# Patient Record
Sex: MALE | Race: WHITE | NOT HISPANIC OR LATINO | Employment: OTHER
[De-identification: names, ages, dates, MRNs, and addresses within clinical notes are randomized per-mention and may not be internally consistent; named-entity substitution may affect disease eponyms.]

---

## 2017-01-05 ENCOUNTER — RECORDS - HEALTHEAST (OUTPATIENT)
Dept: ADMINISTRATIVE | Facility: OTHER | Age: 77
End: 2017-01-05

## 2017-01-06 ENCOUNTER — RECORDS - HEALTHEAST (OUTPATIENT)
Dept: ADMINISTRATIVE | Facility: OTHER | Age: 77
End: 2017-01-06

## 2017-01-13 ENCOUNTER — RECORDS - HEALTHEAST (OUTPATIENT)
Dept: ADMINISTRATIVE | Facility: OTHER | Age: 77
End: 2017-01-13

## 2017-01-14 ENCOUNTER — COMMUNICATION - HEALTHEAST (OUTPATIENT)
Dept: INTERNAL MEDICINE | Facility: CLINIC | Age: 77
End: 2017-01-14

## 2017-01-14 DIAGNOSIS — E11.9 TYPE 2 DIABETES MELLITUS (H): ICD-10-CM

## 2017-01-27 ENCOUNTER — RECORDS - HEALTHEAST (OUTPATIENT)
Dept: ADMINISTRATIVE | Facility: OTHER | Age: 77
End: 2017-01-27

## 2017-01-28 ENCOUNTER — COMMUNICATION - HEALTHEAST (OUTPATIENT)
Dept: INTERNAL MEDICINE | Facility: CLINIC | Age: 77
End: 2017-01-28

## 2017-01-28 DIAGNOSIS — E11.9 DIABETES MELLITUS, TYPE 2 (H): ICD-10-CM

## 2017-02-03 ENCOUNTER — RECORDS - HEALTHEAST (OUTPATIENT)
Dept: ADMINISTRATIVE | Facility: OTHER | Age: 77
End: 2017-02-03

## 2017-02-04 ENCOUNTER — COMMUNICATION - HEALTHEAST (OUTPATIENT)
Dept: INTERNAL MEDICINE | Facility: CLINIC | Age: 77
End: 2017-02-04

## 2017-02-04 DIAGNOSIS — I10 HTN (HYPERTENSION): ICD-10-CM

## 2017-02-10 ENCOUNTER — RECORDS - HEALTHEAST (OUTPATIENT)
Dept: ADMINISTRATIVE | Facility: OTHER | Age: 77
End: 2017-02-10

## 2017-02-24 ENCOUNTER — RECORDS - HEALTHEAST (OUTPATIENT)
Dept: ADMINISTRATIVE | Facility: OTHER | Age: 77
End: 2017-02-24

## 2017-03-02 ENCOUNTER — RECORDS - HEALTHEAST (OUTPATIENT)
Dept: ADMINISTRATIVE | Facility: OTHER | Age: 77
End: 2017-03-02

## 2017-03-16 ENCOUNTER — RECORDS - HEALTHEAST (OUTPATIENT)
Dept: ADMINISTRATIVE | Facility: OTHER | Age: 77
End: 2017-03-16

## 2017-03-27 ENCOUNTER — RECORDS - HEALTHEAST (OUTPATIENT)
Dept: ADMINISTRATIVE | Facility: OTHER | Age: 77
End: 2017-03-27

## 2017-05-01 ENCOUNTER — COMMUNICATION - HEALTHEAST (OUTPATIENT)
Dept: INTERNAL MEDICINE | Facility: CLINIC | Age: 77
End: 2017-05-01

## 2017-05-01 ENCOUNTER — AMBULATORY - HEALTHEAST (OUTPATIENT)
Dept: SCHEDULING | Facility: CLINIC | Age: 77
End: 2017-05-01

## 2017-05-01 ENCOUNTER — RECORDS - HEALTHEAST (OUTPATIENT)
Dept: ADMINISTRATIVE | Facility: OTHER | Age: 77
End: 2017-05-01

## 2017-05-01 DIAGNOSIS — Z09 FOLLOW UP: ICD-10-CM

## 2017-05-01 DIAGNOSIS — I10 HTN (HYPERTENSION): ICD-10-CM

## 2017-05-02 ENCOUNTER — RECORDS - HEALTHEAST (OUTPATIENT)
Dept: ADMINISTRATIVE | Facility: OTHER | Age: 77
End: 2017-05-02

## 2017-05-03 ENCOUNTER — OFFICE VISIT - HEALTHEAST (OUTPATIENT)
Dept: INTERNAL MEDICINE | Facility: CLINIC | Age: 77
End: 2017-05-03

## 2017-05-03 ENCOUNTER — COMMUNICATION - HEALTHEAST (OUTPATIENT)
Dept: INTERNAL MEDICINE | Facility: CLINIC | Age: 77
End: 2017-05-03

## 2017-05-03 DIAGNOSIS — Z01.818 PRE-OPERATIVE EXAMINATION FOR INTERNAL MEDICINE: ICD-10-CM

## 2017-05-03 DIAGNOSIS — C18.9 COLON CANCER (H): ICD-10-CM

## 2017-05-03 DIAGNOSIS — M10.9 GOUT, UNSPECIFIED: ICD-10-CM

## 2017-05-03 DIAGNOSIS — I66.9 CEREBRAL ARTERY OCCLUSION: ICD-10-CM

## 2017-05-03 DIAGNOSIS — I77.9 BILATERAL CAROTID ARTERY DISEASE (H): ICD-10-CM

## 2017-05-03 DIAGNOSIS — I10 ESSENTIAL HYPERTENSION: ICD-10-CM

## 2017-05-03 DIAGNOSIS — C67.9 MALIGNANT NEOPLASM OF BLADDER (H): ICD-10-CM

## 2017-05-03 DIAGNOSIS — E11.9 TYPE 2 DIABETES MELLITUS WITHOUT COMPLICATION (H): ICD-10-CM

## 2017-05-03 DIAGNOSIS — E53.8 OTHER B-COMPLEX DEFICIENCIES: ICD-10-CM

## 2017-05-03 LAB — HBA1C MFR BLD: 6.8 % (ref 3.5–6)

## 2017-05-03 ASSESSMENT — MIFFLIN-ST. JEOR: SCORE: 1504.6

## 2017-05-08 ENCOUNTER — HOSPITAL ENCOUNTER (OUTPATIENT)
Dept: ULTRASOUND IMAGING | Facility: CLINIC | Age: 77
Discharge: HOME | End: 2017-05-08
Attending: INTERNAL MEDICINE

## 2017-05-08 DIAGNOSIS — I65.29 CAROTID STENOSIS: ICD-10-CM

## 2017-05-08 DIAGNOSIS — I77.9 BILATERAL CAROTID ARTERY DISEASE (H): ICD-10-CM

## 2017-05-14 ENCOUNTER — COMMUNICATION - HEALTHEAST (OUTPATIENT)
Dept: INTERNAL MEDICINE | Facility: CLINIC | Age: 77
End: 2017-05-14

## 2017-05-14 DIAGNOSIS — I66.9 CEREBRAL ARTERY OCCLUSION: ICD-10-CM

## 2017-05-14 DIAGNOSIS — I10 HTN (HYPERTENSION): ICD-10-CM

## 2017-05-15 ENCOUNTER — RECORDS - HEALTHEAST (OUTPATIENT)
Dept: ADMINISTRATIVE | Facility: OTHER | Age: 77
End: 2017-05-15

## 2017-05-16 ENCOUNTER — OFFICE VISIT - HEALTHEAST (OUTPATIENT)
Dept: INTERNAL MEDICINE | Facility: CLINIC | Age: 77
End: 2017-05-16

## 2017-05-16 DIAGNOSIS — R05.9 COUGH: ICD-10-CM

## 2017-05-16 DIAGNOSIS — J02.9 SORE THROAT: ICD-10-CM

## 2017-05-17 ENCOUNTER — AMBULATORY - HEALTHEAST (OUTPATIENT)
Dept: SCHEDULING | Facility: CLINIC | Age: 77
End: 2017-05-17

## 2017-05-17 DIAGNOSIS — C19 RECTOSIGMOID CANCER (H): ICD-10-CM

## 2017-05-30 ENCOUNTER — OFFICE VISIT - HEALTHEAST (OUTPATIENT)
Dept: WOUND CARE | Facility: HOSPITAL | Age: 77
End: 2017-05-30

## 2017-05-30 DIAGNOSIS — C19 RECTOSIGMOID CANCER (H): ICD-10-CM

## 2017-05-31 ENCOUNTER — ANESTHESIA - HEALTHEAST (OUTPATIENT)
Dept: SURGERY | Facility: HOSPITAL | Age: 77
End: 2017-05-31

## 2017-05-31 ASSESSMENT — MIFFLIN-ST. JEOR: SCORE: 1502.78

## 2017-06-01 ENCOUNTER — SURGERY - HEALTHEAST (OUTPATIENT)
Dept: SURGERY | Facility: HOSPITAL | Age: 77
End: 2017-06-01

## 2017-06-01 ASSESSMENT — MIFFLIN-ST. JEOR: SCORE: 1515.49

## 2017-06-02 ASSESSMENT — MIFFLIN-ST. JEOR: SCORE: 1516.39

## 2017-06-03 ASSESSMENT — MIFFLIN-ST. JEOR: SCORE: 1477.38

## 2017-06-04 ASSESSMENT — MIFFLIN-ST. JEOR: SCORE: 1513.67

## 2017-06-05 ENCOUNTER — HOME CARE/HOSPICE - HEALTHEAST (OUTPATIENT)
Dept: HOME HEALTH SERVICES | Facility: HOME HEALTH | Age: 77
End: 2017-06-05

## 2017-06-06 ENCOUNTER — HOME CARE/HOSPICE - HEALTHEAST (OUTPATIENT)
Dept: HOME HEALTH SERVICES | Facility: HOME HEALTH | Age: 77
End: 2017-06-06

## 2017-06-06 ASSESSMENT — MIFFLIN-ST. JEOR: SCORE: 1486.9

## 2017-06-08 ENCOUNTER — HOME CARE/HOSPICE - HEALTHEAST (OUTPATIENT)
Dept: HOME HEALTH SERVICES | Facility: HOME HEALTH | Age: 77
End: 2017-06-08

## 2017-06-08 ENCOUNTER — COMMUNICATION - HEALTHEAST (OUTPATIENT)
Dept: INTERNAL MEDICINE | Facility: CLINIC | Age: 77
End: 2017-06-08

## 2017-06-08 DIAGNOSIS — C20 RECTAL CANCER (H): ICD-10-CM

## 2017-06-11 ENCOUNTER — HOME CARE/HOSPICE - HEALTHEAST (OUTPATIENT)
Dept: HOME HEALTH SERVICES | Facility: HOME HEALTH | Age: 77
End: 2017-06-11

## 2017-06-12 ENCOUNTER — HOME CARE/HOSPICE - HEALTHEAST (OUTPATIENT)
Dept: HOME HEALTH SERVICES | Facility: HOME HEALTH | Age: 77
End: 2017-06-12

## 2017-06-12 ENCOUNTER — RECORDS - HEALTHEAST (OUTPATIENT)
Dept: ADMINISTRATIVE | Facility: OTHER | Age: 77
End: 2017-06-12

## 2017-06-13 ENCOUNTER — OFFICE VISIT - HEALTHEAST (OUTPATIENT)
Dept: WOUND CARE | Facility: HOSPITAL | Age: 77
End: 2017-06-13

## 2017-06-13 DIAGNOSIS — C20 RECTAL CANCER (H): ICD-10-CM

## 2017-06-15 ENCOUNTER — COMMUNICATION - HEALTHEAST (OUTPATIENT)
Dept: HOME HEALTH SERVICES | Facility: HOME HEALTH | Age: 77
End: 2017-06-15

## 2017-06-15 ENCOUNTER — HOME CARE/HOSPICE - HEALTHEAST (OUTPATIENT)
Dept: HOME HEALTH SERVICES | Facility: HOME HEALTH | Age: 77
End: 2017-06-15

## 2017-06-19 ENCOUNTER — HOME CARE/HOSPICE - HEALTHEAST (OUTPATIENT)
Dept: HOME HEALTH SERVICES | Facility: HOME HEALTH | Age: 77
End: 2017-06-19

## 2017-06-20 ENCOUNTER — HOME CARE/HOSPICE - HEALTHEAST (OUTPATIENT)
Dept: HOME HEALTH SERVICES | Facility: HOME HEALTH | Age: 77
End: 2017-06-20

## 2017-06-21 ENCOUNTER — RECORDS - HEALTHEAST (OUTPATIENT)
Dept: ADMINISTRATIVE | Facility: OTHER | Age: 77
End: 2017-06-21

## 2017-06-22 ENCOUNTER — HOME CARE/HOSPICE - HEALTHEAST (OUTPATIENT)
Dept: HOME HEALTH SERVICES | Facility: HOME HEALTH | Age: 77
End: 2017-06-22

## 2017-06-23 ENCOUNTER — RECORDS - HEALTHEAST (OUTPATIENT)
Dept: ADMINISTRATIVE | Facility: OTHER | Age: 77
End: 2017-06-23

## 2017-06-23 ENCOUNTER — HOME CARE/HOSPICE - HEALTHEAST (OUTPATIENT)
Dept: HOME HEALTH SERVICES | Facility: HOME HEALTH | Age: 77
End: 2017-06-23

## 2017-06-25 ENCOUNTER — HOME CARE/HOSPICE - HEALTHEAST (OUTPATIENT)
Dept: HOME HEALTH SERVICES | Facility: HOME HEALTH | Age: 77
End: 2017-06-25

## 2017-06-26 ENCOUNTER — HOME CARE/HOSPICE - HEALTHEAST (OUTPATIENT)
Dept: HOME HEALTH SERVICES | Facility: HOME HEALTH | Age: 77
End: 2017-06-26

## 2017-06-26 ENCOUNTER — COMMUNICATION - HEALTHEAST (OUTPATIENT)
Dept: HOME HEALTH SERVICES | Facility: HOME HEALTH | Age: 77
End: 2017-06-26

## 2017-06-29 ENCOUNTER — HOME CARE/HOSPICE - HEALTHEAST (OUTPATIENT)
Dept: HOME HEALTH SERVICES | Facility: HOME HEALTH | Age: 77
End: 2017-06-29

## 2017-06-29 ENCOUNTER — OFFICE VISIT - HEALTHEAST (OUTPATIENT)
Dept: INTERNAL MEDICINE | Facility: CLINIC | Age: 77
End: 2017-06-29

## 2017-06-29 ENCOUNTER — COMMUNICATION - HEALTHEAST (OUTPATIENT)
Dept: HOME HEALTH SERVICES | Facility: HOME HEALTH | Age: 77
End: 2017-06-29

## 2017-06-29 DIAGNOSIS — E11.9 TYPE 2 DIABETES MELLITUS WITHOUT COMPLICATION (H): ICD-10-CM

## 2017-06-29 DIAGNOSIS — I10 ESSENTIAL HYPERTENSION: ICD-10-CM

## 2017-06-29 DIAGNOSIS — E87.5 HYPERKALEMIA: ICD-10-CM

## 2017-07-03 ENCOUNTER — COMMUNICATION - HEALTHEAST (OUTPATIENT)
Dept: INTERNAL MEDICINE | Facility: CLINIC | Age: 77
End: 2017-07-03

## 2017-07-03 ENCOUNTER — HOME CARE/HOSPICE - HEALTHEAST (OUTPATIENT)
Dept: HOME HEALTH SERVICES | Facility: HOME HEALTH | Age: 77
End: 2017-07-03

## 2017-07-05 ENCOUNTER — HOSPITAL ENCOUNTER (OUTPATIENT)
Dept: RADIOLOGY | Facility: HOSPITAL | Age: 77
Discharge: HOME | End: 2017-07-05
Attending: COLON & RECTAL SURGERY

## 2017-07-05 DIAGNOSIS — Z93.2 ILEOSTOMY IN PLACE (H): ICD-10-CM

## 2017-07-06 ENCOUNTER — HOME CARE/HOSPICE - HEALTHEAST (OUTPATIENT)
Dept: HOME HEALTH SERVICES | Facility: HOME HEALTH | Age: 77
End: 2017-07-06

## 2017-07-07 ENCOUNTER — HOME CARE/HOSPICE - HEALTHEAST (OUTPATIENT)
Dept: HOME HEALTH SERVICES | Facility: HOME HEALTH | Age: 77
End: 2017-07-07

## 2017-07-10 ENCOUNTER — HOME CARE/HOSPICE - HEALTHEAST (OUTPATIENT)
Dept: HOME HEALTH SERVICES | Facility: HOME HEALTH | Age: 77
End: 2017-07-10

## 2017-07-11 ENCOUNTER — OFFICE VISIT - HEALTHEAST (OUTPATIENT)
Dept: INTERNAL MEDICINE | Facility: CLINIC | Age: 77
End: 2017-07-11

## 2017-07-11 ENCOUNTER — COMMUNICATION - HEALTHEAST (OUTPATIENT)
Dept: INTERNAL MEDICINE | Facility: CLINIC | Age: 77
End: 2017-07-11

## 2017-07-11 DIAGNOSIS — C20 RECTAL CANCER (H): ICD-10-CM

## 2017-07-11 DIAGNOSIS — Z01.818 PREOP EXAM FOR INTERNAL MEDICINE: ICD-10-CM

## 2017-07-11 DIAGNOSIS — I10 ESSENTIAL HYPERTENSION: ICD-10-CM

## 2017-07-11 DIAGNOSIS — E11.9 TYPE 2 DIABETES MELLITUS WITHOUT COMPLICATION (H): ICD-10-CM

## 2017-07-11 DIAGNOSIS — R11.0 NAUSEA: ICD-10-CM

## 2017-07-11 DIAGNOSIS — I77.9 BILATERAL CAROTID ARTERY DISEASE (H): ICD-10-CM

## 2017-07-12 ENCOUNTER — HOME CARE/HOSPICE - HEALTHEAST (OUTPATIENT)
Dept: HOME HEALTH SERVICES | Facility: HOME HEALTH | Age: 77
End: 2017-07-12

## 2017-07-13 ENCOUNTER — HOME CARE/HOSPICE - HEALTHEAST (OUTPATIENT)
Dept: HOME HEALTH SERVICES | Facility: HOME HEALTH | Age: 77
End: 2017-07-13

## 2017-07-20 ENCOUNTER — COMMUNICATION - HEALTHEAST (OUTPATIENT)
Dept: INTERNAL MEDICINE | Facility: CLINIC | Age: 77
End: 2017-07-20

## 2017-07-22 ENCOUNTER — HOME CARE/HOSPICE - HEALTHEAST (OUTPATIENT)
Dept: HOME HEALTH SERVICES | Facility: HOME HEALTH | Age: 77
End: 2017-07-22

## 2017-07-23 ENCOUNTER — COMMUNICATION - HEALTHEAST (OUTPATIENT)
Dept: HOME HEALTH SERVICES | Facility: HOME HEALTH | Age: 77
End: 2017-07-23

## 2017-07-23 ENCOUNTER — HOME CARE/HOSPICE - HEALTHEAST (OUTPATIENT)
Dept: HOME HEALTH SERVICES | Facility: HOME HEALTH | Age: 77
End: 2017-07-23

## 2017-07-24 ENCOUNTER — OFFICE VISIT - HEALTHEAST (OUTPATIENT)
Dept: INTERNAL MEDICINE | Facility: CLINIC | Age: 77
End: 2017-07-24

## 2017-07-24 ENCOUNTER — AMBULATORY - HEALTHEAST (OUTPATIENT)
Dept: INTERNAL MEDICINE | Facility: CLINIC | Age: 77
End: 2017-07-24

## 2017-07-24 DIAGNOSIS — E44.0 MALNUTRITION OF MODERATE DEGREE (H): ICD-10-CM

## 2017-07-24 DIAGNOSIS — C20 RECTAL CANCER (H): ICD-10-CM

## 2017-07-24 DIAGNOSIS — E87.20 METABOLIC ACIDOSIS: ICD-10-CM

## 2017-07-24 DIAGNOSIS — I10 ESSENTIAL HYPERTENSION: ICD-10-CM

## 2017-07-24 DIAGNOSIS — N17.9 PRERENAL ACUTE RENAL FAILURE (H): ICD-10-CM

## 2017-07-24 DIAGNOSIS — E87.5 HYPERKALEMIA: ICD-10-CM

## 2017-07-25 ENCOUNTER — COMMUNICATION - HEALTHEAST (OUTPATIENT)
Dept: INTERNAL MEDICINE | Facility: CLINIC | Age: 77
End: 2017-07-25

## 2017-07-25 ENCOUNTER — HOME CARE/HOSPICE - HEALTHEAST (OUTPATIENT)
Dept: HOME HEALTH SERVICES | Facility: HOME HEALTH | Age: 77
End: 2017-07-25

## 2017-07-26 ENCOUNTER — HOME CARE/HOSPICE - HEALTHEAST (OUTPATIENT)
Dept: HOME HEALTH SERVICES | Facility: HOME HEALTH | Age: 77
End: 2017-07-26

## 2017-07-26 ENCOUNTER — COMMUNICATION - HEALTHEAST (OUTPATIENT)
Dept: INTERNAL MEDICINE | Facility: CLINIC | Age: 77
End: 2017-07-26

## 2017-07-27 ENCOUNTER — COMMUNICATION - HEALTHEAST (OUTPATIENT)
Dept: INTERNAL MEDICINE | Facility: CLINIC | Age: 77
End: 2017-07-27

## 2017-07-27 ENCOUNTER — HOME CARE/HOSPICE - HEALTHEAST (OUTPATIENT)
Dept: HOME HEALTH SERVICES | Facility: HOME HEALTH | Age: 77
End: 2017-07-27

## 2017-07-28 ENCOUNTER — HOME CARE/HOSPICE - HEALTHEAST (OUTPATIENT)
Dept: HOME HEALTH SERVICES | Facility: HOME HEALTH | Age: 77
End: 2017-07-28

## 2017-07-28 ENCOUNTER — RECORDS - HEALTHEAST (OUTPATIENT)
Dept: ADMINISTRATIVE | Facility: OTHER | Age: 77
End: 2017-07-28

## 2017-07-29 ENCOUNTER — HOME CARE/HOSPICE - HEALTHEAST (OUTPATIENT)
Dept: HOME HEALTH SERVICES | Facility: HOME HEALTH | Age: 77
End: 2017-07-29

## 2017-07-31 ENCOUNTER — HOME CARE/HOSPICE - HEALTHEAST (OUTPATIENT)
Dept: HOME HEALTH SERVICES | Facility: HOME HEALTH | Age: 77
End: 2017-07-31

## 2017-08-03 ENCOUNTER — OFFICE VISIT - HEALTHEAST (OUTPATIENT)
Dept: INTERNAL MEDICINE | Facility: CLINIC | Age: 77
End: 2017-08-03

## 2017-08-03 DIAGNOSIS — Z01.818 PREOP EXAM FOR INTERNAL MEDICINE: ICD-10-CM

## 2017-08-03 DIAGNOSIS — E11.9 TYPE 2 DIABETES MELLITUS WITHOUT COMPLICATION (H): ICD-10-CM

## 2017-08-03 DIAGNOSIS — E46 MALNUTRITION (H): ICD-10-CM

## 2017-08-03 DIAGNOSIS — C20 RECTAL CANCER (H): ICD-10-CM

## 2017-08-03 DIAGNOSIS — I10 ESSENTIAL HYPERTENSION: ICD-10-CM

## 2017-08-03 DIAGNOSIS — N17.9 AKI (ACUTE KIDNEY INJURY) (H): ICD-10-CM

## 2017-08-04 ENCOUNTER — HOME CARE/HOSPICE - HEALTHEAST (OUTPATIENT)
Dept: HOME HEALTH SERVICES | Facility: HOME HEALTH | Age: 77
End: 2017-08-04

## 2017-08-14 ENCOUNTER — ANESTHESIA - HEALTHEAST (OUTPATIENT)
Dept: SURGERY | Facility: HOSPITAL | Age: 77
End: 2017-08-14

## 2017-08-14 ENCOUNTER — COMMUNICATION - HEALTHEAST (OUTPATIENT)
Dept: INTERNAL MEDICINE | Facility: CLINIC | Age: 77
End: 2017-08-14

## 2017-08-14 ASSESSMENT — MIFFLIN-ST. JEOR: SCORE: 1452.89

## 2017-08-15 ENCOUNTER — SURGERY - HEALTHEAST (OUTPATIENT)
Dept: SURGERY | Facility: HOSPITAL | Age: 77
End: 2017-08-15

## 2017-08-15 ASSESSMENT — MIFFLIN-ST. JEOR: SCORE: 1441.31

## 2017-08-17 ASSESSMENT — MIFFLIN-ST. JEOR: SCORE: 1441.31

## 2017-08-18 ENCOUNTER — HOME CARE/HOSPICE - HEALTHEAST (OUTPATIENT)
Dept: HOME HEALTH SERVICES | Facility: HOME HEALTH | Age: 77
End: 2017-08-18

## 2017-08-19 ENCOUNTER — COMMUNICATION - HEALTHEAST (OUTPATIENT)
Dept: INTERNAL MEDICINE | Facility: CLINIC | Age: 77
End: 2017-08-19

## 2017-08-21 ENCOUNTER — COMMUNICATION - HEALTHEAST (OUTPATIENT)
Dept: INTERNAL MEDICINE | Facility: CLINIC | Age: 77
End: 2017-08-21

## 2017-08-21 DIAGNOSIS — I10 ESSENTIAL HYPERTENSION WITH GOAL BLOOD PRESSURE LESS THAN 130/80: ICD-10-CM

## 2017-08-23 ENCOUNTER — COMMUNICATION - HEALTHEAST (OUTPATIENT)
Dept: INTERNAL MEDICINE | Facility: CLINIC | Age: 77
End: 2017-08-23

## 2017-08-23 DIAGNOSIS — E11.9 TYPE 2 DIABETES MELLITUS WITHOUT COMPLICATION (H): ICD-10-CM

## 2017-08-25 ENCOUNTER — OFFICE VISIT - HEALTHEAST (OUTPATIENT)
Dept: INTERNAL MEDICINE | Facility: CLINIC | Age: 77
End: 2017-08-25

## 2017-08-25 ENCOUNTER — HOSPITAL ENCOUNTER (OUTPATIENT)
Dept: ADMINISTRATIVE | Facility: OTHER | Age: 77
Discharge: HOME | End: 2017-08-25

## 2017-08-25 DIAGNOSIS — R19.7 DIARRHEA: ICD-10-CM

## 2017-09-06 ENCOUNTER — RECORDS - HEALTHEAST (OUTPATIENT)
Dept: ADMINISTRATIVE | Facility: OTHER | Age: 77
End: 2017-09-06

## 2017-09-11 ENCOUNTER — COMMUNICATION - HEALTHEAST (OUTPATIENT)
Dept: INTERNAL MEDICINE | Facility: CLINIC | Age: 77
End: 2017-09-11

## 2017-09-11 DIAGNOSIS — E11.9 TYPE 2 DIABETES MELLITUS WITHOUT COMPLICATION (H): ICD-10-CM

## 2017-09-14 ENCOUNTER — RECORDS - HEALTHEAST (OUTPATIENT)
Dept: ADMINISTRATIVE | Facility: OTHER | Age: 77
End: 2017-09-14

## 2017-09-29 ENCOUNTER — AMBULATORY - HEALTHEAST (OUTPATIENT)
Dept: INTERNAL MEDICINE | Facility: CLINIC | Age: 77
End: 2017-09-29

## 2017-09-29 DIAGNOSIS — E53.8 B12 DEFICIENCY: ICD-10-CM

## 2017-09-29 DIAGNOSIS — E53.8 OTHER B-COMPLEX DEFICIENCIES: ICD-10-CM

## 2017-10-03 ENCOUNTER — AMBULATORY - HEALTHEAST (OUTPATIENT)
Dept: NURSING | Facility: CLINIC | Age: 77
End: 2017-10-03

## 2017-10-07 ENCOUNTER — COMMUNICATION - HEALTHEAST (OUTPATIENT)
Dept: INTERNAL MEDICINE | Facility: CLINIC | Age: 77
End: 2017-10-07

## 2017-10-09 ENCOUNTER — COMMUNICATION - HEALTHEAST (OUTPATIENT)
Dept: INTERNAL MEDICINE | Facility: CLINIC | Age: 77
End: 2017-10-09

## 2017-10-09 DIAGNOSIS — I66.9 CEREBRAL ARTERY OCCLUSION: ICD-10-CM

## 2017-11-26 ENCOUNTER — COMMUNICATION - HEALTHEAST (OUTPATIENT)
Dept: INTERNAL MEDICINE | Facility: CLINIC | Age: 77
End: 2017-11-26

## 2017-11-26 DIAGNOSIS — I10 HTN (HYPERTENSION): ICD-10-CM

## 2017-12-07 ENCOUNTER — OFFICE VISIT - HEALTHEAST (OUTPATIENT)
Dept: INTERNAL MEDICINE | Facility: CLINIC | Age: 77
End: 2017-12-07

## 2017-12-07 DIAGNOSIS — I66.9 CEREBRAL ARTERY OCCLUSION: ICD-10-CM

## 2017-12-07 DIAGNOSIS — E11.9 TYPE 2 DIABETES MELLITUS WITHOUT COMPLICATION (H): ICD-10-CM

## 2017-12-07 DIAGNOSIS — I10 ESSENTIAL HYPERTENSION: ICD-10-CM

## 2017-12-07 DIAGNOSIS — E53.8 B12 DEFICIENCY: ICD-10-CM

## 2017-12-07 LAB — HBA1C MFR BLD: 8.7 % (ref 3.5–6)

## 2018-03-02 ENCOUNTER — RECORDS - HEALTHEAST (OUTPATIENT)
Dept: ADMINISTRATIVE | Facility: OTHER | Age: 78
End: 2018-03-02

## 2018-03-06 ENCOUNTER — RECORDS - HEALTHEAST (OUTPATIENT)
Dept: ADMINISTRATIVE | Facility: OTHER | Age: 78
End: 2018-03-06

## 2018-03-15 ENCOUNTER — RECORDS - HEALTHEAST (OUTPATIENT)
Dept: ADMINISTRATIVE | Facility: OTHER | Age: 78
End: 2018-03-15

## 2018-03-19 ENCOUNTER — HOSPITAL ENCOUNTER (OUTPATIENT)
Dept: CT IMAGING | Facility: CLINIC | Age: 78
Discharge: HOME | End: 2018-03-19
Attending: COLON & RECTAL SURGERY

## 2018-03-19 DIAGNOSIS — Z85.048 PERSONAL HISTORY OF RECTAL CANCER: ICD-10-CM

## 2018-03-19 LAB
CREAT BLD-MCNC: 1.3 MG/DL
CREAT BLD-MCNC: 1.3 MG/DL (ref 0.7–1.3)
POC GFR AMER AF HE - HISTORICAL: >60 ML/MIN/1.73M2
POC GFR NON AMER AF HE - HISTORICAL: 53 ML/MIN/1.73M2

## 2018-03-30 ENCOUNTER — OFFICE VISIT - HEALTHEAST (OUTPATIENT)
Dept: INTERNAL MEDICINE | Facility: CLINIC | Age: 78
End: 2018-03-30

## 2018-03-30 DIAGNOSIS — I66.9 CEREBRAL ARTERY OCCLUSION: ICD-10-CM

## 2018-03-30 DIAGNOSIS — C67.9 MALIGNANT NEOPLASM OF BLADDER (H): ICD-10-CM

## 2018-03-30 DIAGNOSIS — I10 ESSENTIAL HYPERTENSION: ICD-10-CM

## 2018-03-30 DIAGNOSIS — E11.9 TYPE 2 DIABETES MELLITUS WITHOUT COMPLICATION (H): ICD-10-CM

## 2018-03-30 LAB — HBA1C MFR BLD: 7.4 % (ref 3.5–6)

## 2018-04-12 ENCOUNTER — OFFICE VISIT - HEALTHEAST (OUTPATIENT)
Dept: INTERNAL MEDICINE | Facility: CLINIC | Age: 78
End: 2018-04-12

## 2018-04-12 DIAGNOSIS — Z51.89 ENCOUNTER FOR WOUND RE-CHECK: ICD-10-CM

## 2018-04-16 ENCOUNTER — COMMUNICATION - HEALTHEAST (OUTPATIENT)
Dept: INTERNAL MEDICINE | Facility: CLINIC | Age: 78
End: 2018-04-16

## 2018-04-16 DIAGNOSIS — I66.9 CEREBRAL ARTERY OCCLUSION: ICD-10-CM

## 2018-04-19 ENCOUNTER — COMMUNICATION - HEALTHEAST (OUTPATIENT)
Dept: INTERNAL MEDICINE | Facility: CLINIC | Age: 78
End: 2018-04-19

## 2018-04-20 ENCOUNTER — OFFICE VISIT - HEALTHEAST (OUTPATIENT)
Dept: INTERNAL MEDICINE | Facility: CLINIC | Age: 78
End: 2018-04-20

## 2018-04-20 DIAGNOSIS — S91.109S OPEN TOE WOUND, SEQUELA: ICD-10-CM

## 2018-04-27 ENCOUNTER — OFFICE VISIT - HEALTHEAST (OUTPATIENT)
Dept: INTERNAL MEDICINE | Facility: CLINIC | Age: 78
End: 2018-04-27

## 2018-04-27 DIAGNOSIS — S91.109S OPEN TOE WOUND, SEQUELA: ICD-10-CM

## 2018-05-25 ENCOUNTER — AMBULATORY - HEALTHEAST (OUTPATIENT)
Dept: LAB | Facility: CLINIC | Age: 78
End: 2018-05-25

## 2018-05-25 DIAGNOSIS — Z85.048 PERSONAL HISTORY OF MALIGNANT NEOPLASM OF RECTUM, RECTOSIGMOID JUNCTION, AND ANUS: ICD-10-CM

## 2018-05-25 LAB — CEA SERPL-MCNC: 3.9 NG/ML (ref 0–3)

## 2018-05-30 ENCOUNTER — OFFICE VISIT - HEALTHEAST (OUTPATIENT)
Dept: INTERNAL MEDICINE | Facility: CLINIC | Age: 78
End: 2018-05-30

## 2018-05-30 DIAGNOSIS — Z86.73 HISTORY OF STROKE: ICD-10-CM

## 2018-05-30 DIAGNOSIS — Z85.51 HISTORY OF BLADDER CANCER: ICD-10-CM

## 2018-05-30 DIAGNOSIS — I65.22 CAROTID ARTERY STENOSIS, ASYMPTOMATIC, LEFT: ICD-10-CM

## 2018-05-30 DIAGNOSIS — Z85.048 HISTORY OF RECTAL CANCER: ICD-10-CM

## 2018-05-30 DIAGNOSIS — Z51.81 MEDICATION MONITORING ENCOUNTER: ICD-10-CM

## 2018-05-30 DIAGNOSIS — E11.9 TYPE 2 DIABETES MELLITUS WITHOUT COMPLICATION, WITHOUT LONG-TERM CURRENT USE OF INSULIN (H): ICD-10-CM

## 2018-05-30 DIAGNOSIS — E53.8 B12 DEFICIENCY: ICD-10-CM

## 2018-05-30 DIAGNOSIS — I10 ESSENTIAL HYPERTENSION: ICD-10-CM

## 2018-05-30 LAB
ALBUMIN SERPL-MCNC: 3.5 G/DL (ref 3.5–5)
ALP SERPL-CCNC: 98 U/L (ref 45–120)
ALT SERPL W P-5'-P-CCNC: 12 U/L (ref 0–45)
ANION GAP SERPL CALCULATED.3IONS-SCNC: 12 MMOL/L (ref 5–18)
AST SERPL W P-5'-P-CCNC: 18 U/L (ref 0–40)
BILIRUB SERPL-MCNC: 0.4 MG/DL (ref 0–1)
BUN SERPL-MCNC: 23 MG/DL (ref 8–28)
CALCIUM SERPL-MCNC: 9.1 MG/DL (ref 8.5–10.5)
CHLORIDE BLD-SCNC: 107 MMOL/L (ref 98–107)
CO2 SERPL-SCNC: 19 MMOL/L (ref 22–31)
CREAT SERPL-MCNC: 1.29 MG/DL (ref 0.7–1.3)
ERYTHROCYTE [DISTWIDTH] IN BLOOD BY AUTOMATED COUNT: 13 % (ref 11–14.5)
FASTING STATUS PATIENT QL REPORTED: YES
GFR SERPL CREATININE-BSD FRML MDRD: 54 ML/MIN/1.73M2
GLUCOSE BLD-MCNC: 149 MG/DL (ref 70–125)
HCT VFR BLD AUTO: 37.2 % (ref 40–54)
HDLC SERPL-MCNC: 36 MG/DL
HGB BLD-MCNC: 12.4 G/DL (ref 14–18)
LDLC SERPL CALC-MCNC: 116 MG/DL
MCH RBC QN AUTO: 31.6 PG (ref 27–34)
MCHC RBC AUTO-ENTMCNC: 33.3 G/DL (ref 32–36)
MCV RBC AUTO: 95 FL (ref 80–100)
PLATELET # BLD AUTO: 206 THOU/UL (ref 140–440)
PMV BLD AUTO: 6.9 FL (ref 7–10)
POTASSIUM BLD-SCNC: 4.8 MMOL/L (ref 3.5–5)
PROT SERPL-MCNC: 6.4 G/DL (ref 6–8)
RBC # BLD AUTO: 3.92 MILL/UL (ref 4.4–6.2)
SODIUM SERPL-SCNC: 138 MMOL/L (ref 136–145)
WBC: 5 THOU/UL (ref 4–11)

## 2018-05-30 ASSESSMENT — MIFFLIN-ST. JEOR: SCORE: 1489.18

## 2018-05-31 ENCOUNTER — COMMUNICATION - HEALTHEAST (OUTPATIENT)
Dept: INTERNAL MEDICINE | Facility: CLINIC | Age: 78
End: 2018-05-31

## 2018-05-31 ENCOUNTER — HOSPITAL ENCOUNTER (OUTPATIENT)
Dept: ULTRASOUND IMAGING | Facility: CLINIC | Age: 78
Discharge: HOME | End: 2018-05-31
Attending: INTERNAL MEDICINE

## 2018-05-31 DIAGNOSIS — I65.22 CAROTID ARTERY STENOSIS, ASYMPTOMATIC, LEFT: ICD-10-CM

## 2018-05-31 DIAGNOSIS — E11.9 TYPE 2 DIABETES MELLITUS WITHOUT COMPLICATION, WITHOUT LONG-TERM CURRENT USE OF INSULIN (H): ICD-10-CM

## 2018-06-01 ENCOUNTER — COMMUNICATION - HEALTHEAST (OUTPATIENT)
Dept: INTERNAL MEDICINE | Facility: CLINIC | Age: 78
End: 2018-06-01

## 2018-06-07 ENCOUNTER — COMMUNICATION - HEALTHEAST (OUTPATIENT)
Dept: INTERNAL MEDICINE | Facility: CLINIC | Age: 78
End: 2018-06-07

## 2018-06-07 DIAGNOSIS — E11.9 TYPE 2 DIABETES MELLITUS (H): ICD-10-CM

## 2018-06-12 ENCOUNTER — OFFICE VISIT - HEALTHEAST (OUTPATIENT)
Dept: INTERNAL MEDICINE | Facility: CLINIC | Age: 78
End: 2018-06-12

## 2018-06-12 DIAGNOSIS — E53.8 B12 DEFICIENCY: ICD-10-CM

## 2018-06-12 DIAGNOSIS — Z86.73 HISTORY OF STROKE: ICD-10-CM

## 2018-06-12 DIAGNOSIS — Z51.81 MEDICATION MONITORING ENCOUNTER: ICD-10-CM

## 2018-06-12 DIAGNOSIS — I10 ESSENTIAL HYPERTENSION: ICD-10-CM

## 2018-06-12 DIAGNOSIS — E11.9 TYPE 2 DIABETES MELLITUS WITHOUT COMPLICATION, WITHOUT LONG-TERM CURRENT USE OF INSULIN (H): ICD-10-CM

## 2018-06-12 DIAGNOSIS — I65.22 CAROTID ARTERY STENOSIS, ASYMPTOMATIC, LEFT: ICD-10-CM

## 2018-06-12 DIAGNOSIS — Z85.048 HISTORY OF RECTAL CANCER: ICD-10-CM

## 2018-06-12 DIAGNOSIS — Z85.51 HISTORY OF BLADDER CANCER: ICD-10-CM

## 2018-06-12 LAB
ANION GAP SERPL CALCULATED.3IONS-SCNC: 7 MMOL/L (ref 5–18)
BUN SERPL-MCNC: 30 MG/DL (ref 8–28)
CALCIUM SERPL-MCNC: 8.9 MG/DL (ref 8.5–10.5)
CHLORIDE BLD-SCNC: 105 MMOL/L (ref 98–107)
CO2 SERPL-SCNC: 26 MMOL/L (ref 22–31)
CREAT SERPL-MCNC: 1.35 MG/DL (ref 0.7–1.3)
GFR SERPL CREATININE-BSD FRML MDRD: 51 ML/MIN/1.73M2
GLUCOSE BLD-MCNC: 222 MG/DL (ref 70–125)
POTASSIUM BLD-SCNC: 4.3 MMOL/L (ref 3.5–5)
SODIUM SERPL-SCNC: 138 MMOL/L (ref 136–145)

## 2018-06-12 ASSESSMENT — MIFFLIN-ST. JEOR: SCORE: 1498.25

## 2018-06-13 ENCOUNTER — COMMUNICATION - HEALTHEAST (OUTPATIENT)
Dept: INTERNAL MEDICINE | Facility: CLINIC | Age: 78
End: 2018-06-13

## 2018-06-14 ENCOUNTER — RECORDS - HEALTHEAST (OUTPATIENT)
Dept: ADMINISTRATIVE | Facility: OTHER | Age: 78
End: 2018-06-14

## 2018-07-17 ENCOUNTER — OFFICE VISIT - HEALTHEAST (OUTPATIENT)
Dept: INTERNAL MEDICINE | Facility: CLINIC | Age: 78
End: 2018-07-17

## 2018-07-17 DIAGNOSIS — E53.8 B12 DEFICIENCY: ICD-10-CM

## 2018-07-17 DIAGNOSIS — E11.40 CONTROLLED TYPE 2 DIABETES WITH NEUROPATHY (H): ICD-10-CM

## 2018-07-17 DIAGNOSIS — Z86.73 HISTORY OF STROKE: ICD-10-CM

## 2018-07-17 DIAGNOSIS — Z85.51 HISTORY OF BLADDER CANCER: ICD-10-CM

## 2018-07-17 DIAGNOSIS — Z85.048 HISTORY OF RECTAL CANCER: ICD-10-CM

## 2018-07-17 DIAGNOSIS — F41.9 ANXIETY: ICD-10-CM

## 2018-07-17 DIAGNOSIS — I10 ESSENTIAL HYPERTENSION: ICD-10-CM

## 2018-07-17 DIAGNOSIS — I65.22 CAROTID ARTERY STENOSIS, ASYMPTOMATIC, LEFT: ICD-10-CM

## 2018-07-17 DIAGNOSIS — Z51.81 MEDICATION MONITORING ENCOUNTER: ICD-10-CM

## 2018-07-17 LAB
ALBUMIN SERPL-MCNC: 3.6 G/DL (ref 3.5–5)
ALP SERPL-CCNC: 87 U/L (ref 45–120)
ALT SERPL W P-5'-P-CCNC: 10 U/L (ref 0–45)
ANION GAP SERPL CALCULATED.3IONS-SCNC: 12 MMOL/L (ref 5–18)
AST SERPL W P-5'-P-CCNC: 11 U/L (ref 0–40)
BILIRUB SERPL-MCNC: 0.3 MG/DL (ref 0–1)
BUN SERPL-MCNC: 26 MG/DL (ref 8–28)
CALCIUM SERPL-MCNC: 8.9 MG/DL (ref 8.5–10.5)
CHLORIDE BLD-SCNC: 109 MMOL/L (ref 98–107)
CO2 SERPL-SCNC: 19 MMOL/L (ref 22–31)
CREAT SERPL-MCNC: 1.39 MG/DL (ref 0.7–1.3)
GFR SERPL CREATININE-BSD FRML MDRD: 49 ML/MIN/1.73M2
GLUCOSE BLD-MCNC: 157 MG/DL (ref 70–125)
HBA1C MFR BLD: 6.9 % (ref 3.5–6)
POTASSIUM BLD-SCNC: 4.7 MMOL/L (ref 3.5–5)
PROT SERPL-MCNC: 6 G/DL (ref 6–8)
SODIUM SERPL-SCNC: 140 MMOL/L (ref 136–145)

## 2018-07-17 ASSESSMENT — MIFFLIN-ST. JEOR: SCORE: 1498.25

## 2018-07-18 ENCOUNTER — COMMUNICATION - HEALTHEAST (OUTPATIENT)
Dept: INTERNAL MEDICINE | Facility: CLINIC | Age: 78
End: 2018-07-18

## 2018-07-23 ENCOUNTER — COMMUNICATION - HEALTHEAST (OUTPATIENT)
Dept: SCHEDULING | Facility: CLINIC | Age: 78
End: 2018-07-23

## 2018-08-01 ENCOUNTER — OFFICE VISIT - HEALTHEAST (OUTPATIENT)
Dept: INTERNAL MEDICINE | Facility: CLINIC | Age: 78
End: 2018-08-01

## 2018-08-01 DIAGNOSIS — E11.40 CONTROLLED TYPE 2 DIABETES WITH NEUROPATHY (H): ICD-10-CM

## 2018-08-01 DIAGNOSIS — Z86.73 HISTORY OF STROKE: ICD-10-CM

## 2018-08-01 DIAGNOSIS — Z51.81 MEDICATION MONITORING ENCOUNTER: ICD-10-CM

## 2018-08-01 DIAGNOSIS — I65.22 CAROTID ARTERY STENOSIS, ASYMPTOMATIC, LEFT: ICD-10-CM

## 2018-08-01 DIAGNOSIS — I10 ESSENTIAL HYPERTENSION: ICD-10-CM

## 2018-08-01 ASSESSMENT — MIFFLIN-ST. JEOR: SCORE: 1502.78

## 2018-08-03 ENCOUNTER — COMMUNICATION - HEALTHEAST (OUTPATIENT)
Dept: INTERNAL MEDICINE | Facility: CLINIC | Age: 78
End: 2018-08-03

## 2018-08-05 ENCOUNTER — COMMUNICATION - HEALTHEAST (OUTPATIENT)
Dept: INTERNAL MEDICINE | Facility: CLINIC | Age: 78
End: 2018-08-05

## 2018-08-05 DIAGNOSIS — I66.9 CEREBRAL ARTERY OCCLUSION: ICD-10-CM

## 2018-08-06 ENCOUNTER — RECORDS - HEALTHEAST (OUTPATIENT)
Dept: ADMINISTRATIVE | Facility: OTHER | Age: 78
End: 2018-08-06

## 2018-08-12 ENCOUNTER — COMMUNICATION - HEALTHEAST (OUTPATIENT)
Dept: INTERNAL MEDICINE | Facility: CLINIC | Age: 78
End: 2018-08-12

## 2018-08-12 DIAGNOSIS — I10 HTN (HYPERTENSION): ICD-10-CM

## 2018-08-23 ENCOUNTER — AMBULATORY - HEALTHEAST (OUTPATIENT)
Dept: NURSING | Facility: CLINIC | Age: 78
End: 2018-08-23

## 2018-09-06 ENCOUNTER — RECORDS - HEALTHEAST (OUTPATIENT)
Dept: ADMINISTRATIVE | Facility: OTHER | Age: 78
End: 2018-09-06

## 2018-09-13 ENCOUNTER — RECORDS - HEALTHEAST (OUTPATIENT)
Dept: ADMINISTRATIVE | Facility: OTHER | Age: 78
End: 2018-09-13

## 2018-09-27 ENCOUNTER — OFFICE VISIT - HEALTHEAST (OUTPATIENT)
Dept: INTERNAL MEDICINE | Facility: CLINIC | Age: 78
End: 2018-09-27

## 2018-09-27 DIAGNOSIS — I10 ESSENTIAL HYPERTENSION: ICD-10-CM

## 2018-09-27 DIAGNOSIS — I65.22 CAROTID ARTERY STENOSIS, ASYMPTOMATIC, LEFT: ICD-10-CM

## 2018-09-27 DIAGNOSIS — Z51.81 MEDICATION MONITORING ENCOUNTER: ICD-10-CM

## 2018-09-27 DIAGNOSIS — F41.9 ANXIETY: ICD-10-CM

## 2018-09-27 DIAGNOSIS — E53.8 B12 DEFICIENCY: ICD-10-CM

## 2018-09-27 DIAGNOSIS — E11.40 CONTROLLED TYPE 2 DIABETES WITH NEUROPATHY (H): ICD-10-CM

## 2018-09-27 DIAGNOSIS — Z86.73 HISTORY OF STROKE: ICD-10-CM

## 2018-09-27 LAB
ANION GAP SERPL CALCULATED.3IONS-SCNC: 11 MMOL/L (ref 5–18)
BUN SERPL-MCNC: 30 MG/DL (ref 8–28)
CALCIUM SERPL-MCNC: 9.2 MG/DL (ref 8.5–10.5)
CHLORIDE BLD-SCNC: 107 MMOL/L (ref 98–107)
CO2 SERPL-SCNC: 22 MMOL/L (ref 22–31)
CREAT SERPL-MCNC: 1.51 MG/DL (ref 0.7–1.3)
GFR SERPL CREATININE-BSD FRML MDRD: 45 ML/MIN/1.73M2
GLUCOSE BLD-MCNC: 188 MG/DL (ref 70–125)
POTASSIUM BLD-SCNC: 4.5 MMOL/L (ref 3.5–5)
SODIUM SERPL-SCNC: 140 MMOL/L (ref 136–145)

## 2018-09-28 ENCOUNTER — COMMUNICATION - HEALTHEAST (OUTPATIENT)
Dept: INTERNAL MEDICINE | Facility: CLINIC | Age: 78
End: 2018-09-28

## 2018-10-06 ENCOUNTER — COMMUNICATION - HEALTHEAST (OUTPATIENT)
Dept: INTERNAL MEDICINE | Facility: CLINIC | Age: 78
End: 2018-10-06

## 2018-10-06 DIAGNOSIS — E11.9 TYPE 2 DIABETES MELLITUS WITHOUT COMPLICATION (H): ICD-10-CM

## 2018-10-13 ENCOUNTER — COMMUNICATION - HEALTHEAST (OUTPATIENT)
Dept: INTERNAL MEDICINE | Facility: CLINIC | Age: 78
End: 2018-10-13

## 2018-10-13 DIAGNOSIS — I10 HTN (HYPERTENSION): ICD-10-CM

## 2018-10-14 ENCOUNTER — COMMUNICATION - HEALTHEAST (OUTPATIENT)
Dept: INTERNAL MEDICINE | Facility: CLINIC | Age: 78
End: 2018-10-14

## 2018-10-14 DIAGNOSIS — E11.40 CONTROLLED TYPE 2 DIABETES WITH NEUROPATHY (H): ICD-10-CM

## 2018-11-01 ENCOUNTER — COMMUNICATION - HEALTHEAST (OUTPATIENT)
Dept: INTERNAL MEDICINE | Facility: CLINIC | Age: 78
End: 2018-11-01

## 2018-11-01 DIAGNOSIS — I66.9 CEREBRAL ARTERY OCCLUSION: ICD-10-CM

## 2018-12-12 ENCOUNTER — RECORDS - HEALTHEAST (OUTPATIENT)
Dept: ADMINISTRATIVE | Facility: OTHER | Age: 78
End: 2018-12-12

## 2018-12-19 ENCOUNTER — RECORDS - HEALTHEAST (OUTPATIENT)
Dept: ADMINISTRATIVE | Facility: OTHER | Age: 78
End: 2018-12-19

## 2019-01-12 ENCOUNTER — COMMUNICATION - HEALTHEAST (OUTPATIENT)
Dept: INTERNAL MEDICINE | Facility: CLINIC | Age: 79
End: 2019-01-12

## 2019-01-12 DIAGNOSIS — E11.9 TYPE 2 DIABETES MELLITUS WITHOUT COMPLICATION, WITHOUT LONG-TERM CURRENT USE OF INSULIN (H): ICD-10-CM

## 2019-01-14 ENCOUNTER — OFFICE VISIT - HEALTHEAST (OUTPATIENT)
Dept: INTERNAL MEDICINE | Facility: CLINIC | Age: 79
End: 2019-01-14

## 2019-01-14 DIAGNOSIS — E11.40 CONTROLLED TYPE 2 DIABETES WITH NEUROPATHY (H): ICD-10-CM

## 2019-01-14 DIAGNOSIS — E11.9 TYPE 2 DIABETES MELLITUS WITHOUT COMPLICATION, WITHOUT LONG-TERM CURRENT USE OF INSULIN (H): ICD-10-CM

## 2019-01-14 DIAGNOSIS — E53.8 B12 DEFICIENCY: ICD-10-CM

## 2019-01-14 DIAGNOSIS — I10 ESSENTIAL HYPERTENSION: ICD-10-CM

## 2019-01-14 DIAGNOSIS — I65.22 CAROTID ARTERY STENOSIS, ASYMPTOMATIC, LEFT: ICD-10-CM

## 2019-01-14 DIAGNOSIS — Z51.81 MEDICATION MONITORING ENCOUNTER: ICD-10-CM

## 2019-01-14 DIAGNOSIS — C18.7 MALIGNANT NEOPLASM OF SIGMOID COLON (H): ICD-10-CM

## 2019-01-14 LAB
ALBUMIN SERPL-MCNC: 3.6 G/DL (ref 3.5–5)
ALP SERPL-CCNC: 99 U/L (ref 45–120)
ALT SERPL W P-5'-P-CCNC: 9 U/L (ref 0–45)
ANION GAP SERPL CALCULATED.3IONS-SCNC: 12 MMOL/L (ref 5–18)
AST SERPL W P-5'-P-CCNC: 13 U/L (ref 0–40)
BILIRUB SERPL-MCNC: 0.4 MG/DL (ref 0–1)
BUN SERPL-MCNC: 41 MG/DL (ref 8–28)
CALCIUM SERPL-MCNC: 8.7 MG/DL (ref 8.5–10.5)
CEA SERPL-MCNC: 3.8 NG/ML (ref 0–3)
CHLORIDE BLD-SCNC: 105 MMOL/L (ref 98–107)
CO2 SERPL-SCNC: 19 MMOL/L (ref 22–31)
CREAT SERPL-MCNC: 2.19 MG/DL (ref 0.7–1.3)
GFR SERPL CREATININE-BSD FRML MDRD: 29 ML/MIN/1.73M2
GLUCOSE BLD-MCNC: 173 MG/DL (ref 70–125)
HBA1C MFR BLD: 6.6 % (ref 3.5–6)
POTASSIUM BLD-SCNC: 5 MMOL/L (ref 3.5–5)
PROT SERPL-MCNC: 6.4 G/DL (ref 6–8)
SODIUM SERPL-SCNC: 136 MMOL/L (ref 136–145)

## 2019-01-15 ENCOUNTER — COMMUNICATION - HEALTHEAST (OUTPATIENT)
Dept: INTERNAL MEDICINE | Facility: CLINIC | Age: 79
End: 2019-01-15

## 2019-01-30 ENCOUNTER — COMMUNICATION - HEALTHEAST (OUTPATIENT)
Dept: INTERNAL MEDICINE | Facility: CLINIC | Age: 79
End: 2019-01-30

## 2019-02-01 ENCOUNTER — OFFICE VISIT - HEALTHEAST (OUTPATIENT)
Dept: INTERNAL MEDICINE | Facility: CLINIC | Age: 79
End: 2019-02-01

## 2019-02-01 DIAGNOSIS — Z85.51 HISTORY OF BLADDER CANCER: ICD-10-CM

## 2019-02-01 DIAGNOSIS — M70.21 OLECRANON BURSITIS OF RIGHT ELBOW: ICD-10-CM

## 2019-02-01 DIAGNOSIS — I10 ESSENTIAL HYPERTENSION: ICD-10-CM

## 2019-02-01 DIAGNOSIS — E53.8 B12 DEFICIENCY: ICD-10-CM

## 2019-02-01 DIAGNOSIS — C18.7 MALIGNANT NEOPLASM OF SIGMOID COLON (H): ICD-10-CM

## 2019-02-01 DIAGNOSIS — E11.40 CONTROLLED TYPE 2 DIABETES WITH NEUROPATHY (H): ICD-10-CM

## 2019-02-01 DIAGNOSIS — R79.89 ELEVATED SERUM CREATININE: ICD-10-CM

## 2019-02-01 LAB
ANION GAP SERPL CALCULATED.3IONS-SCNC: 11 MMOL/L (ref 5–18)
BUN SERPL-MCNC: 35 MG/DL (ref 8–28)
CALCIUM SERPL-MCNC: 8.9 MG/DL (ref 8.5–10.5)
CHLORIDE BLD-SCNC: 105 MMOL/L (ref 98–107)
CO2 SERPL-SCNC: 21 MMOL/L (ref 22–31)
CREAT SERPL-MCNC: 2.11 MG/DL (ref 0.7–1.3)
GFR SERPL CREATININE-BSD FRML MDRD: 30 ML/MIN/1.73M2
GLUCOSE BLD-MCNC: 217 MG/DL (ref 70–125)
POTASSIUM BLD-SCNC: 4.4 MMOL/L (ref 3.5–5)
SODIUM SERPL-SCNC: 137 MMOL/L (ref 136–145)

## 2019-02-04 ENCOUNTER — COMMUNICATION - HEALTHEAST (OUTPATIENT)
Dept: INTERNAL MEDICINE | Facility: CLINIC | Age: 79
End: 2019-02-04

## 2019-02-25 ENCOUNTER — COMMUNICATION - HEALTHEAST (OUTPATIENT)
Dept: INTERNAL MEDICINE | Facility: CLINIC | Age: 79
End: 2019-02-25

## 2019-03-07 ENCOUNTER — RECORDS - HEALTHEAST (OUTPATIENT)
Dept: ADMINISTRATIVE | Facility: OTHER | Age: 79
End: 2019-03-07

## 2019-04-14 ENCOUNTER — COMMUNICATION - HEALTHEAST (OUTPATIENT)
Dept: INTERNAL MEDICINE | Facility: CLINIC | Age: 79
End: 2019-04-14

## 2019-04-14 DIAGNOSIS — E11.9 TYPE 2 DIABETES MELLITUS WITHOUT COMPLICATION (H): ICD-10-CM

## 2019-04-17 ENCOUNTER — OFFICE VISIT - HEALTHEAST (OUTPATIENT)
Dept: INTERNAL MEDICINE | Facility: CLINIC | Age: 79
End: 2019-04-17

## 2019-04-17 DIAGNOSIS — F41.9 ANXIETY: ICD-10-CM

## 2019-04-17 DIAGNOSIS — Z51.81 MEDICATION MONITORING ENCOUNTER: ICD-10-CM

## 2019-04-17 DIAGNOSIS — Z85.51 HISTORY OF BLADDER CANCER: ICD-10-CM

## 2019-04-17 DIAGNOSIS — Z85.048 HISTORY OF RECTAL CANCER: ICD-10-CM

## 2019-04-17 DIAGNOSIS — I65.22 CAROTID ARTERY STENOSIS, ASYMPTOMATIC, LEFT: ICD-10-CM

## 2019-04-17 DIAGNOSIS — Z86.73 HISTORY OF STROKE: ICD-10-CM

## 2019-04-17 DIAGNOSIS — E53.8 B12 DEFICIENCY: ICD-10-CM

## 2019-04-17 DIAGNOSIS — E11.9 TYPE 2 DIABETES MELLITUS WITHOUT COMPLICATION, WITHOUT LONG-TERM CURRENT USE OF INSULIN (H): ICD-10-CM

## 2019-04-17 LAB
ALBUMIN SERPL-MCNC: 3.6 G/DL (ref 3.5–5)
ALP SERPL-CCNC: 118 U/L (ref 45–120)
ALT SERPL W P-5'-P-CCNC: <9 U/L (ref 0–45)
ANION GAP SERPL CALCULATED.3IONS-SCNC: 11 MMOL/L (ref 5–18)
AST SERPL W P-5'-P-CCNC: 10 U/L (ref 0–40)
BILIRUB SERPL-MCNC: 0.3 MG/DL (ref 0–1)
BUN SERPL-MCNC: 30 MG/DL (ref 8–28)
CALCIUM SERPL-MCNC: 9 MG/DL (ref 8.5–10.5)
CHLORIDE BLD-SCNC: 106 MMOL/L (ref 98–107)
CO2 SERPL-SCNC: 19 MMOL/L (ref 22–31)
CREAT SERPL-MCNC: 2.09 MG/DL (ref 0.7–1.3)
GFR SERPL CREATININE-BSD FRML MDRD: 31 ML/MIN/1.73M2
GLUCOSE BLD-MCNC: 169 MG/DL (ref 70–125)
HBA1C MFR BLD: 6.4 % (ref 3.5–6)
POTASSIUM BLD-SCNC: 4.5 MMOL/L (ref 3.5–5)
PROT SERPL-MCNC: 6.3 G/DL (ref 6–8)
SODIUM SERPL-SCNC: 136 MMOL/L (ref 136–145)

## 2019-04-18 ENCOUNTER — COMMUNICATION - HEALTHEAST (OUTPATIENT)
Dept: INTERNAL MEDICINE | Facility: CLINIC | Age: 79
End: 2019-04-18

## 2019-05-03 ENCOUNTER — COMMUNICATION - HEALTHEAST (OUTPATIENT)
Dept: INTERNAL MEDICINE | Facility: CLINIC | Age: 79
End: 2019-05-03

## 2019-05-19 ENCOUNTER — COMMUNICATION - HEALTHEAST (OUTPATIENT)
Dept: INTERNAL MEDICINE | Facility: CLINIC | Age: 79
End: 2019-05-19

## 2019-05-19 DIAGNOSIS — I66.9 CEREBRAL ARTERY OCCLUSION: ICD-10-CM

## 2019-06-24 ENCOUNTER — RECORDS - HEALTHEAST (OUTPATIENT)
Dept: ADMINISTRATIVE | Facility: OTHER | Age: 79
End: 2019-06-24

## 2019-07-10 ENCOUNTER — COMMUNICATION - HEALTHEAST (OUTPATIENT)
Dept: INTERNAL MEDICINE | Facility: CLINIC | Age: 79
End: 2019-07-10

## 2019-07-10 ENCOUNTER — OFFICE VISIT - HEALTHEAST (OUTPATIENT)
Dept: INTERNAL MEDICINE | Facility: CLINIC | Age: 79
End: 2019-07-10

## 2019-07-10 DIAGNOSIS — Z51.81 MEDICATION MONITORING ENCOUNTER: ICD-10-CM

## 2019-07-10 DIAGNOSIS — I10 ESSENTIAL HYPERTENSION: ICD-10-CM

## 2019-07-10 DIAGNOSIS — I66.9 CEREBRAL ARTERY OCCLUSION: ICD-10-CM

## 2019-07-10 DIAGNOSIS — I65.22 CAROTID ARTERY STENOSIS, ASYMPTOMATIC, LEFT: ICD-10-CM

## 2019-07-10 DIAGNOSIS — E53.8 B12 DEFICIENCY: ICD-10-CM

## 2019-07-10 DIAGNOSIS — E11.9 TYPE 2 DIABETES MELLITUS WITHOUT COMPLICATION, WITHOUT LONG-TERM CURRENT USE OF INSULIN (H): ICD-10-CM

## 2019-07-10 DIAGNOSIS — Z85.038 HISTORY OF COLON CANCER, STAGE III: ICD-10-CM

## 2019-07-10 DIAGNOSIS — Z86.73 HISTORY OF STROKE: ICD-10-CM

## 2019-07-10 LAB
ALBUMIN SERPL-MCNC: 3.6 G/DL (ref 3.5–5)
ALP SERPL-CCNC: 107 U/L (ref 45–120)
ALT SERPL W P-5'-P-CCNC: <9 U/L (ref 0–45)
ANION GAP SERPL CALCULATED.3IONS-SCNC: 10 MMOL/L (ref 5–18)
AST SERPL W P-5'-P-CCNC: 11 U/L (ref 0–40)
BILIRUB SERPL-MCNC: 0.3 MG/DL (ref 0–1)
BUN SERPL-MCNC: 31 MG/DL (ref 8–28)
CALCIUM SERPL-MCNC: 9 MG/DL (ref 8.5–10.5)
CHLORIDE BLD-SCNC: 103 MMOL/L (ref 98–107)
CO2 SERPL-SCNC: 25 MMOL/L (ref 22–31)
CREAT SERPL-MCNC: 1.99 MG/DL (ref 0.7–1.3)
ERYTHROCYTE [DISTWIDTH] IN BLOOD BY AUTOMATED COUNT: 12.4 % (ref 11–14.5)
GFR SERPL CREATININE-BSD FRML MDRD: 32 ML/MIN/1.73M2
GLUCOSE BLD-MCNC: 164 MG/DL (ref 70–125)
HBA1C MFR BLD: 6.7 % (ref 3.5–6)
HCT VFR BLD AUTO: 33.6 % (ref 40–54)
HGB BLD-MCNC: 11.3 G/DL (ref 14–18)
MCH RBC QN AUTO: 32.1 PG (ref 27–34)
MCHC RBC AUTO-ENTMCNC: 33.5 G/DL (ref 32–36)
MCV RBC AUTO: 96 FL (ref 80–100)
PLATELET # BLD AUTO: 202 THOU/UL (ref 140–440)
PMV BLD AUTO: 6.4 FL (ref 7–10)
POTASSIUM BLD-SCNC: 4 MMOL/L (ref 3.5–5)
PROT SERPL-MCNC: 6.2 G/DL (ref 6–8)
RBC # BLD AUTO: 3.52 MILL/UL (ref 4.4–6.2)
SODIUM SERPL-SCNC: 138 MMOL/L (ref 136–145)
WBC: 5.7 THOU/UL (ref 4–11)

## 2019-07-11 ENCOUNTER — COMMUNICATION - HEALTHEAST (OUTPATIENT)
Dept: INTERNAL MEDICINE | Facility: CLINIC | Age: 79
End: 2019-07-11

## 2019-07-14 ENCOUNTER — COMMUNICATION - HEALTHEAST (OUTPATIENT)
Dept: INTERNAL MEDICINE | Facility: CLINIC | Age: 79
End: 2019-07-14

## 2019-07-14 DIAGNOSIS — E11.9 TYPE 2 DIABETES MELLITUS (H): ICD-10-CM

## 2019-07-21 ENCOUNTER — COMMUNICATION - HEALTHEAST (OUTPATIENT)
Dept: INTERNAL MEDICINE | Facility: CLINIC | Age: 79
End: 2019-07-21

## 2019-07-21 DIAGNOSIS — I65.22 CAROTID ARTERY STENOSIS, ASYMPTOMATIC, LEFT: ICD-10-CM

## 2019-08-09 ENCOUNTER — COMMUNICATION - HEALTHEAST (OUTPATIENT)
Dept: SCHEDULING | Facility: CLINIC | Age: 79
End: 2019-08-09

## 2019-08-09 ENCOUNTER — OFFICE VISIT - HEALTHEAST (OUTPATIENT)
Dept: FAMILY MEDICINE | Facility: CLINIC | Age: 79
End: 2019-08-09

## 2019-08-09 DIAGNOSIS — R91.8 LUNG FIELD ABNORMAL FINDING ON EXAMINATION: ICD-10-CM

## 2019-08-09 DIAGNOSIS — R03.0 ELEVATED BLOOD PRESSURE READING: ICD-10-CM

## 2019-08-09 DIAGNOSIS — R05.9 COUGH: ICD-10-CM

## 2019-08-13 ENCOUNTER — OFFICE VISIT - HEALTHEAST (OUTPATIENT)
Dept: INTERNAL MEDICINE | Facility: CLINIC | Age: 79
End: 2019-08-13

## 2019-08-13 ENCOUNTER — COMMUNICATION - HEALTHEAST (OUTPATIENT)
Dept: SCHEDULING | Facility: CLINIC | Age: 79
End: 2019-08-13

## 2019-08-13 DIAGNOSIS — H93.8X2 EAR FULLNESS, LEFT: ICD-10-CM

## 2019-08-13 DIAGNOSIS — I10 ESSENTIAL HYPERTENSION: ICD-10-CM

## 2019-08-13 LAB
ANION GAP SERPL CALCULATED.3IONS-SCNC: 11 MMOL/L (ref 5–18)
BUN SERPL-MCNC: 24 MG/DL (ref 8–28)
CALCIUM SERPL-MCNC: 8.7 MG/DL (ref 8.5–10.5)
CHLORIDE BLD-SCNC: 106 MMOL/L (ref 98–107)
CO2 SERPL-SCNC: 22 MMOL/L (ref 22–31)
CREAT SERPL-MCNC: 1.9 MG/DL (ref 0.7–1.3)
GFR SERPL CREATININE-BSD FRML MDRD: 34 ML/MIN/1.73M2
GLUCOSE BLD-MCNC: 143 MG/DL (ref 70–125)
POTASSIUM BLD-SCNC: 3.7 MMOL/L (ref 3.5–5)
SODIUM SERPL-SCNC: 139 MMOL/L (ref 136–145)

## 2019-08-20 ENCOUNTER — OFFICE VISIT - HEALTHEAST (OUTPATIENT)
Dept: INTERNAL MEDICINE | Facility: CLINIC | Age: 79
End: 2019-08-20

## 2019-08-20 DIAGNOSIS — I10 ESSENTIAL HYPERTENSION: ICD-10-CM

## 2019-08-20 LAB
ANION GAP SERPL CALCULATED.3IONS-SCNC: 11 MMOL/L (ref 5–18)
BUN SERPL-MCNC: 36 MG/DL (ref 8–28)
CALCIUM SERPL-MCNC: 8.9 MG/DL (ref 8.5–10.5)
CHLORIDE BLD-SCNC: 103 MMOL/L (ref 98–107)
CO2 SERPL-SCNC: 25 MMOL/L (ref 22–31)
CREAT SERPL-MCNC: 2.19 MG/DL (ref 0.7–1.3)
GFR SERPL CREATININE-BSD FRML MDRD: 29 ML/MIN/1.73M2
GLUCOSE BLD-MCNC: 228 MG/DL (ref 70–125)
POTASSIUM BLD-SCNC: 3.6 MMOL/L (ref 3.5–5)
SODIUM SERPL-SCNC: 139 MMOL/L (ref 136–145)

## 2019-08-21 ENCOUNTER — COMMUNICATION - HEALTHEAST (OUTPATIENT)
Dept: INTERNAL MEDICINE | Facility: CLINIC | Age: 79
End: 2019-08-21

## 2019-08-27 ENCOUNTER — OFFICE VISIT - HEALTHEAST (OUTPATIENT)
Dept: INTERNAL MEDICINE | Facility: CLINIC | Age: 79
End: 2019-08-27

## 2019-08-27 DIAGNOSIS — I10 ESSENTIAL HYPERTENSION: ICD-10-CM

## 2019-08-27 LAB
ANION GAP SERPL CALCULATED.3IONS-SCNC: 11 MMOL/L (ref 5–18)
BUN SERPL-MCNC: 26 MG/DL (ref 8–28)
CALCIUM SERPL-MCNC: 8.7 MG/DL (ref 8.5–10.5)
CHLORIDE BLD-SCNC: 102 MMOL/L (ref 98–107)
CO2 SERPL-SCNC: 26 MMOL/L (ref 22–31)
CREAT SERPL-MCNC: 1.97 MG/DL (ref 0.7–1.3)
GFR SERPL CREATININE-BSD FRML MDRD: 33 ML/MIN/1.73M2
GLUCOSE BLD-MCNC: 192 MG/DL (ref 70–125)
POTASSIUM BLD-SCNC: 4 MMOL/L (ref 3.5–5)
SODIUM SERPL-SCNC: 139 MMOL/L (ref 136–145)

## 2019-08-29 ENCOUNTER — RECORDS - HEALTHEAST (OUTPATIENT)
Dept: ADMINISTRATIVE | Facility: OTHER | Age: 79
End: 2019-08-29

## 2019-08-30 ENCOUNTER — HOSPITAL ENCOUNTER (OUTPATIENT)
Dept: CT IMAGING | Facility: CLINIC | Age: 79
Discharge: HOME | End: 2019-08-30
Attending: INTERNAL MEDICINE

## 2019-08-30 DIAGNOSIS — Z85.048 PERSONAL HISTORY OF RECTAL CANCER: ICD-10-CM

## 2019-08-30 DIAGNOSIS — R97.0 ELEVATED CEA: ICD-10-CM

## 2019-08-30 DIAGNOSIS — Z85.51 PERSONAL HISTORY OF BLADDER CANCER: ICD-10-CM

## 2019-09-03 ENCOUNTER — OFFICE VISIT - HEALTHEAST (OUTPATIENT)
Dept: INTERNAL MEDICINE | Facility: CLINIC | Age: 79
End: 2019-09-03

## 2019-09-03 DIAGNOSIS — N18.30 CHRONIC RENAL INSUFFICIENCY, STAGE 3 (MODERATE) (H): ICD-10-CM

## 2019-09-03 DIAGNOSIS — Z85.038 HISTORY OF COLON CANCER, STAGE III: ICD-10-CM

## 2019-09-03 DIAGNOSIS — I65.22 CAROTID ARTERY STENOSIS, ASYMPTOMATIC, LEFT: ICD-10-CM

## 2019-09-03 DIAGNOSIS — I10 ESSENTIAL HYPERTENSION: ICD-10-CM

## 2019-09-07 ENCOUNTER — COMMUNICATION - HEALTHEAST (OUTPATIENT)
Dept: INTERNAL MEDICINE | Facility: CLINIC | Age: 79
End: 2019-09-07

## 2019-09-07 DIAGNOSIS — I10 ESSENTIAL HYPERTENSION: ICD-10-CM

## 2019-09-09 ENCOUNTER — RECORDS - HEALTHEAST (OUTPATIENT)
Dept: ADMINISTRATIVE | Facility: OTHER | Age: 79
End: 2019-09-09

## 2019-10-07 ENCOUNTER — COMMUNICATION - HEALTHEAST (OUTPATIENT)
Dept: INTERNAL MEDICINE | Facility: CLINIC | Age: 79
End: 2019-10-07

## 2019-10-07 DIAGNOSIS — I66.9 CEREBRAL ARTERY OCCLUSION: ICD-10-CM

## 2019-10-11 ENCOUNTER — COMMUNICATION - HEALTHEAST (OUTPATIENT)
Dept: INTERNAL MEDICINE | Facility: CLINIC | Age: 79
End: 2019-10-11

## 2019-10-11 DIAGNOSIS — I10 ESSENTIAL HYPERTENSION: ICD-10-CM

## 2019-10-21 ENCOUNTER — OFFICE VISIT - HEALTHEAST (OUTPATIENT)
Dept: GERIATRICS | Facility: CLINIC | Age: 79
End: 2019-10-21

## 2019-10-21 DIAGNOSIS — N30.00 ACUTE CYSTITIS WITHOUT HEMATURIA: ICD-10-CM

## 2019-10-21 DIAGNOSIS — Z85.51 HISTORY OF BLADDER CANCER: ICD-10-CM

## 2019-10-21 DIAGNOSIS — Z85.048 HISTORY OF RECTAL CANCER: ICD-10-CM

## 2019-10-21 DIAGNOSIS — Z86.73 HISTORY OF STROKE: ICD-10-CM

## 2019-10-21 DIAGNOSIS — E11.40 CONTROLLED TYPE 2 DIABETES WITH NEUROPATHY (H): ICD-10-CM

## 2019-10-21 DIAGNOSIS — I10 ESSENTIAL HYPERTENSION: ICD-10-CM

## 2019-10-22 ENCOUNTER — RECORDS - HEALTHEAST (OUTPATIENT)
Dept: LAB | Facility: CLINIC | Age: 79
End: 2019-10-22

## 2019-10-23 ENCOUNTER — OFFICE VISIT - HEALTHEAST (OUTPATIENT)
Dept: GERIATRICS | Facility: CLINIC | Age: 79
End: 2019-10-23

## 2019-10-23 DIAGNOSIS — E53.8 B12 DEFICIENCY: ICD-10-CM

## 2019-10-23 DIAGNOSIS — Z86.73 HISTORY OF STROKE: ICD-10-CM

## 2019-10-23 DIAGNOSIS — I10 ESSENTIAL HYPERTENSION: ICD-10-CM

## 2019-10-23 DIAGNOSIS — E11.40 CONTROLLED TYPE 2 DIABETES WITH NEUROPATHY (H): ICD-10-CM

## 2019-10-23 DIAGNOSIS — N30.00 ACUTE CYSTITIS WITHOUT HEMATURIA: ICD-10-CM

## 2019-10-23 DIAGNOSIS — K59.01 SLOW TRANSIT CONSTIPATION: ICD-10-CM

## 2019-10-23 LAB
ANION GAP SERPL CALCULATED.3IONS-SCNC: 10 MMOL/L (ref 5–18)
BUN SERPL-MCNC: 51 MG/DL (ref 8–28)
CALCIUM SERPL-MCNC: 9.3 MG/DL (ref 8.5–10.5)
CHLORIDE BLD-SCNC: 103 MMOL/L (ref 98–107)
CO2 SERPL-SCNC: 24 MMOL/L (ref 22–31)
CREAT SERPL-MCNC: 2.42 MG/DL (ref 0.7–1.3)
ERYTHROCYTE [DISTWIDTH] IN BLOOD BY AUTOMATED COUNT: 13.8 % (ref 11–14.5)
GFR SERPL CREATININE-BSD FRML MDRD: 26 ML/MIN/1.73M2
GLUCOSE BLD-MCNC: 135 MG/DL (ref 70–125)
HCT VFR BLD AUTO: 33.3 % (ref 40–54)
HGB BLD-MCNC: 10.6 G/DL (ref 14–18)
MAGNESIUM SERPL-MCNC: 1.9 MG/DL (ref 1.8–2.6)
MCH RBC QN AUTO: 31.1 PG (ref 27–34)
MCHC RBC AUTO-ENTMCNC: 31.8 G/DL (ref 32–36)
MCV RBC AUTO: 98 FL (ref 80–100)
PLATELET # BLD AUTO: 375 THOU/UL (ref 140–440)
PMV BLD AUTO: 9.2 FL (ref 8.5–12.5)
POTASSIUM BLD-SCNC: 3.6 MMOL/L (ref 3.5–5)
RBC # BLD AUTO: 3.41 MILL/UL (ref 4.4–6.2)
SODIUM SERPL-SCNC: 137 MMOL/L (ref 136–145)
WBC: 9.5 THOU/UL (ref 4–11)

## 2019-10-24 ENCOUNTER — RECORDS - HEALTHEAST (OUTPATIENT)
Dept: LAB | Facility: CLINIC | Age: 79
End: 2019-10-24

## 2019-10-25 LAB
ANION GAP SERPL CALCULATED.3IONS-SCNC: 8 MMOL/L (ref 5–18)
BUN SERPL-MCNC: 52 MG/DL (ref 8–28)
CALCIUM SERPL-MCNC: 8.6 MG/DL (ref 8.5–10.5)
CHLORIDE BLD-SCNC: 103 MMOL/L (ref 98–107)
CO2 SERPL-SCNC: 25 MMOL/L (ref 22–31)
CREAT SERPL-MCNC: 2.44 MG/DL (ref 0.7–1.3)
ERYTHROCYTE [DISTWIDTH] IN BLOOD BY AUTOMATED COUNT: 13.6 % (ref 11–14.5)
GFR SERPL CREATININE-BSD FRML MDRD: 26 ML/MIN/1.73M2
GLUCOSE BLD-MCNC: 87 MG/DL (ref 70–125)
HCT VFR BLD AUTO: 26.2 % (ref 40–54)
HGB BLD-MCNC: 8.6 G/DL (ref 14–18)
MCH RBC QN AUTO: 31.6 PG (ref 27–34)
MCHC RBC AUTO-ENTMCNC: 32.8 G/DL (ref 32–36)
MCV RBC AUTO: 96 FL (ref 80–100)
PLATELET # BLD AUTO: 311 THOU/UL (ref 140–440)
PMV BLD AUTO: 9.5 FL (ref 8.5–12.5)
POTASSIUM BLD-SCNC: 3.8 MMOL/L (ref 3.5–5)
RBC # BLD AUTO: 2.72 MILL/UL (ref 4.4–6.2)
SODIUM SERPL-SCNC: 136 MMOL/L (ref 136–145)
WBC: 7.4 THOU/UL (ref 4–11)

## 2019-10-27 ENCOUNTER — RECORDS - HEALTHEAST (OUTPATIENT)
Dept: LAB | Facility: CLINIC | Age: 79
End: 2019-10-27

## 2019-10-27 LAB — HEMOCCULT STL QL: NEGATIVE

## 2019-10-28 ENCOUNTER — RECORDS - HEALTHEAST (OUTPATIENT)
Dept: LAB | Facility: CLINIC | Age: 79
End: 2019-10-28

## 2019-10-28 ENCOUNTER — OFFICE VISIT - HEALTHEAST (OUTPATIENT)
Dept: GERIATRICS | Facility: CLINIC | Age: 79
End: 2019-10-28

## 2019-10-28 DIAGNOSIS — E11.40 CONTROLLED TYPE 2 DIABETES WITH NEUROPATHY (H): ICD-10-CM

## 2019-10-28 DIAGNOSIS — Z86.73 HISTORY OF STROKE: ICD-10-CM

## 2019-10-28 DIAGNOSIS — I10 ESSENTIAL HYPERTENSION: ICD-10-CM

## 2019-10-28 DIAGNOSIS — N30.00 ACUTE CYSTITIS WITHOUT HEMATURIA: ICD-10-CM

## 2019-10-28 LAB
ANION GAP SERPL CALCULATED.3IONS-SCNC: 9 MMOL/L (ref 5–18)
BUN SERPL-MCNC: 47 MG/DL (ref 8–28)
CALCIUM SERPL-MCNC: 8.8 MG/DL (ref 8.5–10.5)
CHLORIDE BLD-SCNC: 102 MMOL/L (ref 98–107)
CO2 SERPL-SCNC: 24 MMOL/L (ref 22–31)
CREAT SERPL-MCNC: 2.32 MG/DL (ref 0.7–1.3)
ERYTHROCYTE [DISTWIDTH] IN BLOOD BY AUTOMATED COUNT: 13.6 % (ref 11–14.5)
GFR SERPL CREATININE-BSD FRML MDRD: 27 ML/MIN/1.73M2
GLUCOSE BLD-MCNC: 211 MG/DL (ref 70–125)
HCT VFR BLD AUTO: 29.3 % (ref 40–54)
HGB BLD-MCNC: 9.1 G/DL (ref 14–18)
MCH RBC QN AUTO: 31.3 PG (ref 27–34)
MCHC RBC AUTO-ENTMCNC: 31.1 G/DL (ref 32–36)
MCV RBC AUTO: 101 FL (ref 80–100)
PLATELET # BLD AUTO: 403 THOU/UL (ref 140–440)
PMV BLD AUTO: 9.4 FL (ref 8.5–12.5)
POTASSIUM BLD-SCNC: 4.2 MMOL/L (ref 3.5–5)
RBC # BLD AUTO: 2.91 MILL/UL (ref 4.4–6.2)
SODIUM SERPL-SCNC: 135 MMOL/L (ref 136–145)
TSH SERPL DL<=0.005 MIU/L-ACNC: 2.42 UIU/ML (ref 0.3–5)
VIT B12 SERPL-MCNC: >2000 PG/ML (ref 213–816)
WBC: 8.2 THOU/UL (ref 4–11)

## 2019-10-29 ENCOUNTER — RECORDS - HEALTHEAST (OUTPATIENT)
Dept: LAB | Facility: CLINIC | Age: 79
End: 2019-10-29

## 2019-10-29 LAB
25(OH)D3 SERPL-MCNC: 14 NG/ML (ref 30–80)
FERRITIN SERPL-MCNC: 407 NG/ML (ref 27–300)
HBA1C MFR BLD: 7.6 % (ref 4.2–6.1)
IRON SATN MFR SERPL: 23 % (ref 20–50)
IRON SERPL-MCNC: 46 UG/DL (ref 42–175)
TIBC SERPL-MCNC: 199 UG/DL (ref 313–563)
TRANSFERRIN SERPL-MCNC: 159 MG/DL (ref 212–360)

## 2019-10-30 ENCOUNTER — OFFICE VISIT - HEALTHEAST (OUTPATIENT)
Dept: GERIATRICS | Facility: CLINIC | Age: 79
End: 2019-10-30

## 2019-10-30 DIAGNOSIS — E11.40 CONTROLLED TYPE 2 DIABETES WITH NEUROPATHY (H): ICD-10-CM

## 2019-10-30 DIAGNOSIS — Z86.73 HISTORY OF STROKE: ICD-10-CM

## 2019-10-30 DIAGNOSIS — I10 ESSENTIAL HYPERTENSION: ICD-10-CM

## 2019-10-30 DIAGNOSIS — K59.01 SLOW TRANSIT CONSTIPATION: ICD-10-CM

## 2019-10-30 DIAGNOSIS — E53.8 B12 DEFICIENCY: ICD-10-CM

## 2019-10-30 DIAGNOSIS — C20 RECTAL CANCER (H): ICD-10-CM

## 2019-10-30 LAB
BASOPHILS # BLD AUTO: 0.1 THOU/UL (ref 0–0.2)
BASOPHILS NFR BLD AUTO: 1 % (ref 0–2)
DOHLE BODIES: ABNORMAL
EOSINOPHIL COUNT (ABSOLUTE): 0.2 THOU/UL (ref 0–0.4)
EOSINOPHIL NFR BLD AUTO: 2 % (ref 0–6)
ERYTHROCYTE [DISTWIDTH] IN BLOOD BY AUTOMATED COUNT: 13.6 % (ref 11–14.5)
HCT VFR BLD AUTO: 32.6 % (ref 40–54)
HGB BLD-MCNC: 10 G/DL (ref 14–18)
LAB AP CHARGES (HE HISTORICAL CONVERSION): NORMAL
LYMPHOCYTES # BLD AUTO: 0.9 THOU/UL (ref 0.8–4.4)
LYMPHOCYTES NFR BLD AUTO: 9 % (ref 20–40)
MCH RBC QN AUTO: 31.2 PG (ref 27–34)
MCHC RBC AUTO-ENTMCNC: 30.7 G/DL (ref 32–36)
MCV RBC AUTO: 102 FL (ref 80–100)
METAMYELOCYTES (ABSOLUTE): 0.1 THOU/UL
METAMYELOCYTES NFR BLD MANUAL: 1 %
MONOCYTES # BLD AUTO: 0.2 THOU/UL (ref 0–0.9)
MONOCYTES NFR BLD AUTO: 3 % (ref 2–10)
MYELOCYTES (ABSOLUTE): 0.1 THOU/UL
MYELOCYTES NFR BLD MANUAL: 1 %
OVALOCYTES: ABNORMAL
PATH REPORT.COMMENTS IMP SPEC: NORMAL
PATH REPORT.COMMENTS IMP SPEC: NORMAL
PATH REPORT.FINAL DX SPEC: NORMAL
PATH REPORT.MICROSCOPIC SPEC OTHER STN: ABNORMAL
PATH REPORT.MICROSCOPIC SPEC OTHER STN: NORMAL
PATH REPORT.RELEVANT HX SPEC: NORMAL
PLAT MORPH BLD: ABNORMAL
PLATELET # BLD AUTO: 491 THOU/UL (ref 140–440)
PMV BLD AUTO: 9.4 FL (ref 8.5–12.5)
POLYCHROMASIA BLD QL SMEAR: ABNORMAL
RBC # BLD AUTO: 3.21 MILL/UL (ref 4.4–6.2)
REACTIVE LYMPHS: ABNORMAL
TOTAL NEUTROPHILS-ABS(DIFF): 8.1 THOU/UL (ref 2–7.7)
TOTAL NEUTROPHILS-REL(DIFF): 84 % (ref 50–70)
TOXIC GRANULATION: ABNORMAL
WBC: 9.7 THOU/UL (ref 4–11)

## 2019-11-04 ENCOUNTER — OFFICE VISIT - HEALTHEAST (OUTPATIENT)
Dept: GERIATRICS | Facility: CLINIC | Age: 79
End: 2019-11-04

## 2019-11-04 DIAGNOSIS — E11.40 CONTROLLED TYPE 2 DIABETES WITH NEUROPATHY (H): ICD-10-CM

## 2019-11-04 DIAGNOSIS — N30.00 ACUTE CYSTITIS WITHOUT HEMATURIA: ICD-10-CM

## 2019-11-04 DIAGNOSIS — F41.9 ANXIETY: ICD-10-CM

## 2019-11-04 DIAGNOSIS — Z86.73 HISTORY OF STROKE: ICD-10-CM

## 2019-11-04 DIAGNOSIS — I10 ESSENTIAL HYPERTENSION: ICD-10-CM

## 2019-11-05 ENCOUNTER — OFFICE VISIT - HEALTHEAST (OUTPATIENT)
Dept: GERIATRICS | Facility: CLINIC | Age: 79
End: 2019-11-05

## 2019-11-05 DIAGNOSIS — E11.40 CONTROLLED TYPE 2 DIABETES WITH NEUROPATHY (H): ICD-10-CM

## 2019-11-05 DIAGNOSIS — E53.8 B12 DEFICIENCY: ICD-10-CM

## 2019-11-05 DIAGNOSIS — K59.01 SLOW TRANSIT CONSTIPATION: ICD-10-CM

## 2019-11-05 DIAGNOSIS — I65.22 CAROTID ARTERY STENOSIS, ASYMPTOMATIC, LEFT: ICD-10-CM

## 2019-11-05 DIAGNOSIS — I10 ESSENTIAL HYPERTENSION: ICD-10-CM

## 2019-11-05 DIAGNOSIS — R53.81 PHYSICAL DECONDITIONING: ICD-10-CM

## 2019-11-06 ENCOUNTER — AMBULATORY - HEALTHEAST (OUTPATIENT)
Dept: GERIATRICS | Facility: CLINIC | Age: 79
End: 2019-11-06

## 2019-11-06 ENCOUNTER — COMMUNICATION - HEALTHEAST (OUTPATIENT)
Dept: GERIATRICS | Facility: CLINIC | Age: 79
End: 2019-11-06

## 2019-11-07 ENCOUNTER — OFFICE VISIT - HEALTHEAST (OUTPATIENT)
Dept: INTERNAL MEDICINE | Facility: CLINIC | Age: 79
End: 2019-11-07

## 2019-11-07 ENCOUNTER — AMBULATORY - HEALTHEAST (OUTPATIENT)
Dept: LAB | Facility: CLINIC | Age: 79
End: 2019-11-07

## 2019-11-07 DIAGNOSIS — I65.22 CAROTID ARTERY STENOSIS, ASYMPTOMATIC, LEFT: ICD-10-CM

## 2019-11-07 DIAGNOSIS — E55.9 VITAMIN D DEFICIENCY: ICD-10-CM

## 2019-11-07 DIAGNOSIS — Z86.73 HISTORY OF STROKE: ICD-10-CM

## 2019-11-07 DIAGNOSIS — R52 PAIN: ICD-10-CM

## 2019-11-07 DIAGNOSIS — I10 ESSENTIAL HYPERTENSION: ICD-10-CM

## 2019-11-07 DIAGNOSIS — Z87.440 PERSONAL HISTORY OF URINARY TRACT INFECTION: ICD-10-CM

## 2019-11-07 DIAGNOSIS — N18.30 CHRONIC RENAL INSUFFICIENCY, STAGE 3 (MODERATE) (H): ICD-10-CM

## 2019-11-07 DIAGNOSIS — E11.9 TYPE 2 DIABETES MELLITUS WITHOUT COMPLICATION, WITHOUT LONG-TERM CURRENT USE OF INSULIN (H): ICD-10-CM

## 2019-11-07 DIAGNOSIS — E53.8 B12 DEFICIENCY: ICD-10-CM

## 2019-11-07 DIAGNOSIS — Z85.038 HISTORY OF COLON CANCER: ICD-10-CM

## 2019-11-07 DIAGNOSIS — Z85.51 HISTORY OF BLADDER CANCER: ICD-10-CM

## 2019-11-07 DIAGNOSIS — Z85.038 HISTORY OF COLON CANCER, STAGE III: ICD-10-CM

## 2019-11-07 DIAGNOSIS — Z51.81 MEDICATION MONITORING ENCOUNTER: ICD-10-CM

## 2019-11-07 DIAGNOSIS — Z85.048 PERSONAL HISTORY OF MALIGNANT NEOPLASM OF RECTUM, RECTOSIGMOID JUNCTION, AND ANUS: ICD-10-CM

## 2019-11-07 DIAGNOSIS — D64.9 ANEMIA, UNSPECIFIED TYPE: ICD-10-CM

## 2019-11-07 LAB
ALBUMIN SERPL-MCNC: 3.2 G/DL (ref 3.5–5)
ALP SERPL-CCNC: 141 U/L (ref 45–120)
ALT SERPL W P-5'-P-CCNC: 21 U/L (ref 0–45)
ANION GAP SERPL CALCULATED.3IONS-SCNC: 11 MMOL/L (ref 5–18)
AST SERPL W P-5'-P-CCNC: 13 U/L (ref 0–40)
BILIRUB SERPL-MCNC: 0.3 MG/DL (ref 0–1)
BUN SERPL-MCNC: 41 MG/DL (ref 8–28)
CALCIUM SERPL-MCNC: 8.8 MG/DL (ref 8.5–10.5)
CEA SERPL-MCNC: 6.4 NG/ML (ref 0–3)
CHLORIDE BLD-SCNC: 104 MMOL/L (ref 98–107)
CO2 SERPL-SCNC: 23 MMOL/L (ref 22–31)
CREAT SERPL-MCNC: 2.13 MG/DL (ref 0.7–1.3)
ERYTHROCYTE [DISTWIDTH] IN BLOOD BY AUTOMATED COUNT: 13 % (ref 11–14.5)
GFR SERPL CREATININE-BSD FRML MDRD: 30 ML/MIN/1.73M2
GLUCOSE BLD-MCNC: 195 MG/DL (ref 70–125)
HCT VFR BLD AUTO: 32.5 % (ref 40–54)
HGB BLD-MCNC: 10.8 G/DL (ref 14–18)
MCH RBC QN AUTO: 31.8 PG (ref 27–34)
MCHC RBC AUTO-ENTMCNC: 33.2 G/DL (ref 32–36)
MCV RBC AUTO: 96 FL (ref 80–100)
PLATELET # BLD AUTO: 361 THOU/UL (ref 140–440)
PMV BLD AUTO: 6.7 FL (ref 7–10)
POTASSIUM BLD-SCNC: 4.7 MMOL/L (ref 3.5–5)
PROT SERPL-MCNC: 6.8 G/DL (ref 6–8)
RBC # BLD AUTO: 3.39 MILL/UL (ref 4.4–6.2)
SODIUM SERPL-SCNC: 138 MMOL/L (ref 136–145)
WBC: 6.3 THOU/UL (ref 4–11)

## 2019-11-08 ENCOUNTER — COMMUNICATION - HEALTHEAST (OUTPATIENT)
Dept: INTERNAL MEDICINE | Facility: CLINIC | Age: 79
End: 2019-11-08

## 2019-11-08 DIAGNOSIS — Z79.4 TYPE 2 DIABETES MELLITUS WITHOUT COMPLICATION, WITH LONG-TERM CURRENT USE OF INSULIN (H): ICD-10-CM

## 2019-11-08 DIAGNOSIS — E11.9 TYPE 2 DIABETES MELLITUS WITHOUT COMPLICATION, WITH LONG-TERM CURRENT USE OF INSULIN (H): ICD-10-CM

## 2019-11-12 ENCOUNTER — AMBULATORY - HEALTHEAST (OUTPATIENT)
Dept: EDUCATION SERVICES | Facility: CLINIC | Age: 79
End: 2019-11-12

## 2019-11-12 DIAGNOSIS — E11.40 CONTROLLED TYPE 2 DIABETES WITH NEUROPATHY (H): ICD-10-CM

## 2019-11-12 DIAGNOSIS — E11.9 TYPE 2 DIABETES MELLITUS (H): ICD-10-CM

## 2019-11-13 ENCOUNTER — COMMUNICATION - HEALTHEAST (OUTPATIENT)
Dept: INTERNAL MEDICINE | Facility: CLINIC | Age: 79
End: 2019-11-13

## 2019-11-13 DIAGNOSIS — E11.40 CONTROLLED TYPE 2 DIABETES WITH NEUROPATHY (H): ICD-10-CM

## 2019-11-15 ENCOUNTER — RECORDS - HEALTHEAST (OUTPATIENT)
Dept: ADMINISTRATIVE | Facility: OTHER | Age: 79
End: 2019-11-15

## 2019-11-18 ENCOUNTER — OFFICE VISIT - HEALTHEAST (OUTPATIENT)
Dept: INTERNAL MEDICINE | Facility: CLINIC | Age: 79
End: 2019-11-18

## 2019-11-18 DIAGNOSIS — N18.30 CHRONIC RENAL INSUFFICIENCY, STAGE 3 (MODERATE) (H): ICD-10-CM

## 2019-11-18 DIAGNOSIS — E11.40 CONTROLLED TYPE 2 DIABETES WITH NEUROPATHY (H): ICD-10-CM

## 2019-11-18 DIAGNOSIS — I10 ESSENTIAL HYPERTENSION: ICD-10-CM

## 2019-11-18 DIAGNOSIS — E11.9 TYPE 2 DIABETES MELLITUS (H): ICD-10-CM

## 2019-11-18 LAB
ANION GAP SERPL CALCULATED.3IONS-SCNC: 14 MMOL/L (ref 5–18)
BUN SERPL-MCNC: 36 MG/DL (ref 8–28)
CALCIUM SERPL-MCNC: 8.7 MG/DL (ref 8.5–10.5)
CHLORIDE BLD-SCNC: 107 MMOL/L (ref 98–107)
CO2 SERPL-SCNC: 17 MMOL/L (ref 22–31)
CREAT SERPL-MCNC: 2.32 MG/DL (ref 0.7–1.3)
GFR SERPL CREATININE-BSD FRML MDRD: 27 ML/MIN/1.73M2
GLUCOSE BLD-MCNC: 289 MG/DL (ref 70–125)
POTASSIUM BLD-SCNC: 4 MMOL/L (ref 3.5–5)
SODIUM SERPL-SCNC: 138 MMOL/L (ref 136–145)

## 2019-11-18 RX ORDER — HYDRALAZINE HYDROCHLORIDE 25 MG/1
75 TABLET, FILM COATED ORAL 4 TIMES DAILY
Status: SHIPPED | COMMUNITY
Start: 2019-11-18 | End: 2022-03-21

## 2019-12-05 ENCOUNTER — AMBULATORY - HEALTHEAST (OUTPATIENT)
Dept: EDUCATION SERVICES | Facility: CLINIC | Age: 79
End: 2019-12-05

## 2019-12-05 DIAGNOSIS — E11.65 TYPE 2 DIABETES MELLITUS WITH HYPERGLYCEMIA, UNSPECIFIED WHETHER LONG TERM INSULIN USE (H): ICD-10-CM

## 2019-12-12 ENCOUNTER — COMMUNICATION - HEALTHEAST (OUTPATIENT)
Dept: INTERNAL MEDICINE | Facility: CLINIC | Age: 79
End: 2019-12-12

## 2019-12-12 DIAGNOSIS — I10 ESSENTIAL HYPERTENSION: ICD-10-CM

## 2019-12-15 ENCOUNTER — COMMUNICATION - HEALTHEAST (OUTPATIENT)
Dept: MEDSURG UNIT | Facility: CLINIC | Age: 79
End: 2019-12-15

## 2019-12-15 DIAGNOSIS — I10 ESSENTIAL HYPERTENSION: ICD-10-CM

## 2019-12-17 ENCOUNTER — RECORDS - HEALTHEAST (OUTPATIENT)
Dept: ADMINISTRATIVE | Facility: OTHER | Age: 79
End: 2019-12-17

## 2019-12-18 ENCOUNTER — COMMUNICATION - HEALTHEAST (OUTPATIENT)
Dept: INTERNAL MEDICINE | Facility: CLINIC | Age: 79
End: 2019-12-18

## 2019-12-18 DIAGNOSIS — I66.9 CEREBRAL ARTERY OCCLUSION: ICD-10-CM

## 2019-12-19 ENCOUNTER — RECORDS - HEALTHEAST (OUTPATIENT)
Dept: ADMINISTRATIVE | Facility: OTHER | Age: 79
End: 2019-12-19

## 2020-01-16 ENCOUNTER — OFFICE VISIT - HEALTHEAST (OUTPATIENT)
Dept: GERIATRICS | Facility: CLINIC | Age: 80
End: 2020-01-16

## 2020-01-16 DIAGNOSIS — N18.4 CHRONIC KIDNEY DISEASE, STAGE IV (SEVERE) (H): ICD-10-CM

## 2020-01-16 DIAGNOSIS — M62.81 GENERALIZED MUSCLE WEAKNESS: ICD-10-CM

## 2020-01-16 DIAGNOSIS — E11.40 CONTROLLED TYPE 2 DIABETES WITH NEUROPATHY (H): ICD-10-CM

## 2020-01-16 DIAGNOSIS — Z86.73 HISTORY OF STROKE: ICD-10-CM

## 2020-01-16 DIAGNOSIS — J10.1 INFLUENZA A: ICD-10-CM

## 2020-01-16 DIAGNOSIS — R53.81 PHYSICAL DECONDITIONING: ICD-10-CM

## 2020-01-17 ENCOUNTER — OFFICE VISIT - HEALTHEAST (OUTPATIENT)
Dept: GERIATRICS | Facility: CLINIC | Age: 80
End: 2020-01-17

## 2020-01-17 DIAGNOSIS — E11.40 CONTROLLED TYPE 2 DIABETES WITH NEUROPATHY (H): ICD-10-CM

## 2020-01-17 DIAGNOSIS — J10.1 INFLUENZA A: ICD-10-CM

## 2020-01-17 DIAGNOSIS — Z71.89 ACP (ADVANCE CARE PLANNING): ICD-10-CM

## 2020-01-19 ENCOUNTER — RECORDS - HEALTHEAST (OUTPATIENT)
Dept: LAB | Facility: CLINIC | Age: 80
End: 2020-01-19

## 2020-01-20 ENCOUNTER — COMMUNICATION - HEALTHEAST (OUTPATIENT)
Dept: GERIATRICS | Facility: CLINIC | Age: 80
End: 2020-01-20

## 2020-01-20 ENCOUNTER — OFFICE VISIT - HEALTHEAST (OUTPATIENT)
Dept: GERIATRICS | Facility: CLINIC | Age: 80
End: 2020-01-20

## 2020-01-20 DIAGNOSIS — E11.40 CONTROLLED TYPE 2 DIABETES WITH NEUROPATHY (H): ICD-10-CM

## 2020-01-20 DIAGNOSIS — J10.1 INFLUENZA A: ICD-10-CM

## 2020-01-20 DIAGNOSIS — I10 ESSENTIAL HYPERTENSION: ICD-10-CM

## 2020-01-20 LAB
ANION GAP SERPL CALCULATED.3IONS-SCNC: 9 MMOL/L (ref 5–18)
BUN SERPL-MCNC: 45 MG/DL (ref 8–28)
CALCIUM SERPL-MCNC: 8.6 MG/DL (ref 8.5–10.5)
CHLORIDE BLD-SCNC: 105 MMOL/L (ref 98–107)
CO2 SERPL-SCNC: 21 MMOL/L (ref 22–31)
CREAT SERPL-MCNC: 2.36 MG/DL (ref 0.7–1.3)
ERYTHROCYTE [DISTWIDTH] IN BLOOD BY AUTOMATED COUNT: 14.4 % (ref 11–14.5)
GFR SERPL CREATININE-BSD FRML MDRD: 27 ML/MIN/1.73M2
GLUCOSE BLD-MCNC: 273 MG/DL (ref 70–125)
HCT VFR BLD AUTO: 27.4 % (ref 40–54)
HGB BLD-MCNC: 8.6 G/DL (ref 14–18)
MAGNESIUM SERPL-MCNC: 1.9 MG/DL (ref 1.8–2.6)
MCH RBC QN AUTO: 29.7 PG (ref 27–34)
MCHC RBC AUTO-ENTMCNC: 31.4 G/DL (ref 32–36)
MCV RBC AUTO: 95 FL (ref 80–100)
PLATELET # BLD AUTO: 279 THOU/UL (ref 140–440)
PMV BLD AUTO: 9.5 FL (ref 8.5–12.5)
POTASSIUM BLD-SCNC: 4.2 MMOL/L (ref 3.5–5)
RBC # BLD AUTO: 2.9 MILL/UL (ref 4.4–6.2)
SODIUM SERPL-SCNC: 135 MMOL/L (ref 136–145)
WBC: 5.9 THOU/UL (ref 4–11)

## 2020-01-23 ENCOUNTER — OFFICE VISIT - HEALTHEAST (OUTPATIENT)
Dept: GERIATRICS | Facility: CLINIC | Age: 80
End: 2020-01-23

## 2020-01-23 DIAGNOSIS — N18.4 CHRONIC KIDNEY DISEASE, STAGE IV (SEVERE) (H): ICD-10-CM

## 2020-01-23 DIAGNOSIS — E11.69 TYPE 2 DIABETES MELLITUS WITH OTHER SPECIFIED COMPLICATION, WITH LONG-TERM CURRENT USE OF INSULIN (H): ICD-10-CM

## 2020-01-23 DIAGNOSIS — J10.1 INFLUENZA A: ICD-10-CM

## 2020-01-23 DIAGNOSIS — Z79.4 TYPE 2 DIABETES MELLITUS WITH OTHER SPECIFIED COMPLICATION, WITH LONG-TERM CURRENT USE OF INSULIN (H): ICD-10-CM

## 2020-01-23 DIAGNOSIS — Z86.73 HISTORY OF STROKE: ICD-10-CM

## 2020-01-23 DIAGNOSIS — R53.81 PHYSICAL DECONDITIONING: ICD-10-CM

## 2020-01-23 DIAGNOSIS — I10 ESSENTIAL HYPERTENSION: ICD-10-CM

## 2020-01-23 DIAGNOSIS — M62.81 GENERALIZED MUSCLE WEAKNESS: ICD-10-CM

## 2020-01-28 ENCOUNTER — OFFICE VISIT - HEALTHEAST (OUTPATIENT)
Dept: GERIATRICS | Facility: CLINIC | Age: 80
End: 2020-01-28

## 2020-01-28 DIAGNOSIS — N18.4 CHRONIC KIDNEY DISEASE, STAGE IV (SEVERE) (H): ICD-10-CM

## 2020-01-28 DIAGNOSIS — M62.81 GENERALIZED MUSCLE WEAKNESS: ICD-10-CM

## 2020-01-28 DIAGNOSIS — J10.1 INFLUENZA A: ICD-10-CM

## 2020-01-28 DIAGNOSIS — I10 ESSENTIAL HYPERTENSION: ICD-10-CM

## 2020-01-30 ENCOUNTER — OFFICE VISIT - HEALTHEAST (OUTPATIENT)
Dept: GERIATRICS | Facility: CLINIC | Age: 80
End: 2020-01-30

## 2020-01-30 DIAGNOSIS — M62.81 GENERALIZED MUSCLE WEAKNESS: ICD-10-CM

## 2020-01-30 DIAGNOSIS — N18.4 CHRONIC KIDNEY DISEASE, STAGE IV (SEVERE) (H): ICD-10-CM

## 2020-01-30 DIAGNOSIS — Z79.4 TYPE 2 DIABETES MELLITUS WITH OTHER SPECIFIED COMPLICATION, WITH LONG-TERM CURRENT USE OF INSULIN (H): ICD-10-CM

## 2020-01-30 DIAGNOSIS — I10 ESSENTIAL HYPERTENSION: ICD-10-CM

## 2020-01-30 DIAGNOSIS — J10.1 INFLUENZA A: ICD-10-CM

## 2020-01-30 DIAGNOSIS — E11.69 TYPE 2 DIABETES MELLITUS WITH OTHER SPECIFIED COMPLICATION, WITH LONG-TERM CURRENT USE OF INSULIN (H): ICD-10-CM

## 2020-02-03 ENCOUNTER — OFFICE VISIT - HEALTHEAST (OUTPATIENT)
Dept: GERIATRICS | Facility: CLINIC | Age: 80
End: 2020-02-03

## 2020-02-03 DIAGNOSIS — I10 ESSENTIAL HYPERTENSION: ICD-10-CM

## 2020-02-03 DIAGNOSIS — J10.1 INFLUENZA A: ICD-10-CM

## 2020-02-03 DIAGNOSIS — E11.40 CONTROLLED TYPE 2 DIABETES WITH NEUROPATHY (H): ICD-10-CM

## 2020-02-04 ENCOUNTER — COMMUNICATION - HEALTHEAST (OUTPATIENT)
Dept: GERIATRICS | Facility: CLINIC | Age: 80
End: 2020-02-04

## 2020-02-04 ENCOUNTER — AMBULATORY - HEALTHEAST (OUTPATIENT)
Dept: GERIATRICS | Facility: CLINIC | Age: 80
End: 2020-02-04

## 2020-02-05 ENCOUNTER — OFFICE VISIT - HEALTHEAST (OUTPATIENT)
Dept: INTERNAL MEDICINE | Facility: CLINIC | Age: 80
End: 2020-02-05

## 2020-02-05 DIAGNOSIS — R52 PAIN: ICD-10-CM

## 2020-02-05 DIAGNOSIS — J11.1 INFLUENZA: ICD-10-CM

## 2020-02-05 DIAGNOSIS — D50.9 IRON DEFICIENCY ANEMIA, UNSPECIFIED IRON DEFICIENCY ANEMIA TYPE: ICD-10-CM

## 2020-02-05 DIAGNOSIS — E11.40 CONTROLLED TYPE 2 DIABETES WITH NEUROPATHY (H): ICD-10-CM

## 2020-02-05 DIAGNOSIS — I10 ESSENTIAL HYPERTENSION: ICD-10-CM

## 2020-02-05 LAB
ANION GAP SERPL CALCULATED.3IONS-SCNC: 15 MMOL/L (ref 5–18)
BUN SERPL-MCNC: 38 MG/DL (ref 8–28)
CALCIUM SERPL-MCNC: 8.7 MG/DL (ref 8.5–10.5)
CHLORIDE BLD-SCNC: 105 MMOL/L (ref 98–107)
CO2 SERPL-SCNC: 19 MMOL/L (ref 22–31)
CREAT SERPL-MCNC: 2.43 MG/DL (ref 0.7–1.3)
ERYTHROCYTE [DISTWIDTH] IN BLOOD BY AUTOMATED COUNT: 15.8 % (ref 11–14.5)
GFR SERPL CREATININE-BSD FRML MDRD: 26 ML/MIN/1.73M2
GLUCOSE BLD-MCNC: 353 MG/DL (ref 70–125)
HCT VFR BLD AUTO: 30.9 % (ref 40–54)
HGB BLD-MCNC: 9.6 G/DL (ref 14–18)
MCH RBC QN AUTO: 29.2 PG (ref 27–34)
MCHC RBC AUTO-ENTMCNC: 31.1 G/DL (ref 32–36)
MCV RBC AUTO: 94 FL (ref 80–100)
PLATELET # BLD AUTO: 246 THOU/UL (ref 140–440)
PMV BLD AUTO: 10 FL (ref 8.5–12.5)
POTASSIUM BLD-SCNC: 4.1 MMOL/L (ref 3.5–5)
RBC # BLD AUTO: 3.29 MILL/UL (ref 4.4–6.2)
SODIUM SERPL-SCNC: 139 MMOL/L (ref 136–145)
WBC: 6 THOU/UL (ref 4–11)

## 2020-02-07 ENCOUNTER — COMMUNICATION - HEALTHEAST (OUTPATIENT)
Dept: INTERNAL MEDICINE | Facility: CLINIC | Age: 80
End: 2020-02-07

## 2020-02-19 ENCOUNTER — COMMUNICATION - HEALTHEAST (OUTPATIENT)
Dept: MEDSURG UNIT | Facility: CLINIC | Age: 80
End: 2020-02-19

## 2020-02-19 DIAGNOSIS — I10 ESSENTIAL HYPERTENSION: ICD-10-CM

## 2020-03-31 ENCOUNTER — COMMUNICATION - HEALTHEAST (OUTPATIENT)
Dept: INTERNAL MEDICINE | Facility: CLINIC | Age: 80
End: 2020-03-31

## 2020-03-31 DIAGNOSIS — R52 PAIN: ICD-10-CM

## 2020-04-08 ENCOUNTER — OFFICE VISIT - HEALTHEAST (OUTPATIENT)
Dept: INTERNAL MEDICINE | Facility: CLINIC | Age: 80
End: 2020-04-08

## 2020-04-08 DIAGNOSIS — E11.9 TYPE 2 DIABETES MELLITUS WITHOUT COMPLICATION, WITHOUT LONG-TERM CURRENT USE OF INSULIN (H): ICD-10-CM

## 2020-04-08 DIAGNOSIS — D64.9 ANEMIA, UNSPECIFIED TYPE: ICD-10-CM

## 2020-04-08 DIAGNOSIS — Z85.51 HISTORY OF BLADDER CANCER: ICD-10-CM

## 2020-04-08 DIAGNOSIS — Z85.038 HISTORY OF COLON CANCER, STAGE III: ICD-10-CM

## 2020-04-08 DIAGNOSIS — N18.30 CHRONIC RENAL INSUFFICIENCY, STAGE 3 (MODERATE) (H): ICD-10-CM

## 2020-04-08 DIAGNOSIS — I65.22 CAROTID ARTERY STENOSIS, ASYMPTOMATIC, LEFT: ICD-10-CM

## 2020-04-08 DIAGNOSIS — I10 ESSENTIAL HYPERTENSION: ICD-10-CM

## 2020-04-08 ASSESSMENT — PATIENT HEALTH QUESTIONNAIRE - PHQ9: SUM OF ALL RESPONSES TO PHQ QUESTIONS 1-9: 0

## 2020-04-15 ENCOUNTER — COMMUNICATION - HEALTHEAST (OUTPATIENT)
Dept: INTERNAL MEDICINE | Facility: CLINIC | Age: 80
End: 2020-04-15

## 2020-04-15 DIAGNOSIS — I66.9 CEREBRAL ARTERY OCCLUSION: ICD-10-CM

## 2020-04-29 ENCOUNTER — COMMUNICATION - HEALTHEAST (OUTPATIENT)
Dept: INTERNAL MEDICINE | Facility: CLINIC | Age: 80
End: 2020-04-29

## 2020-04-29 DIAGNOSIS — E11.9 TYPE 2 DIABETES MELLITUS WITHOUT COMPLICATION, WITH LONG-TERM CURRENT USE OF INSULIN (H): ICD-10-CM

## 2020-04-29 DIAGNOSIS — Z79.4 TYPE 2 DIABETES MELLITUS WITHOUT COMPLICATION, WITH LONG-TERM CURRENT USE OF INSULIN (H): ICD-10-CM

## 2020-05-07 ENCOUNTER — OFFICE VISIT - HEALTHEAST (OUTPATIENT)
Dept: INTERNAL MEDICINE | Facility: CLINIC | Age: 80
End: 2020-05-07

## 2020-05-07 ENCOUNTER — COMMUNICATION - HEALTHEAST (OUTPATIENT)
Dept: INTERNAL MEDICINE | Facility: CLINIC | Age: 80
End: 2020-05-07

## 2020-05-07 DIAGNOSIS — D64.9 ANEMIA, UNSPECIFIED TYPE: ICD-10-CM

## 2020-05-07 DIAGNOSIS — N18.30 CHRONIC RENAL INSUFFICIENCY, STAGE 3 (MODERATE) (H): ICD-10-CM

## 2020-05-07 DIAGNOSIS — Z86.73 HISTORY OF STROKE: ICD-10-CM

## 2020-05-07 DIAGNOSIS — Z85.51 HISTORY OF BLADDER CANCER: ICD-10-CM

## 2020-05-07 DIAGNOSIS — Z85.038 HISTORY OF COLON CANCER, STAGE III: ICD-10-CM

## 2020-05-07 DIAGNOSIS — Z79.4 TYPE 2 DIABETES MELLITUS WITHOUT COMPLICATION, WITH LONG-TERM CURRENT USE OF INSULIN (H): ICD-10-CM

## 2020-05-07 DIAGNOSIS — I10 ESSENTIAL HYPERTENSION: ICD-10-CM

## 2020-05-07 DIAGNOSIS — E11.9 TYPE 2 DIABETES MELLITUS WITHOUT COMPLICATION, WITH LONG-TERM CURRENT USE OF INSULIN (H): ICD-10-CM

## 2020-05-07 DIAGNOSIS — I65.22 CAROTID ARTERY STENOSIS, ASYMPTOMATIC, LEFT: ICD-10-CM

## 2020-05-08 ENCOUNTER — AMBULATORY - HEALTHEAST (OUTPATIENT)
Dept: PHARMACY | Facility: CLINIC | Age: 80
End: 2020-05-08

## 2020-05-08 DIAGNOSIS — E11.40 CONTROLLED TYPE 2 DIABETES WITH NEUROPATHY (H): ICD-10-CM

## 2020-05-20 ENCOUNTER — OFFICE VISIT - HEALTHEAST (OUTPATIENT)
Dept: INTERNAL MEDICINE | Facility: CLINIC | Age: 80
End: 2020-05-20

## 2020-05-20 DIAGNOSIS — E11.9 TYPE 2 DIABETES MELLITUS WITHOUT COMPLICATION, WITHOUT LONG-TERM CURRENT USE OF INSULIN (H): ICD-10-CM

## 2020-05-20 DIAGNOSIS — N18.30 CHRONIC RENAL INSUFFICIENCY, STAGE 3 (MODERATE) (H): ICD-10-CM

## 2020-05-20 DIAGNOSIS — Z85.038 HISTORY OF COLON CANCER, STAGE III: ICD-10-CM

## 2020-05-20 DIAGNOSIS — Z86.73 HISTORY OF STROKE: ICD-10-CM

## 2020-05-20 DIAGNOSIS — I10 ESSENTIAL HYPERTENSION: ICD-10-CM

## 2020-06-04 ENCOUNTER — RECORDS - HEALTHEAST (OUTPATIENT)
Dept: ADMINISTRATIVE | Facility: OTHER | Age: 80
End: 2020-06-04

## 2020-06-04 ENCOUNTER — COMMUNICATION - HEALTHEAST (OUTPATIENT)
Dept: INTERNAL MEDICINE | Facility: CLINIC | Age: 80
End: 2020-06-04

## 2020-06-04 DIAGNOSIS — R52 PAIN: ICD-10-CM

## 2020-06-23 ENCOUNTER — RECORDS - HEALTHEAST (OUTPATIENT)
Dept: ADMINISTRATIVE | Facility: OTHER | Age: 80
End: 2020-06-23

## 2020-06-23 LAB
ALT SERPL W/O P-5'-P-CCNC: 9 U/L (ref 7–40)
AST SERPL-CCNC: 11 U/L (ref 13–40)
CREAT SERPL-MCNC: 2.04 MG/DL (ref 0.5–1.2)
GFR ESTIMATE EXT - HISTORICAL: 29.7 ML/MIN/1.73M^2

## 2020-06-24 ENCOUNTER — COMMUNICATION - HEALTHEAST (OUTPATIENT)
Dept: SCHEDULING | Facility: CLINIC | Age: 80
End: 2020-06-24

## 2020-06-24 ENCOUNTER — AMBULATORY - HEALTHEAST (OUTPATIENT)
Dept: LAB | Facility: CLINIC | Age: 80
End: 2020-06-24

## 2020-06-24 DIAGNOSIS — N18.4 CHRONIC KIDNEY DISEASE, STAGE IV (SEVERE) (H): ICD-10-CM

## 2020-06-24 DIAGNOSIS — E87.6 HYPOKALEMIA: ICD-10-CM

## 2020-06-24 LAB
ANION GAP SERPL CALCULATED.3IONS-SCNC: 13 MMOL/L (ref 5–18)
BUN SERPL-MCNC: 27 MG/DL (ref 8–28)
CALCIUM SERPL-MCNC: 8.6 MG/DL (ref 8.5–10.5)
CHLORIDE BLD-SCNC: 101 MMOL/L (ref 98–107)
CO2 SERPL-SCNC: 27 MMOL/L (ref 22–31)
CREAT SERPL-MCNC: 2.19 MG/DL (ref 0.7–1.3)
GFR SERPL CREATININE-BSD FRML MDRD: 29 ML/MIN/1.73M2
GLUCOSE BLD-MCNC: 219 MG/DL (ref 70–125)
MAGNESIUM SERPL-MCNC: 2 MG/DL (ref 1.8–2.6)
POTASSIUM BLD-SCNC: 3 MMOL/L (ref 3.5–5)
SODIUM SERPL-SCNC: 141 MMOL/L (ref 136–145)

## 2020-06-24 RX ORDER — POTASSIUM CHLORIDE 1500 MG/1
20 TABLET, EXTENDED RELEASE ORAL DAILY
Qty: 30 TABLET | Refills: 11 | Status: SHIPPED | OUTPATIENT
Start: 2020-06-24 | End: 2021-10-01 | Stop reason: CLARIF

## 2020-06-25 ENCOUNTER — AMBULATORY - HEALTHEAST (OUTPATIENT)
Dept: INTERNAL MEDICINE | Facility: CLINIC | Age: 80
End: 2020-06-25

## 2020-06-25 DIAGNOSIS — N18.4 CHRONIC KIDNEY DISEASE, STAGE IV (SEVERE) (H): ICD-10-CM

## 2020-06-25 DIAGNOSIS — E87.6 HYPOKALEMIA: ICD-10-CM

## 2020-07-02 ENCOUNTER — AMBULATORY - HEALTHEAST (OUTPATIENT)
Dept: LAB | Facility: CLINIC | Age: 80
End: 2020-07-02

## 2020-07-02 DIAGNOSIS — N18.4 CHRONIC KIDNEY DISEASE, STAGE IV (SEVERE) (H): ICD-10-CM

## 2020-07-02 DIAGNOSIS — E87.6 HYPOKALEMIA: ICD-10-CM

## 2020-07-02 LAB
ANION GAP SERPL CALCULATED.3IONS-SCNC: 11 MMOL/L (ref 5–18)
BUN SERPL-MCNC: 27 MG/DL (ref 8–28)
CALCIUM SERPL-MCNC: 8.3 MG/DL (ref 8.5–10.5)
CHLORIDE BLD-SCNC: 102 MMOL/L (ref 98–107)
CO2 SERPL-SCNC: 23 MMOL/L (ref 22–31)
CREAT SERPL-MCNC: 2.12 MG/DL (ref 0.7–1.3)
GFR SERPL CREATININE-BSD FRML MDRD: 30 ML/MIN/1.73M2
GLUCOSE BLD-MCNC: 198 MG/DL (ref 70–125)
POTASSIUM BLD-SCNC: 3.9 MMOL/L (ref 3.5–5)
SODIUM SERPL-SCNC: 136 MMOL/L (ref 136–145)

## 2020-07-03 ENCOUNTER — COMMUNICATION - HEALTHEAST (OUTPATIENT)
Dept: INTERNAL MEDICINE | Facility: CLINIC | Age: 80
End: 2020-07-03

## 2020-07-03 DIAGNOSIS — N28.9 RENAL INSUFFICIENCY: ICD-10-CM

## 2020-07-09 ENCOUNTER — COMMUNICATION - HEALTHEAST (OUTPATIENT)
Dept: INTERNAL MEDICINE | Facility: CLINIC | Age: 80
End: 2020-07-09

## 2020-07-09 ENCOUNTER — OFFICE VISIT - HEALTHEAST (OUTPATIENT)
Dept: INTERNAL MEDICINE | Facility: CLINIC | Age: 80
End: 2020-07-09

## 2020-07-09 DIAGNOSIS — N18.4 CHRONIC KIDNEY DISEASE, STAGE IV (SEVERE) (H): ICD-10-CM

## 2020-07-09 DIAGNOSIS — Z79.4 TYPE 2 DIABETES MELLITUS WITHOUT COMPLICATION, WITH LONG-TERM CURRENT USE OF INSULIN (H): ICD-10-CM

## 2020-07-09 DIAGNOSIS — Z86.73 HISTORY OF STROKE: ICD-10-CM

## 2020-07-09 DIAGNOSIS — I65.22 CAROTID ARTERY STENOSIS, ASYMPTOMATIC, LEFT: ICD-10-CM

## 2020-07-09 DIAGNOSIS — I10 ESSENTIAL HYPERTENSION: ICD-10-CM

## 2020-07-09 DIAGNOSIS — E53.8 B12 DEFICIENCY: ICD-10-CM

## 2020-07-09 DIAGNOSIS — Z85.51 HISTORY OF BLADDER CANCER: ICD-10-CM

## 2020-07-09 DIAGNOSIS — E11.9 TYPE 2 DIABETES MELLITUS WITHOUT COMPLICATION, WITH LONG-TERM CURRENT USE OF INSULIN (H): ICD-10-CM

## 2020-07-09 DIAGNOSIS — Z85.038 HISTORY OF COLON CANCER, STAGE III: ICD-10-CM

## 2020-07-09 LAB
ALBUMIN SERPL-MCNC: 3.3 G/DL (ref 3.5–5)
ALP SERPL-CCNC: 105 U/L (ref 45–120)
ALT SERPL W P-5'-P-CCNC: 10 U/L (ref 0–45)
ANION GAP SERPL CALCULATED.3IONS-SCNC: 13 MMOL/L (ref 5–18)
AST SERPL W P-5'-P-CCNC: 11 U/L (ref 0–40)
BILIRUB SERPL-MCNC: 0.3 MG/DL (ref 0–1)
BUN SERPL-MCNC: 32 MG/DL (ref 8–28)
CALCIUM SERPL-MCNC: 8.3 MG/DL (ref 8.5–10.5)
CEA SERPL-MCNC: 4.7 NG/ML (ref 0–3)
CHLORIDE BLD-SCNC: 105 MMOL/L (ref 98–107)
CO2 SERPL-SCNC: 21 MMOL/L (ref 22–31)
CREAT SERPL-MCNC: 2.38 MG/DL (ref 0.7–1.3)
ERYTHROCYTE [DISTWIDTH] IN BLOOD BY AUTOMATED COUNT: 13.8 % (ref 11–14.5)
GFR SERPL CREATININE-BSD FRML MDRD: 26 ML/MIN/1.73M2
GLUCOSE BLD-MCNC: 146 MG/DL (ref 70–125)
HBA1C MFR BLD: 5.7 % (ref 3.5–6)
HCT VFR BLD AUTO: 26.9 % (ref 40–54)
HGB BLD-MCNC: 8.8 G/DL (ref 14–18)
MCH RBC QN AUTO: 29.6 PG (ref 27–34)
MCHC RBC AUTO-ENTMCNC: 32.9 G/DL (ref 32–36)
MCV RBC AUTO: 90 FL (ref 80–100)
PLATELET # BLD AUTO: 195 THOU/UL (ref 140–440)
PMV BLD AUTO: 6.8 FL (ref 7–10)
POTASSIUM BLD-SCNC: 4.2 MMOL/L (ref 3.5–5)
PROT SERPL-MCNC: 6.2 G/DL (ref 6–8)
RBC # BLD AUTO: 2.98 MILL/UL (ref 4.4–6.2)
SODIUM SERPL-SCNC: 139 MMOL/L (ref 136–145)
WBC: 5.2 THOU/UL (ref 4–11)

## 2020-07-10 ENCOUNTER — COMMUNICATION - HEALTHEAST (OUTPATIENT)
Dept: INTERNAL MEDICINE | Facility: CLINIC | Age: 80
End: 2020-07-10

## 2020-07-15 ENCOUNTER — COMMUNICATION - HEALTHEAST (OUTPATIENT)
Dept: EDUCATION SERVICES | Facility: CLINIC | Age: 80
End: 2020-07-15

## 2020-07-15 DIAGNOSIS — E11.9 TYPE 2 DIABETES MELLITUS (H): ICD-10-CM

## 2020-07-21 ENCOUNTER — COMMUNICATION - HEALTHEAST (OUTPATIENT)
Dept: INTERNAL MEDICINE | Facility: CLINIC | Age: 80
End: 2020-07-21

## 2020-07-21 DIAGNOSIS — I65.22 CAROTID ARTERY STENOSIS, ASYMPTOMATIC, LEFT: ICD-10-CM

## 2020-07-22 RX ORDER — ATORVASTATIN CALCIUM 20 MG/1
20 TABLET, FILM COATED ORAL DAILY
Qty: 90 TABLET | Refills: 3 | Status: SHIPPED | OUTPATIENT
Start: 2020-07-22 | End: 2021-07-30

## 2020-07-27 ENCOUNTER — RECORDS - HEALTHEAST (OUTPATIENT)
Dept: HEALTH INFORMATION MANAGEMENT | Facility: CLINIC | Age: 80
End: 2020-07-27

## 2020-08-31 ENCOUNTER — OFFICE VISIT - HEALTHEAST (OUTPATIENT)
Dept: INTERNAL MEDICINE | Facility: CLINIC | Age: 80
End: 2020-08-31

## 2020-08-31 DIAGNOSIS — I65.22 CAROTID ARTERY STENOSIS, ASYMPTOMATIC, LEFT: ICD-10-CM

## 2020-08-31 DIAGNOSIS — Z86.73 HISTORY OF STROKE: ICD-10-CM

## 2020-08-31 DIAGNOSIS — Z85.51 HISTORY OF BLADDER CANCER: ICD-10-CM

## 2020-08-31 DIAGNOSIS — Z85.038 HISTORY OF COLON CANCER, STAGE III: ICD-10-CM

## 2020-08-31 DIAGNOSIS — Z51.81 ENCOUNTER FOR THERAPEUTIC DRUG MONITORING: ICD-10-CM

## 2020-08-31 DIAGNOSIS — E11.9 TYPE 2 DIABETES MELLITUS WITHOUT COMPLICATION, WITHOUT LONG-TERM CURRENT USE OF INSULIN (H): ICD-10-CM

## 2020-08-31 DIAGNOSIS — N18.4 CHRONIC KIDNEY DISEASE, STAGE IV (SEVERE) (H): ICD-10-CM

## 2020-08-31 DIAGNOSIS — D64.9 ANEMIA, UNSPECIFIED TYPE: ICD-10-CM

## 2020-08-31 DIAGNOSIS — I10 ESSENTIAL HYPERTENSION: ICD-10-CM

## 2020-08-31 DIAGNOSIS — Z79.4 TYPE 2 DIABETES MELLITUS WITHOUT COMPLICATION, WITH LONG-TERM CURRENT USE OF INSULIN (H): ICD-10-CM

## 2020-08-31 DIAGNOSIS — E11.9 TYPE 2 DIABETES MELLITUS WITHOUT COMPLICATION, WITH LONG-TERM CURRENT USE OF INSULIN (H): ICD-10-CM

## 2020-08-31 DIAGNOSIS — E53.8 B12 DEFICIENCY: ICD-10-CM

## 2020-08-31 LAB
ALBUMIN SERPL-MCNC: 3.6 G/DL (ref 3.5–5)
ALP SERPL-CCNC: 99 U/L (ref 45–120)
ALT SERPL W P-5'-P-CCNC: 13 U/L (ref 0–45)
ANION GAP SERPL CALCULATED.3IONS-SCNC: 12 MMOL/L (ref 5–18)
AST SERPL W P-5'-P-CCNC: 14 U/L (ref 0–40)
BILIRUB SERPL-MCNC: 0.3 MG/DL (ref 0–1)
BUN SERPL-MCNC: 31 MG/DL (ref 8–28)
CALCIUM SERPL-MCNC: 8.7 MG/DL (ref 8.5–10.5)
CHLORIDE BLD-SCNC: 103 MMOL/L (ref 98–107)
CO2 SERPL-SCNC: 24 MMOL/L (ref 22–31)
CREAT SERPL-MCNC: 2.22 MG/DL (ref 0.7–1.3)
ERYTHROCYTE [DISTWIDTH] IN BLOOD BY AUTOMATED COUNT: 13.9 % (ref 11–14.5)
GFR SERPL CREATININE-BSD FRML MDRD: 29 ML/MIN/1.73M2
GLUCOSE BLD-MCNC: 117 MG/DL (ref 70–125)
HCT VFR BLD AUTO: 29.9 % (ref 40–54)
HGB BLD-MCNC: 10.3 G/DL (ref 14–18)
MCH RBC QN AUTO: 30.8 PG (ref 27–34)
MCHC RBC AUTO-ENTMCNC: 34.2 G/DL (ref 32–36)
MCV RBC AUTO: 90 FL (ref 80–100)
PLATELET # BLD AUTO: 218 THOU/UL (ref 140–440)
PMV BLD AUTO: 6.3 FL (ref 7–10)
POTASSIUM BLD-SCNC: 3.3 MMOL/L (ref 3.5–5)
PROT SERPL-MCNC: 6.8 G/DL (ref 6–8)
RBC # BLD AUTO: 3.33 MILL/UL (ref 4.4–6.2)
SODIUM SERPL-SCNC: 139 MMOL/L (ref 136–145)
WBC: 5.4 THOU/UL (ref 4–11)

## 2020-09-04 ENCOUNTER — COMMUNICATION - HEALTHEAST (OUTPATIENT)
Dept: INTERNAL MEDICINE | Facility: CLINIC | Age: 80
End: 2020-09-04

## 2020-09-04 DIAGNOSIS — E11.9 TYPE 2 DIABETES MELLITUS WITHOUT COMPLICATION, WITH LONG-TERM CURRENT USE OF INSULIN (H): ICD-10-CM

## 2020-09-04 DIAGNOSIS — Z79.4 TYPE 2 DIABETES MELLITUS WITHOUT COMPLICATION, WITH LONG-TERM CURRENT USE OF INSULIN (H): ICD-10-CM

## 2020-09-08 ENCOUNTER — COMMUNICATION - HEALTHEAST (OUTPATIENT)
Dept: INTERNAL MEDICINE | Facility: CLINIC | Age: 80
End: 2020-09-08

## 2020-09-08 DIAGNOSIS — I10 ESSENTIAL HYPERTENSION: ICD-10-CM

## 2020-09-09 RX ORDER — AMLODIPINE BESYLATE 10 MG/1
10 TABLET ORAL DAILY
Qty: 90 TABLET | Refills: 3 | Status: SHIPPED | OUTPATIENT
Start: 2020-09-09 | End: 2021-09-08

## 2020-09-09 RX ORDER — LINAGLIPTIN 5 MG/1
5 TABLET, FILM COATED ORAL DAILY
Qty: 90 TABLET | Refills: 3 | Status: SHIPPED | OUTPATIENT
Start: 2020-09-09 | End: 2021-09-08

## 2020-09-16 ENCOUNTER — COMMUNICATION - HEALTHEAST (OUTPATIENT)
Dept: INTERNAL MEDICINE | Facility: CLINIC | Age: 80
End: 2020-09-16

## 2020-09-16 DIAGNOSIS — R52 PAIN: ICD-10-CM

## 2020-09-16 DIAGNOSIS — E11.9 TYPE 2 DIABETES MELLITUS (H): ICD-10-CM

## 2020-09-20 ENCOUNTER — COMMUNICATION - HEALTHEAST (OUTPATIENT)
Dept: SCHEDULING | Facility: CLINIC | Age: 80
End: 2020-09-20

## 2020-09-23 ENCOUNTER — OFFICE VISIT - HEALTHEAST (OUTPATIENT)
Dept: INTERNAL MEDICINE | Facility: CLINIC | Age: 80
End: 2020-09-23

## 2020-09-23 DIAGNOSIS — E11.22 TYPE 2 DIABETES MELLITUS WITH STAGE 4 CHRONIC KIDNEY DISEASE, WITH LONG-TERM CURRENT USE OF INSULIN (H): ICD-10-CM

## 2020-09-23 DIAGNOSIS — I48.0 PAROXYSMAL ATRIAL FIBRILLATION (H): ICD-10-CM

## 2020-09-23 DIAGNOSIS — I65.22 CAROTID ARTERY STENOSIS, ASYMPTOMATIC, LEFT: ICD-10-CM

## 2020-09-23 DIAGNOSIS — R78.81 GRAM-POSITIVE BACTEREMIA: ICD-10-CM

## 2020-09-23 DIAGNOSIS — N18.4 TYPE 2 DIABETES MELLITUS WITH STAGE 4 CHRONIC KIDNEY DISEASE, WITH LONG-TERM CURRENT USE OF INSULIN (H): ICD-10-CM

## 2020-09-23 DIAGNOSIS — I10 ESSENTIAL HYPERTENSION: ICD-10-CM

## 2020-09-23 DIAGNOSIS — Z79.4 TYPE 2 DIABETES MELLITUS WITH STAGE 4 CHRONIC KIDNEY DISEASE, WITH LONG-TERM CURRENT USE OF INSULIN (H): ICD-10-CM

## 2020-09-26 ENCOUNTER — COMMUNICATION - HEALTHEAST (OUTPATIENT)
Dept: INTERNAL MEDICINE | Facility: CLINIC | Age: 80
End: 2020-09-26

## 2020-09-26 DIAGNOSIS — I10 ESSENTIAL HYPERTENSION: ICD-10-CM

## 2020-09-29 RX ORDER — FUROSEMIDE 40 MG
TABLET ORAL
Qty: 90 TABLET | Refills: 3 | Status: SHIPPED | OUTPATIENT
Start: 2020-09-29 | End: 2021-09-24

## 2020-10-14 ENCOUNTER — OFFICE VISIT - HEALTHEAST (OUTPATIENT)
Dept: CARDIOLOGY | Facility: CLINIC | Age: 80
End: 2020-10-14

## 2020-10-14 DIAGNOSIS — E11.22 TYPE 2 DIABETES MELLITUS WITH STAGE 4 CHRONIC KIDNEY DISEASE, WITH LONG-TERM CURRENT USE OF INSULIN (H): ICD-10-CM

## 2020-10-14 DIAGNOSIS — Z79.4 TYPE 2 DIABETES MELLITUS WITH STAGE 4 CHRONIC KIDNEY DISEASE, WITH LONG-TERM CURRENT USE OF INSULIN (H): ICD-10-CM

## 2020-10-14 DIAGNOSIS — I50.32 CHRONIC DIASTOLIC CONGESTIVE HEART FAILURE (H): ICD-10-CM

## 2020-10-14 DIAGNOSIS — I48.0 PAROXYSMAL ATRIAL FIBRILLATION (H): ICD-10-CM

## 2020-10-14 DIAGNOSIS — N18.4 TYPE 2 DIABETES MELLITUS WITH STAGE 4 CHRONIC KIDNEY DISEASE, WITH LONG-TERM CURRENT USE OF INSULIN (H): ICD-10-CM

## 2020-10-14 DIAGNOSIS — N18.4 CHRONIC KIDNEY DISEASE, STAGE IV (SEVERE) (H): ICD-10-CM

## 2020-10-14 DIAGNOSIS — I10 ESSENTIAL HYPERTENSION: ICD-10-CM

## 2020-10-21 ENCOUNTER — COMMUNICATION - HEALTHEAST (OUTPATIENT)
Dept: MEDSURG UNIT | Facility: CLINIC | Age: 80
End: 2020-10-21

## 2020-10-21 DIAGNOSIS — I10 ESSENTIAL HYPERTENSION: ICD-10-CM

## 2020-10-29 ENCOUNTER — OFFICE VISIT - HEALTHEAST (OUTPATIENT)
Dept: INTERNAL MEDICINE | Facility: CLINIC | Age: 80
End: 2020-10-29

## 2020-10-29 DIAGNOSIS — D64.9 ANEMIA, UNSPECIFIED TYPE: ICD-10-CM

## 2020-10-29 DIAGNOSIS — I65.22 CAROTID ARTERY STENOSIS, ASYMPTOMATIC, LEFT: ICD-10-CM

## 2020-10-29 DIAGNOSIS — Z85.51 HISTORY OF BLADDER CANCER: ICD-10-CM

## 2020-10-29 DIAGNOSIS — Z79.4 TYPE 2 DIABETES MELLITUS WITH STAGE 4 CHRONIC KIDNEY DISEASE, WITH LONG-TERM CURRENT USE OF INSULIN (H): ICD-10-CM

## 2020-10-29 DIAGNOSIS — E11.9 TYPE 2 DIABETES MELLITUS WITHOUT COMPLICATION, WITHOUT LONG-TERM CURRENT USE OF INSULIN (H): ICD-10-CM

## 2020-10-29 DIAGNOSIS — I10 ESSENTIAL HYPERTENSION: ICD-10-CM

## 2020-10-29 DIAGNOSIS — Z86.73 HISTORY OF STROKE: ICD-10-CM

## 2020-10-29 DIAGNOSIS — Z85.048 HISTORY OF RECTAL CANCER: ICD-10-CM

## 2020-10-29 DIAGNOSIS — Z51.81 ENCOUNTER FOR THERAPEUTIC DRUG MONITORING: ICD-10-CM

## 2020-10-29 DIAGNOSIS — E11.22 TYPE 2 DIABETES MELLITUS WITH STAGE 4 CHRONIC KIDNEY DISEASE, WITH LONG-TERM CURRENT USE OF INSULIN (H): ICD-10-CM

## 2020-10-29 DIAGNOSIS — E53.8 B12 DEFICIENCY: ICD-10-CM

## 2020-10-29 DIAGNOSIS — I48.0 PAROXYSMAL ATRIAL FIBRILLATION (H): ICD-10-CM

## 2020-10-29 DIAGNOSIS — N18.4 TYPE 2 DIABETES MELLITUS WITH STAGE 4 CHRONIC KIDNEY DISEASE, WITH LONG-TERM CURRENT USE OF INSULIN (H): ICD-10-CM

## 2020-10-29 LAB
ALBUMIN SERPL-MCNC: 3.2 G/DL (ref 3.5–5)
ALP SERPL-CCNC: 109 U/L (ref 45–120)
ALT SERPL W P-5'-P-CCNC: 25 U/L (ref 0–45)
ANION GAP SERPL CALCULATED.3IONS-SCNC: 12 MMOL/L (ref 5–18)
AST SERPL W P-5'-P-CCNC: 20 U/L (ref 0–40)
BILIRUB SERPL-MCNC: 0.3 MG/DL (ref 0–1)
BUN SERPL-MCNC: 33 MG/DL (ref 8–28)
CALCIUM SERPL-MCNC: 8.4 MG/DL (ref 8.5–10.5)
CHLORIDE BLD-SCNC: 102 MMOL/L (ref 98–107)
CO2 SERPL-SCNC: 23 MMOL/L (ref 22–31)
CREAT SERPL-MCNC: 2.23 MG/DL (ref 0.7–1.3)
ERYTHROCYTE [DISTWIDTH] IN BLOOD BY AUTOMATED COUNT: 14.7 % (ref 11–14.5)
FERRITIN SERPL-MCNC: 56 NG/ML (ref 27–300)
GFR SERPL CREATININE-BSD FRML MDRD: 28 ML/MIN/1.73M2
GLUCOSE BLD-MCNC: 314 MG/DL (ref 70–125)
HBA1C MFR BLD: 7.3 %
HCT VFR BLD AUTO: 26 % (ref 40–54)
HGB BLD-MCNC: 8.6 G/DL (ref 14–18)
IRON SATN MFR SERPL: 26 % (ref 20–50)
IRON SERPL-MCNC: 63 UG/DL (ref 42–175)
MCH RBC QN AUTO: 30.8 PG (ref 27–34)
MCHC RBC AUTO-ENTMCNC: 33.1 G/DL (ref 32–36)
MCV RBC AUTO: 93 FL (ref 80–100)
PLATELET # BLD AUTO: 188 THOU/UL (ref 140–440)
PMV BLD AUTO: 9.7 FL (ref 8.5–12.5)
POTASSIUM BLD-SCNC: 3.7 MMOL/L (ref 3.5–5)
PROT SERPL-MCNC: 6.1 G/DL (ref 6–8)
RBC # BLD AUTO: 2.79 MILL/UL (ref 4.4–6.2)
SODIUM SERPL-SCNC: 137 MMOL/L (ref 136–145)
TIBC SERPL-MCNC: 247 UG/DL (ref 313–563)
TRANSFERRIN SERPL-MCNC: 197 MG/DL (ref 212–360)
WBC: 4.1 THOU/UL (ref 4–11)

## 2020-11-25 ENCOUNTER — OFFICE VISIT - HEALTHEAST (OUTPATIENT)
Dept: INTERNAL MEDICINE | Facility: CLINIC | Age: 80
End: 2020-11-25

## 2020-11-25 DIAGNOSIS — E11.9 TYPE 2 DIABETES MELLITUS WITHOUT COMPLICATION, WITHOUT LONG-TERM CURRENT USE OF INSULIN (H): ICD-10-CM

## 2020-11-25 DIAGNOSIS — N18.4 CHRONIC KIDNEY DISEASE, STAGE IV (SEVERE) (H): ICD-10-CM

## 2020-11-25 DIAGNOSIS — I10 ESSENTIAL HYPERTENSION: ICD-10-CM

## 2020-12-07 ENCOUNTER — RECORDS - HEALTHEAST (OUTPATIENT)
Dept: ADMINISTRATIVE | Facility: OTHER | Age: 80
End: 2020-12-07

## 2020-12-13 ENCOUNTER — COMMUNICATION - HEALTHEAST (OUTPATIENT)
Dept: INTERNAL MEDICINE | Facility: CLINIC | Age: 80
End: 2020-12-13

## 2020-12-13 DIAGNOSIS — E11.9 TYPE 2 DIABETES MELLITUS WITHOUT COMPLICATION, WITHOUT LONG-TERM CURRENT USE OF INSULIN (H): ICD-10-CM

## 2020-12-16 ENCOUNTER — OFFICE VISIT - HEALTHEAST (OUTPATIENT)
Dept: INTERNAL MEDICINE | Facility: CLINIC | Age: 80
End: 2020-12-16

## 2020-12-16 DIAGNOSIS — Z79.4 TYPE 2 DIABETES MELLITUS WITH STAGE 4 CHRONIC KIDNEY DISEASE, WITH LONG-TERM CURRENT USE OF INSULIN (H): ICD-10-CM

## 2020-12-16 DIAGNOSIS — I10 ESSENTIAL HYPERTENSION: ICD-10-CM

## 2020-12-16 DIAGNOSIS — I65.22 CAROTID ARTERY STENOSIS, ASYMPTOMATIC, LEFT: ICD-10-CM

## 2020-12-16 DIAGNOSIS — N18.4 TYPE 2 DIABETES MELLITUS WITH STAGE 4 CHRONIC KIDNEY DISEASE, WITH LONG-TERM CURRENT USE OF INSULIN (H): ICD-10-CM

## 2020-12-16 DIAGNOSIS — E11.22 TYPE 2 DIABETES MELLITUS WITH STAGE 4 CHRONIC KIDNEY DISEASE, WITH LONG-TERM CURRENT USE OF INSULIN (H): ICD-10-CM

## 2020-12-16 DIAGNOSIS — I48.0 PAROXYSMAL ATRIAL FIBRILLATION (H): ICD-10-CM

## 2020-12-29 ENCOUNTER — COMMUNICATION - HEALTHEAST (OUTPATIENT)
Dept: EDUCATION SERVICES | Facility: CLINIC | Age: 80
End: 2020-12-29

## 2020-12-29 DIAGNOSIS — E11.9 TYPE 2 DIABETES MELLITUS (H): ICD-10-CM

## 2021-01-26 ENCOUNTER — RECORDS - HEALTHEAST (OUTPATIENT)
Dept: ADMINISTRATIVE | Facility: OTHER | Age: 81
End: 2021-01-26

## 2021-01-26 ENCOUNTER — COMMUNICATION - HEALTHEAST (OUTPATIENT)
Dept: INTERNAL MEDICINE | Facility: CLINIC | Age: 81
End: 2021-01-26

## 2021-01-26 DIAGNOSIS — F34.1 DYSTHYMIA: ICD-10-CM

## 2021-02-20 ENCOUNTER — COMMUNICATION - HEALTHEAST (OUTPATIENT)
Dept: EDUCATION SERVICES | Facility: CLINIC | Age: 81
End: 2021-02-20

## 2021-02-20 DIAGNOSIS — E11.9 TYPE 2 DIABETES MELLITUS (H): ICD-10-CM

## 2021-02-22 RX ORDER — PEN NEEDLE, DIABETIC 30 GX3/16"
NEEDLE, DISPOSABLE MISCELLANEOUS
Qty: 100 EACH | Refills: 3 | Status: SHIPPED | OUTPATIENT
Start: 2021-02-22 | End: 2022-01-11

## 2021-03-02 ENCOUNTER — OFFICE VISIT - HEALTHEAST (OUTPATIENT)
Dept: INTERNAL MEDICINE | Facility: CLINIC | Age: 81
End: 2021-03-02

## 2021-03-02 ENCOUNTER — AMBULATORY - HEALTHEAST (OUTPATIENT)
Dept: NURSING | Facility: CLINIC | Age: 81
End: 2021-03-02

## 2021-03-02 DIAGNOSIS — I10 ESSENTIAL HYPERTENSION: ICD-10-CM

## 2021-03-02 DIAGNOSIS — Z85.51 HISTORY OF BLADDER CANCER: ICD-10-CM

## 2021-03-02 DIAGNOSIS — N18.4 TYPE 2 DIABETES MELLITUS WITH STAGE 4 CHRONIC KIDNEY DISEASE, WITH LONG-TERM CURRENT USE OF INSULIN (H): ICD-10-CM

## 2021-03-02 DIAGNOSIS — F34.1 DYSTHYMIA: ICD-10-CM

## 2021-03-02 DIAGNOSIS — I48.0 PAROXYSMAL ATRIAL FIBRILLATION (H): ICD-10-CM

## 2021-03-02 DIAGNOSIS — D64.9 ANEMIA, UNSPECIFIED TYPE: ICD-10-CM

## 2021-03-02 DIAGNOSIS — Z51.81 ENCOUNTER FOR THERAPEUTIC DRUG MONITORING: ICD-10-CM

## 2021-03-02 DIAGNOSIS — I65.22 CAROTID ARTERY STENOSIS, ASYMPTOMATIC, LEFT: ICD-10-CM

## 2021-03-02 DIAGNOSIS — N18.4 CHRONIC KIDNEY DISEASE, STAGE IV (SEVERE) (H): ICD-10-CM

## 2021-03-02 DIAGNOSIS — Z85.048 HISTORY OF RECTAL CANCER: ICD-10-CM

## 2021-03-02 DIAGNOSIS — E53.8 B12 DEFICIENCY: ICD-10-CM

## 2021-03-02 DIAGNOSIS — E11.22 TYPE 2 DIABETES MELLITUS WITH STAGE 4 CHRONIC KIDNEY DISEASE, WITH LONG-TERM CURRENT USE OF INSULIN (H): ICD-10-CM

## 2021-03-02 DIAGNOSIS — Z86.73 HISTORY OF STROKE: ICD-10-CM

## 2021-03-02 DIAGNOSIS — Z79.4 TYPE 2 DIABETES MELLITUS WITH STAGE 4 CHRONIC KIDNEY DISEASE, WITH LONG-TERM CURRENT USE OF INSULIN (H): ICD-10-CM

## 2021-03-02 DIAGNOSIS — E11.9 TYPE 2 DIABETES MELLITUS WITHOUT COMPLICATION, WITHOUT LONG-TERM CURRENT USE OF INSULIN (H): ICD-10-CM

## 2021-03-02 LAB
ALBUMIN SERPL-MCNC: 3.5 G/DL (ref 3.5–5)
ALP SERPL-CCNC: 100 U/L (ref 45–120)
ALT SERPL W P-5'-P-CCNC: 24 U/L (ref 0–45)
ANION GAP SERPL CALCULATED.3IONS-SCNC: 12 MMOL/L (ref 5–18)
AST SERPL W P-5'-P-CCNC: 19 U/L (ref 0–40)
BILIRUB SERPL-MCNC: 0.4 MG/DL (ref 0–1)
BUN SERPL-MCNC: 52 MG/DL (ref 8–28)
CALCIUM SERPL-MCNC: 8.4 MG/DL (ref 8.5–10.5)
CHLORIDE BLD-SCNC: 102 MMOL/L (ref 98–107)
CO2 SERPL-SCNC: 22 MMOL/L (ref 22–31)
CREAT SERPL-MCNC: 2.58 MG/DL (ref 0.7–1.3)
GFR SERPL CREATININE-BSD FRML MDRD: 24 ML/MIN/1.73M2
GLUCOSE BLD-MCNC: 176 MG/DL (ref 70–125)
HBA1C MFR BLD: 7.3 %
POTASSIUM BLD-SCNC: 4.1 MMOL/L (ref 3.5–5)
PROT SERPL-MCNC: 6.6 G/DL (ref 6–8)
SODIUM SERPL-SCNC: 136 MMOL/L (ref 136–145)

## 2021-03-02 RX ORDER — INSULIN GLARGINE 100 [IU]/ML
16 INJECTION, SOLUTION SUBCUTANEOUS EVERY MORNING
Status: SHIPPED
Start: 2021-03-02 | End: 2021-09-08

## 2021-03-02 ASSESSMENT — PATIENT HEALTH QUESTIONNAIRE - PHQ9: SUM OF ALL RESPONSES TO PHQ QUESTIONS 1-9: 2

## 2021-03-03 ENCOUNTER — COMMUNICATION - HEALTHEAST (OUTPATIENT)
Dept: INTERNAL MEDICINE | Facility: CLINIC | Age: 81
End: 2021-03-03

## 2021-03-04 ENCOUNTER — COMMUNICATION - HEALTHEAST (OUTPATIENT)
Dept: INTERNAL MEDICINE | Facility: CLINIC | Age: 81
End: 2021-03-04

## 2021-03-04 DIAGNOSIS — I10 ESSENTIAL HYPERTENSION: ICD-10-CM

## 2021-03-23 ENCOUNTER — AMBULATORY - HEALTHEAST (OUTPATIENT)
Dept: NURSING | Facility: CLINIC | Age: 81
End: 2021-03-23

## 2021-04-06 ENCOUNTER — RECORDS - HEALTHEAST (OUTPATIENT)
Dept: ADMINISTRATIVE | Facility: OTHER | Age: 81
End: 2021-04-06

## 2021-04-06 ENCOUNTER — AMBULATORY - HEALTHEAST (OUTPATIENT)
Dept: CARDIOLOGY | Facility: CLINIC | Age: 81
End: 2021-04-06

## 2021-04-14 ENCOUNTER — OFFICE VISIT - HEALTHEAST (OUTPATIENT)
Dept: CARDIOLOGY | Facility: CLINIC | Age: 81
End: 2021-04-14

## 2021-04-14 DIAGNOSIS — I48.0 PAROXYSMAL ATRIAL FIBRILLATION (H): ICD-10-CM

## 2021-04-14 DIAGNOSIS — N18.4 CHRONIC KIDNEY DISEASE, STAGE IV (SEVERE) (H): ICD-10-CM

## 2021-04-14 DIAGNOSIS — I10 ESSENTIAL HYPERTENSION: ICD-10-CM

## 2021-04-14 DIAGNOSIS — I50.32 CHRONIC DIASTOLIC CHF (CONGESTIVE HEART FAILURE), NYHA CLASS 2 (H): ICD-10-CM

## 2021-04-14 ASSESSMENT — MIFFLIN-ST. JEOR: SCORE: 1366.71

## 2021-04-21 ENCOUNTER — COMMUNICATION - HEALTHEAST (OUTPATIENT)
Dept: MEDSURG UNIT | Facility: CLINIC | Age: 81
End: 2021-04-21

## 2021-04-21 DIAGNOSIS — I10 ESSENTIAL HYPERTENSION: ICD-10-CM

## 2021-04-21 RX ORDER — METOPROLOL SUCCINATE 50 MG/1
50 TABLET, EXTENDED RELEASE ORAL DAILY
Qty: 90 TABLET | Refills: 3 | Status: SHIPPED | OUTPATIENT
Start: 2021-04-21 | End: 2022-04-06

## 2021-05-26 ENCOUNTER — RECORDS - HEALTHEAST (OUTPATIENT)
Dept: ADMINISTRATIVE | Facility: CLINIC | Age: 81
End: 2021-05-26

## 2021-05-26 VITALS — DIASTOLIC BLOOD PRESSURE: 50 MMHG | HEART RATE: 64 BPM | SYSTOLIC BLOOD PRESSURE: 140 MMHG

## 2021-05-26 VITALS — RESPIRATION RATE: 16 BRPM

## 2021-05-27 ENCOUNTER — RECORDS - HEALTHEAST (OUTPATIENT)
Dept: ADMINISTRATIVE | Facility: CLINIC | Age: 81
End: 2021-05-27

## 2021-05-27 VITALS — OXYGEN SATURATION: 100 % | HEART RATE: 81 BPM | DIASTOLIC BLOOD PRESSURE: 58 MMHG | SYSTOLIC BLOOD PRESSURE: 150 MMHG

## 2021-05-27 ASSESSMENT — PATIENT HEALTH QUESTIONNAIRE - PHQ9
SUM OF ALL RESPONSES TO PHQ QUESTIONS 1-9: 0
SUM OF ALL RESPONSES TO PHQ QUESTIONS 1-9: 2

## 2021-05-27 NOTE — TELEPHONE ENCOUNTER
RN cannot approve Refill Request    RN can NOT refill this medication Protocol failed and NO refill given.       Cammy Monroy, [facility] Triage/Med Refill 4/16/2019    Requested Prescriptions   Pending Prescriptions Disp Refills     glimepiride (AMARYL) 4 MG tablet [Pharmacy Med Name: GLIMEPIRIDE 4MG] 180 tablet 1     Sig: ONE PILL TWICE PER DAY WITH LUNCH AND SUPPER       Oral Hypoglycemics Refill Protocol Failed - 4/14/2019 10:01 AM        Failed - Microalbumin in last year      Microalbumin, Random Urine   Date Value Ref Range Status   12/07/2017 120.44 (H) 0.00 - 1.99 mg/dL Final                  Passed - Visit with PCP or prescribing provider visit in last 6 months       Last office visit with prescriber/PCP: 2/1/2019 OR same dept: 2/1/2019 Telly Torre MD OR same specialty: 2/1/2019 Telly Torre MD Last physical: Visit date not found Last MTM visit: Visit date not found         Next appt within 3 mo: Visit date not found  Next physical within 3 mo: Visit date not found  Prescriber OR PCP: Telly Torre MD  Last diagnosis associated with med order: 1. Type 2 diabetes mellitus without complication (H)  - glimepiride (AMARYL) 4 MG tablet [Pharmacy Med Name: GLIMEPIRIDE 4MG]; One pill twice per day with lunch and supper  Dispense: 180 tablet; Refill: 1     If protocol passes may refill for 12 months if within 3 months of last provider visit (or a total of 15 months).           Passed - A1C in last 6 months     Hemoglobin A1c   Date Value Ref Range Status   01/14/2019 6.6 (H) 3.5 - 6.0 % Final               Passed - Blood pressure in last year     BP Readings from Last 1 Encounters:   02/01/19 120/60             Passed - Serum creatinine in last year     Creatinine   Date Value Ref Range Status   02/01/2019 2.11 (H) 0.70 - 1.30 mg/dL Final

## 2021-05-27 NOTE — PATIENT INSTRUCTIONS - HE
Walk with walker 10 minutes twice a day every day.    Make an appointment with ophthalmology for diabetic eye exam.    Continue on current medications.    Follow-up with me in 3-4 months.

## 2021-05-27 NOTE — PROGRESS NOTES
[facility] clinic Follow Up Note    Suman Nagy   78 y.o. male    Date of Visit: 4/17/2019    Chief Complaint   Patient presents with     Follow-up     Recheck of blood pressure, diabetes, and other medical issues.     Injections     B12 shot.     Rosi Butler is here with his wife, Jeni.    Patient has had multiple lacunar strokes, last imaged MRI 2015.    Status post left CEA.  May 2018 carotid ultrasound showed stable 50-69% stenosis on the left, no recurrence on the right.    He has not had any new neurologic events since last visit.  He is at home with wife.  He still takes his own medications but overseen by his wife.    Patient does have a fluctuating hypertension condition.  Occasional spikes up to 180/80, but most numbers in the 130s-140s over 70s.  He denies orthostasis.  No falls.    No lower extremity edema or worsening shortness of breath.    No palpitations or history of arrhythmia.  No chest pain.    Patient had high spiking blood pressures when attempt to wean off clonidine was performed.  He had worsening of creatinine with high potassium when a low-dose losartan was given.    He is no longer on losartan.    On clonidine 1.5 mg in the morning, and a full 3 mg pill in the evening.  Toprol-XL 12.5 mg a day.    His creatinine did remain moderately elevated in February of 2.1.  Previous creatinine was in the 1.5 range.  Potassium was 4.4 last February.    Diabetes type 2 has been well controlled with hemoglobin A1c of 6.6% in January.  His blood sugars are occasionally higher up to 170 but most blood sugars in the 130-140 range.  He denies any low blood sugar events.    He did reduce the metformin down to 500 mg twice a day in January because of elevated creatinine.  He still on Amaryl 4 mg with lunch and supper.    He does have sniffing and peripheral neuropathy.  He does wear diabetic shoes.  He denies any foot sores and the wife does check regularly.    Past history of stage  III a colon cancer resected in 2017.  CT scan of the abdomen negative March 2018.  Colonoscopy June 2018 did show one polyp at flex sig December 2018 was negative.    Plan was for colonoscopy and oncology follow-up in June.  Last January his CEA was stable at 3.8.  He does take to fiber supplements a day.  No abdominal pain and bowels are regular no blood in stool.    Chronic anxiety and dysthymia is well controlled on 75 mg of Zoloft.  He no longer has bad nightmares.  He prefers to stay on the current dose of the Zoloft.    Patient does have a history of B12 deficiency, now taking daily oral B12, and getting B12 shots at visits.    Still on Lipitor 20 mg a day and Plavix with history of carotid artery stenosis.  No  Generalized myalgias.  No jaundice or right upper quadrant pain.  Liver tests of been normal previously.    TURBT in 2015.  I reviewed the cystoscopy report from March 2019 which was negative.  Next month follow-up with urology plan.  Patient denies any hematuria or new urinary shift.    He quit smoking in 1995.    No cough complaints today.    Is been over a year since he seen the ophthalmologist, no acute vision changes.    No elbow bursitis complaints today.    He is eating well, weight is down a few pounds.  No swallowing difficulty.    Wife is functioning well at home with him.  No behavioral issues.    PMHx:    Past Medical History:   Diagnosis Date     Anemia      Bladder cancer (H)      Cancer (H)     Rectosigmoid     Diabetes mellitus (H)      Hyperkalemia      Hypertension      Seizure (H)     possible, one time occurence     Stroke (H)     multiple, residual left sided weakness, last CVA in July 2015 caused some speech deficit     Superficial phlebitis      Venous insufficiency of both lower extremities      PSHx:    Past Surgical History:   Procedure Laterality Date     COLON SURGERY      Colectomy Low Anterior Resection     EYE SURGERY      Cataract Extraction     LAPAROSCOPIC COLON  RESECTION N/A 6/1/2017    Procedure: LAPAROSCOPIC ASSISTED COLECTOMY LOW ANTERIOR RESECTION AND DIVERTING LOOP ILEOSTOMY, PROCTOSOPY;  Surgeon: Tyson Parry MD;  Location: [facility] [place];  Service:      [place] CLOSE ENTEROSTOMY N/A 8/15/2017    Procedure: ILEOSTOMY CLOSURE LOOP ;  Surgeon: Tyson Parry MD;  Location: [facility] [place];  Service: General     KY CYSTOURETHROSCOPY,FULGUR <0.5 CM LESN N/A 9/1/2015    Procedure: CYSTOSCOPY TRANSURETHRAL RESECTION BLADDER TUMOR;  Surgeon: Cleveland Nair MD;  Location: [facility] [place];  Service: Urology     KY CYSTOURETHROSCOPY,FULGUR <0.5 CM LESN N/A 12/22/2015    Procedure: CYSTOSCOPY TRANSURETHRAL RESECTION BLADDER TUMOR AND BLADDER BIOPSIES;  Surgeon: Cleveland Nair MD;  Location: [facility] [place];  Service: Urology     TURBT      X2     VASECTOMY       Immunizations:   Immunization History   Administered Date(s) Administered     Influenza [id number], 01/18/2010     Influenza high dose, seasonal 09/15/2015, 09/26/2016, 10/03/2017, 09/27/2018     Influenza, Seasonal, Inj PF IIV3 09/27/2010, 09/14/2012, 09/27/2013     Influenza, inj, historic,unspecified 10/19/2007, 09/16/2011, 10/15/2015     Influenza, seasonal,quad inj 6-35 mos 09/18/2009, 10/17/2014     Pneumo Conj 13-V (2010&after) 01/20/2015     Pneumo Polysac 23-V 10/08/2004     Td,adult,historic,unspecified 07/01/2004     Tdap 05/20/2015       ROS A comprehensive review of systems was performed and was otherwise negative    Medications, allergies, and problem list were reviewed and updated    Exam  /68 (Patient Site: Right Arm, Patient Position: Sitting, Cuff Size: Adult Regular)   Pulse 60   Resp 16   Wt 177 lb (80.3 kg)   BMI 27.72 kg/m    Alert and oriented x3.  He is able to clamp on the exam table.  No jaundice.  Animated affect.  No JVD.  Lungs are clear.  Heart is regular without murmur.  Abdomen soft and nontender.  There is no ankle edema.    Assessment/Plan  1. Type 2  diabetes mellitus without complication, without long-term current use of insulin (H)  Still appears to be adequately controlled other blood sugars somewhat higher.  No low blood sugars.    I did discuss risk of lactic acidosis with the metformin, with his worsening renal insufficiency.    Peripheral neuropathy, patient denying any foot sores    Patient and wife was reminded to make a ophthalmology appointment for diabetic eye check.  - metFORMIN (GLUCOPHAGE) 1000 MG tablet; Take 0.5 tablets (500 mg total) by mouth 2 (two) times a day with meals.  - Glycosylated Hemoglobin A1c    2. Carotid artery stenosis, asymptomatic, left  Continue medical management with Plavix and Lipitor.    Could consider repeat ultrasound later this year or next year, given that last years ultrasound was stable.    3. History of stroke  No new event.  Plavix and Lipitor    He was not as active this winter, has had some deconditioning with unsteady gait.  I stressed the importance of walking twice a day in the house with walker.  I did discuss option for physical therapy, but he declined.    4. Anxiety  Patient prefers to stay on current dose of sertraline 75 mg a day.  I did discuss option for reducing the dose.    5. History of rectal cancer  Colon cancer resected 2017.  Plan was for oncology follow-up and possible colonoscopy this June.  No evidence of recurrence at this time.    6. History of bladder cancer  Cystoscopy negative in March, six-month follow-up with urology    7. B12 deficiency  B12 shots every 3-4 months at appointments.  Continue daily oral B12    8. Medication monitoring encounter    - Comprehensive Metabolic Panel        Return in about 4 months (around 8/17/2019) for Recheck.   Patient Instructions   Walk with walker 10 minutes twice a day every day.    Make an appointment with ophthalmology for diabetic eye exam.    Continue on current medications.    Follow-up with me in 3-4 months.    Telly Torre MD  I spent a  "total time with patient of over 25 minutes and over 50% coord care.  Time all face to face.      Current Outpatient Medications   Medication Sig Dispense Refill     atorvastatin (LIPITOR) 20 MG tablet Take 1 tablet (20 mg total) by mouth daily. 90 tablet 1     blood glucose test (ONETOUCH ULTRA BLUE TEST STRIP) strips Use 1 each As Directed 6 (six) times a day. (E11.9) 600 strip 1     cholecalciferol, vitamin D3, 1,000 unit tablet Take 1,000 Units by mouth daily.       cloNIDine HCl (CATAPRES) 0.3 MG tablet Take 1/2 pill in the morning.  Take 1 full pill at bedtime. 135 tablet 2     clopidogrel (PLAVIX) 75 mg tablet TAKE ONE (1) TABLET BY MOUTH DAILY 30 tablet 5     cyanocobalamin 1,000 mcg/mL injection Inject 1,000 mcg into the shoulder, thigh, or buttocks every 30 (thirty) days.       cyanocobalamin, vitamin B-12, (VITAMIN B-12) 1,000 mcg Subl Place 1 tablet (1,000 mcg total) under the tongue daily. 90 tablet 3     glimepiride (AMARYL) 4 MG tablet ONE PILL TWICE PER DAY WITH LUNCH AND SUPPER 180 tablet 3     INJECT EASE LANCETS 30 gauge Misc Use daily as directed (4-6 times daily) 600 each 0     INJECT EASE LANCETS 30 gauge Misc Use daily as directed (4-6 times daily) 600 each 3     metFORMIN (GLUCOPHAGE) 1000 MG tablet Take 0.5 tablets (500 mg total) by mouth 2 (two) times a day with meals.       metoprolol succinate (TOPROL-XL) 25 MG Take 0.5 tablets (12.5 mg total) by mouth daily. 45 tablet 3     sertraline (ZOLOFT) 50 MG tablet Take one & one -half (1&1/2) tablets by mouth daily 135 tablet 2     SURE COMFORT PEN NEEDLE 31 gauge x 3/16\" Ndle Use one needle each night. 100 each 2     Current Facility-Administered Medications   Medication Dose Route Frequency Provider Last Rate Last Dose     cyanocobalamin injection 1,000 mcg  1,000 mcg Intramuscular Q30 Days Jerry Kelsey MD   1,000 mcg at 04/17/19 1119     cyanocobalamin injection 1,000 mcg  1,000 mcg Intramuscular Q30 Days Telly Torre MD   1,000 mcg " "at 18 1546     Allergies   Allergen Reactions     Cefazolin      \"felt very hot\"     Pravastatin      Increased peripheral neuropathy     Simvastatin      Increased peripheral neuropathy     Thiazides      Other: Gout       Social History     Tobacco Use     Smoking status: Former Smoker     Last attempt to quit: 1995     Years since quittin.6     Smokeless tobacco: Never Used   Substance Use Topics     Alcohol use: No     Drug use: No           "

## 2021-05-28 ENCOUNTER — RECORDS - HEALTHEAST (OUTPATIENT)
Dept: ADMINISTRATIVE | Facility: CLINIC | Age: 81
End: 2021-05-28

## 2021-05-28 NOTE — TELEPHONE ENCOUNTER
Who is requesting the letter?  Patients wife (consent on file) calling to request a lab results letter stating this is the 2nd time we did not get the lab results in the mail that were mailed.   Why is the letter needed? We want it or our records  How would you like this letter returned? Mailed to address. Home address was verified with caller  Okay to leave a detailed message? Yes

## 2021-05-28 NOTE — TELEPHONE ENCOUNTER
Unable to leave a message on wife's cellular device but did manage to leave a message on the home phone stating the reason why they had not been receiving result letters in the mail and that I will mail out the patient's two most recent result letters. Also gave the number for the [employer] support line which can be called to discuss result letters being switched back to being mailed.       Florinda Osuan, [facility]  2:53 PM  5/3/2019

## 2021-05-28 NOTE — TELEPHONE ENCOUNTER
Explain to pt that since pt is active on [employer], that is how his result letters are sent. We do not send it in the mail if they are active on [employer]   PAKO Mohr  2:03 PM  5/3/2019

## 2021-05-29 NOTE — TELEPHONE ENCOUNTER
Refill Approved    Rx renewed per Medication Renewal Policy. Medication was last renewed on 11/2/18.    Cammy Monroy, [facility] Triage/Med Refill 5/21/2019     Requested Prescriptions   Pending Prescriptions Disp Refills     clopidogrel (PLAVIX) 75 mg tablet [Pharmacy Med Name: CLOPIDOGREL 75MG] 30 tablet 5     Sig: TAKE ONE (1) TABLET BY MOUTH DAILY       Clopidogrel/Prasugrel/Ticagrelor Refill Protocol Passed - 5/19/2019 10:58 AM        Passed - PCP or prescribing provider visit in past 6 months       Last office visit with prescriber/PCP: 4/17/2019 OR same dept: 4/17/2019 Telly Torre MD OR same specialty: 4/17/2019 Telly Torre MD Last physical: Visit date not found Last MTM visit: Visit date not found     Next appt within 3 mo: Visit date not found  Next physical within 3 mo: Visit date not found  Prescriber OR PCP: Telly Torre MD  Last diagnosis associated with med order: 1. Ischemic Stroke  - clopidogrel (PLAVIX) 75 mg tablet [Pharmacy Med Name: CLOPIDOGREL 75MG]; TAKE ONE (1) TABLET BY MOUTH DAILY  Dispense: 30 tablet; Refill: 5    If protocol passes may refill for 6 months if within 3 months of last provider visit (or a total of 9 months).              Passed - Hemoglobin in past 12 months     Hemoglobin   Date Value Ref Range Status   05/30/2018 12.4 (L) 14.0 - 18.0 g/dL Final

## 2021-05-30 ENCOUNTER — RECORDS - HEALTHEAST (OUTPATIENT)
Dept: ADMINISTRATIVE | Facility: CLINIC | Age: 81
End: 2021-05-30

## 2021-05-30 VITALS
WEIGHT: 186 LBS | HEIGHT: 67 IN | BODY MASS INDEX: 29.59 KG/M2 | BODY MASS INDEX: 29.57 KG/M2 | HEIGHT: 67 IN | BODY MASS INDEX: 29.57 KG/M2 | BODY MASS INDEX: 29.59 KG/M2 | WEIGHT: 186 LBS

## 2021-05-30 VITALS — HEIGHT: 67 IN | WEIGHT: 185.4 LBS | BODY MASS INDEX: 29.1 KG/M2

## 2021-05-30 NOTE — TELEPHONE ENCOUNTER
Refill Approved    Rx renewed per Medication Renewal Policy. Medication was last renewed on 6/12/18  OV 7/10/19 .    Lu Hayes, [facility] Triage/Med Refill 7/21/2019     Requested Prescriptions   Pending Prescriptions Disp Refills     atorvastatin (LIPITOR) 20 MG tablet [Pharmacy Med Name: ATORVASTATIN 20MG] 90 tablet 1     Sig: TAKE 1 TABLET (20 MG TOTAL) BY MOUTH DAILY.       Statins Refill Protocol (Hmg CoA Reductase Inhibitors) Passed - 7/21/2019  1:09 PM        Passed - PCP or prescribing provider visit in past 12 months      Last office visit with prescriber/PCP: 4/17/2019 Telly Torre MD OR same dept: 4/17/2019 Telly Torre MD OR same specialty: 4/17/2019 Telly Torre MD  Last physical: 7/10/2019 Last MTM visit: Visit date not found   Next visit within 3 mo: Visit date not found  Next physical within 3 mo: Visit date not found  Prescriber OR PCP: Telly Torre MD  Last diagnosis associated with med order: 1. Carotid artery stenosis, asymptomatic, left  - atorvastatin (LIPITOR) 20 MG tablet [Pharmacy Med Name: ATORVASTATIN 20MG]; Take 1 tablet (20 mg total) by mouth daily.  Dispense: 90 tablet; Refill: 1    If protocol passes may refill for 12 months if within 3 months of last provider visit (or a total of 15 months).

## 2021-05-30 NOTE — PATIENT INSTRUCTIONS - HE
Continue taking atorvastatin/Lipitor 20 mg every evening.  Call if you need a new prescription in order to restart that.    I change the sertraline prescription on the medication list to state what you are currently taking, just 150 mg pill a day.    5 days before your colonoscopy stop the Plavix/clopidogrel.  Restart the Plavix/clopidogrel the day after colonoscopy if no major bleeding.    The day before colonoscopy do not take any glimepiride.  Continue metformin the day before colonoscopy.    The day of colonoscopy do not take glimepiride or metformin.    Take the clonidine and metoprolol at the time as you usually take it, with sip of water or Gatorade if needed.    You can take the sertraline and atorvastatin at the usual times.    Make appointment for routine yearly eye check with ophthalmology this summer.    Change follow-up appointment with me to routine 4-month follow-up.    Vitamin B12 shot today.

## 2021-05-30 NOTE — TELEPHONE ENCOUNTER
Refill Approved    Rx renewed per Medication Renewal Policy. Medication was last renewed on 9/27/18.    Cammy Monroy, [facility] Triage/Med Refill 7/10/2019     Requested Prescriptions   Pending Prescriptions Disp Refills     cloNIDine HCl (CATAPRES) 0.3 MG tablet [Pharmacy Med Name: CLONIDINE 0.3MG] 135 tablet 2     Sig: TAKE 1/2 PILL IN THE MORNING. TAKE 1 FULL PILL AT BEDTIME.       Clonidine/Hydralazine Refill Protocol Passed - 7/10/2019  9:33 AM        Passed - PCP or prescribing provider visit in past 12 months       Last office visit with prescriber/PCP: 4/17/2019 Telly Torre MD OR same dept: 4/17/2019 Telly Torre MD OR same specialty: 4/17/2019 Telly Torre MD  Last physical: Visit date not found Last MTM visit: Visit date not found   Next visit within 3 mo: Visit date not found  Next physical within 3 mo: Visit date not found  Prescriber OR PCP: Telly Torre MD  Last diagnosis associated with med order: 1. Hypertension  - cloNIDine HCl (CATAPRES) 0.3 MG tablet [Pharmacy Med Name: CLONIDINE 0.3MG]; Take 1/2 pill in the morning.  Take 1 full pill at bedtime.  Dispense: 135 tablet; Refill: 2    If protocol passes may refill for 12 months if within 3 months of last provider visit (or a total of 15 months).             Passed - Blood pressure filed in past 12 months     BP Readings from Last 1 Encounters:   04/17/19 124/68

## 2021-05-30 NOTE — PROGRESS NOTES
[facility] clinic Follow Up Note    Suman Nagy   79 y.o. male    Date of Visit: 7/10/2019    Chief Complaint   Patient presents with     Follow-up     Colonoscopy 8-9-19   Discuss medications.                          Rosi oRdriguez is here with his wife, Jeni.  Patient lives at home with wife.    Patient has a history of lacunar strokes, seen on 2015 MRI.  Previous CEA.  I did review the May 2018 ultrasound that showed a stable left stenosis 50 to 69% unchanged.  No right stenosis.    He is on Plavix and has been on Lipitor.  The wife tells me today that is not on her med list at home and she is unsure if he has been taking it recently.    Patient has significant hypertension with labile hypertension.  High spiking blood pressures in the past when we tried to adjust his clonidine dosing.    Elevated creatinine over the past year and no longer on losartan.  Creatinine was 2.0 in April.  Labs reviewed from April 2019 by me today.    Diabetes type 2 is been well controlled and denies low blood sugars.  Hemoglobin A1c in April was 6.4%.  He is on metformin 500 mg twice daily and [place] provide 4 mg with lunch and 4 mg with supper.  Diet is been stable, he denies diarrhea or abdominal pain or nausea.    He had stage III colon cancer resected 2017.  He had a colonoscopy June 2018 with a small polyp.  Flex sig December 2018 was negative.    He was recently at oncology, and I did review their note from June 24, 2019.  His CEA level at that time was up slightly at 4.8.  Previous CEA level in January 2019 had been stable at 3.8.    No blood noted in stool.    History of peripheral neuropathy and B12 deficiency.  He gets regular B12 shots in clinic.    Chronic dysthymia anxiety is controlled, he states his been stable on just 50 mg of sertraline.    No chest pain or chest pressure.  No palpitations or history of arrhythmia.  No falls or fainting spells.    Quit smoking in 1995    PMHx:    Past  Medical History:   Diagnosis Date     Anemia      Bladder cancer (H)      Cancer (H)     Rectosigmoid     Diabetes mellitus (H)      Hyperkalemia      Hypertension      Seizure (H)     possible, one time occurence     Stroke (H)     multiple, residual left sided weakness, last CVA in July 2015 caused some speech deficit     Superficial phlebitis      Venous insufficiency of both lower extremities      PSHx:    Past Surgical History:   Procedure Laterality Date     COLON SURGERY      Colectomy Low Anterior Resection     EYE SURGERY      Cataract Extraction     LAPAROSCOPIC COLON RESECTION N/A 6/1/2017    Procedure: LAPAROSCOPIC ASSISTED COLECTOMY LOW ANTERIOR RESECTION AND DIVERTING LOOP ILEOSTOMY, PROCTOSOPY;  Surgeon: Tyson Parry MD;  Location: [facility];  Service:      OH CLOSE ENTEROSTOMY N/A 8/15/2017    Procedure: ILEOSTOMY CLOSURE LOOP ;  Surgeon: Tyson Parry MD;  Location: [facility] [place];  Service: General     OH CYSTOURETHROSCOPY,FULGUR <0.5 CM LESN N/A 9/1/2015    Procedure: CYSTOSCOPY TRANSURETHRAL RESECTION BLADDER TUMOR;  Surgeon: Cleveland Nair MD;  Location: [facility] [place];  Service: Urology     OH CYSTOURETHROSCOPY,FULGUR <0.5 CM LESN N/A 12/22/2015    Procedure: CYSTOSCOPY TRANSURETHRAL RESECTION BLADDER TUMOR AND BLADDER BIOPSIES;  Surgeon: Cleveland Nair MD;  Location: [facility] [place];  Service: Urology     TURBT      X2     VASECTOMY       Immunizations:   Immunization History   Administered Date(s) Administered     Influenza [id number], 01/18/2010     Influenza high dose, seasonal 09/15/2015, 09/26/2016, 10/03/2017, 09/27/2018     Influenza, Seasonal, Inj PF IIV3 09/27/2010, 09/14/2012, 09/27/2013     Influenza, inj, historic,unspecified 10/19/2007, 09/16/2011, 10/15/2015     Influenza, seasonal,quad inj 6-35 mos 09/18/2009, 10/17/2014     Pneumo Conj 13-V (2010&after) 01/20/2015     Pneumo Polysac 23-V 10/08/2004     Td,adult,historic,unspecified 07/01/2004      Tdap 05/20/2015       ROS A comprehensive review of systems was performed and was otherwise negative    Medications, allergies, and problem list were reviewed and updated    Exam  /86   Pulse 66   Wt 175 lb 14.4 oz (79.8 kg)   BMI 27.55 kg/m    Frail elderly male.  Still able to clamp on the exam table.  Moderate kyphosis.  No jaundice.  Lungs are clear to auscultation with good respiratory excursion.  Heart is regular with no murmur.  No JVD.  No cervical or supraclavicular adenopathy.  No abdominal tenderness abdomen soft nontender nondistended.  No ankle edema.    Assessment/Plan  1. History of colon cancer, stage III  With recurrent polyp on previous colonoscopy.  Slight increase of CEA level.    Colorectal clinic has set up a colonoscopy for August 9.  Risks of colonoscopy discussed.  Patient wishes to proceed with colonoscopy for surveillance after cancer and previous polyp.    No evidence of bleeding at this time.  Check hemoglobin.  - HM2(CBC w/o Differential)    I discussed risk of affecting blood pressure and low blood sugar risk with the colonoscopy prep.  I reviewed medication changes in detail with the wife and patient.  See below instructions.    2. Ischemic Stroke  History of stroke.  Chronic dysthymia controlled, continue current sertraline.  - sertraline (ZOLOFT) 50 MG tablet; Take 1 tablet (50 mg total) by mouth daily.    3. History of stroke  No new event.  He will continue on Plavix.  Hold Plavix for 5 days before the colonoscopy and restart day after if no bleeding.    4. Carotid artery stenosis, asymptomatic, left  Remains asymptomatic.  Unclear if he is taking atorvastatin at this time.  I will put that back on his medication list and have him continue on it.  I did discuss risk of muscle achiness and weakness with atorvastatin and now that he is 79, he could consider discontinuing that in the future.  But I also discussed that it is used for stroke reduction with his carotid artery  stenosis.    5. Type 2 diabetes mellitus without complication, without long-term current use of insulin (H)  Well-controlled.  See patient instructions below regarding his medications.  - Glycosylated Hemoglobin A1c    Due for yearly eye exam, patient was reminded to make appointment.    6. B12 deficiency  B12 shot given today.  Follow-up in 4 months for next B12 shot.    7. Medication monitoring encounter    - Comprehensive Metabolic Panel  - HM2(CBC w/o Differential)    History of bladder cancer with TURBT in 2015.  Cystoscopy report March 2019 was negative.  Urology follow-up, I believe is a 6-month follow-up in September  Return in about 4 months (around 11/10/2019) for Recheck.   Patient Instructions   Continue taking atorvastatin/Lipitor 20 mg every evening.  Call if you need a new prescription in order to restart that.    I change the sertraline prescription on the medication list to state what you are currently taking, just 150 mg pill a day.    5 days before your colonoscopy stop the Plavix/clopidogrel.  Restart the Plavix/clopidogrel the day after colonoscopy if no major bleeding.    The day before colonoscopy do not take any glimepiride.  Continue metformin the day before colonoscopy.    The day of colonoscopy do not take glimepiride or metformin.    Take the clonidine and metoprolol at the time as you usually take it, with sip of water or Gatorade if needed.    You can take the sertraline and atorvastatin at the usual times.    Make appointment for routine yearly eye check with ophthalmology this summer.    Change follow-up appointment with me to routine 4-month follow-up.    Vitamin B12 shot today.    Telly Torre MD      Current Outpatient Medications   Medication Sig Dispense Refill     blood glucose test (ONETOUCH ULTRA BLUE TEST STRIP) strips Use 1 each As Directed 6 (six) times a day. (E11.9) 600 strip 1     cholecalciferol, vitamin D3, 1,000 unit tablet Take 1,000 Units by mouth daily.        "cloNIDine HCl (CATAPRES) 0.3 MG tablet TAKE 1/2 PILL IN THE MORNING. TAKE 1 FULL PILL AT BEDTIME. 135 tablet 2     clopidogrel (PLAVIX) 75 mg tablet TAKE ONE (1) TABLET BY MOUTH DAILY 90 tablet 1     cyanocobalamin 1,000 mcg/mL injection Inject 1,000 mcg into the shoulder, thigh, or buttocks every 30 (thirty) days.       cyanocobalamin, vitamin B-12, (VITAMIN B-12) 1,000 mcg Subl Place 1 tablet (1,000 mcg total) under the tongue daily. 90 tablet 3     glimepiride (AMARYL) 4 MG tablet ONE PILL TWICE PER DAY WITH LUNCH AND SUPPER 180 tablet 3     INJECT EASE LANCETS 30 gauge Misc Use daily as directed (4-6 times daily) 600 each 0     INJECT EASE LANCETS 30 gauge Misc Use daily as directed (4-6 times daily) 600 each 3     metFORMIN (GLUCOPHAGE) 1000 MG tablet Take 0.5 tablets (500 mg total) by mouth 2 (two) times a day with meals.       metoprolol succinate (TOPROL-XL) 25 MG Take 0.5 tablets (12.5 mg total) by mouth daily. 45 tablet 3     psyllium husk (DAILY FIBER ORAL) Take by mouth.       sertraline (ZOLOFT) 50 MG tablet Take 1 tablet (50 mg total) by mouth daily.       SURE COMFORT PEN NEEDLE 31 gauge x 3/16\" Ndle Use one needle each night. 100 each 2     atorvastatin (LIPITOR) 20 MG tablet Take 1 tablet (20 mg total) by mouth daily. 90 tablet 1     Current Facility-Administered Medications   Medication Dose Route Frequency Provider Last Rate Last Dose     cyanocobalamin injection 1,000 mcg  1,000 mcg Intramuscular Q30 Days Jerry Kelsey MD   1,000 mcg at 04/17/19 1119     cyanocobalamin injection 1,000 mcg  1,000 mcg Intramuscular Q30 Days Telly Torre MD   1,000 mcg at 07/17/18 1546     Allergies   Allergen Reactions     Cefazolin      \"felt very hot\"     Pravastatin      Increased peripheral neuropathy     Simvastatin      Increased peripheral neuropathy     Thiazides      Other: Gout       Social History     Tobacco Use     Smoking status: Former Smoker     Last attempt to quit: 8/31/1995     Years " since quittin.8     Smokeless tobacco: Never Used   Substance Use Topics     Alcohol use: No     Drug use: No

## 2021-05-30 NOTE — TELEPHONE ENCOUNTER
Refill Approved    Rx renewed per Medication Renewal Policy. Medication was last renewed on 6/8/18  OV 7/10/19.    Lu Hayes, [facility] Triage/Med Refill 7/14/2019     Requested Prescriptions   Pending Prescriptions Disp Refills     ONETOUCH ULTRA BLUE TEST STRIP strips [Pharmacy Med Name: ONETOUCH ULTRA STRIPS]  1     Sig: USE AS DIRECTED 6 TIMES PER DAY       Diabetic Supplies Refill Protocol Passed - 7/14/2019 11:01 AM        Passed - Visit with PCP or prescribing provider visit in last 6 months     Last office visit with prescriber/PCP: 4/17/2019 Telly Torre MD OR same dept: 4/17/2019 Telly Torre MD OR same specialty: 4/17/2019 Telly Torre MD  Last physical: 7/10/2019 Last MTM visit: Visit date not found   Next visit within 3 mo: Visit date not found  Next physical within 3 mo: Visit date not found  Prescriber OR PCP: Telly Torre MD  Last diagnosis associated with med order: 1. Type 2 diabetes mellitus (H)  - ONETOUCH ULTRA BLUE TEST STRIP strips [Pharmacy Med Name: ONETOUCH ULTRA STRIPS]; USE AS DIRECTED 6 TIMES PER DAY; Refill: 1    If protocol passes may refill for 12 months if within 3 months of last provider visit (or a total of 15 months).             Passed - A1C in last 6 months     Hemoglobin A1c   Date Value Ref Range Status   07/10/2019 6.7 (H) 3.5 - 6.0 % Final

## 2021-05-31 ENCOUNTER — RECORDS - HEALTHEAST (OUTPATIENT)
Dept: ADMINISTRATIVE | Facility: CLINIC | Age: 81
End: 2021-05-31

## 2021-05-31 VITALS — WEIGHT: 162.5 LBS | BODY MASS INDEX: 25.45 KG/M2

## 2021-05-31 VITALS — BODY MASS INDEX: 27.74 KG/M2 | WEIGHT: 177.1 LBS

## 2021-05-31 VITALS — BODY MASS INDEX: 27.25 KG/M2 | WEIGHT: 174 LBS

## 2021-05-31 VITALS — WEIGHT: 169.7 LBS | HEIGHT: 68 IN | BODY MASS INDEX: 25.72 KG/M2

## 2021-05-31 VITALS — BODY MASS INDEX: 25.47 KG/M2 | WEIGHT: 162.6 LBS

## 2021-05-31 VITALS — HEIGHT: 67 IN | WEIGHT: 187.4 LBS | BODY MASS INDEX: 29.41 KG/M2

## 2021-05-31 VITALS — HEIGHT: 66 IN | WEIGHT: 185 LBS | BODY MASS INDEX: 29.73 KG/M2

## 2021-05-31 NOTE — TELEPHONE ENCOUNTER
Call from wife       Was at GI today for colonoscopy and turned away as his BP was too high there       In the past 15-20min there was   196/88mmHg   213/124mmHg   214/98mmHg       No chest pain  No shortness of breath   Did take both of his anti-hypertensives this am        A/P:   > Nothing at usual clinic today - directed to [facility] for eval of BP        They will head now       Pedro Constantino, RN   Triage and Medication Refills            Reason for Disposition    Systolic BP >= 180 OR Diastolic >= 110    Protocols used: HIGH BLOOD PRESSURE-A-OH

## 2021-05-31 NOTE — PROGRESS NOTES
Clinic Note    Assessment:     Assessment and Plan:  1. Hypertension  Uncontrolled.  We will increase his amlodipine to 10 mg and have him follow-up with his PCP next week for recheck.  Recheck basic metabolic panel today.    - amLODIPine (NORVASC) 5 MG tablet; Take 2 tablets (10 mg total) by mouth daily.  Dispense: 90 tablet; Refill: 0  - Basic Metabolic Panel       Patient Instructions   Increase amlodipine to 10 mg/day.  This means that you should take 2 tablets/day, in combination with the rest of your medications.    Recheck kidney labs and electrolytes today.    Follow-up with Dr. Telly Torre next week as originally scheduled for BP check and routine follow-up.    Return in about 1 week (around 9/3/2019).         Subjective:      Patient comes to clinic today for follow-up.    I saw him one week ago for his hypertension.  At that time, his blood pressure was 190/70.  He had seemingly been unresponsive to furosemide 40 mg.  We added amlodipine 5 mg to his regimen.    Today, his blood pressure is 165/67.    He is currently using clonidine 0.3 mg half tablet in the morning and 1 full tablet at bedtime.  Amlodipine 5 mg, furosemide 40 mg, and metoprolol succinate 25 mg.    He has not noticed any significant changes in his urine output.  In general, he is feeling well and has no acute complaints today.    The following portions of the patient's history were reviewed and updated as appropriate: Allergies, medications, problem list, prior note.     Review of Systems:    Review is otherwise negative except for what is mentioned above.     Social Hx:    Social History     Tobacco Use   Smoking Status Former Smoker     Last attempt to quit: 1995     Years since quittin.0   Smokeless Tobacco Never Used         Objective:     Vitals:    19 1313   BP: 165/67   Pulse: 73   Weight: 174 lb 12.8 oz (79.3 kg)       Exam:    General: No apparent distress. Calm. Alert and Oriented X3. Pt behavior is  appropriate.  Chest/Lungs: Normal chest wall, clear to auscultation, normal respiratory effort and rate.   Heart/Pulses: Regular rate and rhythm, strong and equal radial pulses, no murmurs. Capillary refill <2 seconds. No edema.       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 90 tablet 0     atorvastatin (LIPITOR) 20 MG tablet Take 1 tablet (20 mg total) by mouth daily. 90 tablet 3     cholecalciferol, vitamin D3, 1,000 unit tablet Take 1,000 Units by mouth daily.       cloNIDine HCl (CATAPRES) 0.3 MG tablet TAKE 1/2 PILL IN THE MORNING. TAKE 1 FULL PILL AT BEDTIME. 135 tablet 2     clopidogrel (PLAVIX) 75 mg tablet TAKE ONE (1) TABLET BY MOUTH DAILY 90 tablet 1     cyanocobalamin 1,000 mcg/mL injection Inject 1,000 mcg into the shoulder, thigh, or buttocks every 30 (thirty) days.       cyanocobalamin, vitamin B-12, (VITAMIN B-12) 1,000 mcg Subl Place 1 tablet (1,000 mcg total) under the tongue daily. 90 tablet 3     furosemide (LASIX) 40 MG tablet Take 1 tablet (40 mg total) by mouth daily. 30 tablet 0     glimepiride (AMARYL) 4 MG tablet ONE PILL TWICE PER DAY WITH LUNCH AND SUPPER 180 tablet 3     INJECT EASE LANCETS 30 gauge Misc Use daily as directed (4-6 times daily) 600 each 0     INJECT EASE LANCETS 30 gauge Misc Use daily as directed (4-6 times daily) 600 each 3     metFORMIN (GLUCOPHAGE) 1000 MG tablet Take 0.5 tablets (500 mg total) by mouth 2 (two) times a day with meals.       metoprolol succinate (TOPROL-XL) 25 MG  "Take 0.5 tablets (12.5 mg total) by mouth daily. 45 tablet 3     ONETOUCH ULTRA BLUE TEST STRIP strips USE AS DIRECTED 6 TIMES PER  strip 3     psyllium husk (DAILY FIBER ORAL) Take by mouth.       sertraline (ZOLOFT) 50 MG tablet Take 1 tablet (50 mg total) by mouth daily.       SURE COMFORT PEN NEEDLE 31 gauge x 3/16\" Ndle Use one needle each night. 100 each 2     Current Facility-Administered Medications   Medication Dose Route Frequency Provider Last Rate Last Dose     cyanocobalamin injection 1,000 mcg  1,000 mcg Intramuscular Q30 Days Jerry Kelsey MD   1,000 mcg at 04/17/19 1119     cyanocobalamin injection 1,000 mcg  1,000 mcg Intramuscular Q30 Days Telly Torre MD   1,000 mcg at 07/10/19 1543       I spent 15 minutes with patient face to face, of which >50% was counseling regarding the above plan       Mir De La Cruz CNP (Rob)    8/27/2019           "

## 2021-05-31 NOTE — TELEPHONE ENCOUNTER
Triage note:      Wife called about 79 year old  with concerns about high BP.      It came to their attention when he went to [facility] last Friday for a colonoscopy that his BP was running high.  It was 196/88, 213/124, 214/98 that day. They canceled his procedure due to BP.    Yesterday BP was 189/87, 182/83  This morning BP was 190/80, 181/77    He feels a little tired tired, but otherwise he only complains that his left ear feels plugged.  No other symptoms.      Rn triaged to be seen in office today.  She agreed.  Patient warm transferred to scheduling for appointment.  He is scheduled at 2 pm today with Frank De La Cruz NP.    Dea Milan RN, [facility] Med Refill/Triage, 8/13/2019 12:27 PM      Reason for Disposition    Systolic BP >= 180 OR Diastolic >= 110    Protocols used: HIGH BLOOD PRESSURE-A-OH

## 2021-05-31 NOTE — PROGRESS NOTES
[facility] Follow Up Note    Suman Nagy   79 y.o. male    Date of Visit: 9/3/2019    Chief Complaint   Patient presents with     Follow-up     4 month checkup; 2 week blood pressure recheck     Subjective  Michael is here with his wife, Jeni.    Here for blood pressure follow-up.  He has labile hypertension with renal insufficiency.    He had severe high spiking blood pressure when he had his colonoscopy on August 9 and they canceled the colonoscopy, has not been rescheduled.    He was told to take his clonidine and medication the morning as usual.    He takes clonidine 1.5 mg in the morning and 3 mg in the evening.    In mid August Lasix 40 mg once a day was added.  Later in August amlodipine 5 mill grams once a day was added.  On August 27 amlodipine was increased to 10 mg once a day.  Blood pressure at that time was 165/67.    Patient did not have edema previously.    He now is trace to +1 lower extremity edema but he denies that it bothers him.    He denies lightheaded dizzy spells.  No new neurologic changes.  He has a past history of lacunar strokes.  Previous CEA.  May 2018 left carotid ultrasound 50-69%.  No restenosis on the right.    Still on metoprolol XL 12.5 mg a day.    Compliant with clonidine.    Diabetes type 2 has been controlled with low-dose metformin and Amaryl.    No chest pain or palpitations.  He denies orthostasis on the new medication.  Still eating and drinking normally with stable weight.    Patient did undergo a full chest, abdomen and pelvic CT scan for evaluation of his colon cancer.  He had a borderline elevated CEA level.  I did review the CT scan with the wife and patient today.  There was no evidence of malignancy, negative CT scan.    PMHx:    Past Medical History:   Diagnosis Date     Anemia      Bladder cancer (H)      Cancer (H)     Rectosigmoid     Diabetes mellitus (H)      Hyperkalemia      Hypertension      Seizure (H)     possible, one time  occurence     Stroke (H)     multiple, residual left sided weakness, last CVA in July 2015 caused some speech deficit     Superficial phlebitis      Venous insufficiency of both lower extremities      PSHx:    Past Surgical History:   Procedure Laterality Date     COLON SURGERY      Colectomy Low Anterior Resection     EYE SURGERY      Cataract Extraction     LAPAROSCOPIC COLON RESECTION N/A 6/1/2017    Procedure: LAPAROSCOPIC ASSISTED COLECTOMY LOW ANTERIOR RESECTION AND DIVERTING LOOP ILEOSTOMY, PROCTOSOPY;  Surgeon: Tyson Parry MD;  Location: [facility];  Service:      WA CLOSE ENTEROSTOMY N/A 8/15/2017    Procedure: ILEOSTOMY CLOSURE LOOP ;  Surgeon: Tyson Parry MD;  Location: [facility];  Service: General     WA CYSTOURETHROSCOPY,FULGUR <0.5 CM LESN N/A 9/1/2015    Procedure: CYSTOSCOPY TRANSURETHRAL RESECTION BLADDER TUMOR;  Surgeon: Cleevland Nair MD;  Location: [facility];  Service: Urology     WA CYSTOURETHROSCOPY,FULGUR <0.5 CM LESN N/A 12/22/2015    Procedure: CYSTOSCOPY TRANSURETHRAL RESECTION BLADDER TUMOR AND BLADDER BIOPSIES;  Surgeon: Cleveland Nair MD;  Location: [facility];  Service: Urology     TURBT      X2     VASECTOMY       Immunizations:   Immunization History   Administered Date(s) Administered     Influenza [id number], 01/18/2010     Influenza high dose,seasonal,PF, 65+ yrs 09/15/2015, 09/26/2016, 10/03/2017, 09/27/2018     Influenza, Seasonal, Inj PF IIV3 09/27/2010, 09/14/2012, 09/27/2013     Influenza, inj, historic,unspecified 10/19/2007, 09/16/2011, 10/15/2015     Influenza, seasonal,quad inj 6-35 mos 09/18/2009, 10/17/2014     Pneumo Conj 13-V (2010&after) 01/20/2015     Pneumo Polysac 23-V 10/08/2004     Td,adult,historic,unspecified 07/01/2004     Tdap 05/20/2015       ROS A comprehensive review of systems was performed and was otherwise negative    Medications, allergies, and problem list were reviewed and updated    Exam  /74  (Patient Site: Right Arm, Patient Position: Sitting, Cuff Size: Adult Regular)   Pulse 84   Wt 176 lb (79.8 kg)   BMI 27.57 kg/m    No neurologic changes.  Lungs are clear.  Heart is regular without murmur.  He does have trace to +1 ankle edema bilaterally slightly worse on the left leg.    Assessment/Plan  1. Hypertension  Blood pressure is borderline controlled but adequate given his vascular disease and age.    Goal blood pressure less than 150/80.    Continue on current medications and follow-up on November 7.    If blood pressure significantly high, or lower extremity edema worsens, see my instructions below.  Wife will call and I would have him consider those medication changes, and follow-up 1 to 2 weeks after changes.    2. Chronic renal insufficiency, stage 3 (moderate) (H)  Labs were stable on August 27, reviewed by me today    3. Carotid artery stenosis, asymptomatic, left  Asymptomatic.  Continue medical management with Lipitor and Plavix.  He did confirm he is taking those medications now.    4. History of colon cancer, stage III  He has not heard if they are planning a repeat colonoscopy, but with his other medical conditions and labile hypertension and now negative full body CT scan, they may hold off on repeating the colonoscopy.    History of B12 deficiency, will be due for a B12 shot in November.    History of bladder cancer with TURBT in 2015.  Negative cystoscopy in March and has an appointment next week with urology.    History of anxiety and dysthymia, controlled on Zoloft 50 mg.    Return in 2 months (on 11/7/2019) for Recheck.   Patient Instructions   Continue on current medications at this time.    If your legs swell up more, contact me and I can have you adjust medication further.  I could have you reduce amlodipine down to 5 mg once a day, and increase your furosemide to 40 mg twice a day.  I would want you seen in clinic within 1 to 2 weeks, if you made these changes, however.    If  your blood pressure gets above 160/90, or less than 115/60 with lightheaded or dizzy spells, contact me to consider further blood pressure medication adjustment.    If you continue on same medication, see me November 7 for your scheduled appointment.        Telly Torre MD        Current Outpatient Medications   Medication Sig Dispense Refill     amLODIPine (NORVASC) 5 MG tablet Take 2 tablets (10 mg total) by mouth daily. 90 tablet 0     atorvastatin (LIPITOR) 20 MG tablet Take 1 tablet (20 mg total) by mouth daily. 90 tablet 3     cholecalciferol, vitamin D3, 1,000 unit tablet Take 1,000 Units by mouth daily.       cloNIDine HCl (CATAPRES) 0.3 MG tablet TAKE 1/2 PILL IN THE MORNING. TAKE 1 FULL PILL AT BEDTIME. 135 tablet 2     clopidogrel (PLAVIX) 75 mg tablet TAKE ONE (1) TABLET BY MOUTH DAILY 90 tablet 1     cyanocobalamin 1,000 mcg/mL injection Inject 1,000 mcg into the shoulder, thigh, or buttocks every 30 (thirty) days.       cyanocobalamin, vitamin B-12, (VITAMIN B-12) 1,000 mcg Subl Place 1 tablet (1,000 mcg total) under the tongue daily. 90 tablet 3     furosemide (LASIX) 40 MG tablet Take 1 tablet (40 mg total) by mouth daily. 30 tablet 0     glimepiride (AMARYL) 4 MG tablet ONE PILL TWICE PER DAY WITH LUNCH AND SUPPER 180 tablet 3     INJECT EASE LANCETS 30 gauge Misc Use daily as directed (4-6 times daily) 600 each 0     INJECT EASE LANCETS 30 gauge Misc Use daily as directed (4-6 times daily) 600 each 3     metFORMIN (GLUCOPHAGE) 1000 MG tablet Take 0.5 tablets (500 mg total) by mouth 2 (two) times a day with meals.       metoprolol succinate (TOPROL-XL) 25 MG Take 0.5 tablets (12.5 mg total) by mouth daily. 45 tablet 3     ONETOUCH ULTRA BLUE TEST STRIP strips USE AS DIRECTED 6 TIMES PER  strip 3     psyllium husk (DAILY FIBER ORAL) Take 1 tablet by mouth daily.              sertraline (ZOLOFT) 50 MG tablet Take 1 tablet (50 mg total) by mouth daily.       SURE COMFORT PEN NEEDLE 31 gauge x  "3/16\" Ndle Use one needle each night. 100 each 2     Current Facility-Administered Medications   Medication Dose Route Frequency Provider Last Rate Last Dose     cyanocobalamin injection 1,000 mcg  1,000 mcg Intramuscular Q30 Days Jerry Kelsey MD   1,000 mcg at 19 1119     cyanocobalamin injection 1,000 mcg  1,000 mcg Intramuscular Q30 Days Telly Torre MD   1,000 mcg at 07/10/19 1543     Allergies   Allergen Reactions     Cefazolin      \"felt very hot\"     Pravastatin      Increased peripheral neuropathy     Simvastatin      Increased peripheral neuropathy     Thiazides      Other: Gout       Social History     Tobacco Use     Smoking status: Former Smoker     Last attempt to quit: 1995     Years since quittin.0     Smokeless tobacco: Never Used   Substance Use Topics     Alcohol use: No     Drug use: No           "

## 2021-05-31 NOTE — PATIENT INSTRUCTIONS - HE
Your blood pressure is too high today.  We need to add a new blood pressure medication.    New medication is called furosemide (Lasix).  Take 40 mg daily.    Follow-up with me in 1 week to recheck blood pressure and nonfasting labs.    Recheck kidney labs and electrolytes today.    Ear lavage was performed today for earwax removal in left ear.

## 2021-05-31 NOTE — PROGRESS NOTES
Clinic Note    Assessment:     Assessment and Plan:  1. Hypertension  Uncontrolled.  We will add amlodipine 5 mg daily to his regimen.  Recheck basic metabolic panel today.  Follow-up in 1 week.    - amLODIPine (NORVASC) 5 MG tablet; Take 1 tablet (5 mg total) by mouth daily.  Dispense: 90 tablet; Refill: 0  - Basic Metabolic Panel       Patient Instructions   New blood pressure medication sent into pharmacy.  It is called amlodipine.  Take 1 tablet daily with the rest of your medications.    Continue checking blood pressure regularly.  You are doing an excellent job of this.    Continue all other medications as originally prescribed.    Recheck kidney labs and electrolytes today.    Return in about 1 week (around 2019).         Subjective:      Patient comes to the clinic today for follow-up BP check.    I saw the patient 1 week ago.  At that time, his blood pressure was 198/70.  We added furosemide 40 mg to his regimen.  BMP at that time showed stable creatinine 1.90, potassium 3.7.    Today, patient's blood pressure is 190/70.  He complains of a little fatigue but is otherwise completely asymptomatic.  No chest pain or shortness of breath.  No headache or dizziness.    He has a log of blood pressures with him today, with most home BP measurements similar to today's measurement.    Patient uses metoprolol succinate 25 mg once daily, as well as clonidine 0.3 mg.    The following portions of the patient's history were reviewed and updated as appropriate: Allergies, medications, problems, prior note.    Review of Systems:    Review is otherwise negative except for what is mentioned above.     Social Hx:    Social History     Tobacco Use   Smoking Status Former Smoker     Last attempt to quit: 1995     Years since quittin.9   Smokeless Tobacco Never Used         Objective:     Vitals:    19 1315   BP: (!) 190/70   Pulse: 74       Exam:    General: No apparent distress. Calm. Alert and Oriented X3.  Pt behavior is appropriate.      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 90 tablet 3     cholecalciferol, vitamin D3, 1,000 unit tablet Take 1,000 Units by mouth daily.       cloNIDine HCl (CATAPRES) 0.3 MG tablet TAKE 1/2 PILL IN THE MORNING. TAKE 1 FULL PILL AT BEDTIME. 135 tablet 2     clopidogrel (PLAVIX) 75 mg tablet TAKE ONE (1) TABLET BY MOUTH DAILY 90 tablet 1     cyanocobalamin 1,000 mcg/mL injection Inject 1,000 mcg into the shoulder, thigh, or buttocks every 30 (thirty) days.       cyanocobalamin, vitamin B-12, (VITAMIN B-12) 1,000 mcg Subl Place 1 tablet (1,000 mcg total) under the tongue daily. 90 tablet 3     furosemide (LASIX) 40 MG tablet Take 1 tablet (40 mg total) by mouth daily. 30 tablet 0     glimepiride (AMARYL) 4 MG tablet ONE PILL TWICE PER DAY WITH LUNCH AND SUPPER 180 tablet 3     INJECT EASE LANCETS 30 gauge Misc Use daily as directed (4-6 times daily) 600 each 0     INJECT EASE LANCETS 30 gauge Misc Use daily as directed (4-6 times daily) 600 each 3     metFORMIN (GLUCOPHAGE) 1000 MG tablet Take 0.5 tablets (500 mg total) by mouth 2 (two) times a day with meals.       metoprolol succinate (TOPROL-XL) 25 MG Take 0.5 tablets (12.5 mg total) by mouth daily. 45 tablet 3     ONETOUCH ULTRA BLUE TEST STRIP strips USE AS DIRECTED 6 TIMES PER  strip 3     psyllium husk (DAILY FIBER ORAL) Take by mouth.       sertraline (ZOLOFT) 50 MG tablet Take 1 tablet (50 mg total) by mouth daily.       SURE  "COMFORT PEN NEEDLE 31 gauge x 3/16\" Ndle Use one needle each night. 100 each 2     amLODIPine (NORVASC) 5 MG tablet Take 1 tablet (5 mg total) by mouth daily. 90 tablet 0     Current Facility-Administered Medications   Medication Dose Route Frequency Provider Last Rate Last Dose     cyanocobalamin injection 1,000 mcg  1,000 mcg Intramuscular Q30 Days Jerry Kelsey MD   1,000 mcg at 04/17/19 1119     cyanocobalamin injection 1,000 mcg  1,000 mcg Intramuscular Q30 Days Telly Torre MD   1,000 mcg at 07/10/19 1543           Mir De La Cruz CNP (Rob)    8/20/2019           "

## 2021-05-31 NOTE — PATIENT INSTRUCTIONS - HE
New blood pressure medication sent into pharmacy.  It is called amlodipine.  Take 1 tablet daily with the rest of your medications.    Continue checking blood pressure regularly.  You are doing an excellent job of this.    Continue all other medications as originally prescribed.    Recheck kidney labs and electrolytes today.

## 2021-05-31 NOTE — PATIENT INSTRUCTIONS - HE
Continue on current medications at this time.    If your legs swell up more, contact me and I can have you adjust medication further.  I could have you reduce amlodipine down to 5 mg once a day, and increase your furosemide to 40 mg twice a day.  I would want you seen in clinic within 1 to 2 weeks, if you made these changes, however.    If your blood pressure gets above 160/90, or less than 115/60 with lightheaded or dizzy spells, contact me to consider further blood pressure medication adjustment.    If you continue on same medication, see me November 7 for your scheduled appointment.

## 2021-05-31 NOTE — PROGRESS NOTES
Clinic Note    Assessment:     Assessment and Plan:  1. Hypertension  BP is 198/70 today.  We will add furosemide 40 mg to his regimen, recheck BMP today, and get him back in 1 week for recheck.  - furosemide (LASIX) 40 MG tablet; Take 1 tablet (40 mg total) by mouth daily.  Dispense: 30 tablet; Refill: 0  - Basic Metabolic Panel    2. Ear fullness, left  Ear lavage was successfully completed today by the medical assistant without instrumentation.       Patient Instructions   Your blood pressure is too high today.  We need to add a new blood pressure medication.    New medication is called furosemide (Lasix).  Take 40 mg daily.    Follow-up with me in 1 week to recheck blood pressure and nonfasting labs.    Recheck kidney labs and electrolytes today.    Ear lavage was performed today for earwax removal in left ear.    Return in about 1 week (around 8/20/2019).         Subjective:      Follow-up of his hypertension.Patient comes to clinic today for    He was due to have a colonoscopy last week Friday, approximately 5 days ago.  However, he was turned away due to elevated blood pressures during the rooming process.    He was evaluated at the [facility] on 8/9.  It was thought that his blood pressure was elevated due to stress from the upcoming colonoscopy procedure.  He was encouraged to continue monitoring his blood pressure and follow-up with his PCP.    Today, he brings a log of blood pressures with him to his appointment today, with most in the 180-200 systolic range.    He has decreased hearing in his left ear recently but is otherwise completely asymptomatic.  No chest pain or shortness of breath.  No headache or dizziness.  He had a chest x-ray recently at the [facility]; results were benign.    The following portions of the patient's history were reviewed and updated as appropriate: Allergies, medications, problems, prior note.    Review of Systems:    Review is otherwise negative except for what is mentioned above.      Social Hx:    Social History     Tobacco Use   Smoking Status Former Smoker     Last attempt to quit: 1995     Years since quittin.9   Smokeless Tobacco Never Used         Objective:     Vitals:    19 1354   BP: (!) 198/70   Pulse: 66   Weight: 174 lb 12.8 oz (79.3 kg)       Exam:    General: No apparent distress. Calm. Alert and Oriented X3. Pt behavior is appropriate.  Chest/Lungs: Normal chest wall, clear to auscultation, normal respiratory effort and rate.   Heart/Pulses: Regular rate and rhythm, strong and equal radial pulses, no murmurs. Capillary refill <2 seconds. No edema.   Left ear: Canal difficult to visualize due to copious amounts of impacted cerumen.  This was successfully lavaged without instrumentation by the medical assistant.  TM normal.    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 90 tablet 3     cholecalciferol, vitamin D3, 1,000 unit tablet Take 1,000 Units by mouth daily.       cloNIDine HCl (CATAPRES) 0.3 MG tablet TAKE 1/2 PILL IN THE MORNING. TAKE 1 FULL PILL AT BEDTIME. 135 tablet 2     clopidogrel (PLAVIX) 75 mg tablet TAKE ONE (1) TABLET BY MOUTH DAILY 90 tablet 1     cyanocobalamin 1,000 mcg/mL injection Inject 1,000 mcg into the shoulder, thigh, or buttocks every 30 (thirty) days.       cyanocobalamin, vitamin B-12, (VITAMIN B-12) 1,000 mcg Subl Place 1 tablet (1,000 mcg total) under the tongue daily. 90 tablet 3     glimepiride (AMARYL) 4 MG tablet ONE PILL TWICE PER DAY WITH  "LUNCH AND SUPPER 180 tablet 3     INJECT EASE LANCETS 30 gauge Misc Use daily as directed (4-6 times daily) 600 each 0     INJECT EASE LANCETS 30 gauge Misc Use daily as directed (4-6 times daily) 600 each 3     metFORMIN (GLUCOPHAGE) 1000 MG tablet Take 0.5 tablets (500 mg total) by mouth 2 (two) times a day with meals.       metoprolol succinate (TOPROL-XL) 25 MG Take 0.5 tablets (12.5 mg total) by mouth daily. 45 tablet 3     ONETOUCH ULTRA BLUE TEST STRIP strips USE AS DIRECTED 6 TIMES PER  strip 3     psyllium husk (DAILY FIBER ORAL) Take by mouth.       sertraline (ZOLOFT) 50 MG tablet Take 1 tablet (50 mg total) by mouth daily.       SURE COMFORT PEN NEEDLE 31 gauge x 3/16\" Ndle Use one needle each night. 100 each 2     furosemide (LASIX) 40 MG tablet Take 1 tablet (40 mg total) by mouth daily. 30 tablet 0     Current Facility-Administered Medications   Medication Dose Route Frequency Provider Last Rate Last Dose     cyanocobalamin injection 1,000 mcg  1,000 mcg Intramuscular Q30 Days Jerry Kelsey MD   1,000 mcg at 04/17/19 1119     cyanocobalamin injection 1,000 mcg  1,000 mcg Intramuscular Q30 Days Telly Torre MD   1,000 mcg at 07/10/19 1543         Mir De La Cruz CNP (Rob)    8/13/2019           "

## 2021-05-31 NOTE — PATIENT INSTRUCTIONS - HE
Increase amlodipine to 10 mg/day.  This means that you should take 2 tablets/day, in combination with the rest of your medications.    Recheck kidney labs and electrolytes today.    Follow-up with Dr. Telly Torre next week as originally scheduled for BP check and routine follow-up.

## 2021-06-01 ENCOUNTER — RECORDS - HEALTHEAST (OUTPATIENT)
Dept: ADMINISTRATIVE | Facility: CLINIC | Age: 81
End: 2021-06-01

## 2021-06-01 VITALS — BODY MASS INDEX: 28.56 KG/M2 | HEIGHT: 67 IN | WEIGHT: 182 LBS

## 2021-06-01 VITALS — HEIGHT: 67 IN | WEIGHT: 184 LBS | BODY MASS INDEX: 28.88 KG/M2

## 2021-06-01 VITALS — WEIGHT: 184 LBS | HEIGHT: 67 IN | BODY MASS INDEX: 28.88 KG/M2

## 2021-06-01 VITALS — HEIGHT: 67 IN | WEIGHT: 185 LBS | BODY MASS INDEX: 29.03 KG/M2

## 2021-06-01 NOTE — TELEPHONE ENCOUNTER
Refill Approved    Rx renewed per Medication Renewal Policy. Medication was last renewed on 8/13/19.    Lu Hayes, [facility] Triage/Med Refill 9/8/2019     Requested Prescriptions   Pending Prescriptions Disp Refills     furosemide (LASIX) 40 MG tablet [Pharmacy Med Name: FUROSEMIDE 40MG] 30 tablet 0     Sig: TAKE 1 TABLET (40 MG TOTAL) BY MOUTH DAILY.       Diuretics/Combination Diuretics Refill Protocol  Passed - 9/7/2019 10:17 AM        Passed - Visit with PCP or prescribing provider visit in past 12 months     Last office visit with prescriber/PCP: 8/27/2019 Mir De La Cruz CNP OR same dept: 9/3/2019 Telly Torre MD OR same specialty: 9/3/2019 Telly Torre MD  Last physical: Visit date not found Last MTM visit: Visit date not found   Next visit within 3 mo: Visit date not found  Next physical within 3 mo: Visit date not found  Prescriber OR PCP: Mir De La Cruz CNP  Last diagnosis associated with med order: 1. Hypertension  - furosemide (LASIX) 40 MG tablet [Pharmacy Med Name: FUROSEMIDE 40MG]; Take 1 tablet (40 mg total) by mouth daily.  Dispense: 30 tablet; Refill: 0    If protocol passes may refill for 12 months if within 3 months of last provider visit (or a total of 15 months).             Passed - Serum Potassium in past 12 months      Lab Results   Component Value Date    Potassium 4.0 08/27/2019             Passed - Serum Sodium in past 12 months      Lab Results   Component Value Date    Sodium 139 08/27/2019             Passed - Blood pressure on file in past 12 months     BP Readings from Last 1 Encounters:   09/03/19 154/74             Passed - Serum Creatinine in past 12 months      Creatinine   Date Value Ref Range Status   08/27/2019 1.97 (H) 0.70 - 1.30 mg/dL Final

## 2021-06-02 ENCOUNTER — OFFICE VISIT - HEALTHEAST (OUTPATIENT)
Dept: INTERNAL MEDICINE | Facility: CLINIC | Age: 81
End: 2021-06-02

## 2021-06-02 ENCOUNTER — RECORDS - HEALTHEAST (OUTPATIENT)
Dept: ADMINISTRATIVE | Facility: CLINIC | Age: 81
End: 2021-06-02

## 2021-06-02 VITALS — WEIGHT: 185 LBS | BODY MASS INDEX: 28.98 KG/M2

## 2021-06-02 VITALS — WEIGHT: 181.9 LBS | BODY MASS INDEX: 28.49 KG/M2

## 2021-06-02 VITALS — BODY MASS INDEX: 27.72 KG/M2 | WEIGHT: 177 LBS

## 2021-06-02 DIAGNOSIS — I10 ESSENTIAL HYPERTENSION: ICD-10-CM

## 2021-06-02 DIAGNOSIS — Z85.048 HISTORY OF RECTAL CANCER: ICD-10-CM

## 2021-06-02 DIAGNOSIS — E11.22 TYPE 2 DIABETES MELLITUS WITH STAGE 4 CHRONIC KIDNEY DISEASE, WITH LONG-TERM CURRENT USE OF INSULIN (H): ICD-10-CM

## 2021-06-02 DIAGNOSIS — N18.4 ANEMIA DUE TO STAGE 4 CHRONIC KIDNEY DISEASE (H): ICD-10-CM

## 2021-06-02 DIAGNOSIS — Z79.4 TYPE 2 DIABETES MELLITUS WITH STAGE 4 CHRONIC KIDNEY DISEASE, WITH LONG-TERM CURRENT USE OF INSULIN (H): ICD-10-CM

## 2021-06-02 DIAGNOSIS — Z51.81 ENCOUNTER FOR THERAPEUTIC DRUG MONITORING: ICD-10-CM

## 2021-06-02 DIAGNOSIS — N18.4 CHRONIC KIDNEY DISEASE, STAGE IV (SEVERE) (H): ICD-10-CM

## 2021-06-02 DIAGNOSIS — D63.1 ANEMIA DUE TO STAGE 4 CHRONIC KIDNEY DISEASE (H): ICD-10-CM

## 2021-06-02 DIAGNOSIS — N18.4 TYPE 2 DIABETES MELLITUS WITH STAGE 4 CHRONIC KIDNEY DISEASE, WITH LONG-TERM CURRENT USE OF INSULIN (H): ICD-10-CM

## 2021-06-02 DIAGNOSIS — Z85.51 PERSONAL HISTORY OF MALIGNANT NEOPLASM OF BLADDER: ICD-10-CM

## 2021-06-02 DIAGNOSIS — Z86.73 HISTORY OF STROKE: ICD-10-CM

## 2021-06-02 DIAGNOSIS — I48.0 PAROXYSMAL ATRIAL FIBRILLATION (H): ICD-10-CM

## 2021-06-02 LAB
ALBUMIN SERPL-MCNC: 3.2 G/DL (ref 3.5–5)
ALP SERPL-CCNC: 103 U/L (ref 45–120)
ALT SERPL W P-5'-P-CCNC: 10 U/L (ref 0–45)
ANION GAP SERPL CALCULATED.3IONS-SCNC: 10 MMOL/L (ref 5–18)
AST SERPL W P-5'-P-CCNC: 10 U/L (ref 0–40)
BILIRUB SERPL-MCNC: 0.3 MG/DL (ref 0–1)
BUN SERPL-MCNC: 50 MG/DL (ref 8–28)
CALCIUM SERPL-MCNC: 8.4 MG/DL (ref 8.5–10.5)
CHLORIDE BLD-SCNC: 103 MMOL/L (ref 98–107)
CO2 SERPL-SCNC: 22 MMOL/L (ref 22–31)
CREAT SERPL-MCNC: 2.49 MG/DL (ref 0.7–1.3)
GFR SERPL CREATININE-BSD FRML MDRD: 25 ML/MIN/1.73M2
GLUCOSE BLD-MCNC: 287 MG/DL (ref 70–125)
HBA1C MFR BLD: 6.6 %
POTASSIUM BLD-SCNC: 4 MMOL/L (ref 3.5–5)
PROT SERPL-MCNC: 6.2 G/DL (ref 6–8)
SODIUM SERPL-SCNC: 135 MMOL/L (ref 136–145)

## 2021-06-02 NOTE — PROGRESS NOTES
[facility] Admission note      Patient: Suman Nagy  MRN: [id number]  Date of Service: 10/21/2019      [facility] [[id number]]  Reason for Visit     Chief Complaint   Patient presents with     H & P       Code Status     FULL CODE    Assessment     -Acute Klebsiella UTI  -Acute   metabolic/ Toxic encephalopathy with persistent confusion noted in the TCU  -History of chronic kidney disease stage IV  DC creatinine is around 2.3   -History of diabetes type 2  -History of CVA with resultant left-sided weakness in 2015.  Continues on statin and Plavix.  -Bilateral lower extremity lymphedema  -Underlying history of bladder cancer status post cystoscopy recently  -Underlying history of rectal cancer with repeat imaging including a CT revealing no malignancy  -Profound generalized weakness with patient reporting difficulty with ambulation    Plan     Patient has been admitted to the TCU.  He was treated for Klebsiella UTI in the hospital with IV ceftriaxone and transitioned to oral Keflex.  His blood cultures did grow staph coag negative which is felt to be a contaminant.  He is afebrile and denies any dysuria   believes that his UTI stems from recent cystoscopy  He is on a lower dose of Keflex 250 mg twice daily for 6 days and can continue that in spite of renal impairment  As per patient he is on surveillance mode for his bladder cancer with no active treatment is being pursued currently.  He denies any hematuria.  He is a diabetic and his medications will be managed in the TCU.  He has had blood sugars generally in the 140s with highs in the 360+ will monitor trends.  Patient will be started on 4 times daily blood sugar checks.  Insulin sliding scale regimen has been started and monitor trends.  He has a history of hypertension who is now on a low-dose of Norvasc 5MG  due to leg edema concerns.  His metoprolol dose was increased due to this.  He is also on a high dose of  clonidine  Monitor blood pressures closely   Tubigrip stockings for lymphedema.    He is no longer on scheduled Lasix so will monitor trends before adding any Lasix to his regimen including recheck BMP  He does remain on statins and Plavix for underlying history of CVA with left-sided weakness but is more complaining of right leg weakness so will participate in therapy.  He did have repeat imaging done with underlying history of rectal cancer and bladder cancer with did not show any repeat malignancy.  Discharge creatinine was still high at 2.37 his Lasix was held.  Recheck BMP closely, monitor weight trends closely.  He is currently quite debilitated and will need extensive rehab and is wheelchair-bound nonambulatory    History     Patient is a very pleasant 79 y.o. male who is admitted to TCU  Patient was admitted with generalized weakness and work-up revealed that he had UTI.  Cultures grew Klebsiella.  He was treated with IV ceftriaxone.  He has been discharged on oral Keflex.  He is currently denying any dysuria.  Patient was noted to be acutely confused in the hospital he was felt to be confused secondary to metabolic encephalopathy secondary to underlying UTI.  Currently he is improved but not back to baseline.  He has significant issues with chronic lymphedema.  Due to this issue his Norvasc dosage has been discontinued.  He continues to have some limited edema in his legs.  In addition he has chronic kidney disease stage IV baseline creatinine is around 4.  His lymphedema requires him to be on diuretics which were stopped in the hospital.  He has been given a higher dose of metoprolol as he is no longer on Norvasc and Lasix.  Patient is also diabetic as per him his diabetes has been adequately controlled he remains on a diabetic diet along with glimepiride and metformin.  He also has underlying history of bladder cancer and rectal cancer he denies any ongoing treatments for that he recently had a cystoscopy  done.  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 He has never used smokeless tobacco. He reports that he does not drink alcohol or use drugs.    Family History     Reviewed, and family history includes COPD in his father.    Medication List   Post Discharge Medication Reconciliation Status: discharge medications reconciled and changed, per note/orders (see AVS)   Current Outpatient Medications on File Prior to Visit   Medication Sig Dispense Refill     acetaminophen (TYLENOL) 325 MG tablet Take 2 tablets (650 mg total) by mouth every 4 (four) hours as needed.  0     amLODIPine (NORVASC) 5 MG tablet Take 1 tablet (5 mg total) by mouth daily. 30 tablet 0     atorvastatin (LIPITOR) 20 MG tablet Take 1 tablet (20 mg total) by mouth daily. 90 tablet 3     cephalexin (KEFLEX) 250 MG capsule Take 1 capsule (250 mg total) by mouth 2 (two) times a day for 6 days. 12 capsule 0     cloNIDine HCl (CATAPRES) 0.3 MG tablet TAKE 1/2 PILL IN THE MORNING. TAKE 1 FULL PILL AT BEDTIME. 135 tablet 2     clopidogrel (PLAVIX) 75 mg tablet TAKE ONE (1) TABLET BY MOUTH DAILY 90 tablet 1  "    cyanocobalamin 1,000 mcg/mL injection Inject 1,000 mcg into the shoulder, thigh, or buttocks every 3 (three) months.              cyanocobalamin, vitamin B-12, (VITAMIN B-12) 1,000 mcg Subl Place 1 tablet (1,000 mcg total) under the tongue daily. 90 tablet 3     furosemide (LASIX) 40 MG tablet 40 mg daily prn for leg edema  0     glimepiride (AMARYL) 4 MG tablet ONE PILL TWICE PER DAY WITH LUNCH AND SUPPER (Patient taking differently: Take 4 mg by mouth twice a day with lunch and supper.       ) 180 tablet 3     INJECT EASE LANCETS 30 gauge Misc Use daily as directed (4-6 times daily) 600 each 3     metFORMIN (GLUCOPHAGE) 1000 MG tablet Take 1,000 mg by mouth 2 (two) times a day with meals.       metoprolol succinate (TOPROL-XL) 25 MG Take 1 tablet (25 mg total) by mouth daily. 30 tablet 0     ONETOUCH ULTRA BLUE TEST STRIP strips USE AS DIRECTED 6 TIMES PER  strip 3     psyllium husk (DAILY FIBER ORAL) Take 1 tablet by mouth 2 (two) times a day.              sertraline (ZOLOFT) 50 MG tablet Take 50 mg by mouth daily.       SURE COMFORT PEN NEEDLE 31 gauge x 3/16\" Ndle Use one needle each night. 100 each 2     No current facility-administered medications on file prior to visit.        Allergies     Allergies   Allergen Reactions     Cefazolin      \"felt very hot\"     Pravastatin      Increased peripheral neuropathy     Simvastatin      Increased peripheral neuropathy     Thiazides      Other: Gout         Review of Systems   A comprehensive review of 14 systems was done. Pertinent findings noted here and in history of present illness. All the rest negative.  Constitutional: Negative.  Negative for fever, chills, he has activity change, appetite change and fatigue.   HENT: Negative for congestion and facial swelling.    Eyes: Negative for photophobia, redness and visual disturbance.   Respiratory: Negative for cough and chest tightness.    Cardiovascular: Negative for chest pain, palpitations and leg " swelling.   Gastrointestinal: Negative for nausea, diarrhea, constipation, blood in stool and abdominal distention.   Genitourinary: Negative.    Musculoskeletal: Negative.  Reporting significant weakness especially in the right leg today  Skin: Negative.    Neurological: Negative for dizziness, tremors, syncope, weakness, light-headedness and headaches.   Hematological: Does not bruise/bleed easily.   Psychiatric/Behavioral: Negative.  Recalls remain somewhat impaired      Physical Exam     Recent Vitals 10/19/2019   Height -   Weight -   BSA (m2) -   /77   Pulse 100   Temp -   Temp src -   SpO2 -   Some recent data might be hidden       Constitutional: Oriented to person, place, and time and appears well-developed.  Frail and weak  HEENT:  Normocephalic and atraumatic.  Eyes: Conjunctivae and EOM are normal. Pupils are equal, round, and reactive to light. No discharge.  No scleral icterus. Nose normal. Mouth/Throat: Oropharynx is clear and moist. No oropharyngeal exudate.    NECK: Normal range of motion. Neck supple. No JVD present. No tracheal deviation present. No thyromegaly present.   CARDIOVASCULAR: Normal rate, regular rhythm and intact distal pulses.  Exam reveals no gallop and no friction rub.  Systolic murmur present.  PULMONARY: Effort normal and breath sounds normal. No respiratory distress.No Wheezing or rales.  ABDOMEN: Soft. Bowel sounds are normal. No distension and no mass.  There is no tenderness. There is no rebound and no guarding. No HSM.  MUSCULOSKELETAL: Normal range of motion.  Has chronic 1+ bilateral lower extremity edema and no tenderness. Mild kyphosis, no tenderness.  Reporting right lower extremity weakness  LYMPH NODES: Has no cervical, supraclavicular, axillary and groin adenopathy.   NEUROLOGICAL: Alert and oriented to person, place, and time. No cranial nerve deficit.  Normal muscle tone. Coordination normal.  Has baseline left-sided weakness  GENITOURINARY: Deferred  exam.  SKIN: Skin is warm and dry. No rash noted. No erythema. No pallor.   EXTREMITIES: No cyanosis, no clubbing, he has chronic 1+ bilateral lower extremity edema. No Deformity.  PSYCHIATRIC: Normal mood, affect and behavior.      Lab Results     Recent Results (from the past 240 hour(s))   ECG 12 lead nursing unit performed    Collection Time: 10/15/19 10:13 PM   Result Value Ref Range    SYSTOLIC BLOOD PRESSURE 232 mmHg    DIASTOLIC BLOOD PRESSURE 99 mmHg    VENTRICULAR RATE 106 BPM    ATRIAL RATE 106 BPM    P-R INTERVAL 146 ms    QRS DURATION 84 ms    Q-T INTERVAL 352 ms    QTC CALCULATION (BEZET) 467 ms    P Axis 6 degrees    R AXIS -9 degrees    T AXIS 24 degrees    MUSE DIAGNOSIS       Sinus tachycardia with Premature atrial complexes  Inferior infarct , age undetermined -possible   Abnormal ECG  When compared with ECG of 15-AUG-2017 11:41,  Premature atrial complexes are now Present  Vent. rate has increased BY  49 BPM    Confirmed by SHERMAN BRUCE MD LOC: ([id number]) on 10/16/2019 1:07:28 PM     Comprehensive metabolic panel    Collection Time: 10/15/19 10:55 PM   Result Value Ref Range    Sodium 138 136 - 145 mmol/L    Potassium 2.9 (L) 3.5 - 5.0 mmol/L    Chloride 103 98 - 107 mmol/L    CO2 21 (L) 22 - 31 mmol/L    Anion Gap, Calculation 14 5 - 18 mmol/L    Glucose 234 (H) 70 - 125 mg/dL    BUN 25 8 - 28 mg/dL    Creatinine 2.18 (H) 0.70 - 1.30 mg/dL    GFR MDRD Af Amer 35 (L) >60 mL/min/1.73m2    GFR MDRD Non Af Amer 29 (L) >60 mL/min/1.73m2    Bilirubin, Total 0.5 0.0 - 1.0 mg/dL    Calcium 9.1 8.5 - 10.5 mg/dL    Protein, Total 6.8 6.0 - 8.0 g/dL    Albumin 3.2 (L) 3.5 - 5.0 g/dL    Alkaline Phosphatase 105 45 - 120 U/L    AST 17 0 - 40 U/L    ALT 13 0 - 45 U/L   Magnesium    Collection Time: 10/15/19 10:55 PM   Result Value Ref Range    Magnesium 1.5 (L) 1.8 - 2.6 mg/dL   Protime-INR    Collection Time: 10/15/19 10:55 PM   Result Value Ref Range    INR 1.23 (H) 0.90 - 1.10   Troponin I     Collection Time: 10/15/19 10:55 PM   Result Value Ref Range    Troponin I 0.04 0.00 - 0.29 ng/mL   Lactic Acid    Collection Time: 10/15/19 10:55 PM   Result Value Ref Range    Lactic Acid 1.5 0.5 - 2.2 mmol/L   BNP(B-type Natriuretic Peptide)    Collection Time: 10/15/19 10:55 PM   Result Value Ref Range     (H) 0 - 86 pg/mL   HM1 (CBC with Diff)    Collection Time: 10/15/19 10:55 PM   Result Value Ref Range    WBC 9.6 4.0 - 11.0 thou/uL    RBC 3.18 (L) 4.40 - 6.20 mill/uL    Hemoglobin 10.2 (L) 14.0 - 18.0 g/dL    Hematocrit 30.2 (L) 40.0 - 54.0 %    MCV 95 80 - 100 fL    MCH 32.1 27.0 - 34.0 pg    MCHC 33.8 32.0 - 36.0 g/dL    RDW 14.0 11.0 - 14.5 %    Platelets 230 140 - 440 thou/uL    MPV 9.4 8.5 - 12.5 fL    Neutrophils % 87 (H) 50 - 70 %    Lymphocytes % 4 (L) 20 - 40 %    Monocytes % 8 2 - 10 %    Eosinophils % 1 0 - 6 %    Basophils % 0 0 - 2 %    Neutrophils Absolute 8.3 (H) 2.0 - 7.7 thou/uL    Lymphocytes Absolute 0.3 (L) 0.8 - 4.4 thou/uL    Monocytes Absolute 0.8 0.0 - 0.9 thou/uL    Eosinophils Absolute 0.1 0.0 - 0.4 thou/uL    Basophils Absolute 0.0 0.0 - 0.2 thou/uL   Urinalysis-UC if Indicated    Collection Time: 10/15/19 11:06 PM   Result Value Ref Range    Color, UA Yellow Colorless, Yellow, Straw, Light Yellow    Clarity, UA Clear Clear    Glucose, UA 50 mg/dL (!) Negative    Bilirubin, UA Negative Negative    Ketones, UA Negative Negative    Specific Gravity, UA 1.010 1.001 - 1.030    Blood, UA Small (!) Negative    pH, UA 6.0 4.5 - 8.0    Protein, UA >=500 mg/dL (!) Negative mg/dL    Urobilinogen, UA <2.0 E.U./dL <2.0 E.U./dL, 2.0 E.U./dL    Nitrite, UA Negative Negative    Leukocytes, UA Moderate (!) Negative    Bacteria, UA Moderate (!) None Seen hpf    RBC, UA 0-2 None Seen, 0-2 hpf    WBC, UA 10-25 (!) None Seen, 0-5 hpf    Squam Epithel, UA 10-25 (!) None Seen, 0-5 lpf    WBC Clumps Present (!) None Seen    Mucus, UA Moderate (!) None Seen lpf    Granular Casts, UA 10-25 (!) None  Seen lpf   Culture, Urine    Collection Time: 10/15/19 11:06 PM   Result Value Ref Range    Culture 50,000-100,000 col/ml Klebsiella pneumoniae (!)     Culture 10,000-50,000 col/ml Klebsiella pneumoniae (!)        Susceptibility    Klebsiella pneumoniae - BRET     Amoxicillin + Clavulanate <=4/2 Sensitive      Ampicillin >16 Resistant      Cefazolin <=1 Sensitive      Cefepime <=1 Sensitive      Ceftriaxone <=1 Sensitive      Ciprofloxacin <=0.5 Sensitive      Gentamicin <=2 Sensitive      Levofloxacin <=1 Sensitive      Meropenem <=0.25 Sensitive      Nitrofurantoin >64 Resistant      Tetracycline <=2 Sensitive      Tobramycin <=2 Sensitive      Trimethoprim + Sulfamethoxazole <=0.5 Sensitive     Klebsiella pneumoniae - BRET     Amoxicillin + Clavulanate <=4/2 Sensitive      Ampicillin >16 Resistant      Cefazolin <=1 Sensitive      Cefepime <=1 Sensitive      Ceftriaxone <=1 Sensitive      Ciprofloxacin <=0.5 Sensitive      Gentamicin <=2 Sensitive      Levofloxacin <=1 Sensitive      Meropenem <=0.25 Sensitive      Nitrofurantoin >64 Resistant      Tetracycline <=2 Sensitive      Tobramycin <=2 Sensitive      Trimethoprim + Sulfamethoxazole <=0.5 Sensitive    Blood culture from PERIPHERAL SITE    Collection Time: 10/15/19 11:09 PM   Result Value Ref Range    Anaerobic Blood Culture Bottle Positive after 24 hours incubation (!) No Growth, No organisms seen, bottle returned to instrument, Specimen not received, No Growth at 24 hours, No Growth at 48 hours, No Growth at 72 hours, No Growth at 96 hours, No Growth at 120 hours   Culture, Blood Positive Work-up    Collection Time: 10/15/19 11:09 PM   Result Value Ref Range    Culture Coagulase negative Staphylococcus (!)     Gram Stain Result Gram positive cocci in clusters    POCT Glucose    Collection Time: 10/16/19  1:49 AM   Result Value Ref Range    Glucose 318 (H) 70 - 139 mg/dL   Basic Metabolic Panel    Collection Time: 10/16/19  5:55 AM   Result Value Ref Range     Sodium 139 136 - 145 mmol/L    Potassium 3.1 (L) 3.5 - 5.0 mmol/L    Chloride 105 98 - 107 mmol/L    CO2 25 22 - 31 mmol/L    Anion Gap, Calculation 9 5 - 18 mmol/L    Glucose 163 (H) 70 - 125 mg/dL    Calcium 8.7 8.5 - 10.5 mg/dL    BUN 25 8 - 28 mg/dL    Creatinine 2.13 (H) 0.70 - 1.30 mg/dL    GFR MDRD Af Amer 36 (L) >60 mL/min/1.73m2    GFR MDRD Non Af Amer 30 (L) >60 mL/min/1.73m2   HM2(CBC w/o Differential)    Collection Time: 10/16/19  5:55 AM   Result Value Ref Range    WBC 7.1 4.0 - 11.0 thou/uL    RBC 2.94 (L) 4.40 - 6.20 mill/uL    Hemoglobin 9.3 (L) 14.0 - 18.0 g/dL    Hematocrit 28.0 (L) 40.0 - 54.0 %    MCV 95 80 - 100 fL    MCH 31.6 27.0 - 34.0 pg    MCHC 33.2 32.0 - 36.0 g/dL    RDW 14.1 11.0 - 14.5 %    Platelets 210 140 - 440 thou/uL    MPV 9.2 8.5 - 12.5 fL   Magnesium    Collection Time: 10/16/19  5:55 AM   Result Value Ref Range    Magnesium 1.7 (L) 1.8 - 2.6 mg/dL   POCT Glucose    Collection Time: 10/16/19  6:51 AM   Result Value Ref Range    Glucose 134 70 - 139 mg/dL   POCT Glucose    Collection Time: 10/16/19 12:12 PM   Result Value Ref Range    Glucose 191 (H) 70 - 139 mg/dL   POCT Glucose    Collection Time: 10/16/19  2:20 PM   Result Value Ref Range    Glucose 187 (H) 70 - 139 mg/dL   POCT Glucose    Collection Time: 10/16/19  5:33 PM   Result Value Ref Range    Glucose 261 (H) 70 - 139 mg/dL   POCT Glucose    Collection Time: 10/16/19  8:54 PM   Result Value Ref Range    Glucose 263 (H) 70 - 139 mg/dL   Basic Metabolic Panel    Collection Time: 10/17/19  5:16 AM   Result Value Ref Range    Sodium 137 136 - 145 mmol/L    Potassium 3.4 (L) 3.5 - 5.0 mmol/L    Chloride 103 98 - 107 mmol/L    CO2 24 22 - 31 mmol/L    Anion Gap, Calculation 10 5 - 18 mmol/L    Glucose 144 (H) 70 - 125 mg/dL    Calcium 8.9 8.5 - 10.5 mg/dL    BUN 38 (H) 8 - 28 mg/dL    Creatinine 2.46 (H) 0.70 - 1.30 mg/dL    GFR MDRD Af Amer 31 (L) >60 mL/min/1.73m2    GFR MDRD Non Af Amer 25 (L) >60 mL/min/1.73m2   HM1  (CBC with Diff)    Collection Time: 10/17/19  5:16 AM   Result Value Ref Range    WBC 7.1 4.0 - 11.0 thou/uL    RBC 2.98 (L) 4.40 - 6.20 mill/uL    Hemoglobin 9.5 (L) 14.0 - 18.0 g/dL    Hematocrit 28.7 (L) 40.0 - 54.0 %    MCV 96 80 - 100 fL    MCH 31.9 27.0 - 34.0 pg    MCHC 33.1 32.0 - 36.0 g/dL    RDW 14.0 11.0 - 14.5 %    Platelets 212 140 - 440 thou/uL    MPV 9.3 8.5 - 12.5 fL    Neutrophils % 76 (H) 50 - 70 %    Lymphocytes % 12 (L) 20 - 40 %    Monocytes % 10 2 - 10 %    Eosinophils % 2 0 - 6 %    Basophils % 1 0 - 2 %    Neutrophils Absolute 5.3 2.0 - 7.7 thou/uL    Lymphocytes Absolute 0.8 0.8 - 4.4 thou/uL    Monocytes Absolute 0.7 0.0 - 0.9 thou/uL    Eosinophils Absolute 0.1 0.0 - 0.4 thou/uL    Basophils Absolute 0.0 0.0 - 0.2 thou/uL   POCT Glucose    Collection Time: 10/17/19  6:31 AM   Result Value Ref Range    Glucose 138 70 - 139 mg/dL   POCT Glucose    Collection Time: 10/17/19 12:17 PM   Result Value Ref Range    Glucose 330 (H) 70 - 139 mg/dL   Potassium    Collection Time: 10/17/19  1:05 PM   Result Value Ref Range    Potassium 3.7 3.5 - 5.0 mmol/L   POCT Glucose    Collection Time: 10/17/19  5:58 PM   Result Value Ref Range    Glucose 250 (H) 70 - 139 mg/dL   POCT Glucose    Collection Time: 10/17/19  9:03 PM   Result Value Ref Range    Glucose 152 (H) 70 - 139 mg/dL   Magnesium    Collection Time: 10/18/19  6:30 AM   Result Value Ref Range    Magnesium 1.9 1.8 - 2.6 mg/dL   Basic Metabolic Panel    Collection Time: 10/18/19  6:30 AM   Result Value Ref Range    Sodium 139 136 - 145 mmol/L    Potassium 3.0 (L) 3.5 - 5.0 mmol/L    Chloride 103 98 - 107 mmol/L    CO2 24 22 - 31 mmol/L    Anion Gap, Calculation 12 5 - 18 mmol/L    Glucose 82 70 - 125 mg/dL    Calcium 9.0 8.5 - 10.5 mg/dL    BUN 25 8 - 28 mg/dL    Creatinine 2.26 (H) 0.70 - 1.30 mg/dL    GFR MDRD Af Amer 34 (L) >60 mL/min/1.73m2    GFR MDRD Non Af Amer 28 (L) >60 mL/min/1.73m2   POCT Glucose    Collection Time: 10/18/19  6:38 AM    Result Value Ref Range    Glucose 83 70 - 139 mg/dL   POCT Glucose    Collection Time: 10/18/19 11:14 AM   Result Value Ref Range    Glucose 280 (H) 70 - 139 mg/dL   POCT Glucose    Collection Time: 10/18/19  4:04 PM   Result Value Ref Range    Glucose 335 (H) 70 - 139 mg/dL   Potassium    Collection Time: 10/18/19  6:26 PM   Result Value Ref Range    Potassium 3.6 3.5 - 5.0 mmol/L   POCT Glucose    Collection Time: 10/18/19  8:24 PM   Result Value Ref Range    Glucose 223 (H) 70 - 139 mg/dL   Magnesium    Collection Time: 10/19/19  6:16 AM   Result Value Ref Range    Magnesium 1.9 1.8 - 2.6 mg/dL   Basic Metabolic Panel    Collection Time: 10/19/19  6:16 AM   Result Value Ref Range    Sodium 137 136 - 145 mmol/L    Potassium 3.4 (L) 3.5 - 5.0 mmol/L    Chloride 104 98 - 107 mmol/L    CO2 21 (L) 22 - 31 mmol/L    Anion Gap, Calculation 12 5 - 18 mmol/L    Glucose 162 (H) 70 - 125 mg/dL    Calcium 9.0 8.5 - 10.5 mg/dL    BUN 41 (H) 8 - 28 mg/dL    Creatinine 2.37 (H) 0.70 - 1.30 mg/dL    GFR MDRD Af Amer 32 (L) >60 mL/min/1.73m2    GFR MDRD Non Af Amer 27 (L) >60 mL/min/1.73m2   Platelet Count - every other day x 3    Collection Time: 10/19/19  6:16 AM   Result Value Ref Range    Platelets 222 140 - 440 thou/uL   POCT Glucose    Collection Time: 10/19/19  6:40 AM   Result Value Ref Range    Glucose 170 (H) 70 - 139 mg/dL   POCT Glucose    Collection Time: 10/19/19 11:09 AM   Result Value Ref Range    Glucose 316 (H) 70 - 139 mg/dL   Potassium    Collection Time: 10/19/19 12:09 PM   Result Value Ref Range    Potassium 3.9 3.5 - 5.0 mmol/L            Imaging Results     Ct Head Without Contrast    Result Date: 10/16/2019  EXAM: CT HEAD WO CONTRAST LOCATION: [facility] DATE/TIME: 10/16/2019 12:12 AM INDICATION: Neuro deficit, acute, stroke suspected COMPARISON: MRI head 07/20/2015 TECHNIQUE: Routine without IV contrast. Multiplanar reformats. Dose reduction techniques were used. FINDINGS: INTRACRANIAL  CONTENTS: No intracranial hemorrhage, extraaxial collection, or mass effect.  No CT evidence of acute infarct. Mild volume loss and presumed chronic small vessel ischemia similar to the prior MRI of 2015. Small chronic lacunar infarcts in each thalamus again noted. A few additional scattered chronic lacunar infarcts in the periventricular white matter are grossly stable. VISUALIZED ORBITS/SINUSES/MASTOIDS: No intraorbital abnormality. No paranasal sinus mucosal disease. No middle ear or mastoid effusion. BONES/SOFT TISSUES: No acute abnormality.     1.  No definite acute intracranial abnormality. 2.  Age-related and chronic ischemic changes not obviously changed compared to 2015.    Xr Chest 1 View    Result Date: 10/15/2019  EXAM: XR CHEST 1 VIEW LOCATION: [facility] DATE/TIME: 10/15/2019 11:30 PM INDICATION: weakness COMPARISON: 08/09/2019     Heart size is normal. Lung volumes are diminished. No acute infiltrate or large effusion. No acute bony abnormalities.    Us Venous Leg Left    Result Date: 10/15/2019  EXAM: US VENOUS LEG LEFT LOCATION: [facility] DATE/TIME: 10/15/2019 11:30 PM INDICATION: Left leg pain and swelling COMPARISON: 03/02/2016 TECHNIQUE: Venous Duplex ultrasound of the left lower extremity with and without compression, augmentation and duplex. Color flow and spectral Doppler with waveform analysis performed. FINDINGS: Exam includes the common femoral, femoral, popliteal, and contralateral common femoral veins as well as segmentally visualized deep calf veins and greater saphenous vein. LEFT: No deep vein thrombosis. No superficial thrombophlebitis. 4.3 x 2.3 x 1.6 cm complex popliteal fossa cyst. Nonspecific subcutaneous edema.     1.  No deep venous thrombosis in the left lower extremity.            KATIE Florez

## 2021-06-02 NOTE — TELEPHONE ENCOUNTER
Refill Approved    Rx renewed per Medication Renewal Policy. Medication was last renewed on 8/27/19.    Lu Hayes, [facility] Triage/Med Refill 10/11/2019     Requested Prescriptions   Pending Prescriptions Disp Refills     amLODIPine (NORVASC) 5 MG tablet [Pharmacy Med Name: AMLODIPINE 5MG] 90 tablet 0     Sig: TAKE 1 TABLET (5 MG TOTAL) BY MOUTH DAILY.       Calcium-Channel Blockers Protocol Passed - 10/11/2019 10:09 AM        Passed - PCP or prescribing provider visit in past 12 months or next 3 months     Last office visit with prescriber/PCP: 8/27/2019 Mir De La Cruz CNP OR same dept: 9/3/2019 Telly Torre MD OR same specialty: 9/3/2019 Telly Torre MD  Last physical: Visit date not found Last MTM visit: Visit date not found   Next visit within 3 mo: Visit date not found  Next physical within 3 mo: Visit date not found  Prescriber OR PCP: Mir De La Cruz CNP  Last diagnosis associated with med order: 1. Hypertension  - amLODIPine (NORVASC) 5 MG tablet [Pharmacy Med Name: AMLODIPINE 5MG]; Take 1 tablet (5 mg total) by mouth daily.  Dispense: 90 tablet; Refill: 0    If protocol passes may refill for 12 months if within 3 months of last provider visit (or a total of 15 months).             Passed - Blood pressure filed in past 12 months     BP Readings from Last 1 Encounters:   09/03/19 154/74

## 2021-06-02 NOTE — TELEPHONE ENCOUNTER
RN cannot approve Refill Request    RN can NOT refill this medication historical medication requested. Last office visit: 9/3/2019 Telly Torre MD Last Physical: 7/10/2019 Last MTM visit: Visit date not found Last visit same specialty: 9/3/2019 Telly Torre MD.  Next visit within 3 mo: Visit date not found  Next physical within 3 mo: Visit date not found      Janice Argueta, [facility] Triage/Med Refill 10/8/2019    Requested Prescriptions   Pending Prescriptions Disp Refills     sertraline (ZOLOFT) 50 MG tablet [Pharmacy Med Name: SERTRALINE 50MG] 135 tablet 2     Sig: TAKE ONE & ONE -HALF (1&1/2) TABLETS BY MOUTH DAILY       [facility] Refill Protocol  Passed - 10/7/2019 10:07 AM        Passed - PCP or prescribing provider visit in last year     Last office visit with prescriber/PCP: 9/3/2019 Telly Torre MD OR same dept: 9/3/2019 Telly Torre MD OR same specialty: 9/3/2019 Telly Torre MD  Last physical: 7/10/2019 Last MTM visit: Visit date not found   Next visit within 3 mo: Visit date not found  Next physical within 3 mo: Visit date not found  Prescriber OR PCP: Telly Torre MD  Last diagnosis associated with med order: 1. Ischemic Stroke  - sertraline (ZOLOFT) 50 MG tablet [Pharmacy Med Name: SERTRALINE 50MG]; TAKE ONE & ONE -HALF (1&1/2) TABLETS BY MOUTH DAILY  Dispense: 135 tablet; Refill: 2    If protocol passes may refill for 12 months if within 3 months of last provider visit (or a total of 15 months).

## 2021-06-02 NOTE — PROGRESS NOTES
[facility] Admission note      Patient: Suman Nagy  MRN: [id number]  Date of Service: 10/28/2019      [facility] [[id number]]  Reason for Visit     Chief Complaint   Patient presents with     Review Of Multiple Medical Conditions       Code Status     FULL CODE    Assessment     -Acute anemia with a drop in hemoglobin from 10.6-8.6 no evidence of GI bleed so far  -Acute   metabolic/ Toxic encephalopathy with persistent confusion noted in the TCU  -History of chronic kidney disease stage IV  DC creatinine is around 2.3   -History of diabetes type 2  -History of CVA with resultant left-sided weakness in 2015.  Continues on statin and Plavix.  -Bilateral lower extremity lymphedema  -Underlying history of bladder cancer status post cystoscopy recently  -Underlying history of rectal cancer with repeat imaging including a CT revealing no malignancy  -Profound generalized weakness with patient reporting difficulty with ambulation    Plan     Patient has been admitted to the TCU.  UTI concerns are minimal and he is currently done with his Keflex.  Patient denies any dysuria.  Blood pressures reviewed and he is stable on his current regimen he remains on clonidine and amlodipine as well as metoprolol.  Diabetes regimen was reviewed and he is on glimepiride.  In addition he is also getting metformin 1 g twice a day and blood sugars unfortunately remain quite high with postprandial elevations greater than 200.  He is on a maximum dose of metformin and also is taking glimepiride and quite high doses.  Suspect with renal impairment we will be limited and pushing more oral medications and he may be requiring insulin soon  His recheck A1c is pending.-7.6  Recheck hemoglobin shows significant decline in hemoglobin from 10-8.6 there has been a progressive decline in hemoglobin.  Stool guaiacs are done and are negative.  Suspect this is not from internal bleeding.  He did have recent blood  loss from hematuria.  Would like to recheck hemoglobin will also order some iron studies to rule out anemia of chronic kidney disease versus iron deficiency.  Recheck CBC will be pending today also will check a B12 level which is pending for today  [facility] to [facility]    History     Patient is a very pleasant 79 y.o. male who is admitted to TCU  Patient was admitted with generalized weakness and work-up revealed that he had UTI.  Cultures grew Klebsiella.  He was treated with IV ceftriaxone.  He is currently done with his oral Keflex and denies any dysuria.  He has underlying history of chronic kidney disease stage IV.  Renal function remains impaired with a recheck creatinine hovering at 2.4 this seems to be baseline.  Unfortunately has had progressive anemia.  Recheck hemoglobin was 8.6.  This is a drop from 10.6 he denies any GI bleed concerns.  Stool guaiac came back negative.  He is on Plavix for prophylaxis regarding his CVA.  In addition he also gets B12 supplementation.  Suspect this could be related to anemia of chronic kidney disease versus iron deficiency.  Blood pressures have been stable he remains on multiple medications.  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 He has never used smokeless tobacco. He reports that he does not drink alcohol or use drugs.    Family History     Reviewed, and family history includes COPD in his father.    Medication List   Post Discharge Medication Reconciliation Status: discharge medications reconciled and changed, per note/orders (see AVS)   Current Outpatient Medications on File Prior to Visit   Medication Sig Dispense Refill     acetaminophen (TYLENOL) 325 MG tablet Take 2 tablets (650 mg total) by mouth every 4 (four) hours as needed.  0     amLODIPine (NORVASC) 5 MG tablet Take 1 tablet (5 mg total) by mouth daily. 30 tablet 0     atorvastatin (LIPITOR) 20 MG tablet Take 1 tablet (20 mg total) by mouth daily. 90 tablet 3     cloNIDine HCl (CATAPRES) 0.3 MG tablet TAKE 1/2 PILL IN THE MORNING. TAKE 1 FULL PILL AT BEDTIME. 135 tablet 2     clopidogrel (PLAVIX) 75 mg tablet TAKE ONE (1) TABLET BY MOUTH DAILY 90 tablet 1     cyanocobalamin 1,000 mcg/mL injection Inject 1,000 mcg into the shoulder, thigh, or buttocks every 3 (three) months.              cyanocobalamin, vitamin B-12, (VITAMIN B-12) 1,000 mcg Subl Place 1 tablet (1,000 mcg total) under the tongue daily. 90 tablet 3     furosemide (LASIX) 40 MG tablet 40 mg daily prn for leg edema  0     glimepiride (AMARYL) 4 MG tablet ONE PILL TWICE PER DAY WITH LUNCH AND SUPPER (Patient taking differently: Take 4 mg by mouth twice a day with lunch and supper.       ) 180 tablet 3     INJECT EASE LANCETS 30 gauge Misc Use daily as directed (4-6 times daily) 600 each 3     insulin aspart U-100 (NOVOLOG) 100 unit/mL injection Inject under the skin 3 (three) times a day before meals. <140                           0U  141-180                      1U  181-220     "                  2U  221-260                      4U  261-300                      6U  301-350                      8U  351-400                     12U  >400                          Call provider       metFORMIN (GLUCOPHAGE) 1000 MG tablet Take 1,000 mg by mouth 2 (two) times a day with meals.       metoprolol succinate (TOPROL-XL) 25 MG Take 1 tablet (25 mg total) by mouth daily. 30 tablet 0     ONETOUCH ULTRA BLUE TEST STRIP strips USE AS DIRECTED 6 TIMES PER  strip 3     psyllium husk (DAILY FIBER ORAL) Take 1 tablet by mouth 2 (two) times a day.              sertraline (ZOLOFT) 50 MG tablet Take 50 mg by mouth daily.       SURE COMFORT PEN NEEDLE 31 gauge x 3/16\" Ndle Use one needle each night. 100 each 2     No current facility-administered medications on file prior to visit.        Allergies     Allergies   Allergen Reactions     Cefazolin      \"felt very hot\"     Pravastatin      Increased peripheral neuropathy     Simvastatin      Increased peripheral neuropathy     Thiazides      Other: Gout         Review of Systems   A comprehensive review of 14 systems was done. Pertinent findings noted here and in history of present illness. All the rest negative.  Constitutional: Negative.  Negative for fever, chills, he has activity change, appetite change and fatigue.   HENT: Negative for congestion and facial swelling.    Eyes: Negative for photophobia, redness and visual disturbance.   Respiratory: Negative for cough and chest tightness.    Cardiovascular: Negative for chest pain, palpitations and leg swelling.   Gastrointestinal: Negative for nausea, diarrhea, constipation, blood in stool and abdominal distention.   Genitourinary: Negative.    Musculoskeletal: Negative.  Reporting significant weakness especially in the right leg today  Skin: Negative.    Neurological: Negative for dizziness, tremors, syncope, weakness, light-headedness and headaches.   Hematological: Does not bruise/bleed easily. "   Psychiatric/Behavioral: Negative.  Recalls remain somewhat impaired      Physical Exam     Recent Vitals 10/23/2019   Height -   Weight 179 lbs   BSA (m2) 1.97 m2   /74   Pulse 93   Temp 97   Temp src -   SpO2 91   Some recent data might be hidden       Constitutional: Oriented to person, place, and time and appears well-developed.  Frail and weak  HEENT:  Normocephalic and atraumatic.  Eyes: Conjunctivae and EOM are normal. Pupils are equal, round, and reactive to light. No discharge.  No scleral icterus. Nose normal. Mouth/Throat: Oropharynx is clear and moist. No oropharyngeal exudate.    NECK: Normal range of motion. Neck supple. No JVD present. No tracheal deviation present. No thyromegaly present.   CARDIOVASCULAR: Normal rate, regular rhythm and intact distal pulses.  Exam reveals no gallop and no friction rub.  Systolic murmur present.  PULMONARY: Effort normal and breath sounds normal. No respiratory distress.No Wheezing or rales.  ABDOMEN: Soft. Bowel sounds are normal. No distension and no mass.  There is no tenderness. There is no rebound and no guarding. No HSM.  MUSCULOSKELETAL: Normal range of motion.  Has chronic 1+ bilateral lower extremity edema and no tenderness. Mild kyphosis, no tenderness.  Reporting right lower extremity weakness  LYMPH NODES: Has no cervical, supraclavicular, axillary and groin adenopathy.   NEUROLOGICAL: Alert and oriented to person, place, and time. No cranial nerve deficit.  Normal muscle tone. Coordination normal.  Has baseline left-sided weakness  GENITOURINARY: Deferred exam.  SKIN: Skin is warm and dry. No rash noted. No erythema. No pallor.   EXTREMITIES: No cyanosis, no clubbing, he has chronic 1+ bilateral lower extremity edema. No Deformity.  PSYCHIATRIC: Normal mood, affect and behavior.      Lab Results     Recent Results (from the past 240 hour(s))   POCT Glucose    Collection Time: 10/18/19 11:14 AM   Result Value Ref Range    Glucose 280 (H) 70 - 139  mg/dL   POCT Glucose    Collection Time: 10/18/19  4:04 PM   Result Value Ref Range    Glucose 335 (H) 70 - 139 mg/dL   Potassium    Collection Time: 10/18/19  6:26 PM   Result Value Ref Range    Potassium 3.6 3.5 - 5.0 mmol/L   POCT Glucose    Collection Time: 10/18/19  8:24 PM   Result Value Ref Range    Glucose 223 (H) 70 - 139 mg/dL   Magnesium    Collection Time: 10/19/19  6:16 AM   Result Value Ref Range    Magnesium 1.9 1.8 - 2.6 mg/dL   Basic Metabolic Panel    Collection Time: 10/19/19  6:16 AM   Result Value Ref Range    Sodium 137 136 - 145 mmol/L    Potassium 3.4 (L) 3.5 - 5.0 mmol/L    Chloride 104 98 - 107 mmol/L    CO2 21 (L) 22 - 31 mmol/L    Anion Gap, Calculation 12 5 - 18 mmol/L    Glucose 162 (H) 70 - 125 mg/dL    Calcium 9.0 8.5 - 10.5 mg/dL    BUN 41 (H) 8 - 28 mg/dL    Creatinine 2.37 (H) 0.70 - 1.30 mg/dL    GFR MDRD Af Amer 32 (L) >60 mL/min/1.73m2    GFR MDRD Non Af Amer 27 (L) >60 mL/min/1.73m2   Platelet Count - every other day x 3    Collection Time: 10/19/19  6:16 AM   Result Value Ref Range    Platelets 222 140 - 440 thou/uL   POCT Glucose    Collection Time: 10/19/19  6:40 AM   Result Value Ref Range    Glucose 170 (H) 70 - 139 mg/dL   POCT Glucose    Collection Time: 10/19/19 11:09 AM   Result Value Ref Range    Glucose 316 (H) 70 - 139 mg/dL   Potassium    Collection Time: 10/19/19 12:09 PM   Result Value Ref Range    Potassium 3.9 3.5 - 5.0 mmol/L   Basic Metabolic Panel    Collection Time: 10/23/19  8:25 AM   Result Value Ref Range    Sodium 137 136 - 145 mmol/L    Potassium 3.6 3.5 - 5.0 mmol/L    Chloride 103 98 - 107 mmol/L    CO2 24 22 - 31 mmol/L    Anion Gap, Calculation 10 5 - 18 mmol/L    Glucose 135 (H) 70 - 125 mg/dL    Calcium 9.3 8.5 - 10.5 mg/dL    BUN 51 (H) 8 - 28 mg/dL    Creatinine 2.42 (H) 0.70 - 1.30 mg/dL    GFR MDRD Af Amer 31 (L) >60 mL/min/1.73m2    GFR MDRD Non Af Amer 26 (L) >60 mL/min/1.73m2   Magnesium    Collection Time: 10/23/19  8:25 AM   Result Value  Ref Range    Magnesium 1.9 1.8 - 2.6 mg/dL   HM2(CBC w/o Differential)    Collection Time: 10/23/19  8:25 AM   Result Value Ref Range    WBC 9.5 4.0 - 11.0 thou/uL    RBC 3.41 (L) 4.40 - 6.20 mill/uL    Hemoglobin 10.6 (L) 14.0 - 18.0 g/dL    Hematocrit 33.3 (L) 40.0 - 54.0 %    MCV 98 80 - 100 fL    MCH 31.1 27.0 - 34.0 pg    MCHC 31.8 (L) 32.0 - 36.0 g/dL    RDW 13.8 11.0 - 14.5 %    Platelets 375 140 - 440 thou/uL    MPV 9.2 8.5 - 12.5 fL   Basic Metabolic Panel    Collection Time: 10/25/19  6:15 AM   Result Value Ref Range    Sodium 136 136 - 145 mmol/L    Potassium 3.8 3.5 - 5.0 mmol/L    Chloride 103 98 - 107 mmol/L    CO2 25 22 - 31 mmol/L    Anion Gap, Calculation 8 5 - 18 mmol/L    Glucose 87 70 - 125 mg/dL    Calcium 8.6 8.5 - 10.5 mg/dL    BUN 52 (H) 8 - 28 mg/dL    Creatinine 2.44 (H) 0.70 - 1.30 mg/dL    GFR MDRD Af Amer 31 (L) >60 mL/min/1.73m2    GFR MDRD Non Af Amer 26 (L) >60 mL/min/1.73m2   HM2(CBC w/o Differential)    Collection Time: 10/25/19  6:15 AM   Result Value Ref Range    WBC 7.4 4.0 - 11.0 thou/uL    RBC 2.72 (L) 4.40 - 6.20 mill/uL    Hemoglobin 8.6 (L) 14.0 - 18.0 g/dL    Hematocrit 26.2 (L) 40.0 - 54.0 %    MCV 96 80 - 100 fL    MCH 31.6 27.0 - 34.0 pg    MCHC 32.8 32.0 - 36.0 g/dL    RDW 13.6 11.0 - 14.5 %    Platelets 311 140 - 440 thou/uL    MPV 9.5 8.5 - 12.5 fL   Occult Blood, Fecal    Collection Time: 10/27/19  6:30 PM   Result Value Ref Range    Occult Blood, Stool #1 Negative Negative              KATIE Florez

## 2021-06-02 NOTE — PROGRESS NOTES
[facility]      Facility:    [facility] [[id number]]  Code Status: FULL CODE      Chief Complaint/Reason for Visit:   Chief Complaint   Patient presents with     Review Of Multiple Medical Conditions       HPI:   Suman is a 79 y.o. male who is a recent transfer from [facility] admission on 10/52/19 and discharged on 10/19/2019. 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  He was found to have a UTI with Klebsiella pneumonia started on Rocephin,, resumed on Keflex to complete 2-week course.  Per his CKD stage IV, creatinine was 2.37, was on Lasix twice a day for chronic leg edema since August 2019, which is on hold.  We will follow-up with neurology as outpatient.  Norvasc dose is decreased due to chronic leg edema.  Continue lymphedema care.  At discharge he was resumed on amlodipine 5 mg daily, clonidine 0.1 mg 3 times a day, metoprolol 25 mg daily for hypertension.  Per his history of CVA he resumed on statin and Plavix.  Per diabetes he resumed on diabetic diet, glyburide, metformin.  Also has history of bladder, colon, rectal cancer, recent CT showed no malignancy.  He is also seen urology outpatient with a cystoscopy couple weeks ago which was also negative.  He was socially discharged to TCU for rehab.    Past Medical History:  Past Medical History:   Diagnosis Date     Anemia      Bladder cancer (H)      Cancer (H)     Rectosigmoid     Diabetes mellitus (H)      Hyperkalemia      Hypertension      Seizure (H)     possible, one time occurence     Stroke (H)     multiple, residual left sided weakness, last CVA in July 2015 caused some speech deficit     Superficial phlebitis      Venous insufficiency of both lower extremities            Surgical  History:  Past Surgical History:   Procedure Laterality Date     COLON SURGERY      Colectomy Low Anterior Resection     EYE SURGERY      Cataract Extraction     LAPAROSCOPIC COLON RESECTION N/A 2017    Procedure: LAPAROSCOPIC ASSISTED COLECTOMY LOW ANTERIOR RESECTION AND DIVERTING LOOP ILEOSTOMY, PROCTOSOPY;  Surgeon: Tyson Parry MD;  [place] [facility] [place]:      AR CLOSE ENTEROSTOMY N/A 8/15/2017    Procedure: ILEOSTOMY CLOSURE LOOP ;  Surgeon: Tyson Parry MD;  [place] [facility] [place]  Service: General     AR CYSTOURETHROSCOPY,FULGUR <0.5 CM LESN N/A 2015    Procedure: CYSTOSCOPY TRANSURETHRAL RESECTION BLADDER TUMOR;  Surgeon: Cleveland Nair MD;  [place] [facility] [place]  Service: [place]rology     AR CYSTOURETHROSCOPY,FULGUR <0.5 CM LESN N/A 2015    Procedure: CYSTOSCOPY TRANSURETHRAL RESECTION BLADDER TUMOR AND BLADDER BIOPSIES;  Surgeon: Cleveland Nair MD;  [place] [facility] [place]  Service: [place]rology     TURBT      X2     VASECTOMY         Family History:   Family History   Problem Relation Age of Onset     COPD Father        Social History:    Social History     Socioeconomic History     Marital status:      Spouse name: Not on file     Number of children: Not on file     Years of education: Not on file     Highest education level: Not on file   Occupational History     Not on file   Social Needs     Financial resource strain: Not on file     Food insecurity:     Worry: Not on file     Inability: Not on file     Transportation needs:     Medical: Not on file     Non-medical: Not on file   Tobacco Use     Smoking status: Former Smoker     Last attempt to quit: 1995     Years since quittin.1     Smokeless tobacco: Never Used   Substance and Sexual Activity     Alcohol use: No     Drug use: No     Sexual activity: Not Currently     Partners: Female   Lifestyle     Physical activity:     Days per week: Not on file     Minutes per session:  Not on file     Stress: Not on file   Relationships     Social connections:     Talks on phone: Not on file     Gets together: Not on file     Attends Cheondo[employer] service: Not on file     Active member of club or organization: Not on file     Attends meetings of clubs or organizations: Not on file     Relationship status: Not on file     Intimate partner violence:     Fear of current or ex partner: Not on file     Emotionally abused: Not on file     Physically abused: Not on file     Forced sexual activity: Not on file   Other Topics Concern     Not on file   Social History Narrative     Not on file          Review of Systems   Constitutional: Positive for activity change and fatigue. Negative for appetite change, diaphoresis and fever.   HENT: Positive for trouble swallowing. Negative for congestion and hearing loss.    Eyes: Negative.    Respiratory: Negative for shortness of breath and wheezing.    Cardiovascular: Positive for leg swelling.   Gastrointestinal: Negative for abdominal distention, abdominal pain, blood in stool, constipation, diarrhea and nausea.   Endocrine: Negative.    Genitourinary: Negative for difficulty urinating.   Musculoskeletal:        Denies pain   Skin:        intact   Allergic/Immunologic: Negative.    Neurological: Negative for dizziness, tremors, speech difficulty and light-headedness.   Hematological: Negative.    Psychiatric/Behavioral: Negative for agitation, confusion and hallucinations. The patient is not nervous/anxious.        Vitals:    10/23/19 1930   BP: 166/74   Pulse: 93   Resp: 16   Temp: 97  F (36.1  C)   SpO2: 91%   Weight: 179 lb (81.2 kg)       Physical Exam  Vitals signs reviewed.   Constitutional:       Appearance: He is obese.      Comments: Ongoing hyperglycemia   HENT:      Head: Normocephalic and atraumatic.      Right Ear: External ear normal.      Left Ear: External ear normal.      Nose: No congestion or rhinorrhea.      Mouth/Throat:      Pharynx: Oropharynx  is clear. No oropharyngeal exudate or posterior oropharyngeal erythema.   Eyes:      General:         Right eye: No discharge.         Left eye: No discharge.   Cardiovascular:      Rate and Rhythm: Normal rate.      Heart sounds: No murmur. No friction rub. No gallop.    Pulmonary:      Effort: No respiratory distress.      Breath sounds: No wheezing or rales.      Comments: Dim, RA  Abdominal:      General: Bowel sounds are normal.      Comments: Denies constipation or diarrhea   Musculoskeletal:      Right lower leg: Edema present.      Left lower leg: Edema present.      Comments: tubigrips ordered, denies pain. L sided weakness per CVA in 2015   Skin:     General: Skin is warm and dry.      Comments: intact   Neurological:      Mental Status: He is oriented to person, place, and time.   Psychiatric:         Mood and Affect: Mood normal.         Behavior: Behavior normal.      Comments: No behaviors reported         Medication List:  Current Outpatient Medications   Medication Sig     insulin aspart U-100 (NOVOLOG) 100 unit/mL injection Inject under the skin 3 (three) times a day before meals. <140                           0U  141-180                      1U  181-220                      2U  221-260                      4U  261-300                      6U  301-350                      8U  351-400                     12U  >400                          Call provider     acetaminophen (TYLENOL) 325 MG tablet Take 2 tablets (650 mg total) by mouth every 4 (four) hours as needed.     amLODIPine (NORVASC) 5 MG tablet Take 1 tablet (5 mg total) by mouth daily.     atorvastatin (LIPITOR) 20 MG tablet Take 1 tablet (20 mg total) by mouth daily.     cephalexin (KEFLEX) 250 MG capsule Take 1 capsule (250 mg total) by mouth 2 (two) times a day for 6 days.     cloNIDine HCl (CATAPRES) 0.3 MG tablet TAKE 1/2 PILL IN THE MORNING. TAKE 1 FULL PILL AT BEDTIME.     clopidogrel (PLAVIX) 75 mg tablet TAKE ONE (1) TABLET BY MOUTH  "DAILY     cyanocobalamin 1,000 mcg/mL injection Inject 1,000 mcg into the shoulder, thigh, or buttocks every 3 (three) months.            cyanocobalamin, vitamin B-12, (VITAMIN B-12) 1,000 mcg Subl Place 1 tablet (1,000 mcg total) under the tongue daily.     furosemide (LASIX) 40 MG tablet 40 mg daily prn for leg edema     glimepiride (AMARYL) 4 MG tablet ONE PILL TWICE PER DAY WITH LUNCH AND SUPPER (Patient taking differently: Take 4 mg by mouth twice a day with lunch and supper.       )     INJECT EASE LANCETS 30 gauge Misc Use daily as directed (4-6 times daily)     metFORMIN (GLUCOPHAGE) 1000 MG tablet Take 1,000 mg by mouth 2 (two) times a day with meals.     metoprolol succinate (TOPROL-XL) 25 MG Take 1 tablet (25 mg total) by mouth daily.     ONETOUCH ULTRA BLUE TEST STRIP strips USE AS DIRECTED 6 TIMES PER DAY     psyllium husk (DAILY FIBER ORAL) Take 1 tablet by mouth 2 (two) times a day.            sertraline (ZOLOFT) 50 MG tablet Take 50 mg by mouth daily.     SURE COMFORT PEN NEEDLE 31 gauge x 3/16\" Ndle Use one needle each night.       Labs:  Results for orders placed or performed in visit on 10/23/19   Basic Metabolic Panel   Result Value Ref Range    Sodium 137 136 - 145 mmol/L    Potassium 3.6 3.5 - 5.0 mmol/L    Chloride 103 98 - 107 mmol/L    CO2 24 22 - 31 mmol/L    Anion Gap, Calculation 10 5 - 18 mmol/L    Glucose 135 (H) 70 - 125 mg/dL    Calcium 9.3 8.5 - 10.5 mg/dL    BUN 51 (H) 8 - 28 mg/dL    Creatinine 2.42 (H) 0.70 - 1.30 mg/dL    GFR MDRD Af Amer 31 (L) >60 mL/min/1.73m2    GFR MDRD Non Af Amer 26 (L) >60 mL/min/1.73m2     Lab Results   Component Value Date    WBC 9.5 10/23/2019    HGB 10.6 (L) 10/23/2019    HCT 33.3 (L) 10/23/2019    MCV 98 10/23/2019     10/23/2019     Lab Results   Component Value Date    HGBA1C 6.7 (H) 07/10/2019     No results found for: TSH    No results found for: [id number]    Vitamin B-12   Date Value Ref Range Status   05/03/2017 >2,000 (H) 213 - 816 " pg/mL Final     Mag 1.9    Assessment/Plan:    UTI for Klebsiella pneumonia: Initially treated with Rocephin, discharged on Keflex 250 mg twice daily, end 10/25    Chronic kidney disease stage IV: Lasix on hold follow-up with, nephrology outpatient.  Last creatinine 2.42 with a GFR of 26 on 10/23/2019    Hypertension: Continue clonidine 0.15 mg in a.m. and 0.3 mg at bedtime, metoprolol succinate 25 mg daily and amlodipine 5 mg (weaned from 10mg in hospital)    History of stroke in 7/2015 with residual left-sided hemiplegia: Continue Plavix 75 mg daily and atorvastatin 20 mg daily    Vitamin B12 deficiency: Continue B12 1000 MCG daily and 1000 MCG IM injection every 90 days, recheck B12    Controlled type 2 diabetes: Continue glimepiride 4 mg twice daily (at lunch and supper), metformin 1000 mg twice daily and start NovoLog sliding scale, recheck A1c    Depression: Continue sertraline 50 mg daily    Dysphasia: Speech to evaluate and treat    Constipation: Continue psyllium husk 1 tab twice daily    Pain control: Continue Tylenol 650 mg every 4 hours as needed, denies pain    Disposition: Plans to return home, pending cognitive assessment    The care plan has been reviewed and all orders signed. Changes to care plan, if any, as noted. Otherwise, continue care plan of care.  The total time spent with this patient was 35 minutes, with greater than 50% spent in counseling and coordination of care that included multiple issues per kidney fx, speech consult, DM, Htn and therapy which lasted 20 minutes.     Post Discharge Medication Reconciliation Status: discharge medications reconciled, continue medications without change      Electronically signed by: Gee Rincon NP

## 2021-06-02 NOTE — PROGRESS NOTES
[facility]      Facility:    [facility] [[id number]]  Code Status: FULL CODE      Chief Complaint/Reason for Visit:   Chief Complaint   Patient presents with     Review Of Multiple Medical Conditions       HPI:   Suman is a 79 y.o. male who is a recent transfer from [facility] admission on 10/52/19 and discharged on 10/19/2019. 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  He was found to have a UTI with Klebsiella pneumonia started on Rocephin,, resumed on Keflex to complete 2-week course.  Per his CKD stage IV, creatinine was 2.37, was on Lasix twice a day for chronic leg edema since August 2019, which is on hold.  We will follow-up with neurology as outpatient.  Norvasc dose is decreased due to chronic leg edema.  Continue lymphedema care.  At discharge he was resumed on amlodipine 5 mg daily, clonidine 0.1 mg 3 times a day, metoprolol 25 mg daily for hypertension.  Per his history of CVA he resumed on statin and Plavix.  Per diabetes he resumed on diabetic diet, glyburide, metformin.  Also has history of bladder, colon, rectal cancer, recent CT showed no malignancy.  He is also seen urology outpatient with a cystoscopy couple weeks ago which was also negative.  He was socially discharged to TCU for rehab.    Past Medical History:  Past Medical History:   Diagnosis Date     Anemia      Bladder cancer (H)      Cancer (H)     Rectosigmoid     Diabetes mellitus (H)      Hyperkalemia      Hypertension      Seizure (H)     possible, one time occurence     Stroke (H)     multiple, residual left sided weakness, last CVA in July 2015 caused some speech deficit     Superficial phlebitis      Venous insufficiency of both lower extremities            Surgical  History:  Past Surgical History:   Procedure Laterality Date     COLON SURGERY      Colectomy Low Anterior Resection     EYE SURGERY      Cataract Extraction     LAPAROSCOPIC COLON RESECTION N/A 2017    Procedure: LAPAROSCOPIC ASSISTED COLECTOMY LOW ANTERIOR RESECTION AND DIVERTING LOOP ILEOSTOMY, PROCTOSOPY;  Surgeon: Tyson Parry MD;  Location: [facility];  Service:      NY CLOSE ENTEROSTOMY N/A 8/15/2017    Procedure: ILEOSTOMY CLOSURE LOOP ;  Surgeon: Tyson Parry MD;  Location: [facility];  Service: General     NY CYSTOURETHROSCOPY,FULGUR <0.5 CM LESN N/A 2015    Procedure: CYSTOSCOPY TRANSURETHRAL RESECTION BLADDER TUMOR;  Surgeon: Cleveland Nair MD;  Location: [facility];  Service: Urology     NY CYSTOURETHROSCOPY,FULGUR <0.5 CM LESN N/A 2015    Procedure: CYSTOSCOPY TRANSURETHRAL RESECTION BLADDER TUMOR AND BLADDER BIOPSIES;  Surgeon: Cleveland Nair MD;  Location: [facility];  Service: Urology     TURBT      X2     VASECTOMY         Family History:   Family History   Problem Relation Age of Onset     COPD Father        Social History:    Social History     Socioeconomic History     Marital status:      Spouse name: Not on file     Number of children: Not on file     Years of education: Not on file     Highest education level: Not on file   Occupational History     Not on file   Social Needs     Financial resource strain: Not on file     Food insecurity:     Worry: Not on file     Inability: Not on file     Transportation needs:     Medical: Not on file     Non-medical: Not on file   Tobacco Use     Smoking status: Former Smoker     Last attempt to quit: 1995     Years since quittin.1     Smokeless tobacco: Never Used   Substance and Sexual Activity     Alcohol use: No     Drug use: No     Sexual activity: Not Currently     Partners: Female   Lifestyle     Physical activity:     Days per week: Not on file     Minutes per session:  Not on file     Stress: Not on file   Relationships     Social connections:     Talks on phone: Not on file     Gets together: Not on file     Attends Church service: Not on file     Active member of club or organization: Not on file     Attends meetings of clubs or organizations: Not on file     Relationship status: Not on file     Intimate partner violence:     Fear of current or ex partner: Not on file     Emotionally abused: Not on file     Physically abused: Not on file     Forced sexual activity: Not on file   Other Topics Concern     Not on file   Social History Narrative     Not on file          Review of Systems   Constitutional: Positive for activity change and fatigue. Negative for appetite change, diaphoresis and fever.        No concerns   HENT: Positive for trouble swallowing. Negative for congestion and hearing loss.    Eyes: Negative.    Respiratory: Negative for shortness of breath and wheezing.    Cardiovascular: Negative for leg swelling.   Gastrointestinal: Negative for abdominal distention, abdominal pain, blood in stool, constipation, diarrhea and nausea.   Endocrine: Negative.    Genitourinary: Negative for difficulty urinating.   Musculoskeletal:        Bilateral knee pain   Skin:        intact   Allergic/Immunologic: Negative.    Neurological: Negative for dizziness, tremors, speech difficulty and light-headedness.   Hematological: Negative.    Psychiatric/Behavioral: Negative for agitation, confusion and hallucinations. The patient is not nervous/anxious.        Vitals:    10/30/19 1243   BP: 120/76   Pulse: 89   Resp: 20   Temp: 98  F (36.7  C)   SpO2: 99%   Weight: 170 lb (77.1 kg)       Physical Exam  Vitals signs reviewed.   Constitutional:       Appearance: He is obese.      Comments: CKD per oral DM meds, also was given lasix x10d per med error   HENT:      Head: Normocephalic and atraumatic.      Right Ear: External ear normal.      Left Ear: External ear normal.      Nose: No  congestion or rhinorrhea.      Mouth/Throat:      Pharynx: Oropharynx is clear. No oropharyngeal exudate or posterior oropharyngeal erythema.   Eyes:      General:         Right eye: No discharge.         Left eye: No discharge.   Cardiovascular:      Rate and Rhythm: Normal rate.      Heart sounds: No murmur. No friction rub. No gallop.    Pulmonary:      Effort: No respiratory distress.      Breath sounds: No wheezing or rales.      Comments: Dim, RA  Abdominal:      General: Bowel sounds are normal.      Comments: Denies constipation or diarrhea   Musculoskeletal:      Right lower leg: Edema present.      Left lower leg: Edema present.      Comments: tubigrips, bilateral knee pain. L sided weakness per CVA in 2015   Skin:     General: Skin is warm and dry.      Comments: intact   Neurological:      Mental Status: He is oriented to person, place, and time.   Psychiatric:         Mood and Affect: Mood normal.         Behavior: Behavior normal.      Comments: No behaviors reported         Medication List:  Current Outpatient Medications   Medication Sig     diclofenac sodium (VOLTAREN) 1 % Gel Apply 4 g topically 3 (three) times a day. Apply to knees     acetaminophen (TYLENOL) 325 MG tablet Take 2 tablets (650 mg total) by mouth every 4 (four) hours as needed.     amLODIPine (NORVASC) 5 MG tablet Take 1 tablet (5 mg total) by mouth daily.     atorvastatin (LIPITOR) 20 MG tablet Take 1 tablet (20 mg total) by mouth daily.     cloNIDine HCl (CATAPRES) 0.3 MG tablet TAKE 1/2 PILL IN THE MORNING. TAKE 1 FULL PILL AT BEDTIME.     clopidogrel (PLAVIX) 75 mg tablet TAKE ONE (1) TABLET BY MOUTH DAILY     cyanocobalamin 1,000 mcg/mL injection Inject 1,000 mcg into the shoulder, thigh, or buttocks every 3 (three) months.            cyanocobalamin, vitamin B-12, (VITAMIN B-12) 1,000 mcg Subl Place 1 tablet (1,000 mcg total) under the tongue daily. (Patient taking differently: Place 500 mcg under the tongue daily.       )      "INJECT EASE LANCETS 30 gauge Misc Use daily as directed (4-6 times daily)     insulin aspart U-100 (NOVOLOG) 100 unit/mL injection Inject under the skin 3 (three) times a day before meals. <140                           0U  141-180                      1U  181-220                      2U  221-260                      4U  261-300                      6U  301-350                      8U  351-400                     12U  >400                          Call provider     metoprolol succinate (TOPROL-XL) 25 MG Take 1 tablet (25 mg total) by mouth daily.     ONETOUCH ULTRA BLUE TEST STRIP strips USE AS DIRECTED 6 TIMES PER DAY     psyllium husk (DAILY FIBER ORAL) Take 1 tablet by mouth 2 (two) times a day.            sertraline (ZOLOFT) 50 MG tablet Take 50 mg by mouth daily.     SURE COMFORT PEN NEEDLE 31 gauge x 3/16\" Ndle Use one needle each night.       Labs:  Results for orders placed or performed in visit on 10/28/19   Basic Metabolic Panel   Result Value Ref Range    Sodium 135 (L) 136 - 145 mmol/L    Potassium 4.2 3.5 - 5.0 mmol/L    Chloride 102 98 - 107 mmol/L    CO2 24 22 - 31 mmol/L    Anion Gap, Calculation 9 5 - 18 mmol/L    Glucose 211 (H) 70 - 125 mg/dL    Calcium 8.8 8.5 - 10.5 mg/dL    BUN 47 (H) 8 - 28 mg/dL    Creatinine 2.32 (H) 0.70 - 1.30 mg/dL    GFR MDRD Af Amer 33 (L) >60 mL/min/1.73m2    GFR MDRD Non Af Amer 27 (L) >60 mL/min/1.73m2     Lab Results   Component Value Date    WBC 9.7 10/29/2019    HGB 10.0 (L) 10/29/2019    HCT 32.6 (L) 10/29/2019     (H) 10/29/2019     (H) 10/29/2019     Lab Results   Component Value Date    HGBA1C 7.6 (H) 10/28/2019     Lab Results   Component Value Date    TSH 2.42 10/28/2019       Vitamin D, Total (25-Hydroxy)   Date Value Ref Range Status   10/28/2019 14.0 (L) 30.0 - 80.0 ng/mL Final       Vitamin B-12   Date Value Ref Range Status   10/28/2019 >2,000 (H) 213 - 816 pg/mL Final     Mag 1.9    Assessment/Plan:    UTI for Klebsiella pneumonia: " Initially treated with Rocephin, discharged on Keflex 250 mg twice daily, end 10/25    Chronic kidney disease stage IV: Lasix discontinued, f/u with nephrology outpatient on 11/15.  Last creatinine 2.42 with a GFR of 26 on 10/23/2019. Will dc metformin and glimepiride today. He was also given lasix x10d as a medication error    Hypertension: Continue clonidine 0.15 mg in a.m. and 0.3 mg at bedtime, metoprolol succinate 25 mg daily and amlodipine 5 mg (weaned from 10mg in hospital).  SBP <150    History of stroke in 7/2015 with residual left-sided hemiplegia: Continue Plavix 75 mg daily and atorvastatin 20 mg daily    Vitamin B12 deficiency: decrease cyanocobalamin to 500MCG daily and 1000 MCG IM injection every 90 days    Controlled type 2 diabetes: per renal fx will dc metformin and glimepiride. Previously started NovoLog sliding scale, recheck A1c was 7.6    Depression: Continue sertraline 50 mg daily    Dysphasia: Speech to evaluate and treat, no change to CSC diet, regular consistency    Constipation: Continue psyllium husk 1 tab twice daily    Pain control: Continue Tylenol 650 mg every 4 hours as needed, today start voltaren gel 1% 4gm three times a day to knees    Disposition: Plans to return home, CPT 4.6    MEDICAL EQUIPMENT NEEDS:  wheelchair    DISCHARGE PLAN/FACE TO FACE:  I certify that services are/were furnished while this patient was under the care of a physician and that a physician or an allowed non-physician practitioner (NPP), had a face-to-face encounter that meets the physician face-to-face encounter requirements. The encounter was in whole, or in part, related to the primary reason for home health. The patient is confined to his/her home and needs intermittent skilled nursing, physical therapy, speech-language pathology, or the continued need for occupational therapy. A plan of care has been established by a physician and is periodically reviewed by a physician.  Date of Face-to-Face  Encounter: 10/30/19    I certify that, based on my findings, the following services are medically necessary home health services:     Patient is 67.5 inches tall and weighs 179 pounds.  The patient has had acute change of condition that necessitates a mobility device that cannot be resolved by the use of a fitted cane or walker.  Patient has multiple diagnoses that limit functional mobility including M62.81 Muscle weakness, R I 8 9.0 lymphedema, 286.7 3 history of TIA, and arthritis of her knees.  Patient requires a standard wheelchair, seat cushion and bilateral lower extremity foot rest.  The patient has a mobility limitation that prevents him from accomplishing an MR ADLs such as feeding, dressing, grooming and bathing.  This places the patient at a reasonably determined heightened risk of morbidity or mortality secondary to attempts to perform an MR ADL.  Patient's home provides adequate access between rooms, maneuvering space and surfaces for the use of manual wheelchair that is provided.  The use of manual wheelchair will significantly improve the patient's ability to participate in MR ADLs, as he has not expressed an unwillingness to use, and he will use it on a regular basis in the home.  The patient has sufficient upper extremity function and other physical and mental capabilities needed to safely propel the wheelchair that is provided.  He also has caregiver, wife and family who are available and willing to provide assistance with a wheelchair.  Lifetime use.      Electronically signed by: Gee Rincon NP

## 2021-06-02 NOTE — TELEPHONE ENCOUNTER
Pt's wife is calling in about prescription refill that was ordered for her  on 10/11/2019. Insurance says it is too soon to fill. Prescription pharmacy has says 5mg per day, but there was another order on file for him to take 10 mg per day. Wife reports pt is taking 10 mg per day, these are 5 mg tablets ordered, so this is why he is already out. Please clarify correct script and dosage with Pharmacy, so she can  today.   Pt was out of medication yesterday.   Please call [facility] at [phone]    Provider please advise.    Petey Anglin, MADD[facility] Triage/Medication Refill

## 2021-06-03 ENCOUNTER — COMMUNICATION - HEALTHEAST (OUTPATIENT)
Dept: INTERNAL MEDICINE | Facility: CLINIC | Age: 81
End: 2021-06-03

## 2021-06-03 VITALS
WEIGHT: 179 LBS | TEMPERATURE: 97 F | SYSTOLIC BLOOD PRESSURE: 166 MMHG | DIASTOLIC BLOOD PRESSURE: 74 MMHG | OXYGEN SATURATION: 91 % | RESPIRATION RATE: 16 BRPM | HEART RATE: 93 BPM | BODY MASS INDEX: 27.62 KG/M2

## 2021-06-03 VITALS
TEMPERATURE: 98 F | WEIGHT: 170 LBS | BODY MASS INDEX: 26.23 KG/M2 | HEART RATE: 89 BPM | DIASTOLIC BLOOD PRESSURE: 76 MMHG | SYSTOLIC BLOOD PRESSURE: 120 MMHG | OXYGEN SATURATION: 99 % | RESPIRATION RATE: 20 BRPM

## 2021-06-03 VITALS
DIASTOLIC BLOOD PRESSURE: 72 MMHG | HEART RATE: 80 BPM | SYSTOLIC BLOOD PRESSURE: 142 MMHG | BODY MASS INDEX: 25.6 KG/M2 | WEIGHT: 165.9 LBS

## 2021-06-03 VITALS — WEIGHT: 174.8 LBS | BODY MASS INDEX: 27.38 KG/M2

## 2021-06-03 VITALS
DIASTOLIC BLOOD PRESSURE: 74 MMHG | BODY MASS INDEX: 27.57 KG/M2 | SYSTOLIC BLOOD PRESSURE: 154 MMHG | WEIGHT: 176 LBS | HEART RATE: 84 BPM

## 2021-06-03 VITALS — BODY MASS INDEX: 25.46 KG/M2 | WEIGHT: 165 LBS

## 2021-06-03 VITALS — BODY MASS INDEX: 27.38 KG/M2 | WEIGHT: 174.8 LBS

## 2021-06-03 VITALS — WEIGHT: 175.9 LBS | BODY MASS INDEX: 27.55 KG/M2

## 2021-06-03 NOTE — PATIENT INSTRUCTIONS - HE
Increase Lantus to 10 units nightly.    If fasting sugars in the morning are less than 100, call the clinic for possible adjustment.    No changes in glimepiride for now.    Follow-up with diabetes educator in December.  Make sure that you have clear recordings of your blood sugars, per her instructions.  Make sure that you are checking your blood sugar in the morning while fasting and 2 hours after eating meals.    Follow-up with Dr. Sanz in January.

## 2021-06-03 NOTE — PROGRESS NOTES
Clinic Note    Assessment:     Assessment and Plan:    1. Type 2 diabetes mellitus (H)  Tolerating his Lantus.  No hypoglycemia.  His fasting blood sugars in the morning of been in the 150s.  I will have him increase his Lantus from 8 units to 10 units nightly.  He will follow-up with the diabetes educator next month.    2. Hypertension  He had hydralazine 25 mg 4 times daily added by his nephrologist.  On medication reconciliation, it was found that he was actually taking furosemide 40 mg twice daily instead of once daily as directed by Dr. Sanz.  I will have him hold steady on his medications for now and recheck BMP.  - Basic Metabolic Panel       Patient Instructions   Increase Lantus to 10 units nightly.    If fasting sugars in the morning are less than 100, call the clinic for possible adjustment.    No changes in glimepiride for now.    Follow-up with diabetes educator in December.  Make sure that you have clear recordings of your blood sugars, per her instructions.  Make sure that you are checking your blood sugar in the morning while fasting and 2 hours after eating meals.    Follow-up with Dr. Sanz in January.        Return in about 2 months (around 1/18/2020).         Subjective:      Patient comes to clinic today for follow-up of his type 2 diabetes.    Patient last saw his PCP, Dr. Telly Torre on 11/7.  That was for hospital/TCU follow-up.    He had progressive worsening kidney function over the past year.  In the hospital, he was discontinued from metformin.  He had significant changes to his blood pressure medications.  His amlodipine was reduced from 10 mg/day down to 5 mg.  His furosemide at 40 mg twice per day was held.    He was told to restart the furosemide at 40 mg once per day and told to go back on the 10 mg amlodipine dose.    Today, his blood pressure is 140/50.  On medication reconciliation, it was realized that he is actually taking furosemide 40 mg twice daily.  Other medications are  the same.    He was recently started on Lantus insulin 8 units nightly by the diabetes educator.  On glimepiride 4 mg with lunch and supper.  He brings a log of blood sugars with him to his appointment today.  Fasting blood sugars in the morning are in the 150s.  Some of his later measurements will be closer to the 300s, depending on what he eats.    Unfortunately, his blood sugar log is quite messy and difficult to extract data from.  No dates or times listed.    He denies any hypoglycemia.    He recently saw nephrology.  Was started on hydralazine 25 mg 4 times daily.  Has follow-up with them next month.    The following portions of the patient's history were reviewed and updated as appropriate: Allergies, medications, problems, Prior note   Review of Systems:    Review is otherwise negative except for what is mentioned above.     Social Hx:    Social History     Tobacco Use   Smoking Status Former Smoker     Last attempt to quit: 1995     Years since quittin.2   Smokeless Tobacco Never Used         Objective:     Vitals:    19 1402   BP: 140/50   Pulse: 64       Exam:    General: No apparent distress. Calm. Alert and Oriented X3. Pt behavior is appropriate.  Patient is in a wheelchair, accompanied by wife.  Chest/Lungs: Normal chest wall, clear to auscultation, normal respiratory effort and rate.   Heart/Pulses: Regular rate and rhythm, strong and equal radial pulses, no murmurs. Capillary refill <2 seconds. No edema.       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  0     amLODIPine (NORVASC) 10 MG tablet Take 1 tablet (10 mg total) by mouth daily.       atorvastatin (LIPITOR) 20 MG tablet Take 1 tablet (20 mg total) by mouth daily. 90 tablet 3     blood glucose test strips Check blood sugar 3 times per day. Accu-Chek Guide test striips. 100 strip 3     cholecalciferol, vitamin D3, (VITAMIN D3) 1,000 unit capsule Take 2 capsules (2,000 Units total) by mouth daily. 180 capsule 0     cloNIDine HCl (CATAPRES) 0.3 MG tablet TAKE 1/2 PILL IN THE MORNING. TAKE 1 FULL PILL AT BEDTIME. 135 tablet 2     clopidogrel (PLAVIX) 75 mg tablet TAKE ONE (1) TABLET BY MOUTH DAILY 90 tablet 1     cyanocobalamin 1,000 mcg/mL injection Inject 1,000 mcg into the shoulder, thigh, or buttocks every 3 (three) months.              cyanocobalamin, vitamin B-12, (VITAMIN B-12) 1,000 mcg Subl Place 1 tablet (1,000 mcg total) under the tongue daily. (Patient taking differently: Place 500 mcg under the tongue daily.       ) 90 tablet 3     diclofenac sodium (VOLTAREN) 1 % Gel Apply 4 g topically 3 (three) times a day. Apply to knees 100 g 0     furosemide (LASIX) 40 MG tablet Take 1 tablet (40 mg total) by mouth daily.  0     generic lancets Fastclix lancets. Check blood sugar 3 times per day. 102 each 3     glimepiride (AMARYL) 4 MG tablet Take 4 mg by mouth twice a day with lunch and supper.       hydrALAZINE (APRESOLINE) 25 MG tablet Take 25 mg by mouth 4 (four) times a day.       insulin glargine (LANTUS SOLOSTAR U-100 INSULIN) 100 unit/mL (3 mL) pen Take 10 units daily + use 2 unit prime with each dose for a total of 12 units/day 5 adj dose pen 0     metoprolol succinate (TOPROL-XL) 25 MG Take 1 tablet (25 mg total) by mouth daily. 30 tablet 0     ONETOUCH ULTRA2 METER [facility] AS DIRECTED 1 each 0     pen needle, diabetic (BD ULTRA-FINE JONO PEN  "NEEDLE) 32 gauge x 5/32\" Ndle Use one needle per day to inject insulin. 100 each 4     psyllium husk (DAILY FIBER ORAL) Take 1 tablet by mouth 2 (two) times a day.              sertraline (ZOLOFT) 50 MG tablet Take 50 mg by mouth daily.       Current Facility-Administered Medications   Medication Dose Route Frequency Provider Last Rate Last Dose     cyanocobalamin injection 1,000 mcg  1,000 mcg Intramuscular Q3 Months Telly Torre MD   1,000 mcg at 11/07/19 1405       I spent 30 minutes with patient face to face, of which >50% was counseling regarding the above plan       Mir De La Cruz (Rob), NATALI    11/18/2019           "

## 2021-06-03 NOTE — PROGRESS NOTES
[facility] Admission note      Patient: Suman Nagy  MRN: [id number]  Date of Service: 11/4/2019      [facility] [[id number]]  Reason for Visit     Chief Complaint   Patient presents with     Review Of Multiple Medical Conditions       Code Status     FULL CODE    Assessment     -Acute anemia with a drop in hemoglobin from 10.6-8.6 no evidence of GI bleed so far  R/C improved to 10  -Acute   metabolic/ Toxic encephalopathy with persistent confusion noted in the TCU  -Cognitive impairment baseline CPT is 4.6  -History of chronic kidney disease stage IV  DC creatinine is around 2.3   -History of diabetes type 2 poorly controlled in spite of patient being on a max dose of metformin  -History of CVA with resultant left-sided weakness in 2015.  Continues on statin and Plavix.  -Bilateral lower extremity lymphedema  -Underlying history of bladder cancer status post cystoscopy recently  -Underlying history of rectal cancer with repeat imaging including a CT revealing no malignancy  -Profound generalized weakness with patient reporting difficulty with ambulation    Plan     Patient has been admitted to the TCU.  Patient examined the presence of his wife and was quite agitated and upset today  UTI concerns are minimal and he is currently done with his Keflex.  Patient denies any dysuria.  Patient's blood sugars have been very elevated with postprandial elevations he is on max dose of 2 agents.  He is on metformin in spite of renal impairment.  He is also on glimepiride  He is quite adamant and does not want to go home on any insulin regimen.  Recheck hemoglobin is stabilized at 10.  He will be discharging at a wheelchair level and as per his request he is requesting that he be discharged today.  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  He feels he is reached his max potential  even though he still wheelchair-bound and will discharge at wheelchair level  CPT is 4.6.  Care plan reviewed both with the patient and his wife present at his bedside.  They will discuss this with the .  Family is also adamant that they do not want to take any therapy home as this is the request of the patient  Cognitive status was reviewed with wife who is aware of those limitations and feels that is baseline for him and not new.  In addition I did talk to the patient and his wife regarding his renal impairment and patient told me he plans to follow-up with his primary care and a nephrologist to discuss these issues.  I have again asked him to discuss with  and set up a discharge plan going.  Total time spent 35 minutes more than 20 minutes spent face-to-face talking to the patient and his wife reviewing labs as well as reviewing discharge planning and patient's concern advice concerns about going home including services and cognitive issues.  Patient has been discussed about diabetic management which is poor but is adamant he will not want anything changed till he sees his regular doctor    History     Patient is a very pleasant 79 y.o. male who is admitted to TCU  Patient was admitted with generalized weakness and work-up revealed that he had UTI.  Cultures grew Klebsiella.  He is currently done with his antibiotics and denies any dysuria.  He remains profoundly debilitated due to which his fall risk is high and his balance is poor.  He will be discharging at a wheelchair level.  Cognitively he is impaired CPT is 4.6 his wife present at bedside is aware and understand that this is baseline for him.  He had significant anemia work-up so far has been negative suspected to be anemia of chronic kidney disease.  Recheck hemoglobin has stabilized at 10 with no new concerns.  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)     Anxiety     History of stroke     Medication monitoring encounter     History of bladder cancer     History of rectal cancer     Carotid artery stenosis, asymptomatic, left     Rectal cancer (H)     Hypertension     B12 deficiency     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 He has never used smokeless tobacco. He reports that he does not drink alcohol or use drugs.    Family History     Reviewed, and family history includes COPD in his father.    Medication List   Post Discharge Medication Reconciliation Status: discharge medications reconciled and changed, per note/orders (see AVS)   Current Outpatient Medications on File Prior to Visit   Medication Sig Dispense Refill     acetaminophen (TYLENOL) 325 MG tablet Take 2 tablets (650 mg total) by mouth every 4 (four) hours as needed.  0     amLODIPine (NORVASC) 5 MG tablet Take 1 tablet (5 mg total) by mouth daily. 30 tablet 0     atorvastatin (LIPITOR) 20 MG tablet Take 1 tablet (20 mg total) by mouth daily. 90 tablet 3     cloNIDine HCl (CATAPRES) 0.3 MG tablet TAKE 1/2 PILL IN THE MORNING. TAKE 1 FULL PILL AT BEDTIME. 135 tablet 2     clopidogrel (PLAVIX) 75 mg tablet TAKE ONE (1) TABLET BY MOUTH DAILY 90 tablet 1      "cyanocobalamin 1,000 mcg/mL injection Inject 1,000 mcg into the shoulder, thigh, or buttocks every 3 (three) months.              cyanocobalamin, vitamin B-12, (VITAMIN B-12) 1,000 mcg Subl Place 1 tablet (1,000 mcg total) under the tongue daily. (Patient taking differently: Place 500 mcg under the tongue daily.       ) 90 tablet 3     diclofenac sodium (VOLTAREN) 1 % Gel Apply 4 g topically 3 (three) times a day. Apply to knees       INJECT EASE LANCETS 30 gauge Misc Use daily as directed (4-6 times daily) 600 each 3     insulin aspart U-100 (NOVOLOG) 100 unit/mL injection Inject under the skin 3 (three) times a day before meals. <140                           0U  141-180                      1U  181-220                      2U  221-260                      4U  261-300                      6U  301-350                      8U  351-400                     12U  >400                          Call provider       metoprolol succinate (TOPROL-XL) 25 MG Take 1 tablet (25 mg total) by mouth daily. 30 tablet 0     ONETOUCH ULTRA BLUE TEST STRIP strips USE AS DIRECTED 6 TIMES PER  strip 3     psyllium husk (DAILY FIBER ORAL) Take 1 tablet by mouth 2 (two) times a day.              sertraline (ZOLOFT) 50 MG tablet Take 50 mg by mouth daily.       SURE COMFORT PEN NEEDLE 31 gauge x 3/16\" Ndle Use one needle each night. 100 each 2     No current facility-administered medications on file prior to visit.        Allergies     Allergies   Allergen Reactions     Cefazolin      \"felt very hot\"     Pravastatin      Increased peripheral neuropathy     Simvastatin      Increased peripheral neuropathy     Thiazides      Other: Gout         Review of Systems   A comprehensive review of 14 systems was done. Pertinent findings noted here and in history of present illness. All the rest negative.  Constitutional: Negative.  Negative for fever, chills, he has activity change, appetite change and fatigue.   HENT: Negative for congestion " and facial swelling.    Eyes: Negative for photophobia, redness and visual disturbance.   Respiratory: Negative for cough and chest tightness.    Cardiovascular: Negative for chest pain, palpitations and leg swelling.   Gastrointestinal: Negative for nausea, diarrhea, constipation, blood in stool and abdominal distention.   Genitourinary: Negative.    Musculoskeletal: Negative.  Reporting significant weakness especially in the right leg today  Skin: Negative.    Neurological: Negative for dizziness, tremors, syncope, weakness, light-headedness and headaches.   Hematological: Does not bruise/bleed easily.   Psychiatric/Behavioral: Negative.  Recalls remain somewhat impaired      Physical Exam     Recent Vitals 10/30/2019   Height -   Weight 170 lbs   BSA (m2) 1.92 m2   /76   Pulse 89   Temp 98   Temp src -   SpO2 99   Some recent data might be hidden       Constitutional: Oriented to person, place, and time and appears well-developed.  Frail and weak  HEENT:  Normocephalic and atraumatic.  Eyes: Conjunctivae and EOM are normal. Pupils are equal, round, and reactive to light. No discharge.  No scleral icterus. Nose normal. Mouth/Throat: Oropharynx is clear and moist. No oropharyngeal exudate.    NECK: Normal range of motion. Neck supple. No JVD present. No tracheal deviation present. No thyromegaly present.   CARDIOVASCULAR: Normal rate, regular rhythm and intact distal pulses.  Exam reveals no gallop and no friction rub.  Systolic murmur present.  PULMONARY: Effort normal and breath sounds normal. No respiratory distress.No Wheezing or rales.  ABDOMEN: Soft. Bowel sounds are normal. No distension and no mass.  There is no tenderness. There is no rebound and no guarding. No HSM.  MUSCULOSKELETAL: Normal range of motion.  Has chronic 1+ bilateral lower extremity edema and no tenderness. Mild kyphosis, no tenderness.  Reporting right lower extremity weakness  LYMPH NODES: Has no cervical, supraclavicular, axillary  and groin adenopathy.   NEUROLOGICAL: Alert and oriented to person, place, and time. No cranial nerve deficit.  Normal muscle tone. Coordination normal.  Has baseline left-sided weakness  GENITOURINARY: Deferred exam.  SKIN: Skin is warm and dry. No rash noted. No erythema. No pallor.   EXTREMITIES: No cyanosis, no clubbing, he has chronic 1+ bilateral lower extremity edema. No Deformity.  PSYCHIATRIC: Normal mood, affect and behavior.      Lab Results     Recent Results (from the past 240 hour(s))   Occult Blood, Fecal    Collection Time: 10/27/19  6:30 PM   Result Value Ref Range    Occult Blood, Stool #1 Negative Negative   Basic Metabolic Panel    Collection Time: 10/28/19  9:25 AM   Result Value Ref Range    Sodium 135 (L) 136 - 145 mmol/L    Potassium 4.2 3.5 - 5.0 mmol/L    Chloride 102 98 - 107 mmol/L    CO2 24 22 - 31 mmol/L    Anion Gap, Calculation 9 5 - 18 mmol/L    Glucose 211 (H) 70 - 125 mg/dL    Calcium 8.8 8.5 - 10.5 mg/dL    BUN 47 (H) 8 - 28 mg/dL    Creatinine 2.32 (H) 0.70 - 1.30 mg/dL    GFR MDRD Af Amer 33 (L) >60 mL/min/1.73m2    GFR MDRD Non Af Amer 27 (L) >60 mL/min/1.73m2   Glycosylated Hemoglobin A1C    Collection Time: 10/28/19  9:25 AM   Result Value Ref Range    Hemoglobin A1c 7.6 (H) 4.2 - 6.1 %   Thyroid Stimulating Hormone (TSH)    Collection Time: 10/28/19  9:25 AM   Result Value Ref Range    TSH 2.42 0.30 - 5.00 uIU/mL   Vitamin B12    Collection Time: 10/28/19  9:25 AM   Result Value Ref Range    Vitamin B-12 >2,000 (H) 213 - 816 pg/mL   Vitamin D, Total (25-Hydroxy)    Collection Time: 10/28/19  9:25 AM   Result Value Ref Range    Vitamin D, Total (25-Hydroxy) 14.0 (L) 30.0 - 80.0 ng/mL   HM2(CBC w/o Differential)    Collection Time: 10/28/19  9:25 AM   Result Value Ref Range    WBC 8.2 4.0 - 11.0 thou/uL    RBC 2.91 (L) 4.40 - 6.20 mill/uL    Hemoglobin 9.1 (L) 14.0 - 18.0 g/dL    Hematocrit 29.3 (L) 40.0 - 54.0 %     (H) 80 - 100 fL    MCH 31.3 27.0 - 34.0 pg    MCHC  31.1 (L) 32.0 - 36.0 g/dL    RDW 13.6 11.0 - 14.5 %    Platelets 403 140 - 440 thou/uL    MPV 9.4 8.5 - 12.5 fL   Ferritin    Collection Time: 10/29/19 10:08 AM   Result Value Ref Range    Ferritin 407 (H) 27 - 300 ng/mL   Iron and Transferrin Iron Binding Capacity    Collection Time: 10/29/19 10:08 AM   Result Value Ref Range    Iron 46 42 - 175 ug/dL    Transferrin 159 (L) 212 - 360 mg/dL    Transferrin Saturation, Calculated 23 20 - 50 %    Transferrin IBC, Calculated 199 (L) 313 - 563 ug/dL   Morphology,Smear Review (MORP)    Collection Time: 10/29/19 10:08 AM   Result Value Ref Range    Pathology, Smear Review See Separate Pathology Report (!) (none)    WBC 9.7 4.0 - 11.0 thou/uL    RBC 3.21 (L) 4.40 - 6.20 mill/uL    Hemoglobin 10.0 (L) 14.0 - 18.0 g/dL    Hematocrit 32.6 (L) 40.0 - 54.0 %     (H) 80 - 100 fL    MCH 31.2 27.0 - 34.0 pg    MCHC 30.7 (L) 32.0 - 36.0 g/dL    RDW 13.6 11.0 - 14.5 %    Platelets 491 (H) 140 - 440 thou/uL    MPV 9.4 8.5 - 12.5 fL   Manual Differential    Collection Time: 10/29/19 10:08 AM   Result Value Ref Range    Total Neutrophils % 84 (H) 50 - 70 %    Lymphocytes % 9 (L) 20 - 40 %    Monocytes % 3 2 - 10 %    Eosinophils %  2 0 - 6 %    Basophils % 1 0 - 2 %    Metamyelocytes % 1 <=1 %    Myelocytes % 1 <=1 %    Total Neutrophils Absolute 8.1 (H) 2.0 - 7.7 thou/ul    Lymphocytes Absolute 0.9 0.8 - 4.4 thou/uL    Monocytes Absolute 0.2 0.0 - 0.9 thou/uL    Eosinophils Absolute 0.2 0.0 - 0.4 thou/uL    Basophils Absolute 0.1 0.0 - 0.2 thou/uL    Metamyelocytes Absolute 0.1 <=0.1 thou/uL    Myelocytes Absolute 0.1 <=0.1 thou/uL    Reactive Lymphocytes 1+ (!) Negative    Toxic Granulation 1+ (!) Negative    Dohle Bodies 1+ (!) Negative    Platelet Estimate Increased (!) Normal    Ovalocytes 1+ (!) Negative    Polychromasia 1+ (!) Negative   Peripheral Blood Smear, Path Review    Collection Time: 10/29/19 10:08 AM   Result Value Ref Range    Case Report       Peripheral Blood  Morphology Report                Case: [id number]                                   Authorizing Provider:  Abbie Kessler MBBS         Collected:           10/29/2019 1008              Ordering Location:      [facility] Received:            10/30/2019 0948              Pathologist:           Elias Beltran MD                                                        Specimen:    Peripheral Blood                                                                           Final Diagnosis       PERIPHERAL BLOOD:     -   MILD NEUTROPHILIA     -   MACROCYTIC ANEMIA     -   MILD THROMBOCYTOSIS    Comment       The causes of reactive neutrophilia are numerous and range from infection to metabolic defects. Bacterial infections are the predominant cause of neutrophilia; other causes include therapeutic or endogenous drugs/hormones, acute stress, acute tissue necrosis and other infectious/inflammatory processes, including collagen vascular disorders and autoimmune disease. Absolute neutrophilia is seen in a variety of hematopoietic neoplasms, especially myeloproliferative disorders. Recommend clinical correlation; consider repeat CBC after resolution of any possible infection. If a diagnosis of CML is suspected, evaluation of the peripheral blood for the presence of the [place] chromosome and/or the BCR/ABL fusion gene is suggested.     Macrocytosis can be seen in liver disease, hypothyroidism, refractory anemias, vitamin B12 and folate deficiency, and drug/alcohol use, among other etiologies.     Thrombocytosis can be a reaction to hemorrhage, iron deficiency, post splenectomy, chronic  inflammatory states, malignancies, or drugs (vincristine, high-dose erythropoietin) or due to an intrinsic stem cell defect (e.g., essential thrombocythemia). Please correlate with clinical findings.     If there is clinical suspicion for a myeloproliferative neoplasm, JAK2 (Janus Kinase 2 gene), MPL (thrombopoietin receptor  gene), and CALR (calreticulin) mutation analysis can be performed on a subsequent peripheral blood specimen. The JAK2 V617F is present in 95% to 98% of polycythemia vera, 50% to 60% of primary myelofibrosis (PMF), and 50% to 60% of essential thrombocythemia (ET). It has also been described infrequently in other myeloid neoplasms, including chronic myelomonocytic leukemia and myelodysplastic syndrome. This mutation is not seen in chronic myelogenous leukemia (CML) or in reactive conditions with elevated blood counts. Detection of the JAK2 V617F is useful to help establish the diagnosis of MPN. However, a negative JAK2 V617F result does not indicate absence of an  MPN. Other important molecular markers in [id number] MPN include CALR exon 9 mutation (20%-30% of PMF and ET) and MPL exon 10 mutation (5%-10% of PMF and 3%-5% of ET). Mutations in JAK2, CALR, and MPL are essentially mutually exclusive.    Clinical Information D64.9     Peripheral Smear       Red blood cells are decreased in number and overall macrocytic and normochromic. Anisopoikilocytosis, polychromasia, and rouleaux formation are not prominent.    The white blood cell count and differential appear as reported on the CBC. Leukocytes are high normal in number and demonstrate an absolute neutrophilia. No blasts or dysplastic changes are identified.    Platelets are increased in number but overall normal in appearance.    Charges CPT: 93158  ICD-10: D64.9               KATIE Florez

## 2021-06-03 NOTE — TELEPHONE ENCOUNTER
"RN cannot approve Refill Request    RN can NOT refill this medication med is not covered by policy/route to provider. Last office visit: 11/7/2019 Telly Torre MD Last Physical: 7/10/2019 Last MTM visit: Visit date not found Last visit same specialty: 11/7/2019 Telly Torre MD.  Next visit within 3 mo: Visit date not found  Next physical within 3 mo: Visit date not found      Aleja Smith, [facility] Triage/Med Refill 11/8/2019    Requested Prescriptions   Pending Prescriptions Disp Refills     EASY TOUCH INSULIN SYRINGE 0.3 mL 30 gauge x 1/2\" Syrg [Pharmacy Med Name: INSULIN SYRG MIS 0.3/30G] 100 each 0     Sig: USE AS DIRECTED FOR SLIDING SCALE INSULIN       There is no refill protocol information for this order           "

## 2021-06-03 NOTE — PATIENT INSTRUCTIONS - HE
Continue on the higher dose of amlodipine 10 mg a day.    Continue on furosemide, but only take 40 mg once a day.    Take the full metoprolol, or 25 mg once a day.    You will not be able to take the metformin anymore with your elevated creatinine.  Discontinue metformin.    Continue the glimepiride 4 mg with lunch and supper.    You will likely need to start insulin.  Record your blood sugars 3 times a day before breakfast, lunch and dinner, bring those records into your next visit.    Check blood pressure daily, and bring those records with to next visit.    Meet with the diabetic educator to discuss insulin.    See the kidney doctor later this month as planned.    Follow-up with me on November 15 at 1 PM.

## 2021-06-03 NOTE — TELEPHONE ENCOUNTER
Refill Approved    Rx renewed per Medication Renewal Policy. Medication was last renewed on 11/7/19.    Cammy Monroy, [facility] Triage/Med Refill 11/13/2019     Requested Prescriptions   Pending Prescriptions Disp Refills     ONETOUCH ULTRA2 METER Misc [Pharmacy Med Name: ONETOUCH ULTRA 2 METER KIT] 1 each 0     Sig: AS DIRECTED       Diabetic Supplies Refill Protocol Passed - 11/13/2019  2:04 PM        Passed - Visit with PCP or prescribing provider visit in last 6 months     Last office visit with prescriber/PCP: 11/7/2019 Telly Torre MD OR same dept: 11/7/2019 Telly Torre MD OR same specialty: 11/7/2019 Telly Torre MD  Last physical: 7/10/2019 Last MTM visit: Visit date not found   Next visit within 3 mo: Visit date not found  Next physical within 3 mo: Visit date not found  Prescriber OR PCP: Telly Torre MD  Last diagnosis associated with med order: There are no diagnoses linked to this encounter.  If protocol passes may refill for 12 months if within 3 months of last provider visit (or a total of 15 months).             Passed - A1C in last 6 months     Hemoglobin A1c   Date Value Ref Range Status   10/28/2019 7.6 (H) 4.2 - 6.1 % Final

## 2021-06-03 NOTE — PROGRESS NOTES
[facility]    Facility:   [facility] [[id number]]   Code Status: POLST AVAILABLE  PCP: Telly Torre MD   Phone: [phone]   Fax: [phone]      CHIEF COMPLAINT/REASON FOR VISIT:  Chief Complaint   Patient presents with     Discharge Summary       HISTORY COURSE:    Suman is a 79 y.o. male who is a recent transfer from [facility] admission on 10/52/19 and discharged on 10/19/2019. 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  He was found to have a UTI with Klebsiella pneumonia started on Rocephin,, resumed on Keflex to complete 2-week course.  Per his CKD stage IV, creatinine was 2.37, was on Lasix twice a day for chronic leg edema since August 2019, which is on hold.  We will follow-up with neurology as outpatient.  Norvasc dose is decreased due to chronic leg edema.  Continue lymphedema care.  At discharge he was resumed on amlodipine 5 mg daily, clonidine 0.1 mg 3 times a day, metoprolol 25 mg daily for hypertension.  Per his history of CVA he resumed on statin and Plavix.  Per diabetes he resumed on diabetic diet, glyburide, metformin.  Also has history of bladder, colon, rectal cancer, recent CT showed no malignancy.  He is also seen urology outpatient with a cystoscopy couple weeks ago which was also negative.  He was socially discharged to TCU for rehab.    Today Mr. Nagy is being evaluated for a review of multiple medical conditions prior to discharge from TCU.  He denied any concerns at this visit and is looking forward to returning home.  His blood pressure has been stable on his current antihypertensive regimen with SBP 120s.  He reported that his urinary symptoms have resolved at this time.  The patient denied lightheadedness,  dizziness, breathing difficulty, chest pain, palpitations, constipation, urinary symptoms, numbness or tingling in extremities, and pain.  Nursing staff denied any new concerns for the patient at this time.    Past Medical History:   Diagnosis Date     Anemia      Bladder cancer (H)      Cancer (H)     Rectosigmoid     Diabetes mellitus (H)      Hyperkalemia      Hypertension      Seizure (H)     possible, one time occurence     Stroke (H)     multiple, residual left sided weakness, last CVA in July 2015 caused some speech deficit     Superficial phlebitis      Venous insufficiency of both lower extremities      Past Surgical History:   Procedure Laterality Date     COLON SURGERY      Colectomy Low Anterior Resection     EYE SURGERY      Cataract Extraction     LAPAROSCOPIC COLON RESECTION N/A 6/1/2017    Procedure: LAPAROSCOPIC ASSISTED COLECTOMY LOW ANTERIOR RESECTION AND DIVERTING LOOP ILEOSTOMY, PROCTOSOPY;  Surgeon: Tyson Parry MD;  Location: [facility];  Service:      NC CLOSE ENTEROSTOMY N/A 8/15/2017    Procedure: ILEOSTOMY CLOSURE LOOP ;  Surgeon: Tyson Parry MD;  Location: [facility];  Service: General     NC CYSTOURETHROSCOPY,FULGUR <0.5 CM LESN N/A 9/1/2015    Procedure: CYSTOSCOPY TRANSURETHRAL RESECTION BLADDER TUMOR;  Surgeon: Cleveland Nair MD;  Location: [facility];  Service: Urology     NC CYSTOURETHROSCOPY,FULGUR <0.5 CM LESN N/A 12/22/2015    Procedure: CYSTOSCOPY TRANSURETHRAL RESECTION BLADDER TUMOR AND BLADDER BIOPSIES;  Surgeon: Cleveland Nair MD;  Location: [facility];  Service: Urology     TURBT      X2     VASECTOMY        Social History     Socioeconomic History     Marital status:      Spouse name: None     Number of children: None     Years of education: None     Highest education level: None   Occupational History     None   Social Needs     Financial resource strain: None     Food insecurity:     Worry: None     Inability: None      Transportation needs:     Medical: None     Non-medical: None   Tobacco Use     Smoking status: Former Smoker     Last attempt to quit: 1995     Years since quittin.1     Smokeless tobacco: Never Used   Substance and Sexual Activity     Alcohol use: No     Drug use: No     Sexual activity: Not Currently     Partners: Female   Lifestyle     Physical activity:     Days per week: None     Minutes per session: None     Stress: None   Relationships     Social connections:     Talks on phone: None     Gets together: None     Attends Jewish service: None     Active member of club or organization: None     Attends meetings of clubs or organizations: None     Relationship status: None     Intimate partner violence:     Fear of current or ex partner: None     Emotionally abused: None     Physically abused: None     Forced sexual activity: None   Other Topics Concern     None   Social History Narrative     None         PHYSICAL EXAM:   Resp 15     General Appearance:    Alert, cooperative, no distress, appears stated age   Head:    Normocephalic, without obvious abnormality, atraumatic   Eyes:    PERRL, conjunctiva/corneas clear,  both eyes        Ears:    Normal  external ear canals, both ears   Nose:   Nares normal, septum midline, mucosa normal, no drainage    or sinus tenderness   Throat:   Lips, mucosa, and tongue normal; teeth and gums normal   Neck:   Supple, symmetrical, trachea midline, no adenopathy;        thyroid:  No enlargement/tenderness/nodules; no carotid    bruit or JVD   Back:     Symmetric, no curvature, ROM normal, no CVA tenderness   Lungs:     Clear to auscultation bilaterally, respirations unlabored   Chest wall:    No tenderness or deformity   Heart:    Regular rate and rhythm, S1 and S2 normal, no murmur, rub   or gallop   Abdomen:     Soft, non-tender, bowel sounds active all four quadrants,     no masses, no organomegaly   Extremities:   Extremities normal, atraumatic, no cyanosis  or edema   Pulses:   2+ and symmetric all extremities   Skin:   Skin color, texture, turgor normal, no rashes or lesions   Neurologic:   Oriented to person, place, time, and situation; exhibits logical thought processes; generalized weakness       MEDICATION LIST:  Current Outpatient Medications   Medication Sig     acetaminophen (TYLENOL) 325 MG tablet Take 2 tablets (650 mg total) by mouth every 4 (four) hours as needed.     amLODIPine (NORVASC) 5 MG tablet Take 1 tablet (5 mg total) by mouth daily.     atorvastatin (LIPITOR) 20 MG tablet Take 1 tablet (20 mg total) by mouth daily.     cloNIDine HCl (CATAPRES) 0.3 MG tablet TAKE 1/2 PILL IN THE MORNING. TAKE 1 FULL PILL AT BEDTIME.     clopidogrel (PLAVIX) 75 mg tablet TAKE ONE (1) TABLET BY MOUTH DAILY     cyanocobalamin 1,000 mcg/mL injection Inject 1,000 mcg into the shoulder, thigh, or buttocks every 3 (three) months.            cyanocobalamin, vitamin B-12, (VITAMIN B-12) 1,000 mcg Subl Place 1 tablet (1,000 mcg total) under the tongue daily. (Patient taking differently: Place 500 mcg under the tongue daily.       )     diclofenac sodium (VOLTAREN) 1 % Gel Apply 4 g topically 3 (three) times a day. Apply to knees     INJECT EASE LANCETS 30 gauge Misc Use daily as directed (4-6 times daily)     insulin aspart U-100 (NOVOLOG) 100 unit/mL injection Inject under the skin 3 (three) times a day before meals. <140                           0U  141-180                      1U  181-220                      2U  221-260                      4U  261-300                      6U  301-350                      8U  351-400                     12U  >400                          Call provider     metoprolol succinate (TOPROL-XL) 25 MG Take 1 tablet (25 mg total) by mouth daily.     ONETOUCH ULTRA BLUE TEST STRIP strips USE AS DIRECTED 6 TIMES PER DAY     psyllium husk (DAILY FIBER ORAL) Take 1 tablet by mouth 2 (two) times a day.            sertraline (ZOLOFT) 50 MG tablet Take  "50 mg by mouth daily.     SURE COMFORT PEN NEEDLE 31 gauge x 3/16\" Ndle Use one needle each night.       DISCHARGE DIAGNOSIS:    ICD-10-CM    1. Hypertension I10    2. Carotid artery stenosis, asymptomatic, left I65.22    3. 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 Slow transit constipation K59.01    5. B12 deficiency E53.8    6. Physical deconditioning R53.81        Physical deconditioning  -Discharge home from facility per therapy recommendations     Hypertension/CAD  -Encouraged cardiac diet, low sodium diet, exercise and stress reduction techniques.   -Emphasized importance of blood pressure control.   -Continue current medications as prescribed.   -Notify provider if pt's SBP<90 or >180 and DBP <50 or >100     DMII  Lab Results   Component Value Date    HGBA1C 7.6 (H) 10/28/2019   -Continue Novolog as prescribed  -Continue blood glucose checks four times a day AC/HS  -Notify provider with BG < 70 or > 450    Constipation  -Continue psyllium as prescribed  -Encouraged patient to increase fiber in diet, eat a lot of fruits and vegetables, increase water intake  -Exercise as much as possible  -Notify provider if patient has loose stools or no BM for >3 days    Vitamin B12 deficiency  Vitamin B-12   Date Value Ref Range Status   10/28/2019 >2,000 (H) 213 - 816 pg/mL Final   -Follow-up with PCP for further recommendations    Otherwise continue current care plan for all other chronic medical conditions, as they are stable. Encouraged patient to engage in healthy lifestyle behaviors such as engaging in social activities, exercising (PT/OT), eating well, and following care plan. Follow up for routine check-up, or sooner if needed. Will continue to monitor patient and work with nursing staff collaboratively to work toward positive patient outcomes.     MEDICAL EQUIPMENT NEEDS:  The patient has mobility limitation significantly impairing his ability to participate in mobility-related activities of " daily living, such as toileting, feeding, dressing, grooming, and bathing in customary locations in the home. The mobility limitation cannot be sufficiently and safely resolved by use of an appropriately fitted walker. The patient or caregiver is able to safely use a walker. The patient's functional mobility deficit can be sufficiently resolved by the use of a walker.  The patient has mobility limitations that impairs his ability to perform ADLs without use of a walker to be mobile in the home.      DISCHARGE PLAN/FACE TO FACE:  I certify that services are/were furnished while this patient was under the care of a physician and that a physician or an allowed non-physician practitioner (NPP), had a face-to-face encounter that meets the physician face-to-face encounter requirements. The encounter was in whole, or in part, related to the primary reason for home health. The patient is confined to his home and needs intermittent skilled nursing, physical therapy, speech-language pathology, or the continued need for occupational therapy. A plan of care has been established by a physician and is periodically reviewed by a physician.  Date of Face-to-Face Encounter: 11/5/19    I certify that, based on my findings, the following services are medically necessary home health services: home health aid for bathing and ADLs, skilled nursing (RN) for medication set-up and teaching, symptoms and disease process monitoring and education, PT for strengthening, balance, endurance and safety within the home, OT for strengthening, ADL needs, adaptive equipment and safety.     My clinical findings support the need for the above skilled services because: (Please write a brief narrative summary that describes what the RN, PT, SLP, or other services will be doing in the home. A list of diagnoses in this section does not meet the CMS requirements.) home health aid for bathing and ADLs, skilled nursing (RN) for medication set-up and teaching,  symptoms and disease process monitoring and education, PT for strengthening, balance, endurance and safety within the home, OT for strengthening, ADL needs, adaptive equipment and safety.     This patient is homebound because: (Please write a brief narrative summary describing the functional limitations as to why this patient is homebound and specifically what makes this patient homebound.) he is a frail elderly gentleman with multiple comorbidities and recent hospitalizations making it unsafe for him to go out into the community for services.     The patient is, or has been, under my care and I have initiated the establishment of the plan of care. This patient will be followed by a physician who will periodically review the plan of care.    Schedule follow up visit with primary care provider within 7 days to reestablish care.    Anticipated discharge date: 11/6/19    Electronically signed by: Kristi Trevizo CNP

## 2021-06-03 NOTE — PROGRESS NOTES
[facility] clinic Follow Up Note    Suman Nagy   79 y.o. male    Date of Visit: 11/7/2019    Chief Complaint   Patient presents with     Follow-up     Subjective  Luke is here with his wife, Jeni.  Patient is here for hospital follow-up and TCU follow-up.    Patient has a past history of stroke, diabetes, hypertension with worsening renal insufficiency.    He also has a history of stage III colon cancer resected in 2017.  August 2019 CT scan of the chest abdomen and pelvis was negative for recurrence.  He still has not had his follow-up colonoscopy which was planned for last August.    He did have cystoscopy in September for his bladder cancer follow-up.  TURBT in 2015.  Cystoscopy was negative and given a 6-month follow-up.    Patient has had progressive worsening kidney function over the past year.  May 2018 creatinine was 1.3.    October 28, 2019 creatinine 2.3.    Patient has diabetes type 2.  Hemoglobin A1c was 7.6 in October in the hospital.  Usually hemoglobin A1c has been 6.7% and well-controlled.    With his elevated creatinine, he was discontinued from metformin in the hospital last month.  His blood sugars are now running in the 200s.  He is on Amaryl 4 mg with lunch and supper.  Blood sugar was 210 this morning.    Patient was hospitalized in late October with confusion and a Klebsiella UTI.  Blood culture was 1/2+ for coag negative staph.  He was treated with ceftriaxone and then Keflex.  He is returned to baseline mental status.  No fevers or urinary symptoms now.    No diarrhea.    In the hospital he had significant changes to his medications.  He had lower extremity edema and lower blood pressure.  His amlodipine was reduced from 10 mg a day down to 5 mg a day.    His previous furosemide at 40 mg twice a day was held.    He continued on clonidine, one point it appeared to be 0.1 mg 3 times daily, but the wife had put him back on 1.5 mg in the morning and 3 mg in the  evening.    Metoprolol XL was increased from 12.5 mg daily up to 25 mg daily.    He currently does not have any significant lower extremity edema.    When he went home yesterday from the TCU, the wife went back to his previous dose of medications prior to hospitalization.    Patient was getting a sliding scale short acting insulin in the TCU but that was not continued when he went home.    I did review hospital work-up including a head CT scan from October 15 it was negative for bleed or fracture.  Chest x-ray at that time was clear.  Ultrasound of his left leg was negative for DVT.    He was significantly anemic consistent with acute illness.  Hemoglobin down to 8.6 on October 25.  Hemoglobin coming up to 10.0 on October 29.  I did review the blood smear which appears benign.  Ferritin and iron saturation levels were 407/23%    He does have history of B12 deficiency on oral B12 and usually gets B12 shots every 3 months.    His vitamin D level was very low at 14 and was started on a supplement of 2000 IU a day.    Chronic dysthymia, still on Zoloft 50 mg.    Past history of stroke in 2015 with left hemiparesis.  May 2018 ultrasound showed left 50-69% with no restenosis on the right post CEA.  He is on Plavix and Lipitor 20 mg.    He denies chest pain or palpitations.    Cared for at home with wife.    He has some lower back achiness which increased in the hospital.  He is largely in a wheelchair but does ambulate some.  He declined any referral to physical therapy.  He is not taking pain medicine.    PMHx:    Past Medical History:   Diagnosis Date     Anemia      Bladder cancer (H)      Cancer (H)     Rectosigmoid     Diabetes mellitus (H)      Hyperkalemia      Hypertension      Seizure (H)     possible, one time occurence     Stroke (H)     multiple, residual left sided weakness, last CVA in July 2015 caused some speech deficit     Superficial phlebitis      Venous insufficiency of both lower extremities       PSHx:    Past Surgical History:   Procedure Laterality Date     COLON SURGERY      Colectomy Low Anterior Resection     EYE SURGERY      Cataract Extraction     LAPAROSCOPIC COLON RESECTION N/A 6/1/2017    Procedure: LAPAROSCOPIC ASSISTED COLECTOMY LOW ANTERIOR RESECTION AND DIVERTING LOOP ILEOSTOMY, PROCTOSOPY;  Surgeon: Tyson Parry MD;  [place] [facility]  Service:      SC CLOSE ENTEROSTOMY N/A 8/15/2017    Procedure: ILEOSTOMY CLOSURE LOOP ;  Surgeon: Tyson Parry MD;  [place] [facility]  Service: General     SC CYSTOURETHROSCOPY,FULGUR <0.5 CM LESN N/A 9/1/2015    Procedure: CYSTOSCOPY TRANSURETHRAL RESECTION BLADDER TUMOR;  Surgeon: Cleveland Nair MD;  [place] [facility]  Service: Urology     SC CYSTOURETHROSCOPY,FULGUR <0.5 CM LESN N/A 12/22/2015    Procedure: CYSTOSCOPY TRANSURETHRAL RESECTION BLADDER TUMOR AND BLADDER BIOPSIES;  Surgeon: Cleveland Nair MD;  [place] [facility]  Service: Urology     TURBT      X2     VASECTOMY       Immunizations:   Immunization History   Administered Date(s) Administered     Influenza [id number], 01/18/2010     Influenza high dose,seasonal,PF, 65+ yrs 09/15/2015, 09/26/2016, 10/03/2017, 09/27/2018, 10/19/2019     Influenza, Seasonal, Inj PF IIV3 09/27/2010, 09/14/2012, 09/27/2013     Influenza, inj, historic,unspecified 10/19/2007, 09/16/2011, 10/15/2015     Influenza, seasonal,quad inj 6-35 mos 09/18/2009, 10/17/2014     Pneumo Conj 13-V (2010&after) 01/20/2015     Pneumo Polysac 23-V 10/08/2004     Td,adult,historic,unspecified 07/01/2004     Tdap 05/20/2015       ROS A comprehensive review of systems was performed and was otherwise negative    Medications, allergies, and problem list were reviewed and updated    Exam  /72 (Patient Site: Right Arm, Patient Position: Sitting, Cuff Size: Adult Regular)   Pulse 80   Wt 165 lb 14.4 oz (75.3 kg)   BMI 25.60 kg/m    Patient has some baseline mental deficits,  but still oriented to place and time.  Answers questions appropriately.  Somewhat flat affect but that is baseline for him.  No jaundice.  Lungs are clear to auscultation.  Heart is regular without murmur.  Abdomen soft nontender no suprapubic tenderness.  There is no ankle edema, which is improved from previous.    Assessment/Plan  1. Hypertension  Currently controlled, but has recently gone through significant changes both at the TCU and then returning home to his previous doses of medications.    Extensive discussion on his blood pressure medication was performed by me today with wife.    I will have him continue taking the higher dose of Toprol-XL 25 mg a day.  He was on 25 mg a day at the TCU, but this morning the wife just gave him 12.5 mg.    Go back to the 10 mg amlodipine dose, which he actually had this morning.  He had been on 5 mg of amlodipine at the TCU.    Continue on clonidine 1.5 mg in the morning and 3 mg in the evening.    Restart furosemide at 40 mg once a day.  Could go back to the 40 mg twice a day if needed for blood pressure control.    Does not have current edema, but that was an issue earlier.      - metoprolol succinate (TOPROL-XL) 25 MG; Take 1 tablet (25 mg total) by mouth daily.  Dispense: 30 tablet; Refill: 0  - amLODIPine (NORVASC) 10 MG tablet; Take 1 tablet (10 mg total) by mouth daily.    2. Personal history of urinary tract infection  No evidence of recurrence.  Completed antibiotic treatment.    3. History of bladder cancer  Negative cystoscopy in September, 6-month follow-up with urology    4. Chronic renal insufficiency, stage 3 (moderate) (H)  Significant worsening creatinine since last year.    Wife reports that he has a nephrology appointment on November 14.  - Comprehensive Metabolic Panel  - furosemide (LASIX) 40 MG tablet; Take 1 tablet (40 mg total) by mouth daily.; Refill: 0    5. Carotid artery stenosis, asymptomatic, left  Post CEA.  Post stroke.    Continue Plavix  and Lipitor 20 mg    6. History of colon cancer, stage III  Oncology department requested a CEA recheck.  Did have been borderline higher.  He had canceled his colonoscopy in August because of severe elevated blood pressure.    He is not currently clinically stable to proceed with further endoscopy at this time.    He did have a CT scan in August full body without evidence of recurrence cancer on that test.    7. History of stroke  As above, stable.  Plavix and Lipitor.    8. Type 2 diabetes mellitus without complication, without long-term current use of insulin (H)  We will no longer be able to take the metformin with his worsening renal insufficiency.  That was taken off his med list.  He had not been taking metformin at the TCU.    Blood sugars are running too high.  He will likely need to start a Lantus insulin 5 to 10 units daily.    He is no longer on the sliding scale short acting insulin since leaving the TCU yesterday.    He has an appoint with the diabetic educator next week to discuss starting insulin with the wife would have to give it.    He will continue on the Amaryl 4 mg with lunch and supper at this time.    He previously had had well-controlled diabetes prior to hospitalization.  He was on a low-dose metformin 500 mg twice a day previous, however.  - Ambulatory referral to Diabetes Education Program (CDE)    9. B12 deficiency  IM B12 given today.  Plan every 3-month shot.  Continue oral B12 daily.  - HM2(CBC w/o Differential)  - cyanocobalamin injection 1,000 mcg    10. Medication monitoring encounter    - Comprehensive Metabolic Panel    11. Anemia, unspecified type  Significant worsening anemia consistent with acute illness in the hospital last month.  Was improving on last check.  Recheck today.  - HM2(CBC w/o Differential)    12. Pain  Knee DJD pain.  Voltaren gel refilled.  - diclofenac sodium (VOLTAREN) 1 % Gel; Apply 4 g topically 3 (three) times a day. Apply to knees  Dispense: 100 g;  Refill: 0    13. Vitamin D deficiency  Severely low level.  Started on 2000 IU a day.  - cholecalciferol, vitamin D3, (VITAMIN D3) 1,000 unit capsule; Take 2 capsules (2,000 Units total) by mouth daily.  Dispense: 180 capsule; Refill: 0    14. History of colon cancer  As above  - CEA (Carcinoembryonic Antigen)    Is already got the flu shot.    Time with patient and wife greater than 40 minutes with greater than 50% the time coronation of care.  All time face-to-face.    Return in 8 days (on 11/15/2019) for Recheck.   Patient Instructions   Continue on the higher dose of amlodipine 10 mg a day.    Continue on furosemide, but only take 40 mg once a day.    Take the full metoprolol, or 25 mg once a day.    You will not be able to take the metformin anymore with your elevated creatinine.  Discontinue metformin.    Continue the glimepiride 4 mg with lunch and supper.    You will likely need to start insulin.  Record your blood sugars 3 times a day before breakfast, lunch and dinner, bring those records into your next visit.    Check blood pressure daily, and bring those records with to next visit.    Meet with the diabetic educator to discuss insulin.    See the kidney doctor later this month as planned.    Follow-up with me on November 15 at 1 PM.    Telly Torre MD        Current Outpatient Medications   Medication Sig Dispense Refill     acetaminophen (TYLENOL) 325 MG tablet Take 2 tablets (650 mg total) by mouth every 4 (four) hours as needed.  0     amLODIPine (NORVASC) 10 MG tablet Take 1 tablet (10 mg total) by mouth daily.       atorvastatin (LIPITOR) 20 MG tablet Take 1 tablet (20 mg total) by mouth daily. 90 tablet 3     cloNIDine HCl (CATAPRES) 0.3 MG tablet TAKE 1/2 PILL IN THE MORNING. TAKE 1 FULL PILL AT BEDTIME. 135 tablet 2     clopidogrel (PLAVIX) 75 mg tablet TAKE ONE (1) TABLET BY MOUTH DAILY 90 tablet 1     cyanocobalamin 1,000 mcg/mL injection Inject 1,000 mcg into the shoulder, thigh, or buttocks  "every 3 (three) months.              cyanocobalamin, vitamin B-12, (VITAMIN B-12) 1,000 mcg Subl Place 1 tablet (1,000 mcg total) under the tongue daily. (Patient taking differently: Place 500 mcg under the tongue daily.       ) 90 tablet 3     diclofenac sodium (VOLTAREN) 1 % Gel Apply 4 g topically 3 (three) times a day. Apply to knees 100 g 0     furosemide (LASIX) 40 MG tablet Take 1 tablet (40 mg total) by mouth daily.  0     glimepiride (AMARYL) 4 MG tablet Take 4 mg by mouth twice a day with lunch and supper.       INJECT EASE LANCETS 30 gauge Misc Use daily as directed (4-6 times daily) 600 each 3     metoprolol succinate (TOPROL-XL) 25 MG Take 1 tablet (25 mg total) by mouth daily. 30 tablet 0     ONETOUCH ULTRA BLUE TEST STRIP strips USE AS DIRECTED 6 TIMES PER  strip 3     psyllium husk (DAILY FIBER ORAL) Take 1 tablet by mouth 2 (two) times a day.              sertraline (ZOLOFT) 50 MG tablet Take 50 mg by mouth daily.       SURE COMFORT PEN NEEDLE 31 gauge x 3/16\" Ndle Use one needle each night. 100 each 2     cholecalciferol, vitamin D3, (VITAMIN D3) 1,000 unit capsule Take 2 capsules (2,000 Units total) by mouth daily. 180 capsule 0     Current Facility-Administered Medications   Medication Dose Route Frequency Provider Last Rate Last Dose     cyanocobalamin injection 1,000 mcg  1,000 mcg Intramuscular Q3 Months Telly Torre MD   1,000 mcg at 19 1405     Allergies   Allergen Reactions     Cefazolin      \"felt very hot\"     Pravastatin      Increased peripheral neuropathy     Simvastatin      Increased peripheral neuropathy     Thiazides      Other: Gout       Social History     Tobacco Use     Smoking status: Former Smoker     Last attempt to quit: 1995     Years since quittin.2     Smokeless tobacco: Never Used   Substance Use Topics     Alcohol use: No     Drug use: No           "

## 2021-06-03 NOTE — PROGRESS NOTES
[facility] Patient Outreach:     Discharge Date::  11/5/19      Reason for TCU stay (discharge diagnosis)::  UTI, CKD, HTN, DMII      Are you feeling better, the same or worse since your discharge?:  Patient is feeling better          As part of your discharge plan, did they discuss home care with you?: Yes (refused all the therapy options d/t he says he doesn't need anything and has all the exercises )        Have your seen them yet, or are they scheduled to visit?: No        Did you receive any new medications, or was there a change to your medications?: Yes        Are you taking those medications, or do you have any established regiment?:  Taking all medications without any problems       Do you have any follow up visits scheduled with your PCP or Specialist?:  Yes, with PCP      (RN) Is it scheduled soon enough (3-5 days)?: Yes

## 2021-06-03 NOTE — TELEPHONE ENCOUNTER
New Appointment Needed  What is the reason for the visit:    The patients wife called stating that her  can not make this appointment, that he has another appointment at the same time.  She was wondering if he could get rescheduled with Dr. Torre another day.  She also stated he needs to be seen within 2 weeks per Dr. Torre.    Provider Preference: PCP only  How soon do you need to be seen?: next week  Waitlist offered?: No  Okay to leave a detailed message:  Yes

## 2021-06-03 NOTE — PROGRESS NOTES
DIABETES EDUCATION CARE PLAN    Assessment/Plan:     Initial visit for diabetes education for a Lantus insulin start. Instructed on insulin action times, how to store measure and inject Lantus or Basaglar using an insulin pen. Instructed on insulin injection sites and rotation, symptoms and treatment of high and low blood sugar. Reviewed proper sharps disposal. Patient's wife Alana plans on giving the injections. Luke was on long-acting insulin in the past. Patient is checking his blood sugar. He does not always wash his hands properly prior to poking his finger which often results in false high readings. Updated meter to a more accurate One Touch Verio Flex. The One Touch Ultra 2 meters have older technology that is not accurate.    B One Touch Ultra 2 (old meter)  B One Touch Verio Flex (new meter)    Per medication protocol basal insulin dose calculated based on weight and A1c. 75.3 kg (0.1 units/kg)=7.5 units  Noted in the MD progress notes to start between 5-10 units Lantus per day which aligns with these calculations    Current diabetes medications:   Glimepiride 0-4-4-0  Using a Novolog sliding scale since leaving the rehab facility. The Lantus vial and syringes given to patient and wife at discharge.  Typically taking 1-2 units Novolog 1-3 times per day.    PLAN:  1. Check blood sugar 3 times per day (before breakfast, before dinner and bedtime). Wash hands with soap and water before checking blood sugar.  2. Start 8 units of Lantus insulin at bedtime. Pick a time and stay within one hour of the same time.  3. Stop Novolog sliding scale.  4. Continue Glimepiride 4 mg before lunch and dinner.  5. Drink more water to lower blood sugar and protect your kidneys.  6. Bring meter and blood sugar log to all medical visits.  7. Seeing  19.   8. Next visit with me in 2-3 weeks.    Subjective and Objective:     Suman Nagy is a 79 y.o. male referred by Telly Torre MD.  Accompanied  by:  wife Alana   Lives with wife who is the primary caretaker. Luke checks his blood sugar. Wife does the grocery shopping and cooking.    Wt Readings from Last 3 Encounters:   11/07/19 165 lb 14.4 oz (75.3 kg)   10/30/19 170 lb (77.1 kg)   10/23/19 179 lb (81.2 kg)     Lab Results   Component Value Date    HGBA1C 7.6 (H) 10/28/2019     Physical Activity: Limited. Luke was in a wheelchair today    SMBG pattern/BG ranges: Uses One Touch Ultra 2 meter. Plans to use the One Touch Verio Flex starting today  Fasting 168, 190, 182, 153, 216, 219, 160  Lunch 341, 334, 338  Supper 183, 235    Hypoglycemia: none    EDUCATION RECORD    Monitoring   Meter: Assessed and Demonstrated how to use  Monitoring: Discussed and Literature provided  BG goals: Discussed and Literature provided    Nutrition Management  Nutrition Management: Discussed briefly  Weight: Not addressed  Portions/Balance: Not addressed  Carb ID/Count: Not addressed  Label Reading: Not addressed  Heart Healthy Fats: Not addressed  Physical Activity: Assessed and Discussed    Medication  Oral diabetes medications: Assessed, Discussed and Competent  Injected Medications: Discussed and demonstrated how to use pen and Literature provided  Storage/Exp:Discussed, Literature provided and Competent   Site Rotation: Discussed and Literature provided   Disposal of Sharps:Discussed and Literature provided    Diabetes Disease Process: Discussed and Literature provided    Acute Complications: Prevent, Detect, Treat:  Hypoglycemia: Assessed and Discussed and Literature provided  Hyperglycemia: Assessed and Discussed and Literature provided  Sick Days: Not addressed    Chronic Complications  Foot Care:Not addressed  Skin Care: Not addressed  Eye: Not addressed  ABC: Discussed  Teeth:Not addressed  Goal Setting and Problem Solving: Discussed and Literature provided  Barriers: Assessed and history of stroke, cognitive impairment  Psychosocial Adjustments:  Assessed      Time spent with the patient: 60 minutes   Previous Education: yes  Visit Type:Diabetes Self-Management Training ()  Hours Remaining: DSMT 1 and MNT 1 for 2019  Diagnosis per referral:Type 2 diabetes with neuropathy, with unspecified use of insulin (E11.9, Z79.4)      Kasie Bishop RD, LD, CDE  Diabetes Educator  11/12/2019                          environmental exposure/outdoors on Friday

## 2021-06-03 NOTE — PATIENT INSTRUCTIONS - HE
1. Check blood sugar 3 times per day (before breakfast, before dinner and bedtime). Wash hands with soap and water before checking blood sugar.  2. Start 8 units of Lantus insulin at bedtime. Pick a time and stay within one hour of the same time.  3. Continue Glimepiride 4 mg before lunch and dinner.  4. Drink more water to lower blood sugar and protect your kidneys.  5. Bring meter and blood sugar log to all medical visits.  6. Seeing  11/18/19.   7. Next visit with me in 2-3 weeks.    Blood sugar goals:   Before meals: 100-150  Bedtime: 100-160    A1c goal of less than 8.0%

## 2021-06-04 ENCOUNTER — COMMUNICATION - HEALTHEAST (OUTPATIENT)
Dept: INTERNAL MEDICINE | Facility: CLINIC | Age: 81
End: 2021-06-04

## 2021-06-04 VITALS
HEART RATE: 64 BPM | BODY MASS INDEX: 29.44 KG/M2 | DIASTOLIC BLOOD PRESSURE: 77 MMHG | WEIGHT: 188 LBS | TEMPERATURE: 98.6 F | SYSTOLIC BLOOD PRESSURE: 132 MMHG

## 2021-06-04 VITALS
SYSTOLIC BLOOD PRESSURE: 142 MMHG | BODY MASS INDEX: 26.78 KG/M2 | DIASTOLIC BLOOD PRESSURE: 54 MMHG | TEMPERATURE: 98 F | HEART RATE: 64 BPM | WEIGHT: 171 LBS

## 2021-06-04 VITALS
WEIGHT: 188 LBS | SYSTOLIC BLOOD PRESSURE: 161 MMHG | TEMPERATURE: 98.8 F | HEART RATE: 75 BPM | DIASTOLIC BLOOD PRESSURE: 75 MMHG | BODY MASS INDEX: 29.44 KG/M2

## 2021-06-04 VITALS
HEART RATE: 67 BPM | TEMPERATURE: 98.7 F | SYSTOLIC BLOOD PRESSURE: 148 MMHG | BODY MASS INDEX: 27.82 KG/M2 | WEIGHT: 177.6 LBS | DIASTOLIC BLOOD PRESSURE: 67 MMHG

## 2021-06-04 VITALS — WEIGHT: 172 LBS | BODY MASS INDEX: 26.54 KG/M2

## 2021-06-04 VITALS
DIASTOLIC BLOOD PRESSURE: 54 MMHG | SYSTOLIC BLOOD PRESSURE: 132 MMHG | BODY MASS INDEX: 27.72 KG/M2 | HEART RATE: 60 BPM | WEIGHT: 177 LBS

## 2021-06-04 DIAGNOSIS — I10 ESSENTIAL HYPERTENSION: ICD-10-CM

## 2021-06-04 NOTE — PROGRESS NOTES
DIABETES EDUCATION CARE PLAN    Assessment/Plan:     3 week follow-up visit for diabetes education and insulin adjustment. Luke is checking his fasting blood sugar every day. Fasting blood sugars within target with recent increase in Lantus to 10 units. He is not checking his blood sugars later in the day. Before starting Lantus his bedtime blood sugars were above 200. Reviewed eating habits. Luke has a good appetite and eats sensibly. Encouraged him to drink more water and less Coke Zero. Weight increased 7 pounds.     Current diabetes medications:   Glimepiride 0-4-4-0  Lantus Solostar 0-0-0-10    Plan:  1. Check blood sugar 3 times per day (before breakfast, before dinner and bedtime). Wash hands with soap and water before checking blood sugar.  2. Continue 10 units of Lantus insulin at bedtime.   3. Continue Glimepiride 4 mg before lunch and dinner.  4. Drink more water to lower blood sugar and protect your kidneys.  5. Bring meter and blood sugar log to all medical visits.  6. Next diabetes check 1/20/2020.  7. Follow-up with me as needed.    Subjective/Objective:      Suman Nagy is a 79 y.o. male referred by Telly Torre MD.  Accompanied by wife, Alana    Wt Readings from Last 3 Encounters:   12/05/19 172 lb (78 kg)   11/13/19 165 lb (74.8 kg)   11/07/19 165 lb 14.4 oz (75.3 kg)       Lab Results   Component Value Date    HGBA1C 7.6 (H) 10/28/2019     Diet/Eating Habits: 3 meals per day and 3 snacks  Breakfast: Oatmeal, blueberries, 2 slices of wheat toast with butter and jelly. Water or Coke Zero.  Snack: peanuts, unsalted  Lunch: PB sandwich with sugar-free jello or fish, salad. Coke Zero.  Snack: peanuts, unsalted or microwave popcorn  Dinner: Hamburger, baked potato, green beans  Bedtime snack: 1/2 sandwich    Physical Activity: Sedentary. left sided weakness    SMBG pattern/BG ranges: New meter by pharmacist. Unsure of name. Patient. states [employer] will not cover the One Touch Verio test  strips   Fasting 100, 137, 117, 118    Hypoglycemia: none    EDUCATION RECORD     Monitoring   Meter (per above goals): Competent  Monitoring: Competent  BG goals: Assessed, Discussed and Competent    Nutrition Management  Nutrition Management: Discussed  Weight: Discussed  Portions/Balance: Assessed and Discussed  Carb ID/: Assessed and Competent  Label Reading: Competent  Physical Activity: Assessed    Medication  Orals: Competent  Injected Medications: Assessed and Discussed   Storage/Exp:Competent   Site Rotation: Competent   Disposal of Sharps: Competent    Diabetes Disease Process Competent    Acute Complications: Prevent, Detect, Treat:  Hypoglycemia: Assessed, Discussed and Competent  Hyperglycemia: Assessed, Discussed and Competent  Sick Days: Not addressed    Chronic Complications  Foot Care:Not addressed  Skin Care: Not addressed  Eye: Not addressed  ABC: Competent  Teeth:Not addressed  Goal Setting and Problem Solving: Assessed and Discussed  Barriers: Assessed and history of stroke with cognitive impairment  Psychosocial Adjustments: Assessed      Time spent with the patient: 60 minutes   Previous Education: yes  Visit Type:Diabetes Self-Management Training ()  Hours Remaining: DSMT 1 and MNT 1 for 2019  Diagnosis per referral:Type 2 diabetes with hyperglycemia, with use of insulin (E11.65, Z79.4)        Kasie Bishop RD, LD, CDE  Diabetes Educator  12/5/2019

## 2021-06-04 NOTE — PATIENT INSTRUCTIONS - HE
1. Check blood sugar 3 times per day (before breakfast, before dinner and bedtime). Wash hands with soap and water before checking blood sugar.  2. Continue 10 units of Lantus insulin at bedtime.   3. Continue Glimepiride 4 mg before lunch and dinner.  4. Drink more water to lower blood sugar and protect your kidneys.  5. Bring meter and blood sugar log to all medical visits.  6. Next diabetes check 1/20/2020.  7. Follow-up with me as needed.    Blood sugar goals:   Before meals: 100-150  Bedtime: 100-160    A1c goal of less than 8.0

## 2021-06-04 NOTE — TELEPHONE ENCOUNTER
Refill Approved    Rx renewed per Medication Renewal Policy. Medication was last renewed on 11/7/19    Melanie Lunsford, [facility] Triage/Med Refill 12/13/2019     Requested Prescriptions   Pending Prescriptions Disp Refills     metoprolol succinate (TOPROL-XL) 25 MG 30 tablet 0     Sig: Take 1 tablet (25 mg total) by mouth daily.       Beta-Blockers Refill Protocol Passed - 12/12/2019 12:53 AM        Passed - PCP or prescribing provider visit in past 12 months or next 3 months     Last office visit with prescriber/PCP: 11/7/2019 Telly Torre MD OR same dept: 11/18/2019 Mir De La Cruz CNP OR same specialty: 11/18/2019 Mir De La Cruz CNP  Last physical: 7/10/2019 Last MTM visit: Visit date not found   Next visit within 3 mo: Visit date not found  Next physical within 3 mo: Visit date not found  Prescriber OR PCP: Telly Torre MD  Last diagnosis associated with med order: 1. Hypertension  - metoprolol succinate (TOPROL-XL) 25 MG; Take 1 tablet (25 mg total) by mouth daily.  Dispense: 30 tablet; Refill: 0    If protocol passes may refill for 12 months if within 3 months of last provider visit (or a total of 15 months).             Passed - Blood pressure filed in past 12 months     BP Readings from Last 1 Encounters:   11/18/19 140/50

## 2021-06-04 NOTE — TELEPHONE ENCOUNTER
RN cannot approve Refill Request    RN can NOT refill this medication med is not covered by policy/route to provider. Last office visit: 11/7/2019 Telly Torre MD Last Physical: 7/10/2019 Last MTM visit: Visit date not found Last visit same specialty: Visit date not found.  Next visit within 3 mo: Visit date not found  Next physical within 3 mo: Visit date not found      Janice Argueta, [facility] Triage/Med Refill 12/16/2019    Requested Prescriptions   Pending Prescriptions Disp Refills     metoprolol succinate (TOPROL-XL) 25 MG [Pharmacy Med Name: METOPROLOL SUC 25MG ER] 30 tablet 0     Sig: TAKE 1 TABLET (25 MG TOTAL) BY MOUTH DAILY.       There is no refill protocol information for this order

## 2021-06-04 NOTE — TELEPHONE ENCOUNTER
Refill Approved    Rx renewed per Medication Renewal Policy. Medication was last renewed on 5/21/19.    Aleja Smith, [facility] Triage/Med Refill 12/20/2019     Requested Prescriptions   Pending Prescriptions Disp Refills     clopidogrel (PLAVIX) 75 mg tablet [Pharmacy Med Name: CLOPIDOGREL 75MG] 90 tablet 1     Sig: TAKE ONE (1) TABLET BY MOUTH DAILY       Clopidogrel/Prasugrel/Ticagrelor Refill Protocol Passed - 12/18/2019 11:50 AM        Passed - PCP or prescribing provider visit in past 6 months       Last office visit with prescriber/PCP: 11/7/2019 OR same dept: 11/18/2019 Mir De La Cruz CNP OR same specialty: 11/18/2019 Mir De La Cruz CNP Last physical: 7/10/2019 Last MTM visit: Visit date not found     Next appt within 3 mo: Visit date not found  Next physical within 3 mo: Visit date not found  Prescriber OR PCP: Telly Torre MD  Last diagnosis associated with med order: 1. Ischemic Stroke  - clopidogrel (PLAVIX) 75 mg tablet [Pharmacy Med Name: CLOPIDOGREL 75MG]; TAKE ONE (1) TABLET BY MOUTH DAILY  Dispense: 90 tablet; Refill: 1    If protocol passes may refill for 6 months if within 3 months of last provider visit (or a total of 9 months).              Passed - Hemoglobin in past 12 months     Hemoglobin   Date Value Ref Range Status   11/07/2019 10.8 (L) 14.0 - 18.0 g/dL Final

## 2021-06-05 VITALS
WEIGHT: 155 LBS | BODY MASS INDEX: 24.33 KG/M2 | DIASTOLIC BLOOD PRESSURE: 52 MMHG | HEIGHT: 67 IN | RESPIRATION RATE: 16 BRPM | SYSTOLIC BLOOD PRESSURE: 120 MMHG | HEART RATE: 60 BPM

## 2021-06-05 VITALS
DIASTOLIC BLOOD PRESSURE: 50 MMHG | RESPIRATION RATE: 13 BRPM | HEART RATE: 56 BPM | WEIGHT: 163 LBS | BODY MASS INDEX: 25.53 KG/M2 | SYSTOLIC BLOOD PRESSURE: 144 MMHG

## 2021-06-05 RX ORDER — CLONIDINE HYDROCHLORIDE 0.3 MG/1
TABLET ORAL
Qty: 135 TABLET | Refills: 3 | Status: SHIPPED | OUTPATIENT
Start: 2021-06-05 | End: 2022-01-01

## 2021-06-05 NOTE — PROGRESS NOTES
[facility] Admission note      Patient: Suman Nagy  MRN: [id number]  Date of Service: 1/16/2020      [facility] [[id number]]  Reason for Visit     Chief Complaint   Patient presents with     H & P       Code Status     FULL CODE    Assessment     - ACUTE INFLUENZA  - acute on chronic anemia  - acute on chronic hip and knee pain  - type 2 DM  - advanced CKD with a discharge creatinine ranging between 2-1.9  -Electrolyte abnormalities with low potassium and magnesium  - htn' with elevated systolic blood pressures noted in the TCU  -Chronic lymphedema  -Profound debilitation with increasing amount of care that this patient needs at home.  He has incontinence at baseline and requires assistance with all his IADLs and most of his ADLs at home  -Protein calorie malnutrition  -Obesity    Plan     Patient has been admitted to the TCU and examined in the presence of his wife.  He is being treated for influenza A with Tamiflu and will finish his 5-day regimen in the TCU.  He is denying any cough congestion or fever.  He is reporting profound weakness with myalgias.  He is reporting bilateral hip and knee pain with left greater than right with exacerbation noted.  He has topical Voltaren gel and Tylenol available for pain.  I did review goals of care with him and his wife apparently at baseline he requires significant assistance with all his a ADLs and IADLs.  He has his daughter as well as wife assisting him with his cares.  Wife is aware of his decline and hoping that he can get somewhat stronger and ambulate at least to the bathroom and self transfers to ease the caregiving burden.  He also has chronic incontinence and she is hoping that if he could go to the bathroom on his own this would help him with managing that.  Monitor labs including hemoglobin discharge hemoglobin was 8.5.  He is on PPIs currently.  He has a history of diabetes which as per family has been well controlled.  He  will continue with his blood sugar checks and insulin regimen in the TCU.  He was noted to have significant lymphedema which is chronic as per patient he does not like wearing compression hoses.  BMP is ordered to monitor electrolyte imbalance including potassium and magnesium impairments noted in the hospital.  Blood pressures to be monitored he is on multiple medications for blood pressure management and had accelerated hypertension in the hospital due to which his clonidine 0.15MG dosage has been increased to 3 times daily.  He is also on amlodipine.  He is also on hydralazine as well as metoprolol  Staff will be checking blood pressures closely every shift and monitoring trends  He has chronic lymphedema as per the patient and his wife with left-sided weakness.  He is on diuretics including Lasix.  Weights will need to be monitored.  Redress concerns about compression hose again  Goals of care reviewed with his wife and patient and they are hopeful that if he gets a little bit stronger he should be able to discharge home safely.  As per patient and his wife he is quite weak today and he is unable to stand on his own he requires two-person assistance which would be too much for his wife to manage by herself at home  Total time spent is 45 minutes with more than 35 minutes spent face-to-face reviewing goals of care including discharge planning and baseline functional status with patient and his wife present at bedside these were reviewed in my note above.    Additional 20 minutes spent face-to-face reviewing his CODE STATUS as well as hisPOLST   Patient is requesting a full CODE STATUS.  His wife is the opinion that he should be preferably not full code she thinks that if he goes for CPR he is at high risk for adverse outcomes however patient is adamant that he wants to be full code this was reviewed with both of them he has advanced directives he would like to be intubated and brought to the emergency room.  He  told me subsequently his family can decide what they want to do with him  Patient then stated that he is aware that he can break his ribs while getting CPR but he does not care.  His wife is is still not completely sure if she wants him to be full code but at present she would like him to continue what ever he wants to be  I have asked them to bring a copy of his advance directives to keep in the file also reviewed with them that they should possibly sit down and discuss this with family what long-term planning they would like to do he has advanced directives.  Wife is clear she would not like to keep him in a vegetable state      History     Patient is a very pleasant 79 y.o. male who is admitted to TCU  He was examined in the presence of his wife who supplemented his history.  As per the history he lives at home with his wife and other extended family members.  He presented with increasing weakness with low-grade fever and myalgias.  Work-up revealed that he had a positive influenza A.  He is currently being treated with Tamiflu and will finish his course in the TCU.  He denies any cough congestion.  He however is profoundly weak requiring 2 person assist at least with all his ADLs and is not able to walk.  He was also noted to have acute on chronic anemia with no obvious signs of GI losses.  He was started on Prilosec.  R/C today improved at 8.5 from 7.4 yesterday  Anemia most likely secondary to chronic kidney disease discharge creatinine was 2  He has underlying history of type 2 diabetes which as per wife has been adequately controlled in the past.  He will continue with his insulin regimen in the TCU and oral glimepiride  blood sugars will be monitored.  He has underlying history of CVA with resultant left-sided hemiplegia.  Unfortunately he continues to be weak on that side as per his wife he requires significant assistance at home now.  He lives with extended family members who have been helping him with  all his IADLs he has incontinence also at baseline.  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)     Anxiety     History of stroke     Medication monitoring encounter     History of bladder cancer     History of rectal cancer     Carotid artery stenosis, asymptomatic, left     Rectal cancer (H)     Hypertension     B12 deficiency     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 He has never used smokeless tobacco. He reports that he does not drink alcohol or use drugs.    Family History     Reviewed, and family history includes COPD in his father.    Medication List   Post Discharge Medication Reconciliation Status: discharge medications reconciled, continue medications without change   Current Outpatient Medications on File Prior to Visit   Medication Sig Dispense Refill     acetaminophen (TYLENOL) 325 MG tablet Take 2 tablets (650 mg total) by mouth every 4 (four) hours as needed.  0     amLODIPine (NORVASC) 10 MG tablet Take 1 tablet (10 mg total) by mouth daily.       atorvastatin (LIPITOR) 20 MG tablet Take 1 tablet (20 mg total) by mouth daily. 90 tablet 3     blood glucose test strips Check blood sugar 3 times per day. Accu-Chek Guide test  "striips. 100 strip 3     cholecalciferol, vitamin D3, (VITAMIN D3) 1,000 unit capsule Take 2 capsules (2,000 Units total) by mouth daily. 180 capsule 0     cloNIDine HCl (CATAPRES) 0.3 MG tablet Take 0.5 tablets (0.15 mg total) by mouth 3 (three) times a day.  0     clopidogrel (PLAVIX) 75 mg tablet Take 1 tablet (75 mg total) by mouth daily. 90 tablet 0     cyanocobalamin 1,000 mcg/mL injection Inject 1,000 mcg into the shoulder, thigh, or buttocks every 3 (three) months.              cyanocobalamin, vitamin B-12, (VITAMIN B-12) 1,000 mcg Subl Place 1 tablet (1,000 mcg total) under the tongue daily. 90 tablet 3     diclofenac sodium (VOLTAREN) 1 % Gel Apply 4 g topically 3 (three) times a day. Apply to knees (Patient taking differently: Apply 4 g topically 3 (three) times a day as needed. Apply to knees ) 100 g 0     furosemide (LASIX) 40 MG tablet Take 40 mg by mouth daily.       generic lancets Fastclix lancets. Check blood sugar 3 times per day. 102 each 3     glimepiride (AMARYL) 4 MG tablet Take 4 mg by mouth twice a day with lunch and supper.       hydrALAZINE (APRESOLINE) 25 MG tablet Take 25 mg by mouth 4 (four) times a day.       insulin glargine (LANTUS SOLOSTAR PEN) 100 unit/mL (3 mL) pen Inject 10 Units under the skin at bedtime.       metoprolol succinate (TOPROL-XL) 25 MG Take 1 tablet (25 mg total) by mouth daily. 90 tablet 3     omeprazole (PRILOSEC) 20 MG capsule Take 1 capsule (20 mg total) by mouth 2 (two) times a day before meals.  0     ONETOUCH ULTRA2 METER [facility] AS DIRECTED 1 each 0     [START ON 1/17/2020] oseltamivir (TAMIFLU) 30 MG capsule Take 1 capsule (30 mg total) by mouth daily for 1 day. 1 capsule 0     pen needle, diabetic (BD ULTRA-FINE JONO PEN NEEDLE) 32 gauge x 5/32\" Ndle Use one needle per day to inject insulin. 100 each 4     polyethylene glycol (MIRALAX) 17 gram packet Take 1 packet (17 g total) by mouth daily as needed.  0     psyllium husk (DAILY FIBER ORAL) Take 1 tablet by " mouth 2 (two) times a day.              sertraline (ZOLOFT) 50 MG tablet Take 50 mg by mouth daily.       [DISCONTINUED] cloNIDine HCl (CATAPRES) 0.3 MG tablet TAKE 1/2 PILL IN THE MORNING. TAKE 1 FULL PILL AT BEDTIME. 135 tablet 2     Current Facility-Administered Medications on File Prior to Visit   Medication Dose Route Frequency Provider Last Rate Last Dose     acetaminophen tablet 650 mg (TYLENOL)  650 mg Oral Q4H PRN Norbert Cheung MD   650 mg at 01/16/20 1252     amLODIPine tablet 10 mg (NORVASC)  10 mg Oral Daily with lunch Norbert Cheung MD   10 mg at 01/16/20 1120     atorvastatin tablet 20 mg (LIPITOR)  20 mg Oral DAILY Norbert Cheung MD   20 mg at 01/16/20 0805     bisacodyl suppository 10 mg (DULCOLAX)  10 mg Rectal Daily PRN Norbert Cheung MD         cloNIDine (CATAPRES) tablet 0.15 mg  0.15 mg Oral TID Long Flynn MD   0.15 mg at 01/16/20 0804     dextrose 50 % (D50W) syringe 20-50 mL  20-50 mL Intravenous Q15 Min PRN Norbert Cheung MD         diclofenac sodium 1 % gel 4 g (VOLTAREN)  4 g Topical TID PRN Norbert Cheung MD         glimepiride tablet 4 mg (AMARYL)  4 mg Oral BID CC lunch & supper Norbert Cheung MD   4 mg at 01/16/20 1120     glucagon (human recombinant) injection 1 mg  1 mg Subcutaneous Q15 Min PRN Norbert Cheung MD         hydrALAZINE tablet 25 mg (APRESOLINE)  25 mg Oral QID Norbert Cheung MD   25 mg at 01/16/20 0805     insulin aspart U-100 injection (NovoLOG)   Subcutaneous TID with meals Norbert Cheung MD   3 Units at 01/16/20 1120     insulin glargine injection 8 Units (LANTUS)  8 Units Subcutaneous QHS Norbert Cheung MD   8 Units at 01/15/20 2048     magnesium hydroxide suspension 30 mL (MILK OF MAG)  30 mL Oral Daily PRN Norbert Cheung MD         magnesium oxide tablet 400 mg (MAG-OX)  400 mg Oral BID Wil, Enrique S, MD   400 mg at 01/16/20 0805     metoprolol succinate 24 hr tablet 25 mg (TOPROL-XL)  25 mg Oral QHS  "Norbert Cheung MD   25 mg at 01/15/20 2048     omeprazole capsule 20 mg (PriLOSEC)  20 mg Oral BID AC Enrique De La Torre MD   20 mg at 01/16/20 0639     ondansetron injection 4 mg (ZOFRAN)  4 mg Intravenous Q4H PRN Norbert Cheung MD        Or     ondansetron tablet 8 mg (ZOFRAN)  8 mg Oral Q8H PRN Norbert Cheung MD         oseltamivir capsule 30 mg (TAMIFLU)  30 mg Oral DAILY Norbert Cheung MD   30 mg at 01/16/20 0805     polyethylene glycol packet 17 g (MIRALAX)  17 g Oral DAILY Norbert Cheung MD   17 g at 01/16/20 0805     sertraline tablet 50 mg (ZOLOFT)  50 mg Oral Daily with supper Norbert Cheung MD   50 mg at 01/15/20 1706     sodium chloride flush 2.5 mL (NS)  2.5 mL Intravenous Line Care Norbert Cheung MD   10 mL at 01/13/20 1703     [DISCONTINUED] cloNIDine (CATAPRES) tablet 0.15 mg  0.15 mg Oral QAM Norbert Cheung MD   0.15 mg at 01/15/20 0921     [DISCONTINUED] cloNIDine HCl tablet 0.3 mg (CATAPRES)  0.3 mg Oral QHS Norbert Cheung MD   0.3 mg at 01/14/20 2138     [DISCONTINUED] sodium chloride 0.9%  50 mL/hr Intravenous Continuous Long Flynn MD 50 mL/hr at 01/16/20 0433 50 mL/hr at 01/16/20 0433       Allergies     Allergies   Allergen Reactions     Cefazolin      \"felt very hot\"     Pravastatin      Increased peripheral neuropathy     Simvastatin      Increased peripheral neuropathy     Thiazides      Other: Gout         Review of Systems   A comprehensive review of 14 systems was done. Pertinent findings noted here and in history of present illness. All the rest negative.  Constitutional: Negative.  Negative for fever, chills, he has activity change, appetite change and fatigue.   HENT: Negative for congestion and facial swelling.    Eyes: Negative for photophobia, redness and visual disturbance.   Respiratory: Negative for cough and chest tightness.    Cardiovascular: Negative for chest pain, palpitations and has chronic leg swelling.   Gastrointestinal: " Negative for nausea, diarrhea, constipation, blood in stool and abdominal distention.   Genitourinary: Negative.  Has significant incontinence  Musculoskeletal: Negative.  REPorting significant weakness and difficulty ambulating  Skin: Negative.    Neurological: Negative for dizziness, tremors, syncope, weakness, light-headedness and headaches.   Hematological: Does not bruise/bleed easily.   Psychiatric/Behavioral: Negative.        Physical Exam     Recent Vitals 1/16/2020   Height -   Weight -   BSA (m2) -   /66   Pulse -   Temp -   Temp src -   SpO2 -   Some recent data might be hidden   Pulse is 79 temp is 98 and sats are 90% on room air    Constitutional: Oriented to person, place, and time and appears well-developed.   HEENT:  Normocephalic and atraumatic.  Eyes: Conjunctivae and EOM are normal. Pupils are equal, round, and reactive to light. No discharge.  No scleral icterus. Nose normal. Mouth/Throat: Oropharynx is clear and moist. No oropharyngeal exudate.    NECK: Normal range of motion. Neck supple. No JVD present. No tracheal deviation present. No thyromegaly present.   CARDIOVASCULAR: Normal rate, regular rhythm and intact distal pulses.  Exam reveals no gallop and no friction rub.  Systolic murmur present.  PULMONARY: Effort normal and breath sounds normal. No respiratory distress.No Wheezing or rales.  ABDOMEN: Soft. Bowel sounds are normal. No distension and no mass.  There is no tenderness. There is no rebound and no guarding. No HSM.  MUSCULOSKELETAL: Normal range of motion.  Has bilateral 2+ leg edema and no tenderness. Mild kyphosis, no tenderness.  Bilateral lower extremity weakness noted on exam  LYMPH NODES: Has no cervical, supraclavicular, axillary and groin adenopathy.   NEUROLOGICAL: Alert and oriented to person, place, and time. No cranial nerve deficit.  Normal muscle tone. Coordination normal.   Chronic left-sided weakness from previous CVA  GENITOURINARY: Deferred exam.  SKIN:  Skin is warm and dry. No rash noted. No erythema. No pallor.   EXTREMITIES: No cyanosis, no clubbing, has bilateral 2+ leg edema. No Deformity.  PSYCHIATRIC: Normal mood, affect and behavior.      Lab Results     Recent Results (from the past 240 hour(s))   POCT Glucose    Collection Time: 01/13/20 11:46 AM   Result Value Ref Range    Glucose 292 (H) 70 - 139 mg/dL   Comprehensive Metabolic Panel    Collection Time: 01/13/20 12:52 PM   Result Value Ref Range    Sodium 133 (L) 136 - 145 mmol/L    Potassium 3.1 (L) 3.5 - 5.0 mmol/L    Chloride 102 98 - 107 mmol/L    CO2 19 (L) 22 - 31 mmol/L    Anion Gap, Calculation 12 5 - 18 mmol/L    Glucose 308 (H) 70 - 125 mg/dL    BUN 40 (H) 8 - 28 mg/dL    Creatinine 2.41 (H) 0.70 - 1.30 mg/dL    GFR MDRD Af Amer 32 (L) >60 mL/min/1.73m2    GFR MDRD Non Af Amer 26 (L) >60 mL/min/1.73m2    Bilirubin, Total 0.5 0.0 - 1.0 mg/dL    Calcium 8.6 8.5 - 10.5 mg/dL    Protein, Total 6.5 6.0 - 8.0 g/dL    Albumin 2.9 (L) 3.5 - 5.0 g/dL    Alkaline Phosphatase 102 45 - 120 U/L    AST 17 0 - 40 U/L    ALT 16 0 - 45 U/L   Lactic Acid    Collection Time: 01/13/20 12:52 PM   Result Value Ref Range    Lactic Acid 1.3 0.5 - 2.2 mmol/L   Influenza A/B Rapid Test    Collection Time: 01/13/20 12:52 PM   Result Value Ref Range    Influenza  A, Rapid Antigen Influenza A antigen detected (!) No Influenza A antigen detected    Influenza B, Rapid Antigen No Influenza B antigen detected No Influenza B antigen detected   Troponin I    Collection Time: 01/13/20 12:52 PM   Result Value Ref Range    Troponin I 0.04 0.00 - 0.29 ng/mL   Magnesium    Collection Time: 01/13/20 12:52 PM   Result Value Ref Range    Magnesium 1.7 (L) 1.8 - 2.6 mg/dL   HM1 (CBC with Diff)    Collection Time: 01/13/20 12:52 PM   Result Value Ref Range    WBC 8.2 4.0 - 11.0 thou/uL    RBC 2.86 (L) 4.40 - 6.20 mill/uL    Hemoglobin 8.7 (L) 14.0 - 18.0 g/dL    Hematocrit 25.8 (L) 40.0 - 54.0 %    MCV 90 80 - 100 fL    MCH 30.4 27.0 -  34.0 pg    MCHC 33.7 32.0 - 36.0 g/dL    RDW 14.8 (H) 11.0 - 14.5 %    Platelets 225 140 - 440 thou/uL    MPV 9.1 8.5 - 12.5 fL    Neutrophils % 82 (H) 50 - 70 %    Lymphocytes % 6 (L) 20 - 40 %    Monocytes % 10 2 - 10 %    Eosinophils % 0 0 - 6 %    Basophils % 0 0 - 2 %    Neutrophils Absolute 6.8 2.0 - 7.7 thou/uL    Lymphocytes Absolute 0.5 (L) 0.8 - 4.4 thou/uL    Monocytes Absolute 0.8 0.0 - 0.9 thou/uL    Eosinophils Absolute 0.0 0.0 - 0.4 thou/uL    Basophils Absolute 0.0 0.0 - 0.2 thou/uL   ECG 12 lead with MUSE    Collection Time: 01/13/20 12:54 PM   Result Value Ref Range    SYSTOLIC BLOOD PRESSURE 177 mmHg    DIASTOLIC BLOOD PRESSURE 72 mmHg    VENTRICULAR RATE 59 BPM    ATRIAL RATE 59 BPM    P-R INTERVAL 152 ms    QRS DURATION 84 ms    Q-T INTERVAL 456 ms    QTC CALCULATION (BEZET) 451 ms    P Axis 87 degrees    R AXIS 2 degrees    T AXIS 12 degrees    MUSE DIAGNOSIS       Sinus bradycardia  Nonspecific T wave abnormality  Abnormal ECG  When compared with ECG of 15-OCT-2019 22:13,  Premature atrial complexes are no longer Present  Vent. rate has decreased BY  47 BPM  Confirmed by SEE ED PROVIDER NOTE FOR, ECG INTERPRETATION ([id number]),  Jaylene Tay ([id number]) on 1/14/2020 1:03:27 AM     Urinalysis-UC if Indicated    Collection Time: 01/13/20  1:47 PM   Result Value Ref Range    Color, UA Straw Colorless, Yellow, Straw, Light Yellow    Clarity, UA Clear Clear    Glucose,  mg/dL (!) Negative    Bilirubin, UA Negative Negative    Ketones, UA Negative Negative    Specific Gravity, UA 1.010 1.001 - 1.030    Blood, UA Negative Negative    pH, UA 5.0 4.5 - 8.0    Protein, UA 30 mg/dL (!) Negative mg/dL    Urobilinogen, UA <2.0 E.U./dL <2.0 E.U./dL, 2.0 E.U./dL    Nitrite, UA Negative Negative    Leukocytes, UA Negative Negative    Bacteria, UA Few (!) None Seen hpf    RBC, UA 0-2 None Seen, 0-2 hpf    WBC, UA 0-5 None Seen, 0-5 hpf    Squam Epithel, UA 0-5 None Seen, 0-5 lpf    Mucus, UA Few (!) None  Seen lpf    Hyaline Casts, UA 0-5 0-5, None Seen lpf   POCT Glucose    Collection Time: 01/13/20  4:54 PM   Result Value Ref Range    Glucose 188 (H) 70 - 139 mg/dL   Potassium    Collection Time: 01/13/20  6:51 PM   Result Value Ref Range    Potassium 3.1 (L) 3.5 - 5.0 mmol/L   Magnesium    Collection Time: 01/13/20  6:51 PM   Result Value Ref Range    Magnesium 1.7 (L) 1.8 - 2.6 mg/dL   POCT Glucose    Collection Time: 01/13/20  8:26 PM   Result Value Ref Range    Glucose 246 (H) 70 - 139 mg/dL   Potassium    Collection Time: 01/14/20  4:15 AM   Result Value Ref Range    Potassium 3.3 (L) 3.5 - 5.0 mmol/L   Basic Metabolic Panel    Collection Time: 01/14/20  4:15 AM   Result Value Ref Range    Sodium 136 136 - 145 mmol/L    Potassium 3.3 (L) 3.5 - 5.0 mmol/L    Chloride 107 98 - 107 mmol/L    CO2 21 (L) 22 - 31 mmol/L    Anion Gap, Calculation 8 5 - 18 mmol/L    Glucose 132 (H) 70 - 125 mg/dL    Calcium 8.0 (L) 8.5 - 10.5 mg/dL    BUN 38 (H) 8 - 28 mg/dL    Creatinine 2.25 (H) 0.70 - 1.30 mg/dL    GFR MDRD Af Amer 34 (L) >60 mL/min/1.73m2    GFR MDRD Non Af Amer 28 (L) >60 mL/min/1.73m2   Hemoglobin    Collection Time: 01/14/20  4:19 AM   Result Value Ref Range    Hemoglobin 7.0 (L) 14.0 - 18.0 g/dL   POCT Glucose    Collection Time: 01/14/20  6:25 AM   Result Value Ref Range    Glucose 98 70 - 139 mg/dL   Potassium    Collection Time: 01/14/20 10:50 AM   Result Value Ref Range    Potassium 4.2 3.5 - 5.0 mmol/L   POCT Glucose    Collection Time: 01/14/20 11:36 AM   Result Value Ref Range    Glucose 239 (H) 70 - 139 mg/dL   Protime-INR    Collection Time: 01/14/20  3:32 PM   Result Value Ref Range    INR 1.17 (H) 0.90 - 1.10   Lactate Dehydrogenase (LDH)    Collection Time: 01/14/20  3:32 PM   Result Value Ref Range    LD (LDH) 173 125 - 220 U/L   Hemoglobin    Collection Time: 01/14/20  3:33 PM   Result Value Ref Range    Hemoglobin 8.4 (L) 14.0 - 18.0 g/dL   Haptoglobin    Collection Time: 01/14/20  3:33 PM    Result Value Ref Range    Haptoglobin 269 (H) 33 - 171 mg/dL   Reticulocytes    Collection Time: 01/14/20  3:33 PM   Result Value Ref Range    Retic Absolute Count 0.045 0.010 - 0.110 mill/uL    Retic Ct Pct 1.63 0.8 - 2.7 %   POCT Glucose    Collection Time: 01/14/20  5:15 PM   Result Value Ref Range    Glucose 193 (H) 70 - 139 mg/dL   POCT Glucose    Collection Time: 01/14/20  9:00 PM   Result Value Ref Range    Glucose 217 (H) 70 - 139 mg/dL   POCT Glucose    Collection Time: 01/15/20  6:27 AM   Result Value Ref Range    Glucose 117 70 - 139 mg/dL   Basic Metabolic Panel    Collection Time: 01/15/20  6:39 AM   Result Value Ref Range    Sodium 136 136 - 145 mmol/L    Potassium 3.7 3.5 - 5.0 mmol/L    Chloride 109 (H) 98 - 107 mmol/L    CO2 20 (L) 22 - 31 mmol/L    Anion Gap, Calculation 7 5 - 18 mmol/L    Glucose 113 70 - 125 mg/dL    Calcium 8.4 (L) 8.5 - 10.5 mg/dL    BUN 42 (H) 8 - 28 mg/dL    Creatinine 2.08 (H) 0.70 - 1.30 mg/dL    GFR MDRD Af Amer 37 (L) >60 mL/min/1.73m2    GFR MDRD Non Af Amer 31 (L) >60 mL/min/1.73m2   Magnesium    Collection Time: 01/15/20  6:39 AM   Result Value Ref Range    Magnesium 1.7 (L) 1.8 - 2.6 mg/dL   Platelet Count - every other day x 3    Collection Time: 01/15/20  6:39 AM   Result Value Ref Range    Platelets 169 140 - 440 thou/uL   Hemoglobin    Collection Time: 01/15/20  6:39 AM   Result Value Ref Range    Hemoglobin 7.4 (L) 14.0 - 18.0 g/dL   POCT Glucose    Collection Time: 01/15/20 12:42 PM   Result Value Ref Range    Glucose 224 (H) 70 - 139 mg/dL   POCT Glucose    Collection Time: 01/15/20  4:59 PM   Result Value Ref Range    Glucose 213 (H) 70 - 139 mg/dL   POCT Glucose    Collection Time: 01/15/20 10:00 PM   Result Value Ref Range    Glucose 247 (H) 70 - 139 mg/dL   POCT Glucose    Collection Time: 01/16/20  6:31 AM   Result Value Ref Range    Glucose 98 70 - 139 mg/dL   Basic Metabolic Panel    Collection Time: 01/16/20  8:39 AM   Result Value Ref Range    Sodium  138 136 - 145 mmol/L    Potassium 4.2 3.5 - 5.0 mmol/L    Chloride 109 (H) 98 - 107 mmol/L    CO2 20 (L) 22 - 31 mmol/L    Anion Gap, Calculation 9 5 - 18 mmol/L    Glucose 108 70 - 125 mg/dL    Calcium 9.0 8.5 - 10.5 mg/dL    BUN 35 (H) 8 - 28 mg/dL    Creatinine 1.94 (H) 0.70 - 1.30 mg/dL    GFR MDRD Af Amer 41 (L) >60 mL/min/1.73m2    GFR MDRD Non Af Amer 33 (L) >60 mL/min/1.73m2   Hemoglobin    Collection Time: 01/16/20  8:39 AM   Result Value Ref Range    Hemoglobin 8.5 (L) 14.0 - 18.0 g/dL   POCT Glucose    Collection Time: 01/16/20 11:14 AM   Result Value Ref Range    Glucose 253 (H) 70 - 139 mg/dL            Imaging Results     Ct Head Without Contrast    Result Date: 1/13/2020  EXAM: CT HEAD WO CONTRAST LOCATION: [facility] DATE/TIME: 1/13/2020 1:13 PM INDICATION: Generalized weakness. COMPARISON: 10/15/2019. TECHNIQUE: Routine without IV contrast. Multiplanar reformats. Dose reduction techniques were used. FINDINGS: INTRACRANIAL CONTENTS: No intracranial hemorrhage, extraaxial collection, or mass effect.  Chronic infarctions within bilateral thalami and right posterior limb internal capsule. Mild presumed chronic small vessel ischemic changes. Mild generalized volume loss. No hydrocephalus. VISUALIZED ORBITS/SINUSES/MASTOIDS: Prior bilateral cataract surgery. Visualized portions of the orbits are otherwise unremarkable. No paranasal sinus mucosal disease. No middle ear or mastoid effusion. BONES/SOFT TISSUES: No acute abnormality.     1.  No acute intracranial process.     Xr Chest 1 View    Result Date: 1/13/2020  EXAM: XR CHEST 1 VIEW LOCATION: [facility] DATE/TIME: 1/13/2020 1:19 PM INDICATION: fever COMPARISON: 10/15/2019     Shallow inspiration. Lungs are clear. Heart and pulmonary vascularity are normal.    Xr Hip Left 2 Or More Vws Portable    Result Date: 1/14/2020  EXAM: XR HIP LEFT 2 OR MORE VWS PORTABLE LOCATION: [facility] DATE/TIME: 1/14/2020 3:11 PM INDICATION: pain  COMPARISON: None.     The left hip joint space appears normal. No fracture or dislocation seen. There is peripheral arterial disease. Moderate narrowing of the right hip joint space.             KATIE Florez

## 2021-06-05 NOTE — PROGRESS NOTES
[facility] Admission note      Patient: Suman Nagy  MRN: [id number]  Date of Service: 1/23/2020      [facility] [[id number]]  Reason for Visit     Chief Complaint   Patient presents with     Review Of Multiple Medical Conditions       Code Status     FULL CODE    Assessment     - ACUTE INFLUENZA  - acute on chronic anemia  - acute on chronic hip and knee pain  - type 2 DM  - advanced CKD with a discharge creatinine ranging between 2-1.9  -Electrolyte abnormalities with low potassium and magnesium  - htn' with elevated systolic blood pressures noted in the TCU  -Chronic lymphedema  -Profound debilitation with increasing amount of care that this patient needs at home.  He has incontinence at baseline and requires assistance with all his IADLs and most of his ADLs at home  -Protein calorie malnutrition  -Obesity    Plan     Patient has been admitted to the TCU and examined in the presence of his wife.  He is being treated for influenza A with Tamiflu and will finish his 5-day regimen in the TCU.  He is denying any cough congestion or fever.  Weakness issues were reviewed with therapy and he has been walking with contact-guard assist currently so his strength is returned quite a bit.  He continues with therapy.  Blood sugars were reviewed and his blood sugars are consistently greater than 200.  Plan is to increase his Lantus to 14 units in the morning and monitor trends  Continue with his oral hypoglycemics continue to monitor blood sugars and adjust medication and insulin accordingly.  Continues with lymphedema bilateral lower extremities but is not interested in pursuing any wraps or higher amount of diuretics.  Continue with his PT OT and rehab  Recheck labs show stable hemoglobin but impaired renal function monitor trends he has baseline renal impairment.  Recheck BMP closely    History     Patient is a very pleasant 79 y.o. male who is admitted to TCU  He was examined in the  presence of his wife who supplemented his history.  As per the history he lives at home with his wife and other extended family members.  He presented with increasing weakness with low-grade fever and myalgias.  Work-up revealed that he had a positive influenza A.  He is currently being treated with Tamiflu and will finish his course in the TCU.  He denies any cough congestion.  He has been afebrile and denies any concern regarding influenza any longer  He however is profoundly weak requiring 2 person assist at least with all his ADLs and is not able to walk.  However now reporting significant improvement in strength.  He is a contact-guard assist walking up to 200 feet  He was also noted to have acute on chronic anemia with no obvious signs of GI losses.  He was started on Prilosec.  R/C in the TCU was 8.6 and he is being monitored denies any bleeding issues  Anemia most likely secondary to chronic kidney disease discharge creatinine was 2  He has underlying history of type 2 diabetes which as per wife has been adequately controlled in the past.  He will continue with his insulin regimen in the TCU and oral glimepiride  blood sugars will be monitored.  He has underlying history of CVA with resultant left-sided hemiplegia.    However reporting significant improvement in strength.  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)     Anxiety     History of stroke     Medication monitoring encounter     History of bladder cancer     History of rectal cancer     Carotid artery stenosis, asymptomatic, left     Rectal cancer (H)     Hypertension     B12 deficiency      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 He has never used smokeless tobacco. He reports that he does not drink alcohol or use drugs.    Family History     Reviewed, and family history includes COPD in his father.    Medication List   Post Discharge Medication Reconciliation Status: discharge medications reconciled, continue medications without change   Current Outpatient Medications on File Prior to Visit   Medication Sig Dispense Refill     acetaminophen (TYLENOL) 325 MG tablet Take 2 tablets (650 mg total) by mouth every 4 (four) hours as needed.  0     amLODIPine (NORVASC) 10 MG tablet Take 1 tablet (10 mg total) by mouth daily.       atorvastatin (LIPITOR) 20 MG tablet Take 1 tablet (20 mg total) by mouth daily. 90 tablet 3     blood glucose test strips Check blood sugar 3 times per day. Accu-Chek Guide test striips. 100 strip 3     cholecalciferol, vitamin D3, (VITAMIN D3) 1,000 unit capsule Take 2 capsules (2,000 Units total) by mouth daily. 180 capsule 0     cloNIDine HCl (CATAPRES) 0.3 MG tablet Take 0.5 tablets (0.15 mg total) by mouth 3 (three) times a day.  0     clopidogrel (PLAVIX) 75 mg tablet Take 1 tablet (75 mg total) by mouth daily. 90 tablet 0     cyanocobalamin 1,000 mcg/mL injection Inject 1,000 mcg into the shoulder, thigh, or buttocks every 3 (three) months.              cyanocobalamin, vitamin B-12, (VITAMIN B-12) 1,000 mcg Subl Place 1 tablet (1,000 mcg total) under the tongue daily. 90 tablet 3     diclofenac sodium (VOLTAREN) 1 % Gel Apply 4 g topically 3 (three) times a day. Apply to knees (Patient taking  "differently: Apply 4 g topically 3 (three) times a day as needed. Apply to knees ) 100 g 0     furosemide (LASIX) 40 MG tablet Take 40 mg by mouth daily.       generic lancets Fastclix lancets. Check blood sugar 3 times per day. 102 each 3     glimepiride (AMARYL) 4 MG tablet Take 4 mg by mouth twice a day with lunch and supper.       hydrALAZINE (APRESOLINE) 25 MG tablet Take 25 mg by mouth 4 (four) times a day.       insulin glargine (LANTUS SOLOSTAR PEN) 100 unit/mL (3 mL) pen Inject 10 Units under the skin at bedtime.       metoprolol succinate (TOPROL-XL) 25 MG Take 1 tablet (25 mg total) by mouth daily. 90 tablet 3     omeprazole (PRILOSEC) 20 MG capsule Take 1 capsule (20 mg total) by mouth 2 (two) times a day before meals.  0     ONETOUCH ULTRA2 METER Misc AS DIRECTED 1 each 0     pen needle, diabetic (BD ULTRA-FINE JONO PEN NEEDLE) 32 gauge x 5/32\" Ndle Use one needle per day to inject insulin. 100 each 4     polyethylene glycol (MIRALAX) 17 gram packet Take 1 packet (17 g total) by mouth daily as needed.  0     psyllium husk (DAILY FIBER ORAL) Take 1 tablet by mouth 2 (two) times a day.              sertraline (ZOLOFT) 50 MG tablet Take 50 mg by mouth daily.       No current facility-administered medications on file prior to visit.        Allergies     Allergies   Allergen Reactions     Cefazolin      \"felt very hot\"     Pravastatin      Increased peripheral neuropathy     Simvastatin      Increased peripheral neuropathy     Thiazides      Other: Gout         Review of Systems   A comprehensive review of 14 systems was done. Pertinent findings noted here and in history of present illness. All the rest negative.  Constitutional: Negative.  Negative for fever, chills, he has activity change, appetite change and fatigue.   HENT: Negative for congestion and facial swelling.    Eyes: Negative for photophobia, redness and visual disturbance.   Respiratory: Negative for cough and chest tightness.  "   Cardiovascular: Negative for chest pain, palpitations and has chronic leg swelling.   Gastrointestinal: Negative for nausea, diarrhea, constipation, blood in stool and abdominal distention.   Genitourinary: Negative.  Has significant incontinence  Musculoskeletal: Negative.  REPorting significant weakness and difficulty ambulating  Skin: Negative.    Neurological: Negative for dizziness, tremors, syncope, weakness, light-headedness and headaches.   Hematological: Does not bruise/bleed easily.   Psychiatric/Behavioral: Negative.        Physical Exam     Recent Vitals 1/21/2020   Height -   Weight 188 lbs   BSA (m2) 2.01 m2   /77   Pulse 64   Temp 98.6   Temp src -   SpO2 -   Some recent data might be hidden   Pulse is 79 temp is 98 and sats are 90% on room air    Constitutional: Oriented to person, place, and time and appears well-developed.   HEENT:  Normocephalic and atraumatic.  Eyes: Conjunctivae and EOM are normal. Pupils are equal, round, and reactive to light. No discharge.  No scleral icterus. Nose normal. Mouth/Throat: Oropharynx is clear and moist. No oropharyngeal exudate.    NECK: Normal range of motion. Neck supple. No JVD present. No tracheal deviation present. No thyromegaly present.   CARDIOVASCULAR: Normal rate, regular rhythm and intact distal pulses.  Exam reveals no gallop and no friction rub.  Systolic murmur present.  PULMONARY: Effort normal and breath sounds normal. No respiratory distress.No Wheezing or rales.  ABDOMEN: Soft. Bowel sounds are normal. No distension and no mass.  There is no tenderness. There is no rebound and no guarding. No HSM.  MUSCULOSKELETAL: Normal range of motion.  Has bilateral 2+ leg edema and no tenderness. Mild kyphosis, no tenderness.  Bilateral lower extremity weakness noted on exam  LYMPH NODES: Has no cervical, supraclavicular, axillary and groin adenopathy.   NEUROLOGICAL: Alert and oriented to person, place, and time. No cranial nerve deficit.  Normal  muscle tone. Coordination normal.   Chronic left-sided weakness from previous CVA  GENITOURINARY: Deferred exam.  SKIN: Skin is warm and dry. No rash noted. No erythema. No pallor.   EXTREMITIES: No cyanosis, no clubbing, has bilateral 2+ leg edema. No Deformity.  PSYCHIATRIC: Normal mood, affect and behavior.      Lab Results     Recent Results (from the past 240 hour(s))   POCT Glucose    Collection Time: 01/13/20 11:46 AM   Result Value Ref Range    Glucose 292 (H) 70 - 139 mg/dL   Comprehensive Metabolic Panel    Collection Time: 01/13/20 12:52 PM   Result Value Ref Range    Sodium 133 (L) 136 - 145 mmol/L    Potassium 3.1 (L) 3.5 - 5.0 mmol/L    Chloride 102 98 - 107 mmol/L    CO2 19 (L) 22 - 31 mmol/L    Anion Gap, Calculation 12 5 - 18 mmol/L    Glucose 308 (H) 70 - 125 mg/dL    BUN 40 (H) 8 - 28 mg/dL    Creatinine 2.41 (H) 0.70 - 1.30 mg/dL    GFR MDRD Af Amer 32 (L) >60 mL/min/1.73m2    GFR MDRD Non Af Amer 26 (L) >60 mL/min/1.73m2    Bilirubin, Total 0.5 0.0 - 1.0 mg/dL    Calcium 8.6 8.5 - 10.5 mg/dL    Protein, Total 6.5 6.0 - 8.0 g/dL    Albumin 2.9 (L) 3.5 - 5.0 g/dL    Alkaline Phosphatase 102 45 - 120 U/L    AST 17 0 - 40 U/L    ALT 16 0 - 45 U/L   Lactic Acid    Collection Time: 01/13/20 12:52 PM   Result Value Ref Range    Lactic Acid 1.3 0.5 - 2.2 mmol/L   Influenza A/B Rapid Test    Collection Time: 01/13/20 12:52 PM   Result Value Ref Range    Influenza  A, Rapid Antigen Influenza A antigen detected (!) No Influenza A antigen detected    Influenza B, Rapid Antigen No Influenza B antigen detected No Influenza B antigen detected   Troponin I    Collection Time: 01/13/20 12:52 PM   Result Value Ref Range    Troponin I 0.04 0.00 - 0.29 ng/mL   Magnesium    Collection Time: 01/13/20 12:52 PM   Result Value Ref Range    Magnesium 1.7 (L) 1.8 - 2.6 mg/dL   HM1 (CBC with Diff)    Collection Time: 01/13/20 12:52 PM   Result Value Ref Range    WBC 8.2 4.0 - 11.0 thou/uL    RBC 2.86 (L) 4.40 - 6.20  mill/uL    Hemoglobin 8.7 (L) 14.0 - 18.0 g/dL    Hematocrit 25.8 (L) 40.0 - 54.0 %    MCV 90 80 - 100 fL    MCH 30.4 27.0 - 34.0 pg    MCHC 33.7 32.0 - 36.0 g/dL    RDW 14.8 (H) 11.0 - 14.5 %    Platelets 225 140 - 440 thou/uL    MPV 9.1 8.5 - 12.5 fL    Neutrophils % 82 (H) 50 - 70 %    Lymphocytes % 6 (L) 20 - 40 %    Monocytes % 10 2 - 10 %    Eosinophils % 0 0 - 6 %    Basophils % 0 0 - 2 %    Neutrophils Absolute 6.8 2.0 - 7.7 thou/uL    Lymphocytes Absolute 0.5 (L) 0.8 - 4.4 thou/uL    Monocytes Absolute 0.8 0.0 - 0.9 thou/uL    Eosinophils Absolute 0.0 0.0 - 0.4 thou/uL    Basophils Absolute 0.0 0.0 - 0.2 thou/uL   ECG 12 lead with MUSE    Collection Time: 01/13/20 12:54 PM   Result Value Ref Range    SYSTOLIC BLOOD PRESSURE 177 mmHg    DIASTOLIC BLOOD PRESSURE 72 mmHg    VENTRICULAR RATE 59 BPM    ATRIAL RATE 59 BPM    P-R INTERVAL 152 ms    QRS DURATION 84 ms    Q-T INTERVAL 456 ms    QTC CALCULATION (BEZET) 451 ms    P Axis 87 degrees    R AXIS 2 degrees    T AXIS 12 degrees    MUSE DIAGNOSIS       Sinus bradycardia  Nonspecific T wave abnormality  Abnormal ECG  When compared with ECG of 15-OCT-2019 22:13,  Premature atrial complexes are no longer Present  Vent. rate has decreased BY  47 BPM  Confirmed by SEE ED PROVIDER NOTE FOR, ECG INTERPRETATION ([id number]),  Jaylene Tay ([id number]) on 1/14/2020 1:03:27 AM     Urinalysis-UC if Indicated    Collection Time: 01/13/20  1:47 PM   Result Value Ref Range    Color, UA Straw Colorless, Yellow, Straw, Light Yellow    Clarity, UA Clear Clear    Glucose,  mg/dL (!) Negative    Bilirubin, UA Negative Negative    Ketones, UA Negative Negative    Specific Gravity, UA 1.010 1.001 - 1.030    Blood, UA Negative Negative    pH, UA 5.0 4.5 - 8.0    Protein, UA 30 mg/dL (!) Negative mg/dL    Urobilinogen, UA <2.0 E.U./dL <2.0 E.U./dL, 2.0 E.U./dL    Nitrite, UA Negative Negative    Leukocytes, UA Negative Negative    Bacteria, UA Few (!) None Seen hpf    RBC, UA  0-2 None Seen, 0-2 hpf    WBC, UA 0-5 None Seen, 0-5 hpf    Squam Epithel, UA 0-5 None Seen, 0-5 lpf    Mucus, UA Few (!) None Seen lpf    Hyaline Casts, UA 0-5 0-5, None Seen lpf   POCT Glucose    Collection Time: 01/13/20  4:54 PM   Result Value Ref Range    Glucose 188 (H) 70 - 139 mg/dL   Potassium    Collection Time: 01/13/20  6:51 PM   Result Value Ref Range    Potassium 3.1 (L) 3.5 - 5.0 mmol/L   Magnesium    Collection Time: 01/13/20  6:51 PM   Result Value Ref Range    Magnesium 1.7 (L) 1.8 - 2.6 mg/dL   POCT Glucose    Collection Time: 01/13/20  8:26 PM   Result Value Ref Range    Glucose 246 (H) 70 - 139 mg/dL   Potassium    Collection Time: 01/14/20  4:15 AM   Result Value Ref Range    Potassium 3.3 (L) 3.5 - 5.0 mmol/L   Basic Metabolic Panel    Collection Time: 01/14/20  4:15 AM   Result Value Ref Range    Sodium 136 136 - 145 mmol/L    Potassium 3.3 (L) 3.5 - 5.0 mmol/L    Chloride 107 98 - 107 mmol/L    CO2 21 (L) 22 - 31 mmol/L    Anion Gap, Calculation 8 5 - 18 mmol/L    Glucose 132 (H) 70 - 125 mg/dL    Calcium 8.0 (L) 8.5 - 10.5 mg/dL    BUN 38 (H) 8 - 28 mg/dL    Creatinine 2.25 (H) 0.70 - 1.30 mg/dL    GFR MDRD Af Amer 34 (L) >60 mL/min/1.73m2    GFR MDRD Non Af Amer 28 (L) >60 mL/min/1.73m2   Hemoglobin    Collection Time: 01/14/20  4:19 AM   Result Value Ref Range    Hemoglobin 7.0 (L) 14.0 - 18.0 g/dL   POCT Glucose    Collection Time: 01/14/20  6:25 AM   Result Value Ref Range    Glucose 98 70 - 139 mg/dL   Potassium    Collection Time: 01/14/20 10:50 AM   Result Value Ref Range    Potassium 4.2 3.5 - 5.0 mmol/L   POCT Glucose    Collection Time: 01/14/20 11:36 AM   Result Value Ref Range    Glucose 239 (H) 70 - 139 mg/dL   Protime-INR    Collection Time: 01/14/20  3:32 PM   Result Value Ref Range    INR 1.17 (H) 0.90 - 1.10   Lactate Dehydrogenase (LDH)    Collection Time: 01/14/20  3:32 PM   Result Value Ref Range    LD (LDH) 173 125 - 220 U/L   Hemoglobin    Collection Time: 01/14/20   3:33 PM   Result Value Ref Range    Hemoglobin 8.4 (L) 14.0 - 18.0 g/dL   Haptoglobin    Collection Time: 01/14/20  3:33 PM   Result Value Ref Range    Haptoglobin 269 (H) 33 - 171 mg/dL   Reticulocytes    Collection Time: 01/14/20  3:33 PM   Result Value Ref Range    Retic Absolute Count 0.045 0.010 - 0.110 mill/uL    Retic Ct Pct 1.63 0.8 - 2.7 %   POCT Glucose    Collection Time: 01/14/20  5:15 PM   Result Value Ref Range    Glucose 193 (H) 70 - 139 mg/dL   POCT Glucose    Collection Time: 01/14/20  9:00 PM   Result Value Ref Range    Glucose 217 (H) 70 - 139 mg/dL   POCT Glucose    Collection Time: 01/15/20  6:27 AM   Result Value Ref Range    Glucose 117 70 - 139 mg/dL   Basic Metabolic Panel    Collection Time: 01/15/20  6:39 AM   Result Value Ref Range    Sodium 136 136 - 145 mmol/L    Potassium 3.7 3.5 - 5.0 mmol/L    Chloride 109 (H) 98 - 107 mmol/L    CO2 20 (L) 22 - 31 mmol/L    Anion Gap, Calculation 7 5 - 18 mmol/L    Glucose 113 70 - 125 mg/dL    Calcium 8.4 (L) 8.5 - 10.5 mg/dL    BUN 42 (H) 8 - 28 mg/dL    Creatinine 2.08 (H) 0.70 - 1.30 mg/dL    GFR MDRD Af Amer 37 (L) >60 mL/min/1.73m2    GFR MDRD Non Af Amer 31 (L) >60 mL/min/1.73m2   Magnesium    Collection Time: 01/15/20  6:39 AM   Result Value Ref Range    Magnesium 1.7 (L) 1.8 - 2.6 mg/dL   Platelet Count - every other day x 3    Collection Time: 01/15/20  6:39 AM   Result Value Ref Range    Platelets 169 140 - 440 thou/uL   Hemoglobin    Collection Time: 01/15/20  6:39 AM   Result Value Ref Range    Hemoglobin 7.4 (L) 14.0 - 18.0 g/dL   POCT Glucose    Collection Time: 01/15/20 12:42 PM   Result Value Ref Range    Glucose 224 (H) 70 - 139 mg/dL   POCT Glucose    Collection Time: 01/15/20  4:59 PM   Result Value Ref Range    Glucose 213 (H) 70 - 139 mg/dL   POCT Glucose    Collection Time: 01/15/20 10:00 PM   Result Value Ref Range    Glucose 247 (H) 70 - 139 mg/dL   POCT Glucose    Collection Time: 01/16/20  6:31 AM   Result Value Ref Range     Glucose 98 70 - 139 mg/dL   Basic Metabolic Panel    Collection Time: 01/16/20  8:39 AM   Result Value Ref Range    Sodium 138 136 - 145 mmol/L    Potassium 4.2 3.5 - 5.0 mmol/L    Chloride 109 (H) 98 - 107 mmol/L    CO2 20 (L) 22 - 31 mmol/L    Anion Gap, Calculation 9 5 - 18 mmol/L    Glucose 108 70 - 125 mg/dL    Calcium 9.0 8.5 - 10.5 mg/dL    BUN 35 (H) 8 - 28 mg/dL    Creatinine 1.94 (H) 0.70 - 1.30 mg/dL    GFR MDRD Af Amer 41 (L) >60 mL/min/1.73m2    GFR MDRD Non Af Amer 33 (L) >60 mL/min/1.73m2   Hemoglobin    Collection Time: 01/16/20  8:39 AM   Result Value Ref Range    Hemoglobin 8.5 (L) 14.0 - 18.0 g/dL   POCT Glucose    Collection Time: 01/16/20 11:14 AM   Result Value Ref Range    Glucose 253 (H) 70 - 139 mg/dL   Basic Metabolic Panel    Collection Time: 01/20/20 11:25 AM   Result Value Ref Range    Sodium 135 (L) 136 - 145 mmol/L    Potassium 4.2 3.5 - 5.0 mmol/L    Chloride 105 98 - 107 mmol/L    CO2 21 (L) 22 - 31 mmol/L    Anion Gap, Calculation 9 5 - 18 mmol/L    Glucose 273 (H) 70 - 125 mg/dL    Calcium 8.6 8.5 - 10.5 mg/dL    BUN 45 (H) 8 - 28 mg/dL    Creatinine 2.36 (H) 0.70 - 1.30 mg/dL    GFR MDRD Af Amer 32 (L) >60 mL/min/1.73m2    GFR MDRD Non Af Amer 27 (L) >60 mL/min/1.73m2   HM2(CBC w/o Differential)    Collection Time: 01/20/20 11:25 AM   Result Value Ref Range    WBC 5.9 4.0 - 11.0 thou/uL    RBC 2.90 (L) 4.40 - 6.20 mill/uL    Hemoglobin 8.6 (L) 14.0 - 18.0 g/dL    Hematocrit 27.4 (L) 40.0 - 54.0 %    MCV 95 80 - 100 fL    MCH 29.7 27.0 - 34.0 pg    MCHC 31.4 (L) 32.0 - 36.0 g/dL    RDW 14.4 11.0 - 14.5 %    Platelets 279 140 - 440 thou/uL    MPV 9.5 8.5 - 12.5 fL   Magnesium    Collection Time: 01/20/20 11:25 AM   Result Value Ref Range    Magnesium 1.9 1.8 - 2.6 mg/dL            Imaging Results     Ct Head Without Contrast    Result Date: 1/13/2020  EXAM: CT HEAD WO CONTRAST LOCATION: [facility] DATE/TIME: 1/13/2020 1:13 PM INDICATION: Generalized weakness. COMPARISON:  10/15/2019. TECHNIQUE: Routine without IV contrast. Multiplanar reformats. Dose reduction techniques were used. FINDINGS: INTRACRANIAL CONTENTS: No intracranial hemorrhage, extraaxial collection, or mass effect.  Chronic infarctions within bilateral thalami and right posterior limb internal capsule. Mild presumed chronic small vessel ischemic changes. Mild generalized volume loss. No hydrocephalus. VISUALIZED ORBITS/SINUSES/MASTOIDS: Prior bilateral cataract surgery. Visualized portions of the orbits are otherwise unremarkable. No paranasal sinus mucosal disease. No middle ear or mastoid effusion. BONES/SOFT TISSUES: No acute abnormality.     1.  No acute intracranial process.     Xr Chest 1 View    Result Date: 1/13/2020  EXAM: XR CHEST 1 VIEW LOCATION: [facility] DATE/TIME: 1/13/2020 1:19 PM INDICATION: fever COMPARISON: 10/15/2019     Shallow inspiration. Lungs are clear. Heart and pulmonary vascularity are normal.    Xr Hip Left 2 Or More Vws Portable    Result Date: 1/14/2020  EXAM: XR HIP LEFT 2 OR MORE VWS PORTABLE LOCATION: [facility] DATE/TIME: 1/14/2020 3:11 PM INDICATION: pain COMPARISON: None.     The left hip joint space appears normal. No fracture or dislocation seen. There is peripheral arterial disease. Moderate narrowing of the right hip joint space.             KATIE Florez

## 2021-06-05 NOTE — PROGRESS NOTES
".[facility]      NAME:  Suman Nagy             :  1940    MRN: [id number]    CODE STATUS:  FULL CODE    FACILITY: [facility] [[id number]]      CHIEF COMPLAIN/REASON FOR VISIT:  Chief Complaint   Patient presents with     Problem Visit     Review Of Multiple Medical Conditions       HISTORY OF PRESENT ILLNESS: Suman Nagy is a 79 y.o. male being seen for review of multiple medical conditions, per nursing request his POLST reviewed and advance care planning discussed. He came to the TCU from [facility]. After a decondtioned state for influenza. Per his EMR \" with a history of CVA with persistent left-sided deficits, seizure disorder, type 2 diabetes, chronic kidney disease stage III-IV presents with fatigue body aches and malaise found to have influenza a in the emergency department.\" His HBG in hospital was lowest at 7, never transfused and was dced to us with a 8.5 level. He is seen working in therapies, has had no concerns. Noted BS up a bit, we will need to check BS four times a day for more trending, will review and adjust meds prn. Tamiflu has completed, out of isolation at this time.    Allergies   Allergen Reactions     Cefazolin      \"felt very hot\"     Pravastatin      Increased peripheral neuropathy     Simvastatin      Increased peripheral neuropathy     Thiazides      Other: Gout     :     Current Outpatient Medications   Medication Sig     acetaminophen (TYLENOL) 325 MG tablet Take 2 tablets (650 mg total) by mouth every 4 (four) hours as needed.     amLODIPine (NORVASC) 10 MG tablet Take 1 tablet (10 mg total) by mouth daily.     atorvastatin (LIPITOR) 20 MG tablet Take 1 tablet (20 mg total) by mouth daily.     blood glucose test strips Check blood sugar 3 times per day. Accu-Chek Guide test striips.     cholecalciferol, vitamin D3, (VITAMIN D3) 1,000 unit capsule Take 2 capsules (2,000 Units total) by mouth daily.     " "cloNIDine HCl (CATAPRES) 0.3 MG tablet Take 0.5 tablets (0.15 mg total) by mouth 3 (three) times a day.     clopidogrel (PLAVIX) 75 mg tablet Take 1 tablet (75 mg total) by mouth daily.     cyanocobalamin 1,000 mcg/mL injection Inject 1,000 mcg into the shoulder, thigh, or buttocks every 3 (three) months.            cyanocobalamin, vitamin B-12, (VITAMIN B-12) 1,000 mcg Subl Place 1 tablet (1,000 mcg total) under the tongue daily.     diclofenac sodium (VOLTAREN) 1 % Gel Apply 4 g topically 3 (three) times a day. Apply to knees (Patient taking differently: Apply 4 g topically 3 (three) times a day as needed. Apply to knees )     furosemide (LASIX) 40 MG tablet Take 40 mg by mouth daily.     generic lancets Fastclix lancets. Check blood sugar 3 times per day.     glimepiride (AMARYL) 4 MG tablet Take 4 mg by mouth twice a day with lunch and supper.     hydrALAZINE (APRESOLINE) 25 MG tablet Take 25 mg by mouth 4 (four) times a day.     insulin glargine (LANTUS SOLOSTAR PEN) 100 unit/mL (3 mL) pen Inject 10 Units under the skin at bedtime.     metoprolol succinate (TOPROL-XL) 25 MG Take 1 tablet (25 mg total) by mouth daily.     omeprazole (PRILOSEC) 20 MG capsule Take 1 capsule (20 mg total) by mouth 2 (two) times a day before meals.     ONETOUCH ULTRA2 METER Misc AS DIRECTED     pen needle, diabetic (BD ULTRA-FINE JONO PEN NEEDLE) 32 gauge x 5/32\" Ndle Use one needle per day to inject insulin.     polyethylene glycol (MIRALAX) 17 gram packet Take 1 packet (17 g total) by mouth daily as needed.     psyllium husk (DAILY FIBER ORAL) Take 1 tablet by mouth 2 (two) times a day.            sertraline (ZOLOFT) 50 MG tablet Take 50 mg by mouth daily.         REVIEW OF SYSTEMS:    Currently, no fever, chills, or rigors. Does not have any visual or hearing problems. Denies any chest pain, headaches, palpitations, lightheadedness, dizziness, shortness of breath, or cough. Appetite is good. Denies any GERD symptoms. Denies any " difficulty with swallowing, nausea, or vomiting.  Denies any abdominal pain, diarrhea or constipation. Denies any urinary symptoms. No insomnia. No active bleeding. No rash.       PHYSICAL EXAMINATION:  Vitals:    01/21/20 0639   BP: 132/77   Pulse: 64   Temp: 98.6  F (37  C)   Weight: 188 lb (85.3 kg)         GENERAL: Awake, Alert, oriented x3, not in any form of acute distress, answers questions appropriately, follows simple commands, conversant  HEENT: Head is normocephalic with normal hair distribution. No evidence of trauma. Ears: No acute purulent discharge. Eyes: Conjunctivae pink with no scleral jaundice. Nose: Normal mucosa and septum. NECK: Supple with no cervical or supraclavicular lymphadenopathy. Trachea is midline.   CHEST: No tenderness or deformity, no crepitus  LUNG: Clear to auscultation with good chest expansion. There are no crackles or wheezes, normal AP diameter.  BACK: No kyphosis of the thoracic spine. Symmetric, no curvature, ROM normal, no CVA tenderness, no spinal tenderness   CVS: There is good S1  S2,  rhythm is regular.  ABDOMEN: Globular and soft, nontender to palpation, non distended, no masses, no organomegaly, good bowel sounds, no rebound or guarding, no peritoneal signs.   EXTREMITIES: Atraumatic. Full range of motion on both upper and lower extremities, there is no tenderness to palpation, positive pedal edema, no cyanosis or clubbing, no calf tenderness, normal cap refill, no joint swelling. Generalized weakness  SKIN: Warm and dry, no erythema noted, no rashes or lesions.  NEUROLOGICAL: The patient is oriented to person, place and time. Strength and sensation are grossly intact. Face is symmetric.          LABS:    Lab Results   Component Value Date    WBC 5.9 01/20/2020    HGB 8.6 (L) 01/20/2020    HCT 27.4 (L) 01/20/2020    MCV 95 01/20/2020     01/20/2020       Results for orders placed or performed in visit on 01/20/20   Basic Metabolic Panel   Result Value Ref Range     Sodium 135 (L) 136 - 145 mmol/L    Potassium 4.2 3.5 - 5.0 mmol/L    Chloride 105 98 - 107 mmol/L    CO2 21 (L) 22 - 31 mmol/L    Anion Gap, Calculation 9 5 - 18 mmol/L    Glucose 273 (H) 70 - 125 mg/dL    Calcium 8.6 8.5 - 10.5 mg/dL    BUN 45 (H) 8 - 28 mg/dL    Creatinine 2.36 (H) 0.70 - 1.30 mg/dL    GFR MDRD Af Amer 32 (L) >60 mL/min/1.73m2    GFR MDRD Non Af Amer 27 (L) >60 mL/min/1.73m2           Lab Results   Component Value Date    HGBA1C 7.6 (H) 10/28/2019     Vitamin D, Total (25-Hydroxy)   Date Value Ref Range Status   10/28/2019 14.0 (L) 30.0 - 80.0 ng/mL Final     Lab Results   Component Value Date    MPZOIGHG31 >2,000 (H) 10/28/2019       ASSESSMENT/PLAN:  1. Influenza A    2. Hypertension    3. 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 Influenza: He is in a deconditioned state s/p for his hospitalization for influenza, Remains in room isolation until tamiflu completed. He is to participate in PT/OT and have SN for chronic medical condition management, meds and VS.    2. HTN: 132/77, See med list, on Lasix as well as apresoline    3. Dm:We ordered BS checks four times a day as we need better trending to adjust any meds, we will review throughout stay.            Electronically signed by:  Shahrzad Mahan CNP  This progress note was completed using Dragon software and there may be grammatical errors.

## 2021-06-05 NOTE — PROGRESS NOTES
[facility] Admission note      Patient: Suman Nagy  MRN: [id number]  Date of Service: 1/30/2020      [facility] [[id number]]  Reason for Visit     Chief Complaint   Patient presents with     Review Of Multiple Medical Conditions       Code Status     FULL CODE    Assessment     - ACUTE INFLUENZA; RESOLVED  - acute on chronic anemia  - acute on chronic hip and knee pain  - type 2 DM uncontrolled  - advanced CKD with a discharge creatinine ranging between 2-1.9  R/c 2.3  -Electrolyte abnormalities with low potassium and magnesium  - htn' with elevated systolic blood pressures noted in the TCU  -Chronic lymphedema  -Profound debilitation with increasing amount of care that this patient needs at home.  He has incontinence at baseline and requires assistance with all his IADLs and most of his ADLs at home  -Protein calorie malnutrition  -Obesity    Plan     Patient has been admitted to the TCU   He is now on a higher dose of Lantus 16 units in the morning   however because of elevated blood sugar of 377 ; last night he was given 5 units of Lantus.  WiLL  monitor trends on blood sugar before making any further adjustment.  Physically is doing quite well and is now ambulating with stairs   he is a 1 person assist.  No hypoxia no cough no congestion.  He has chronic lymphedema does not want it addressed.  R/C HG 8.6  Denies any GI bleed  R/c ckd with stable creat 2.3  Discharge plan is to go home      History     Patient is a very pleasant 79 y.o. male who is admitted to TCU  He presented with increasing weakness with low-grade fever and myalgias.  Work-up revealed that he had a positive influenza A.  He is currently done with Tamiflu .  He denies any cough congestion.  He has been afebrile and denies any concern regarding influenza any longer.   He however is profoundly weak requiring 2 person assist at least with all his ADLs and is not able to walk.  However now reporting significant  improvement in strength.  He is a contact-guard assist walking up to 200 feet  Therapy plans to start working with stairs with him if he improves the plan to discharge him home soon  He was also noted to have acute on chronic anemia with no obvious signs of GI losses.  He was started on Prilosec.  R/C in the TCU was 8.6 and he is being monitored denies any bleeding issues  Anemia most likely secondary to chronic kidney disease discharge creatinine was 2  He has underlying history of type 2 diabetes which is not well controlled.  Lantus increased to 16 units last night he had a blood sugar of 377  He has underlying history of CVA with resultant left-sided hemiplegia.    However reporting significant improvement in strength.  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)     Anxiety     History of stroke     Medication monitoring encounter     History of bladder cancer     History of rectal cancer     Carotid artery stenosis, asymptomatic, left     Rectal cancer (H)     Hypertension     B12 deficiency     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 He has never used smokeless tobacco. He reports that he does not drink alcohol or use drugs.    Family History     Reviewed, and family history  includes COPD in his father.    Medication List   Post Discharge Medication Reconciliation Status: discharge medications reconciled, continue medications without change   Current Outpatient Medications on File Prior to Visit   Medication Sig Dispense Refill     acetaminophen (TYLENOL) 325 MG tablet Take 2 tablets (650 mg total) by mouth every 4 (four) hours as needed.  0     amLODIPine (NORVASC) 10 MG tablet Take 1 tablet (10 mg total) by mouth daily.       atorvastatin (LIPITOR) 20 MG tablet Take 1 tablet (20 mg total) by mouth daily. 90 tablet 3     blood glucose test strips Check blood sugar 3 times per day. Accu-Chek Guide test striips. 100 strip 3     cholecalciferol, vitamin D3, (VITAMIN D3) 1,000 unit capsule Take 2 capsules (2,000 Units total) by mouth daily. 180 capsule 0     cloNIDine HCl (CATAPRES) 0.3 MG tablet Take 0.5 tablets (0.15 mg total) by mouth 3 (three) times a day.  0     clopidogrel (PLAVIX) 75 mg tablet Take 1 tablet (75 mg total) by mouth daily. 90 tablet 0     cyanocobalamin 1,000 mcg/mL injection Inject 1,000 mcg into the shoulder, thigh, or buttocks every 3 (three) months.              cyanocobalamin, vitamin B-12, (VITAMIN B-12) 1,000 mcg Subl Place 1 tablet (1,000 mcg total) under the tongue daily. 90 tablet 3     diclofenac sodium (VOLTAREN) 1 % Gel Apply 4 g topically 3 (three) times a day. Apply to knees (Patient taking differently: Apply 4 g topically 3 (three) times a day as needed. Apply to knees ) 100 g 0     furosemide (LASIX) 40 MG tablet Take 40 mg by mouth daily.       generic lancets Fastclix lancets. Check blood sugar 3 times per day. 102 each 3     glimepiride (AMARYL) 4 MG tablet Take 4 mg by mouth twice a day with lunch and supper.       hydrALAZINE (APRESOLINE) 25 MG tablet Take 25 mg by mouth 4 (four) times a day.       insulin glargine (LANTUS SOLOSTAR PEN) 100 unit/mL (3 mL) pen Inject 10 Units under the skin at bedtime.       metoprolol succinate (TOPROL-XL) 25 MG  "Take 1 tablet (25 mg total) by mouth daily. 90 tablet 3     omeprazole (PRILOSEC) 20 MG capsule Take 1 capsule (20 mg total) by mouth 2 (two) times a day before meals.  0     ONETOUCH ULTRA2 METER Mis AS DIRECTED 1 each 0     pen needle, diabetic (BD ULTRA-FINE JONO PEN NEEDLE) 32 gauge x 5/32\" Ndle Use one needle per day to inject insulin. 100 each 4     polyethylene glycol (MIRALAX) 17 gram packet Take 1 packet (17 g total) by mouth daily as needed.  0     psyllium husk (DAILY FIBER ORAL) Take 1 tablet by mouth 2 (two) times a day.              sertraline (ZOLOFT) 50 MG tablet Take 50 mg by mouth daily.       No current facility-administered medications on file prior to visit.        Allergies     Allergies   Allergen Reactions     Cefazolin      \"felt very hot\"     Pravastatin      Increased peripheral neuropathy     Simvastatin      Increased peripheral neuropathy     Thiazides      Other: Gout         Review of Systems   A comprehensive review of 14 systems was done. Pertinent findings noted here and in history of present illness. All the rest negative.  Constitutional: Negative.  Negative for fever, chills, he has activity change, appetite change and fatigue.   HENT: Negative for congestion and facial swelling.    Eyes: Negative for photophobia, redness and visual disturbance.   Respiratory: Negative for cough and chest tightness.    Cardiovascular: Negative for chest pain, palpitations and has chronic leg swelling.   Gastrointestinal: Negative for nausea, diarrhea, constipation, blood in stool and abdominal distention.   Genitourinary: Negative.  Has significant incontinence  Musculoskeletal: Negative.  REPorting significant weakness and difficulty ambulating  Skin: Negative.    Neurological: Negative for dizziness, tremors, syncope, weakness, light-headedness and headaches.   Hematological: Does not bruise/bleed easily.   Psychiatric/Behavioral: Negative.        Physical Exam     Recent Vitals 1/21/2020 "   Height -   Weight 188 lbs   BSA (m2) 2.01 m2   /77   Pulse 64   Temp 98.6   Temp src -   SpO2 -   Some recent data might be hidden       Constitutional: Oriented to person, place, and time and appears well-developed.   HEENT:  Normocephalic and atraumatic.  Eyes: Conjunctivae and EOM are normal. Pupils are equal, round, and reactive to light. No discharge.  No scleral icterus. Nose normal. Mouth/Throat: Oropharynx is clear and moist. No oropharyngeal exudate.    NECK: Normal range of motion. Neck supple. No JVD present. No tracheal deviation present. No thyromegaly present.   CARDIOVASCULAR: Normal rate, regular rhythm and intact distal pulses.  Exam reveals no gallop and no friction rub.  Systolic murmur present.  PULMONARY: Effort normal and breath sounds normal. No respiratory distress.No Wheezing or rales.  ABDOMEN: Soft. Bowel sounds are normal. No distension and no mass.  There is no tenderness. There is no rebound and no guarding. No HSM.  MUSCULOSKELETAL: Normal range of motion.  Has bilateral 2+ leg edema and no tenderness. Mild kyphosis, no tenderness.  Bilateral lower extremity weakness noted on exam  LYMPH NODES: Has no cervical, supraclavicular, axillary and groin adenopathy.   NEUROLOGICAL: Alert and oriented to person, place, and time. No cranial nerve deficit.  Normal muscle tone. Coordination normal.   Chronic left-sided weakness from previous CVA  GENITOURINARY: Deferred exam.  SKIN: Skin is warm and dry. No rash noted. No erythema. No pallor.   EXTREMITIES: No cyanosis, no clubbing, has bilateral 2+ leg edema. No Deformity.  PSYCHIATRIC: Normal mood, affect and behavior.      Lab Results     Recent Results (from the past 240 hour(s))   Basic Metabolic Panel    Collection Time: 01/20/20 11:25 AM   Result Value Ref Range    Sodium 135 (L) 136 - 145 mmol/L    Potassium 4.2 3.5 - 5.0 mmol/L    Chloride 105 98 - 107 mmol/L    CO2 21 (L) 22 - 31 mmol/L    Anion Gap, Calculation 9 5 - 18 mmol/L     Glucose 273 (H) 70 - 125 mg/dL    Calcium 8.6 8.5 - 10.5 mg/dL    BUN 45 (H) 8 - 28 mg/dL    Creatinine 2.36 (H) 0.70 - 1.30 mg/dL    GFR MDRD Af Amer 32 (L) >60 mL/min/1.73m2    GFR MDRD Non Af Amer 27 (L) >60 mL/min/1.73m2   HM2(CBC w/o Differential)    Collection Time: 01/20/20 11:25 AM   Result Value Ref Range    WBC 5.9 4.0 - 11.0 thou/uL    RBC 2.90 (L) 4.40 - 6.20 mill/uL    Hemoglobin 8.6 (L) 14.0 - 18.0 g/dL    Hematocrit 27.4 (L) 40.0 - 54.0 %    MCV 95 80 - 100 fL    MCH 29.7 27.0 - 34.0 pg    MCHC 31.4 (L) 32.0 - 36.0 g/dL    RDW 14.4 11.0 - 14.5 %    Platelets 279 140 - 440 thou/uL    MPV 9.5 8.5 - 12.5 fL   Magnesium    Collection Time: 01/20/20 11:25 AM   Result Value Ref Range    Magnesium 1.9 1.8 - 2.6 mg/dL            KATIE Florez

## 2021-06-05 NOTE — PROGRESS NOTES
[facility] Patient Outreach:     Discharge Date::  2/3/20      Reason for TCU stay (discharge diagnosis)::  Influenza A, deconditioning      Are you feeling better, the same or worse since your discharge?:  Patient is feeling better          As part of your discharge plan, did they discuss home care with you?: Yes (declined therapy)        Have your seen them yet, or are they scheduled to visit?: No        Did you receive any new medications, or was there a change to your medications?: No (same meds as TCU.  )            Do you have any follow up visits scheduled with your PCP or Specialist?:  Yes, with PCP      (RN) Is it scheduled soon enough (3-5 days)?: Yes

## 2021-06-05 NOTE — TELEPHONE ENCOUNTER
[facility] Nurse Triage Telephone Note      Provider: JOVITA Sethi  Facility: [facility]    Facility Type: TCU    Caller: Petty  Call Back Number:  [phone]    Allergies: Cefazolin; Pravastatin; Simvastatin; and Thiazides    Reason for call: Nurse reporting Heme 2, BMP, and Mg levels.  Notable meds:  Metoprolol ER 25mg daily, Lasix 40mg daily, Clonidine 0.15mg three times a day, Amlodipine 10mg daily, Hydralazine 25mg four times a day, Glimepride 4mg two times a day(lunch and dinner).     Verbal Order/Direction given by Provider: Encourage fluids.      Provider giving order: JOVITA Sethi    Verbal order given to: Petty Lentz RN

## 2021-06-05 NOTE — PROGRESS NOTES
"  [facility]      NAME:  Suman Nagy             :  1940    MRN: [id number]    CODE STATUS:  FULL CODE    FACILITY: [facility] [[id number]]      CHIEF COMPLAIN/REASON FOR VISIT:  Chief Complaint   Patient presents with     Review Of Multiple Medical Conditions       HISTORY OF PRESENT ILLNESS: Suman Nagy is a 79 y.o. male being seen for review of multiple medical conditions, per nursing request his POLST reviewed and advance care planning discussed. He came to the TCU from [facility]. After a decondtioned state for influenza. Per his EMR \" with a history of CVA with persistent left-sided deficits, seizure disorder, type 2 diabetes, chronic kidney disease stage III-IV presents with fatigue body aches and malaise found to have influenza a in the emergency department.\" Currently in isolation as he is finishing his tamiflu course. His HBG in hospital was lowest at 7, never transfused and was dced to us with a 8.5 level. We did discuss TCU routines, MCS role in his care as well as advanced directives.    Allergies   Allergen Reactions     Cefazolin      \"felt very hot\"     Pravastatin      Increased peripheral neuropathy     Simvastatin      Increased peripheral neuropathy     Thiazides      Other: Gout     :     Current Outpatient Medications   Medication Sig     acetaminophen (TYLENOL) 325 MG tablet Take 2 tablets (650 mg total) by mouth every 4 (four) hours as needed.     amLODIPine (NORVASC) 10 MG tablet Take 1 tablet (10 mg total) by mouth daily.     atorvastatin (LIPITOR) 20 MG tablet Take 1 tablet (20 mg total) by mouth daily.     blood glucose test strips Check blood sugar 3 times per day. Accu-Chek Guide test striips.     cholecalciferol, vitamin D3, (VITAMIN D3) 1,000 unit capsule Take 2 capsules (2,000 Units total) by mouth daily.     cloNIDine HCl (CATAPRES) 0.3 MG tablet Take 0.5 tablets (0.15 mg total) by mouth 3 (three) times a day. " "    clopidogrel (PLAVIX) 75 mg tablet Take 1 tablet (75 mg total) by mouth daily.     cyanocobalamin 1,000 mcg/mL injection Inject 1,000 mcg into the shoulder, thigh, or buttocks every 3 (three) months.            cyanocobalamin, vitamin B-12, (VITAMIN B-12) 1,000 mcg Subl Place 1 tablet (1,000 mcg total) under the tongue daily.     diclofenac sodium (VOLTAREN) 1 % Gel Apply 4 g topically 3 (three) times a day. Apply to knees (Patient taking differently: Apply 4 g topically 3 (three) times a day as needed. Apply to knees )     furosemide (LASIX) 40 MG tablet Take 40 mg by mouth daily.     generic lancets Fastclix lancets. Check blood sugar 3 times per day.     glimepiride (AMARYL) 4 MG tablet Take 4 mg by mouth twice a day with lunch and supper.     hydrALAZINE (APRESOLINE) 25 MG tablet Take 25 mg by mouth 4 (four) times a day.     insulin glargine (LANTUS SOLOSTAR PEN) 100 unit/mL (3 mL) pen Inject 10 Units under the skin at bedtime.     metoprolol succinate (TOPROL-XL) 25 MG Take 1 tablet (25 mg total) by mouth daily.     omeprazole (PRILOSEC) 20 MG capsule Take 1 capsule (20 mg total) by mouth 2 (two) times a day before meals.     ONETOUCH ULTRA2 METER Misc AS DIRECTED     oseltamivir (TAMIFLU) 30 MG capsule Take 1 capsule (30 mg total) by mouth daily for 1 day.     pen needle, diabetic (BD ULTRA-FINE JONO PEN NEEDLE) 32 gauge x 5/32\" Ndle Use one needle per day to inject insulin.     polyethylene glycol (MIRALAX) 17 gram packet Take 1 packet (17 g total) by mouth daily as needed.     psyllium husk (DAILY FIBER ORAL) Take 1 tablet by mouth 2 (two) times a day.            sertraline (ZOLOFT) 50 MG tablet Take 50 mg by mouth daily.         REVIEW OF SYSTEMS:    Currently, no fever, chills, or rigors. Does not have any visual or hearing problems. Denies any chest pain, headaches, palpitations, lightheadedness, dizziness, shortness of breath, or cough. Appetite is good. Denies any GERD symptoms. Denies any difficulty " with swallowing, nausea, or vomiting.  Denies any abdominal pain, diarrhea or constipation. Denies any urinary symptoms. No insomnia. No active bleeding. No rash.       PHYSICAL EXAMINATION:  Vitals:    01/18/20 0904   BP: 161/75   Pulse: 75   Temp: 98.8  F (37.1  C)   Weight: 188 lb (85.3 kg)         GENERAL: Awake, Alert, oriented x3, not in any form of acute distress, answers questions appropriately, follows simple commands, conversant  HEENT: Head is normocephalic with normal hair distribution. No evidence of trauma. Ears: No acute purulent discharge. Eyes: Conjunctivae pink with no scleral jaundice. Nose: Normal mucosa and septum. NECK: Supple with no cervical or supraclavicular lymphadenopathy. Trachea is midline.   CHEST: No tenderness or deformity, no crepitus  LUNG: Clear to auscultation with good chest expansion. There are no crackles or wheezes, normal AP diameter.  BACK: No kyphosis of the thoracic spine. Symmetric, no curvature, ROM normal, no CVA tenderness, no spinal tenderness   CVS: There is good S1  S2,  rhythm is regular.  ABDOMEN: Globular and soft, nontender to palpation, non distended, no masses, no organomegaly, good bowel sounds, no rebound or guarding, no peritoneal signs.   EXTREMITIES: Atraumatic. Full range of motion on both upper and lower extremities, there is no tenderness to palpation, positive pedal edema, no cyanosis or clubbing, no calf tenderness, normal cap refill, no joint swelling. Generalized weakness  SKIN: Warm and dry, no erythema noted, no rashes or lesions.  NEUROLOGICAL: The patient is oriented to person, place and time. Strength and sensation are grossly intact. Face is symmetric.                    LABS:    Lab Results   Component Value Date    WBC 8.2 01/13/2020    HGB 8.5 (L) 01/16/2020    HCT 25.8 (L) 01/13/2020    MCV 90 01/13/2020     01/15/2020       Results for orders placed or performed during the hospital encounter of 01/13/20   Basic Metabolic Panel    Result Value Ref Range    Sodium 138 136 - 145 mmol/L    Potassium 4.2 3.5 - 5.0 mmol/L    Chloride 109 (H) 98 - 107 mmol/L    CO2 20 (L) 22 - 31 mmol/L    Anion Gap, Calculation 9 5 - 18 mmol/L    Glucose 108 70 - 125 mg/dL    Calcium 9.0 8.5 - 10.5 mg/dL    BUN 35 (H) 8 - 28 mg/dL    Creatinine 1.94 (H) 0.70 - 1.30 mg/dL    GFR MDRD Af Amer 41 (L) >60 mL/min/1.73m2    GFR MDRD Non Af Amer 33 (L) >60 mL/min/1.73m2           Lab Results   Component Value Date    HGBA1C 7.6 (H) 10/28/2019     Vitamin D, Total (25-Hydroxy)   Date Value Ref Range Status   10/28/2019 14.0 (L) 30.0 - 80.0 ng/mL Final     Lab Results   Component Value Date    OVGYQKAA89 >2,000 (H) 10/28/2019       ASSESSMENT/PLAN:  1. Influenza A    2. 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 ACP (advance care planning)      1. Influenza: He is in a deconditioned state s/p for his hospitalization for influenza, Remains in room isolation until tamiflu completed. He is to participate in PT/OT and have SN for chronic medical condition management, meds and VS.    2. Dm: BS at 217, SN check BS four times a day, see med list above, on Lantuis at HS with oral glipizide. Diet counselling offerred, pt a bit gruff reported to me he knows what to eat,.    3. ACP: POLST signed today as Full code by this provider, we spent greater then 16 minutes face to face today reviewing POLST status and advance care planning.        Electronically signed by:  Shahrzad Mahan CNP  This progress note was completed using Dragon software and there may be grammatical errors.

## 2021-06-05 NOTE — PATIENT INSTRUCTIONS - HE
We will recheck hemoglobin levels today.    We will recheck kidney labs today.    Prescription for Voltaren gel sent into your pharmacy for knee pain.    Continue using Tylenol for knee pain.    It sounds like you are doing well at home without nursing home care.  If this changes, schedule an appointment to come back and see me.    Blood pressure looks okay right now.  If you notice blood pressures in the 160s, come back and see me.    Blood sugars sound like they are going well.  If you notice any consistently high  Measurements, or any low measurements, come back and see me.    Follow-up with Dr. Telly Torre in 4 to 6 weeks.

## 2021-06-05 NOTE — PROGRESS NOTES
Clinic Note    Assessment:     Assessment and Plan:    1. Influenza  Symptoms have now completely resolved.  No respiratory complaints.  No fevers.    2. Hypertension  Blood pressure 150/58 today.  No changes in medication regimen for now.  Recheck CBC and BMP.    3. 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  No hypoglycemia.    4. Pain  He is dealing with some chronic pain in his right knee that responds well to Voltaren gel.  - diclofenac sodium (VOLTAREN) 1 % Gel; Apply 4 g topically 3 (three) times a day as needed. Apply to knees  Dispense: 1 Tube; Refill: 1    5. Iron deficiency anemia  He had variably low hemoglobin measurements while in the hospital.  As low as 7.4, but never transfused.  Recheck today.           Patient Instructions   We will recheck hemoglobin levels today.    We will recheck kidney labs today.    Prescription for Voltaren gel sent into your pharmacy for knee pain.    Continue using Tylenol for knee pain.    It sounds like you are doing well at home without nursing home care.  If this changes, schedule an appointment to come back and see me.    Blood pressure looks okay right now.  If you notice blood pressures in the 160s, come back and see me.    Blood sugars sound like they are going well.  If you notice any consistently high  Measurements, or any low measurements, come back and see me.    Follow-up with Dr. Telly Torre in 4 to 6 weeks.    Return in about 1 month (around 3/5/2020).         Subjective:      Patient presents to clinic today for hospital discharge follow-up.    He presented to [facility] ED on 1/13 with weakness, fatigue, body aches, and malaise.    He was found to have influenza A in the emergency department.  He was admitted and started on Tamiflu, renally dosed for 30 mg daily for 5 days.    Gradually, his strength improved and he was discharged to TCU for further PT/OT.    He had variable  hemoglobin labs during his hospitalization, including low of 7.4.  He was not transfused.    While at the TCU, he had his diabetes medications changed.  Now using Lantus 16 units every morning with Amaryl 4 mg twice daily with meals.  His blood sugars have been well controlled since being home.  No hypoglycemia.    He has been doing well at home without falls or safety issues.  Wife is been helping with chores.  He is eating and drinking normally.    Blood pressure has been stable at home.  Most measurements in the 140s to 150 systolic range.  No hypotensive episodes.    The following portions of the patient's history were reviewed and updated as appropriate: Allergies, medications, problem list, prior note, prior labs.     Review of Systems:    Review is otherwise negative except for what is mentioned above.     Social Hx:    Social History     Tobacco Use   Smoking Status Former Smoker     Last attempt to quit: 1995     Years since quittin.4   Smokeless Tobacco Never Used         Objective:     Vitals:    20 1136 20 1156   BP: 160/57 150/58   Pulse: 81    SpO2: 100%        Exam:    General: No apparent distress. Calm. Alert and Oriented X3. Pt behavior is appropriate.  Patient is wheelchair-bound.  Accompanied by wife  Chest/Lungs: Normal chest wall, clear to auscultation, normal respiratory effort and rate.   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  0     amLODIPine (NORVASC) 10 MG tablet Take 1 tablet (10 mg total) by mouth daily.       atorvastatin (LIPITOR) 20 MG tablet Take 1 tablet (20 mg total) by mouth daily. 90 tablet 3     blood glucose test strips Check blood sugar 3 times per day. Accu-Chek Guide test striips. 100 strip 3     cholecalciferol, vitamin D3, (VITAMIN D3) 1,000 unit capsule Take 2 capsules (2,000 Units total) by mouth daily. 180 capsule 0     cloNIDine HCl (CATAPRES) 0.3 MG tablet Take 0.5 tablets (0.15 mg total) by mouth 3 (three) times a day.  0     clopidogrel (PLAVIX) 75 mg tablet Take 1 tablet (75 mg total) by mouth daily. 90 tablet 0     cyanocobalamin 1,000 mcg/mL injection Inject 1,000 mcg into the shoulder, thigh, or buttocks every 3 (three) months.              cyanocobalamin, vitamin B-12, (VITAMIN B-12) 1,000 mcg Subl Place 1 tablet (1,000 mcg total) under the tongue daily. 90 tablet 3     diclofenac sodium (VOLTAREN) 1 % Gel Apply 4 g topically 3 (three) times a day as needed. Apply to knees 1 Tube 1     furosemide (LASIX) 40 MG tablet Take 40 mg by mouth daily.       generic lancets Fastclix lancets. Check blood sugar 3 times per day. 102 each 3     glimepiride (AMARYL) 4 MG tablet Take 4 mg by mouth twice a day with lunch and supper.       hydrALAZINE (APRESOLINE) 25 MG tablet Take 25 mg by mouth 4 (four) times a day.       insulin glargine (LANTUS SOLOSTAR PEN) 100 unit/mL (3 mL) pen Inject 16 Units under the skin daily with breakfast.        metoprolol succinate (TOPROL-XL) 25 MG Take 1 tablet (25 mg total) by mouth daily. 90 tablet 3     omeprazole (PRILOSEC) 20 MG capsule Take 1 capsule (20 mg total) by mouth 2 (two) times a day before meals.  0     ONETOUCH ULTRA2 METER [facility] AS DIRECTED 1 each 0     pen  "needle, diabetic (BD ULTRA-FINE JONO PEN NEEDLE) 32 gauge x 5/32\" Ndle Use one needle per day to inject insulin. 100 each 4     polyethylene glycol (MIRALAX) 17 gram packet Take 1 packet (17 g total) by mouth daily as needed.  0     sertraline (ZOLOFT) 50 MG tablet Take 50 mg by mouth daily.       No current facility-administered medications for this visit.            Mir De La Cruz (Rob), CNP    2/5/2020           "

## 2021-06-05 NOTE — PROGRESS NOTES
[facility] Admission note      Patient: Suman Nagy  MRN: [id number]  Date of Service: 1/28/2020      [facility] [[id number]]  Reason for Visit     Chief Complaint   Patient presents with     Review Of Multiple Medical Conditions       Code Status     FULL CODE    Assessment     - ACUTE INFLUENZA  - acute on chronic anemia  - acute on chronic hip and knee pain  - type 2 DM  - advanced CKD with a discharge creatinine ranging between 2-1.9  -Electrolyte abnormalities with low potassium and magnesium  - htn' with elevated systolic blood pressures noted in the TCU  -Chronic lymphedema  -Profound debilitation with increasing amount of care that this patient needs at home.  He has incontinence at baseline and requires assistance with all his IADLs and most of his ADLs at home  -Protein calorie malnutrition  -Obesity    Plan     Patient has been admitted to the TCU and examined in the presence of his wife.  He is being treated for influenza A with Tamiflu and done with his 5-day regimen in the TCU.  He is denying any cough congestion or fever.  He has significant lymphedema noted on exam today.    I did offer to either give him some stockings with elevation versus going up on the diuretics he refuses both options.  Patient has enough stockings at home as per him and does not want to get another one he is not bothered he does not want to increase his diuretics either.  In light of renal impairment with a recheck creatinine of 2.3 will not push for higher doses of diuretics either  Advised control on salt intake and fluid intake.  Diabetic control is extremely poor.  Plan is to discontinue his Lantus at at bedtime as he is running some early morning low blood sugars of 120s and below.  Switch him to Lantus 16 units but in the morning with breakfast.  His postprandial blood sugars are greater than 200 consistently  Recheck hemoglobin was stable but 8.6 he denies any GI bleeding  concerns  Continue with his PT OT and rehab he is down to 1 person assist and needs to work on stairs prior to going home    History     Patient is a very pleasant 79 y.o. male who is admitted to TCU  He was examined in the presence of his wife who supplemented his history.  As per the history he lives at home with his wife and other extended family members.  He presented with increasing weakness with low-grade fever and myalgias.  Work-up revealed that he had a positive influenza A.  He is currently being treated with Tamiflu and will finish his course in the TCU.  He denies any cough congestion.  He has been afebrile and denies any concern regarding influenza any longer.  Currently done with Tamiflu  He however is profoundly weak requiring 2 person assist at least with all his ADLs and is not able to walk.  However now reporting significant improvement in strength.  He is a contact-guard assist walking up to 200 feet  Therapy plans to start working with stairs with him if he improves the plan to discharge him home soon  He was also noted to have acute on chronic anemia with no obvious signs of GI losses.  He was started on Prilosec.  R/C in the TCU was 8.6 and he is being monitored denies any bleeding issues  Anemia most likely secondary to chronic kidney disease discharge creatinine was 2  He has underlying history of type 2 diabetes which as per wife has been adequately controlled in the past.  He will continue with his insulin regimen in the TCU and oral glimepiride  Blood sugars are uncontrolled he recently had an increase in Lantus to 14 units with not much improvement a.m. blood sugars are now dropping to around 120 but postprandials remain high in the 200+ he is not inclined to do any dietary control  He has underlying history of CVA with resultant left-sided hemiplegia.    However reporting significant improvement in strength.  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)     Anxiety     History of stroke     Medication monitoring encounter     History of bladder cancer     History of rectal cancer     Carotid artery stenosis, asymptomatic, left     Rectal cancer (H)     Hypertension     B12 deficiency     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 He has never used smokeless tobacco. He reports that he does not drink alcohol or use drugs.    Family History     Reviewed, and family history includes COPD in his father.    Medication List   Post Discharge Medication Reconciliation Status: discharge medications reconciled, continue medications without change   Current Outpatient Medications on File Prior to Visit   Medication Sig Dispense Refill     acetaminophen (TYLENOL) 325 MG tablet Take 2 tablets (650 mg total) by mouth every 4 (four) hours as needed.  0     amLODIPine (NORVASC) 10 MG tablet Take 1 tablet (10 mg total) by mouth daily.       atorvastatin (LIPITOR) 20 MG tablet Take 1 tablet (20 mg total) by mouth daily. 90 tablet 3     blood glucose test strips Check blood sugar 3 times per day. Accu-Chek Guide test striips. 100 strip 3     cholecalciferol, vitamin D3, (VITAMIN D3) 1,000 unit capsule Take 2 capsules (2,000 Units total) by mouth daily. 180 capsule 0     cloNIDine  "HCl (CATAPRES) 0.3 MG tablet Take 0.5 tablets (0.15 mg total) by mouth 3 (three) times a day.  0     clopidogrel (PLAVIX) 75 mg tablet Take 1 tablet (75 mg total) by mouth daily. 90 tablet 0     cyanocobalamin 1,000 mcg/mL injection Inject 1,000 mcg into the shoulder, thigh, or buttocks every 3 (three) months.              cyanocobalamin, vitamin B-12, (VITAMIN B-12) 1,000 mcg Subl Place 1 tablet (1,000 mcg total) under the tongue daily. 90 tablet 3     diclofenac sodium (VOLTAREN) 1 % Gel Apply 4 g topically 3 (three) times a day. Apply to knees (Patient taking differently: Apply 4 g topically 3 (three) times a day as needed. Apply to knees ) 100 g 0     furosemide (LASIX) 40 MG tablet Take 40 mg by mouth daily.       generic lancets Fastclix lancets. Check blood sugar 3 times per day. 102 each 3     glimepiride (AMARYL) 4 MG tablet Take 4 mg by mouth twice a day with lunch and supper.       hydrALAZINE (APRESOLINE) 25 MG tablet Take 25 mg by mouth 4 (four) times a day.       insulin glargine (LANTUS SOLOSTAR PEN) 100 unit/mL (3 mL) pen Inject 10 Units under the skin at bedtime.       metoprolol succinate (TOPROL-XL) 25 MG Take 1 tablet (25 mg total) by mouth daily. 90 tablet 3     omeprazole (PRILOSEC) 20 MG capsule Take 1 capsule (20 mg total) by mouth 2 (two) times a day before meals.  0     ONETOUCH ULTRA2 METER [facility] AS DIRECTED 1 each 0     pen needle, diabetic (BD ULTRA-FINE JONO PEN NEEDLE) 32 gauge x 5/32\" Ndle Use one needle per day to inject insulin. 100 each 4     polyethylene glycol (MIRALAX) 17 gram packet Take 1 packet (17 g total) by mouth daily as needed.  0     psyllium husk (DAILY FIBER ORAL) Take 1 tablet by mouth 2 (two) times a day.              sertraline (ZOLOFT) 50 MG tablet Take 50 mg by mouth daily.       No current facility-administered medications on file prior to visit.        Allergies     Allergies   Allergen Reactions     Cefazolin      \"felt very hot\"     Pravastatin      Increased " peripheral neuropathy     Simvastatin      Increased peripheral neuropathy     Thiazides      Other: Gout         Review of Systems   A comprehensive review of 14 systems was done. Pertinent findings noted here and in history of present illness. All the rest negative.  Constitutional: Negative.  Negative for fever, chills, he has activity change, appetite change and fatigue.   HENT: Negative for congestion and facial swelling.    Eyes: Negative for photophobia, redness and visual disturbance.   Respiratory: Negative for cough and chest tightness.    Cardiovascular: Negative for chest pain, palpitations and has chronic leg swelling.   Gastrointestinal: Negative for nausea, diarrhea, constipation, blood in stool and abdominal distention.   Genitourinary: Negative.  Has significant incontinence  Musculoskeletal: Negative.  REPorting significant weakness and difficulty ambulating  Skin: Negative.    Neurological: Negative for dizziness, tremors, syncope, weakness, light-headedness and headaches.   Hematological: Does not bruise/bleed easily.   Psychiatric/Behavioral: Negative.        Physical Exam     Recent Vitals 1/21/2020   Height -   Weight 188 lbs   BSA (m2) 2.01 m2   /77   Pulse 64   Temp 98.6   Temp src -   SpO2 -   Some recent data might be hidden   Pulse is 79 temp is 98 and sats are 90% on room air    Constitutional: Oriented to person, place, and time and appears well-developed.   HEENT:  Normocephalic and atraumatic.  Eyes: Conjunctivae and EOM are normal. Pupils are equal, round, and reactive to light. No discharge.  No scleral icterus. Nose normal. Mouth/Throat: Oropharynx is clear and moist. No oropharyngeal exudate.    NECK: Normal range of motion. Neck supple. No JVD present. No tracheal deviation present. No thyromegaly present.   CARDIOVASCULAR: Normal rate, regular rhythm and intact distal pulses.  Exam reveals no gallop and no friction rub.  Systolic murmur present.  PULMONARY: Effort normal  and breath sounds normal. No respiratory distress.No Wheezing or rales.  ABDOMEN: Soft. Bowel sounds are normal. No distension and no mass.  There is no tenderness. There is no rebound and no guarding. No HSM.  MUSCULOSKELETAL: Normal range of motion.  Has bilateral 2+ leg edema and no tenderness. Mild kyphosis, no tenderness.  Bilateral lower extremity weakness noted on exam  LYMPH NODES: Has no cervical, supraclavicular, axillary and groin adenopathy.   NEUROLOGICAL: Alert and oriented to person, place, and time. No cranial nerve deficit.  Normal muscle tone. Coordination normal.   Chronic left-sided weakness from previous CVA  GENITOURINARY: Deferred exam.  SKIN: Skin is warm and dry. No rash noted. No erythema. No pallor.   EXTREMITIES: No cyanosis, no clubbing, has bilateral 2+ leg edema. No Deformity.  PSYCHIATRIC: Normal mood, affect and behavior.      Lab Results     Recent Results (from the past 240 hour(s))   Basic Metabolic Panel    Collection Time: 01/20/20 11:25 AM   Result Value Ref Range    Sodium 135 (L) 136 - 145 mmol/L    Potassium 4.2 3.5 - 5.0 mmol/L    Chloride 105 98 - 107 mmol/L    CO2 21 (L) 22 - 31 mmol/L    Anion Gap, Calculation 9 5 - 18 mmol/L    Glucose 273 (H) 70 - 125 mg/dL    Calcium 8.6 8.5 - 10.5 mg/dL    BUN 45 (H) 8 - 28 mg/dL    Creatinine 2.36 (H) 0.70 - 1.30 mg/dL    GFR MDRD Af Amer 32 (L) >60 mL/min/1.73m2    GFR MDRD Non Af Amer 27 (L) >60 mL/min/1.73m2   HM2(CBC w/o Differential)    Collection Time: 01/20/20 11:25 AM   Result Value Ref Range    WBC 5.9 4.0 - 11.0 thou/uL    RBC 2.90 (L) 4.40 - 6.20 mill/uL    Hemoglobin 8.6 (L) 14.0 - 18.0 g/dL    Hematocrit 27.4 (L) 40.0 - 54.0 %    MCV 95 80 - 100 fL    MCH 29.7 27.0 - 34.0 pg    MCHC 31.4 (L) 32.0 - 36.0 g/dL    RDW 14.4 11.0 - 14.5 %    Platelets 279 140 - 440 thou/uL    MPV 9.5 8.5 - 12.5 fL   Magnesium    Collection Time: 01/20/20 11:25 AM   Result Value Ref Range    Magnesium 1.9 1.8 - 2.6 mg/dL                Abbie  KATIE Kessler

## 2021-06-06 NOTE — TELEPHONE ENCOUNTER
RN cannot approve Refill Request    RN can NOT refill this medication med is not covered by policy/route to provider. Last office visit: 2/5/2020 Mir De La Cruz CNP Last Physical: Visit date not found Last MTM visit: Visit date not found Last visit same specialty: Visit date not found.  Next visit within 3 mo: Visit date not found  Next physical within 3 mo: Visit date not found      Boogie Constantino, [facility] Connection Triage/Med Refill 2/21/2020    Requested Prescriptions   Pending Prescriptions Disp Refills     metoprolol succinate (TOPROL-XL) 25 MG [Pharmacy Med Name: METOPROLOL SUC 25MG ER] 30 tablet 0     Sig: TAKE 1 TABLET (25 MG TOTAL) BY MOUTH DAILY.       There is no refill protocol information for this order

## 2021-06-07 NOTE — TELEPHONE ENCOUNTER
Refill Approved    Rx renewed per Medication Renewal Policy. Medication was last renewed on 12/20/19.    Cammy Monroy, [facility] Connection Triage/Med Refill 4/16/2020     Requested Prescriptions   Pending Prescriptions Disp Refills     clopidogreL (PLAVIX) 75 mg tablet [Pharmacy Med Name: CLOPIDOGREL 75MG] 90 tablet 0     Sig: TAKE 1 TABLET (75 MG TOTAL) BY MOUTH DAILY.       Clopidogrel/Prasugrel/Ticagrelor Refill Protocol Passed - 4/15/2020  9:30 AM        Passed - PCP or prescribing provider visit in past 6 months       Last office visit with prescriber/PCP: 11/7/2019 OR same dept: 2/5/2020 Mir De La Cruz CNP OR same specialty: 2/5/2020 Mir De La Cruz CNP Last physical: Visit date not found Last MTM visit: Visit date not found     Next appt within 3 mo: Visit date not found  Next physical within 3 mo: Visit date not found  Prescriber OR PCP: Telly Torre MD  Last diagnosis associated with med order: 1. Ischemic Stroke  - clopidogreL (PLAVIX) 75 mg tablet [Pharmacy Med Name: CLOPIDOGREL 75MG]; Take 1 tablet (75 mg total) by mouth daily.  Dispense: 90 tablet; Refill: 0    If protocol passes may refill for 6 months if within 3 months of last provider visit (or a total of 9 months).              Passed - Hemoglobin in past 12 months     Hemoglobin   Date Value Ref Range Status   02/05/2020 9.6 (L) 14.0 - 18.0 g/dL Final

## 2021-06-07 NOTE — TELEPHONE ENCOUNTER
RN cannot approve Refill Request    RN can NOT refill this medication Protocol failed and NO refill given     . Last office visit: 2/5/2020 Mir De La Cruz CNP Last Physical: Visit date not found Last MTM visit: Visit date not found Last visit same specialty: 2/5/2020 Mir De La Cruz CNP.  Next visit within 3 mo: Visit date not found  Next physical within 3 mo: Visit date not found      Cammy Monroy, [facility] Triage/Med Refill 4/1/2020    Requested Prescriptions   Pending Prescriptions Disp Refills     diclofenac sodium (VOLTAREN) 1 % Gel [Pharmacy Med Name: DICLOFENAC GEL 1%] 100 g 0     Sig: APPLY 4 GRAMS TOPICALLY 3 (THREE) TIMES A DAY AS NEEDED. APPLY TO KNEES       There is no refill protocol information for this order

## 2021-06-07 NOTE — TELEPHONE ENCOUNTER
RN cannot approve Refill Request    RN can NOT refill this medication Protocol failed and NO refill given.     Cammy Monroy, [facility] Triage/Med Refill 4/30/2020    Requested Prescriptions   Pending Prescriptions Disp Refills     glimepiride (AMARYL) 4 MG tablet [Pharmacy Med Name: GLIMEPIRIDE 4MG] 180 tablet 3     Sig: ONE PILL TWICE PER DAY WITH LUNCH AND SUPPER       Oral Hypoglycemics Refill Protocol Failed - 4/29/2020  9:43 AM        Failed - A1C in last 6 months     Hemoglobin A1c   Date Value Ref Range Status   10/28/2019 7.6 (H) 4.2 - 6.1 % Final               Failed - Microalbumin in last year      Microalbumin, Random Urine   Date Value Ref Range Status   12/07/2017 120.44 (H) 0.00 - 1.99 mg/dL Final                  Passed - Visit with PCP or prescribing provider visit in last 6 months       Last office visit with prescriber/PCP: 11/7/2019 OR same dept: 2/5/2020 Mir De La Cruz CNP OR same specialty: 2/5/2020 Mir De La Cruz CNP Last physical: Visit date not found Last MTM visit: Visit date not found         Next appt within 3 mo: Visit date not found  Next physical within 3 mo: Visit date not found  Prescriber OR PCP: Telly Torre MD  Last diagnosis associated with med order: There are no diagnoses linked to this encounter.   If protocol passes may refill for 12 months if within 3 months of last provider visit (or a total of 15 months).           Passed - Blood pressure in last year     BP Readings from Last 1 Encounters:   02/05/20 150/58             Passed - Serum creatinine in last year     Creatinine   Date Value Ref Range Status   02/05/2020 2.43 (H) 0.70 - 1.30 mg/dL Final

## 2021-06-08 NOTE — TELEPHONE ENCOUNTER
The patient's wife was informed of Dr. Torre's message and expressed understanding. Agreed to inform us right away of any blood sugars below 80. Telephone visit scheduled for 05/20.

## 2021-06-08 NOTE — PATIENT INSTRUCTIONS - HE
Continue 14 units of the Lantus insulin daily.    Continue the glimepiride 4 mg with lunch and with supper.    Try to reduce the amount of potatoes you eat with lunch.    I will have our clinic pharmacist evaluate options for additional pill medications for diabetes, otherwise we will need to discuss short acting insulin to be given with meals.    Determine your follow-up with your nephrology/kidney clinic.    Follow-up with me on July 9 at 11:20 AM.  You can request a phone consult before that if needed.

## 2021-06-08 NOTE — TELEPHONE ENCOUNTER
Please have [facility] pharmacy evaluate patient to see if linagliptin or sitagliptin would be appropriate and an available option with his insurance.    Patient is having high blood sugars during the day, but low blood sugars in the morning on Lantus insulin.    Currently on Amaryl 4 mg with lunch and supper.    I would like to have patient consider additional oral medications, otherwise he would need to start short acting insulin with meals.

## 2021-06-08 NOTE — PATIENT INSTRUCTIONS - HE
Reduce Lantus insulin down to 10 units a day.  If any further low blood sugar reactions, contact me by phone right away.    Continue on the Tradjenta and glimepiride at current doses.    Follow-up with me in clinic on July 9.

## 2021-06-08 NOTE — TELEPHONE ENCOUNTER
Have patient proceed with addition of Tradjenta medication.  Prescription has been sent in by the pharmacist.    Contact patient and his wife, Jeni, to confirm the addition of the Tradjenta.    Please make sure patient and wife are warned that addition of the Tradjenta may cause low blood sugars requiring reduction in insulin.  If any low blood sugars less than 80, they should call clinic right away to discuss insulin adjustment.    Please set up a phone consult with patient and wife with me in 2 to 3 weeks.  He can also keep the appointment on July 9.

## 2021-06-08 NOTE — PROGRESS NOTES
"Suman Nagy is a 79 y.o. male who is being evaluated via a billable telephone visit.      The patient has been notified of following:     \"This telephone visit will be conducted via a call between you and your physician/provider. We have found that certain health care needs can be provided without the need for a physical exam.  This service lets us provide the care you need with a short phone conversation.  If a prescription is necessary we can send it directly to your pharmacy.  If lab work is needed we can place an order for that and you can then stop by our lab to have the test done at a later time.    Telephone visits are billed at different rates depending on your insurance coverage. During this emergency period, for some insurers they may be billed the same as an in-person visit.  Please reach out to your insurance provider with any questions.    If during the course of the call the physician/provider feels a telephone visit is not appropriate, you will not be charged for this service.\"    Patient has given verbal consent to a Telephone visit? Yes    What phone number would you like to be contacted at? [phone]     Patient would like to receive their AVS by AVS Preference: Michele.    Additional provider notes:     [facility] Follow Up Note    Suman Nagy   79 y.o. male    Date of Visit: 5/7/2020    Chief Complaint   Patient presents with     Follow-up     1 month recheck     Subjective  Luke and his wife, Jeni, had requested a video consult to avoid clinic visit during the coronavirus outbreak.    Patient has a history of severe labile hypertension on multiple medications.    Previous CVA with left hemiparesis in 2015.  2018 ultrasound showed left carotid 50-69% and stable.  Right carotid without recurrent stenosis after CEA.    Patient has labile hypertension with history of severe hypertension spikes.  Over the past year patient has had progressive renal " failure.    February 5 of this year at nephrology clinic his creatinine was mildly higher at 2.4 with potassium of 4.1.    He is now on hydralazine 75 mg 4 times daily.    Also on Lasix 40 mg daily, clonidine 1.5 mg 3 times a day, amlodipine 10 mg a day and Toprol-XL 25 mg a day.    He has had some mild edema in the past although edema not noted at last visit.    His blood pressure has been labile but mostly in the 150s over 70s.  Recently was 139/70.  Denies orthostasis.  He does not do much activity or walking.    Cared for at home by wife.    He had influenza A in January with severe illness.  Hemoglobin down to 7.4 but did not get a transfusion and hemoglobin was coming up to 9.6 in February.  He has not had an erythropoietin shot since then.  He is on oral B12 supplement.    Patient's diabetes has been sub-adequately controlled since being off the metformin with worsening creatinine.    He tends to snack during the day.    He reports eating oatmeal for breakfast, some meat and potatoes for lunch and generally a sandwich for supper.    He was having borderline low blood sugars to the 70s in the morning on discussion 1 month ago.  I had him reduce the Lantus insulin from 16 mg in the morning down to 14 units in the morning.    He still takes glimepiride 4 mg with lunch and supper.    He reports blood sugars during the day in the 2-300 range still.  No low blood sugars during the day.    He reported one borderline low blood sugar of 74 2 weeks ago.  But no further low blood sugars since then.    He is otherwise been eating well.  No problems with nausea or diarrhea or skipped meals.    There is not been increasing shortness of breath.  No significant orthostasis.    Past history of stage II colon cancer in 2017.  August 2019 CT scan negative.  Colonoscopy follow-up plan currently on hold with COVID-19 but unclear if patient wishes to proceed with further colon cancer monitoring.    November 2019 CEA was increased  at 6.4.    TURBT in 2015.  September 2019- cystoscopy.  He does have a urology appointment on June 4 for follow-up cystoscopy.  No new urinary complaints.    No chest pain or new cough or increasing shortness of breath.  No falls reported.    PMHx:    Past Medical History:   Diagnosis Date     Anemia      Bladder cancer (H)      Cancer (H)     Rectosigmoid     Diabetes mellitus (H)      Hyperkalemia      Hypertension      Seizure (H)     possible, one time occurence     Stroke (H)     multiple, residual left sided weakness, last CVA in July 2015 caused some speech deficit     Superficial phlebitis      Venous insufficiency of both lower extremities      PSHx:    Past Surgical History:   Procedure Laterality Date     COLON SURGERY      Colectomy Low Anterior Resection     EYE SURGERY      Cataract Extraction     LAPAROSCOPIC COLON RESECTION N/A 6/1/2017    Procedure: LAPAROSCOPIC ASSISTED COLECTOMY LOW ANTERIOR RESECTION AND DIVERTING LOOP ILEOSTOMY, PROCTOSOPY;  Surgeon: Tyson Parry MD;  Location: [facility];  Service:      MI CLOSE ENTEROSTOMY N/A 8/15/2017    Procedure: ILEOSTOMY CLOSURE LOOP ;  Surgeon: Tyson Parry MD;  Location: [facility];  Service: General     MI CYSTOURETHROSCOPY,FULGUR <0.5 CM LESN N/A 9/1/2015    Procedure: CYSTOSCOPY TRANSURETHRAL RESECTION BLADDER TUMOR;  Surgeon: Cleveland Nair MD;  Location: [facility];  Service: Urology     MI CYSTOURETHROSCOPY,FULGUR <0.5 CM LESN N/A 12/22/2015    Procedure: CYSTOSCOPY TRANSURETHRAL RESECTION BLADDER TUMOR AND BLADDER BIOPSIES;  Surgeon: Cleveland Nair MD;  Location: [facility];  Service: Urology     TURBT      X2     VASECTOMY       Immunizations:   Immunization History   Administered Date(s) Administered     Influenza [id number], 01/18/2010     Influenza high dose,seasonal,PF, 65+ yrs 09/15/2015, 09/26/2016, 10/03/2017, 09/27/2018, 10/19/2019     Influenza, Seasonal, Inj PF IIV3 09/27/2010, 09/14/2012,  09/27/2013     Influenza, inj, historic,unspecified 10/19/2007, 09/16/2011, 10/15/2015     Influenza, seasonal,quad inj 6-35 mos 09/18/2009, 10/17/2014     Pneumo Conj 13-V (2010&after) 01/20/2015     Pneumo Polysac 23-V 10/08/2004     Td,adult,historic,unspecified 07/01/2004     Tdap 05/20/2015       ROS A comprehensive review of systems was performed and was otherwise negative    Medications, allergies, and problem list were reviewed and updated    Exam  There were no vitals taken for this visit.  Patient is alert and oriented and did answer questions appropriately although somewhat slow cognition but at baseline.  Wife also gave history.    Assessment/Plan  1. Type 2 diabetes mellitus without complication, with long-term current use of insulin (H)  High blood sugars during the day, but unclear how that is related to his meals or snacking.  Borderline low blood sugars in the morning, he still had 1 2 weeks ago but none for 2 weeks per patient.    I did offer patient referral to the diabetic educator with 5-day continuous glucometer for further evaluation, but with current coronavirus outbreak I will not make that referral at this time.    Goal to avoid low blood sugars.  Goal for moderate control of diabetes with hemoglobin A1c less than 8%.    He could consider short acting insulin with lunch and supper for high blood sugars.    At this time we will continue on the Amaryl 4 mg with lunch and supper.    Continue 14 units of Lantus.    I did ask our [facility] pharmacy group to evaluate for linagliptin or sitagliptin or other oral medication that could be added for his diabetes with his renal failure.    Otherwise I will consider NovoLog or Humalog 5 units with meals.    Because of the current coronavirus outbreak, follow-up will be delayed until July 9.    2. Chronic renal insufficiency, stage 3 (moderate) (H)  Progressive nephrosclerosis associated with hypertension.    Patient needs to determine his nephrology  follow-up, would anticipate this spring.    Kidney labs last checked in February.    Follow-up with me in July for lab work as well.    3. Anemia, unspecified type  Severe anemia with acute illness with influenza A in January.  Hemoglobin coming up in February.    He has not gotten erythropoietin shots recently through the nephrology clinic.    4. Hypertension  As above    5. History of bladder cancer  Has an appointment for follow-up cystoscopy in June with urology    6. History of colon cancer, stage III  Unclear if he wishes to proceed with follow-up colonoscopy.  He had canceled his colonoscopy August 2019 because of severe elevated blood pressure at that time.  The CT scan of the abdomen did not show evidence of recurrence.    Unable to proceed with colonoscopy at this time with a coronavirus outbreak.    He does not have an oncology appointment follow-up scheduled at this time.  I would consider rechecking CEA level in July.    7. History of stroke  No new neurologic event.  Known carotid artery stenosis, status post right CEA.    Lipitor and Plavix    8. Carotid artery stenosis, asymptomatic, left  Stable on ultrasound May 2018.  Continue medical management as above.    Return in 9 weeks (on 7/9/2020) for Recheck.   Patient Instructions   Continue 14 units of the Lantus insulin daily.    Continue the glimepiride 4 mg with lunch and with supper.    Try to reduce the amount of potatoes you eat with lunch.    I will have our clinic pharmacist evaluate options for additional pill medications for diabetes, otherwise we will need to discuss short acting insulin to be given with meals.    Determine your follow-up with your nephrology/kidney clinic.    Follow-up with me on July 9 at 11:20 AM.  You can request a phone consult before that if needed.    Telly Torre MD        Current Outpatient Medications   Medication Sig Dispense Refill     acetaminophen (TYLENOL) 325 MG tablet Take 2 tablets (650 mg total) by mouth  "every 4 (four) hours as needed.  0     amLODIPine (NORVASC) 10 MG tablet Take 1 tablet (10 mg total) by mouth daily.       atorvastatin (LIPITOR) 20 MG tablet Take 1 tablet (20 mg total) by mouth daily. 90 tablet 3     blood glucose test strips Check blood sugar 3 times per day. Accu-Chek Guide test striips. 100 strip 3     cholecalciferol, vitamin D3, (VITAMIN D3) 1,000 unit capsule Take 2 capsules (2,000 Units total) by mouth daily. 180 capsule 0     cloNIDine HCl (CATAPRES) 0.3 MG tablet Take 0.5 tablets (0.15 mg total) by mouth 3 (three) times a day.  0     clopidogreL (PLAVIX) 75 mg tablet TAKE 1 TABLET (75 MG TOTAL) BY MOUTH DAILY. 90 tablet 1     cyanocobalamin 1,000 mcg/mL injection Inject 1,000 mcg into the shoulder, thigh, or buttocks every 3 (three) months.              cyanocobalamin, vitamin B-12, (VITAMIN B-12) 1,000 mcg Subl Place 1 tablet (1,000 mcg total) under the tongue daily. 90 tablet 3     diclofenac sodium (VOLTAREN) 1 % Gel APPLY 4 GRAMS TOPICALLY 3 (THREE) TIMES A DAY AS NEEDED. APPLY TO KNEES 100 g 0     furosemide (LASIX) 40 MG tablet Take 40 mg by mouth daily.       generic lancets Fastclix lancets. Check blood sugar 3 times per day. 102 each 3     glimepiride (AMARYL) 4 MG tablet ONE PILL TWICE PER DAY WITH LUNCH AND SUPPER 180 tablet 3     hydrALAZINE (APRESOLINE) 25 MG tablet Take 75 mg by mouth 4 (four) times a day.        insulin glargine (LANTUS SOLOSTAR PEN) 100 unit/mL (3 mL) pen Inject 14 Units under the skin daily with breakfast. 3 adj dose pen 3     metoprolol succinate (TOPROL-XL) 25 MG TAKE 1 TABLET (25 MG TOTAL) BY MOUTH DAILY. 90 tablet 2     omeprazole (PRILOSEC) 20 MG capsule Take 1 capsule (20 mg total) by mouth 2 (two) times a day before meals.  0     ONETOUCH ULTRA2 METER [facility] AS DIRECTED 1 each 0     pen needle, diabetic (BD ULTRA-FINE JONO PEN NEEDLE) 32 gauge x 5/32\" Ndle Use one needle per day to inject insulin. 100 each 4     polyethylene glycol (MIRALAX) 17 gram " "packet Take 1 packet (17 g total) by mouth daily as needed.  0     sertraline (ZOLOFT) 50 MG tablet Take 50 mg by mouth daily.       No current facility-administered medications for this visit.      Allergies   Allergen Reactions     Cefazolin      \"felt very hot\"     Pravastatin      Increased peripheral neuropathy     Simvastatin      Increased peripheral neuropathy     Thiazides      Other: Gout       Social History     Tobacco Use     Smoking status: Former Smoker     Last attempt to quit: 1995     Years since quittin.7     Smokeless tobacco: Never Used   Substance Use Topics     Alcohol use: No     Frequency: Never     Drug use: No             Phone call duration: 21 minutes    Florinda Osuna CMA        "

## 2021-06-08 NOTE — TELEPHONE ENCOUNTER
RN cannot approve Refill Request    RN can NOT refill this medication med is not covered by policy/route to provider. Last office visit: Visit date not found Last Physical: Visit date not found Last MTM visit: Visit date not found Last visit same specialty: 2/5/2020 Mir De La Cruz, NATALI.  Next visit within 3 mo: Visit date not found  Next physical within 3 mo: Visit date not found      Diana Romero, [facility] Triage/Med Refill 6/4/2020    Requested Prescriptions   Pending Prescriptions Disp Refills     diclofenac sodium (VOLTAREN) 1 % Gel [Pharmacy Med Name: DICLOFENAC GEL 1%] 100 g 0     Sig: APPLY 4 GRAMS TOPICALLY 3 (THREE) TIMES A DAY AS NEEDED. APPLY TO KNEES       There is no refill protocol information for this order

## 2021-06-08 NOTE — TELEPHONE ENCOUNTER
I already spoke with the daughter who manages his medications, so you can go ahead and call to schedule. Thanks!

## 2021-06-08 NOTE — PROGRESS NOTES
"Suman Nagy is a 79 y.o. male who is being evaluated via a billable telephone visit.      The patient has been notified of following:     \"This telephone visit will be conducted via a call between you and your physician/provider. We have found that certain health care needs can be provided without the need for a physical exam.  This service lets us provide the care you need with a short phone conversation.  If a prescription is necessary we can send it directly to your pharmacy.  If lab work is needed we can place an order for that and you can then stop by our lab to have the test done at a later time.    Telephone visits are billed at different rates depending on your insurance coverage. During this emergency period, for some insurers they may be billed the same as an in-person visit.  Please reach out to your insurance provider with any questions.    If during the course of the call the physician/provider feels a telephone visit is not appropriate, you will not be charged for this service.\"    Patient has given verbal consent to a Telephone visit? Yes    What phone number would you like to be contacted at? [phone]    Patient would like to receive their AVS by AVS Preference: Mail a copy.    Additional provider notes:    [facility] Follow Up Note    Suman Nagy   79 y.o. male    Date of Visit: 5/20/2020    Chief Complaint   Patient presents with     Follow-up     High blood sugars in the afternoon     Subjective  I spoke with Jeni his wife, in addition to patient for a phone call follow-up on diabetes.    Patient was having high blood sugars during the day from 2-300, but having low blood sugars early in the morning or at night.    Patient had another low blood sugar of 80 1 in the morning.  He had 1 episode in the middle of the night where he had weakness and difficulty seeing and thought it was a low blood sugar, resolved with eating within a few minutes.    Blood pressure " has been labile in the past but currently stable.  Blood pressure still running 140-150 over 70s.    No new neurologic changes.  History of stroke with left hemiparesis in 2015 and right CEA.    Patient had started on Tradjenta in early May at 5 mg a day.    On Lantus 14 units in the morning.  Amaryl 4 mg with lunch and supper.    PMHx:    Past Medical History:   Diagnosis Date     Anemia      Bladder cancer (H)      Cancer (H)     Rectosigmoid     Diabetes mellitus (H)      Hyperkalemia      Hypertension      Seizure (H)     possible, one time occurence     Stroke (H)     multiple, residual left sided weakness, last CVA in July 2015 caused some speech deficit     Superficial phlebitis      Venous insufficiency of both lower extremities      PSHx:    Past Surgical History:   Procedure Laterality Date     COLON SURGERY      Colectomy Low Anterior Resection     EYE SURGERY      Cataract Extraction     LAPAROSCOPIC COLON RESECTION N/A 6/1/2017    Procedure: LAPAROSCOPIC ASSISTED COLECTOMY LOW ANTERIOR RESECTION AND DIVERTING LOOP ILEOSTOMY, PROCTOSOPY;  Surgeon: Tyson Parry MD;  [place] [facility];  Service:      UT CLOSE ENTEROSTOMY N/A 8/15/2017    Procedure: ILEOSTOMY CLOSURE LOOP ;  Surgeon: Tyson Parry MD;  [place] [facility];  Service: General     UT CYSTOURETHROSCOPY,FULGUR <0.5 CM LESN N/A 9/1/2015    Procedure: CYSTOSCOPY TRANSURETHRAL RESECTION BLADDER TUMOR;  Surgeon: Cleveland Nair MD;  Location: [facility];  Service: Urology     UT CYSTOURETHROSCOPY,FULGUR <0.5 CM LESN N/A 12/22/2015    Procedure: CYSTOSCOPY TRANSURETHRAL RESECTION BLADDER TUMOR AND BLADDER BIOPSIES;  Surgeon: Cleveland Nair MD;  [place] [facility] [place];  Service: Urology     TURBT      X2     VASECTOMY       Immunizations:   Immunization History   Administered Date(s) Administered     Influenza [id number], 01/18/2010     Influenza high dose,seasonal,PF, 65+ yrs 09/15/2015, 09/26/2016,  10/03/2017, 09/27/2018, 10/19/2019     Influenza, Seasonal, Inj PF IIV3 09/27/2010, 09/14/2012, 09/27/2013     Influenza, inj, historic,unspecified 10/19/2007, 09/16/2011, 10/15/2015     Influenza, seasonal,quad inj 6-35 mos 09/18/2009, 10/17/2014     Pneumo Conj 13-V (2010&after) 01/20/2015     Pneumo Polysac 23-V 10/08/2004     Td,adult,historic,unspecified 07/01/2004     Tdap 05/20/2015       ROS A comprehensive review of systems was performed and was otherwise negative    Medications, allergies, and problem list were reviewed and updated    Exam  There were no vitals taken for this visit.  Phone call    Assessment/Plan  1. Type 2 diabetes mellitus without complication, without long-term current use of insulin (H)  Fluctuating blood sugars with low blood sugars overnight.  He tends to snack quite a bit during the day.    Tradjenta added on May 7 without significant changes in blood sugars, and did have 2 more low blood sugar episodes.    It may take 1 to 2 months to fully take effect with Tradjenta.    [facility] pharmacy is also stated that Ozempic was available on his drug formulary, and could consider that as an alternative.    I also discussed short acting insulin option with NovoLog with wife, but she does not wish to proceed with that.    With a low blood sugar overnight, reduce Lantus down to 10 units.  I would hyperglycemia during the day, but goal for avoiding low blood sugars.    He will try to reduce potatoes and snacking.    Follow-up on July 9 in clinic.  Wife was told to call before then if further problems with higher blood sugars or blood sugars less than 80.  - insulin glargine (LANTUS SOLOSTAR PEN) 100 unit/mL (3 mL) pen; Inject 10 Units under the skin daily with breakfast.  Dispense: 3 adj dose pen; Refill: 3    2. Chronic renal insufficiency, stage 3 (moderate) (H)  Follows with nephrology.    I did review labs from February 5, 2020 with a creatinine of 2.4 and potassium 4.1    3. Hypertension  Wife  requested a refill on clonidine and clarify dose as 1/2 tablet in the morning and a full tablet in the evening of the 0.3 mg tablets.    Continue on other medications.    4. History of stroke  No new event    5. History of colon cancer, stage III  Increasing CEA level last fall.  Colon cancer resection in 2017.  Unclear if he is going to follow-up with further colonoscopies at this time.  Can discuss in July.  Not planning follow-up with GI at this time with the current coronavirus outbreak.    He does have a cystoscopy scheduled for June 4 with bladder tumor follow-up with urology.    History of dysthymia, on Zoloft    No follow-ups on file.   Patient Instructions   Reduce Lantus insulin down to 10 units a day.  If any further low blood sugar reactions, contact me by phone right away.    Continue on the Tradjenta and glimepiride at current doses.    Follow-up with me in clinic on July 9.    Telly Torre MD        Current Outpatient Medications   Medication Sig Dispense Refill     acetaminophen (TYLENOL) 325 MG tablet Take 2 tablets (650 mg total) by mouth every 4 (four) hours as needed.  0     amLODIPine (NORVASC) 10 MG tablet Take 1 tablet (10 mg total) by mouth daily.       atorvastatin (LIPITOR) 20 MG tablet Take 1 tablet (20 mg total) by mouth daily. 90 tablet 3     blood glucose test strips Check blood sugar 3 times per day. Accu-Chek Guide test striips. 100 strip 3     cholecalciferol, vitamin D3, (VITAMIN D3) 1,000 unit capsule Take 2 capsules (2,000 Units total) by mouth daily. 180 capsule 0     cloNIDine HCl (CATAPRES) 0.3 MG tablet Take 0.5 tablets (0.15 mg total) by mouth 3 (three) times a day.  0     clopidogreL (PLAVIX) 75 mg tablet TAKE 1 TABLET (75 MG TOTAL) BY MOUTH DAILY. 90 tablet 1     cyanocobalamin 1,000 mcg/mL injection Inject 1,000 mcg into the shoulder, thigh, or buttocks every 3 (three) months.              cyanocobalamin, vitamin B-12, (VITAMIN B-12) 1,000 mcg Subl Place 1 tablet (1,000  "mcg total) under the tongue daily. 90 tablet 3     diclofenac sodium (VOLTAREN) 1 % Gel APPLY 4 GRAMS TOPICALLY 3 (THREE) TIMES A DAY AS NEEDED. APPLY TO KNEES 100 g 0     furosemide (LASIX) 40 MG tablet Take 40 mg by mouth daily.       generic lancets Fastclix lancets. Check blood sugar 3 times per day. 102 each 3     glimepiride (AMARYL) 4 MG tablet ONE PILL TWICE PER DAY WITH LUNCH AND SUPPER 180 tablet 3     hydrALAZINE (APRESOLINE) 25 MG tablet Take 75 mg by mouth 4 (four) times a day.        insulin glargine (LANTUS SOLOSTAR PEN) 100 unit/mL (3 mL) pen Inject 10 Units under the skin daily with breakfast. 3 adj dose pen 3     linaGLIPtin (TRADJENTA) 5 mg Tab Take 1 tablet (5 mg total) by mouth daily. 30 tablet 3     metoprolol succinate (TOPROL-XL) 25 MG TAKE 1 TABLET (25 MG TOTAL) BY MOUTH DAILY. 90 tablet 2     omeprazole (PRILOSEC) 20 MG capsule Take 1 capsule (20 mg total) by mouth 2 (two) times a day before meals.  0     ONETOUCH ULTRA2 METER [facility] AS DIRECTED 1 each 0     pen needle, diabetic (BD ULTRA-FINE JONO PEN NEEDLE) 32 gauge x 5/32\" Ndle Use one needle per day to inject insulin. 100 each 4     polyethylene glycol (MIRALAX) 17 gram packet Take 1 packet (17 g total) by mouth daily as needed.  0     sertraline (ZOLOFT) 50 MG tablet Take 50 mg by mouth daily.       No current facility-administered medications for this visit.      Allergies   Allergen Reactions     Cefazolin      \"felt very hot\"     Pravastatin      Increased peripheral neuropathy     Simvastatin      Increased peripheral neuropathy     Thiazides      Other: Gout       Social History     Tobacco Use     Smoking status: Former Smoker     Last attempt to quit: 1995     Years since quittin.7     Smokeless tobacco: Never Used   Substance Use Topics     Alcohol use: No     Frequency: Never     Drug use: No             Phone call duration: 7 minutes    Vanessa Harris CMA    "

## 2021-06-08 NOTE — TELEPHONE ENCOUNTER
Tradjenta is $45/month. I will call patient to discuss.     Ana Maria Esteban, PharmD, BCACP  Medication Management (MTM) Pharmacist  [facility]

## 2021-06-08 NOTE — PROGRESS NOTES
MTM Consult Encounter    Suman Nagy is a 79 y.o. male referred for a clinical pharmacist consult from Dr. Torre for DPP-IV medication education.     Patient consented to a telehealth visit: Yes  Discussion: Patient is currently taking Lantus 14 units injected daily and glimepiride 4 mg twice daily with lunch and dinner.  No longer on metformin due to CKD.  Was having low morning blood sugars, so Lantus dose was reduced from 16 units.  But blood sugars during the day can be quite elevated in the 2-300 range.     Would not recommend SGLT2 inhibitor given CKD.  Would also avoid pioglitazone given history of bladder cancer.  Looked into other options such as DPP-IV medication as suggested by PCP.  Tradjenta does not need to be renally dose adjusted and is on patient's formulary for $45 per month.  This is affordable for the patient. Medication education and counseling was provided, including indication, expected effect, dosing instructions, and possible side effects. The patient's questions were answered.      Of note, GLP-1 agonist, Ozempic is also on formulary for the same co-pay.  If Tradjenta is not effective, could consider stopping this and switching instead to Ozempic.    Lab Results   Component Value Date    HGBA1C 7.6 (H) 10/28/2019    HGBA1C 6.7 (H) 07/10/2019    HGBA1C 6.4 (H) 04/17/2019     Lab Results   Component Value Date    MICROALBUR 120.44 (H) 12/07/2017    LDLCALC 55 09/05/2014    CREATININE 2.43 (H) 02/05/2020       Plan:  1. Start Tradjenta 5 mg daily.     Follow up:   4-6 weeks via phone with PharmD    Ana Maria Esteban, PharmD, BCACP  Medication Management (MTM) Pharmacist  [facility]       Current Outpatient Medications   Medication Sig Dispense Refill     acetaminophen (TYLENOL) 325 MG tablet Take 2 tablets (650 mg total) by mouth every 4 (four) hours as needed.  0     amLODIPine (NORVASC) 10 MG tablet Take 1 tablet (10 mg total) by mouth daily.        "atorvastatin (LIPITOR) 20 MG tablet Take 1 tablet (20 mg total) by mouth daily. 90 tablet 3     blood glucose test strips Check blood sugar 3 times per day. Accu-Chek Guide test striips. 100 strip 3     cholecalciferol, vitamin D3, (VITAMIN D3) 1,000 unit capsule Take 2 capsules (2,000 Units total) by mouth daily. 180 capsule 0     cloNIDine HCl (CATAPRES) 0.3 MG tablet Take 0.5 tablets (0.15 mg total) by mouth 3 (three) times a day.  0     clopidogreL (PLAVIX) 75 mg tablet TAKE 1 TABLET (75 MG TOTAL) BY MOUTH DAILY. 90 tablet 1     cyanocobalamin 1,000 mcg/mL injection Inject 1,000 mcg into the shoulder, thigh, or buttocks every 3 (three) months.              cyanocobalamin, vitamin B-12, (VITAMIN B-12) 1,000 mcg Subl Place 1 tablet (1,000 mcg total) under the tongue daily. 90 tablet 3     diclofenac sodium (VOLTAREN) 1 % Gel APPLY 4 GRAMS TOPICALLY 3 (THREE) TIMES A DAY AS NEEDED. APPLY TO KNEES 100 g 0     furosemide (LASIX) 40 MG tablet Take 40 mg by mouth daily.       generic lancets Fastclix lancets. Check blood sugar 3 times per day. 102 each 3     glimepiride (AMARYL) 4 MG tablet ONE PILL TWICE PER DAY WITH LUNCH AND SUPPER 180 tablet 3     hydrALAZINE (APRESOLINE) 25 MG tablet Take 75 mg by mouth 4 (four) times a day.        insulin glargine (LANTUS SOLOSTAR PEN) 100 unit/mL (3 mL) pen Inject 14 Units under the skin daily with breakfast. 3 adj dose pen 3     linaGLIPtin (TRADJENTA) 5 mg Tab Take 1 tablet (5 mg total) by mouth daily. 30 tablet 3     metoprolol succinate (TOPROL-XL) 25 MG TAKE 1 TABLET (25 MG TOTAL) BY MOUTH DAILY. 90 tablet 2     omeprazole (PRILOSEC) 20 MG capsule Take 1 capsule (20 mg total) by mouth 2 (two) times a day before meals.  0     ONETOUCH ULTRA2 METER [facility] [place]ED 1 each 0     pen needle, diabetic (BD ULTRA-FINE JONO PEN NEEDLE) 32 gauge x 5/32\" Ndle Use one needle per day to inject insulin. 100 each 4     polyethylene glycol (MIRALAX) 17 gram packet Take 1 packet (17 g " total) by mouth daily as needed.  0     sertraline (ZOLOFT) 50 MG tablet Take 50 mg by mouth daily.       No current facility-administered medications for this visit.

## 2021-06-09 NOTE — TELEPHONE ENCOUNTER
Refill Approved    Rx renewed per Medication Renewal Policy. Medication was last renewed on 7/21/19.    Cammy Monroy, [facility] Triage/Med Refill 7/22/2020     Requested Prescriptions   Pending Prescriptions Disp Refills     atorvastatin (LIPITOR) 20 MG tablet [Pharmacy Med Name: ATORVASTATIN 20MG] 90 tablet 2     Sig: TAKE 1 TABLET (20 MG TOTAL) BY MOUTH DAILY.       Statins Refill Protocol (Hmg CoA Reductase Inhibitors) Passed - 7/21/2020 10:01 AM        Passed - PCP or prescribing provider visit in past 12 months      Last office visit with prescriber/PCP: 7/9/2020 Telly Torre MD OR same dept: 7/9/2020 Telly Torre MD OR same specialty: 7/9/2020 Telly Torre MD  Last physical: 7/10/2019 Last MTM visit: Visit date not found   Next visit within 3 mo: Visit date not found  Next physical within 3 mo: Visit date not found  Prescriber OR PCP: Telly Torre MD  Last diagnosis associated with med order: 1. Carotid artery stenosis, asymptomatic, left  - atorvastatin (LIPITOR) 20 MG tablet [Pharmacy Med Name: ATORVASTATIN 20MG]; Take 1 tablet (20 mg total) by mouth daily.  Dispense: 90 tablet; Refill: 2    If protocol passes may refill for 12 months if within 3 months of last provider visit (or a total of 15 months).

## 2021-06-09 NOTE — TELEPHONE ENCOUNTER
Patient's wife notified and lab appointment scheduled for this afternoon.  Vanessa Harris CMA ............... 2:33 PM, 06/24/20

## 2021-06-09 NOTE — TELEPHONE ENCOUNTER
MADDY Callahan  Luke saw his oncologist at [facility] yesterday as follow up. Potassium is 2.8 from routine CMP draw. [facility] is not actively treating Diaz, appointment yesterday was just a follow up.     Erika Arriaga NP from [facility] today states that they would prefer for this issue to be managed by Luke's GP as he would be most familiar with Luke and his medical history. Luke does take furosemide.     Urgent matter, please review and advise Luke.    Swati Palmer RN  [facility] Nurse Advisor      Reason for Disposition    Lab or radiology calling with CRITICAL test results    Protocols used: PCP CALL - NO TRIAGE-A-

## 2021-06-09 NOTE — TELEPHONE ENCOUNTER
Fax a copy of my clinic note from today and the lab result letter from today's labs to his nephrologist, Dr. Camilo at fax #[phone]

## 2021-06-09 NOTE — TELEPHONE ENCOUNTER
Message given to patient's wife. Patient scheduled for lab 7/20/2020. No order is in chart. Order pended.  Vanessa Harris CMA ............... 4:41 PM, 07/03/20

## 2021-06-09 NOTE — PROGRESS NOTES
[facility] clinic Follow Up Note    Suman Nagy   79 y.o. male    Date of Visit: 7/9/2020    Chief Complaint   Patient presents with     Follow-up     Subjective  Luke is here with his wife, Jeni, for follow-up of multiple medical issues.    Patient has chronic renal insufficiency with worsening of his creatinine last year with hypertensive spike.  Creatinine did stabilize last February at 2.4 with a normal potassium.    He did have a low potassium of 3.0 on June 20, was put on a potassium supplement 20 mg once a day and had labs rechecked on July 2.  Creatinine was 2.12 and potassium 3.9 with normal sodium.    He continues on amlodipine 10 mg a day.  Lasix 40 mg a day, hydralazine 75 mg 3 times daily, Toprol-XL 25 mg a day and clonidine 0.15 mg in the morning and 0.3 mg in the evening.  He did not tolerate reduction in clonidine in the past.    Blood pressures have been running in the 150s to 160s over 70-80 range on home check.  Blood pressure lower in clinic here today.  He denies orthostasis.  No significant edema.    He denies any increasing shortness of breath.  No history of heart failure.    I did review the 2015 heart echo with normal ventricular size and function and no significant valve abnormality.  Ejection fraction 61%, but some mild diastolic dysfunction noted.    Past history of CVA with left hemiparesis in 2015.  He had a right CEA at that time.  Ultrasound in 2018 did not show any restenosis and stable left 50-69% stenosis on the left.    He still on Lipitor 20 mg and Plavix.  No bleeding issues.    He is minimally active at home but does do some walking.  No falls.    Past history of stage III colon cancer with surgery in 2017.  He did have an increased CEA level November 2019 at 6.4.  Last CT scan of the abdomen August 2019- for recurrence.    Patient states that he did have some blood work done at his oncology clinic about 3 weeks ago but has not heard those results.    No  new abdominal pain.  He has some occasional loose stools but not severe.    Diabetes type 2.  Metformin was discontinued when his creatinine worsened previously.  October 2019 hemoglobin A1c was 7.6%.  He was put on Tradjenta 5 mg a day but that did not make a significant change in his blood sugars.  He was on Lantus insulin at a higher dose, which was reduced to 10 mg a day in May.  He was having low blood sugars overnight and early in the morning.  Wife was resistant to starting on NovoLog sliding scale with meals.    He has been on Amaryl 4 mg with lunch and supper.    [facility] pharmacy did review his case last year and apparently the Ozempic is covered at the same cost as the Tradjenta.    Past history of bladder cancer with TURBT in 2013.  He had a virtual only visit with urology last month and cystoscopy changed to this fall.    Chronic dysthymia stable on Zoloft 50 mg.    Denies new swallowing issues.  No new nausea.  No chest pain complaints.    Past history of B12 deficiency and chronic anemia.  He is taking his oral sublingual B12 daily.    No foot sores    PMHx:    Past Medical History:   Diagnosis Date     Anemia      Bladder cancer (H)      Cancer (H)     Rectosigmoid     Diabetes mellitus (H)      Hyperkalemia      Hypertension      Seizure (H)     possible, one time occurence     Stroke (H)     multiple, residual left sided weakness, last CVA in July 2015 caused some speech deficit     Superficial phlebitis      Venous insufficiency of both lower extremities      PSHx:    Past Surgical History:   Procedure Laterality Date     COLON SURGERY      Colectomy Low Anterior Resection     EYE SURGERY      Cataract Extraction     LAPAROSCOPIC COLON RESECTION N/A 6/1/2017    Procedure: LAPAROSCOPIC ASSISTED COLECTOMY LOW ANTERIOR RESECTION AND DIVERTING LOOP ILEOSTOMY, PROCTOSOPY;  Surgeon: Tyson Parry MD;  Location: [facility] [place];  Service:      [place] CLOSE ENTEROSTOMY N/A 8/15/2017    Procedure: ILEOSTOMY  CLOSURE LOOP ;  Surgeon: Tyson Parry MD;  Location: [facility] [place];  Service: General     CA CYSTOURETHROSCOPY,FULGUR <0.5 CM LESN N/A 9/1/2015    Procedure: CYSTOSCOPY TRANSURETHRAL RESECTION BLADDER TUMOR;  Surgeon: Cleveland Nair MD;  Location: [facility] OR;  Service: Urology     CA CYSTOURETHROSCOPY,FULGUR <0.5 CM LESN N/A 12/22/2015    Procedure: CYSTOSCOPY TRANSURETHRAL RESECTION BLADDER TUMOR AND BLADDER BIOPSIES;  Surgeon: Cleveland Nair MD;  Location: [facility] OR;  Service: Urology     TURBT      X2     VASECTOMY       Immunizations:   Immunization History   Administered Date(s) Administered     Influenza A3q4-50, 01/18/2010     Influenza high dose,seasonal,PF, 65+ yrs 09/15/2015, 09/26/2016, 10/03/2017, 09/27/2018, 10/19/2019     Influenza, Seasonal, Inj PF IIV3 09/27/2010, 09/14/2012, 09/27/2013     Influenza, inj, historic,unspecified 10/19/2007, 09/16/2011, 10/15/2015     Influenza, seasonal,quad inj 6-35 mos 09/18/2009, 10/17/2014     Pneumo Conj 13-V (2010&after) 01/20/2015     Pneumo Polysac 23-V 10/08/2004     Td,adult,historic,unspecified 07/01/2004     Tdap 05/20/2015       ROS A comprehensive review of systems was performed and was otherwise negative    Medications, allergies, and problem list were reviewed and updated    Exam  /54 (Patient Site: Right Arm, Patient Position: Sitting, Cuff Size: Adult Regular)   Pulse 60   Wt 177 lb (80.3 kg)   BMI 27.72 kg/m    Patient is alert with baseline mental status.  Somewhat slow cognition and some mild dysarthria consistent with post stroke, but stable from previous.  Mild persistent left hemiparesis with global weakness, but no new neurologic deficits.  Lungs are clear to auscultation heart regular without murmur.  Abdomen nontender just mildly overweight and no ankle edema.    Assessment/Plan  1. Type 2 diabetes mellitus without complication, with long-term current use of insulin (H).  Not currently controlled  with high blood sugars during the day and symptomatic hypoglycemia intermittently in the morning.    Requiring medication adjustment.  High blood sugars in the evening and during the day in the 200-300s but he does snack and may be eating something right before he checks his blood sugars.      Continues to have early morning or overnight low blood sugars.  He still eating fairly regularly.    His worsening renal function may be causing longer action of the Amaryl.    Discontinue the Amaryl at suppertime.  Continue to 4 mg with lunch.    Continue on 10 units of Lantus at this time, but if any further low blood sugars, would plan to reduce the Lantus insulin down to 8 units and reevaluate.    I did discuss option of changing Tradjenta to Ozempic, but I also discussed risk of GI side effects and diarrhea and risk of worsening kidney function if he does develop volume depletion with diarrhea.  Patient does not wish to change to Ozempic at this time.    Goal hemoglobin A1c less than 8%    2-month follow-up    Continue Tradjenta 5 mg a day at this time.    - Glycosylated Hemoglobin A1c  - glimepiride (AMARYL) 4 MG tablet; Take 1 tablet (4 mg total) by mouth daily with lunch. Dose reduction to once a day  Dispense: 90 tablet; Refill: 3    2. Chronic kidney disease, stage IV (severe) (H)  Kidney function is stabilized.  Consistent with nephrosclerosis associated with severe hypertension and diabetes.    He does see a nephrologist, patient requested faxing labs from today to nephrology.    Edema controlled with furosemide daily.    History of low potassium.  Potassium level normalized on potassium supplement.  Given his renal insufficiency, he will plan to follow potassium closely.    Recheck potassium today.  Follow-up in 2 months.  - Comprehensive Metabolic Panel  - HM2(CBC w/o Differential)    3. History of stroke  No new event.  Continue Lipitor and Plavix.  History of carotid artery disease post CEA.  Stable in  2018.    During coronavirus outbreak I will not plan to do a surveillance ultrasound on carotids as long as he remains asymptomatic.    4. Carotid artery stenosis, asymptomatic, left  As above    5. Hypertension  Blood pressure on the low side today.  Labile at home.  History of labile hypertension.  Avoiding hypotension with his known vascular disease and history of stroke.    Continue current medications.    6. History of colon cancer, stage III  Increasing CEA earlier.  I did contact the oncology office requesting lab results from approximately 3 weeks ago.    I will recheck the CEA level now.    August 2019 CT scan without evidence of recurrence.  He will plan to follow-up with his oncologist.  - CEA (Carcinoembryonic Antigen)    7. History of bladder cancer  His cystoscopy follow-up was delayed until December because of the coronavirus outbreak.  He denies any hematuria or new symptoms.    8. B12 deficiency  Restart B12 injections.  History of anemia.  Continue daily sublingual B12.  - cyanocobalamin injection 1,000 mcg    Chronic dysthymia, continue Zoloft 50 mg    Time with patient and wife greater than 40 minutes with time face-to-face.  Greater than 50% time coordination of care.    Return in about 2 months (around 9/9/2020) for Recheck.   Patient Instructions   Stop the glimepiride/Amaryl with supper.  Take the 4 mg pill with lunch only.    Continue on the 10 units of insulin at this time.  If you have any further low blood sugars less than 80, contact clinic right away for further medication adjustment.    We will fax labs from today to your nephrologist, as requested.    Follow-up with me in approximately 2 months.    Telly Torre MD        Current Outpatient Medications   Medication Sig Dispense Refill     acetaminophen (TYLENOL) 325 MG tablet Take 2 tablets (650 mg total) by mouth every 4 (four) hours as needed.  0     amLODIPine (NORVASC) 10 MG tablet Take 1 tablet (10 mg total) by mouth daily.    "    atorvastatin (LIPITOR) 20 MG tablet Take 1 tablet (20 mg total) by mouth daily. 90 tablet 3     blood glucose test strips Check blood sugar 3 times per day. Accu-Chek Guide test striips. 100 strip 3     cholecalciferol, vitamin D3, (VITAMIN D3) 1,000 unit capsule Take 2 capsules (2,000 Units total) by mouth daily. 180 capsule 0     cloNIDine HCL (CATAPRES) 0.3 MG tablet Take 1/2 pill in the morning and 1 full pill in the evening. 135 tablet 2     clopidogreL (PLAVIX) 75 mg tablet TAKE 1 TABLET (75 MG TOTAL) BY MOUTH DAILY. 90 tablet 1     cyanocobalamin 1,000 mcg/mL injection Inject 1,000 mcg into the shoulder, thigh, or buttocks every 3 (three) months.              cyanocobalamin, vitamin B-12, (VITAMIN B-12) 1,000 mcg Subl Place 1 tablet (1,000 mcg total) under the tongue daily. 90 tablet 3     diclofenac sodium (VOLTAREN) 1 % Gel APPLY 4 GRAMS TOPICALLY 3 (THREE) TIMES A DAY AS NEEDED. APPLY TO KNEES 100 g 0     furosemide (LASIX) 40 MG tablet Take 40 mg by mouth daily.       generic lancets Fastclix lancets. Check blood sugar 3 times per day. 102 each 3     glimepiride (AMARYL) 4 MG tablet Take 1 tablet (4 mg total) by mouth daily with lunch. Dose reduction to once a day 90 tablet 3     hydrALAZINE (APRESOLINE) 25 MG tablet Take 75 mg by mouth 4 (four) times a day.        insulin glargine (LANTUS SOLOSTAR PEN) 100 unit/mL (3 mL) pen Inject 10 Units under the skin daily with breakfast. 3 adj dose pen 3     linaGLIPtin (TRADJENTA) 5 mg Tab Take 1 tablet (5 mg total) by mouth daily. 30 tablet 3     metoprolol succinate (TOPROL-XL) 25 MG TAKE 1 TABLET (25 MG TOTAL) BY MOUTH DAILY. 90 tablet 2     omeprazole (PRILOSEC) 20 MG capsule Take 1 capsule (20 mg total) by mouth 2 (two) times a day before meals.  0     ONETOUCH ULTRA2 METER [facility] [place] 1 each 0     pen needle, diabetic (BD ULTRA-FINE JONO PEN NEEDLE) 32 gauge x 5/32\" Ndle Use one needle per day to inject insulin. 100 each 4     polyethylene glycol " "(MIRALAX) 17 gram packet Take 1 packet (17 g total) by mouth daily as needed.  0     potassium chloride (K-DUR,KLOR-CON) 20 MEQ tablet Take 1 tablet (20 mEq total) by mouth daily. 30 tablet 11     sertraline (ZOLOFT) 50 MG tablet Take 50 mg by mouth daily.       Current Facility-Administered Medications   Medication Dose Route Frequency Provider Last Rate Last Dose     cyanocobalamin injection 1,000 mcg  1,000 mcg Intramuscular Q3 Months Telly Torre MD         Allergies   Allergen Reactions     Cefazolin      \"felt very hot\"     Pravastatin      Increased peripheral neuropathy     Simvastatin      Increased peripheral neuropathy     Thiazides      Other: Gout       Social History     Tobacco Use     Smoking status: Former Smoker     Last attempt to quit: 1995     Years since quittin.8     Smokeless tobacco: Never Used   Substance Use Topics     Alcohol use: No     Frequency: Never     Drug use: No           "

## 2021-06-09 NOTE — TELEPHONE ENCOUNTER
Note needs to be signed before it can be printed and sent. I will wait for encounter to be finished. Also, result letter is not completed.  Vanessa Harris CMA ............... 12:07 PM, 07/09/20

## 2021-06-09 NOTE — PATIENT INSTRUCTIONS - HE
Stop the glimepiride/Amaryl with supper.  Take the 4 mg pill with lunch only.    Continue on the 10 units of insulin at this time.  If you have any further low blood sugars less than 80, contact clinic right away for further medication adjustment.    We will fax labs from today to your nephrologist, as requested.    Follow-up with me in approximately 2 months.

## 2021-06-09 NOTE — TELEPHONE ENCOUNTER
Please call Mr. Maldonado, and tell him    1.   prescription for potassium chloride tablets, start taking 1 a day, starting today.    2.  Come in the clinic TODAY and get blood drawn so that we can check potassium, kidney function, and magnesium levels.  Future order placed    Tell him that we will need to monitor his potassium levels closely, possibly every 3 or 4 days to make sure we adjust his potassium dose properly because he has weak kidney function.    thanks

## 2021-06-10 NOTE — PROGRESS NOTES
Patient comes in complaining of sinus congestion cough myalgias headache and hoarseness in his voice.  This began last Thursday.  They have a grandson is living the house who also had some sort of cold URI.  No diagnosis of influenza but they are aware of.  No shortness of breath or wheezing.  Scheduled for surgery tomorrow and they have elected to cancel the procedure.    Review of systems as above otherwise negative    Reviewed allergies medications    Social history as mentioned above with the grandson    Physical exam vital signs below patient looks well in no acute distress.    Neck supple without adenopathy    Voice is hoarse    Lungs are clear    Cough sounds nonproductive.    Rapid strep and influenza were both negative.    Assessment and plan    1.  URI likely.  I think it is appropriate to not do surgery tomorrow and reschedule.  Recommended fluids Tylenol rest and let me know if symptoms worsen.    2.  Previous stroke off the Plavix right now for the surgery tomorrow.  They will call and get surgery rescheduled and if it is more than 8 days away he should restart Plavix and stop 7-8 days prior to the procedure.

## 2021-06-10 NOTE — PROGRESS NOTES
OUTPATIENT PRE-OP OSTOMY CONSULTATION    Visit notification completed: Yes  Patients mode of arrival: Ambulatory assisted     INTAKE    Type of Stoma Planned: Temporary Colostomy    Diagnosis Pertinent to Stoma:Colon/Rectal Cancer  Date of Diagnosis: UNKNOWN  Type of Surgery: laparoscopic assisted colectomy   Surgery Date: 06/01/2017  Surgeon: Dr. Parry    Hospital: [facility]  Current Living Situation:House  Anticipated Discharge Location After Hospital Stay:House  Any significant vision issues: No  Any hand dexterity issues: No    Present for Teaching Session: Spouse   Present: No    Stoma Site Marking:  Assessed Patient while: Lying, Sitting and Standing  Marked in LLQ with Permanent Marker    Interventions: educated to explain a colostomy, expectations and cares  Identified Barriers to Independent Care: no    EDUCATION  Teaching folder given: Yes   Teachings:   WOC Role, Activity, Anatomy, Bathing: Ostomy supplies are waterproof., Clothing, Hospital Stay: 1 and 2, Pouching Products: Showed pouching system  and Insurance    Total Time Spent with Patient: 45 minutes.  Education time: 40 minutes.

## 2021-06-10 NOTE — PROGRESS NOTES
ASSESSMENT:  1. Pre-operative examination for internal medicine  No contraindication for the surgical procedure.  Good postop DVT prophylaxis was discussed with the patient and his wife as well as incentive spirometry.  He will stop the Plavix 8-9 days prior to his procedure and avoid diabetic medicines the morning of the procedure and the glyburide the night before.  I checked with anesthesia and keeping the clonidine patch on was said to be fine.    2. Type 2 diabetes mellitus without complication  A1c will be checked today.  - Glycosylated Hemoglobin A1c  - JIC RED  - Microalbumin, Random Urine    3. Colon cancer  The reason for the surgery.  He has had radiation therapy.  - Ferritin  - Iron and Transferrin Iron Binding Capacity    4. Gout  No recurrent episodes at this time.    5. Essential hypertension  Well-controlled on current medications.  - Comprehensive Metabolic Panel  - HM2(CBC w/o Differential)    6. Ischemic Stroke  He has carotid stenosis which is mild to moderate but it has been 2 years and will go ahead and recheck an ultrasound.    7. Malignant neoplasm of bladder  Followed by urology on a regular basis.    8. Vitamin B12 Deficiency  Getting B12 shots and will get a B12 level done today  - Vitamin B12    9. Bilateral carotid artery disease  As mentioned above, will get an ultrasound.    PLAN:  Patient Instructions   Stop taking Plavix on 5/8/17.    Do not take metformin the morning of the procedure.    Do not take glimepiride the night before or morning of procedure.     Make sure you pump your feet 10 times each side every 15 minutes you are awake for the entire month after surgery.    Deep breath hold exercise as instructed during visit, 5 breaths every 15 minutes.     Bilateral carotid artery ultrasound- #[phone]      Orders Placed This Encounter   Procedures     Glycosylated Hemoglobin A1c     JIC RED     Microalbumin, Random Urine     Comprehensive Metabolic Panel     HM2(CBC w/o  Differential)     Vitamin B12     Ferritin     Iron and Transferrin Iron Binding Capacity     There are no discontinued medications.    Return if symptoms worsen or fail to improve.    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 male here for an internal medicine pre-operative consultation. The exam is requested by Dr. Tyson Parry MD in preparation for LAPAROSCOPIC ASSISTED COLECTOMY LOW ANTERIOR RESECTION to be performed at [facility] on 05/17/2017. Today s examination on 5/3/2017 is done to review the underlying surgical condition of colon cancer, clear for anesthesia, and review medical problems with appropriate changes in medications.    He was diagnosed with colon cancer and has been treated with chemotherapy and radiation. The tumor has become smaller since the treatment was started.     Suman Nagy has tolerated previous surgeries well without bleeding or anesthesia difficulty.     Past/Current Medical Problems:  Previous bleeding disorders: Negative    History of anesthesia reactions: Negative    Past Medical History:   Diagnosis Date     Anemia       Bladder cancer      Cancer     Rectosigmoid     Diabetes mellitus      Hypertension      Seizure     had seizure with previous stroke     Stroke     multiple, residual left sided weakness, last CVA in July 2015 caused some speech deficit     Superficial phlebitis      Venous insufficiency of both lower extremities          REVIEW OF SYSTEMS:   He has not noticed any weakness in his legs since his stroke.     Constitutional: Negative  Eyes: Negative  ENT: Negative  Respiratory: Negative  Breast/Skin: Negative  Cardiovascular:Negative   GI: Colon cancer  Urinary: Urine leaking  Reproductive: Negative  Musculoskeletal: Negative  Neuro: Negative  Endocrine: Negative  Mental Health: Negative   Lymph: Negative  Heme: Negative     Remaining Review of Systems negative.    QUESTIONS/HISTORY PERTINENT TO PRE-OP:  Body Piercings: None    Family history of bleeding disorders: Negative    Family history of anesthesia reactions: Negative    Hospitalizations/ Year:  3/02/16-3/04/16- URI with pneumonia  And as associated with surgical history below.     Surgeries:  Past Surgical History:   Procedure Laterality Date     COLON SURGERY  05/17/2017    Colectomy Low Anterior Resection     EYE SURGERY      Cataract Extraction     [place] CYSTOURETHROSCOPY,FULGUR <0.5 CM LESN N/A 9/1/2015    Procedure: CYSTOSCOPY TRANSURETHRAL RESECTION BLADDER TUMOR;  Surgeon: Cleveland Nair MD;  Location: [facility];  Service: Urology     [place] CYSTOURETHROSCOPY,FULGUR <0.5 CM LESN N/A 12/22/2015    Procedure: CYSTOSCOPY TRANSURETHRAL RESECTION BLADDER TUMOR AND BLADDER BIOPSIES;  Surgeon: Cleveland Nair MD;  Location: [facility];  Service: Urology     TURBT      X2     VASECTOMY         Family History:   Family History   Problem Relation Age of Onset     COPD Father        Social History:  Social History     Social History     Marital status:      Spouse name: N/A     Number of children: N/A     Years of education: N/A  "    Occupational History     Not on file.     Social History Main Topics     Smoking status: Former Smoker     Quit date: 8/31/1995     Smokeless tobacco: Not on file     Alcohol use No     Drug use: No     Sexual activity: Not Currently     Partners: Female     Other Topics Concern     Not on file     Social History Narrative       VITALS:  Vitals:    05/03/17 1106   BP: 118/62   Pulse: 62   Weight: 185 lb 6.4 oz (84.1 kg)   Height: 5' 7\" (1.702 m)     Wt Readings from Last 3 Encounters:   05/03/17 185 lb 6.4 oz (84.1 kg)   12/30/16 186 lb 1.6 oz (84.4 kg)   01/06/17 186 lb (84.4 kg)     Body mass index is 29.04 kg/(m^2).    PHYSICAL EXAM:  General Appearance: Alert, cooperative, no distress, appears stated age.  HEENT: EMOI, fundi not observed, TMs normal, mouth and throat without lesions.  Neck: Supple without adenopathy or thyromegaly.  Back: No CVA tenderness or spinous process pain.  Lungs: Clear to auscultation bilaterally, good air movement.  Heart: Regular rate and rhythm, S1 and S2 normal, no murmur or bruit. Rate relatively slow but regular.   Abdomen: Soft, non-tender, no HSM or masses.  Musculoskeletal: No gross abnormalities.  Extremities: No CCE, pulses I/IV and symmetric. Right radial pulse a little diminished compared to left. Brachial pulses normal.   Skin: No worrisome lesions noted.  Lymph nodes: Cervical, supraclavicular, groin, and axillary nodes normal.  Neurologic: CNII-XII intact, strength V/V and symmetric, DTRs II/IV and symmetric, sensory grossly intact  Psychiatric:  He has a normal mood and affect.     Notes Reviewed, additional history from source other than patient (2 TOTAL): None.    Accessed Care Everywhere, Requested Records, Consult with Physician (1 TOTAL): Consulted with anesthesiologist about use of clonidine patch.     Radiology tests summarized or ordered (XR, CT, MRI, DXA, US): Ordered carotid US today.    Labs reviewed or ordered (1 TOTAL): Ordered A1c lab today. "     Medicine tests reviewed or ordered (ECG, echocardiogram, colonoscopy, EGD, venous US) (1 TOTAL): None.    Independent review of ECG or XR (2 EACH): None.      The visit lasted a total of 10 minutes face to face with the patient. Over 50% of the time was spent counseling and educating the patient about surgical preparation.    ICherry, am scribing for and in the presence of, Dr. Kelsey.    I, Dr. Kelsey, personally performed the services described in this documentation, as scribed by Cherry Garcia in my presence, and it is both accurate and complete.    MEDICATIONS:  Current Outpatient Prescriptions   Medication Sig Dispense Refill     atenolol (TENORMIN) 50 MG tablet Take 1 tablet (50 mg total) by mouth 2 (two) times a day. (Patient taking differently: Take 50 mg by mouth daily. ) 180 tablet 3     atorvastatin (LIPITOR) 40 MG tablet Take 1 tablet (40 mg total) by mouth daily. 30 tablet 11     cholecalciferol, vitamin D3, 1,000 unit tablet Take 2,000 Units by mouth Daily after lunch.        cloNIDine HCl (CATAPRES) 0.3 MG tablet Take one & one -half (1&1/2) tablets by mouth daily (Patient taking differently: Take half tabe twice daily) 90 tablet 1     clopidogrel (PLAVIX) 75 mg tablet Take 75 mg by mouth bedtime.       glimepiride (AMARYL) 4 MG tablet One pill twice per day with lunch and supper 180 tablet 1     INJECT EASE LANCETS 30 gauge Misc Use daily as directed (4-6 times daily) 600 each 1     iron,carbonyl-vitamin C (VITRON-C) 65 mg iron- 125 mg TbEC Take by mouth.       losartan (COZAAR) 100 MG tablet Take one (1) tablet by mouth daily 90 tablet 1     metFORMIN (GLUCOPHAGE) 500 MG tablet Take 2 tablets (1,000 mg total) by mouth 2 (two) times a day with meals. 360 tablet 3     ONETOUCH ULTRA TEST strips Test 4-6 times daily as directed 600 each 2     sertraline (ZOLOFT) 50 MG tablet Take 3 tablets (150 mg total) by mouth daily. (Patient taking differently: Take 50 mg by mouth daily. )  "135 tablet 3     atorvastatin (LIPITOR) 40 MG tablet Take 40 mg by mouth bedtime.        SURE COMFORT PEN NEEDLE 31 gauge x 3/16\" Ndle Use one needle each night. 100 each 2     Current Facility-Administered Medications   Medication Dose Route Frequency Provider Last Rate Last Dose     cyanocobalamin injection 1,000 mcg  1,000 mcg Intramuscular Q30 Days Jerry Kelsey MD   1,000 mcg at 12/05/16 1505       ALLERGIES:  Allergies   Allergen Reactions     Cefazolin      \"felt very hot\"     Pravastatin      Increased peripheral neuropathy     Simvastatin      Increased peripheral neuropathy     Thiazides      Other: Gout         Total data points: 3  "

## 2021-06-11 NOTE — TELEPHONE ENCOUNTER
RN cannot approve Refill Request    RN can NOT refill this medication Protocol failed and NO refill given. Last office visit: 8/31/2020 Telly Torre MD Last Physical: 7/10/2019 Last MTM visit: Visit date not found Last visit same specialty: 8/31/2020 Telly Torre MD.  Next visit within 3 mo: Visit date not found  Next physical within 3 mo: Visit date not found      Cammy Monroy, [facility] Triage/Med Refill 9/17/2020    Requested Prescriptions   Pending Prescriptions Disp Refills     diclofenac sodium (VOLTAREN) 1 % Gel [Pharmacy Med Name: *DICLOFENAC GEL 1%] 100 g 0     Sig: APPLY 4 GRAMS TOPICALLY 3 (THREE) TIMES A DAY AS NEEDED. APPLY TO KNEES       There is no refill protocol information for this order      Signed Prescriptions Disp Refills    ONETOUCH DELICA PLUS LANCET 33 gauge Misc 300 each 3     Sig: CHECK BLOOD SUGAR 3 TIMES PER DAY.       Diabetic Supplies Refill Protocol Passed - 9/16/2020  9:55 AM        Passed - Visit with PCP or prescribing provider visit in last 6 months     Last office visit with prescriber/PCP: 8/31/2020 Telly Torre MD OR same dept: 8/31/2020 Telly Torre MD OR same specialty: 8/31/2020 Telly Torre MD  Last physical: 7/10/2019 Last MTM visit: Visit date not found   Next visit within 3 mo: Visit date not found  Next physical within 3 mo: Visit date not found  Prescriber OR PCP: Telly Torre MD  Last diagnosis associated with med order: 1. Pain  - diclofenac sodium (VOLTAREN) 1 % Gel [Pharmacy Med Name: *DICLOFENAC GEL 1%]; APPLY 4 GRAMS TOPICALLY 3 (THREE) TIMES A DAY AS NEEDED. APPLY TO KNEES  Dispense: 100 g; Refill: 0    2. Type 2 diabetes mellitus (H)  - ONETOUCH DELICA PLUS LANCET 33 gauge Misc; CHECK BLOOD SUGAR 3 TIMES PER DAY.  Dispense: 300 each; Refill: 3    If protocol passes may refill for 12 months if within 3 months of last provider visit (or a total of 15 months).             Passed - A1C in last 6 months     Hemoglobin A1c   Date Value Ref  Range Status   07/09/2020 5.7 3.5 - 6.0 % Final

## 2021-06-11 NOTE — TELEPHONE ENCOUNTER
Refill Approved    Rx renewed per Medication Renewal Policy. Medication was last renewed on 1/13/20.    Cammy Monroy, [facility] Triage/Med Refill 9/29/2020     Requested Prescriptions   Pending Prescriptions Disp Refills     furosemide (LASIX) 40 MG tablet [Pharmacy Med Name: FUROSEMIDE 40MG] 90 tablet 2     Sig: TAKE 1 TABLET (40 MG TOTAL) BY MOUTH DAILY.       Diuretics/Combination Diuretics Refill Protocol  Passed - 9/26/2020 10:25 AM        Passed - Visit with PCP or prescribing provider visit in past 12 months     Last office visit with prescriber/PCP: 8/31/2020 Telly Torre MD OR same dept: 8/31/2020 Telly Torre MD OR same specialty: 8/31/2020 Telly Torre MD  Last physical: 7/10/2019 Last MTM visit: Visit date not found   Next visit within 3 mo: Visit date not found  Next physical within 3 mo: Visit date not found  Prescriber OR PCP: Telly Torre MD  Last diagnosis associated with med order: There are no diagnoses linked to this encounter.  If protocol passes may refill for 12 months if within 3 months of last provider visit (or a total of 15 months).             Passed - Serum Potassium in past 12 months      Lab Results   Component Value Date    Potassium 3.2 (L) 09/22/2020             Passed - Serum Sodium in past 12 months      Lab Results   Component Value Date    Sodium 139 09/22/2020             Passed - Blood pressure on file in past 12 months     BP Readings from Last 1 Encounters:   09/22/20 122/57             Passed - Serum Creatinine in past 12 months      Creatinine   Date Value Ref Range Status   09/22/2020 2.12 (H) mg/dL Final

## 2021-06-11 NOTE — TELEPHONE ENCOUNTER
RN cannot approve Refill Request    RN can NOT refill this medication PCP messaged that patient is overdue for Labs. Last office visit: 8/31/2020 Telly Torre MD Last Physical: 7/10/2019 Last MTM visit: Visit date not found Last visit same specialty: 8/31/2020 Telly Torre MD.  Next visit within 3 mo: Visit date not found  Next physical within 3 mo: Visit date not found      Kacey Daniels, [facility] Triage/Med Refill 9/7/2020    Requested Prescriptions   Pending Prescriptions Disp Refills     TRADJENTA 5 mg Tab [Pharmacy Med Name: TRADJENTA 5MG] 30 tablet 2     Sig: TAKE 1 TABLET (5 MG TOTAL) BY MOUTH DAILY.       Oral Hypoglycemics Refill Protocol Failed - 9/4/2020  9:27 AM        Failed - Microalbumin in last year      Microalbumin, Random Urine   Date Value Ref Range Status   12/07/2017 120.44 (H) 0.00 - 1.99 mg/dL Final                  Passed - Visit with PCP or prescribing provider visit in last 6 months       Last office visit with prescriber/PCP: 8/31/2020 OR same dept: 8/31/2020 Telly Torre MD OR same specialty: 8/31/2020 Telly Torre MD Last physical: Visit date not found Last MTM visit: Visit date not found         Next appt within 3 mo: Visit date not found  Next physical within 3 mo: Visit date not found  Prescriber OR PCP: Telly Torre MD  Last diagnosis associated with med order: 1. Type 2 diabetes mellitus without complication, with long-term current use of insulin (H)  - TRADJENTA 5 mg Tab [Pharmacy Med Name: TRADJENTA 5MG]; Take 1 tablet (5 mg total) by mouth daily.  Dispense: 30 tablet; Refill: 2     If protocol passes may refill for 12 months if within 3 months of last provider visit (or a total of 15 months).           Passed - A1C in last 6 months     Hemoglobin A1c   Date Value Ref Range Status   07/09/2020 5.7 3.5 - 6.0 % Final               Passed - Blood pressure in last year     BP Readings from Last 1 Encounters:   08/31/20 142/54             Passed - Serum  creatinine in last year     Creatinine   Date Value Ref Range Status   08/31/2020 2.22 (H) 0.70 - 1.30 mg/dL Final

## 2021-06-11 NOTE — PROGRESS NOTES
"Suman Nagy is a 80 y.o. male who is being evaluated via a billable telephone visit.      The patient has been notified of following:     \"This telephone visit will be conducted via a call between you and your physician/provider. We have found that certain health care needs can be provided without the need for a physical exam.  This service lets us provide the care you need with a short phone conversation.  If a prescription is necessary we can send it directly to your pharmacy.  If lab work is needed we can place an order for that and you can then stop by our lab to have the test done at a later time.    Telephone visits are billed at different rates depending on your insurance coverage. During this emergency period, for some insurers they may be billed the same as an in-person visit.  Please reach out to your insurance provider with any questions.    If during the course of the call the physician/provider feels a telephone visit is not appropriate, you will not be charged for this service.\"    Patient has given verbal consent to a Telephone visit? Yes    What phone number would you like to be contacted at? [phone]     Patient would like to receive their AVS by AVS Preference: Michele.    Additional provider notes:     [facility] clinic Follow Up Note    Suman Nagy   80 y.o. male    Date of Visit: 9/23/2020    Chief Complaint   Patient presents with     Follow-up     3 week diabetic checkup     Subjective  Patient was scheduled for a virtual visit follow-up for his diabetes.  Recent clinic visit on October 31 he was still having some lower blood sugars in the morning.  I had previously stopped his suppertime glimepiride with reduced low blood sugar events, but with continued low blood sugar events I had him stop the lunchtime glimepiride late last month as well.    Continues on 8 units of Lantus and Tradjenta 5 mg a day.    Patient was recently in the hospital with a group A strep " bacteremia, possibly skin source but unclear.    This morning's blood sugar was 206.  Yesterday blood sugar 126.  He denies any further low blood sugars.  He did not restart the glimepiride.    He denies nausea and still eating regular meals.    He has had some looser stools since being on antibiotic but not severe diarrhea and no abdominal pain.  He stopped his MiraLAX.    Status post stage III colon cancer resection in 2017.  No evidence of recurrence in August 2019 abdominal CT scan.  He did not have a repeat abdominal CT scan during this admission.    Patient was hospitalized September 18 through September 22 with severe weakness.  Found to be bacteremic with group A strep.  Treated initially with Zosyn and then oral amoxicillin for 7 days at discharge.  No fever and his weakness has resolved.    I did review the chest x-ray which showed some questionable left base and right midlung mild streaky densities.  But patient did not have significant worsening cough and pneumonia was not highly suspected.  September 18 COVID-19 test negative.    Yesterday in the hospital his blood sugar was 138.  Creatinine 2.1.    He does have chronic renal insufficiency which worsened after severe blood pressure spike last year.  His baseline creatinine has been around 2.4.    His blood pressure has been labile.  Blood pressure controlled 142/54 last month in clinic.    Today's blood pressure was 163/76.    He continues on same blood pressure medication except for the increase of metoprolol from 25 mg to 50 mg.    Patient had atrial fibrillation on presentation to the hospital.  He was not anticoagulated because of fall risk history.  It appears he had a spontaneous cardioversion as later notes from that hospitalization documented a regular rhythm.    I did review the heart echo from this month with no significant change from previous and no evidence of infectious endocarditis.  Ejection fraction 66% with mild diastolic dysfunction  and no heart valve problems.    Status post stroke with left jose angel-para-cysts in 2015.  Right CEA at that time.    Patient had a repeat CT angiogram of the head and neck on September 18 which I reviewed.  No acute stroke.  Chronic bilateral thalamic lacunar.  No severe intracranial lesions or aneurysm.  The right carotid artery was 30% the left was 40%, similar to previous evaluation.  No posterior circulation stenosis noted.    No new neurologic deficit noted.    Patient denies any further palpitations or racing heart rate sensation.  No chest pain.    Baseline trace edema is stable.  No increasing shortness of breath.    PMHx:    Past Medical History:   Diagnosis Date     Anemia      Bladder cancer (H)      Cancer (H)     Rectosigmoid     Diabetes mellitus (H)      Hyperkalemia      Hypertension      Seizure (H)     possible, one time occurence     Stroke (H)     multiple, residual left sided weakness, last CVA in July 2015 caused some speech deficit     Superficial phlebitis      Venous insufficiency of both lower extremities      PSHx:    Past Surgical History:   Procedure Laterality Date     COLON SURGERY      Colectomy Low Anterior Resection     EYE SURGERY      Cataract Extraction     LAPAROSCOPIC COLON RESECTION N/A 6/1/2017    Procedure: LAPAROSCOPIC ASSISTED COLECTOMY LOW ANTERIOR RESECTION AND DIVERTING LOOP ILEOSTOMY, PROCTOSOPY;  Surgeon: Tyson Parry MD;  Location: [facility] [place];  Service:      VA CLOSE ENTEROSTOMY N/A 8/15/2017    Procedure: ILEOSTOMY CLOSURE LOOP ;  Surgeon: Tyson Parry MD;  Location: [facility];  Service: General     VA CYSTOURETHROSCOPY,FULGUR <0.5 CM LESN N/A 9/1/2015    Procedure: CYSTOSCOPY TRANSURETHRAL RESECTION BLADDER TUMOR;  Surgeon: Cleveland Nair MD;  Location: [facility] [place];  Service: Urology     VA CYSTOURETHROSCOPY,FULGUR <0.5 CM LESN N/A 12/22/2015    Procedure: CYSTOSCOPY TRANSURETHRAL RESECTION BLADDER TUMOR AND BLADDER BIOPSIES;   Surgeon: Cleveland Nair MD;  Location: [facility] [place];  Service: Urology     TURBT      X2     VASECTOMY       Immunizations:   Immunization History   Administered Date(s) Administered     Influenza [id number], 01/18/2010     Influenza high dose,seasonal,PF, 65+ yrs 09/15/2015, 09/26/2016, 10/03/2017, 09/27/2018, 10/19/2019     Influenza, Seasonal, Inj PF IIV3 09/27/2010, 09/14/2012, 09/27/2013     Influenza, inj, historic,unspecified 10/19/2007, 09/16/2011, 10/15/2015     Influenza, seasonal,quad inj 6-35 mos 09/18/2009, 10/17/2014     Influenza,quad,high Dose,PF, 65yr + 09/21/2020     Pneumo Conj 13-V (2010&after) 01/20/2015     Pneumo Polysac 23-V 10/08/2004     Td,adult,historic,unspecified 07/01/2004     Tdap 05/20/2015       ROS A comprehensive review of systems was performed and was otherwise negative    Medications, allergies, and problem list were reviewed and updated    Exam  There were no vitals taken for this visit.  Patient's voice is consistent with baseline on phone today.    Assessment/Plan  1. Type 2 diabetes mellitus with stage 4 chronic kidney disease, with long-term current use of insulin (H)  Higher blood sugar this morning but just got out of the hospital yesterday.  Denies any low blood sugars.    Continue the Lantus insulin at 8 units a day at this time.  If continued blood sugars above 200 and no low blood sugars, he was told to go back to 10 units a day of the Lantus insulin.    Continue Tradjenta daily.    Do not restart glimepiride, with history of lower blood sugars in the morning.    2. Paroxysmal atrial fibrillation (H)  One episode documented in the hospital.  Asymptomatic at this time.    Continues on metoprolol.  Continue on the higher dose of 50 mg a day.    He is on Plavix for history of stroke.    Refer to cardiology to review treatment plan and discuss anticoagulation plan further.  - Ambulatory referral to Cardiology    3. Hypertension  Labile.  Mildly high currently but  discussed with a hospital.  Has recently been controlled.  Tends to run a low diastolic.    Continue on current medications including the higher dose of metoprolol at 50 mg a day.    Follow-up October 29 for blood pressure recheck and kidney lab recheck.    4. Carotid artery stenosis, asymptomatic, left  No evidence of progression on the CT angiogram earlier this month.  Continue Lipitor 20 mg and Plavix.    History of stroke    5. Gram-positive bacteremia  Unclear etiology.  Possible skin source.  Patient did not have a repeat CT scan of the abdomen and does have history of colon cancer.  No new abdominal pain to suspect perforation.    He finished the amoxicillin 7 days prescription.    I did warn patient and wife about C. difficile colitis risk and if diarrhea worsens, contact clinic for earlier evaluation.    Streaky densities on chest x-ray, low suspicion for pneumonia.  Likely atelectasis.    History of dysthymia is been well controlled on Zoloft 50 mg.    History of bladder cancer with TURBT in 2013.  Urology follow-up plan I believe was for December.    History of B12 deficiency and got a B12 shot on October 31.    Return in 5 weeks (on 10/29/2020) for Recheck.   Patient Instructions   Continue on the higher dose of metoprolol, 50 mg a day.    Cardiology clinic will contact you to set up an appointment to discuss the atrial fibrillation that occurred in the hospital.    If your blood sugars remain above 200, and no further low blood sugars less than 80, plan to increase the Lantus insulin back to 10 units a day.  Stay off the glimepiride diabetes pill.    Finish the current antibiotic as prescribed.  If you have progressive severe diarrhea, contact clinic for evaluation for C. difficile stool infection.    Follow-up with me on October 29 as planned    Telly Torre MD        Current Outpatient Medications   Medication Sig Dispense Refill     amLODIPine (NORVASC) 10 MG tablet TAKE 1 TABLET (10 MG TOTAL) BY  MOUTH DAILY. 90 tablet 3     amoxicillin (AMOXIL) 500 MG capsule Take 1 capsule (500 mg total) by mouth 2 (two) times a day for 6 days. 12 capsule 0     atorvastatin (LIPITOR) 20 MG tablet TAKE 1 TABLET (20 MG TOTAL) BY MOUTH DAILY. 90 tablet 3     calcium, as carbonate, (TUMS) 200 mg calcium (500 mg) chewable tablet Chew 1 tablet (200 mg total) 3 (three) times a day as needed for heartburn.  0     cholecalciferol, vitamin D3, (VITAMIN D3) 1,000 unit capsule Take 2 capsules (2,000 Units total) by mouth daily. 180 capsule 0     cloNIDine HCL (CATAPRES) 0.3 MG tablet Take 1/2 pill in the morning and 1 full pill in the evening. (Patient taking differently: Take 0.15-0.3 mg by mouth see administration instructions. Take 1/2 pill in the morning and 1 full pill in the evening.) 135 tablet 2     clopidogreL (PLAVIX) 75 mg tablet TAKE 1 TABLET (75 MG TOTAL) BY MOUTH DAILY. 90 tablet 1     cyanocobalamin 1,000 mcg/mL injection Inject 1,000 mcg into the shoulder, thigh, or buttocks every 3 (three) months.              cyanocobalamin, vitamin B-12, (VITAMIN B-12) 1,000 mcg Subl Place 1 tablet (1,000 mcg total) under the tongue daily. 90 tablet 3     diclofenac sodium (VOLTAREN) 1 % Gel APPLY 4 GRAMS TOPICALLY 3 (THREE) TIMES A DAY AS NEEDED. APPLY TO KNEES 100 g 0     furosemide (LASIX) 40 MG tablet Take 40 mg by mouth daily.       hydrALAZINE (APRESOLINE) 25 MG tablet Take 75 mg by mouth 4 (four) times a day.        insulin glargine (LANTUS SOLOSTAR PEN) 100 unit/mL (3 mL) pen Inject 10 Units under the skin daily with breakfast.       metoprolol succinate (TOPROL-XL) 50 MG 24 hr tablet Take 1 tablet (50 mg total) by mouth daily. 30 tablet 0     ONETOUCH DELICA PLUS LANCET 33 gauge Misc CHECK BLOOD SUGAR 3 TIMES PER DAY. 300 each 3     ONETOUCH ULTRA BLUE TEST STRIP strips CHECK BLOOD SUGAR 3 TIMES PER DAY. ACCU-CHEK GUIDE TEST STRIIPS. 100 strip 2     ONETOUCH ULTRA2 METER [facility] [place] 1 each 0     pen needle, diabetic  "(BD ULTRA-FINE JONO PEN NEEDLE) 32 gauge x \" Ndle Use one needle per day to inject insulin. 100 each 4     polyethylene glycol (MIRALAX) 17 gram packet Take 1 packet (17 g total) by mouth daily as needed.  0     potassium chloride (K-DUR,KLOR-CON) 20 MEQ tablet Take 1 tablet (20 mEq total) by mouth daily. 30 tablet 11     sertraline (ZOLOFT) 50 MG tablet Take 50 mg by mouth daily.       TRADJENTA 5 mg Tab TAKE 1 TABLET (5 MG TOTAL) BY MOUTH DAILY. 90 tablet 3     acetaminophen (TYLENOL) 325 MG tablet Take 2 tablets (650 mg total) by mouth every 4 (four) hours as needed.  0     No current facility-administered medications for this visit.      Allergies   Allergen Reactions     Cefazolin Other (See Comments)     \"felt very hot\" Oct 2019 Cephalexin  Sep 2020 Ceftriaxone     Pravastatin Myalgia     Increased peripheral neuropathy     Simvastatin Myalgia     Increased peripheral neuropathy     Thiazides Other (See Comments)     Other: Gout       Social History     Tobacco Use     Smoking status: Former Smoker     Last attempt to quit: 1995     Years since quittin.0     Smokeless tobacco: Never Used   Substance Use Topics     Alcohol use: No     Frequency: Never     Drug use: No             Phone call duration: 15 minutes    Florinda Osuna CMA      "

## 2021-06-11 NOTE — ANESTHESIA POSTPROCEDURE EVALUATION
Patient: Suman Nagy  LAPAROSCOPIC ASSISTED COLECTOMY LOW ANTERIOR RESECTION AND DIVERTING LOOP ILEOSTOMY, PROCTOSOPY  Anesthesia type: general    Patient location: PACU  Last vitals:   Vitals:    06/01/17 2245   BP: 167/67   Pulse: 93   Resp: 20   Temp: 37.1  C (98.8  F)   SpO2: 98%     Post vital signs: stable  Level of consciousness: awake, alert and oriented  Post-anesthesia pain: pain controlled  Post-anesthesia nausea and vomiting: no  Pulmonary: unassisted, return to baseline  Cardiovascular: stable and hypertensive  Hydration: adequate  Anesthetic events: no    QCDR Measures:  ASA# 11 - Clari-op Cardiac Arrest: ASA11B - Patient did NOT experience unanticipated cardiac arrest  ASA# 12 - Clari-op Mortality Rate: ASA12B - Patient did NOT die  ASA# 13 - PACU Re-Intubation Rate: ASA13B - Patient did NOT require a new airway mgmt  ASA# 10 - Composite Anes Safety: ASA10A - No serious adverse event  ASA# 38 - New Corneal Injury: ASA38A - No new exposure keratitis or corneal abrasion in PACU    Additional Notes:  Severe hypertension during emergence.  Required pain control and labetalol for elevated BP.  Improved.

## 2021-06-11 NOTE — ANESTHESIA CARE TRANSFER NOTE
Last vitals:   Vitals:    06/01/17 1754   BP: (!) 237/99   Pulse: 79   Resp: 25   Temp: 36.1  C (96.9  F)   SpO2: 100%     Patient's level of consciousness is drowsy  Spontaneous respirations: yes  Maintains airway independently: yes  Dentition unchanged: yes  Oropharynx: oropharynx clear of all foreign objects    QCDR Measures:  ASA# 20 - Surgical Safety Checklist: ASA20A - Safety Checks Done  PQRS# 430 - Adult PONV Prevention: 4558F - Pt received => 2 anti-emetic agents (different classes) preop & intraop  ASA# 8 - Peds PONV Prevention: NA - Not pediatric patient, not GA or 2 or more risk factors NOT present  PQRS# 424 - Clari-op Temp Management: 4559F - At least one body temp DOCUMENTED => 35.5C or 95.9F within required timeframe  PQRS# 426 - PACU Transfer Protocol: - Transfer of care checklist used  ASA# 14 - Acute Post-op Pain: ASA14B - Patient did NOT experience pain >= 7 out of 10     Patient remains hypertensive despite extubation. Dr. Vance at bedside in PACU. Ordered additional labetalol and hydralazine for PACU RNs.

## 2021-06-11 NOTE — PROGRESS NOTES
[facility] clinic Follow Up Note    Suman Nagy   80 y.o. male    Date of Visit: 8/31/2020    Chief Complaint   Patient presents with     Follow-up     Subjective  Luke is here with wife, Jeni for follow-up of multiple medical issues.    Patient had significant worsening renal insufficiency last year after high blood pressure spike.    He has chronic renal insufficiency consistent with nephrosclerosis.    His creatinine has stabilized at 2.38 when last checked July 9.  Potassium and sodium level normal.    Chronic anemia with hemoglobin of 8.8 last July.  No new bleeding issues.    He has significant vascular disease with left hemispheric stroke in 2015.  Right CEA.  2018 showed left carotid 50-69%, right was 0% after CEA.    Still on Lipitor and Plavix.  No new neurologic event.    He denies orthostasis.    He tends to run a high systolic and low diastolic.  His blood pressures at home have been in the 140s-160s/70s.    On amlodipine 10 mg a day, clonidine 0.15 mg, or 1/2 tablet, in the morning.  0.3 mg in the evening.  Lasix 40 mg a day plus potassium.  Hydralazine 75 mg 4 times daily.  Toprol-XL 25 mg a day.    Trace ankle edema somewhat worse on the left but not severe.    He denies increasing shortness of breath.  No chest pain or palpitations.    Patient was having lower blood sugars in the middle of the night and early morning during evaluation in July.  His hemoglobin A1c at that time was 5.7%.    He was on glimepiride 4 mg with lunch and supper.  I had him stop the supper glimepiride.  He has not had any further low blood sugars at night or in the morning.    His blood sugar has been 92- 120 5 in the morning recently.    He does not check his blood sugars before supper very often, but 2 weeks ago his blood sugar was 176 at 4:30 PM.  On August 20 his blood sugar did get up to 263.  Unclear if that was after snacks, however.    He did lower his Lantus insulin down to 8 units.  At July  visit we had discussed 10 units of Lantus insulin but apparently a couple weeks ago he went down to 8 units.    Still on linagliptin 5 mg a day.    I did review the oncology note from June 23 of this year.  No evidence of recurrence at that time.  August 2019 abdominal CT scan was negative.  He had a stage III colon cancer resected in 2017.    I did review the labs from July 2020 with a CEA level of 4.7 which was down.  No new bowel issues or blood in stool or abdominal pain complaints.  On Prilosec and MiraLAX.    No flare of dysthymia on Zoloft 50 mg.    Past history of bladder cancer with TURBT in 2013.  No new urinary symptoms or hematuria.  Urology follow-up appointment was delayed until December. 2015 heart echo with ejection fraction 61%.  No heart valve problems.  Mild diastolic dysfunction.    PMHx:    Past Medical History:   Diagnosis Date     Anemia      Bladder cancer (H)      Cancer (H)     Rectosigmoid     Diabetes mellitus (H)      Hyperkalemia      Hypertension      Seizure (H)     possible, one time occurence     Stroke (H)     multiple, residual left sided weakness, last CVA in July 2015 caused some speech deficit     Superficial phlebitis      Venous insufficiency of both lower extremities      PSHx:    Past Surgical History:   Procedure Laterality Date     COLON SURGERY      Colectomy Low Anterior Resection     EYE SURGERY      Cataract Extraction     LAPAROSCOPIC COLON RESECTION N/A 6/1/2017    Procedure: LAPAROSCOPIC ASSISTED COLECTOMY LOW ANTERIOR RESECTION AND DIVERTING LOOP ILEOSTOMY, PROCTOSOPY;  Surgeon: Tyson Parry MD;  Location: [facility];  Service:      MO CLOSE ENTEROSTOMY N/A 8/15/2017    Procedure: ILEOSTOMY CLOSURE LOOP ;  Surgeon: Tyson Parry MD;  Location: [facility];  Service: General     MO CYSTOURETHROSCOPY,FULGUR <0.5 CM LESN N/A 9/1/2015    Procedure: CYSTOSCOPY TRANSURETHRAL RESECTION BLADDER TUMOR;  Surgeon: Cleveland Nair MD;  Location:  [facility] OR;  Service: Urology     FL CYSTOURETHROSCOPY,FULGUR <0.5 CM KANDY N/A 12/22/2015    Procedure: CYSTOSCOPY TRANSURETHRAL RESECTION BLADDER TUMOR AND BLADDER BIOPSIES;  Surgeon: Cleveland Nair MD;  [place] [facility] [place];  Service: Urology     TURBT      X2     VASECTOMY       Immunizations:   Immunization History   Administered Date(s) Administered     Influenza [id number], 01/18/2010     Influenza high dose,seasonal,PF, 65+ yrs 09/15/2015, 09/26/2016, 10/03/2017, 09/27/2018, 10/19/2019     Influenza, Seasonal, Inj PF IIV3 09/27/2010, 09/14/2012, 09/27/2013     Influenza, inj, historic,unspecified 10/19/2007, 09/16/2011, 10/15/2015     Influenza, seasonal,quad inj 6-35 mos 09/18/2009, 10/17/2014     Pneumo Conj 13-V (2010&after) 01/20/2015     Pneumo Polysac 23-V 10/08/2004     Td,adult,historic,unspecified 07/01/2004     Tdap 05/20/2015       ROS A comprehensive review of systems was performed and was otherwise negative    Medications, allergies, and problem list were reviewed and updated    Exam  /54 (Patient Site: Right Arm, Patient Position: Sitting, Cuff Size: Adult Regular)   Pulse 64   Temp 98  F (36.7  C) (Oral)   Wt 171 lb (77.6 kg)   BMI 26.78 kg/m    Patient is alert and oriented.  Able to stand with some difficulty but very unsteady gait and he did not walk as he did not have his walker.  Lungs are clear to auscultation without wheezing or crackles.  Heart is regular without murmur.  Abdomen is nontender, nonobese.  There is trace ankle edema, somewhat more on the left ankle.    Assessment/Plan  1. Type 2 diabetes mellitus without complication, with long-term current use of insulin (H)  Hypoglycemia in the morning has resolved since stopping the suppertime Clement provide.    Still having blood sugars on the low side in the morning.  I will have him stop the glimepiride entirely.    I will have him check his blood sugars before breakfast and before supper on a more  regular basis over the next few weeks.  Follow-up phone consult in approximately 3 weeks to discuss diabetic control.    I would plan to have him adjust the Lantus insulin if needed if he begins having higher blood sugars, and no low blood sugar recurrence.    Continue linagliptin 5 mg a day.    I did change the dosing on the Lantus insulin to 8 units a day at this time.    2. Chronic kidney disease, stage IV (severe) (H)  Stabilized.  His nephrologist is retiring.    Consistent with nephrosclerosis.  - Comprehensive Metabolic Panel    3. History of stroke  No new event.  Continue Lipitor and Plavix.    Status post right CEA    4. Carotid artery stenosis, asymptomatic, left  Not planning follow-up ultrasound with current coronavirus outbreak.  Continue medical management.    5. Hypertension  Blood pressure well controlled with blood pressure on the low side today with low diastolic.  With severe vascular disease, avoiding hypotension.  Currently tolerating current medications.  Continue on current medications.    6. History of colon cancer, stage III  CEA level is down to July.  No evidence of recurrence at this time.  6-month follow-up with oncology was planned in December or January of next year.    7. History of bladder cancer  No evidence of recurrence.  Follow-up with urology delayed until December.    8. B12 deficiency  B12 shot given today, as will be over 3 months for his next visit.  Continue oral B12 supplement.    9. Anemia, unspecified type  Chronic issue.  Related to chronic disease and chronic renal insufficiency.  - HM2(CBC w/o Differential)    10. Encounter for therapeutic drug monitoring    - Comprehensive Metabolic Panel    11. Type 2 diabetes mellitus without complication, without long-term current use of insulin (H)    - insulin glargine (LANTUS SOLOSTAR PEN) 100 unit/mL (3 mL) pen; Inject 8 Units under the skin daily with breakfast.; Refill: 0    I discussed flu shot, with plan for flu shot as  soon as available this fall.    History of dysthymia, stable on Zoloft 50 mg.    Return in about 3 weeks (around 9/21/2020) for Phone follow-up for diabetes.   Patient Instructions   Stop glimepiride entirely.    Check blood sugars before breakfast and before supper.    Set up a phone visit with me in approximately 3 weeks to discuss diabetes.    If you have any further low blood sugars less than 80, contact clinic right away.    If you are having blood sugars above 200 more often, we may need to discuss adjusting insulin further.    Continue on the 8 units of the insulin a day at this time.    B12 shot today.    Clinic follow-up appointment with me in 2 months.    Get high-dose flu shot when they become available this fall.    Telly Torre MD        Current Outpatient Medications   Medication Sig Dispense Refill     acetaminophen (TYLENOL) 325 MG tablet Take 2 tablets (650 mg total) by mouth every 4 (four) hours as needed.  0     amLODIPine (NORVASC) 10 MG tablet Take 1 tablet (10 mg total) by mouth daily.       atorvastatin (LIPITOR) 20 MG tablet TAKE 1 TABLET (20 MG TOTAL) BY MOUTH DAILY. 90 tablet 3     cholecalciferol, vitamin D3, (VITAMIN D3) 1,000 unit capsule Take 2 capsules (2,000 Units total) by mouth daily. 180 capsule 0     cloNIDine HCL (CATAPRES) 0.3 MG tablet Take 1/2 pill in the morning and 1 full pill in the evening. 135 tablet 2     clopidogreL (PLAVIX) 75 mg tablet TAKE 1 TABLET (75 MG TOTAL) BY MOUTH DAILY. 90 tablet 1     cyanocobalamin 1,000 mcg/mL injection Inject 1,000 mcg into the shoulder, thigh, or buttocks every 3 (three) months.              cyanocobalamin, vitamin B-12, (VITAMIN B-12) 1,000 mcg Subl Place 1 tablet (1,000 mcg total) under the tongue daily. 90 tablet 3     diclofenac sodium (VOLTAREN) 1 % Gel APPLY 4 GRAMS TOPICALLY 3 (THREE) TIMES A DAY AS NEEDED. APPLY TO KNEES 100 g 0     furosemide (LASIX) 40 MG tablet Take 40 mg by mouth daily.       generic lancets Fastclix  "lancets. Check blood sugar 3 times per day. 102 each 3     hydrALAZINE (APRESOLINE) 25 MG tablet Take 75 mg by mouth 4 (four) times a day.        insulin glargine (LANTUS SOLOSTAR PEN) 100 unit/mL (3 mL) pen Inject 8 Units under the skin daily with breakfast.  0     linaGLIPtin (TRADJENTA) 5 mg Tab Take 1 tablet (5 mg total) by mouth daily. 30 tablet 3     metoprolol succinate (TOPROL-XL) 25 MG TAKE 1 TABLET (25 MG TOTAL) BY MOUTH DAILY. 90 tablet 2     omeprazole (PRILOSEC) 20 MG capsule Take 1 capsule (20 mg total) by mouth 2 (two) times a day before meals.  0     ONETOUCH ULTRA BLUE TEST STRIP strips CHECK BLOOD SUGAR 3 TIMES PER DAY. ACCU-CHEK GUIDE TEST STRIIPS. 100 strip 2     ONETOUCH ULTRA2 METER [facility] AS DIRECTED 1 each 0     pen needle, diabetic (BD ULTRA-FINE JONO PEN NEEDLE) 32 gauge x 5/32\" Ndle Use one needle per day to inject insulin. 100 each 4     polyethylene glycol (MIRALAX) 17 gram packet Take 1 packet (17 g total) by mouth daily as needed.  0     potassium chloride (K-DUR,KLOR-CON) 20 MEQ tablet Take 1 tablet (20 mEq total) by mouth daily. 30 tablet 11     sertraline (ZOLOFT) 50 MG tablet Take 50 mg by mouth daily.       Current Facility-Administered Medications   Medication Dose Route Frequency Provider Last Rate Last Dose     cyanocobalamin injection 1,000 mcg  1,000 mcg Intramuscular Q3 Months Telly Torre MD   1,000 mcg at 20 1203     Allergies   Allergen Reactions     Cefazolin      \"felt very hot\"     Pravastatin      Increased peripheral neuropathy     Simvastatin      Increased peripheral neuropathy     Thiazides      Other: Gout       Social History     Tobacco Use     Smoking status: Former Smoker     Last attempt to quit: 1995     Years since quittin.0     Smokeless tobacco: Never Used   Substance Use Topics     Alcohol use: No     Frequency: Never     Drug use: No           "

## 2021-06-11 NOTE — PATIENT INSTRUCTIONS - HE
Continue on the higher dose of metoprolol, 50 mg a day.    Cardiology clinic will contact you to set up an appointment to discuss the atrial fibrillation that occurred in the hospital.    If your blood sugars remain above 200, and no further low blood sugars less than 80, plan to increase the Lantus insulin back to 10 units a day.  Stay off the glimepiride diabetes pill.    Finish the current antibiotic as prescribed.  If you have progressive severe diarrhea, contact clinic for evaluation for C. difficile stool infection.    Follow-up with me on October 29 as planned

## 2021-06-11 NOTE — ANESTHESIA PREPROCEDURE EVALUATION
Anesthesia Evaluation      Patient summary reviewed   No history of anesthetic complications     Airway   Mallampati: II  Neck ROM: full   Pulmonary - negative ROS and normal exam                          Cardiovascular - normal exam  (+) hypertension, ,      Neuro/Psych    (+) CVA ,     Endo/Other    (+) diabetes mellitus type 2 well controlled,      GI/Hepatic/Renal - negative ROS      Other findings: CVA x 3, L-sided numbness and weakness, has cane, one was associated with a seizure (none since), speech seems nl.  Echo 7/31/15: mild LAE, mild AI, trace MR/TR/pulmonic regurgitation.  K 4.8, Cr 1.13, HgA1c 6.8, Hg 11.8.  Colon Ca.  Gout.  Bladder Ca.  Venous insufficiency with superficial phlebitis.  Carotid US showed 50-69% stenosis L carotid, presteal waveform pattern R vertebral artery, minimal disease R carotid. Lower partial.  Few teeth decayed to gums, one has eroded deeply within metal (filling?  Cap?).      Dental    (+) upper dentures and lower dentures                       Anesthesia Plan  Planned anesthetic: general endotracheal    ASA 3   Induction: intravenous   Anesthetic plan and risks discussed with: patient    Post-op plan: routine recovery

## 2021-06-11 NOTE — PROGRESS NOTES
OUTPATIENT OSTOMY ASSESSMENT    Patients mode of arrival: Ambulatory assisted-wheelchair    INTAKE  Type of Stoma: Temporary Ileostomy  Anticipated date of takedown: unknown - maybe 3 months    Diagnosis Pertinent to Stoma:Colon/Rectal Cancer Date of Diagnosis: January 2017  Type of Surgery: Ileostomy    Surgery Date: June 2017  Surgeon: Brinda     Hospital: [facility]    Purpose of this visit:Leaking Problem    Present for Teaching Session: Patient and Spouse   Present: NA    Pertinent Information: Patient and wife report frequent leaking issues and skin breakdown.    Current Equipment:    Product # Brand Description   Pouch [id number] with filter   Flange      Paste [address] ring   Powder [address] Stoma powder   Deodorizer      Skin barrier unknown convatec            Pouch Change Frequency: daily or more.  Provider of Care: Emptying: wife Pouch Change: wife    ASSESSMENT  Stoma Size: Round 7/8 inches  Protrusion: Flush  Vascularity: Pink  Mucocutaneous Juncture: Intact  Peristomal Skin: Abnormal raw open tissue circumferential around stoma extending out approx 6cm    Wound: No  Current Wound Care: NA    TREATMENT  Applied: stoma powder and cavilon no-sting    INTERVENTIONS  Refitting done, Educated on peristomal skin treatment and Educated on pouch change procedure  Miscellaneous Interventions: Incontinence care    INSTRUCTIONS GIVEN  WOC Role, Activity, Anatomy, Clothing, Pouching Products: Showed pouching system , Insurance and Ordering supplies    PLAN  Change in Supplies: See Outpatient Ostomy Instructions and New Pouching Procedure: See Outpatient Ostomy Instructions      Total Time Spent with Patient: 50 minutes.  Education time: 30 minutes.  Wound care: 0 minutes.

## 2021-06-11 NOTE — TELEPHONE ENCOUNTER
Refill Approved    Rx renewed per Medication Renewal Policy. Medication was last renewed on 11/112/19.    Cammy Monroy, [facility] Triage/Med Refill 9/17/2020     Requested Prescriptions   Pending Prescriptions Disp Refills     diclofenac sodium (VOLTAREN) 1 % Gel [Pharmacy Med Name: *DICLOFENAC GEL 1%] 100 g 0     Sig: APPLY 4 GRAMS TOPICALLY 3 (THREE) TIMES A DAY AS NEEDED. APPLY TO KNEES       There is no refill protocol information for this order        ONETOUCH DELICA PLUS LANCET 33 gauge Misc [Pharmacy Med Name: ONETOUCH DELICA PLUS 33G] 102 each 2     Sig: CHECK BLOOD SUGAR 3 TIMES PER DAY.       Diabetic Supplies Refill Protocol Passed - 9/16/2020  9:55 AM        Passed - Visit with PCP or prescribing provider visit in last 6 months     Last office visit with prescriber/PCP: 8/31/2020 Telly Torre MD OR same dept: 8/31/2020 Telly Torre MD OR same specialty: 8/31/2020 Telly Torre MD  Last physical: 7/10/2019 Last MTM visit: Visit date not found   Next visit within 3 mo: Visit date not found  Next physical within 3 mo: Visit date not found  Prescriber OR PCP: Telly Torre MD  Last diagnosis associated with med order: 1. Pain  - diclofenac sodium (VOLTAREN) 1 % Gel [Pharmacy Med Name: *DICLOFENAC GEL 1%]; APPLY 4 GRAMS TOPICALLY 3 (THREE) TIMES A DAY AS NEEDED. APPLY TO KNEES  Dispense: 100 g; Refill: 0    2. Type 2 diabetes mellitus (H)  - ONETOUCH DELICA PLUS LANCET 33 gauge Misc [Pharmacy Med Name: ONETOUCH DELICA PLUS 33G]; CHECK BLOOD SUGAR 3 TIMES PER DAY.  Dispense: 102 each; Refill: 2    If protocol passes may refill for 12 months if within 3 months of last provider visit (or a total of 15 months).             Passed - A1C in last 6 months     Hemoglobin A1c   Date Value Ref Range Status   07/09/2020 5.7 3.5 - 6.0 % Final

## 2021-06-11 NOTE — PROGRESS NOTES
ASSESSMENT:  1. Preop exam for internal medicine  I am concerned about his malnutrition affecting his ability to heal at this time.  We are attempting to make some changes that will allow him to feel better, eat more, and potentially improve to a safe state by the time surgery is scheduled.  He will see me next week to see if things are better enough to go through with the planned surgery at the planned time.  I wonder if the iron is the culprit?      2. Type 2 diabetes mellitus without complication  Off some meds now, and will add back as needed with diet as above.    3. Rectal cancer  This is the original reason for his surgery and need for colostomy reversal.  - Comprehensive Metabolic Panel  - HM2(CBC w/o Differential)    4. Essential hypertension  Well controlled at this time on these meds. He is not currently on the losartan.    - Comprehensive Metabolic Panel  - HM2(CBC w/o Differential)    5. Nausea  May be multifactorial.  Malnutrition, meds, previous surgery, etc.   - Urinalysis    6. Bilateral carotid artery disease  Will stop the statin at this time to make sure it is not causing any weakness and other symptoms putting him at greater risk for recovering well from his upcoming surgery.      PLAN:  Patient Instructions   My recommendation is you have the shingles vaccination completed somewhere.   I suggested that they stop the atorvastatin, stay off the losartan, stop the iron to make certain this is not causing more nausea and causing him not to eat to become more malnourished.    See me next week    Next week we will discuss the stopping of Plavix prior to surgery.    Orders Placed This Encounter   Procedures     Comprehensive Metabolic Panel     HM2(CBC w/o Differential)     Urinalysis     Medications Discontinued During This Encounter   Medication Reason     losartan (COZAAR) 50 MG tablet Therapy completed     glyBURIDE-metFORMIN (GLUCOVANCE) 5-500 mg per tablet Therapy completed       No Follow-up on  file.    ASSESSED PROBLEMS:  Problem List Items Addressed This Visit     Hypertension    Relevant Orders    Comprehensive Metabolic Panel    HM2(CBC w/o Differential)    Type 2 diabetes mellitus without complication    Bilateral carotid artery disease    Rectal cancer    Relevant Orders    Comprehensive Metabolic Panel    HM2(CBC w/o Differential)      Other Visit Diagnoses     Preop exam for internal medicine    -  Primary    Nausea        Relevant Orders    Urinalysis          CHIEF COMPLAINT:  Chief Complaint   Patient presents with     Pre-op Exam      -ILEOSTOMY CLOSURE LOOP - Dr. Parry at [facility] on 08/01/2017       HISTORY OF PRESENT ILLNESS:  Suman Nagy is a 77 y.o. male here for an internal medicine pre-operative consultation. The exam is requested by Dr. Tyson Parry in preparation for ILEOSTOMY CLOSURE LOOP  to be performed at [facility] on 08/01/2017. Today s examination on 7/11/2017 is done to review the underlying surgical condition of rectal cancer, clear for anesthesia, and review medical problems with appropriate changes in medications.    Suman Nagy has tolerated previous surgeries well without bleeding or anesthesia difficulty.     Past/Current Medical Problems:  Previous bleeding disorders: Negative    History of anesthesia reactions: Negative    Past Medical History:   Diagnosis Date     Anemia      Bladder cancer      Cancer     Rectosigmoid     Diabetes mellitus      Hyperkalemia      Hypertension      Seizure     possible, one time occurence     Stroke     multiple, residual left sided weakness, last CVA in July 2015 caused some speech deficit     Superficial phlebitis      Venous insufficiency of both lower extremities          REVIEW OF SYSTEMS:   Constitutional: unable to keep medications down  Eyes: Negative  ENT: Negative  Respiratory: Negative  Breast/Skin: Negative  Cardiovascular:Negative   GI: Negative  Urinary: Negative  Reproductive: Negative  Musculoskeletal:  Negative  Neuro: Negative  Endocrine: Negative  Mental Health: Negative   Lymph: Negative  Heme: Negative     Remaining Review of Systems negative.    QUESTIONS/HISTORY PERTINENT TO PRE-OP:  Body Piercings: None    Family history of bleeding disorders: Negative    Family history of anesthesia reactions: Negative      Surgeries:  Past Surgical History:   Procedure Laterality Date     COLON SURGERY      Colectomy Low Anterior Resection     EYE SURGERY      Cataract Extraction     LAPAROSCOPIC COLON RESECTION N/A 6/1/2017    Procedure: LAPAROSCOPIC ASSISTED COLECTOMY LOW ANTERIOR RESECTION AND DIVERTING LOOP ILEOSTOMY, PROCTOSOPY;  Surgeon: Tyson Parry MD;  [place] [facility] [place]:      MS CYSTOURETHROSCOPY,FULGUR <0.5 CM LESN N/A 9/1/2015    Procedure: CYSTOSCOPY TRANSURETHRAL RESECTION BLADDER TUMOR;  Surgeon: Cleveland Nair MD;  [place] [facility] [place]  Service: Urology     MS CYSTOURETHROSCOPY,FULGUR <0.5 CM LESN N/A 12/22/2015    Procedure: CYSTOSCOPY TRANSURETHRAL RESECTION BLADDER TUMOR AND BLADDER BIOPSIES;  Surgeon: Cleveland Nair MD;  [place] [facility] [place]  Service: Urology     TURBT      X2     VASECTOMY         Family History:   Family History   Problem Relation Age of Onset     COPD Father        Social History:  Social History     Social History     Marital status:      Spouse name: N/A     Number of children: N/A     Years of education: N/A     Occupational History     Not on file.     Social History Main Topics     Smoking status: Former Smoker     Quit date: 8/31/1995     Smokeless tobacco: Not on file     Alcohol use No     Drug use: No     Sexual activity: Not Currently     Partners: Female     Other Topics Concern     Not on file     Social History Narrative       VITALS:  Vitals:    07/11/17 1128   BP: 112/62   Pulse: 72     Wt Readings from Last 3 Encounters:   06/29/17 162 lb 8 oz (73.7 kg)   06/29/17 162 lb 9.6 oz (73.8 kg)   06/25/17 158 lb 6.4  "oz (71.8 kg)     There is no height or weight on file to calculate BMI.    PHYSICAL EXAM:  General Appearance: Alert, cooperative, no distress, appears stated age.  HEENT: EMOI, fundi not observed, TMs normal, mouth and throat without lesions.  Neck: Supple without adenopathy or thyromegaly.  Back: No CVA tenderness or spinous process pain.  Lungs: Clear to auscultation bilaterally, good air movement.  Heart: Regular rate and rhythm, S1 and S2 normal, no murmur or bruit.  Abdomen: Soft, non-tender, no HSM or masses.  Musculoskeletal: No gross abnormalities.  Extremities: No CCE, pulses II/IV and symmetric,   Skin: No worrisome lesions noted.  Lymph nodes: Cervical, supraclavicular, groin, and axillary nodes normal.  Neurologic: CNII-XII intact, strength V/V and symmetric, DTRs II/IV and symmetric, sensory grossly intact  Psychiatric: Normal mood and affect.       MEDICATIONS:  Current Outpatient Prescriptions   Medication Sig Dispense Refill     atenolol (TENORMIN) 50 MG tablet Take 50 mg by mouth every evening.       atorvastatin (LIPITOR) 40 MG tablet Take 40 mg by mouth at bedtime.       cloNIDine HCl (CATAPRES) 0.3 MG tablet Take 0.15 mg by mouth 2 (two) times a day.        clopidogrel (PLAVIX) 75 mg tablet Take 75 mg by mouth bedtime.       glimepiride (AMARYL) 4 MG tablet Take 4 mg by mouth 2 (two) times daily after lunch and supper.       INJECT EASE LANCETS 30 gauge Misc Use daily as directed (4-6 times daily) 600 each 1     iron,carbonyl-vitamin C (VITRON-C) 65 mg iron- 125 mg TbEC Take 1 tablet by mouth 2 (two) times a day.        ONETOUCH ULTRA TEST strips Test 4-6 times daily as directed 600 each 2     sertraline (ZOLOFT) 50 MG tablet Take 50 mg by mouth daily with supper.        SURE COMFORT PEN NEEDLE 31 gauge x 3/16\" Ndle Use one needle each night. 100 each 2     Current Facility-Administered Medications   Medication Dose Route Frequency Provider Last Rate Last Dose     cyanocobalamin injection 1,000 " "mcg  1,000 mcg Intramuscular Q30 Days Jerry Kelsey MD   1,000 mcg at 06/29/17 0954       ALLERGIES:  Allergies   Allergen Reactions     Cefazolin      \"felt very hot\"     Pravastatin      Increased peripheral neuropathy     Simvastatin      Increased peripheral neuropathy     Thiazides      Other: Gout       "

## 2021-06-11 NOTE — PROGRESS NOTES
Clinic Note    Assessment:     Assessment and Plan:  1. Hyperkalemia  We will check labs but likely this is resolved.  Not sure what caused his significant acute renal insufficiency but I think it safe enough to put him back on the ARB.  Will check labs today and follow-up in a couple weeks.    2. Essential hypertension  Restart the losartan that would be both beneficial for preventing elevated blood pressures and therefore stroke.  Certainly in a dehydrated state the ARB may have contributed to that but he was doing fine on the medication previously.  Will check again now and then again when the see me in 2 weeks.    3. Type 2 diabetes mellitus without complication  We do not need super tight control, but I think he would be best off being a little metformin, and since his renal function improved, we can start 500 mg twice per day.       Patient Instructions   Restart the metformin 500 mg twice per day    Restart the losartan 50 mg once per day    See me in two weeks.     Stay well hydrated    Return in about 2 weeks (around 7/13/2017) for preop colostomy take down.         Subjective:      Suman Nagy is a 76 y.o. male was in the hospital postoperatively because of significant dehydration and feeling poorly.  Renal function start to improve by the time he left.  He is eating and drinking fine now.  Is having minimal pain.  He has no narcotic left.  Pain is in the back and is a little bit in the abdomen.  It is not debilitating.  No fevers or chills.  He had meds changed in the hospital.  Hospital records and discharge summary and previous meds reviewed.  Patient needs some labs rechecked today.  They had trouble with his ostomy site and leaking when he first had it done now has had a different bag put on and this works very well.  He was seen today with his wife who gives most of the history these days.    The following portions of the patient's history were reviewed and updated as appropriate: Past  medical history, allergies, medications, discharge summary, labs in the hospital showing renal insufficiency and then improvement.  He was hyperkalemic and then improving.    Review of Systems:    He is relatively comfortable and without dramatic symptoms at this time.  No dyspnea or chest pain.  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       atorvastatin (LIPITOR) 40 MG tablet Take 40 mg by mouth at bedtime.       cloNIDine HCl (CATAPRES) 0.3 MG tablet Take 0.15 mg by mouth 2 (two) times a day.        clopidogrel (PLAVIX) 75 mg tablet Take 75 mg by mouth bedtime.       glimepiride (AMARYL) 4 MG tablet Take 4 mg by mouth 2 (two) times daily after lunch and supper.       INJECT EASE LANCETS 30 gauge Misc Use daily as directed (4-6 times daily) 600 each 1     iron,carbonyl-vitamin C (VITRON-C) 65 mg iron- 125 mg TbEC Take 1 tablet by mouth 2 (two) times a day.        ONETOUCH ULTRA TEST strips Test 4-6 times daily as directed 600 each 2     sertraline (ZOLOFT) 50 MG tablet Take 50 mg by mouth  "daily with supper.        SURE COMFORT PEN NEEDLE 31 gauge x 3/16\" Ndle Use one needle each night. 100 each 2     Current Facility-Administered Medications   Medication Dose Route Frequency Provider Last Rate Last Dose     cyanocobalamin injection 1,000 mcg  1,000 mcg Intramuscular Q30 Days Jerry Kelsey MD   1,000 mcg at 06/29/17 0954         Jerry Kelsey    6/29/2017         "

## 2021-06-11 NOTE — TELEPHONE ENCOUNTER
Refill Approved    Rx renewed per Medication Renewal Policy. Medication was last renewed on 11/7/19.    Cammy Monroy, [facility] Triage/Med Refill 9/9/2020     Requested Prescriptions   Pending Prescriptions Disp Refills     amLODIPine (NORVASC) 10 MG tablet [Pharmacy Med Name: AMLODIPINE 10MG] 90 tablet 1     Sig: TAKE 1 TABLET (10 MG TOTAL) BY MOUTH DAILY.       Calcium-Channel Blockers Protocol Passed - 9/8/2020 11:46 AM        Passed - PCP or prescribing provider visit in past 12 months or next 3 months     Last office visit with prescriber/PCP: 8/31/2020 Telly Torre MD OR same dept: 8/31/2020 Telly Torre MD OR same specialty: 8/31/2020 Telly Torre MD  Last physical: 7/10/2019 Last MTM visit: Visit date not found   Next visit within 3 mo: Visit date not found  Next physical within 3 mo: Visit date not found  Prescriber OR PCP: Telly Torre MD  Last diagnosis associated with med order: 1. Hypertension  - amLODIPine (NORVASC) 10 MG tablet [Pharmacy Med Name: AMLODIPINE 10MG]; Take 1 tablet (10 mg total) by mouth daily.  Dispense: 90 tablet; Refill: 1    If protocol passes may refill for 12 months if within 3 months of last provider visit (or a total of 15 months).             Passed - Blood pressure filed in past 12 months     BP Readings from Last 1 Encounters:   08/31/20 142/54

## 2021-06-11 NOTE — PATIENT INSTRUCTIONS - HE
Stop glimepiride entirely.    Check blood sugars before breakfast and before supper.    Set up a phone visit with me in approximately 3 weeks to discuss diabetes.    If you have any further low blood sugars less than 80, contact clinic right away.    If you are having blood sugars above 200 more often, we may need to discuss adjusting insulin further.    Continue on the 8 units of the insulin a day at this time.    B12 shot today.    Clinic follow-up appointment with me in 2 months.    Get high-dose flu shot when they become available this fall.

## 2021-06-12 NOTE — PROGRESS NOTES
Clinic Note    Assessment:     Assessment and Plan:  1. Metabolic acidosis  We will check lab work today and see whether or not this is resolved with slowing up the ostomy output and continue with the fluid and calorie intake  - Comprehensive Metabolic Panel    2. Malnutrition of moderate degree  As above we will see how his protein level looks at this time to determine whether or not TPN would be appropriate, however the nephrologist apparently discouraged the use of TPN according to the patient and wife  - Comprehensive Metabolic Panel  - HM2(CBC w/o Differential)    3. Prerenal acute renal failure  We will check lab work.  - Comprehensive Metabolic Panel    4. Rectal cancer  This is why had the ostomy  - Comprehensive Metabolic Panel  - HM2(CBC w/o Differential)    5. Hyperkalemia  We will check and make sure his potassium is fine at this time  - Comprehensive Metabolic Panel    6. Essential hypertension  Well-controlled on this dose of medications and continue same  - Comprehensive Metabolic Panel       Patient Instructions   Stick with the half dose of the blood pressure pill atenolol once daily    Continue with the opium and Imodium and fiber to help slow bowel movements    Eat well and get some good protein intake    Decreased fluid intake a little bit and especially after 6 PM so that her body can get rid of some of the extra fluid before going to bed at 9 and therefore not getting up so often to urinate        Return in about 4 weeks (around 8/21/2017) for post surgical follow up.         Subjective:      Suman Nagy is a 77 y.o. male comes in for follow-up after being hospitalized couple of times for dehydration renal insufficiency acidosis felt to all be related to his ostomy.  Is in a malnourished state.  Since going home this past time from the hospital 4 days ago however he has been eating well, having significantly stool output less and feeling better.  Sugars have not risen to dramatically.   Blood pressure is fine.  He is taking just a half dose of the meds.    I spoke with the surgeon about the patient's malnutrition and recent hospitalization before I spoke to the patient.  We wondered about using TPN.    The following portions of the patient's history were reviewed and updated as appropriate: Past medical history, allergies, medications, hospital discharge recently, lab work in the hospital showing renal function abnormalities, electrolyte abnormalities, and acidosis.  This is all felt to be related to his ileostomy    Review of Systems:    He is feeling better and no other symptoms than mentioned above.  Blood sugars been under reasonable control in the 170s  History   Smoking Status     Former Smoker     Quit date: 8/31/1995   Smokeless Tobacco     Not on file         Objective:     Vitals:    07/24/17 1349   BP: 102/62   Pulse: 75       Exam:  Patient looks well no acute distress and looks much better than he did last time I saw him  CV RRR  Lungs-clear  Extremities-compression stockings on and no dramatic edema  General-patient looks healthier with better color etc. at this time and more interactive.  Skin-ecchymosis on the backs of his hands.      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       cloNIDine HCl (CATAPRES) 0.3 MG tablet Take 0.15 mg by mouth 2 (two) times a day.        clopidogrel (PLAVIX) 75 mg tablet Take 75 mg by mouth bedtime.       diphenoxylate-atropine (LOMOTIL) 2.5-0.025 mg per tablet Take 1 tablet by mouth 3 (three)  "times a day. 90 tablet 0     glimepiride (AMARYL) 4 MG tablet Take 4 mg by mouth 2 (two) times daily after lunch and supper.       opium 10 mg/mL (morphine) Tinc Take 0.6 mL (6 mg total) by mouth 4 (four) times a day. 1 Bottle 0     psyllium (METAMUCIL) 3.4 gram packet Take 1 packet by mouth 2 (two) times a day. 60 packet 0     sertraline (ZOLOFT) 50 MG tablet Take 50 mg by mouth daily with supper.        INJECT EASE LANCETS 30 gauge Misc Use daily as directed (4-6 times daily) 600 each 1     ONETOUCH ULTRA TEST strips Test 4-6 times daily as directed 600 each 2     sodium bicarbonate 650 MG tablet Take 2 tablets (1,300 mg total) by mouth 3 (three) times a day. 180 tablet 0     SURE COMFORT PEN NEEDLE 31 gauge x 3/16\" Ndle Use one needle each night. 100 each 2     Current Facility-Administered Medications   Medication Dose Route Frequency Provider Last Rate Last Dose     cyanocobalamin injection 1,000 mcg  1,000 mcg Intramuscular Q30 Days Jerry Kelsey MD   1,000 mcg at 06/29/17 0954         Jerry Kelsey    7/24/2017         "

## 2021-06-12 NOTE — PATIENT INSTRUCTIONS - HE
Suman Nagy,    It is my pleasure to see you today at the [facility].    My recommendations for this visit are:    1.  Discontinue Plavix, start Eliquis 2.5 mg two times a day to prevent stroke.  2.  Refer you to talk to my EP colleague to discuss Watchman device to avoid life-long anticoagulation  3.  Continue other medications  4.  Will see you again in 6 months      Car Canela MD, PhD

## 2021-06-12 NOTE — PROGRESS NOTES
Clinic Note    Assessment:     Assessment and Plan:  1. Diarrhea  Sure sounds like C. difficile with green stool frequent after hospitalization and with night sweats last night.  Otherwise is feeling fine.  Labs look good from last night.  Need to get a stool specimen ASAP.  Because of the night sweats and the situation I would like to start the Flagyl before we get the results.  I have sent through the prescription already.  Please see other recommendations below.  I will follow-up with them by phone early in the week and decide when they need to be seen next.  - C. difficile Toxigenic by PCR       Patient Instructions   After the stool specimen is collected, start metronidazole 500 mg 3 times per day    Rice, applesauce, clear liquids like Pedialyte, and dry toast for the next day    Pepto-Bismol 4 times per day for the next 3-4 days    Get a probiotic at the pharmacy to take daily to help replenish the good and normal bacteria in the intestines    Return if symptoms worsen or fail to improve.         Subjective:      Suman Nagy is a 77 y.o. male comes in for follow-up after being seen in the ER last night for severe diarrhea.  He was just discharged from the hospital this past week and has been doing well but was a little constipated Monday took 1 dose of MiraLAX and the next day began having frequent green stools.  He is fine if he does not eat but when he eats he has uncontrollable liquid greenish stools.  Denies fever or chills but then had night sweats last night.  No blood in stool mentioned.  No mention of any blood sugar related issues.    The following portions of the patient's history were reviewed and updated as appropriate: ER visit from last night with normal CBC and electrolytes and LFTs.  ER note, discharge summary, past medical history, allergies, labs in the hospital, medicines    Review of Systems:    He says that he feels great with the exception of night sweats and the diarrhea every  time he eats.  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 30 tablet 0     cyanocobalamin 1,000 mcg/mL injection Inject 1,000 mcg into the shoulder, thigh, or buttocks every 30 (thirty) days.       glimepiride (AMARYL) 4 MG tablet Take 1 tablet (4 mg total) by mouth daily before breakfast.  0     ibuprofen (ADVIL,MOTRIN) 200 MG tablet Take 400 mg by mouth every 8 (eight) hours as needed for pain.       INJECT EASE LANCETS 30 gauge Misc Use daily as directed (4-6 times daily) 600 each 1     INJECT EASE LANCETS 30 gauge Misc Use daily as directed (4-6 times daily) 600 each 0     metoprolol succinate (TOPROL-XL) 25 MG Take 0.5 tablets (12.5 mg total) by mouth daily. (Patient taking differently: Take 12.5 mg by mouth every evening. ) 30 tablet 1     ONETOUCH ULTRA TEST strips Test 4-6 times daily as directed 600 each 2     oxyCODONE (ROXICODONE) 5 MG immediate release tablet Take 1-2 tablets (5-10 mg total) by mouth every 4 (four) hours as needed. 50 tablet 0     sertraline (ZOLOFT) 50 MG tablet Take 50 mg by mouth daily with supper.     "    SURE COMFORT PEN NEEDLE 31 gauge x 3/16\" Ndle Use one needle each night. 100 each 2     metroNIDAZOLE (FLAGYL) 500 MG tablet Take 1 tablet (500 mg total) by mouth 3 (three) times a day for 14 days. 42 tablet 0     No current facility-administered medications for this visit.          Jerry Kelsey    8/25/2017         "

## 2021-06-12 NOTE — PROGRESS NOTES
ASSESSMENT:  1. Preop exam for internal medicine  No contraindication to the surgical procedure at this time.  He has been malnourished relating to significant GI output and electrolyte etc. loss.  Now dramatically improving and with another 2 weeks before surgery expect that he will be in much better shape.  He is on Plavix and this will be stopped 7 days before the procedure.  Restart Plavix after the surgery when they feel he is able to do this.  His beta-blocker is being changed and dose decreased to prevent significant bradycardia it is probably in his best interest to be on a little bit of a beta-blocker still..     - Comprehensive Metabolic Panel  - HM2(CBC w/o Differential)  - Prealbumin    2. Essential hypertension  Well-controlled at this time and will decrease the beta-blocker as mentioned above    3. Malnutrition  This is improving now with his diet increasing and less GI output.  Will check lab work today and see how he is doing and continue with improved diet before surgery.    4. SARIKA (acute kidney injury)  Renal function is improved with his better hydration etc.  We will plan on checking labs now as well.    5. Type 2 diabetes mellitus without complication  Hold the Amaryl the morning of the procedure.    6. Rectal cancer  This is why he had the ileostomy to begin with.      PLAN:    Stop atenolol and switch to metoprolol succinate 12.5 mg daily this would be much lower dose much less likely to cause bradycardia.  Hold the glimepiride the day of surgery  Hold Plavix for 7 days prior to surgery restart afterwards when the surgeon feels it is appropriate  Keep eating an appropriate amount of food with protein stability body back up and be able to tolerate the procedure better      ASSESSED PROBLEMS:  Problem List Items Addressed This Visit     None          CHIEF COMPLAINT:  Chief Complaint   Patient presents with     Pre-op Exam       HISTORY OF PRESENT ILLNESS:  Suman Nagy is a 77 y.o. male  here for an internal medicine pre-operative consultation. The exam is requested by Dr. Parry in preparation for ILEOSTOMY CLOSURE LOOP  to be performed at [facility] [place] on 08/15/2017. Today s examination on 8/3/2017 is done to review the underlying surgical condition of ileostomy with proposed ileostomy takedown in 2 weeks.  His diarrhea is significantly improved with the tincture of opium.  He is eating well and feeling stronger and has had physical therapy that feels that he is done dramatically better now.  No new strokelike symptoms chest pain pressure or tightness of chest or shortness of breath.  He has residual stroke symptoms with weakness in his left arm and leg.  Chronic stable 2+ peripheral edema.    Suman Nagy has tolerated previous surgeries well without bleeding or anesthesia difficulty.     Past/Current Medical Problems:  Previous bleeding disorders: Negative    History of anesthesia reactions: Negative    Past Medical History:   Diagnosis Date     Anemia      Bladder cancer      Cancer     Rectosigmoid     Diabetes mellitus      Hyperkalemia      Hypertension      Seizure     possible, one time occurence     Stroke     multiple, residual left sided weakness, last CVA in July 2015 caused some speech deficit     Superficial phlebitis      Venous insufficiency of both lower extremities          REVIEW OF SYSTEMS:   Constitutional: Negative  Eyes: Negative  ENT: Negative  Respiratory: Negative  Breast/Skin: Negative  Cardiovascular:Negative   GI: Negative  Urinary: Negative  Reproductive: Negative  Musculoskeletal: Negative  Neuro: Negative  Endocrine: Negative  Mental Health: Negative   Lymph: Negative  Heme: Negative     Remaining Review of Systems negative.    QUESTIONS/HISTORY PERTINENT TO PRE-OP:  Body Piercings: None    Family history of bleeding disorders: Negative    Family history of anesthesia reactions: Negative      Surgeries:  Past Surgical History:   Procedure Laterality Date     COLON  SURGERY      Colectomy Low Anterior Resection     EYE SURGERY      Cataract Extraction     LAPAROSCOPIC COLON RESECTION N/A 6/1/2017    Procedure: LAPAROSCOPIC ASSISTED COLECTOMY LOW ANTERIOR RESECTION AND DIVERTING LOOP ILEOSTOMY, PROCTOSOPY;  Surgeon: Tyson Parry MD;  [facility];  Service:      VA CYSTOURETHROSCOPY,FULGUR <0.5 CM LESN N/A 9/1/2015    Procedure: CYSTOSCOPY TRANSURETHRAL RESECTION BLADDER TUMOR;  Surgeon: Cleveland Nair MD;  [facility];  Service: Urology     VA CYSTOURETHROSCOPY,FULGUR <0.5 CM LESN N/A 12/22/2015    Procedure: CYSTOSCOPY TRANSURETHRAL RESECTION BLADDER TUMOR AND BLADDER BIOPSIES;  Surgeon: Cleveland Nair MD;  [facility];  Service: Urology     TURBT      X2     VASECTOMY         Family History:   Family History   Problem Relation Age of Onset     COPD Father        Social History:  Social History     Social History     Marital status:      Spouse name: N/A     Number of children: N/A     Years of education: N/A     Occupational History     Not on file.     Social History Main Topics     Smoking status: Former Smoker     Quit date: 8/31/1995     Smokeless tobacco: Not on file     Alcohol use No     Drug use: No     Sexual activity: Not Currently     Partners: Female     Other Topics Concern     Not on file     Social History Narrative       VITALS:  Vitals:    08/03/17 1305   BP: 112/62   Pulse: (!) 51     Wt Readings from Last 3 Encounters:   07/29/17 174 lb (78.9 kg)   07/20/17 172 lb 14.4 oz (78.4 kg)   06/29/17 162 lb 8 oz (73.7 kg)     There is no height or weight on file to calculate BMI.    PHYSICAL EXAM:  General Appearance: Alert, cooperative, no distress, appears older than stated age.  Moves very slowly, cautiously  HEENT: EMOI,l, mouth and throat without lesions.  Neck: Supple without adenopathy   Back: No CVA tenderness or spinous process pain.  Lungs: Clear to auscultation bilaterally, good air  "movement.  Heart: Regular rate and rhythm, S1 and S2 normal, soft murmur, no bruit.  Abdomen: Soft, non-tender, no HSM or masses.  Ostomy site looks good and so bag looks like normal ostomy output  Musculoskeletal: No gross abnormalities.  Extremities: 2+ edema about one third up to shin stable, pulses II/IV and symmetric,   Skin: No worrisome lesions noted.  Lymph nodes: Cervical, supraclavicular, groin, and axillary nodes normal.  Neurologic: CNII-XII intact, strength slightly decreased in left arm and leg compared to left, DTRs not checked, sensory grossly intact  Psychiatric: Normal mood and affect.       MEDICATIONS:  Current Outpatient Prescriptions   Medication Sig Dispense Refill     atenolol (TENORMIN) 50 MG tablet Take 25 mg by mouth every evening. Indications: hypertension       cloNIDine HCl (CATAPRES) 0.3 MG tablet Take 0.15 mg by mouth 2 (two) times a day.        clopidogrel (PLAVIX) 75 mg tablet Take 75 mg by mouth bedtime.       diphenoxylate-atropine (LOMOTIL) 2.5-0.025 mg per tablet Take 1 tablet by mouth 3 (three) times a day. 90 tablet 0     glimepiride (AMARYL) 4 MG tablet Take 4 mg by mouth 2 (two) times daily after lunch and supper.       INJECT EASE LANCETS 30 gauge Misc Use daily as directed (4-6 times daily) 600 each 1     ONETOUCH ULTRA TEST strips Test 4-6 times daily as directed 600 each 2     opium 10 mg/mL (morphine) Tinc Take 0.6 mL (6 mg total) by mouth 4 (four) times a day. 1 Bottle 0     psyllium (METAMUCIL) 3.4 gram packet Take 1 packet by mouth 2 (two) times a day. 60 packet 0     sertraline (ZOLOFT) 50 MG tablet Take 50 mg by mouth daily with supper.        sodium bicarbonate 650 MG tablet Take 2 tablets (1,300 mg total) by mouth 3 (three) times a day. 180 tablet 0     SURE COMFORT PEN NEEDLE 31 gauge x 3/16\" Ndle Use one needle each night. 100 each 2     Current Facility-Administered Medications   Medication Dose Route Frequency Provider Last Rate Last Dose     cyanocobalamin " "injection 1,000 mcg  1,000 mcg Intramuscular Q30 Days Jerry Kelsey MD   1,000 mcg at 06/29/17 7505       ALLERGIES:  Allergies   Allergen Reactions     Cefazolin      \"felt very hot\"     Pravastatin      Increased peripheral neuropathy     Simvastatin      Increased peripheral neuropathy     Thiazides      Other: Gout       "

## 2021-06-12 NOTE — ANESTHESIA CARE TRANSFER NOTE
Last vitals:   Vitals:    08/15/17 0952   BP: (!) 193/88   Pulse: 99   Resp: 18   Temp: 36.8  C (98.2  F)   SpO2: 100%     Patient's level of consciousness is drowsy  Spontaneous respirations: yes  Maintains airway independently: yes  Dentition unchanged: yes  Oropharynx: oropharynx clear of all foreign objects    QCDR Measures:  ASA# 20 - Surgical Safety Checklist: WHO surgical safety checklist completed prior to induction  PQRS# 430 - Adult PONV Prevention: 4558F - Pt received => 2 anti-emetic agents (different classes) preop & intraop  ASA# 8 - Peds PONV Prevention: NA - Not pediatric patient, not GA or 2 or more risk factors NOT present  PQRS# 424 - Clari-op Temp Management: 4559F - At least one body temp DOCUMENTED => 35.5C or 95.9F within required timeframe  PQRS# 426 - PACU Transfer Protocol: - Transfer of care checklist used  ASA# 14 - Acute Post-op Pain: ASA14B - Patient did NOT experience pain >= 7 out of 10

## 2021-06-12 NOTE — ANESTHESIA POSTPROCEDURE EVALUATION
Patient: Suman Nagy  ILEOSTOMY CLOSURE LOOP   Anesthesia type: general    Patient location: PACU  Last vitals:   Vitals:    08/15/17 1124   BP: 169/83   Pulse: 63   Resp: 16   Temp: 36.7  C (98.1  F)   SpO2: 97%     Post vital signs: stable  Level of consciousness: awake and responds to simple questions  Post-anesthesia pain: pain controlled  Post-anesthesia nausea and vomiting: no  Pulmonary: unassisted, return to baseline  Cardiovascular: stable and blood pressure at baseline  Hydration: adequate  Anesthetic events: no    QCDR Measures:  ASA# 11 - Clari-op Cardiac Arrest: ASA11B - Patient did NOT experience unanticipated cardiac arrest  ASA# 12 - Clari-op Mortality Rate: ASA12B - Patient did NOT die  ASA# 13 - PACU Re-Intubation Rate: ASA13B - Patient did NOT require a new airway mgmt  ASA# 10 - Composite Anes Safety: ASA10A - No serious adverse event  ASA# 38 - New Corneal Injury: ASA38A - No new exposure keratitis or corneal abrasion in PACU    Additional Notes:

## 2021-06-12 NOTE — PROGRESS NOTES
[facility] Follow Up Note    Suman Nagy   80 y.o. male    Date of Visit: 10/29/2020    Chief Complaint   Patient presents with     Follow-up     Subjective  Luke is here for follow-up on multiple medical issues.    His diabetes is not as well controlled.  Blood sugars 1 .  No low blood sugars.  He did increase the insulin from 8 units to 10 units in September.    He is no longer on the glimepiride.  On Tradjenta 5 mg a day.  Hemoglobin A1c in July was 5.7%.    Patient did have paroxysmal atrial fibrillation during a severe bacteremia episode last month with strep a, possible skin source.  No fevers or residual from that.    Because of the paroxysmal atrial fibrillation, cardiology did put him on Eliquis 2.5 mg twice daily, and Plavix was discontinued.  I did review the note from October 14 with cardiology.    Patient does not have any palpitations or symptomatic atrial fibrillation.  He still in regular rhythm today.    I did review the echo from September 2020 with mild diastolic dysfunction.  No infectious endocarditis or valve problems.    He does have significant renal insufficiency with a creatinine of 2.4.  September 22 creatinine was 2.1 with a potassium of 3.2.    He is now on a potassium supplement 20 mEq a day in addition to Lasix 40 mg a day.    Still on his usual clonidine 0.15 mg in the morning and 0.3 mg in the evening, amlodipine 10 mg a day, hydralazine 75 mg twice daily, and Toprol-XL 50 mg a day which was increased last month after the A. fib.    Blood pressure has been 136/70-1 61/73 on his home checks.    He denies orthostasis and no edema.  Patient did develop significant anemia with his acute illness last month.  Hemoglobin was down to 7.9.    Patient denies significant shortness of breath currently.  No bleeding noted.    Past history of stage III rectal cancer 2017.  August 2019 CT scan without recurrence.  CEA level was drawn in July and was down to 4.7.  No  new bowel issues.    Past history of bladder cancer with TURBT in 2013.  No new urinary symptoms.  He has a follow-up with urology scheduled for December. September 2020 neck CT angiogram noted.  Right 30% and left 40%.  Stable chronic thalamic lacune's.    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    No acute neurologic changes currently.    No new cough or fever.    Mood stable on Zoloft 50 mg.        PMHx:    Past Medical History:   Diagnosis Date     Anemia      Bladder cancer (H)      Cancer (H)     Rectosigmoid     Diabetes mellitus (H)      Hyperkalemia      Hypertension      Seizure (H)     possible, one time occurence     Stroke (H)     multiple, residual left sided weakness, last CVA in July 2015 caused some speech deficit     Superficial phlebitis      Venous insufficiency of both lower extremities      PSHx:    Past Surgical History:   Procedure Laterality Date     COLON SURGERY      Colectomy Low Anterior Resection     EYE SURGERY      Cataract Extraction     LAPAROSCOPIC COLON RESECTION N/A 6/1/2017    Procedure: LAPAROSCOPIC ASSISTED COLECTOMY LOW ANTERIOR RESECTION AND DIVERTING LOOP ILEOSTOMY, PROCTOSOPY;  Surgeon: Tyson Parry MD;  Location: [facility];  Service:      LA CLOSE ENTEROSTOMY N/A 8/15/2017    Procedure: ILEOSTOMY CLOSURE LOOP ;  Surgeon: Tyson Parry MD;  Location: [facility];  Service: General     LA CYSTOURETHROSCOPY,FULGUR <0.5 CM LESN N/A 9/1/2015    Procedure: CYSTOSCOPY TRANSURETHRAL RESECTION BLADDER TUMOR;  Surgeon: Cleveland Nair MD;  Location: [facility];  Service: Urology     LA CYSTOURETHROSCOPY,FULGUR <0.5 CM LESN N/A 12/22/2015    Procedure: CYSTOSCOPY TRANSURETHRAL RESECTION BLADDER TUMOR AND BLADDER BIOPSIES;  Surgeon: Cleveland Nair MD;  Location: [facility];  Service: Urology     TURBT      X2     VASECTOMY       Immunizations:   Immunization History   Administered Date(s) Administered      Influenza C0d2-77, 01/18/2010     Influenza high dose,seasonal,PF, 65+ yrs 09/15/2015, 09/26/2016, 10/03/2017, 09/27/2018, 10/19/2019     Influenza, Seasonal, Inj PF IIV3 09/27/2010, 09/14/2012, 09/27/2013     Influenza, inj, historic,unspecified 10/19/2007, 09/16/2011, 10/15/2015     Influenza, seasonal,quad inj 6-35 mos 09/18/2009, 10/17/2014     Influenza,quad,high Dose,PF, 65yr + 09/21/2020     Pneumo Conj 13-V (2010&after) 01/20/2015     Pneumo Polysac 23-V 10/08/2004     Td,adult,historic,unspecified 07/01/2004     Tdap 05/20/2015       ROS A comprehensive review of systems was performed and was otherwise negative    Medications, allergies, and problem list were reviewed and updated    Exam  /48 (Patient Site: Right Arm, Patient Position: Sitting, Cuff Size: Adult Regular)   Pulse (!) 56   Temp 96.7  F (35.9  C) (Other) Comment (Src): forehead  Wt 160 lb (72.6 kg)   BMI 25.06 kg/m    Patient is alert and oriented.  Baseline mental status.  Mild left hemiparesis unchanged.  Abdomen soft and nontender.  Just trace ankle edema bilaterally.  Heart is regular without murmur.  Lungs are clear.    Assessment/Plan  1. Type 2 diabetes mellitus with stage 4 chronic kidney disease, with long-term current use of insulin (H)  Blood sugars are higher.  He does admit to eating black licorice often and other sweets.    Improve diet.  Increase the Lantus insulin to 12 units.  Stay off the glimepiride, because of concerns over low blood sugar in the past.    Continue Tradjenta.  - Glycosylated Hemoglobin A1c    Follow-up with phone consult in 1 month to discuss blood sugar, and adjust insulin further if needed    2. Paroxysmal atrial fibrillation (H)  No evidence of recurrence.  Asymptomatic.  He is higher risk for stroke with his carotid artery stenosis and history of stroke.    Cardiology did put him on low-dose Eliquis 2.5 mg twice daily and Plavix was discontinued earlier this month.    I did review the  bleeding risk with patient.  He was warned about significant bleeding risk and if he falls or hits his head get evaluated right away.    Patient also discussed the watchman device with cardiology.  I again discussed this with patient today.  He could consider that in order to try to get off the blood thinner.    I did explain that there is some risk of stroke when the device is placed, also issues with his renal insufficiency and severe hypertension.    Patient will plan to speak with cardiology about the watchman device and get more information.    Continue Eliquis at this time, long-term.    Toprol-XL 50 mg a day    3. Carotid artery stenosis, asymptomatic, left  Medical management with Lipitor, now Eliquis.    4. History of stroke  As above    5. Hypertension  Controlled.  Runs low diastolic but tolerating.  Continue on current medications.    Can adjust potassium supplement if needed, check potassium    6. B12 deficiency  B12 shot given today.  Usually gets B12 shots every 3 to 4 months, plan to get him the next one at his next visit.  - cyanocobalamin injection 1,000 mcg  - cyanocobalamin injection 1,000 mcg    7. History of bladder cancer  Follow-up with urology this December    8. History of rectal cancer  I did discuss follow-up for this issue with patient and wife today.  They were unable to do his colonoscopy in the past with his uncontrolled hypertension.  I did offer patient referral back to colorectal clinic at this time and consider follow-up colonoscopy, but patient and family want to hold off until the spring with the current coronavirus outbreak.    The CEA level was decreasing in July.    9. Anemia, unspecified type  From acute illness last month with bacteremia.    Check iron levels to see if he needs an iron infusion.  His nephrology clinic may have also checked labs last week but those records are not available.  His nephrology clinic may order iron infusion if needed.  He can get his  erythropoietin shots to the nephrology clinic.      - HM2(CBC w/o Differential)  - Ferritin  - Iron and Transferrin Iron Binding Capacity    10. Encounter for therapeutic drug monitoring    - Comprehensive Metabolic Panel    11. Type 2 diabetes mellitus without complication, without long-term current use of insulin (H)  As above  - insulin glargine (LANTUS SOLOSTAR PEN) 100 unit/mL (3 mL) pen; Inject 12 Units under the skin daily with breakfast.  Dispense: 15 mL; Refill: 0    Patient was told to stop eating black licorice and sweets.    Return in about 4 weeks (around 11/26/2020) for Virtual-phone visit to discuss diabetes.   Patient Instructions   Increase Lantus insulin to 12 units a day.    Set up a phone-virtual visit in approximately 1 month with me to discuss blood sugars.    Also schedule a routine follow-up with me in 4-5 months.    If you have any blood sugars less than 80, contact clinic right away.    Get a B12 shot today.  Repeat B12 shot at next visit.    You could discuss the watchman device with cardiology, which could potentially allow you to get off the Eliquis blood thinner.  Continue Eliquis blood thinner as you are doing at this time.    Follow-up with urology for your bladder tumor follow-up this winter as planned.    Do not eat any black licorice, as that can affect your kidneys.  Do not need any candy or sweets, which makes your diabetes worse.        Telly Torre MD        Current Outpatient Medications   Medication Sig Dispense Refill     acetaminophen (TYLENOL) 325 MG tablet Take 2 tablets (650 mg total) by mouth every 4 (four) hours as needed.  0     amLODIPine (NORVASC) 10 MG tablet TAKE 1 TABLET (10 MG TOTAL) BY MOUTH DAILY. 90 tablet 3     apixaban ANTICOAGULANT (ELIQUIS) 2.5 mg Tab tablet Take 1 tablet (2.5 mg total) by mouth 2 (two) times a day. 60 tablet 11     atorvastatin (LIPITOR) 20 MG tablet TAKE 1 TABLET (20 MG TOTAL) BY MOUTH DAILY. 90 tablet 3     calcium, as carbonate,  "(TUMS) 200 mg calcium (500 mg) chewable tablet Chew 1 tablet (200 mg total) 3 (three) times a day as needed for heartburn.  0     cholecalciferol, vitamin D3, (VITAMIN D3) 1,000 unit capsule Take 2 capsules (2,000 Units total) by mouth daily. 180 capsule 0     cloNIDine HCL (CATAPRES) 0.3 MG tablet Take 1/2 pill in the morning and 1 full pill in the evening. (Patient taking differently: Take 0.15-0.3 mg by mouth see administration instructions. Take 1/2 pill in the morning and 1 full pill in the evening.) 135 tablet 2     cyanocobalamin 1,000 mcg/mL injection Inject 1,000 mcg into the shoulder, thigh, or buttocks every 3 (three) months.              cyanocobalamin, vitamin B-12, (VITAMIN B-12) 1,000 mcg Subl Place 1 tablet (1,000 mcg total) under the tongue daily. 90 tablet 3     diclofenac sodium (VOLTAREN) 1 % Gel APPLY 4 GRAMS TOPICALLY 3 (THREE) TIMES A DAY AS NEEDED. APPLY TO KNEES 100 g 0     FIBER CHOICE, INULIN, ORAL Take 2 tablets by mouth daily.       furosemide (LASIX) 40 MG tablet TAKE 1 TABLET (40 MG TOTAL) BY MOUTH DAILY. 90 tablet 3     hydrALAZINE (APRESOLINE) 25 MG tablet Take 75 mg by mouth 4 (four) times a day.        insulin glargine (LANTUS SOLOSTAR PEN) 100 unit/mL (3 mL) pen Inject 12 Units under the skin daily with breakfast. 15 mL 0     metoprolol succinate (TOPROL-XL) 50 MG 24 hr tablet TAKE 1 TABLET (50 MG TOTAL) BY MOUTH DAILY. 90 tablet 1     ONETOUCH DELICA PLUS LANCET 33 gauge Misc CHECK BLOOD SUGAR 3 TIMES PER DAY. 300 each 3     ONETOUCH ULTRA BLUE TEST STRIP strips CHECK BLOOD SUGAR 3 TIMES PER DAY. ACCU-CHEK GUIDE TEST STRIIPS. 100 strip 2     ONETOUCH ULTRA2 METER [facility] [place] [facility] AS DIRECTED 1 each 0     pen needle, diabetic (BD ULTRA-FINE JONO PEN NEEDLE) 32 gauge x 5/32\" Ndle Use one needle per day to inject insulin. 100 each 4     polyethylene glycol (MIRALAX) 17 gram packet Take 1 packet (17 g total) by mouth daily as needed.  0     potassium chloride (K-DUR,KLOR-CON) 20 MEQ tablet Take " "1 tablet (20 mEq total) by mouth daily. 30 tablet 11     sertraline (ZOLOFT) 50 MG tablet Take 50 mg by mouth daily.       TRADJENTA 5 mg Tab TAKE 1 TABLET (5 MG TOTAL) BY MOUTH DAILY. 90 tablet 3     Current Facility-Administered Medications   Medication Dose Route Frequency Provider Last Rate Last Dose     cyanocobalamin injection 1,000 mcg  1,000 mcg Intramuscular Q30 Days Telly Torre MD         cyanocobalamin injection 1,000 mcg  1,000 mcg Intramuscular Q3 Months Telly Torre MD         Allergies   Allergen Reactions     Cefazolin Other (See Comments)     \"felt very hot\" Oct 2019 Cephalexin  Sep 2020 Ceftriaxone     Pravastatin Myalgia     Increased peripheral neuropathy     Simvastatin Myalgia     Increased peripheral neuropathy     Thiazides Other (See Comments)     Other: Gout       Social History     Tobacco Use     Smoking status: Former Smoker     Quit date: 1995     Years since quittin.1     Smokeless tobacco: Never Used   Substance Use Topics     Alcohol use: No     Frequency: Never     Drug use: No           "

## 2021-06-12 NOTE — PATIENT INSTRUCTIONS - HE
Increase Lantus insulin to 12 units a day.    Set up a phone-virtual visit in approximately 1 month with me to discuss blood sugars.    Also schedule a routine follow-up with me in 4-5 months.    If you have any blood sugars less than 80, contact clinic right away.    Get a B12 shot today.  Repeat B12 shot at next visit.    You could discuss the watchman device with cardiology, which could potentially allow you to get off the Eliquis blood thinner.  Continue Eliquis blood thinner as you are doing at this time.    Follow-up with urology for your bladder tumor follow-up this winter as planned.    Do not eat any black licorice, as that can affect your kidneys.  Do not need any candy or sweets, which makes your diabetes worse.

## 2021-06-12 NOTE — TELEPHONE ENCOUNTER
RN cannot approve Refill Request    RN can NOT refill this medication med is not covered by policy/route to provider. Last office visit: Visit date not found Last Physical: Visit date not found Last MTM visit: Visit date not found Last visit same specialty: Visit date not found.  Next visit within 3 mo: Visit date not found  Next physical within 3 mo: Visit date not found      Cammy Monroy, [facility] Triage/Med Refill 10/23/2020    Requested Prescriptions   Pending Prescriptions Disp Refills     metoprolol succinate (TOPROL-XL) 50 MG 24 hr tablet [Pharmacy Med Name: METOPROLOL SUC 50MG ER] 30 tablet 0     Sig: TAKE 1 TABLET (50 MG TOTAL) BY MOUTH DAILY.       There is no refill protocol information for this order

## 2021-06-13 NOTE — PROGRESS NOTES
"Suman Nagy is a 80 y.o. male who is being evaluated via a billable telephone visit.      The patient has been notified of following:     \"This telephone visit will be conducted via a call between you and your physician/provider. We have found that certain health care needs can be provided without the need for a physical exam.  This service lets us provide the care you need with a short phone conversation.  If a prescription is necessary we can send it directly to your pharmacy.  If lab work is needed we can place an order for that and you can then stop by our lab to have the test done at a later time.    Telephone visits are billed at different rates depending on your insurance coverage. During this emergency period, for some insurers they may be billed the same as an in-person visit.  Please reach out to your insurance provider with any questions.    If during the course of the call the physician/provider feels a telephone visit is not appropriate, you will not be charged for this service.\"    Patient has given verbal consent to a Telephone visit? Yes    What phone number would you like to be contacted at? [phone]    Patient would like to receive their AVS by AVS Preference: Michele.    Additional provider notes:     [facility] Follow Up Note    Suman Nagy   80 y.o. male    Date of Visit: 12/16/2020    Chief Complaint   Patient presents with     Follow-up     1 month checkup on diabetes and other medical issues. Noted a Retacrit injection on 12/07/20.     Rosi Butler requested a virtual telephone visit to avoid clinic visit during the corona virus outbreak.    Patient was having low blood sugars with glimepiride in the early morning hours and at night, that was discontinued earlier this year.    Patient was having high blood sugars above 200s earlier this fall.    He was on 12 units of Lantus insulin with Tradjenta 5 mg a day.    In October his hemoglobin A1c was up to " 7.3%, previously 5.7% in July.    He does have chronic renal insufficiency with a creatinine of 2.2 in October.  His hypertension is well controlled running 131/70 recently on amlodipine, hydralazine and furosemide.    No increasing shortness of breath or edema.    Paroxysmal atrial fibrillation since a septic episode in September.  He is on Eliquis and Toprol-XL 50 mg a day.  No palpitations or racing heart rate spells currently.  September 2020 echo with mild diastolic dysfunction but no heart valve problems.    Right CEA in 2015.  Left hemiparesis.  September 2020 CT angiogram of the neck showed 40% of the left and 30% of the right.  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    Patient is now taking 14 units of Lantus insulin daily since his appointment on November 25.    His blood sugars are starting to trend down and was 148 this morning.  It was still recently up in the 160s and occasionally above 200, but trending down.    No further low blood sugar episodes.    Patient wishes to continue on current insulin and medication.    No chest pain or new cardiac symptoms.    Past history of rectal cancer 2017, stage III.  Abdominal CT scan August 2019 was negative.  He had a colonoscopy planned for this year but that is delayed until the spring with COVID-19 outbreak.  No new abdominal or bowel symptoms.    Past history of B12 deficiency, did get a shot in October.    History of dysthymia, he states he has been feeling better and good.  He still somewhat more fatigue, complains of it being dark out at night.  Still on Zoloft 50 mg a day.  Diet is stable, has not changed his oral intake.        PMHx:    Past Medical History:   Diagnosis Date     Anemia      Bladder cancer (H)      Cancer (H)     Rectosigmoid     Diabetes mellitus (H)      Hyperkalemia      Hypertension      Seizure (H)     possible, one time occurence     Stroke (H)     multiple, residual left sided weakness, last CVA in July 2015 caused some speech deficit      Superficial phlebitis      Venous insufficiency of both lower extremities      PSHx:    Past Surgical History:   Procedure Laterality Date     COLON SURGERY      Colectomy Low Anterior Resection     EYE SURGERY      Cataract Extraction     LAPAROSCOPIC COLON RESECTION N/A 6/1/2017    Procedure: LAPAROSCOPIC ASSISTED COLECTOMY LOW ANTERIOR RESECTION AND DIVERTING LOOP ILEOSTOMY, PROCTOSOPY;  Surgeon: Tyson Parry MD;  Location: [facility];  Service:      MO CLOSE ENTEROSTOMY N/A 8/15/2017    Procedure: ILEOSTOMY CLOSURE LOOP ;  Surgeon: Tyson Parry MD;  Location: [facility];  Service: General     MO CYSTOURETHROSCOPY,FULGUR <0.5 CM LESN N/A 9/1/2015    Procedure: CYSTOSCOPY TRANSURETHRAL RESECTION BLADDER TUMOR;  Surgeon: Cleveland Nair MD;  Location: [facility];  Service: Urology     MO CYSTOURETHROSCOPY,FULGUR <0.5 CM LESN N/A 12/22/2015    Procedure: CYSTOSCOPY TRANSURETHRAL RESECTION BLADDER TUMOR AND BLADDER BIOPSIES;  Surgeon: Cleveland Nair MD;  Location: [facility];  Service: Urology     TURBT      X2     VASECTOMY       Immunizations:   Immunization History   Administered Date(s) Administered     Influenza [id number], 01/18/2010     Influenza high dose,seasonal,PF, 65+ yrs 09/15/2015, 09/26/2016, 10/03/2017, 09/27/2018, 10/19/2019     Influenza, Seasonal, Inj PF IIV3 09/27/2010, 09/14/2012, 09/27/2013     Influenza, inj, historic,unspecified 10/19/2007, 09/16/2011, 10/15/2015     Influenza, seasonal,quad inj 6-35 mos 09/18/2009, 10/17/2014     Influenza,quad,high Dose,PF, 65yr + 09/21/2020     Pneumo Conj 13-V (2010&after) 01/20/2015     Pneumo Polysac 23-V 10/08/2004     Td,adult,historic,unspecified 07/01/2004     Tdap 05/20/2015       ROS A comprehensive review of systems was performed and was otherwise negative    Medications, allergies, and problem list were reviewed and updated    Exam  There were no vitals taken for this visit.  Phone  consult    Assessment/Plan  1. Type 2 diabetes mellitus with stage 4 chronic kidney disease, with long-term current use of insulin (H)  Improving control.  Still some borderline high blood sugars, but no further low blood sugars.    Continue on 14 units of Lantus and Tradjenta 5 mg a day.    No Metformin with his chronic renal insufficiency.    Plan to adjust insulin as needed to control diabetes.    Goal hemoglobin A1c is less than 8%.    Follow-up in March 2. Hypertension  Controlled.  Continue current medications.    Chronic renal insufficiency    3. Carotid artery stenosis, asymptomatic, left  Asymptomatic.  Lipitor 20 mg.  Eliquis for blood thinner.  Not on aspirin.    4. Paroxysmal atrial fibrillation (H)  Asymptomatic.  Eliquis.  Toprol-XL.    History of rectal cancer, delaying colonoscopy until the spring, we can discuss that at March visit.  No evidence of recurrence at this time.    History of bladder cancer with TURBT in 2013.  Follow-up with urology.    Plan B12 shot in March    Dysthymia controlled on Zoloft    Return in 2 months (on 3/2/2021) for Recheck.   Patient Instructions   No change in treatment plan.  Continue current insulin.    Contact me if you start getting low blood sugars less than 80 again, or if your blood sugars are staying above 200 again.    Otherwise follow-up with me on March 2, as scheduled.    Telly Torre MD        Current Outpatient Medications   Medication Sig Dispense Refill     amLODIPine (NORVASC) 10 MG tablet TAKE 1 TABLET (10 MG TOTAL) BY MOUTH DAILY. 90 tablet 3     apixaban ANTICOAGULANT (ELIQUIS) 2.5 mg Tab tablet Take 1 tablet (2.5 mg total) by mouth 2 (two) times a day. 60 tablet 11     atorvastatin (LIPITOR) 20 MG tablet TAKE 1 TABLET (20 MG TOTAL) BY MOUTH DAILY. 90 tablet 3     calcium, as carbonate, (TUMS) 200 mg calcium (500 mg) chewable tablet Chew 1 tablet (200 mg total) 3 (three) times a day as needed for heartburn.  0     cholecalciferol, vitamin D3,  "(VITAMIN D3) 1,000 unit capsule Take 2 capsules (2,000 Units total) by mouth daily. 180 capsule 0     cloNIDine HCL (CATAPRES) 0.3 MG tablet Take 1/2 pill in the morning and 1 full pill in the evening. (Patient taking differently: Take 0.15-0.3 mg by mouth see administration instructions. Take 1/2 pill in the morning and 1 full pill in the evening.) 135 tablet 2     cyanocobalamin 1,000 mcg/mL injection Inject 1,000 mcg into the shoulder, thigh, or buttocks every 3 (three) months.              cyanocobalamin, vitamin B-12, (VITAMIN B-12) 1,000 mcg Subl Place 1 tablet (1,000 mcg total) under the tongue daily. 90 tablet 3     diclofenac sodium (VOLTAREN) 1 % Gel APPLY 4 GRAMS TOPICALLY 3 (THREE) TIMES A DAY AS NEEDED. APPLY TO KNEES 100 g 0     FIBER CHOICE, INULIN, ORAL Take 2 tablets by mouth daily.       furosemide (LASIX) 40 MG tablet TAKE 1 TABLET (40 MG TOTAL) BY MOUTH DAILY. 90 tablet 3     hydrALAZINE (APRESOLINE) 25 MG tablet Take 75 mg by mouth 4 (four) times a day.        LANTUS SOLOSTAR U-100 INSULIN 100 unit/mL (3 mL) pen INJECT 14 UNITS UNDER THE SKIN DAILY WITH BREAKFAST. 9 mL 2     metoprolol succinate (TOPROL-XL) 50 MG 24 hr tablet TAKE 1 TABLET (50 MG TOTAL) BY MOUTH DAILY. 90 tablet 1     ONETOUCH DELICA PLUS LANCET 33 gauge Misc CHECK BLOOD SUGAR 3 TIMES PER DAY. 300 each 3     ONETOUCH ULTRA BLUE TEST STRIP strips CHECK BLOOD SUGAR 3 TIMES PER DAY. ACCU-CHEK GUIDE TEST STRIIPS. 100 strip 2     ONETOUCH ULTRA2 METER [facility] AS DIRECTED 1 each 0     pen needle, diabetic (BD ULTRA-FINE JONO PEN NEEDLE) 32 gauge x 5/32\" Ndle Use one needle per day to inject insulin. 100 each 4     polyethylene glycol (MIRALAX) 17 gram packet Take 1 packet (17 g total) by mouth daily as needed.  0     potassium chloride (K-DUR,KLOR-CON) 20 MEQ tablet Take 1 tablet (20 mEq total) by mouth daily. 30 tablet 11     sertraline (ZOLOFT) 50 MG tablet Take 50 mg by mouth daily.       TRADJENTA 5 mg Tab TAKE 1 TABLET (5 MG TOTAL) " "BY MOUTH DAILY. 90 tablet 3     acetaminophen (TYLENOL) 325 MG tablet Take 2 tablets (650 mg total) by mouth every 4 (four) hours as needed.  0     Current Facility-Administered Medications   Medication Dose Route Frequency Provider Last Rate Last Admin     cyanocobalamin injection 1,000 mcg  1,000 mcg Intramuscular Q3 Months Telly Torre MD   1,000 mcg at 10/29/20 1142     Allergies   Allergen Reactions     Cefazolin Other (See Comments)     \"felt very hot\" Oct 2019 Cephalexin  Sep 2020 Ceftriaxone     Pravastatin Myalgia     Increased peripheral neuropathy     Simvastatin Myalgia     Increased peripheral neuropathy     Thiazides Other (See Comments)     Other: Gout       Social History     Tobacco Use     Smoking status: Former Smoker     Quit date: 1995     Years since quittin.3     Smokeless tobacco: Never Used   Substance Use Topics     Alcohol use: No     Frequency: Never     Drug use: No             Phone call duration:  11 minutes    Florinda Osuna CMA      "

## 2021-06-13 NOTE — PROGRESS NOTES
Chief Complaint   Patient presents with     B12/FLU     Flu consent and contraindication forms are given/ signed/ reviewed and sent to medical records to scan.     Amie Caraballo CMA [facility] 10/3/2017 9:04 AM

## 2021-06-13 NOTE — PATIENT INSTRUCTIONS - HE
No change in treatment plan.  Continue current insulin.    Contact me if you start getting low blood sugars less than 80 again, or if your blood sugars are staying above 200 again.    Otherwise follow-up with me on March 2, as scheduled.

## 2021-06-13 NOTE — PROGRESS NOTES
"Suman Nagy is a 80 y.o. male who is being evaluated via a billable telephone visit.      The patient has been notified of following:     \"This telephone visit will be conducted via a call between you and your physician/provider. We have found that certain health care needs can be provided without the need for a physical exam.  This service lets us provide the care you need with a short phone conversation.  If a prescription is necessary we can send it directly to your pharmacy.  If lab work is needed we can place an order for that and you can then stop by our lab to have the test done at a later time.    Telephone visits are billed at different rates depending on your insurance coverage. During this emergency period, for some insurers they may be billed the same as an in-person visit.  Please reach out to your insurance provider with any questions.    If during the course of the call the physician/provider feels a telephone visit is not appropriate, you will not be charged for this service.\"    Patient has given verbal consent to a Telephone visit? Yes    What phone number would you like to be contacted at? [phone]     Patient would like to receive their AVS by AVS Preference: Michele.    Additional provider notes:     [facility] Follow Up Note    Suman Nagy   80 y.o. male    Date of Visit: 11/25/2020    Chief Complaint   Patient presents with     Diabetes     1 month checkup. Reporting high blood sugars >200 for a couple weeks and lower BP's. BP this AM: 126/57 around 09:00.     Subjective  I spoke with patient and wife.    Patient is in taking 12 units of his Lantus insulin which was increased October 29.  Still on Tradjenta 5 mg a day.    No longer on glimepiride    His blood sugars are still running above 200.  No low blood sugars.    Hemoglobin A1c in July of this year was 5.7%.    Hemoglobin A1c in October of this year was 7.3%.    Patient has been having worsening renal " insufficiency.    Patient was having low blood sugars in the middle of the night and early morning.  I had him stop the glimepiride    In August he was on 8 units of Lantus insulin.    His blood pressure is 126/57 today.  He had highly labile blood pressure in the past.  Medications unchanged.    He had paroxysmal atrial fibrillation during a bacteremia episode of cellulitis in September.  No recurrence.  No complaints of palpitation.    He is now on Eliquis in September, no longer on Plavix.    Heart echo in September of this year showed mild diastolic dysfunction.  No significant valve problem.    He did have significant anemia with the acute illness.    Last month hemoglobin was improving to a hemoglobin of 8.6.  Normal iron labs.    Patient does not complain of any worsening shortness of breath or fatigue.    September 2020 CT angiogram of the neck showed 30% on the right and 40% of the left with an old thalamic lacune.  Previous left hemiparesis with right CEA in 2015.    PMHx:    Past Medical History:   Diagnosis Date     Anemia      Bladder cancer (H)      Cancer (H)     Rectosigmoid     Diabetes mellitus (H)      Hyperkalemia      Hypertension      Seizure (H)     possible, one time occurence     Stroke (H)     multiple, residual left sided weakness, last CVA in July 2015 caused some speech deficit     Superficial phlebitis      Venous insufficiency of both lower extremities      PSHx:    Past Surgical History:   Procedure Laterality Date     COLON SURGERY      Colectomy Low Anterior Resection     EYE SURGERY      Cataract Extraction     LAPAROSCOPIC COLON RESECTION N/A 6/1/2017    Procedure: LAPAROSCOPIC ASSISTED COLECTOMY LOW ANTERIOR RESECTION AND DIVERTING LOOP ILEOSTOMY, PROCTOSOPY;  Surgeon: Tyson Parry MD;  Location: [facility];  Service:      IN CLOSE ENTEROSTOMY N/A 8/15/2017    Procedure: ILEOSTOMY CLOSURE LOOP ;  Surgeon: Tyson Parry MD;  Location: [facility];  Service:  General     WA CYSTOURETHROSCOPY,FULGUR <0.5 CM LESN N/A 9/1/2015    Procedure: CYSTOSCOPY TRANSURETHRAL RESECTION BLADDER TUMOR;  Surgeon: Cleveland Nair MD;  Location: [facility];  Service: Urology     WA CYSTOURETHROSCOPY,FULGUR <0.5 CM LESN N/A 12/22/2015    Procedure: CYSTOSCOPY TRANSURETHRAL RESECTION BLADDER TUMOR AND BLADDER BIOPSIES;  Surgeon: Cleveland Nair MD;  Location: [facility];  Service: Urology     TURBT      X2     VASECTOMY       Immunizations:   Immunization History   Administered Date(s) Administered     Influenza [id number], 01/18/2010     Influenza high dose,seasonal,PF, 65+ yrs 09/15/2015, 09/26/2016, 10/03/2017, 09/27/2018, 10/19/2019     Influenza, Seasonal, Inj PF IIV3 09/27/2010, 09/14/2012, 09/27/2013     Influenza, inj, historic,unspecified 10/19/2007, 09/16/2011, 10/15/2015     Influenza, seasonal,quad inj 6-35 mos 09/18/2009, 10/17/2014     Influenza,quad,high Dose,PF, 65yr + 09/21/2020     Pneumo Conj 13-V (2010&after) 01/20/2015     Pneumo Polysac 23-V 10/08/2004     Td,adult,historic,unspecified 07/01/2004     Tdap 05/20/2015       ROS A comprehensive review of systems was performed and was otherwise negative    Medications, allergies, and problem list were reviewed and updated    Exam  There were no vitals taken for this visit.  Phone consult    Assessment/Plan  1. Type 2 diabetes mellitus without complication, without long-term current use of insulin (H)  Blood sugars are still running high above 200 and no low blood sugars.    Was having low blood sugars on glimepiride in the past.    Goal blood sugars less than 160 with goal hemoglobin A1c less than 8%.    Increase Lantus insulin further to 15 units.  See patient instructions.    Phone consult next month.    Also has an appointment scheduled on March 2 with me  - insulin glargine (LANTUS SOLOSTAR PEN) 100 unit/mL (3 mL) pen; Inject 15 Units under the skin daily with breakfast.  Dispense: 15 mL; Refill:  0    2. Hypertension  Blood pressure controlled, labile.  Continue current medications.    3. Chronic kidney disease, stage IV (severe) (H)  Creatinine was 2.2, at baseline last month.    Paroxysmal atrial fibrillation, Eliquis.  He could follow-up with cardiology to discuss watchman    He does plan to see urology next month for his bladder cancer follow-up.  TURBT in 2013.    Stage III rectal cancer 2017.  Abdominal CT scan - August 2019 with a CEA coming down to 4.7 in July of this year.  He is planning to wait until the spring for his colonoscopy follow-up, because of the coronavirus outbreak.    He did get a B12 shot in October 2019.  Plan B12 shot at next visit in March.    History of dysthymia, stable on Zoloft 50 mg    Return in 3 weeks (on 12/16/2020) for Phone follow-up at 1:40 PM.   Patient Instructions   Increase Lantus insulin to 15 units.    Continue blood sugars above 200, increase Lantus insulin further to 18 units a day.    If any low blood sugars less than 100, reduce Lantus insulin back to 12 units a day.    Phone follow-up with me on December 16 at 1:40 PM.    Telly Torre MD        Current Outpatient Medications   Medication Sig Dispense Refill     acetaminophen (TYLENOL) 325 MG tablet Take 2 tablets (650 mg total) by mouth every 4 (four) hours as needed.  0     amLODIPine (NORVASC) 10 MG tablet TAKE 1 TABLET (10 MG TOTAL) BY MOUTH DAILY. 90 tablet 3     apixaban ANTICOAGULANT (ELIQUIS) 2.5 mg Tab tablet Take 1 tablet (2.5 mg total) by mouth 2 (two) times a day. 60 tablet 11     atorvastatin (LIPITOR) 20 MG tablet TAKE 1 TABLET (20 MG TOTAL) BY MOUTH DAILY. 90 tablet 3     calcium, as carbonate, (TUMS) 200 mg calcium (500 mg) chewable tablet Chew 1 tablet (200 mg total) 3 (three) times a day as needed for heartburn.  0     cholecalciferol, vitamin D3, (VITAMIN D3) 1,000 unit capsule Take 2 capsules (2,000 Units total) by mouth daily. 180 capsule 0     cloNIDine HCL (CATAPRES) 0.3 MG tablet Take  "1/2 pill in the morning and 1 full pill in the evening. (Patient taking differently: Take 0.15-0.3 mg by mouth see administration instructions. Take 1/2 pill in the morning and 1 full pill in the evening.) 135 tablet 2     cyanocobalamin 1,000 mcg/mL injection Inject 1,000 mcg into the shoulder, thigh, or buttocks every 3 (three) months.              cyanocobalamin, vitamin B-12, (VITAMIN B-12) 1,000 mcg Subl Place 1 tablet (1,000 mcg total) under the tongue daily. 90 tablet 3     diclofenac sodium (VOLTAREN) 1 % Gel APPLY 4 GRAMS TOPICALLY 3 (THREE) TIMES A DAY AS NEEDED. APPLY TO KNEES 100 g 0     FIBER CHOICE, INULIN, ORAL Take 2 tablets by mouth daily.       furosemide (LASIX) 40 MG tablet TAKE 1 TABLET (40 MG TOTAL) BY MOUTH DAILY. 90 tablet 3     hydrALAZINE (APRESOLINE) 25 MG tablet Take 75 mg by mouth 4 (four) times a day.        insulin glargine (LANTUS SOLOSTAR PEN) 100 unit/mL (3 mL) pen Inject 15 Units under the skin daily with breakfast. 15 mL 0     metoprolol succinate (TOPROL-XL) 50 MG 24 hr tablet TAKE 1 TABLET (50 MG TOTAL) BY MOUTH DAILY. 90 tablet 1     ONETOUCH DELICA PLUS LANCET 33 gauge Misc CHECK BLOOD SUGAR 3 TIMES PER DAY. 300 each 3     ONETOUCH ULTRA BLUE TEST STRIP strips CHECK BLOOD SUGAR 3 TIMES PER DAY. ACCU-CHEK GUIDE TEST STRIIPS. 100 strip 2     ONETOUCH ULTRA2 METER [facility] AS DIRECTED 1 each 0     pen needle, diabetic (BD ULTRA-FINE JONO PEN NEEDLE) 32 gauge x 5/32\" Ndle Use one needle per day to inject insulin. 100 each 4     polyethylene glycol (MIRALAX) 17 gram packet Take 1 packet (17 g total) by mouth daily as needed.  0     potassium chloride (K-DUR,KLOR-CON) 20 MEQ tablet Take 1 tablet (20 mEq total) by mouth daily. 30 tablet 11     sertraline (ZOLOFT) 50 MG tablet Take 50 mg by mouth daily.       TRADJENTA 5 mg Tab TAKE 1 TABLET (5 MG TOTAL) BY MOUTH DAILY. 90 tablet 3     Current Facility-Administered Medications   Medication Dose Route Frequency Provider Last Rate Last " "Admin     cyanocobalamin injection 1,000 mcg  1,000 mcg Intramuscular Q3 Months Telly Torre MD   1,000 mcg at 10/29/20 1142     Allergies   Allergen Reactions     Cefazolin Other (See Comments)     \"felt very hot\" Oct 2019 Cephalexin  Sep 2020 Ceftriaxone     Pravastatin Myalgia     Increased peripheral neuropathy     Simvastatin Myalgia     Increased peripheral neuropathy     Thiazides Other (See Comments)     Other: Gout       Social History     Tobacco Use     Smoking status: Former Smoker     Quit date: 1995     Years since quittin.2     Smokeless tobacco: Never Used   Substance Use Topics     Alcohol use: No     Frequency: Never     Drug use: No             Phone call duration:  12 minutes    Florinda Osuna CMA      "

## 2021-06-13 NOTE — PROGRESS NOTES
, this patient has appointment on Tuesday 10/3 for a nurse visit only at [facility] and is  requesting for B12 injection. Order is pended Please advise/ review and sign. Thank you      Amie Caraballo, [facility] 9/29/2017 11:42 AM

## 2021-06-13 NOTE — TELEPHONE ENCOUNTER
RN cannot approve Refill Request    RN can NOT refill this medication PCP messaged that patient is overdue for Labs. Last office visit: 10/29/2020 Telly Torre MD Last Physical: 7/10/2019 Last MTM visit: Visit date not found Last visit same specialty: 10/29/2020 Telly Torre MD.  Next visit within 3 mo: Visit date not found  Next physical within 3 mo: Visit date not found      Kacey Daniels, [facility] Triage/Med Refill 12/13/2020    Requested Prescriptions   Pending Prescriptions Disp Refills     LANTUS SOLOSTAR U-100 INSULIN 100 unit/mL (3 mL) pen [Pharmacy Med Name: LANTUS INJ SOLOSTAR] 9 mL 2     Sig: INJECT 14 UNITS UNDER THE SKIN DAILY WITH BREAKFAST.       Insulin/GLP-1 Refill Protocol Failed - 12/13/2020 12:32 PM        Failed - Microalbumin in last year     Microalbumin, Random Urine   Date Value Ref Range Status   12/07/2017 120.44 (H) 0.00 - 1.99 mg/dL Final                  Passed - Visit with PCP or prescribing provider visit in last 6 months     Last office visit with prescriber/PCP: 10/29/2020 OR same dept: 10/29/2020 Telly Torre MD OR same specialty: 10/29/2020 Telly Torre MD Last physical: Visit date not found Last MTM visit: Visit date not found     Next appt within 3 mo: Visit date not found  Next physical within 3 mo: Visit date not found  Prescriber OR PCP: Telly Torre MD  Last diagnosis associated with med order: 1. Type 2 diabetes mellitus without complication, without long-term current use of insulin (H)  - LANTUS SOLOSTAR U-100 INSULIN 100 unit/mL (3 mL) pen [Pharmacy Med Name: LANTUS INJ SOLOSTAR]; INJECT 14 UNITS UNDER THE SKIN DAILY WITH BREAKFAST.  Dispense: 9 mL; Refill: 2    If protocol passes may refill for 6 months if within 3 months of last provider visit (or a total of 9 months).              Passed - A1C in last 6 months     Hemoglobin A1c   Date Value Ref Range Status   10/29/2020 7.3 (H) <=5.6 % Final     Comment:     Normal <5.7% Prediabete 5.7-6.4%  Diabletes 6.5% or higher - adopted from ADA consensus guidelines               Passed - Blood pressure in last year     BP Readings from Last 1 Encounters:   10/29/20 138/48             Passed - Creatinine done in last year     Creatinine   Date Value Ref Range Status   10/29/2020 2.23 (H) 0.70 - 1.30 mg/dL Final

## 2021-06-14 ENCOUNTER — COMMUNICATION - HEALTHEAST (OUTPATIENT)
Dept: INTERNAL MEDICINE | Facility: CLINIC | Age: 81
End: 2021-06-14

## 2021-06-14 DIAGNOSIS — E11.40 CONTROLLED TYPE 2 DIABETES WITH NEUROPATHY (H): ICD-10-CM

## 2021-06-14 NOTE — TELEPHONE ENCOUNTER
RN cannot approve Refill Request    RN can NOT refill this medication historical medication requested and refill SIG does not match the med list.. Last office visit: 10/29/2020 Telly Torre MD Last Physical: 7/10/2019 Last MTM visit: Visit date not found Last visit same specialty: 10/29/2020 Telly Torre MD.  Next visit within 3 mo: Visit date not found  Next physical within 3 mo: Visit date not found      Hayden Porras, [facility] Connection Triage/Med Refill 1/27/2021    Requested Prescriptions   Pending Prescriptions Disp Refills     sertraline (ZOLOFT) 50 MG tablet [Pharmacy Med Name: SERTRALINE 50MG] 135 tablet 1     Sig: TAKE ONE & ONE -HALF (1&1/2) TABLETS BY MOUTH DAILY       SS[facility] Refill Protocol  Passed - 1/26/2021  9:32 AM        Passed - PCP or prescribing provider visit in last year     Last office visit with prescriber/PCP: 10/29/2020 Telly Torre MD OR same dept: 10/29/2020 Telly Torre MD OR same specialty: 10/29/2020 Telly Torre MD  Last physical: 7/10/2019 Last MTM visit: Visit date not found   Next visit within 3 mo: Visit date not found  Next physical within 3 mo: Visit date not found  Prescriber OR PCP: Telly Torre MD  Last diagnosis associated with med order: There are no diagnoses linked to this encounter.  If protocol passes may refill for 12 months if within 3 months of last provider visit (or a total of 15 months).

## 2021-06-14 NOTE — PROGRESS NOTES
Clinic Note    Assessment:     Assessment and Plan:  1. Type 2 diabetes mellitus without complication  Much worse control but is off the meds that he had been on previously.  Is only taking Prandin 4 mg once per day which is ridiculous.  Is only checking sugars once per day also not good.  Please see the recommendations and instructions.  His renal function has been normal and therefore it safe for him to be on metformin.  The issue is when he had his ostomy and became dehydrated.  Otherwise his renal functions returned to normal.  This is the best means of being able to take care of this.  - JIC RED  - Glycosylated Hemoglobin A1c  - Microalbumin, Random Urine  - Comprehensive Metabolic Panel    2. Hypertension  Well-controlled on current meds and will continue same and check lab work.  - Comprehensive Metabolic Panel  - HM2(CBC w/o Differential)    3. B12 deficiency  We will check a CBC but he also got a B12 shot here today.  Should be on monthly shots.  - HM2(CBC w/o Differential)    4. Ischemic Stroke  He is on Plavix on a daily basis.  Blood pressures under good control.  He is not on a statin at this time will need to address that next visit.       Patient Instructions   Restart metformin 500 mg twice per day for a week, then 1000 mg twice per day.    Increase glimipiride to twice per day before the bigger meals.    Maybe try 1/2 tab before breakfast, and 1/2 with smaller meal, and full tab with larger meal of the day before meals.      Daily dressing change with silvadine cream     MyChart email in 2 weeks with update on the sugar levels.             Return in about 3 months (around 3/7/2018) for diabetes.         Subjective:      Suman Nagy is a 77 y.o. male comes in for follow-up of his diabetes but has not been in for some time and had his meds changed in the TCU and now A1c was 8.7.  This terrible.  A had his ostomy reversed and he has pretty good bowel function.  Pretty good control.   Occasionally loses some incontinence.  Occasionally incontinent of urine as well.  CV and pulmonary symptoms negative.  Endocrine as above.    The following portions of the patient's history were reviewed and updated as appropriate: Past medical history, allergies, medicines, lab work over the past 4 months with normal renal function.    Review of Systems:    As mentioned above.  History   Smoking Status     Former Smoker     Quit date: 8/31/1995   Smokeless Tobacco     Never Used         Objective:     Vitals:    12/07/17 1708   BP: 128/64   Pulse: 66   Weight: 177 lb 1.6 oz (80.3 kg)       Exam:  Vital signs stable and patient looks well.  Does slouch in his chair.  Neck supple without adenopathy  CV- regular rhythm.  No murmur heard  Lungs are clear  Extremities has 1+ edema bilateral  Left little toe has an excoriation and a shallow ulcer with some maceration.  Is been covered too long.  We dressed it with Silvadene and Band-Aid.    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       glimepiride (AMARYL) 4 MG tablet One pill twice per day with lunch and supper (Patient taking differently: one daily) 180 tablet 3     INJECT EASE LANCETS 30 gauge Misc Use daily as directed (4-6 times daily) 600 each 0     metoprolol succinate (TOPROL-XL) 25 MG Take 0.5 tablets (12.5 mg total) by mouth daily. 30  "tablet 3     ONETOUCH ULTRA TEST strips Test 4-6 times daily as directed 600 each 2     sertraline (ZOLOFT) 50 MG tablet Take one & one -half (1&1/2) tablets by mouth daily (Patient taking differently: one tablet daily) 135 tablet 0     SURE COMFORT PEN NEEDLE 31 gauge x 3/16\" Ndle Use one needle each night. 100 each 2     metFORMIN (GLUCOPHAGE) 1000 MG tablet Take 1 tablet (1,000 mg total) by mouth 2 (two) times a day with meals. 180 tablet 1     Current Facility-Administered Medications   Medication Dose Route Frequency Provider Last Rate Last Dose     cyanocobalamin injection 1,000 mcg  1,000 mcg Intramuscular Q30 Days Jerry Kelsey MD   1,000 mcg at 12/07/17 1730         Jerry Kelsey    12/7/2017         "

## 2021-06-15 NOTE — TELEPHONE ENCOUNTER
Refill Approved    Rx renewed per Medication Renewal Policy. Medication was last renewed on 5/20/20, last OV 3/2/21.    Kacey Daniels, [facility] Triage/Med Refill 3/4/2021     Requested Prescriptions   Pending Prescriptions Disp Refills     cloNIDine HCL (CATAPRES) 0.3 MG tablet [Pharmacy Med Name: CLONIDINE 0.3MG] 135 tablet 1     Sig: TAKE 1/2 PILL IN THE MORNING AND 1 FULL PILL IN THE EVENING.       Clonidine/Hydralazine Refill Protocol Passed - 3/4/2021 10:25 AM        Passed - PCP or prescribing provider visit in past 12 months       Last office visit with prescriber/PCP: 3/2/2021 Telly Torre MD OR same dept: 3/2/2021 Telly Torre MD OR same specialty: 3/2/2021 Telly Torre MD  Last physical: 7/10/2019 Last MTM visit: Visit date not found   Next visit within 3 mo: Visit date not found  Next physical within 3 mo: Visit date not found  Prescriber OR PCP: Telly Torre MD  Last diagnosis associated with med order: 1. Hypertension  - cloNIDine HCL (CATAPRES) 0.3 MG tablet [Pharmacy Med Name: CLONIDINE 0.3MG]; Take 1/2 pill in the morning and 1 full pill in the evening.  Dispense: 135 tablet; Refill: 1    If protocol passes may refill for 12 months if within 3 months of last provider visit (or a total of 15 months).             Passed - Blood pressure filed in past 12 months     BP Readings from Last 1 Encounters:   03/02/21 124/46

## 2021-06-15 NOTE — PROGRESS NOTES
[facility] Follow Up Note    Suman Nagy   80 y.o. male    Date of Visit: 3/2/2021    Chief Complaint   Patient presents with     Follow-up     Subjective  Luke is here with wife, Jeni.    Patient has left hemiparesis with previous stroke.  Previous right CEA 2015.  September 2020 CT angiogram left 40% right 30%.    On Lipitor 20 mg.  No generalized myalgias or abdominal pain.    He is on Eliquis for history of paroxysmal atrial fibrillation.  No bleeding issues.    Patient did fall in the bathroom 2 months ago when he was trying to bend over and twist at the same time.  But he denies loss of consciousness or hitting his head.    He denies palpitations or racing heart rate spells.  On Toprol-XL 50 mg a day.    Denies orthostasis.    September 2020 echo with mild diastolic dysfunction.  No valve problems.    He has severe chronic renal insufficiency with a creatinine 2.5 last January.  I did review the nephrology note from December 2020.    He is now getting erythropoietin shots for low hemoglobin.  Hemoglobin was stable and January at 9.6.  October 2020 iron saturation 26% with a ferritin of 56.    Patient denies any worsening shortness of breath or fatigue.    On amlodipine 10 mg a day, clonidine 1.5 mg in the morning and 3.0 mg in the evening.  Lasix 40 mg a day with potassium.  Hydralazine 75 mg 4 times daily.    At oncology visit in January blood pressure was 137/70.    History of stage III rectal cancer in 2017.  Low anterior resection.  September 2019 CT scan abdomen negative for recurrence.  June 2018 colonoscopy with 2 polyps.    He had planned a follow-up colonoscopy, but had had a severely elevated blood pressure at the time and that was canceled is not rescheduled.  He denies blood in stool or rectal pain.    Did review the oncology note from January 2021.  CEA level was still pending at that note.  He was given a 6-month follow-up.  Albumin level 4.0.  AST 11 alkaline  phosphatase 93.  Creatinine was 2.0 at that time.  Blood sugar 164.    Diabetes type 2 on Lantus insulin.  He was on 14 units with blood sugars were running often above 200.  1 month ago he increased the Lantus insulin to 16 units.  His blood sugar was 153 this morning.  His blood sugars are now running 140-220.  He denies any low blood sugars.    Still on Tradjenta 5 mg a day.    He was taken off glimepiride in the past because of low blood sugars.  October 2020 hemoglobin A1c was 7.3%.    He denies chest pain or any worsening shortness of breath.    Chronic postnasal drip type cough.  Quit smoking 1995.    Past history of bladder cancer with TURBT in 2013.  He is overdue for his yearly cystoscopy.  No new urinary symptoms or pneumaturia.  He still planning on waiting until the spring for urology follow-up and colorectal clinic follow-up.    Chronic dysthymia but he states he feels good.  Stable on Zoloft 50 mg.  PMHx:    Past Medical History:   Diagnosis Date     Anemia      Bladder cancer (H)      Cancer (H)     Rectosigmoid     Diabetes mellitus (H)      Hyperkalemia      Hypertension      Seizure (H)     possible, one time occurence     Stroke (H)     multiple, residual left sided weakness, last CVA in July 2015 caused some speech deficit     Superficial phlebitis      Venous insufficiency of both lower extremities      PSHx:    Past Surgical History:   Procedure Laterality Date     COLON SURGERY      Colectomy Low Anterior Resection     EYE SURGERY      Cataract Extraction     LAPAROSCOPIC COLON RESECTION N/A 6/1/2017    Procedure: LAPAROSCOPIC ASSISTED COLECTOMY LOW ANTERIOR RESECTION AND DIVERTING LOOP ILEOSTOMY, PROCTOSOPY;  Surgeon: Tyson Parry MD;  Location: [facility];  Service:      MS CLOSE ENTEROSTOMY N/A 8/15/2017    Procedure: ILEOSTOMY CLOSURE LOOP ;  Surgeon: Tyson Parry MD;  Location: [facility];  Service: General     MS CYSTOURETHROSCOPY,FULGUR <0.5 CM LESN N/A 9/1/2015     Procedure: CYSTOSCOPY TRANSURETHRAL RESECTION BLADDER TUMOR;  Surgeon: Cleveland Nair MD;  [place] [facility] [place];  Service: Urology     [place] CYSTOURETHROSCOPY,FULGUR <0.5 CM LESN N/A 12/22/2015    Procedure: CYSTOSCOPY TRANSURETHRAL RESECTION BLADDER TUMOR AND BLADDER BIOPSIES;  Surgeon: Cleveland Nair MD;  [place] [facility] [place];  Service: Urology     TURBT      X2     VASECTOMY       Immunizations:   Immunization History   Administered Date(s) Administered     COVID-19,PF,Pfizer 03/02/2021     Influenza [id number], 01/18/2010     Influenza high dose,seasonal,PF, 65+ yrs 09/15/2015, 09/26/2016, 10/03/2017, 09/27/2018, 10/19/2019     Influenza, Seasonal, Inj PF IIV3 09/27/2010, 09/14/2012, 09/27/2013     Influenza, inj, historic,unspecified 10/19/2007, 09/16/2011, 10/15/2015     Influenza, seasonal,quad inj 6-35 mos 09/18/2009, 10/17/2014     Influenza,quad,high Dose,PF, 65yr + 09/21/2020     Pneumo Conj 13-V (2010&after) 01/20/2015     Pneumo Polysac 23-V 10/08/2004     Td,adult,historic,unspecified 07/01/2004     Tdap 05/20/2015       ROS A comprehensive review of systems was performed and was otherwise negative    Medications, allergies, and problem list were reviewed and updated    Exam  /46 (Patient Site: Right Arm, Patient Position: Sitting, Cuff Size: Adult Regular)   Pulse (!) 56   Temp 97.1  F (36.2  C) (Other) Comment (Src): forehead  Wt 152 lb (68.9 kg)   BMI 23.81 kg/m    Alert and oriented.  Good mood and affect today.  Left hemiparesis.  He can stand with pushing himself out of the chair.  He can stand for brief period of time.  Global deconditioning.  Lungs are clear to auscultation.  Heart is regular without murmur.  Abdomen is soft and nontender.  No ankle edema.    He was complaining of some left ear pain in the evening.  But the external ear is normal and there is no tenderness to palpation.  Periauricular tissues are normal.  Tympanic membrane is normal and there is  no cerumen impaction.    Assessment/Plan  1. Type 2 diabetes mellitus with stage 4 chronic kidney disease, with long-term current use of insulin (H)  Controlled with higher dose of Lantus 16 units.  He can continue on that current dose as long as no low blood sugars.    Tradjenta 5 mg a day.    Goal hemoglobin A1c less than 8%  - Glycosylated Hemoglobin A1c    2. Chronic kidney disease, stage IV (severe) (H)  Sees nephrology.  Plan nephrology follow-up this spring.    Now getting erythropoietin shots for his chronic anemia    Edema controlled with current furosemide 40 mg.    3. Hypertension  Blood pressure well controlled without orthostasis.  Continue on current medications.  He denies orthostasis.  Could adjust hydralazine if needed for lower blood pressures but appears appropriate at this time.  He did not tolerate decrease of clonidine in the past.    4. Paroxysmal atrial fibrillation (H)  Heart regular.  Toprol-XL 50 mg a day.  Eliquis.  He was reminded to seek medical attention right away if he does fall hit his head.    5. Carotid artery stenosis, asymptomatic, left  Asymptomatic.  Eliquis.  Lipitor 20 mg.    6. History of stroke  As above.  Left hemiparesis.    7. History of rectal cancer  No evidence of recurrence at this time.  Oncology follow-up in January, 6-month follow-up from that visit.    Patient was still planning to follow-up with colorectal clinic in the spring.  He is unsure if he wants to do another flex sig or colonoscopy at this time.    8. History of bladder cancer  Patient does plan to follow-up with urology in the spring.  Overdue for cystoscopy follow-up.    9. B12 deficiency  B12 shot given today.  Follow-up in 3 months.  Gets B12 shots about every 3 months.    10. Dysthymia  Controlled on Zoloft 50 mg    11. Anemia, unspecified type  Get erythropoietin shots through his nephrology clinic.  He has an appointment next week to recheck hemoglobin and erythropoietin if needed    12.  Encounter for therapeutic drug monitoring    - Comprehensive Metabolic Panel    13. Type 2 diabetes mellitus without complication, without long-term current use of insulin (H)  Patient will continue on the 16 units as he has been doing.  - insulin glargine (LANTUS SOLOSTAR U-100 INSULIN) 100 unit/mL (3 mL) pen; Inject 16 Units under the skin every morning.    Patient got his first COVID-19 vaccine today.    Left ear pain but with normal exam and no tenderness.  Follow-up if that worsens.    Return in about 3 months (around 6/2/2021) for Recheck.   Patient Instructions   Continue on 16 units of your insulin.  If you have any low blood sugars less than 90, contact me right away and I would have you reduce your insulin use.    If your blood sugars continue to run above 200, also contact me in clinic.  Try to increase activity as much as you are able to.  Perhaps walking in place holding onto a chair 3-4 times a day.  This will help you control blood sugars.    I will plan to have you get seen by the colorectal clinic this spring for your rectal cancer follow-up.    I will plan to have you seen by urology this spring for your bladder tumor follow-up.    Follow-up in clinic in 3 months.    Telly Torre MD        Current Outpatient Medications   Medication Sig Dispense Refill     acetaminophen (TYLENOL) 325 MG tablet Take 2 tablets (650 mg total) by mouth every 4 (four) hours as needed.  0     amLODIPine (NORVASC) 10 MG tablet TAKE 1 TABLET (10 MG TOTAL) BY MOUTH DAILY. 90 tablet 3     apixaban ANTICOAGULANT (ELIQUIS) 2.5 mg Tab tablet Take 1 tablet (2.5 mg total) by mouth 2 (two) times a day. 60 tablet 11     atorvastatin (LIPITOR) 20 MG tablet TAKE 1 TABLET (20 MG TOTAL) BY MOUTH DAILY. 90 tablet 3     calcium, as carbonate, (TUMS) 200 mg calcium (500 mg) chewable tablet Chew 1 tablet (200 mg total) 3 (three) times a day as needed for heartburn.  0     cholecalciferol, vitamin D3, (VITAMIN D3) 1,000 unit capsule Take  "2 capsules (2,000 Units total) by mouth daily. 180 capsule 0     cloNIDine HCL (CATAPRES) 0.3 MG tablet Take 1/2 pill in the morning and 1 full pill in the evening. (Patient taking differently: Take 0.15-0.3 mg by mouth see administration instructions. Take 1/2 pill in the morning and 1 full pill in the evening.) 135 tablet 2     cyanocobalamin 1,000 mcg/mL injection Inject 1,000 mcg into the shoulder, thigh, or buttocks every 3 (three) months.              cyanocobalamin, vitamin B-12, (VITAMIN B-12) 1,000 mcg Subl Place 1 tablet (1,000 mcg total) under the tongue daily. 90 tablet 3     FIBER CHOICE, INULIN, ORAL Take 2 tablets by mouth daily.       furosemide (LASIX) 40 MG tablet TAKE 1 TABLET (40 MG TOTAL) BY MOUTH DAILY. 90 tablet 3     hydrALAZINE (APRESOLINE) 25 MG tablet Take 75 mg by mouth 4 (four) times a day.        insulin glargine (LANTUS SOLOSTAR U-100 INSULIN) 100 unit/mL (3 mL) pen Inject 16 Units under the skin every morning.       metoprolol succinate (TOPROL-XL) 50 MG 24 hr tablet TAKE 1 TABLET (50 MG TOTAL) BY MOUTH DAILY. 90 tablet 1     potassium chloride (K-DUR,KLOR-CON) 20 MEQ tablet Take 1 tablet (20 mEq total) by mouth daily. 30 tablet 11     sertraline (ZOLOFT) 50 MG tablet TAKE ONE & ONE -HALF (1&1/2) TABLETS BY MOUTH DAILY (Patient taking differently: Take 50 mg by mouth daily. ) 135 tablet 1     TRADJENTA 5 mg Tab TAKE 1 TABLET (5 MG TOTAL) BY MOUTH DAILY. 90 tablet 3     diclofenac sodium (VOLTAREN) 1 % Gel APPLY 4 GRAMS TOPICALLY 3 (THREE) TIMES A DAY AS NEEDED. APPLY TO KNEES 100 g 0     ONETOUCH DELICA PLUS LANCET 33 gauge Misc CHECK BLOOD SUGAR 3 TIMES PER DAY. 300 each 3     ONETOUCH ULTRA BLUE TEST STRIP strips CHECK BLOOD SUGAR 3 TIMES PER  strip 1     ONETOUCH ULTRA2 METER [facility] AS DIRECTED 1 each 0     PEN NEEDLE 32 gauge x 5/32\" Ndle USE ONE NEEDLE PER DAY TO INJECT INSULIN. 100 each 3     polyethylene glycol (MIRALAX) 17 gram packet Take 1 packet (17 g total) by mouth " "daily as needed.  0     Current Facility-Administered Medications   Medication Dose Route Frequency Provider Last Rate Last Admin     cyanocobalamin injection 1,000 mcg  1,000 mcg Intramuscular Q3 Months Telly Torre MD   1,000 mcg at 21 1154     Allergies   Allergen Reactions     Cefazolin Other (See Comments)     \"felt very hot\" Oct 2019 Cephalexin  Sep 2020 Ceftriaxone     Pravastatin Myalgia     Increased peripheral neuropathy     Simvastatin Myalgia     Increased peripheral neuropathy     Thiazides Other (See Comments)     Other: Gout       Social History     Tobacco Use     Smoking status: Former Smoker     Quit date: 1995     Years since quittin.5     Smokeless tobacco: Never Used   Substance Use Topics     Alcohol use: No     Frequency: Never     Drug use: No           "

## 2021-06-15 NOTE — PATIENT INSTRUCTIONS - HE
Continue on 16 units of your insulin.  If you have any low blood sugars less than 90, contact me right away and I would have you reduce your insulin use.    If your blood sugars continue to run above 200, also contact me in clinic.  Try to increase activity as much as you are able to.  Perhaps walking in place holding onto a chair 3-4 times a day.  This will help you control blood sugars.    I will plan to have you get seen by the colorectal clinic this spring for your rectal cancer follow-up.    I will plan to have you seen by urology this spring for your bladder tumor follow-up.    Follow-up in clinic in 3 months.

## 2021-06-16 PROBLEM — I65.22 CAROTID ARTERY STENOSIS, ASYMPTOMATIC, LEFT: Status: ACTIVE | Noted: 2018-05-30

## 2021-06-16 PROBLEM — Z85.048 HISTORY OF RECTAL CANCER: Status: ACTIVE | Noted: 2018-05-30

## 2021-06-16 PROBLEM — K59.01 SLOW TRANSIT CONSTIPATION: Status: ACTIVE | Noted: 2019-10-23

## 2021-06-16 PROBLEM — Z51.81 MEDICATION MONITORING ENCOUNTER: Status: ACTIVE | Noted: 2018-06-12

## 2021-06-16 PROBLEM — F41.9 ANXIETY: Status: ACTIVE | Noted: 2018-07-17

## 2021-06-16 PROBLEM — N39.0 UTI (URINARY TRACT INFECTION): Status: ACTIVE | Noted: 2019-10-16

## 2021-06-16 PROBLEM — C20 RECTAL CANCER (H): Status: ACTIVE | Noted: 2017-06-01

## 2021-06-16 PROBLEM — Z71.89 ACP (ADVANCE CARE PLANNING): Status: ACTIVE | Noted: 2020-01-18

## 2021-06-16 NOTE — TELEPHONE ENCOUNTER
RN cannot approve Refill Request    RN can NOT refill this medication med is not covered by policy/route to provider. Last office visit: 3/2/2021 Telly Torre MD Last Physical: 7/10/2019 Last MTM visit: Visit date not found Last visit same specialty: Visit date not found.  Next visit within 3 mo: Visit date not found  Next physical within 3 mo: Visit date not found      Diana Romero, [facility] Triage/Med Refill 4/21/2021    Requested Prescriptions   Pending Prescriptions Disp Refills     metoprolol succinate (TOPROL-XL) 50 MG 24 hr tablet [Pharmacy Med Name: METOPROLOL SUC 50MG ER] 90 tablet 0     Sig: TAKE 1 TABLET (50 MG TOTAL) BY MOUTH DAILY.       There is no refill protocol information for this order

## 2021-06-17 NOTE — PROGRESS NOTES
ASSESSMENT/PLAN:  1. Open toe wound, sequela  Significantly improved but the nail is can come off at some point.  We instructed them how to take care of this so as to prevent traumatic amputation of the toenail.  He will continue working his way off over the next couple weeks I am sure.  They will return if there are other symptoms or problems.  Occasional and every other day smoking was recommended.      Patient Instructions   Keep the toe clean.     There is no reason to pull at the toenail. It will slowly fall off on its own.     Continue soaking the toe. This can be every other day.     Lightly put a bandage over it.     Let us know if it is not getting better or if you have concerns.         Return if symptoms worsen or fail to improve.    CHIEF COMPLAINT:  Chief Complaint   Patient presents with     toe check       HISTORY OF PRESENT ILLNESS:  Suman Nagy is a 77 y.o. male presenting to the clinic today to follow up on his toe wound. He is accompanied by his wife.     Toe Wound: He was last seen on 4/20 for his left great toe wound and toenail falling off. He initially injured the toe by banging it on a table. He has been seen for this a number of times in the past month. His wife thinks the toe is looking better than the last time he was here. They have not soaked the toe today, but he did last night.     REVIEW OF SYSTEMS:   He generally only bathes once per week, per his wife. Comprehensive review of systems negative except as noted above.    PFSH:  Reviewed, as below.     TOBACCO USE:  History   Smoking Status     Former Smoker     Quit date: 8/31/1995   Smokeless Tobacco     Never Used       VITALS:  Vitals:    04/27/18 1100   BP: 122/68   Pulse: 75     Wt Readings from Last 3 Encounters:   12/07/17 177 lb 1.6 oz (80.3 kg)   08/24/17 165 lb (74.8 kg)   08/17/17 169 lb 11.2 oz (77 kg)     There is no height or weight on file to calculate BMI.      PHYSICAL EXAM:  General Appearance: Alert,  cooperative, no distress, appears stated age.  Extremities: The  great toe has the toenail that is loose and held on by a small amount of tissue near the base.  There is no evidence of any infection.  With pulling back to take a look there was a little bit of oozing blood.  No pus or other issue.  Psychiatric: he has a normal mood and affect.     Notes Reviewed, additional history from source other than patient (2 TOTAL): None.    Accessed Care Everywhere, Requested Records, Consult with Physician (1 TOTAL): None.     Radiology tests summarized or ordered (XR, CT, MRI, DXA, US): None.    Labs reviewed or ordered (1 TOTAL): None.     Medicine tests reviewed or ordered (ECG, echocardiogram, colonoscopy, EGD, venous US) (1 TOTAL): None.    Independent review of ECG or XR (2 EACH): None.    MEDICATIONS:  Current Outpatient Prescriptions   Medication Sig Dispense Refill     cloNIDine HCl (CATAPRES) 0.3 MG tablet Take one & one -half (1&1/2) tablets by mouth daily (Patient taking differently: one daily) 135 tablet 3     clopidogrel (PLAVIX) 75 mg tablet Take one (1) tablet by mouth daily 30 tablet 5     cyanocobalamin 1,000 mcg/mL injection Inject 1,000 mcg into the shoulder, thigh, or buttocks every 30 (thirty) days.       glimepiride (AMARYL) 4 MG tablet One pill twice per day with lunch and supper (Patient taking differently: one daily) 180 tablet 3     INJECT EASE LANCETS 30 gauge Misc Use daily as directed (4-6 times daily) 600 each 0     metFORMIN (GLUCOPHAGE) 1000 MG tablet Take 1 tablet (1,000 mg total) by mouth 2 (two) times a day with meals. 180 tablet 1     metoprolol succinate (TOPROL-XL) 25 MG Take 0.5 tablets (12.5 mg total) by mouth daily. 30 tablet 3     ONETOUCH ULTRA TEST strips Test 4-6 times daily as directed 600 each 2     sertraline (ZOLOFT) 50 MG tablet Take one & one -half (1&1/2) tablets by mouth daily (Patient taking differently: one tablet daily) 135 tablet 0     SURE COMFORT PEN NEEDLE 31 gauge  "x 3/16\" Ndle Use one needle each night. 100 each 2     Current Facility-Administered Medications   Medication Dose Route Frequency Provider Last Rate Last Dose     cyanocobalamin injection 1,000 mcg  1,000 mcg Intramuscular Q30 Days Jerry Kelsey MD   1,000 mcg at 03/30/18 1629       The visit lasted a total of 5 minutes face to face with the patient. Over 50% of the time was spent counseling and educating the patient about his toe wound.    IJann, am scribing for and in the presence of, Dr. Kelsey.    I, Dr. Kelsey, personally performed the services described in this documentation, as scribed by Jann Quiroz in my presence, and it is both accurate and complete.    Dragon dictation was used for this note.  Speech recognition errors are a possibility.      Total data points: 0    "

## 2021-06-17 NOTE — PROGRESS NOTES
ASSESSMENT/PLAN:  1. Encounter for wound re-check  This wound is healing up nicely.  I was quite concerned about it with his diabetes and neuropathy.  They will continue to keep it covered.      Patient Instructions   Continue putting some of the silvadene cream on the toe with a Bandaid over the top. Not too tight!    Vaseline for the dryness.     You do not need to continue soaking.       Return if symptoms worsen or fail to improve.    CHIEF COMPLAINT:  Chief Complaint   Patient presents with     follow up feet       HISTORY OF PRESENT ILLNESS:  Suman Nagy is a 77 y.o. male presenting to the clinic today to follow up on a toe wound. He is accompanied by his wife.     Toe Wound: He was seen on 3/30/2018 with a wound on his left great toe and a small cut on his right foot. He is a diabetic and has poor sensation in his feet. He has been soaking the feet and has been putting silvadene cream and bacitracin on the wounds. The wounds are healing well. The toe is not oozing at all.     REVIEW OF SYSTEMS:   He has had gout in the past but that was when he was on a thiazide medication. Comprehensive review of systems negative except as noted above.    PFSH:  Reviewed, as below.     TOBACCO USE:  History   Smoking Status     Former Smoker     Quit date: 8/31/1995   Smokeless Tobacco     Never Used       VITALS:  Vitals:    04/12/18 1334   BP: 112/68   Pulse: 63     Wt Readings from Last 3 Encounters:   12/07/17 177 lb 1.6 oz (80.3 kg)   08/24/17 165 lb (74.8 kg)   08/17/17 169 lb 11.2 oz (77 kg)     There is no height or weight on file to calculate BMI.    PHYSICAL EXAM:  General Appearance: Alert, cooperative, no distress, appears stated age.  Extremities: The left great toe under the nail has a very tiny dried-up eschar.  No evidence of any infection.  This is healing nicely.  There is some dependent rubor on that side.  No warmth nothing to suspect infection or gout.  Right toe wound was not observed today but  "is a small eschar as well apparently his wife is watching this carefully.  Nothing concerning  Psychiatric: he has a normal mood and affect.     Notes Reviewed, additional history from source other than patient (2 TOTAL): None.    Accessed Care Everywhere, Requested Records, Consult with Physician (1 TOTAL): None.     Radiology tests summarized or ordered (XR, CT, MRI, DXA, US): None.    Labs reviewed or ordered (1 TOTAL): None.     Medicine tests reviewed or ordered (ECG, echocardiogram, colonoscopy, EGD, venous US) (1 TOTAL): None.    Independent review of ECG or XR (2 EACH): None.    MEDICATIONS:  Current Outpatient Prescriptions   Medication Sig Dispense Refill     cloNIDine HCl (CATAPRES) 0.3 MG tablet Take one & one -half (1&1/2) tablets by mouth daily (Patient taking differently: one daily) 135 tablet 3     clopidogrel (PLAVIX) 75 mg tablet Take one (1) tablet by mouth daily 30 tablet 5     cyanocobalamin 1,000 mcg/mL injection Inject 1,000 mcg into the shoulder, thigh, or buttocks every 30 (thirty) days.       glimepiride (AMARYL) 4 MG tablet One pill twice per day with lunch and supper (Patient taking differently: one daily) 180 tablet 3     INJECT EASE LANCETS 30 gauge Misc Use daily as directed (4-6 times daily) 600 each 0     metFORMIN (GLUCOPHAGE) 1000 MG tablet Take 1 tablet (1,000 mg total) by mouth 2 (two) times a day with meals. 180 tablet 1     metoprolol succinate (TOPROL-XL) 25 MG Take 0.5 tablets (12.5 mg total) by mouth daily. 30 tablet 3     ONETOUCH ULTRA TEST strips Test 4-6 times daily as directed 600 each 2     sertraline (ZOLOFT) 50 MG tablet Take one & one -half (1&1/2) tablets by mouth daily (Patient taking differently: one tablet daily) 135 tablet 0     SURE COMFORT PEN NEEDLE 31 gauge x 3/16\" Ndle Use one needle each night. 100 each 2     Current Facility-Administered Medications   Medication Dose Route Frequency Provider Last Rate Last Dose     cyanocobalamin injection 1,000 mcg  " 1,000 mcg Intramuscular Q30 Days Jerry Kelsey MD   1,000 mcg at 03/30/18 8484       The visit lasted a total of 5 minutes face to face with the patient. Over 50% of the time was spent counseling and educating the patient about his toe wound.    I, Jann Quiroz, am scribing for and in the presence of, Dr. Kelsey.    I, Dr. Kelsey, personally performed the services described in this documentation, as scribed by Jann Quiroz in my presence, and it is both accurate and complete.    Dragon dictation was used for this note.  Speech recognition errors are a possibility.      Total data points: 0

## 2021-06-17 NOTE — PROGRESS NOTES
Luke comes in today for follow-up of his toenail which he had banged and has continued to to lose.  They have been doing some soaking.  His wife was a little concerned about it.  When I saw previously there was crusting and thickening of the dystrophic nail at the edge.  There is no evidence of any erythema.  He cannot feel it at all.  No pain no fever.  The doing soaking with Epsom salts.    Past medical history allergies and meds reviewed as well as my previous note about the wound.    Physical exam  Vital signs stable patient looks well  Left great toe has the end of the nail that  from underneath.  There is some crusting at the end of the nail.  With some slight debridement there was some fluid that was able to be expressed.  It was not pus however.  In pulling back I can see clearly that there was there was a space and a gap.  I did clear the crust off the tip of the toe and then trimmed away some of the side of the nail.  With little pressure there was little bleeding.  There is no evidence of any infection or erythematous.  No deeper wound.  Was dressed with Silvadene and bandage.    Assessment and plan    Diabetic with a 2 wound and dystrophic nail and trauma.  This does not look like he needs any antibiotic at this time.  We will do Epsom salt soaks and I think that the fluid will drain out of there better now that I have cleared this a bit.  Use the Silvadene and bandages on a regular basis and follow-up in 1 week.

## 2021-06-17 NOTE — PROGRESS NOTES
ASSESSMENT/PLAN:  1. Type 2 diabetes mellitus without complication  Under better control now on the higher dose of medicine.  He will continue same.  No low sugars.  With his other medical problems, we certainly do not need to push further with medicines.  - Glycosylated Hemoglobin A1c  - [facility] RED    2. Hypertension  Well-controlled on current meds will continue same.  Labs have looked good.    3. Malignant neoplasm of bladder  Followed by urology    4. Ischemic Stroke  No your recurrent symptoms.  He is on Plavix with good blood pressure control.  He has not been able to tolerate statins.        Patient Instructions   Please note that if over the counter medications were taken off of your medication list, it is not because you are being asked to stop taking them.  does not want all of the over the counter medications on your medication list, as it bogs down the list.     Please call your insurance company and discuss Shingrix vaccine. This is a series of 2 injections that MUST be given 2-6 months apart and will cost around $287.00 here at [facility].    Watch your feet carefully. Keep the cut on your right foot covered with a bandage, checking it once per day. Put antibiotic cream on it every once in a while. Soak the left big toe with warm water and epsom salts for 15 minutes once or twice per day. If it starts to get bright red, you should be seen right away.     Return in a week and a half to check on the feet.       Administrations This Visit     cyanocobalamin injection 1,000 mcg     Admin Date Action Dose Route Administered By             03/30/2018 Given 1000 mcg Intramuscular Freya Esquivel CMA                        Return in about 11 days (around 4/10/2018) for foot lesion.    CHIEF COMPLAINT:  Chief Complaint   Patient presents with     Diabetes       HISTORY OF PRESENT ILLNESS:  Suman Nagy is a 77 y.o. male presenting to the clinic today for a diabetic check. He is  accompanied by his wife.     Diabetes: His last hemoglobin A1c was 8.7% on 12/7/2017. He had some of his medications discontinued prior to that visit. He is now back to taking 1,000 mg of metformin twice daily and 4 mg of glimepiride twice daily. His hemoglobin A1c is down to 7.4% today. He has not had any episodes of hypoglycemia. He tries to watch his diet but admits he cheats every once in a while. He stubbed his left great toe about three or four days ago. He also has a small cut on his right foot. His wife has been putting a bandage on it.     REVIEW OF SYSTEMS:   He was recently seen by his urologist. He will will have a few polyps removed at his upcoming colonoscopy. Comprehensive review of systems negative except as noted above.    PFSH:  Reviewed, as below.     TOBACCO USE:  History   Smoking Status     Former Smoker     Quit date: 8/31/1995   Smokeless Tobacco     Never Used       VITALS:  Vitals:    03/30/18 1603   BP: 128/70   Pulse: 81     Wt Readings from Last 3 Encounters:   12/07/17 177 lb 1.6 oz (80.3 kg)   08/24/17 165 lb (74.8 kg)   08/17/17 169 lb 11.2 oz (77 kg)     There is no height or weight on file to calculate BMI.    PHYSICAL EXAM:  General Appearance: Alert, cooperative, no distress, appears stated age.  Lungs: Clear to auscultation bilaterally, good air movement.  Heart: Regular rate and rhythm, S1 and S2 normal, no murmur or bruit.  Extremities:  Diabetic foot exam done.  Left great toe he has nail that is pulled up a bit and has no pus underneath it.  There is no surrounding erythema.  This is cleaned with peroxide by my assistant and then dressed with Silvadene and bandage.  On the right foot there was a small excoriation that looks clean but should be covered.  There is some 1+ edema dorsum of both feet.  Psychiatric: he has a normal mood and affect.     Notes Reviewed, additional history from source other than patient (2 TOTAL): None.    Accessed Care Everywhere, Requested Records,  "Consult with Physician (1 TOTAL): None.     Radiology tests summarized or ordered (XR, CT, MRI, DXA, US): None.    Labs reviewed or ordered (1 TOTAL): Labs from 12/7/2017 reviewed. Labs ordered.     Medicine tests reviewed or ordered (ECG, echocardiogram, colonoscopy, EGD, venous US) (1 TOTAL): None.    Independent review of ECG or XR (2 EACH): None.    MEDICATIONS:  Current Outpatient Prescriptions   Medication Sig Dispense Refill     cloNIDine HCl (CATAPRES) 0.3 MG tablet Take one & one -half (1&1/2) tablets by mouth daily (Patient taking differently: one daily) 135 tablet 3     clopidogrel (PLAVIX) 75 mg tablet Take one (1) tablet by mouth daily 30 tablet 5     cyanocobalamin 1,000 mcg/mL injection Inject 1,000 mcg into the shoulder, thigh, or buttocks every 30 (thirty) days.       glimepiride (AMARYL) 4 MG tablet One pill twice per day with lunch and supper (Patient taking differently: one daily) 180 tablet 3     INJECT EASE LANCETS 30 gauge Misc Use daily as directed (4-6 times daily) 600 each 0     metFORMIN (GLUCOPHAGE) 1000 MG tablet Take 1 tablet (1,000 mg total) by mouth 2 (two) times a day with meals. 180 tablet 1     metoprolol succinate (TOPROL-XL) 25 MG Take 0.5 tablets (12.5 mg total) by mouth daily. 30 tablet 3     ONETOUCH ULTRA TEST strips Test 4-6 times daily as directed 600 each 2     sertraline (ZOLOFT) 50 MG tablet Take one & one -half (1&1/2) tablets by mouth daily (Patient taking differently: one tablet daily) 135 tablet 0     SURE COMFORT PEN NEEDLE 31 gauge x 3/16\" Ndle Use one needle each night. 100 each 2     Current Facility-Administered Medications   Medication Dose Route Frequency Provider Last Rate Last Dose     cyanocobalamin injection 1,000 mcg  1,000 mcg Intramuscular Q30 Days Jerry Kelsey MD   1,000 mcg at 03/30/18 9746       The visit lasted a total of 7 minutes face to face with the patient. Over 50% of the time was spent counseling and educating the patient about his " diabetes.    I, Jann Quiroz, am scribing for and in the presence of, Dr. Kelsey.    I, Dr. Kelsey, personally performed the services described in this documentation, as scribed by Jann Quiroz in my presence, and it is both accurate and complete.    Dragon dictation was used for this note.  Speech recognition errors are a possibility.      Total data points: 1

## 2021-06-17 NOTE — PATIENT INSTRUCTIONS - HE
Patient Instructions by Prashant Langley DO at 8/9/2019  2:50 PM     Author: Prashant Langley DO Service: -- Author Type: Physician    Filed: 8/9/2019  3:59 PM Encounter Date: 8/9/2019 Status: Addendum    : Prashant Langley DO (Physician)    Related Notes: Original Note by Prashant Langley DO (Physician) filed at 8/9/2019  3:58 PM       Your chest x-ray today was reassuring looking.  I do not think starting any new treatments for your cough, or your blood pressure as needed.  Simply continue your current blood pressure treatment, and recheck it daily for the next couple days.  If it does not continue trending down towards a normal range, with the top systolic number being 140 or less, or the bottom number being 90 or less, be seen again within the next week.  I think the stress of the colonoscopy preparation was the biggest factor in your blood pressure elevation.  You can discuss your cough with your primary care provider if it is not better within the next couple of weeks.  Be seen sooner if your cough worsens, or you develop a fever.  See handout for tips on controlling hypertension, and thoughts about your cough.    Patient Education     Chronic Cough with Uncertain Cause (Adult)    Everyone has had a cough as part of the common cold, flu, or bronchitis. This kind of cough occurs along with an achy feeling, low-grade fever, nasal and sinus congestion, and a scratchy or sore throat. This usually gets better in 2 to 3 weeks. A cough that lasts longer than 3 weeks may be due to other causes.  If your cough does not improve over the next 2 weeks, further testing may be needed. Follow up with your healthcare provider as advised. Cough suppressants may be recommended. Based on your exam today, the exact cause of your cough is not certain. Below are some common causes for persistent cough.  Smoker's cough  Smokers cough doesnt go away. If you continue to smoke, it only gets worse. The cough is from irritation in the  air passages. Talk to your healthcare provider about quitting. Medicines or nicotine-replacement products, like gum or the patch, may make quitting easier.  Postnasal drip  A cough that is worse at night may be due to postnasal drip. Excess mucus in the nose drains from the back of your nose to your throat. This triggers the cough reflex. Postnasal drip may be due to a sinus infection or allergy. Common allergens include dust, tobacco smoke (both inhaled and secondhand smoke), environmental pollutants, pollen, mold, pets, cleaning agents, room deodorizers, and chemical fumes. Over-the-counter antihistamines or decongestants may be helpful for allergies. A sinus infection may requires antibiotic treatment. See your healthcare provider if symptoms continue.  Medicines  Certain prescribed medicines can cause a chronic cough in some people:    ACE inhibitors for high blood pressure. These include benazepril, captopril, enalapril, fosinopril, lisinopril, quinapril, ramipril, and others.    Beta-blockers for high blood pressure and other conditions. These include propranolol, atenolol, metoprolol, nadolol, and others.  Let your healthcare provider know if you are taking any of these.  Asthma  Cough may be the only sign of mild asthma. You may have tests to find out if asthma is causing your cough. You may also take asthma medicine on a trial basis.  Acid reflux (heartburn, GERD)  The esophagus is the tube that carries food from the mouth to the stomach. A valve at its lower end prevents stomach acids from flowing upward. If this valve does not work properly, acid from the stomach enters the esophagus. This may cause a burning pain in the upper abdomen or lower chest, belching, or cough. Symptoms are often worse when lying flat. Avoid eating or drinking before bedtime. Try using extra pillows to raise your upper body, or place 4-inch blocks under the head of your bed. You may try an over-the-counter antacid or an  acid-blocking medicine such as famotidine, cimetidine, ranitidine, esomeprazole, lansoprazole, or omeprazole. Stronger medicines for this condition can be prescribed by your healthcare provider.  Follow-up care  Follow up with your healthcare provider, or as advised, if your cough does not improve. Further testing may be needed.  Note: If an X-ray was taken, a specialist will review it. You will be notified of any new findings that may affect your care.  When to seek medical advice  Call your healthcare provider right away if any of these occur:    Mild wheezing or difficulty breathing    Fever of 100.4 F (38 C) or higher, or as directed by your healthcare provider    Unexpected weight loss    Coughing up large amounts of colored sputum    Night sweats (sheets and pajamas get soaking wet)  Call 911  Call 911 if any of these occur:    Coughing up blood    Moderate to severe trouble breathing or wheezing  Date Last Reviewed: 9/13/2015 2000-2017 [employer]. [address] [place] [address]. All rights reserved. This information is not intended as a substitute for professional medical care. Always follow your healthcare professional's instructions.           Patient Education     Controlling High Blood Pressure  High blood pressure (hypertension) is often called the silent killer. This is because many people who have it dont know it. High blood pressure can raise your risk of heart attack, stroke, and heart failure. Controlling your blood pressure can decrease your risk of these problems. Know your blood pressure and remember to check it regularly. Doing so can save your life.  Blood pressure measurements are given as 2 numbers. Systolic blood pressure is the upper number. This is the pressure when the heart contracts. Diastolic blood pressure is the lower number. This is the pressure when the heart relaxes between beats.  Blood pressure is categorized as normal, elevated, or stage 1 or stage  2 high blood pressure:    Normal blood pressure is systolic of less than 120 and diastolic of less than 80 (120/80)    Elevated blood pressure is systolic of 120 to 129 and diastolic less than 80    Stage 1 high blood pressure is systolic is 130 to 139 or diastolic between 80 to 89    Stage 2 high blood pressure is when systolic is 140 or higher or the diastolic is 90 or higher  Here are some things you can do to help control your blood pressure.    Choose heart-healthy foods    Select low-salt, low-fat foods. Limit sodium intake to 2,400 mg per day or the amount suggested by your healthcare provider.    Limit canned, dried, cured, packaged, and fast foods. These can contain a lot of salt.    Eat 8 to 10 servings of fruits and vegetables every day.    Choose lean meats, fish, or chicken.    Eat whole-grain pasta, brown rice, and beans.    Eat 2 to 3 servings of low-fat or fat-free dairy products.    Ask your doctor about the DASH eating plan. This plan helps reduce blood pressure.    When you go to a restaurant, ask that your meal be prepared with no added salt.  Maintain a healthy weight    Ask your healthcare provider how many calories to eat a day. Then stick to that number.    Ask your healthcare provider what weight range is healthiest for you. If you are overweight, a weight loss of only 3% to 5% of your body weight can help lower blood pressure. Generally, a good weight loss goal is to lose 10% of your body weight in a year.    Limit snacks and sweets.    Get regular exercise.  Get up and get active    Choose activities you enjoy. Find ones you can do with friends or family. This includes bicycling, dancing, walking, and jogging.    Park farther away from building entrances.    Use stairs instead of the elevator.    When you can, walk or bike instead of driving.    [place] leaves, garden, or do household repairs.    Be active at a moderate to vigorous level of physical activity for at least 40 minutes for a  minimum of 3 to 4 days a week.   Manage stress    Make time to relax and enjoy life. Find time to laugh.    Communicate your concerns with your loved ones and your healthcare provider.    Visit with family and friends, and keep up with hobbies.  Limit alcohol and quit smoking    Men should have no more than 2 drinks per day.    Women should have no more than 1 drink per day.    Talk with your healthcare provider about quitting smoking. Smoking significantly increases your risk for heart disease and stroke. Ask your healthcare provider about community smoking cessation programs and other options.  Medicines  If lifestyle changes arent enough, your healthcare provider may prescribe high blood pressure medicine. Take all medicines as prescribed. If you have any questions about your medicines, ask your healthcare provider before stopping or changing them.   Date Last Reviewed: 4/27/2016 2000-2017 [employer]. [address] [place] [address], [place] [address]. All rights reserved. This information is not intended as a substitute for professional medical care. Always follow your healthcare professional's instructions.

## 2021-06-18 NOTE — PROGRESS NOTES
[facility] Follow Up Note    Suman Nagy   77 y.o. male    Date of Visit: 6/12/2018    Chief Complaint   Patient presents with     Follow-up     2 wk BP check.      Rosi Butler is here with his wife, Jeni.  Patient is cared for at home by wife and children.  Daughter does the medications, but she is not present.    Patient is here for follow-up after initial visit with me on May 30.    He has extensive past medical history:    Type 2 diabetes mellitus appears to be adequately controlled at this time with blood sugar this morning of 147.  Occasionally high blood sugars up to 200 with dietary indiscretion.  Hemoglobin A1c on March 30 was 7.4%.  On metformin thousand milligrams twice a day and Amaryl 4 mg with lunch and 4 mg at supper.    No low blood sugar events.    He denies any foot sores.  He does have some peripheral neuropathy.  He has an unsteady gait.    Patient's blood pressure was not controlled on last visit at 150/72.  Previously in March was 120/70.    Patient had acute renal injury last year with volume depletion from colon cancer surgery, was taken off losartan at that time.    I restarted losartan 25 mg a day on May 30.  He tolerated that well and denies lightheaded dizzy spells.  He has not checked his blood pressure on his own.    Continues on clonidine 0.3 mg a day and Toprol-XL 12.5 mg a day.    No worsening edema.  No worsening fatigue.    December 2017 urine microalbumin was 1103.    No chest pain or palpitations, no history of cardiac disease.  No history of A. fib.  Normal heart echo in 2015.    He has had multiple strokes with robert Gilliland on 2015 MRI with a periventricular stroke.  Suggestive of small vessel ischemic events, so she with hypertension diabetes.    He has some residual left-sided weakness and unsteady gait and poor memory.  No acute neurologic changes since last visit.    He continues on Plavix, but on hold currently for the colonoscopy that  is coming up in 2 days.    Patient had stage IIIa colon cancer and chemoradiation last year.  Colorectal clinic saw him in March, no evidence of recurrence on CT scan with IV contrast.  He had a CEA last month that was 3.9.  He does take a fiber supplement and bowels are regular.  No blood in stool.    Colonoscopy June 14.  I reviewed the medication plan from last visit, wife had no further questions and will proceed with that medication plan colonoscopy.    Patient was on Lipitor last year, was not continued on it with his other issues.  He had previous problems with pravastatin and neuropathy and simvastatin and questionable neuropathy association.  Liver tests have been normal.    Patient does have carotid artery stenosis of 50-69% which is stable on May 2018 ultrasound.  No stenosis on the left.    Last month his LDL was 116 and HDL 36.  Liver tests were normal.    He has not had a sleep study evaluation, no alcohol issues reported.    He did have bladder cancer in 2015 with TURBT with recurrence in 2016.  March 2018 cystoscopy negative and six-month follow-up plan.  No hematuria or other urinary symptoms.    No new cough or increasing shortness of breath.  No swallowing difficulty or mouth sores.    Chronic anxiety and depression is stable.  On Zoloft 50 mg a day.    He was contacted by his eye clinic and told to make his yearly appointment this summer.  No acute vision changes.  No new headache.    Past history of a toenail infection March of this year that resolved and no recurrent foot sores or infection concerns now.    PMHx:    Past Medical History:   Diagnosis Date     Anemia      Bladder cancer      Cancer     Rectosigmoid     Diabetes mellitus      Hyperkalemia      Hypertension      Seizure     possible, one time occurence     Stroke     multiple, residual left sided weakness, last CVA in July 2015 caused some speech deficit     Superficial phlebitis      Venous insufficiency of both lower extremities   "    PSHx:    Past Surgical History:   Procedure Laterality Date     COLON SURGERY      Colectomy Low Anterior Resection     EYE SURGERY      Cataract Extraction     LAPAROSCOPIC COLON RESECTION N/A 6/1/2017    Procedure: LAPAROSCOPIC ASSISTED COLECTOMY LOW ANTERIOR RESECTION AND DIVERTING LOOP ILEOSTOMY, PROCTOSOPY;  Surgeon: Tyson Parry MD;  Location: [facility];  Service:      [place] CLOSE ENTEROSTOMY N/A 8/15/2017    Procedure: ILEOSTOMY CLOSURE LOOP ;  Surgeon: Tyson Parry MD;  Location: [facility];  Service: General     [place] CYSTOURETHROSCOPY,FULGUR <0.5 CM LESN N/A 9/1/2015    Procedure: CYSTOSCOPY TRANSURETHRAL RESECTION BLADDER TUMOR;  Surgeon: Cleveland Nair MD;  Location: [facility];  Service: Urology     KY CYSTOURETHROSCOPY,FULGUR <0.5 CM LESN N/A 12/22/2015    Procedure: CYSTOSCOPY TRANSURETHRAL RESECTION BLADDER TUMOR AND BLADDER BIOPSIES;  Surgeon: Cleveland Nair MD;  Location: [facility];  Service: Urology     TURBT      X2     VASECTOMY       Immunizations:   Immunization History   Administered Date(s) Administered     Influenza [id number], 01/18/2010     Influenza high dose, seasonal 09/15/2015, 09/26/2016, 10/03/2017     Influenza, Seasonal, Inj PF IIV3 09/27/2010, 09/14/2012, 09/27/2013     Influenza, inj, historic,unspecified 10/19/2007, 09/16/2011, 10/15/2015     Influenza, seasonal,quad inj 6-35 mos 09/18/2009, 10/17/2014     Pneumo Conj 13-V (2010&after) 01/20/2015     Pneumo Polysac 23-V 10/08/2004     Td,adult,historic,unspecified 07/01/2004     Tdap 05/20/2015       ROS A comprehensive review of systems was performed and was otherwise negative    Medications, allergies, and problem list were reviewed and updated    Exam  /76  Pulse 68  Ht 5' 7\" (1.702 m)  Wt 184 lb (83.5 kg)  SpO2 99%  BMI 28.82 kg/m2  Frail elderly male, poor short-term memory but does answer questions appropriately.  Somewhat apathetic affect no jaundice.  Lungs are " clear to auscultation without crackles or wheezing.  There is almost none to trace ankle edema bilaterally.  Abdomen is nontender.  Heart is regular without murmur.  Moderate kyphosis.    Assessment/Plan  1. Hypertension with high microalbumin/diabetic nephropathy  Adequately controlled to mildly high currently.  But about to have colonoscopy in 2 days.    I will see him next month, at that point consider increase of losartan and decrease of clonidine.    Significant dry mouth complaints.  Can use artificial saliva.    Continue on low-dose metoprolol.    2. Type 2 diabetes mellitus without complication, without long-term current use of insulin  Moderate control, but denies low blood sugars.  Continue current Amaryl and metformin.  Current diabetic diet and increase regular walking.    Follow-up next month for hemoglobin A1c.    See last visit for plan for diabetes medication for colonoscopy.    Routine ophthalmology follow-up this summer    3. Carotid artery stenosis, asymptomatic, left  Asymptomatic.  Restart atorvastatin.  Previous issue I believe is been neuropathy and I suspect it was not associated with statins.    Strong indication for statin drug and patient with stroke and carotid artery stenosis and diabetes.    Plan to titrate up to a higher dose of Lipitor at a later time.  I did warn patient on toxicity risk of statin drug including liver and muscle and possible neuropathy association.  - atorvastatin (LIPITOR) 20 MG tablet; Take 1 tablet (20 mg total) by mouth daily.  Dispense: 90 tablet; Refill: 1    4. B12 deficiency  B12 shot 2 weeks ago, plan next B12 shot at next visit.    Could consider changing B12 shots every 3 months.    Hemoglobin stable at 12.4 with normal MCV of 95 on May 30    5. History of bladder cancer  No recurrence on last cystoscopy set, six-month cystoscopy in September    6. History of rectal cancer  Colonoscopy in 2 days, no evidence of recurrence at this time.  Chemoradiation last  year.    7. History of stroke  We will restart Plavix after colonoscopy.  Goal for excellent control of hypertension and diabetes, and restarting statin drug.    8. Medication monitoring encounter    - Basic Metabolic Panel    Chronic anxiety depression, stable on Zoloft 50 mg.    Unsteady gait with peripheral neuropathy and previous strokes.  I strongly encouraged him to use a walker for all ambulation.  Increase regular ambulation as able to preserve function.  He could consider physical therapy for conditioning and gait training at any time.    Patient still listed as full code.    Return in 5 weeks (on 7/17/2018) for Recheck.   Patient Instructions   Proceed with colonoscopy, with medication orders from last visit.    After you are recovered from your colonoscopy, start atorvastatin 20 mg every evening for treatment of your cholesterol.    Continue other medication the same at this time.    Follow-up with me in approximately 1 month to discuss possible further changes to blood pressure medication.  Plan to recheck hemoglobin A1c at that visit.    You will plan to get a B12 shot at next visit.    Use your walker when you walk.  Walk on a regular basis to maintain your strength.    See ophthalmology for routine checkup this summer.    Telly Torre MD  I spent a total time with patient of over 25 minutes and over 50% coord care.  Time all face to face.      Current Outpatient Prescriptions   Medication Sig Dispense Refill     blood glucose test (ONETOUCH ULTRA BLUE TEST STRIP) strips Use 1 each As Directed 6 (six) times a day. (E11.9) 600 strip 1     cholecalciferol, vitamin D3, 1,000 unit tablet Take 1,000 Units by mouth daily.       cloNIDine HCl (CATAPRES) 0.3 MG tablet Take one & one -half (1&1/2) tablets by mouth daily (Patient taking differently: one daily) 135 tablet 3     clopidogrel (PLAVIX) 75 mg tablet Take one (1) tablet by mouth daily 30 tablet 5     cyanocobalamin 1,000 mcg/mL injection Inject 1,000  "mcg into the shoulder, thigh, or buttocks every 30 (thirty) days.       glimepiride (AMARYL) 4 MG tablet One pill twice per day with lunch and supper 180 tablet 3     INJECT EASE LANCETS 30 gauge Misc Use daily as directed (4-6 times daily) 600 each 0     losartan (COZAAR) 25 MG tablet Take 1 tablet (25 mg total) by mouth daily. 30 tablet 4     metFORMIN (GLUCOPHAGE) 1000 MG tablet Take 1 tablet (1,000 mg total) by mouth 2 (two) times a day with meals. 180 tablet 1     metoprolol succinate (TOPROL-XL) 25 MG Take 0.5 tablets (12.5 mg total) by mouth daily. 30 tablet 3     sertraline (ZOLOFT) 50 MG tablet Take one & one -half (1&1/2) tablets by mouth daily (Patient taking differently: one tablet daily) 135 tablet 0     SURE COMFORT PEN NEEDLE 31 gauge x 3/16\" Ndle Use one needle each night. 100 each 2     atorvastatin (LIPITOR) 20 MG tablet Take 1 tablet (20 mg total) by mouth daily. 90 tablet 1     Current Facility-Administered Medications   Medication Dose Route Frequency Provider Last Rate Last Dose     cyanocobalamin injection 1,000 mcg  1,000 mcg Intramuscular Q30 Days Jerry Kelsey MD   1,000 mcg at 03/30/18 1629     cyanocobalamin injection 1,000 mcg  1,000 mcg Intramuscular Q30 Days Telly Torre MD   1,000 mcg at 05/30/18 1120     Allergies   Allergen Reactions     Cefazolin      \"felt very hot\"     Pravastatin      Increased peripheral neuropathy     Simvastatin      Increased peripheral neuropathy     Thiazides      Other: Gout       Social History   Substance Use Topics     Smoking status: Former Smoker     Quit date: 8/31/1995     Smokeless tobacco: Never Used     Alcohol use No           "

## 2021-06-18 NOTE — LETTER
Letter by Telly Torre MD at      Author: Telly Torre MD Service: -- Author Type: --    Filed:  Encounter Date: 1/15/2019 Status: (Other)       Suman Nagy  [address] [facility] [address]             January 15, 2019         Dear Mr. Nagy,    Below are the results from your recent visit:    Resulted Orders   Comprehensive Metabolic Panel   Result Value Ref Range    Sodium 136 136 - 145 mmol/L    Potassium 5.0 3.5 - 5.0 mmol/L    Chloride 105 98 - 107 mmol/L    CO2 19 (L) 22 - 31 mmol/L    Anion Gap, Calculation 12 5 - 18 mmol/L    Glucose 173 (H) 70 - 125 mg/dL    BUN 41 (H) 8 - 28 mg/dL    Creatinine 2.19 (H) 0.70 - 1.30 mg/dL    GFR MDRD Af Amer 35 (L) >60 mL/min/1.73m2    GFR MDRD Non Af Amer 29 (L) >60 mL/min/1.73m2    Bilirubin, Total 0.4 0.0 - 1.0 mg/dL    Calcium 8.7 8.5 - 10.5 mg/dL    Protein, Total 6.4 6.0 - 8.0 g/dL    Albumin 3.6 3.5 - 5.0 g/dL    Alkaline Phosphatase 99 45 - 120 U/L    AST 13 0 - 40 U/L    ALT 9 0 - 45 U/L    Narrative    Fasting Glucose reference range is 70-99 mg/dL per  American Diabetes Association (ADA) guidelines.   Glycosylated Hemoglobin A1c   Result Value Ref Range    Hemoglobin A1c 6.6 (H) 3.5 - 6.0 %   CEA (Carcinoembryonic Antigen)   Result Value Ref Range    CEA 3.8 (H) 0.0 - 3.0 ng/mL    Narrative    Method is Abbott Carcinoembryonic Antigen (CEA) using  Chemiluminescent Microparticle Immunoassay.  WHO 1st International  Std (73/601).    Smoking may increase values.  Increased levels may be seen in patients with primary  colorectal cancer or other malignancies including  breast, gastrointestinal tract, liver, ovarian,  pancreatic, and prostatic cancers. CEA may be useful  in monitoring patients with known malignancies.    Serum markers are not specific for malignancy and values  may vary by method.       Your creatinine is worsening, signifying worsening kidney function.  You will need to stop losartan entirely.  Reduce Metformin down to 500 mg twice a  day by cutting metformin pills in half.    See me in 2 weeks in clinic for reevaluation of this issue.    Liver tests are normal.    Hemoglobin A1c is well controlled.    CEA level is stable/okay.    Please call with questions or contact us using [employer].    Sincerely,        Electronically signed by Telly Torre MD

## 2021-06-18 NOTE — LETTER
Letter by Telly Torre MD at      Author: Telly Torre MD Service: -- Author Type: --    Filed:  Encounter Date: 2/4/2019 Status: (Other)       Suman Nagy  [address] [facility] [address]             February 4, 2019         Dear Mr. Nagy,    Below are the results from your recent visit:    Resulted Orders   Basic Metabolic Panel   Result Value Ref Range    Sodium 137 136 - 145 mmol/L    Potassium 4.4 3.5 - 5.0 mmol/L    Chloride 105 98 - 107 mmol/L    CO2 21 (L) 22 - 31 mmol/L    Anion Gap, Calculation 11 5 - 18 mmol/L    Glucose 217 (H) 70 - 125 mg/dL    Calcium 8.9 8.5 - 10.5 mg/dL    BUN 35 (H) 8 - 28 mg/dL    Creatinine 2.11 (H) 0.70 - 1.30 mg/dL    GFR MDRD Af Amer 37 (L) >60 mL/min/1.73m2    GFR MDRD Non Af Amer 30 (L) >60 mL/min/1.73m2    Narrative    Fasting Glucose reference range is 70-99 mg/dL per  American Diabetes Association (ADA) guidelines.       Creatinine is stable, but still elevated.  Potassium level is normal.    Continue on current medications at this time.  Follow-up in 3 months as planned.    Please call with questions or contact us using [employer].    Sincerely,        Electronically signed by Telly Torre MD

## 2021-06-18 NOTE — PROGRESS NOTES
[facility] Follow Up Note    Suman Nagy   77 y.o. male    Date of Visit: 5/30/2018    Chief Complaint   Patient presents with     Establish Care     meet and ellen, KEVIN, colonoscopy coming up June 14th     Subjective  Arlen is here with his wife, Anne Swenson. patient is here to establish care with me, transferring care from Dr. Galvan.    Patient has multiple medical issues with extensive past medical history.  Extensive review of medical record was performed by me today.  History also obtained from wife.    Patient has type 2 diabetes which has been sub-adequately controlled in the past, but usually well controlled.  He had been on a low-dose of insulin in the past, Lantus.  But now on Amaryl 4 mg with lunch and supper and metformin thousand milligrams twice a day.  Last hemoglobin A1c was in March of this year and 7.4%.  He does not follow blood sugars often.  Denies any recent low blood sugar events.    Hypertension is currently managed with clonidine 0.3 mg daily and Toprol-XL 12.5 mg a day.  In March his blood pressure was 128/70.  He does not check his blood pressure on his own.  Blood pressures moderately high today.    Patient had acute renal injury last year with volume depletion as a complication of his colon cancer surgery, malnutrition and weight loss at the time.  He was seen in June 2017 with plan to restart the losartan, but was stopped shortly thereafter because of some GI symptoms.  He has no GI symptoms now and is eating well.    He did have urine microalbumin level of 1103 December 2017.    Patient was taken off his statin drug last year.  He was on Lipitor 40 mg a day.  He had previous problems with pravastatin and simvastatin, it was felt that may worsen his neuropathy.    He has chronic achiness to his feet and hands.  He also has osteoarthritic changes.  Does have numbness of his feet and unsteady gait.  Does not take pain medicine for the neuropathy.  He  takes occasional ibuprofen, did discuss risks with ibuprofen including affecting kidney function and blood pressure.  States his last ibuprofen was a month ago and uses that very rarely.    He did have an infection of the left toenail in March, that resolved.  No new foot sores or ulcers.  He does wear orthotic shoes.    He has unsteady gait but no recent falls.  He has a walker at home but does not often uses it.  He does use a cane often.    Patient had a number of strokes, multiple chronic lacunar increase seen on July 2015 MRI, there was a acute to subacute 9 mm periventricular white matter CVA.  Consider report of some mild left sided weakness in years past.  Wife reports no neurologic changes since last year.    No palpitations or history of atrial fibrillation.  He had normal cardiac echo in 2015.    No chest pain or history of cardiac ischemic disease.    He did have carotid artery stenosis based on of May 2017 ultrasound with 50-69% stenosis on the left, not on the right.  No further intervention was done of that.    He has been on Plavix since previous stroke.  Blood in stool or GI bleeding symptoms no epigastric pain or history of gastric ulcer.    He denies any residual from stroke.    HEAD MRI WITHOUT AND WITH IV CONTRAST  7/20/2015 11:08 AM     INDICATION: Slurred speech. Symptoms for one week. Question stroke.  TECHNIQUE: Head MRI without and with intravenous contrast.  CONTRAST: 9ml Gadavist.  COMPARISON: MRI 04/11/2013.     FINDINGS: There is a small band of recent acute-subacute infarct in the left mid to posterior periventricular white matter measuring 9 mm x 3 mm. No superimposed acute hemorrhage or mass effect and no other areas of recent infarction. Maturation of the   previously noted acute infarct in the right thalamus since 04/11/2013. Stable multiple chronic lacunar infarcts in the periventricular white matter, both thalami, basal ganglia and in the michael. No new hydrocephalus, hemorrhage or  mass. Stable moderate   small vessel ischemic change in the cerebral white matter and michael with mild generalized cerebral and cerebellar atrophy. Grossly, the major intracranial vascular flow voids are maintained. The flow void in the distal small right vertebral artery is not   as well visualized but is unchanged from 04/11/2013. Mastoids are clear. Paranasal sinuses show a trace of mucosal thickening in the ethmoid regions.     IMPRESSION:   CONCLUSION:  1.  There is a small 9 mm x 3 mm band of recent acute-subacute infarct in the mid to posterior left periventricular white matter on the diffusion sequence. No other recent infarcts are seen.   2.  Maturation of a previously noted acute infarct in the right thalamus since 04/11/2013. Stable, multiple other chronic lacunar infarcts in the thalami, basal ganglia, periventricular white matter and michael.   3.  Stable moderate presumed small vessel ischemic changes in the cerebral white matter and michael with stable generalized cerebral and cerebellar atrophy.   4.  No new hemorrhage, mass lesion or hydrocephalus.   5.  Findings discussed with Dr. Kelsey on 07/21/2015.    He was diagnosed with rectal cancer stage IIIa with surgery last summer.  He did have neoadjuvant chemoradiation.  He has had problems with fecal and urinary incontinence after that treatment.  His remained relatively stable.  He did meet with his colorectal surgeon in March, full body CT scan with IV contrast negative for malignancy recurrence.  CEA level 3.9 earlier this month.  He has a colonoscopy set on June 14.    His stools are somewhat loose and frequent but not severely so and does not take Imodium or Lomotil now.  He does take a fiber supplement daily.    Bladder cancer with TURBT in 2015.  High-grade noninvasive tumor treated with valrubicin.  Small recurrence with fulguration December 2016.  March 2018 cystoscopy negative and given a six-month follow-up.  No hematuria.    Quit smoking in  1995.  No cough or increasing shortness of breath now.  No mouth sores or swallowing difficulty.    Chronic anxiety and dysthymia, on sertraline 50 mg a day.  Dry mouth is stable.    Past history of B12 deficiency, gets B12 shots monthly.    Lives at home with wife, and also other children living in the house.  Patient has essentially 24-hour care.  Plan is for patient to stay at home with family.    Eating regular meals, denies nausea.  No chest pain or palpitations.  No lower extremity edema issues.    PMHx:    Past Medical History:   Diagnosis Date     Anemia      Bladder cancer      Cancer     Rectosigmoid     Diabetes mellitus      Hyperkalemia      Hypertension      Seizure     possible, one time occurence     Stroke     multiple, residual left sided weakness, last CVA in July 2015 caused some speech deficit     Superficial phlebitis      Venous insufficiency of both lower extremities      PSHx:    Past Surgical History:   Procedure Laterality Date     COLON SURGERY      Colectomy Low Anterior Resection     EYE SURGERY      Cataract Extraction     LAPAROSCOPIC COLON RESECTION N/A 6/1/2017    Procedure: LAPAROSCOPIC ASSISTED COLECTOMY LOW ANTERIOR RESECTION AND DIVERTING LOOP ILEOSTOMY, PROCTOSOPY;  Surgeon: Tyson Parry MD;  Location: [facility];  Service:      MS CLOSE ENTEROSTOMY N/A 8/15/2017    Procedure: ILEOSTOMY CLOSURE LOOP ;  Surgeon: Tyson Parry MD;  Location: [facility];  Service: General     MS CYSTOURETHROSCOPY,FULGUR <0.5 CM LESN N/A 9/1/2015    Procedure: CYSTOSCOPY TRANSURETHRAL RESECTION BLADDER TUMOR;  Surgeon: Cleveland Nair MD;  Location: [facility];  Service: Urology     MS CYSTOURETHROSCOPY,FULGUR <0.5 CM LESN N/A 12/22/2015    Procedure: CYSTOSCOPY TRANSURETHRAL RESECTION BLADDER TUMOR AND BLADDER BIOPSIES;  Surgeon: Cleveland Nair MD;  Location: [facility];  Service: Urology     TURBT      X2     VASECTOMY       Immunizations:  "  Immunization History   Administered Date(s) Administered     Influenza L1n4-02, 01/18/2010     Influenza high dose, seasonal 09/15/2015, 09/26/2016, 10/03/2017     Influenza, Seasonal, Inj PF IIV3 09/27/2010, 09/14/2012, 09/27/2013     Influenza, inj, historic,unspecified 10/19/2007, 09/16/2011, 10/15/2015     Influenza, seasonal,quad inj 6-35 mos 09/18/2009, 10/17/2014     Pneumo Conj 13-V (2010&after) 01/20/2015     Pneumo Polysac 23-V 10/08/2004     Td,adult,historic,unspecified 07/01/2004     Tdap 05/20/2015       ROS A comprehensive review of systems was performed and was otherwise negative    Medications, allergies, and problem list were reviewed and updated    Exam  /72  Pulse 63  Ht 5' 7\" (1.702 m)  Wt 182 lb (82.6 kg)  SpO2 98%  BMI 28.51 kg/m2  Patient appears elderly, older than stated age.  Frail, moderate kyphosis.  Global deconditioning and does have significant unsteady gait.  He does appear alert and oriented ×3.  Difficulty getting on the exam table, did need standby assistance for studying.  Pupils and irises equal and reactive.  No conjunctivitis or scleral icterus.  He is missing teeth.  No cervical or supraclavicular adenopathy.  No JVD and no carotid bruits.  No thyromegaly or nodularity.  Lungs are clear to auscultation with good respiratory excursion, moderate kyphosis.  Heart is regular without murmur.  Just trace ankle edema.  Feet were examined, decreased fine filament sensation.  Skin perfusion is good, no ulcers or pre-ulcerative calluses.  Moderate toe degenerative changes.  Abdomen is soft, mildly overweight, nontender no hepatosplenomegaly.    Assessment/Plan  1. Type 2 diabetes mellitus without complication, without long-term current use of insulin  Appears adequately controlled at this time.  Denies low blood sugars.  Continue current glimepiride and metformin twice daily.  Checking creatinine today.    He was warned about risk of low blood sugar.    Plan for his " diabetes medication with the colonoscopy as below.    Needs to restart a statin drug, will plan to restart Lipitor on future appointment.  - LDL Cholesterol, Direct  - HDL Cholesterol    2. Essential hypertension  Not controlled.  Does have significant microalbuminuria.  He previously been on losartan, but stopped due to acute renal injury last year when he was having GI issues related to his colon cancer surgery.  Those issues have resolved.  His creatinine had returned to baseline.    Continue clonidine at this time, but suffers from fatigue and dry mouth, and he may benefit from slowly titrating that down or off.    Patient was given warning on risk of kidney injury and high potassium with will start in restarting.  He was also given warning of interaction with ibuprofen and told to avoid ibuprofen/Advil/Aleve/other NSAIDs.  - losartan (COZAAR) 25 MG tablet; Take 1 tablet (25 mg total) by mouth daily.  Dispense: 30 tablet; Refill: 4    3. History of bladder cancer  No evidence of recurrence.  Six-month follow-up cystoscopy will be due in September 4. History of rectal cancer  No evidence of recurrence.  Colonoscopy on June 14.    Plan for medications for his colonoscopy as below.    CT scan in March, full body, negative for recurrence.    Continue to follow up with colorectal on it.  Clinic.    CEA level of 3.9 earlier this month, to be used for monitoring through the colorectal clinic.    5. B12 deficiency  Patient has been getting monthly B12 shots.  Could consider changing every 3 months with oral supplements in the future.  - cyanocobalamin injection 1,000 mcg; Inject 1 mL (1,000 mcg total) into the shoulder, thigh, or buttocks every 30 (thirty) days.    Peripheral neuropathy with history of toe infection last March.  No active infection now.  He does have good orthotic shoes.    6. Carotid artery stenosis, asymptomatic, left  Has remained asymptomatic.  Have patient repeat carotid ultrasound to rule out  critical stenosis.  Consider MRA if evidence of progression.    Needs to start a statin drug, with plans as above.    Continue the Plavix daily.  - US Carotid Bilateral; Future    He has an unsteady gait with peripheral neuropathy and history of stroke.  Patient was told of significant bleeding risk with Plavix.  He was told if he falls and hits his head, he needs to go to the emergency room right away to rule out a bleed.    I strongly encouraged him to use the walker, which he does have at home.  He prefers to use the cane at this time.  Patient was told that the walker is better at reducing fall risk.      7. History of stroke  As above  Patient is full code.    He appears to be functioning adequately at home with essentially 24-hour care with his family members.  8. Medication monitoring encounter    - Comprehensive Metabolic Panel  - HM2(CBC w/o Differential)    History of dysthymia, continue sertraline 50 mg a day long-term.    He could consider Biotene artificial saliva for dry mouth.    History of cataract surgery, will need to determine his ophthalmology follow-up plan.    Return in about 12 days (around 6/11/2018) for Recheck.   Patient Instructions   B12 shot today.    Lab work today to check kidney labs and cholesterol, also liver tests and hemoglobin.    Hemoglobin A1c for diabetes check is not due yet.    See  to set up ultrasound of your carotid arteries.    Stop Plavix/clopidogrel 5 days before the colonoscopy and restart day after colonoscopy if no major bleeding.    Restart losartan at 25 mg once a day.  Do not take losartan day before or day of the colonoscopy.    Do not take glimepiride the evening before your colonoscopy.    Do not take the glimepiride or metformin on the day of colonoscopy until the colonoscopy is over and you are eating lunch at home.    I will plan to have you restart atorvastatin/Lipitor on future visit.    See me in approximately 2 weeks, before your colonoscopy  "for follow-up on the blood pressure.    I do recommend you use a walker for all ambulation.    Telly Torre MD  Total time with patient over 40 minutes and over 50% coord care.  Time all face to face.      Current Outpatient Prescriptions   Medication Sig Dispense Refill     cholecalciferol, vitamin D3, 1,000 unit tablet Take 1,000 Units by mouth daily.       cloNIDine HCl (CATAPRES) 0.3 MG tablet Take one & one -half (1&1/2) tablets by mouth daily (Patient taking differently: one daily) 135 tablet 3     clopidogrel (PLAVIX) 75 mg tablet Take one (1) tablet by mouth daily 30 tablet 5     cyanocobalamin 1,000 mcg/mL injection Inject 1,000 mcg into the shoulder, thigh, or buttocks every 30 (thirty) days.       glimepiride (AMARYL) 4 MG tablet One pill twice per day with lunch and supper 180 tablet 3     metFORMIN (GLUCOPHAGE) 1000 MG tablet Take 1 tablet (1,000 mg total) by mouth 2 (two) times a day with meals. 180 tablet 1     metoprolol succinate (TOPROL-XL) 25 MG Take 0.5 tablets (12.5 mg total) by mouth daily. 30 tablet 3     sertraline (ZOLOFT) 50 MG tablet Take one & one -half (1&1/2) tablets by mouth daily (Patient taking differently: one tablet daily) 135 tablet 0     INJECT EASE LANCETS 30 gauge Misc Use daily as directed (4-6 times daily) 600 each 0     losartan (COZAAR) 25 MG tablet Take 1 tablet (25 mg total) by mouth daily. 30 tablet 4     ONETOUCH ULTRA TEST strips Test 4-6 times daily as directed 600 each 2     SURE COMFORT PEN NEEDLE 31 gauge x 3/16\" Ndle Use one needle each night. 100 each 2     Current Facility-Administered Medications   Medication Dose Route Frequency Provider Last Rate Last Dose     cyanocobalamin injection 1,000 mcg  1,000 mcg Intramuscular Q30 Days Jerry Kelsey MD   1,000 mcg at 03/30/18 1629     cyanocobalamin injection 1,000 mcg  1,000 mcg Intramuscular Q30 Days Telly Torre MD         Allergies   Allergen Reactions     Cefazolin      \"felt very hot\"     Pravastatin  "     Increased peripheral neuropathy     Simvastatin      Increased peripheral neuropathy     Thiazides      Other: Gout       Social History   Substance Use Topics     Smoking status: Former Smoker     Quit date: 8/31/1995     Smokeless tobacco: Never Used     Alcohol use No

## 2021-06-19 NOTE — LETTER
Letter by Abbie Kessler MBBS at      Author: Abbie Kessler MBBS Service: -- Author Type: --    Filed:  Encounter Date: 11/4/2019 Status: Signed         Patient: Suman Nagy   MR Number: [id number]   YOB: 1940   Date of Visit: 11/4/2019       [facility]      Patient: Suman Nagy  MRN: [id number]  Date of Service: 11/4/2019      [facility] [[id number]]  Reason for Visit     Chief Complaint   Patient presents with   ? Review Of Multiple Medical Conditions       Code Status     FULL CODE    Assessment     -Acute anemia with a drop in hemoglobin from 10.6-8.6 no evidence of GI bleed so far  R/C improved to 10  -Acute   metabolic/ Toxic encephalopathy with persistent confusion noted in the TCU  -Cognitive impairment baseline CPT is 4.6  -History of chronic kidney disease stage IV  DC creatinine is around 2.3   -History of diabetes type 2 poorly controlled in spite of patient being on a max dose of metformin  -History of CVA with resultant left-sided weakness in 2015.  Continues on statin and Plavix.  -Bilateral lower extremity lymphedema  -Underlying history of bladder cancer status post cystoscopy recently  -Underlying history of rectal cancer with repeat imaging including a CT revealing no malignancy  -Profound generalized weakness with patient reporting difficulty with ambulation    Plan     Patient has been admitted to the TCU.  Patient examined the presence of his wife and was quite agitated and upset today  UTI concerns are minimal and he is currently done with his Keflex.  Patient denies any dysuria.  Patient's blood sugars have been very elevated with postprandial elevations he is on max dose of 2 agents.  He is on metformin in spite of renal impairment.  He is also on glimepiride  He is quite adamant and does not want to go home on any insulin regimen.  Recheck hemoglobin is stabilized at 10.  He will be discharging at a wheelchair  level and as per his request he is requesting that he be discharged today.  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  He feels he is reached his max potential even though he still wheelchair-bound and will discharge at wheelchair level  CPT is 4.6.  Care plan reviewed both with the patient and his wife present at his bedside.  They will discuss this with the .  Family is also adamant that they do not want to take any therapy home as this is the request of the patient  Cognitive status was reviewed with wife who is aware of those limitations and feels that is baseline for him and not new.  In addition I did talk to the patient and his wife regarding his renal impairment and patient told me he plans to follow-up with his primary care and a nephrologist to discuss these issues.  I have again asked him to discuss with  and set up a discharge plan going.  Total time spent 35 minutes more than 20 minutes spent face-to-face talking to the patient and his wife reviewing labs as well as reviewing discharge planning and patient's concern advice concerns about going home including services and cognitive issues.  Patient has been discussed about diabetic management which is poor but is adamant he will not want anything changed till he sees his regular doctor    History     Patient is a very pleasant 79 y.o. male who is admitted to TCU  Patient was admitted with generalized weakness and work-up revealed that he had UTI.  Cultures grew Klebsiella.  He is currently done with his antibiotics and denies any dysuria.  He remains profoundly debilitated due to which his fall risk is high and his balance is poor.  He will be discharging at a wheelchair level.  Cognitively he is impaired CPT is 4.6 his wife present at bedside is aware and understand that this is baseline for him.  He had significant anemia  work-up so far has been negative suspected to be anemia of chronic kidney disease.  Recheck hemoglobin has stabilized at 10 with no new concerns.  Diabetic control remains suboptimal he is on high doses of metformin and also on glyburide and probably would benefit from switching to a different agent probably insulin his postprandial blood sugars remain quite high however he is adamant that he does not want any changes made    Past Medical History     Active Ambulatory (Non-Hospital) Problems    Diagnosis   ? Slow transit constipation   ? UTI (urinary tract infection)   ? 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 Anxiety   ? History of stroke   ? Medication monitoring encounter   ? History of bladder cancer   ? History of rectal cancer   ? Carotid artery stenosis, asymptomatic, left   ? Rectal cancer (H)   ? Hypertension   ? B12 deficiency   ? 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 Anemia    ? Bladder cancer (H)    ? Cancer (H)    ? Diabetes mellitus (H)    ? Hyperkalemia    ? Hypertension    ? Seizure (H)    ? Stroke (H)    ? Superficial phlebitis    ? Venous insufficiency of both lower extremities        Past Social History     Reviewed, and he  reports that he quit smoking about 24 years ago. He has never used smokeless tobacco. He reports that he does not drink alcohol or use drugs.    Family History     Reviewed, and family history includes COPD in his father.    Medication List   Post Discharge Medication Reconciliation Status: discharge medications reconciled and changed, per note/orders (see AVS)   Current Outpatient Medications on File Prior to Visit   Medication Sig Dispense Refill   ? acetaminophen (TYLENOL) 325 MG tablet Take 2 tablets (650 mg total) by mouth every 4 (four) hours as needed.  0   ? amLODIPine (NORVASC) 5 MG tablet Take 1 tablet (5 mg total) by mouth daily. 30 tablet 0   ? atorvastatin (LIPITOR) 20 MG tablet Take 1  "tablet (20 mg total) by mouth daily. 90 tablet 3   ? cloNIDine HCl (CATAPRES) 0.3 MG tablet TAKE 1/2 PILL IN THE MORNING. TAKE 1 FULL PILL AT BEDTIME. 135 tablet 2   ? clopidogrel (PLAVIX) 75 mg tablet TAKE ONE (1) TABLET BY MOUTH DAILY 90 tablet 1   ? cyanocobalamin 1,000 mcg/mL injection Inject 1,000 mcg into the shoulder, thigh, or buttocks every 3 (three) months.            ? cyanocobalamin, vitamin B-12, (VITAMIN B-12) 1,000 mcg Subl Place 1 tablet (1,000 mcg total) under the tongue daily. (Patient taking differently: Place 500 mcg under the tongue daily.       ) 90 tablet 3   ? diclofenac sodium (VOLTAREN) 1 % Gel Apply 4 g topically 3 (three) times a day. Apply to knees     ? INJECT EASE LANCETS 30 gauge Misc Use daily as directed (4-6 times daily) 600 each 3   ? insulin aspart U-100 (NOVOLOG) 100 unit/mL injection Inject under the skin 3 (three) times a day before meals. <140                           0U  141-180                      1U  181-220                      2U  221-260                      4U  261-300                      6U  301-350                      8U  351-400                     12U  >400                          Call provider     ? metoprolol succinate (TOPROL-XL) 25 MG Take 1 tablet (25 mg total) by mouth daily. 30 tablet 0   ? ONETOUCH ULTRA BLUE TEST STRIP strips USE AS DIRECTED 6 TIMES PER  strip 3   ? psyllium husk (DAILY FIBER ORAL) Take 1 tablet by mouth 2 (two) times a day.            ? sertraline (ZOLOFT) 50 MG tablet Take 50 mg by mouth daily.     ? SURE COMFORT PEN NEEDLE 31 gauge x 3/16\" Ndle Use one needle each night. 100 each 2     No current facility-administered medications on file prior to visit.        Allergies     Allergies   Allergen Reactions   ? Cefazolin      \"felt very hot\"   ? Pravastatin      Increased peripheral neuropathy   ? Simvastatin      Increased peripheral neuropathy   ? Thiazides      Other: Gout         Review of Systems   A comprehensive review " of 14 systems was done. Pertinent findings noted here and in history of present illness. All the rest negative.  Constitutional: Negative.  Negative for fever, chills, he has activity change, appetite change and fatigue.   HENT: Negative for congestion and facial swelling.    Eyes: Negative for photophobia, redness and visual disturbance.   Respiratory: Negative for cough and chest tightness.    Cardiovascular: Negative for chest pain, palpitations and leg swelling.   Gastrointestinal: Negative for nausea, diarrhea, constipation, blood in stool and abdominal distention.   Genitourinary: Negative.    Musculoskeletal: Negative.  Reporting significant weakness especially in the right leg today  Skin: Negative.    Neurological: Negative for dizziness, tremors, syncope, weakness, light-headedness and headaches.   Hematological: Does not bruise/bleed easily.   Psychiatric/Behavioral: Negative.  Recalls remain somewhat impaired      Physical Exam     Recent Vitals 10/30/2019   Height -   Weight 170 lbs   BSA (m2) 1.92 m2   /76   Pulse 89   Temp 98   Temp src -   SpO2 99   Some recent data might be hidden       Constitutional: Oriented to person, place, and time and appears well-developed.  Frail and weak  HEENT:  Normocephalic and atraumatic.  Eyes: Conjunctivae and EOM are normal. Pupils are equal, round, and reactive to light. No discharge.  No scleral icterus. Nose normal. Mouth/Throat: Oropharynx is clear and moist. No oropharyngeal exudate.    NECK: Normal range of motion. Neck supple. No JVD present. No tracheal deviation present. No thyromegaly present.   CARDIOVASCULAR: Normal rate, regular rhythm and intact distal pulses.  Exam reveals no gallop and no friction rub.  Systolic murmur present.  PULMONARY: Effort normal and breath sounds normal. No respiratory distress.No Wheezing or rales.  ABDOMEN: Soft. Bowel sounds are normal. No distension and no mass.  There is no tenderness. There is no rebound and no  guarding. No HSM.  MUSCULOSKELETAL: Normal range of motion.  Has chronic 1+ bilateral lower extremity edema and no tenderness. Mild kyphosis, no tenderness.  Reporting right lower extremity weakness  LYMPH NODES: Has no cervical, supraclavicular, axillary and groin adenopathy.   NEUROLOGICAL: Alert and oriented to person, place, and time. No cranial nerve deficit.  Normal muscle tone. Coordination normal.  Has baseline left-sided weakness  GENITOURINARY: Deferred exam.  SKIN: Skin is warm and dry. No rash noted. No erythema. No pallor.   EXTREMITIES: No cyanosis, no clubbing, he has chronic 1+ bilateral lower extremity edema. No Deformity.  PSYCHIATRIC: Normal mood, affect and behavior.      Lab Results     Recent Results (from the past 240 hour(s))   Occult Blood, Fecal    Collection Time: 10/27/19  6:30 PM   Result Value Ref Range    Occult Blood, Stool #1 Negative Negative   Basic Metabolic Panel    Collection Time: 10/28/19  9:25 AM   Result Value Ref Range    Sodium 135 (L) 136 - 145 mmol/L    Potassium 4.2 3.5 - 5.0 mmol/L    Chloride 102 98 - 107 mmol/L    CO2 24 22 - 31 mmol/L    Anion Gap, Calculation 9 5 - 18 mmol/L    Glucose 211 (H) 70 - 125 mg/dL    Calcium 8.8 8.5 - 10.5 mg/dL    BUN 47 (H) 8 - 28 mg/dL    Creatinine 2.32 (H) 0.70 - 1.30 mg/dL    GFR MDRD Af Amer 33 (L) >60 mL/min/1.73m2    GFR MDRD Non Af Amer 27 (L) >60 mL/min/1.73m2   Glycosylated Hemoglobin A1C    Collection Time: 10/28/19  9:25 AM   Result Value Ref Range    Hemoglobin A1c 7.6 (H) 4.2 - 6.1 %   Thyroid Stimulating Hormone (TSH)    Collection Time: 10/28/19  9:25 AM   Result Value Ref Range    TSH 2.42 0.30 - 5.00 uIU/mL   Vitamin B12    Collection Time: 10/28/19  9:25 AM   Result Value Ref Range    Vitamin B-12 >2,000 (H) 213 - 816 pg/mL   Vitamin D, Total (25-Hydroxy)    Collection Time: 10/28/19  9:25 AM   Result Value Ref Range    Vitamin D, Total (25-Hydroxy) 14.0 (L) 30.0 - 80.0 ng/mL   HM2(CBC w/o Differential)    Collection  Time: 10/28/19  9:25 AM   Result Value Ref Range    WBC 8.2 4.0 - 11.0 thou/uL    RBC 2.91 (L) 4.40 - 6.20 mill/uL    Hemoglobin 9.1 (L) 14.0 - 18.0 g/dL    Hematocrit 29.3 (L) 40.0 - 54.0 %     (H) 80 - 100 fL    MCH 31.3 27.0 - 34.0 pg    MCHC 31.1 (L) 32.0 - 36.0 g/dL    RDW 13.6 11.0 - 14.5 %    Platelets 403 140 - 440 thou/uL    MPV 9.4 8.5 - 12.5 fL   Ferritin    Collection Time: 10/29/19 10:08 AM   Result Value Ref Range    Ferritin 407 (H) 27 - 300 ng/mL   Iron and Transferrin Iron Binding Capacity    Collection Time: 10/29/19 10:08 AM   Result Value Ref Range    Iron 46 42 - 175 ug/dL    Transferrin 159 (L) 212 - 360 mg/dL    Transferrin Saturation, Calculated 23 20 - 50 %    Transferrin IBC, Calculated 199 (L) 313 - 563 ug/dL   Morphology,Smear Review (MORP)    Collection Time: 10/29/19 10:08 AM   Result Value Ref Range    Pathology, Smear Review See Separate Pathology Report (!) (none)    WBC 9.7 4.0 - 11.0 thou/uL    RBC 3.21 (L) 4.40 - 6.20 mill/uL    Hemoglobin 10.0 (L) 14.0 - 18.0 g/dL    Hematocrit 32.6 (L) 40.0 - 54.0 %     (H) 80 - 100 fL    MCH 31.2 27.0 - 34.0 pg    MCHC 30.7 (L) 32.0 - 36.0 g/dL    RDW 13.6 11.0 - 14.5 %    Platelets 491 (H) 140 - 440 thou/uL    MPV 9.4 8.5 - 12.5 fL   Manual Differential    Collection Time: 10/29/19 10:08 AM   Result Value Ref Range    Total Neutrophils % 84 (H) 50 - 70 %    Lymphocytes % 9 (L) 20 - 40 %    Monocytes % 3 2 - 10 %    Eosinophils %  2 0 - 6 %    Basophils % 1 0 - 2 %    Metamyelocytes % 1 <=1 %    Myelocytes % 1 <=1 %    Total Neutrophils Absolute 8.1 (H) 2.0 - 7.7 thou/ul    Lymphocytes Absolute 0.9 0.8 - 4.4 thou/uL    Monocytes Absolute 0.2 0.0 - 0.9 thou/uL    Eosinophils Absolute 0.2 0.0 - 0.4 thou/uL    Basophils Absolute 0.1 0.0 - 0.2 thou/uL    Metamyelocytes Absolute 0.1 <=0.1 thou/uL    Myelocytes Absolute 0.1 <=0.1 thou/uL    Reactive Lymphocytes 1+ (!) Negative    Toxic Granulation 1+ (!) Negative    Dohle Bodies 1+ (!)  Negative    Platelet Estimate Increased (!) Normal    Ovalocytes 1+ (!) Negative    Polychromasia 1+ (!) Negative   Peripheral Blood Smear, Path Review    Collection Time: 10/29/19 10:08 AM   Result Value Ref Range    Case Report       Peripheral Blood Morphology Report                Case: [id number]                                   Authorizing Provider:  Abbie Kessler MBBS         Collected:           10/29/2019 1008              Ordering Location:      [facility] Received:            10/30/2019 0948              Pathologist:           Elias Beltran MD                                                        Specimen:    Peripheral Blood                                                                           Final Diagnosis       PERIPHERAL BLOOD:     -   MILD NEUTROPHILIA     -   MACROCYTIC ANEMIA     -   MILD THROMBOCYTOSIS    Comment       The causes of reactive neutrophilia are numerous and range from infection to metabolic defects. Bacterial infections are the predominant cause of neutrophilia; other causes include therapeutic or endogenous drugs/hormones, acute stress, acute tissue necrosis and other infectious/inflammatory processes, including collagen vascular disorders and autoimmune disease. Absolute neutrophilia is seen in a variety of hematopoietic neoplasms, especially myeloproliferative disorders. Recommend clinical correlation; consider repeat CBC after resolution of any possible infection. If a diagnosis of CML is suspected, evaluation of the peripheral blood for the presence of the Strang chromosome and/or the BCR/ABL fusion gene is suggested.     Macrocytosis can be seen in liver disease, hypothyroidism, refractory anemias, vitamin B12 and folate deficiency, and drug/alcohol use, among other etiologies.     Thrombocytosis can be a reaction to hemorrhage, iron deficiency, post splenectomy, chronic  inflammatory states, malignancies, or drugs (vincristine, high-dose  erythropoietin) or due to an intrinsic stem cell defect (e.g., essential thrombocythemia). Please correlate with clinical findings.     If there is clinical suspicion for a myeloproliferative neoplasm, JAK2 (Janus Kinase 2 gene), MPL (thrombopoietin receptor gene), and CALR (calreticulin) mutation analysis can be performed on a subsequent peripheral blood specimen. The JAK2 V617F is present in 95% to 98% of polycythemia vera, 50% to 60% of primary myelofibrosis (PMF), and 50% to 60% of essential thrombocythemia (ET). It has also been described infrequently in other myeloid neoplasms, including chronic myelomonocytic leukemia and myelodysplastic syndrome. This mutation is not seen in chronic myelogenous leukemia (CML) or in reactive conditions with elevated blood counts. Detection of the JAK2 V617F is useful to help establish the diagnosis of MPN. However, a negative JAK2 V617F result does not indicate absence of an  MPN. Other important molecular markers in [id number] MPN include CALR exon 9 mutation (20%-30% of PMF and ET) and MPL exon 10 mutation (5%-10% of PMF and 3%-5% of ET). Mutations in JAK2, CALR, and MPL are essentially mutually exclusive.    Clinical Information D64.9     Peripheral Smear       Red blood cells are decreased in number and overall macrocytic and normochromic. Anisopoikilocytosis, polychromasia, and rouleaux formation are not prominent.    The white blood cell count and differential appear as reported on the CBC. Leukocytes are high normal in number and demonstrate an absolute neutrophilia. No blasts or dysplastic changes are identified.    Platelets are increased in number but overall normal in appearance.    Charges CPT: 61551  ICD-10: D64.9               KATIE Florez

## 2021-06-19 NOTE — PROGRESS NOTES
[facility] clinic Follow Up Note    Suman Nagy   78 y.o. male    Date of Visit: 8/1/2018    Chief Complaint   Patient presents with     Follow-up     2 wk BP check     Subjective  Luke is here with his wife, Jeni.  Patient is here for follow-up of his hypertension.  Patient also has mild renal insufficiency with a creatinine of 1.39 on last check.    Patient has a past history of lacunar strokes and dementia.  Cared for at home by wife and children.    The daughter sets up the medication, but I did review the medications with the wife.    She did again confirm that he was just taking the clonidine 0.3 mg at night, not multiple times during the day.    In July 17 clinic visit his blood pressure was 130/70.  Patient is complaining of dry mouth, and he also has diabetes with some mild renal insufficiency.  In May of this year I had restarted the losartan 25 mg a day.    On July 17 he was told to increase the losartan further to 50 mg a day.    But also at that time I had him reduce the clonidine down to 0.1 mg in the evening.    Enforcing the next day he had a very high spiking blood pressure and had loose stools.  He felt fatigued.    No chest pain or acute neurologic deficit noted.    I had patient increase the clonidine and now back to 0.1 mg 3 times a day of clonidine.  Her graph his blood pressure is still running high in the 150s-170s over 80s-90s.    Patient wants to go back to his previous blood pressure dosing.    The wife reports that he has had problems in the past when his blood pressure medications were changed.    He is also on the Toprol-XL 12.5 mg a day    No worsening edema.    He does have a past history of moderate carotid artery stenosis.  Still on Lipitor and Plavix.    He does have a past history of colon cancer resected last summer and irritable bowel and rectal incontinence issues.  Colonoscopy in June did show a polyp with 2 year follow-up plan on that.    Diabetes  remains well controlled and hemoglobin A1c was 6.9% earlier this month.  Still on metformin and Amaryl.  PMHx:    Past Medical History:   Diagnosis Date     Anemia      Bladder cancer (H)      Cancer (H)     Rectosigmoid     Diabetes mellitus (H)      Hyperkalemia      Hypertension      Seizure (H)     possible, one time occurence     Stroke (H)     multiple, residual left sided weakness, last CVA in July 2015 caused some speech deficit     Superficial phlebitis      Venous insufficiency of both lower extremities      PSHx:    Past Surgical History:   Procedure Laterality Date     COLON SURGERY      Colectomy Low Anterior Resection     EYE SURGERY      Cataract Extraction     LAPAROSCOPIC COLON RESECTION N/A 6/1/2017    Procedure: LAPAROSCOPIC ASSISTED COLECTOMY LOW ANTERIOR RESECTION AND DIVERTING LOOP ILEOSTOMY, PROCTOSOPY;  Surgeon: Tyson Parry MD;  Location: [facility];  Service:      NE CLOSE ENTEROSTOMY N/A 8/15/2017    Procedure: ILEOSTOMY CLOSURE LOOP ;  Surgeon: Tyson Parry MD;  Location: [facility];  Service: General     NE CYSTOURETHROSCOPY,FULGUR <0.5 CM LESN N/A 9/1/2015    Procedure: CYSTOSCOPY TRANSURETHRAL RESECTION BLADDER TUMOR;  Surgeon: Cleveland Nair MD;  Location: [facility];  Service: Urology     NE CYSTOURETHROSCOPY,FULGUR <0.5 CM LESN N/A 12/22/2015    Procedure: CYSTOSCOPY TRANSURETHRAL RESECTION BLADDER TUMOR AND BLADDER BIOPSIES;  Surgeon: Cleveland Nair MD;  Location: [facility];  Service: Urology     TURBT      X2     VASECTOMY       Immunizations:   Immunization History   Administered Date(s) Administered     Influenza D7x6-59, 01/18/2010     Influenza high dose, seasonal 09/15/2015, 09/26/2016, 10/03/2017     Influenza, Seasonal, Inj PF IIV3 09/27/2010, 09/14/2012, 09/27/2013     Influenza, inj, historic,unspecified 10/19/2007, 09/16/2011, 10/15/2015     Influenza, seasonal,quad inj 6-35 mos 09/18/2009, 10/17/2014     Pneumo Conj 13-V  "(2010&after) 01/20/2015     Pneumo Polysac 23-V 10/08/2004     Td,adult,historic,unspecified 07/01/2004     Tdap 05/20/2015       ROS A comprehensive review of systems was performed and was otherwise negative    Medications, allergies, and problem list were reviewed and updated    Exam  /78  Pulse 63  Ht 5' 7\" (1.702 m)  Wt 185 lb (83.9 kg)  SpO2 98%  BMI 28.98 kg/m2  No change in neurologic status.  Still somewhat withdrawn with positive speech.  Global atrophy unchanged.  Lungs are clear.  Heart is regular without murmur.  Abdomen is nontender.  Just trace ankle edema.    Assessment/Plan  1. Hypertension  Blood pressure did become significantly out of control after blood pressure changes.    He will return to his previous dosing of clonidine and losartan.  See plan for transitioning back to his clonidine below.    Follow-up later this month to reevaluate blood pressure.    If patient has further high blood pressure spikes, he still does have the 0.1 mg of clonidine leftover that he could use as needed during the day.    Mild chronic renal insufficiency, he is back to his previous 25 mg dose of losartan.    Follow-up later this month.  - losartan (COZAAR) 25 MG tablet; Take 1 tablet (25 mg total) by mouth daily.  Dispense: 90 tablet; Refill: 1  - cloNIDine HCl (CATAPRES) 0.3 MG tablet; Take 1 tablet (0.3 mg total) by mouth every evening.  Dispense: 90 tablet; Refill: 1    2. Carotid artery stenosis, asymptomatic, left  Asymptomatic.  Plavix and Lipitor.    3. 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  Monitor for low blood sugars.  On metformin and Amaryl    Need to confirm his yearly ophthalmology appointment which was due this summer.    4. History of stroke  No new neurologic event noted.    5. Medication monitoring encounter  Consider lab draw next visit.    6. Essential hypertension    - losartan (COZAAR) 25 MG tablet; Take 1 tablet (25 mg total) by mouth daily.  Dispense: 90 " tablet; Refill: 1  - cloNIDine HCl (CATAPRES) 0.3 MG tablet; Take 1 tablet (0.3 mg total) by mouth every evening.  Dispense: 90 tablet; Refill: 1    Loose stool exacerbation likely from clonidine change, in the setting of ileus hemicolectomy.    B12 deficiency did get a B12 shot on July 17.  Consider repeat B12 shot at next visit, should be able to go to longer intervals soon.    History of chronic anxiety and dysthymia on Zoloft.    History of colon cancer, has a colorectal appointment in November and a 2 year follow-up for his colonoscopy June 2020.    Biotene artificial saliva for dry mouth        Return in 3 weeks (on 8/23/2018) for Recheck.   Patient Instructions   Take 0.2 mg of clonidine tonight.    Take 0.1 mg of clonidine tomorrow morning, if blood pressure above 150/80.    Tomorrow night take 0.3 mg of clonidine, which is back to your usual dose.    Continue on the 0.3 mg of clonidine every evening, as you had previously done.    Continue on the 25 mg dose of losartan.    Follow-up with me on August 23 for blood pressure.    If blood pressure remains above 150/80, contact me in clinic.    Telly Torre MD        Current Outpatient Prescriptions   Medication Sig Dispense Refill     atorvastatin (LIPITOR) 20 MG tablet Take 1 tablet (20 mg total) by mouth daily. 90 tablet 1     blood glucose test (ONETOUCH ULTRA BLUE TEST STRIP) strips Use 1 each As Directed 6 (six) times a day. (E11.9) 600 strip 1     cholecalciferol, vitamin D3, 1,000 unit tablet Take 1,000 Units by mouth daily.       clopidogrel (PLAVIX) 75 mg tablet Take one (1) tablet by mouth daily 30 tablet 5     cyanocobalamin 1,000 mcg/mL injection Inject 1,000 mcg into the shoulder, thigh, or buttocks every 30 (thirty) days.       glimepiride (AMARYL) 4 MG tablet One pill twice per day with lunch and supper 180 tablet 3     INJECT EASE LANCETS 30 gauge Misc Use daily as directed (4-6 times daily) 600 each 0     losartan (COZAAR) 25 MG tablet Take 1  "tablet (25 mg total) by mouth daily. 90 tablet 1     metFORMIN (GLUCOPHAGE) 1000 MG tablet Take 1 tablet (1,000 mg total) by mouth 2 (two) times a day with meals. 180 tablet 1     metoprolol succinate (TOPROL-XL) 25 MG Take 0.5 tablets (12.5 mg total) by mouth daily. 30 tablet 3     sertraline (ZOLOFT) 50 MG tablet Take one & one -half (1&1/2) tablets by mouth daily (Patient taking differently: one tablet daily) 135 tablet 0     SURE COMFORT PEN NEEDLE 31 gauge x 3/16\" Ndle Use one needle each night. 100 each 2     cloNIDine HCl (CATAPRES) 0.3 MG tablet Take 1 tablet (0.3 mg total) by mouth every evening. 90 tablet 1     Current Facility-Administered Medications   Medication Dose Route Frequency Provider Last Rate Last Dose     cyanocobalamin injection 1,000 mcg  1,000 mcg Intramuscular Q30 Days Jerry Kelsey MD   1,000 mcg at 03/30/18 1629     cyanocobalamin injection 1,000 mcg  1,000 mcg Intramuscular Q30 Days Telly Torre MD   1,000 mcg at 07/17/18 1546     Allergies   Allergen Reactions     Cefazolin      \"felt very hot\"     Pravastatin      Increased peripheral neuropathy     Simvastatin      Increased peripheral neuropathy     Thiazides      Other: Gout       Social History   Substance Use Topics     Smoking status: Former Smoker     Quit date: 8/31/1995     Smokeless tobacco: Never Used     Alcohol use No           "

## 2021-06-19 NOTE — LETTER
Letter by Abbie Kessler [employer] at      Author: Abbie Kessler [employer] Service: -- Author Type: --    Filed:  Encounter Date: 10/28/2019 Status: Signed         Patient: Suman Nagy   MR Number: [id number]   YOB: 1940   Date of Visit: 10/28/2019       [facility] Admission note      Patient: Suman Nagy  MRN: [id number]  Date of Service: 10/28/2019      [facility] [[id number]]  Reason for Visit     Chief Complaint   Patient presents with   ? Review Of Multiple Medical Conditions       Code Status     FULL CODE    Assessment     -Acute anemia with a drop in hemoglobin from 10.6-8.6 no evidence of GI bleed so far  -Acute   metabolic/ Toxic encephalopathy with persistent confusion noted in the TCU  -History of chronic kidney disease stage IV  DC creatinine is around 2.3   -History of diabetes type 2  -History of CVA with resultant left-sided weakness in 2015.  Continues on statin and Plavix.  -Bilateral lower extremity lymphedema  -Underlying history of bladder cancer status post cystoscopy recently  -Underlying history of rectal cancer with repeat imaging including a CT revealing no malignancy  -Profound generalized weakness with patient reporting difficulty with ambulation    Plan     Patient has been admitted to the TCU.  UTI concerns are minimal and he is currently done with his Keflex.  Patient denies any dysuria.  Blood pressures reviewed and he is stable on his current regimen he remains on clonidine and amlodipine as well as metoprolol.  Diabetes regimen was reviewed and he is on glimepiride.  In addition he is also getting metformin 1 g twice a day and blood sugars unfortunately remain quite high with postprandial elevations greater than 200.  He is on a maximum dose of metformin and also is taking glimepiride and quite high doses.  Suspect with renal impairment we will be limited and pushing more oral medications and he may be requiring insulin soon  His  recheck A1c is pending.-7.6  Recheck hemoglobin shows significant decline in hemoglobin from 10-8.6 there has been a progressive decline in hemoglobin.  Stool guaiacs are done and are negative.  Suspect this is not from internal bleeding.  He did have recent blood loss from hematuria.  Would like to recheck hemoglobin will also order some iron studies to rule out anemia of chronic kidney disease versus iron deficiency.  Recheck CBC will be pending today also will check a B12 level which is pending for today  [facility] to [facility]    History     Patient is a very pleasant 79 y.o. male who is admitted to TCU  Patient was admitted with generalized weakness and work-up revealed that he had UTI.  Cultures grew Klebsiella.  He was treated with IV ceftriaxone.  He is currently done with his oral Keflex and denies any dysuria.  He has underlying history of chronic kidney disease stage IV.  Renal function remains impaired with a recheck creatinine hovering at 2.4 this seems to be baseline.  Unfortunately has had progressive anemia.  Recheck hemoglobin was 8.6.  This is a drop from 10.6 he denies any GI bleed concerns.  Stool guaiac came back negative.  He is on Plavix for prophylaxis regarding his CVA.  In addition he also gets B12 supplementation.  Suspect this could be related to anemia of chronic kidney disease versus iron deficiency.  Blood pressures have been stable he remains on multiple medications.  At baseline he is wheelchair-bound with left-sided weakness due to a previous CVA but feels that he is closest to getting back to baseline    Past Medical History     Active Ambulatory (Non-Hospital) Problems    Diagnosis   ? Slow transit constipation   ? UTI (urinary tract infection)   ? 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 Anxiety   ? History of stroke   ? Medication monitoring encounter   ? History of bladder cancer   ? History of rectal cancer   ? Carotid artery stenosis,  asymptomatic, left   ? Rectal cancer (H)   ? Hypertension   ? B12 deficiency   ? 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 Anemia    ? Bladder cancer (H)    ? Cancer (H)    ? Diabetes mellitus (H)    ? Hyperkalemia    ? Hypertension    ? Seizure (H)    ? Stroke (H)    ? Superficial phlebitis    ? Venous insufficiency of both lower extremities        Past Social History     Reviewed, and he  reports that he quit smoking about 24 years ago. He has never used smokeless tobacco. He reports that he does not drink alcohol or use drugs.    Family History     Reviewed, and family history includes COPD in his father.    Medication List   Post Discharge Medication Reconciliation Status: discharge medications reconciled and changed, per note/orders (see AVS)   Current Outpatient Medications on File Prior to Visit   Medication Sig Dispense Refill   ? acetaminophen (TYLENOL) 325 MG tablet Take 2 tablets (650 mg total) by mouth every 4 (four) hours as needed.  0   ? amLODIPine (NORVASC) 5 MG tablet Take 1 tablet (5 mg total) by mouth daily. 30 tablet 0   ? atorvastatin (LIPITOR) 20 MG tablet Take 1 tablet (20 mg total) by mouth daily. 90 tablet 3   ? cloNIDine HCl (CATAPRES) 0.3 MG tablet TAKE 1/2 PILL IN THE MORNING. TAKE 1 FULL PILL AT BEDTIME. 135 tablet 2   ? clopidogrel (PLAVIX) 75 mg tablet TAKE ONE (1) TABLET BY MOUTH DAILY 90 tablet 1   ? cyanocobalamin 1,000 mcg/mL injection Inject 1,000 mcg into the shoulder, thigh, or buttocks every 3 (three) months.            ? cyanocobalamin, vitamin B-12, (VITAMIN B-12) 1,000 mcg Subl Place 1 tablet (1,000 mcg total) under the tongue daily. 90 tablet 3   ? furosemide (LASIX) 40 MG tablet 40 mg daily prn for leg edema  0   ? glimepiride (AMARYL) 4 MG tablet ONE PILL TWICE PER DAY WITH LUNCH AND SUPPER (Patient taking differently: Take 4 mg by mouth twice a day with lunch and supper.       ) 180 tablet 3   ? INJECT EASE  "LANCETS 30 gauge Misc Use daily as directed (4-6 times daily) 600 each 3   ? insulin aspart U-100 (NOVOLOG) 100 unit/mL injection Inject under the skin 3 (three) times a day before meals. <140                           0U  141-180                      1U  181-220                      2U  221-260                      4U  261-300                      6U  301-350                      8U  351-400                     12U  >400                          Call provider     ? metFORMIN (GLUCOPHAGE) 1000 MG tablet Take 1,000 mg by mouth 2 (two) times a day with meals.     ? metoprolol succinate (TOPROL-XL) 25 MG Take 1 tablet (25 mg total) by mouth daily. 30 tablet 0   ? ONETOUCH ULTRA BLUE TEST STRIP strips USE AS DIRECTED 6 TIMES PER  strip 3   ? psyllium husk (DAILY FIBER ORAL) Take 1 tablet by mouth 2 (two) times a day.            ? sertraline (ZOLOFT) 50 MG tablet Take 50 mg by mouth daily.     ? SURE COMFORT PEN NEEDLE 31 gauge x 3/16\" Ndle Use one needle each night. 100 each 2     No current facility-administered medications on file prior to visit.        Allergies     Allergies   Allergen Reactions   ? Cefazolin      \"felt very hot\"   ? Pravastatin      Increased peripheral neuropathy   ? Simvastatin      Increased peripheral neuropathy   ? Thiazides      Other: Gout         Review of Systems   A comprehensive review of 14 systems was done. Pertinent findings noted here and in history of present illness. All the rest negative.  Constitutional: Negative.  Negative for fever, chills, he has activity change, appetite change and fatigue.   HENT: Negative for congestion and facial swelling.    Eyes: Negative for photophobia, redness and visual disturbance.   Respiratory: Negative for cough and chest tightness.    Cardiovascular: Negative for chest pain, palpitations and leg swelling.   Gastrointestinal: Negative for nausea, diarrhea, constipation, blood in stool and abdominal distention.   Genitourinary: Negative.  "   Musculoskeletal: Negative.  Reporting significant weakness especially in the right leg today  Skin: Negative.    Neurological: Negative for dizziness, tremors, syncope, weakness, light-headedness and headaches.   Hematological: Does not bruise/bleed easily.   Psychiatric/Behavioral: Negative.  Recalls remain somewhat impaired      Physical Exam     Recent Vitals 10/23/2019   Height -   Weight 179 lbs   BSA (m2) 1.97 m2   /74   Pulse 93   Temp 97   Temp src -   SpO2 91   Some recent data might be hidden       Constitutional: Oriented to person, place, and time and appears well-developed.  Frail and weak  HEENT:  Normocephalic and atraumatic.  Eyes: Conjunctivae and EOM are normal. Pupils are equal, round, and reactive to light. No discharge.  No scleral icterus. Nose normal. Mouth/Throat: Oropharynx is clear and moist. No oropharyngeal exudate.    NECK: Normal range of motion. Neck supple. No JVD present. No tracheal deviation present. No thyromegaly present.   CARDIOVASCULAR: Normal rate, regular rhythm and intact distal pulses.  Exam reveals no gallop and no friction rub.  Systolic murmur present.  PULMONARY: Effort normal and breath sounds normal. No respiratory distress.No Wheezing or rales.  ABDOMEN: Soft. Bowel sounds are normal. No distension and no mass.  There is no tenderness. There is no rebound and no guarding. No HSM.  MUSCULOSKELETAL: Normal range of motion.  Has chronic 1+ bilateral lower extremity edema and no tenderness. Mild kyphosis, no tenderness.  Reporting right lower extremity weakness  LYMPH NODES: Has no cervical, supraclavicular, axillary and groin adenopathy.   NEUROLOGICAL: Alert and oriented to person, place, and time. No cranial nerve deficit.  Normal muscle tone. Coordination normal.  Has baseline left-sided weakness  GENITOURINARY: Deferred exam.  SKIN: Skin is warm and dry. No rash noted. No erythema. No pallor.   EXTREMITIES: No cyanosis, no clubbing, he has chronic 1+  bilateral lower extremity edema. No Deformity.  PSYCHIATRIC: Normal mood, affect and behavior.      Lab Results     Recent Results (from the past 240 hour(s))   POCT Glucose    Collection Time: 10/18/19 11:14 AM   Result Value Ref Range    Glucose 280 (H) 70 - 139 mg/dL   POCT Glucose    Collection Time: 10/18/19  4:04 PM   Result Value Ref Range    Glucose 335 (H) 70 - 139 mg/dL   Potassium    Collection Time: 10/18/19  6:26 PM   Result Value Ref Range    Potassium 3.6 3.5 - 5.0 mmol/L   POCT Glucose    Collection Time: 10/18/19  8:24 PM   Result Value Ref Range    Glucose 223 (H) 70 - 139 mg/dL   Magnesium    Collection Time: 10/19/19  6:16 AM   Result Value Ref Range    Magnesium 1.9 1.8 - 2.6 mg/dL   Basic Metabolic Panel    Collection Time: 10/19/19  6:16 AM   Result Value Ref Range    Sodium 137 136 - 145 mmol/L    Potassium 3.4 (L) 3.5 - 5.0 mmol/L    Chloride 104 98 - 107 mmol/L    CO2 21 (L) 22 - 31 mmol/L    Anion Gap, Calculation 12 5 - 18 mmol/L    Glucose 162 (H) 70 - 125 mg/dL    Calcium 9.0 8.5 - 10.5 mg/dL    BUN 41 (H) 8 - 28 mg/dL    Creatinine 2.37 (H) 0.70 - 1.30 mg/dL    GFR MDRD Af Amer 32 (L) >60 mL/min/1.73m2    GFR MDRD Non Af Amer 27 (L) >60 mL/min/1.73m2   Platelet Count - every other day x 3    Collection Time: 10/19/19  6:16 AM   Result Value Ref Range    Platelets 222 140 - 440 thou/uL   POCT Glucose    Collection Time: 10/19/19  6:40 AM   Result Value Ref Range    Glucose 170 (H) 70 - 139 mg/dL   POCT Glucose    Collection Time: 10/19/19 11:09 AM   Result Value Ref Range    Glucose 316 (H) 70 - 139 mg/dL   Potassium    Collection Time: 10/19/19 12:09 PM   Result Value Ref Range    Potassium 3.9 3.5 - 5.0 mmol/L   Basic Metabolic Panel    Collection Time: 10/23/19  8:25 AM   Result Value Ref Range    Sodium 137 136 - 145 mmol/L    Potassium 3.6 3.5 - 5.0 mmol/L    Chloride 103 98 - 107 mmol/L    CO2 24 22 - 31 mmol/L    Anion Gap, Calculation 10 5 - 18 mmol/L    Glucose 135 (H) 70 - 125  mg/dL    Calcium 9.3 8.5 - 10.5 mg/dL    BUN 51 (H) 8 - 28 mg/dL    Creatinine 2.42 (H) 0.70 - 1.30 mg/dL    GFR MDRD Af Amer 31 (L) >60 mL/min/1.73m2    GFR MDRD Non Af Amer 26 (L) >60 mL/min/1.73m2   Magnesium    Collection Time: 10/23/19  8:25 AM   Result Value Ref Range    Magnesium 1.9 1.8 - 2.6 mg/dL   HM2(CBC w/o Differential)    Collection Time: 10/23/19  8:25 AM   Result Value Ref Range    WBC 9.5 4.0 - 11.0 thou/uL    RBC 3.41 (L) 4.40 - 6.20 mill/uL    Hemoglobin 10.6 (L) 14.0 - 18.0 g/dL    Hematocrit 33.3 (L) 40.0 - 54.0 %    MCV 98 80 - 100 fL    MCH 31.1 27.0 - 34.0 pg    MCHC 31.8 (L) 32.0 - 36.0 g/dL    RDW 13.8 11.0 - 14.5 %    Platelets 375 140 - 440 thou/uL    MPV 9.2 8.5 - 12.5 fL   Basic Metabolic Panel    Collection Time: 10/25/19  6:15 AM   Result Value Ref Range    Sodium 136 136 - 145 mmol/L    Potassium 3.8 3.5 - 5.0 mmol/L    Chloride 103 98 - 107 mmol/L    CO2 25 22 - 31 mmol/L    Anion Gap, Calculation 8 5 - 18 mmol/L    Glucose 87 70 - 125 mg/dL    Calcium 8.6 8.5 - 10.5 mg/dL    BUN 52 (H) 8 - 28 mg/dL    Creatinine 2.44 (H) 0.70 - 1.30 mg/dL    GFR MDRD Af Amer 31 (L) >60 mL/min/1.73m2    GFR MDRD Non Af Amer 26 (L) >60 mL/min/1.73m2   HM2(CBC w/o Differential)    Collection Time: 10/25/19  6:15 AM   Result Value Ref Range    WBC 7.4 4.0 - 11.0 thou/uL    RBC 2.72 (L) 4.40 - 6.20 mill/uL    Hemoglobin 8.6 (L) 14.0 - 18.0 g/dL    Hematocrit 26.2 (L) 40.0 - 54.0 %    MCV 96 80 - 100 fL    MCH 31.6 27.0 - 34.0 pg    MCHC 32.8 32.0 - 36.0 g/dL    RDW 13.6 11.0 - 14.5 %    Platelets 311 140 - 440 thou/uL    MPV 9.5 8.5 - 12.5 fL   Occult Blood, Fecal    Collection Time: 10/27/19  6:30 PM   Result Value Ref Range    Occult Blood, Stool #1 Negative Negative              KATIE Florez

## 2021-06-19 NOTE — LETTER
Letter by Kristi Trevizo CNP at      Author: Kristi Trevizo CNP Service: -- Author Type: --    Filed:  Encounter Date: 11/5/2019 Status: Signed         Patient: Suman Nagy   MR Number: [id number]   YOB: 1940   Date of Visit: 11/5/2019     [facility]    Facility:   [facility] [[id number]]   Code Status: POLST AVAILABLE  PCP: Telly Torre MD   Phone: [phone]   Fax: [phone]      CHIEF COMPLAINT/REASON FOR VISIT:  Chief Complaint   Patient presents with   ? Discharge Summary       HISTORY COURSE:    Suman is a 79 y.o. male who is a recent transfer from [facility] admission on 10/52/19 and discharged on 10/19/2019. 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  He was found to have a UTI with Klebsiella pneumonia started on Rocephin,, resumed on Keflex to complete 2-week course.  Per his CKD stage IV, creatinine was 2.37, was on Lasix twice a day for chronic leg edema since August 2019, which is on hold.  We will follow-up with neurology as outpatient.  Norvasc dose is decreased due to chronic leg edema.  Continue lymphedema care.  At discharge he was resumed on amlodipine 5 mg daily, clonidine 0.1 mg 3 times a day, metoprolol 25 mg daily for hypertension.  Per his history of CVA he resumed on statin and Plavix.  Per diabetes he resumed on diabetic diet, glyburide, metformin.  Also has history of bladder, colon, rectal cancer, recent CT showed no malignancy.  He is also seen urology outpatient with a cystoscopy couple weeks ago which was also negative.  [facility] was [facility].    Today Mr. Nagy is being evaluated for a review of multiple medical conditions prior to discharge from TCU.  He  denied any concerns at this visit and is looking forward to returning home.  His blood pressure has been stable on his current antihypertensive regimen with SBP 120s.  He reported that his urinary symptoms have resolved at this time.  The patient denied lightheadedness, dizziness, breathing difficulty, chest pain, palpitations, constipation, urinary symptoms, numbness or tingling in extremities, and pain.  Nursing staff denied any new concerns for the patient at this time.    Past Medical History:   Diagnosis Date   ? Anemia    ? Bladder cancer (H)    ? Cancer (H)     Rectosigmoid   ? Diabetes mellitus (H)    ? Hyperkalemia    ? Hypertension    ? Seizure (H)     possible, one time occurence   ? Stroke (H)     multiple, residual left sided weakness, last CVA in July 2015 caused some speech deficit   ? Superficial phlebitis    ? Venous insufficiency of both lower extremities      Past Surgical History:   Procedure Laterality Date   ? COLON SURGERY      Colectomy Low Anterior Resection   ? EYE SURGERY      Cataract Extraction   ? LAPAROSCOPIC COLON RESECTION N/A 6/1/2017    Procedure: LAPAROSCOPIC ASSISTED COLECTOMY LOW ANTERIOR RESECTION AND DIVERTING LOOP ILEOSTOMY, PROCTOSOPY;  Surgeon: Tyson Parry MD;  Location: [facility];  Service:    ? SC CLOSE ENTEROSTOMY N/A 8/15/2017    Procedure: ILEOSTOMY CLOSURE LOOP ;  Surgeon: Tyson Parry MD;  Location: [facility];  Service: General   ? SC CYSTOURETHROSCOPY,FULGUR <0.5 CM LESN N/A 9/1/2015    Procedure: CYSTOSCOPY TRANSURETHRAL RESECTION BLADDER TUMOR;  Surgeon: Cleveland Nair MD;  Location: [facility];  Service: Urology   ? SC CYSTOURETHROSCOPY,FULGUR <0.5 CM LESN N/A 12/22/2015    Procedure: CYSTOSCOPY TRANSURETHRAL RESECTION BLADDER TUMOR AND BLADDER BIOPSIES;  Surgeon: Cleveland Nair MD;  Location: [facility];  Service: Urology   ? TURBT      X2   ? VASECTOMY        Social History     Socioeconomic History   ? Marital  status:      Spouse name: None   ? Number of children: None   ? Years of education: None   ? Highest education level: None   Occupational History   ? None   Social Needs   ? Financial resource strain: None   ? Food insecurity:     Worry: None     Inability: None   ? Transportation needs:     Medical: None     Non-medical: None   Tobacco Use   ? Smoking status: Former Smoker     Last attempt to quit: 1995     Years since quittin.1   ? Smokeless tobacco: Never Used   Substance and Sexual Activity   ? Alcohol use: No   ? Drug use: No   ? Sexual activity: Not Currently     Partners: Female   Lifestyle   ? Physical activity:     Days per week: None     Minutes per session: None   ? Stress: None   Relationships   ? Social connections:     Talks on phone: None     Gets together: None     Attends Jainism service: None     Active member of club or organization: None     Attends meetings of clubs or organizations: None     Relationship status: None   ? Intimate partner violence:     Fear of current or ex partner: None     Emotionally abused: None     Physically abused: None     Forced sexual activity: None   Other Topics Concern   ? None   Social History Narrative   ? None         PHYSICAL EXAM:   Resp 16     General Appearance:    Alert, cooperative, no distress, appears stated age   Head:    Normocephalic, without obvious abnormality, atraumatic   Eyes:    PERRL, conjunctiva/corneas clear,  both eyes        Ears:    Normal  external ear canals, both ears   Nose:   Nares normal, septum midline, mucosa normal, no drainage    or sinus tenderness   Throat:   Lips, mucosa, and tongue normal; teeth and gums normal   Neck:   Supple, symmetrical, trachea midline, no adenopathy;        thyroid:  No enlargement/tenderness/nodules; no carotid    bruit or JVD   Back:     Symmetric, no curvature, ROM normal, no CVA tenderness   Lungs:     Clear to auscultation bilaterally, respirations unlabored   Chest wall:    No  tenderness or deformity   Heart:    Regular rate and rhythm, S1 and S2 normal, no murmur, rub   or gallop   Abdomen:     Soft, non-tender, bowel sounds active all four quadrants,     no masses, no organomegaly   Extremities:   Extremities normal, atraumatic, no cyanosis or edema   Pulses:   2+ and symmetric all extremities   Skin:   Skin color, texture, turgor normal, no rashes or lesions   Neurologic:   Oriented to person, place, time, and situation; exhibits logical thought processes; generalized weakness       MEDICATION LIST:  Current Outpatient Medications   Medication Sig   ? acetaminophen (TYLENOL) 325 MG tablet Take 2 tablets (650 mg total) by mouth every 4 (four) hours as needed.   ? amLODIPine (NORVASC) 5 MG tablet Take 1 tablet (5 mg total) by mouth daily.   ? atorvastatin (LIPITOR) 20 MG tablet Take 1 tablet (20 mg total) by mouth daily.   ? cloNIDine HCl (CATAPRES) 0.3 MG tablet TAKE 1/2 PILL IN THE MORNING. TAKE 1 FULL PILL AT BEDTIME.   ? clopidogrel (PLAVIX) 75 mg tablet TAKE ONE (1) TABLET BY MOUTH DAILY   ? cyanocobalamin 1,000 mcg/mL injection Inject 1,000 mcg into the shoulder, thigh, or buttocks every 3 (three) months.          ? cyanocobalamin, vitamin B-12, (VITAMIN B-12) 1,000 mcg Subl Place 1 tablet (1,000 mcg total) under the tongue daily. (Patient taking differently: Place 500 mcg under the tongue daily.       )   ? diclofenac sodium (VOLTAREN) 1 % Gel Apply 4 g topically 3 (three) times a day. Apply to knees   ? INJECT EASE LANCETS 30 gauge Misc Use daily as directed (4-6 times daily)   ? insulin aspart U-100 (NOVOLOG) 100 unit/mL injection Inject under the skin 3 (three) times a day before meals. <140                           0U  141-180                      1U  181-220                      2U  221-260                      4U  261-300                      6U  301-350                      8U  351-400                     12U  >400                          Call provider   ? metoprolol  "succinate (TOPROL-XL) 25 MG Take 1 tablet (25 mg total) by mouth daily.   ? ONETOUCH ULTRA BLUE TEST STRIP strips USE AS DIRECTED 6 TIMES PER DAY   ? psyllium husk (DAILY FIBER ORAL) Take 1 tablet by mouth 2 (two) times a day.          ? sertraline (ZOLOFT) 50 MG tablet Take 50 mg by mouth daily.   ? SURE COMFORT PEN NEEDLE 31 gauge x 3/16\" Ndle Use one needle each night.       DISCHARGE DIAGNOSIS:    ICD-10-CM    1. Hypertension I10    2. Carotid artery stenosis, asymptomatic, left I65.22    3. 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 Slow transit constipation K59.01    5. B12 deficiency E53.8    6. Physical deconditioning R53.81        Physical deconditioning  -Discharge home from facility per therapy recommendations     Hypertension/CAD  -Encouraged cardiac diet, low sodium diet, exercise and stress reduction techniques.   -Emphasized importance of blood pressure control.   -Continue current medications as prescribed.   -Notify provider if pt's SBP<90 or >180 and DBP <50 or >100     DMII  Lab Results   Component Value Date    HGBA1C 7.6 (H) 10/28/2019   -Continue Novolog as prescribed  -Continue blood glucose checks four times a day AC/HS  -Notify provider with BG < 70 or > 450    Constipation  -Continue psyllium as prescribed  -Encouraged patient to increase fiber in diet, eat a lot of fruits and vegetables, increase water intake  -Exercise as much as possible  -Notify provider if patient has loose stools or no BM for >3 days    Vitamin B12 deficiency  Vitamin B-12   Date Value Ref Range Status   10/28/2019 >2,000 (H) 213 - 816 pg/mL Final   -Follow-up with PCP for further recommendations    Otherwise continue current care plan for all other chronic medical conditions, as they are stable. Encouraged patient to engage in healthy lifestyle behaviors such as engaging in social activities, exercising (PT/OT), eating well, and following care plan. Follow up for routine check-up, or sooner if " needed. Will continue to monitor patient and work with nursing staff collaboratively to work toward positive patient outcomes.     MEDICAL EQUIPMENT NEEDS:  The patient has mobility limitation significantly impairing his ability to participate in mobility-related activities of daily living, such as toileting, feeding, dressing, grooming, and bathing in customary locations in the home. The mobility limitation cannot be sufficiently and safely resolved by use of an appropriately fitted walker. The patient or caregiver is able to safely use a walker. The patient's functional mobility deficit can be sufficiently resolved by the use of a walker.  The patient has mobility limitations that impairs his ability to perform ADLs without use of a walker to be mobile in the home.      DISCHARGE PLAN/FACE TO FACE:  I certify that services are/were furnished while this patient was under the care of a physician and that a physician or an allowed non-physician practitioner (NPP), had a face-to-face encounter that meets the physician face-to-face encounter requirements. The encounter was in whole, or in part, related to the primary reason for home health. The patient is confined to his home and needs intermittent skilled nursing, physical therapy, speech-language pathology, or the continued need for occupational therapy. A plan of care has been established by a physician and is periodically reviewed by a physician.  Date of Face-to-Face Encounter: 11/5/19    I certify that, based on my findings, the following services are medically necessary home health services: home health aid for bathing and ADLs, skilled nursing (RN) for medication set-up and teaching, symptoms and disease process monitoring and education, PT for strengthening, balance, endurance and safety within the home, OT for strengthening, ADL needs, adaptive equipment and safety.     My clinical findings support the need for the above skilled services because: (Please  write a brief narrative summary that describes what the RN, PT, SLP, or other services will be doing in the home. A list of diagnoses in this section does not meet the CMS requirements.) home health aid for bathing and ADLs, skilled nursing (RN) for medication set-up and teaching, symptoms and disease process monitoring and education, PT for strengthening, balance, endurance and safety within the home, OT for strengthening, ADL needs, adaptive equipment and safety.     This patient is homebound because: (Please write a brief narrative summary describing the functional limitations as to why this patient is homebound and specifically what makes this patient homebound.) he is a frail elderly gentleman with multiple comorbidities and recent hospitalizations making it unsafe for him to go out into the community for services.     The patient is, or has been, under my care and I have initiated the establishment of the plan of care. This patient will be followed by a physician who will periodically review the plan of care.    Schedule follow up visit with primary care provider within 7 days to reestablish care.    Anticipated discharge date: 11/6/19    Electronically signed by: Kristi Trevizo CNP

## 2021-06-19 NOTE — LETTER
Letter by Telly Torer MD at      Author: Telly Torre MD Service: -- Author Type: --    Filed:  Encounter Date: 4/18/2019 Status: (Other)         Suman Nagy  [address] [facility] [address]             April 18, 2019         Dear Mr. Nagy,    Below are the results from your recent visit:    Resulted Orders   Comprehensive Metabolic Panel   Result Value Ref Range    Sodium 136 136 - 145 mmol/L    Potassium 4.5 3.5 - 5.0 mmol/L    Chloride 106 98 - 107 mmol/L    CO2 19 (L) 22 - 31 mmol/L    Anion Gap, Calculation 11 5 - 18 mmol/L    Glucose 169 (H) 70 - 125 mg/dL    BUN 30 (H) 8 - 28 mg/dL    Creatinine 2.09 (H) 0.70 - 1.30 mg/dL    GFR MDRD Af Amer 37 (L) >60 mL/min/1.73m2    GFR MDRD Non Af Amer 31 (L) >60 mL/min/1.73m2    Bilirubin, Total 0.3 0.0 - 1.0 mg/dL    Calcium 9.0 8.5 - 10.5 mg/dL    Protein, Total 6.3 6.0 - 8.0 g/dL    Albumin 3.6 3.5 - 5.0 g/dL    Alkaline Phosphatase 118 45 - 120 U/L    AST 10 0 - 40 U/L    ALT <9 0 - 45 U/L    Narrative    Fasting Glucose reference range is 70-99 mg/dL per  American Diabetes Association (ADA) guidelines.   Glycosylated Hemoglobin A1c   Result Value Ref Range    Hemoglobin A1c 6.4 (H) 3.5 - 6.0 %       Creatinine level stable.  Potassium level is normal.  Liver tests are normal.    Excellent control of diabetes with hemoglobin A1c of 6.4%.    No change in treatment plan.    Please call with questions or contact us using [email or web address].    Sincerely,        Electronically signed by Telly Torre MD

## 2021-06-19 NOTE — LETTER
Letter by Mir De La Cruz CNP at      Author: Mir De La Cruz CNP Service: -- Author Type: --    Filed:  Encounter Date: 8/21/2019 Status: (Other)         Suman Nagy  [address] [facility] [address]             August 21, 2019         Dear Mr. Nagy,    Below are the results from your recent visit:    Resulted Orders   Basic Metabolic Panel   Result Value Ref Range    Sodium 139 136 - 145 mmol/L    Potassium 3.6 3.5 - 5.0 mmol/L    Chloride 103 98 - 107 mmol/L    CO2 25 22 - 31 mmol/L    Anion Gap, Calculation 11 5 - 18 mmol/L    Glucose 228 (H) 70 - 125 mg/dL    Calcium 8.9 8.5 - 10.5 mg/dL    BUN 36 (H) 8 - 28 mg/dL    Creatinine 2.19 (H) 0.70 - 1.30 mg/dL    GFR MDRD Af Amer 35 (L) >60 mL/min/1.73m2    GFR MDRD Non Af Amer 29 (L) >60 mL/min/1.73m2    Narrative    Fasting Glucose reference range is 70-99 mg/dL per  American Diabetes Association (ADA) guidelines.       Potassium labs are still normal.    Kidney labs are slightly elevated.  We will hold off making any changes and recheck these values in 1 week when you come back to see me.    Please call with questions or contact us using [email or web address].    Sincerely,        Electronically signed by Mir De La Cruz CNP

## 2021-06-19 NOTE — LETTER
Letter by Gee Rincon NP at      Author: Gee Rincon NP Service: -- Author Type: --    Filed:  Encounter Date: 10/23/2019 Status: Signed         Patient: Suman Nagy   MR Number: [id number]   YOB: 1940   Date of Visit: 10/23/2019     [facility]      Facility:    [facility] [[id number]]  Code Status: FULL CODE      Chief Complaint/Reason for Visit:  011  Chief Complaint   Patient presents with   ? Review Of Multiple Medical Conditions       HPI:   Suman is a 79 y.o. male who is a recent transfer from [facility] admission on 10/52/19 and discharged on 10/19/2019. 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  He was found to have a UTI with Klebsiella pneumonia started on Rocephin,, resumed on Keflex to complete 2-week course.  Per his CKD stage IV, creatinine was 2.37, was on Lasix twice a day for chronic leg edema since August 2019, which is on hold.  We will follow-up with neurology as outpatient.  Norvasc dose is decreased due to chronic leg edema.  Continue lymphedema care.  At discharge he was resumed on amlodipine 5 mg daily, clonidine 0.1 mg 3 times a day, metoprolol 25 mg daily for hypertension.  Per his history of CVA he resumed on statin and Plavix.  Per diabetes he resumed on diabetic diet, glyburide, metformin.  Also has history of bladder, colon, rectal cancer, recent CT showed no malignancy.  He is also seen urology outpatient with a cystoscopy couple weeks ago which was also negative.  He was socially discharged to TCU for rehab.    Past Medical History:  Past Medical History:   Diagnosis Date   ? Anemia    ? Bladder cancer (H)    ? Cancer (H)     Rectosigmoid   ? Diabetes mellitus (H)    ? Hyperkalemia     ? Hypertension    ? Seizure (H)     possible, one time occurence   ? Stroke (H)     multiple, residual left sided weakness, last CVA in July 2015 caused some speech deficit   ? Superficial phlebitis    ? Venous insufficiency of both lower extremities            Surgical History:  Past Surgical History:   Procedure Laterality Date   ? COLON SURGERY      Colectomy Low Anterior Resection   ? EYE SURGERY      Cataract Extraction   ? LAPAROSCOPIC COLON RESECTION N/A 6/1/2017    Procedure: LAPAROSCOPIC ASSISTED COLECTOMY LOW ANTERIOR RESECTION AND DIVERTING LOOP ILEOSTOMY, PROCTOSOPY;  Surgeon: Tyson Parry MD;  [place] [facility]  Service:    ? RI CLOSE ENTEROSTOMY N/A 8/15/2017    Procedure: ILEOSTOMY CLOSURE LOOP ;  Surgeon: Tyson Parry MD;  [place] [facility]  Service: General   ? RI CYSTOURETHROSCOPY,FULGUR <0.5 CM LESN N/A 9/1/2015    Procedure: CYSTOSCOPY TRANSURETHRAL RESECTION BLADDER TUMOR;  Surgeon: Cleveland Nair MD;  [place] [facility]  Service: Urology   ? RI CYSTOURETHROSCOPY,FULGUR <0.5 CM LESN N/A 12/22/2015    Procedure: CYSTOSCOPY TRANSURETHRAL RESECTION BLADDER TUMOR AND BLADDER BIOPSIES;  Surgeon: Cleveland Nair MD;  [place] [facility]  Service: Urology   ? TURBT      X2   ? VASECTOMY         Family History:   Family History   Problem Relation Age of Onset   ? COPD Father        Social History:    Social History     Socioeconomic History   ? Marital status:      Spouse name: Not on file   ? Number of children: Not on file   ? Years of education: Not on file   ? Highest education level: Not on file   Occupational History   ? Not on file   Social Needs   ? Financial resource strain: Not on file   ? Food insecurity:     Worry: Not on file     Inability: Not on file   ? Transportation needs:     Medical: Not on file     Non-medical: Not on file   Tobacco Use   ? Smoking status: Former Smoker     Last attempt to quit: 8/31/1995     Years  since quittin.1   ? Smokeless tobacco: Never Used   Substance and Sexual Activity   ? Alcohol use: No   ? Drug use: No   ? Sexual activity: Not Currently     Partners: Female   Lifestyle   ? Physical activity:     Days per week: Not on file     Minutes per session: Not on file   ? Stress: Not on file   Relationships   ? Social connections:     Talks on phone: Not on file     Gets together: Not on file     Attends [employer] service: Not on file     Active member of club or organization: Not on file     Attends meetings of clubs or organizations: Not on file     Relationship status: Not on file   ? Intimate partner violence:     Fear of current or ex partner: Not on file     Emotionally abused: Not on file     Physically abused: Not on file     Forced sexual activity: Not on file   Other Topics Concern   ? Not on file   Social History Narrative   ? Not on file          Review of Systems   Constitutional: Positive for activity change and fatigue. Negative for appetite change, diaphoresis and fever.   HENT: Positive for trouble swallowing. Negative for congestion and hearing loss.    Eyes: Negative.    Respiratory: Negative for shortness of breath and wheezing.    Cardiovascular: Positive for leg swelling.   Gastrointestinal: Negative for abdominal distention, abdominal pain, blood in stool, constipation, diarrhea and nausea.   Endocrine: Negative.    Genitourinary: Negative for difficulty urinating.   Musculoskeletal:        Denies pain   Skin:        intact   Allergic/Immunologic: Negative.    Neurological: Negative for dizziness, tremors, speech difficulty and light-headedness.   Hematological: Negative.    Psychiatric/Behavioral: Negative for agitation, confusion and hallucinations. The patient is not nervous/anxious.        Vitals:    10/23/19 1930   BP: 166/74   Pulse: 93   Resp: 16   Temp: 97  F (36.1  C)   SpO2: 91%   Weight: 179 lb (81.2 kg)       Physical Exam  Vitals signs reviewed.   Constitutional:        Appearance: He is obese.      Comments: Ongoing hyperglycemia   HENT:      Head: Normocephalic and atraumatic.      Right Ear: External ear normal.      Left Ear: External ear normal.      Nose: No congestion or rhinorrhea.      Mouth/Throat:      Pharynx: Oropharynx is clear. No oropharyngeal exudate or posterior oropharyngeal erythema.   Eyes:      General:         Right eye: No discharge.         Left eye: No discharge.   Cardiovascular:      Rate and Rhythm: Normal rate.      Heart sounds: No murmur. No friction rub. No gallop.    Pulmonary:      Effort: No respiratory distress.      Breath sounds: No wheezing or rales.      Comments: Dim, RA  Abdominal:      General: Bowel sounds are normal.      Comments: Denies constipation or diarrhea   Musculoskeletal:      Right lower leg: Edema present.      Left lower leg: Edema present.      Comments: tubigrips ordered, denies pain. L sided weakness per CVA in 2015   Skin:     General: Skin is warm and dry.      Comments: intact   Neurological:      Mental Status: He is oriented to person, place, and time.   Psychiatric:         Mood and Affect: Mood normal.         Behavior: Behavior normal.      Comments: No behaviors reported         Medication List:  Current Outpatient Medications   Medication Sig   ? insulin aspart U-100 (NOVOLOG) 100 unit/mL injection Inject under the skin 3 (three) times a day before meals. <140                           0U  141-180                      1U  181-220                      2U  221-260                      4U  261-300                      6U  301-350                      8U  351-400                     12U  >400                          Call provider   ? acetaminophen (TYLENOL) 325 MG tablet Take 2 tablets (650 mg total) by mouth every 4 (four) hours as needed.   ? amLODIPine (NORVASC) 5 MG tablet Take 1 tablet (5 mg total) by mouth daily.   ? atorvastatin (LIPITOR) 20 MG tablet Take 1 tablet (20 mg total) by mouth daily.   ?  "cephalexin (KEFLEX) 250 MG capsule Take 1 capsule (250 mg total) by mouth 2 (two) times a day for 6 days.   ? cloNIDine HCl (CATAPRES) 0.3 MG tablet TAKE 1/2 PILL IN THE MORNING. TAKE 1 FULL PILL AT BEDTIME.   ? clopidogrel (PLAVIX) 75 mg tablet TAKE ONE (1) TABLET BY MOUTH DAILY   ? cyanocobalamin 1,000 mcg/mL injection Inject 1,000 mcg into the shoulder, thigh, or buttocks every 3 (three) months.          ? cyanocobalamin, vitamin B-12, (VITAMIN B-12) 1,000 mcg Subl Place 1 tablet (1,000 mcg total) under the tongue daily.   ? furosemide (LASIX) 40 MG tablet 40 mg daily prn for leg edema   ? glimepiride (AMARYL) 4 MG tablet ONE PILL TWICE PER DAY WITH LUNCH AND SUPPER (Patient taking differently: Take 4 mg by mouth twice a day with lunch and supper.       )   ? INJECT EASE LANCETS 30 gauge Misc Use daily as directed (4-6 times daily)   ? metFORMIN (GLUCOPHAGE) 1000 MG tablet Take 1,000 mg by mouth 2 (two) times a day with meals.   ? metoprolol succinate (TOPROL-XL) 25 MG Take 1 tablet (25 mg total) by mouth daily.   ? ONETOUCH ULTRA BLUE TEST STRIP strips USE AS DIRECTED 6 TIMES PER DAY   ? psyllium husk (DAILY FIBER ORAL) Take 1 tablet by mouth 2 (two) times a day.          ? sertraline (ZOLOFT) 50 MG tablet Take 50 mg by mouth daily.   ? SURE COMFORT PEN NEEDLE 31 gauge x 3/16\" Ndle Use one needle each night.       Labs:  Results for orders placed or performed in visit on 10/23/19   Basic Metabolic Panel   Result Value Ref Range    Sodium 137 136 - 145 mmol/L    Potassium 3.6 3.5 - 5.0 mmol/L    Chloride 103 98 - 107 mmol/L    CO2 24 22 - 31 mmol/L    Anion Gap, Calculation 10 5 - 18 mmol/L    Glucose 135 (H) 70 - 125 mg/dL    Calcium 9.3 8.5 - 10.5 mg/dL    BUN 51 (H) 8 - 28 mg/dL    Creatinine 2.42 (H) 0.70 - 1.30 mg/dL    GFR MDRD Af Amer 31 (L) >60 mL/min/1.73m2    GFR MDRD Non Af Amer 26 (L) >60 mL/min/1.73m2     Lab Results   Component Value Date    WBC 9.5 10/23/2019    HGB 10.6 (L) 10/23/2019    HCT 33.3 " (L) 10/23/2019    MCV 98 10/23/2019     10/23/2019     Lab Results   Component Value Date    HGBA1C 6.7 (H) 07/10/2019     No results found for: TSH    No results found for: [id number]    Vitamin B-12   Date Value Ref Range Status   05/03/2017 >2,000 (H) 213 - 816 pg/mL Final     Mag 1.9    Assessment/Plan:    UTI for Klebsiella pneumonia: Initially treated with Rocephin, discharged on Keflex 250 mg twice daily, end 10/25    Chronic kidney disease stage IV: Lasix on hold follow-up with, nephrology outpatient.  Last creatinine 2.42 with a GFR of 26 on 10/23/2019    Hypertension: Continue clonidine 0.15 mg in a.m. and 0.3 mg at bedtime, metoprolol succinate 25 mg daily and amlodipine 5 mg (weaned from 10mg in hospital)    History of stroke in 7/2015 with residual left-sided hemiplegia: Continue Plavix 75 mg daily and atorvastatin 20 mg daily    Vitamin B12 deficiency: Continue B12 1000 MCG daily and 1000 MCG IM injection every 90 days, recheck B12    Controlled type 2 diabetes: Continue glimepiride 4 mg twice daily (at lunch and supper), metformin 1000 mg twice daily and start NovoLog sliding scale, recheck A1c    Depression: Continue sertraline 50 mg daily    Dysphasia: Speech to evaluate and treat    Constipation: Continue psyllium husk 1 tab twice daily    Pain control: Continue Tylenol 650 mg every 4 hours as needed, denies pain    Disposition: Plans to return home, pending cognitive assessment    The care plan has been reviewed and all orders signed. Changes to care plan, if any, as noted. Otherwise, continue care plan of care.  The total time spent with this patient was 35 minutes, with greater than 50% spent in counseling and coordination of care that included multiple issues per kidney fx, speech consult, DM, Htn and therapy which lasted 20 minutes.     Post Discharge Medication Reconciliation Status: discharge medications reconciled, continue medications without change      Electronically signed by:  Gee Rincon, NP

## 2021-06-19 NOTE — PROGRESS NOTES
[facility] Follow Up Note    Suman Nagy   78 y.o. male    Date of Visit: 7/17/2018    Chief Complaint   Patient presents with     Follow-up     1 mo follow up, BP     Subjective  Luke is here with his wife, Jessica    Patient has had chronic lacunar strokesn.  Also has other children at home and gets essential 24 hour care.    Patient is here for follow-up of multiple medical issues.  In the past, some mild cognitive deficit.  He gets his medication set up by his children.    Last MRI with chronic lacunar infarcts July 2015.  No history of atrial fibrillation.  Normal echo in 2015.    He does have some peripheral neuropathy with unsteady gait.  No significant pain.  He does have a cane but does not use it often.  No falls.    He does have nephropathy with urine microalbumin 1103 in December 2017.    He had low blood pressure as he was recovering from his colon cancer surgery and developed acute renal failure and had his losartan discontinued last year.    In May of this year I had him restart losartan at 25 mg a day.  He tolerated that well.  He denies lightheaded dizzy spells.  On June 12 his blood pressure was 140/76.    He continues to complain of fatigue and dry mouth with the clonidine 0.3 mg once a day.    Also on Toprol-XL 12.5 mg a day.    No lower extremity edema.    No chest pain or palpitations.  No history of ischemic heart disease.    He does have carotid artery stenosis of moderate severity it was stable on carotid ultrasound last month.  Left carotid 50-69%.  No history of surgical intervention.  He is back on Lipitor 20 mg a day which was restarted last month.  Still on Plavix 75 mg a day.    Patient did have a stage IIIa colon cancer of the rectum resected last summer.  He had neoadjuvant chemotherapy and radiation.  He has had some irritable bowel and rectal incontinence issues since but stable.  He had a full body CT scan in March 2018 was negative.  CEA level was 3.9 in  May.  Had a colonoscopy in June that did show at least one polyp, the wife reports it to be a tubular adenoma.  He was given a 2 year follow-up colonoscopy but does have a follow-up with the colorectal clinic in November of this year plan.    Chronic anxiety and depression controlled on Zoloft.    Past history of bladder cancer with TURBT in 2015.  Cystoscopy negative March 2018 with plan follow-up in September or this fall.  Her graph he quit smoking in 1995.  No new cough or hemoptysis.    Diabetes type 2 remains well controlled blood sugars largely 120s-150s.  Size blood sugar on his birthday was 180.  No low blood sugars.  No foot sores.  Continues on metformin 1000 mg twice a day and Amaryl 4 mg with lunch and supper.  Hemoglobin A1c in March was 7.4%.    It has been over a year since he seen the eye doctor.    PMHx:    Past Medical History:   Diagnosis Date     Anemia      Bladder cancer (H)      Cancer (H)     Rectosigmoid     Diabetes mellitus (H)      Hyperkalemia      Hypertension      Seizure (H)     possible, one time occurence     Stroke (H)     multiple, residual left sided weakness, last CVA in July 2015 caused some speech deficit     Superficial phlebitis      Venous insufficiency of both lower extremities      PSHx:    Past Surgical History:   Procedure Laterality Date     COLON SURGERY      Colectomy Low Anterior Resection     EYE SURGERY      Cataract Extraction     LAPAROSCOPIC COLON RESECTION N/A 6/1/2017    Procedure: LAPAROSCOPIC ASSISTED COLECTOMY LOW ANTERIOR RESECTION AND DIVERTING LOOP ILEOSTOMY, PROCTOSOPY;  Surgeon: Tyson Parry MD;  Location: [facility];  Service:      [place] CLOSE ENTEROSTOMY N/A 8/15/2017    Procedure: ILEOSTOMY CLOSURE LOOP ;  Surgeon: Tyson Parry MD;  Location: [facility];  Service: General     [place] CYSTOURETHROSCOPY,FULGUR <0.5 CM LESN N/A 9/1/2015    Procedure: CYSTOSCOPY TRANSURETHRAL RESECTION BLADDER TUMOR;  Surgeon: Cleveland Nair MD;   "Location: [facility];  Service: Urology     [place] CYSTOURETHROSCOPY,FULGUR <0.5 CM KANDY N/A 12/22/2015    Procedure: CYSTOSCOPY TRANSURETHRAL RESECTION BLADDER TUMOR AND BLADDER BIOPSIES;  Surgeon: Cleveland Nair MD;  Location: [facility];  Service: Urology     TURBT      X2     VASECTOMY       Immunizations:   Immunization History   Administered Date(s) Administered     Influenza [id number], 01/18/2010     Influenza high dose, seasonal 09/15/2015, 09/26/2016, 10/03/2017     Influenza, Seasonal, Inj PF IIV3 09/27/2010, 09/14/2012, 09/27/2013     Influenza, inj, historic,unspecified 10/19/2007, 09/16/2011, 10/15/2015     Influenza, seasonal,quad inj 6-35 mos 09/18/2009, 10/17/2014     Pneumo Conj 13-V (2010&after) 01/20/2015     Pneumo Polysac 23-V 10/08/2004     Td,adult,historic,unspecified 07/01/2004     Tdap 05/20/2015       ROS A comprehensive review of systems was performed and was otherwise negative    Medications, allergies, and problem list were reviewed and updated    Exam  /70  Pulse 66  Ht 5' 7\" (1.702 m)  Wt 184 lb (83.5 kg)  SpO2 98%  BMI 28.82 kg/m2  Global weakness with unsteady gait.  He is able to stand and ambulate up on table but with standby assistance.  Lungs are clear to auscultation without wheezing or crackles.  No ankle edema today, he said trace edema in the past.  Moderate kyphosis.  Heart is regular without murmur and abdomen is nontender.    Assessment/Plan  1. Controlled type 2 diabetes with neuropathy (H)  Well-controlled and no low blood sugars.  Continue metformin and Amaryl.  He was warned about risk of low blood sugars and told report any low blood sugars immediately.  - Glycosylated Hemoglobin A1c    Patient's wife was reminded to set up his routine yearly eye exam for diabetic check    I again encouraged him to use his walker for ambulation given his significant neuropathy and global deconditioning and unsteady gait.    2. Carotid artery stenosis, " asymptomatic, left  Asymptomatic.  Continue medical management.  Continue Lipitor 20 mg a day.  I will not plan further titration at this time given his global muscle atrophy and deconditioning.  Could consider increased dose of Lipitor at a later time.    Continue Plavix daily.    3. History of rectal cancer, no evidence of recurrence  Follow-up with colorectal clinic in November as planned.    2 year follow-up on colonoscopy was recommended from June of this year.    4. History of bladder cancer  No evidence of recurrence at this time.  Follow-up with urology in September for six-month cystoscopy follow-up.    5. History of stroke  Small vessel ischemic disease suspected.  No evidence of new event.  No history of atrial fibrillation noted.  Goal for excellent blood pressure control and avoiding hypotension.    Medical management with Lipitor and Plavix.  6. Medication monitoring encounter    - Comprehensive Metabolic Panel    7. B12 deficiency  B12 shot today.  He has been getting monthly B12 shots for quite some time.  I will plan to change to every 3 months in the future.    8. Anxiety  Stable and controlled on sertraline 50 mg a day.    9. Essential hypertension  History of nephropathy with elevated urine microalbumin.  He had acute renal failure last year with hypotension related to his bowel surgery but that has  Creatinine in the 1.3 range on check in May and June.    Recheck today.    I did recommend further increase of the losartan given his diabetic nephropathy, did explain there is chance of acute renal failure and kidney injury with higher dose of losartan, especially if he develops volume depletion.  He accepts this risk and does wish to proceed with higher dose of losartan.    His blood pressure is well-controlled and goal to avoid hypotension.  Also he has had significant fatigue and dry mouth associated with the clonidine.    I did warn him on risk of rebound hypertension and high spiking blood  pressures as he weans down the clonidine.    Plan for blood pressure monitoring and can give an extra dose of clonidine if needed for blood pressure spiked above 160/90.    Lower clonidine down to 0.1 mg daily.    Biotene artificial saliva for dry mouth.    - cloNIDine HCl (CATAPRES) 0.1 MG tablet; Take 1 tablet (0.1 mg total) by mouth daily.  Dispense: 30 tablet; Refill: 2  - losartan (COZAAR) 50 MG tablet; Take 1 tablet (50 mg total) by mouth daily.  Dispense: 30 tablet; Refill: 3    Return in about 2 weeks (around 7/31/2018) for Recheck.   Patient Instructions   Reduce clonidine down to a 0.1 mg size pill once a day.  This replaces your 0.3 mg clonidine pill.    Increase losartan to 50 mg a day.    Check blood pressure.  If blood pressure gets above 160/90, take an extra 0.1 mg pill of clonidine once and call the office for further evaluation.    If blood pressure gets less than 110/60, call the office to reevaluate medication.    Follow-up in 2-3 weeks for blood pressure reevaluation.    Lab work today to check hemoglobin A1c and kidney function.    Report any low blood sugars less than 80 immediately.    Make a routine ophthalmology appointment this summer for diabetic checkup.    B12 shot today.    You can use the Biotene artificial saliva for dry mouth.    Telly Torre MD  I spent a total time with patient of over 25 minutes and over 50% coord care.  Time all face to face.      Current Outpatient Prescriptions   Medication Sig Dispense Refill     atorvastatin (LIPITOR) 20 MG tablet Take 1 tablet (20 mg total) by mouth daily. 90 tablet 1     blood glucose test (ONETOUCH ULTRA BLUE TEST STRIP) strips Use 1 each As Directed 6 (six) times a day. (E11.9) 600 strip 1     cholecalciferol, vitamin D3, 1,000 unit tablet Take 1,000 Units by mouth daily.       clopidogrel (PLAVIX) 75 mg tablet Take one (1) tablet by mouth daily 30 tablet 5     cyanocobalamin 1,000 mcg/mL injection Inject 1,000 mcg into the shoulder,  "thigh, or buttocks every 30 (thirty) days.       glimepiride (AMARYL) 4 MG tablet One pill twice per day with lunch and supper 180 tablet 3     INJECT EASE LANCETS 30 gauge Misc Use daily as directed (4-6 times daily) 600 each 0     losartan (COZAAR) 50 MG tablet Take 1 tablet (50 mg total) by mouth daily. 30 tablet 3     metFORMIN (GLUCOPHAGE) 1000 MG tablet Take 1 tablet (1,000 mg total) by mouth 2 (two) times a day with meals. 180 tablet 1     metoprolol succinate (TOPROL-XL) 25 MG Take 0.5 tablets (12.5 mg total) by mouth daily. 30 tablet 3     sertraline (ZOLOFT) 50 MG tablet Take one & one -half (1&1/2) tablets by mouth daily (Patient taking differently: one tablet daily) 135 tablet 0     SURE COMFORT PEN NEEDLE 31 gauge x 3/16\" Ndle Use one needle each night. 100 each 2     cloNIDine HCl (CATAPRES) 0.1 MG tablet Take 1 tablet (0.1 mg total) by mouth daily. 30 tablet 2     Current Facility-Administered Medications   Medication Dose Route Frequency Provider Last Rate Last Dose     cyanocobalamin injection 1,000 mcg  1,000 mcg Intramuscular Q30 Days Jerry Kelsey MD   1,000 mcg at 03/30/18 1629     cyanocobalamin injection 1,000 mcg  1,000 mcg Intramuscular Q30 Days Telly Torre MD   1,000 mcg at 07/17/18 1546     Allergies   Allergen Reactions     Cefazolin      \"felt very hot\"     Pravastatin      Increased peripheral neuropathy     Simvastatin      Increased peripheral neuropathy     Thiazides      Other: Gout       Social History   Substance Use Topics     Smoking status: Former Smoker     Quit date: 8/31/1995     Smokeless tobacco: Never Used     Alcohol use No           "

## 2021-06-19 NOTE — LETTER
Letter by Telly Torre MD at      Author: Telly Torre MD Service: -- Author Type: --    Filed:  Encounter Date: 7/11/2019 Status: (Other)         Suman Nagy  [id number] [address]             July 11, 2019         Dear Mr. Nagy,    Below are the results from your recent visit:    Resulted Orders   Comprehensive Metabolic Panel   Result Value Ref Range    Sodium 138 136 - 145 mmol/L    Potassium 4.0 3.5 - 5.0 mmol/L    Chloride 103 98 - 107 mmol/L    CO2 25 22 - 31 mmol/L    Anion Gap, Calculation 10 5 - 18 mmol/L    Glucose 164 (H) 70 - 125 mg/dL    BUN 31 (H) 8 - 28 mg/dL    Creatinine 1.99 (H) 0.70 - 1.30 mg/dL    GFR MDRD Af Amer 39 (L) >60 mL/min/1.73m2    GFR MDRD Non Af Amer 32 (L) >60 mL/min/1.73m2    Bilirubin, Total 0.3 0.0 - 1.0 mg/dL    Calcium 9.0 8.5 - 10.5 mg/dL    Protein, Total 6.2 6.0 - 8.0 g/dL    Albumin 3.6 3.5 - 5.0 g/dL    Alkaline Phosphatase 107 45 - 120 U/L    AST 11 0 - 40 U/L    ALT <9 0 - 45 U/L    Narrative    Fasting Glucose reference range is 70-99 mg/dL per  American Diabetes Association (ADA) guidelines.   HM2(CBC w/o Differential)   Result Value Ref Range    WBC 5.7 4.0 - 11.0 thou/uL    RBC 3.52 (L) 4.40 - 6.20 mill/uL    Hemoglobin 11.3 (L) 14.0 - 18.0 g/dL    Hematocrit 33.6 (L) 40.0 - 54.0 %    MCV 96 80 - 100 fL    MCH 32.1 27.0 - 34.0 pg    MCHC 33.5 32.0 - 36.0 g/dL    RDW 12.4 11.0 - 14.5 %    Platelets 202 140 - 440 thou/uL    MPV 6.4 (L) 7.0 - 10.0 fL   Glycosylated Hemoglobin A1c   Result Value Ref Range    Hemoglobin A1c 6.7 (H) 3.5 - 6.0 %       Creatinine is stable.  Potassium level is normal.    Liver tests are normal.    Diabetes remains adequately controlled with hemoglobin A1c of 6.7%.    Mildly lower hemoglobin.    No change in plan for colonoscopy.    Please call with questions or contact us using MyChart.    Sincerely,        Electronically signed by Telly Torre MD

## 2021-06-19 NOTE — LETTER
Letter by Abbie Kessler MBBS at      Author: Abbie Kessler MBBS Service: -- Author Type: --    Filed:  Encounter Date: 10/21/2019 Status: Signed         Patient: Suman Nagy   MR Number: [id number]   YOB: 1940   Date of Visit: 10/21/2019       [facility] Admission note      Patient: Suman Nagy  MRN: [id number]  Date of Service: 10/21/2019      [facility] [[id number]]  Reason for Visit     Chief Complaint   Patient presents with   ? H & P       Code Status     FULL CODE    Assessment     -Acute Klebsiella UTI  -Acute   metabolic/ Toxic encephalopathy with persistent confusion noted in the TCU  -History of chronic kidney disease stage IV  DC creatinine is around 2.3   -History of diabetes type 2  -History of CVA with resultant left-sided weakness in 2015.  Continues on statin and Plavix.  -Bilateral lower extremity lymphedema  -Underlying history of bladder cancer status post cystoscopy recently  -Underlying history of rectal cancer with repeat imaging including a CT revealing no malignancy  -Profound generalized weakness with patient reporting difficulty with ambulation    Plan     Patient has been admitted to the TCU.  He was treated for Klebsiella UTI in the hospital with IV ceftriaxone and transitioned to oral Keflex.  His blood cultures did grow staph coag negative which is felt to be a contaminant.  He is afebrile and denies any dysuria   believes that his UTI stems from recent cystoscopy  He is on a lower dose of Keflex 250 mg twice daily for 6 days and can continue that in spite of renal impairment  As per patient he is on surveillance mode for his bladder cancer with no active treatment is being pursued currently.  He denies any hematuria.  He is a diabetic and his medications will be managed in the TCU.  He has had blood sugars generally in the 140s with highs in the 360+ will monitor trends.  Patient will be started on 4 times daily blood sugar  checks.  Insulin sliding scale regimen has been started and monitor trends.  He has a history of hypertension who is now on a low-dose of Norvasc 5MG  due to leg edema concerns.  His metoprolol dose was increased due to this.  He is also on a high dose of clonidine  Monitor blood pressures closely   Tubigrip stockings for lymphedema.    He is no longer on scheduled Lasix so will monitor trends before adding any Lasix to his regimen including recheck BMP  He does remain on statins and Plavix for underlying history of CVA with left-sided weakness but is more complaining of right leg weakness so will participate in therapy.  He did have repeat imaging done with underlying history of rectal cancer and bladder cancer with did not show any repeat malignancy.  Discharge creatinine was still high at 2.37 his Lasix was held.  Recheck BMP closely, monitor weight trends closely.  He is currently quite debilitated and will need extensive rehab and is wheelchair-bound nonambulatory    History     Patient is a very pleasant 79 y.o. male who is admitted to TCU  Patient was admitted with generalized weakness and work-up revealed that he had UTI.  Cultures grew Klebsiella.  He was treated with IV ceftriaxone.  He has been discharged on oral Keflex.  He is currently denying any dysuria.  Patient was noted to be acutely confused in the hospital he was felt to be confused secondary to metabolic encephalopathy secondary to underlying UTI.  Currently he is improved but not back to baseline.  He has significant issues with chronic lymphedema.  Due to this issue his Norvasc dosage has been discontinued.  He continues to have some limited edema in his legs.  In addition he has chronic kidney disease stage IV baseline creatinine is around 4.  His lymphedema requires him to be on diuretics which were stopped in the hospital.  He has been given a higher dose of metoprolol as he is no longer on Norvasc and Lasix.  Patient is also diabetic as  per him his diabetes has been adequately controlled he remains on a diabetic diet along with glimepiride and metformin.  He also has underlying history of bladder cancer and rectal cancer he denies any ongoing treatments for that he recently had a cystoscopy done.  He believes that his UTI stems from the cystoscopy that he underwent recently    Past Medical History     Active Ambulatory (Non-Hospital) Problems    Diagnosis   ? UTI (urinary tract infection)   ? 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 Anxiety   ? History of stroke   ? Medication monitoring encounter   ? History of bladder cancer   ? History of rectal cancer   ? Carotid artery stenosis, asymptomatic, left   ? Rectal cancer (H)   ? Hypertension   ? B12 deficiency   ? 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 Anemia    ? Bladder cancer (H)    ? Cancer (H)    ? Diabetes mellitus (H)    ? Hyperkalemia    ? Hypertension    ? Seizure (H)    ? Stroke (H)    ? Superficial phlebitis    ? Venous insufficiency of both lower extremities        Past Social History     Reviewed, and he  reports that he quit smoking about 24 years ago. He has never used smokeless tobacco. He reports that he does not drink alcohol or use drugs.    Family History     Reviewed, and family history includes COPD in his father.    Medication List   Post Discharge Medication Reconciliation Status: discharge medications reconciled and changed, per note/orders (see AVS)   Current Outpatient Medications on File Prior to Visit   Medication Sig Dispense Refill   ? acetaminophen (TYLENOL) 325 MG tablet Take 2 tablets (650 mg total) by mouth every 4 (four) hours as needed.  0   ? amLODIPine (NORVASC) 5 MG tablet Take 1 tablet (5 mg total) by mouth daily. 30 tablet 0   ? atorvastatin (LIPITOR) 20 MG tablet Take 1 tablet (20 mg total) by mouth daily. 90 tablet 3   ? cephalexin (KEFLEX) 250 MG capsule Take 1 capsule (250 mg  "total) by mouth 2 (two) times a day for 6 days. 12 capsule 0   ? cloNIDine HCl (CATAPRES) 0.3 MG tablet TAKE 1/2 PILL IN THE MORNING. TAKE 1 FULL PILL AT BEDTIME. 135 tablet 2   ? clopidogrel (PLAVIX) 75 mg tablet TAKE ONE (1) TABLET BY MOUTH DAILY 90 tablet 1   ? cyanocobalamin 1,000 mcg/mL injection Inject 1,000 mcg into the shoulder, thigh, or buttocks every 3 (three) months.            ? cyanocobalamin, vitamin B-12, (VITAMIN B-12) 1,000 mcg Subl Place 1 tablet (1,000 mcg total) under the tongue daily. 90 tablet 3   ? furosemide (LASIX) 40 MG tablet 40 mg daily prn for leg edema  0   ? glimepiride (AMARYL) 4 MG tablet ONE PILL TWICE PER DAY WITH LUNCH AND SUPPER (Patient taking differently: Take 4 mg by mouth twice a day with lunch and supper.       ) 180 tablet 3   ? INJECT EASE LANCETS 30 gauge Misc Use daily as directed (4-6 times daily) 600 each 3   ? metFORMIN (GLUCOPHAGE) 1000 MG tablet Take 1,000 mg by mouth 2 (two) times a day with meals.     ? metoprolol succinate (TOPROL-XL) 25 MG Take 1 tablet (25 mg total) by mouth daily. 30 tablet 0   ? ONETOUCH ULTRA BLUE TEST STRIP strips USE AS DIRECTED 6 TIMES PER  strip 3   ? psyllium husk (DAILY FIBER ORAL) Take 1 tablet by mouth 2 (two) times a day.            ? sertraline (ZOLOFT) 50 MG tablet Take 50 mg by mouth daily.     ? SURE COMFORT PEN NEEDLE 31 gauge x 3/16\" Ndle Use one needle each night. 100 each 2     No current facility-administered medications on file prior to visit.        Allergies     Allergies   Allergen Reactions   ? Cefazolin      \"felt very hot\"   ? Pravastatin      Increased peripheral neuropathy   ? Simvastatin      Increased peripheral neuropathy   ? Thiazides      Other: Gout         Review of Systems   A comprehensive review of 14 systems was done. Pertinent findings noted here and in history of present illness. All the rest negative.  Constitutional: Negative.  Negative for fever, chills, he has activity change, appetite " change and fatigue.   HENT: Negative for congestion and facial swelling.    Eyes: Negative for photophobia, redness and visual disturbance.   Respiratory: Negative for cough and chest tightness.    Cardiovascular: Negative for chest pain, palpitations and leg swelling.   Gastrointestinal: Negative for nausea, diarrhea, constipation, blood in stool and abdominal distention.   Genitourinary: Negative.    Musculoskeletal: Negative.  Reporting significant weakness especially in the right leg today  Skin: Negative.    Neurological: Negative for dizziness, tremors, syncope, weakness, light-headedness and headaches.   Hematological: Does not bruise/bleed easily.   Psychiatric/Behavioral: Negative.  Recalls remain somewhat impaired      Physical Exam     Recent Vitals 10/19/2019   Height -   Weight -   BSA (m2) -   /77   Pulse 100   Temp -   Temp src -   SpO2 -   Some recent data might be hidden       Constitutional: Oriented to person, place, and time and appears well-developed.  Frail and weak  HEENT:  Normocephalic and atraumatic.  Eyes: Conjunctivae and EOM are normal. Pupils are equal, round, and reactive to light. No discharge.  No scleral icterus. Nose normal. Mouth/Throat: Oropharynx is clear and moist. No oropharyngeal exudate.    NECK: Normal range of motion. Neck supple. No JVD present. No tracheal deviation present. No thyromegaly present.   CARDIOVASCULAR: Normal rate, regular rhythm and intact distal pulses.  Exam reveals no gallop and no friction rub.  Systolic murmur present.  PULMONARY: Effort normal and breath sounds normal. No respiratory distress.No Wheezing or rales.  ABDOMEN: Soft. Bowel sounds are normal. No distension and no mass.  There is no tenderness. There is no rebound and no guarding. No HSM.  MUSCULOSKELETAL: Normal range of motion.  Has chronic 1+ bilateral lower extremity edema and no tenderness. Mild kyphosis, no tenderness.  Reporting right lower extremity weakness  LYMPH NODES: Has  no cervical, supraclavicular, axillary and groin adenopathy.   NEUROLOGICAL: Alert and oriented to person, place, and time. No cranial nerve deficit.  Normal muscle tone. Coordination normal.  Has baseline left-sided weakness  GENITOURINARY: Deferred exam.  SKIN: Skin is warm and dry. No rash noted. No erythema. No pallor.   EXTREMITIES: No cyanosis, no clubbing, he has chronic 1+ bilateral lower extremity edema. No Deformity.  PSYCHIATRIC: Normal mood, affect and behavior.      Lab Results     Recent Results (from the past 240 hour(s))   ECG 12 lead nursing unit performed    Collection Time: 10/15/19 10:13 PM   Result Value Ref Range    SYSTOLIC BLOOD PRESSURE 232 mmHg    DIASTOLIC BLOOD PRESSURE 99 mmHg    VENTRICULAR RATE 106 BPM    ATRIAL RATE 106 BPM    P-R INTERVAL 146 ms    QRS DURATION 84 ms    Q-T INTERVAL 352 ms    QTC CALCULATION (BEZET) 467 ms    P Axis 6 degrees    R AXIS -9 degrees    T AXIS 24 degrees    MUSE DIAGNOSIS       Sinus tachycardia with Premature atrial complexes  Inferior infarct , age undetermined -possible   Abnormal ECG  When compared with ECG of 15-AUG-2017 11:41,  Premature atrial complexes are now Present  Vent. rate has increased BY  49 BPM    Confirmed by SHERMAN BRUCE MD LOC: ([id number]) on 10/16/2019 1:07:28 PM     Comprehensive metabolic panel    Collection Time: 10/15/19 10:55 PM   Result Value Ref Range    Sodium 138 136 - 145 mmol/L    Potassium 2.9 (L) 3.5 - 5.0 mmol/L    Chloride 103 98 - 107 mmol/L    CO2 21 (L) 22 - 31 mmol/L    Anion Gap, Calculation 14 5 - 18 mmol/L    Glucose 234 (H) 70 - 125 mg/dL    BUN 25 8 - 28 mg/dL    Creatinine 2.18 (H) 0.70 - 1.30 mg/dL    GFR MDRD Af Amer 35 (L) >60 mL/min/1.73m2    GFR MDRD Non Af Amer 29 (L) >60 mL/min/1.73m2    Bilirubin, Total 0.5 0.0 - 1.0 mg/dL    Calcium 9.1 8.5 - 10.5 mg/dL    Protein, Total 6.8 6.0 - 8.0 g/dL    Albumin 3.2 (L) 3.5 - 5.0 g/dL    Alkaline Phosphatase 105 45 - 120 U/L    AST 17 0 - 40 U/L    ALT 13 0  - 45 U/L   Magnesium    Collection Time: 10/15/19 10:55 PM   Result Value Ref Range    Magnesium 1.5 (L) 1.8 - 2.6 mg/dL   Protime-INR    Collection Time: 10/15/19 10:55 PM   Result Value Ref Range    INR 1.23 (H) 0.90 - 1.10   Troponin I    Collection Time: 10/15/19 10:55 PM   Result Value Ref Range    Troponin I 0.04 0.00 - 0.29 ng/mL   Lactic Acid    Collection Time: 10/15/19 10:55 PM   Result Value Ref Range    Lactic Acid 1.5 0.5 - 2.2 mmol/L   BNP(B-type Natriuretic Peptide)    Collection Time: 10/15/19 10:55 PM   Result Value Ref Range     (H) 0 - 86 pg/mL   HM1 (CBC with Diff)    Collection Time: 10/15/19 10:55 PM   Result Value Ref Range    WBC 9.6 4.0 - 11.0 thou/uL    RBC 3.18 (L) 4.40 - 6.20 mill/uL    Hemoglobin 10.2 (L) 14.0 - 18.0 g/dL    Hematocrit 30.2 (L) 40.0 - 54.0 %    MCV 95 80 - 100 fL    MCH 32.1 27.0 - 34.0 pg    MCHC 33.8 32.0 - 36.0 g/dL    RDW 14.0 11.0 - 14.5 %    Platelets 230 140 - 440 thou/uL    MPV 9.4 8.5 - 12.5 fL    Neutrophils % 87 (H) 50 - 70 %    Lymphocytes % 4 (L) 20 - 40 %    Monocytes % 8 2 - 10 %    Eosinophils % 1 0 - 6 %    Basophils % 0 0 - 2 %    Neutrophils Absolute 8.3 (H) 2.0 - 7.7 thou/uL    Lymphocytes Absolute 0.3 (L) 0.8 - 4.4 thou/uL    Monocytes Absolute 0.8 0.0 - 0.9 thou/uL    Eosinophils Absolute 0.1 0.0 - 0.4 thou/uL    Basophils Absolute 0.0 0.0 - 0.2 thou/uL   Urinalysis-UC if Indicated    Collection Time: 10/15/19 11:06 PM   Result Value Ref Range    Color, UA Yellow Colorless, Yellow, Straw, Light Yellow    Clarity, UA Clear Clear    Glucose, UA 50 mg/dL (!) Negative    Bilirubin, UA Negative Negative    Ketones, UA Negative Negative    Specific Gravity, UA 1.010 1.001 - 1.030    Blood, UA Small (!) Negative    pH, UA 6.0 4.5 - 8.0    Protein, UA >=500 mg/dL (!) Negative mg/dL    Urobilinogen, UA <2.0 E.U./dL <2.0 E.U./dL, 2.0 E.U./dL    Nitrite, UA Negative Negative    Leukocytes, UA Moderate (!) Negative    Bacteria, UA Moderate (!) None Seen  hpf    RBC, UA 0-2 None Seen, 0-2 hpf    WBC, UA 10-25 (!) None Seen, 0-5 hpf    Squam Epithel, UA 10-25 (!) None Seen, 0-5 lpf    WBC Clumps Present (!) None Seen    Mucus, UA Moderate (!) None Seen lpf    Granular Casts, UA 10-25 (!) None Seen lpf   Culture, Urine    Collection Time: 10/15/19 11:06 PM   Result Value Ref Range    Culture 50,000-100,000 col/ml Klebsiella pneumoniae (!)     Culture 10,000-50,000 col/ml Klebsiella pneumoniae (!)        Susceptibility    Klebsiella pneumoniae - BRET     Amoxicillin + Clavulanate <=4/2 Sensitive      Ampicillin >16 Resistant      Cefazolin <=1 Sensitive      Cefepime <=1 Sensitive      Ceftriaxone <=1 Sensitive      Ciprofloxacin <=0.5 Sensitive      Gentamicin <=2 Sensitive      Levofloxacin <=1 Sensitive      Meropenem <=0.25 Sensitive      Nitrofurantoin >64 Resistant      Tetracycline <=2 Sensitive      Tobramycin <=2 Sensitive      Trimethoprim + Sulfamethoxazole <=0.5 Sensitive     Klebsiella pneumoniae - BRET     Amoxicillin + Clavulanate <=4/2 Sensitive      Ampicillin >16 Resistant      Cefazolin <=1 Sensitive      Cefepime <=1 Sensitive      Ceftriaxone <=1 Sensitive      Ciprofloxacin <=0.5 Sensitive      Gentamicin <=2 Sensitive      Levofloxacin <=1 Sensitive      Meropenem <=0.25 Sensitive      Nitrofurantoin >64 Resistant      Tetracycline <=2 Sensitive      Tobramycin <=2 Sensitive      Trimethoprim + Sulfamethoxazole <=0.5 Sensitive    Blood culture from PERIPHERAL SITE    Collection Time: 10/15/19 11:09 PM   Result Value Ref Range    Anaerobic Blood Culture Bottle Positive after 24 hours incubation (!) No Growth, No organisms seen, bottle returned to instrument, Specimen not received, No Growth at 24 hours, No Growth at 48 hours, No Growth at 72 hours, No Growth at 96 hours, No Growth at 120 hours   Culture, Blood Positive Work-up    Collection Time: 10/15/19 11:09 PM   Result Value Ref Range    Culture Coagulase negative Staphylococcus (!)     Gram  Stain Result Gram positive cocci in clusters    POCT Glucose    Collection Time: 10/16/19  1:49 AM   Result Value Ref Range    Glucose 318 (H) 70 - 139 mg/dL   Basic Metabolic Panel    Collection Time: 10/16/19  5:55 AM   Result Value Ref Range    Sodium 139 136 - 145 mmol/L    Potassium 3.1 (L) 3.5 - 5.0 mmol/L    Chloride 105 98 - 107 mmol/L    CO2 25 22 - 31 mmol/L    Anion Gap, Calculation 9 5 - 18 mmol/L    Glucose 163 (H) 70 - 125 mg/dL    Calcium 8.7 8.5 - 10.5 mg/dL    BUN 25 8 - 28 mg/dL    Creatinine 2.13 (H) 0.70 - 1.30 mg/dL    GFR MDRD Af Amer 36 (L) >60 mL/min/1.73m2    GFR MDRD Non Af Amer 30 (L) >60 mL/min/1.73m2   HM2(CBC w/o Differential)    Collection Time: 10/16/19  5:55 AM   Result Value Ref Range    WBC 7.1 4.0 - 11.0 thou/uL    RBC 2.94 (L) 4.40 - 6.20 mill/uL    Hemoglobin 9.3 (L) 14.0 - 18.0 g/dL    Hematocrit 28.0 (L) 40.0 - 54.0 %    MCV 95 80 - 100 fL    MCH 31.6 27.0 - 34.0 pg    MCHC 33.2 32.0 - 36.0 g/dL    RDW 14.1 11.0 - 14.5 %    Platelets 210 140 - 440 thou/uL    MPV 9.2 8.5 - 12.5 fL   Magnesium    Collection Time: 10/16/19  5:55 AM   Result Value Ref Range    Magnesium 1.7 (L) 1.8 - 2.6 mg/dL   POCT Glucose    Collection Time: 10/16/19  6:51 AM   Result Value Ref Range    Glucose 134 70 - 139 mg/dL   POCT Glucose    Collection Time: 10/16/19 12:12 PM   Result Value Ref Range    Glucose 191 (H) 70 - 139 mg/dL   POCT Glucose    Collection Time: 10/16/19  2:20 PM   Result Value Ref Range    Glucose 187 (H) 70 - 139 mg/dL   POCT Glucose    Collection Time: 10/16/19  5:33 PM   Result Value Ref Range    Glucose 261 (H) 70 - 139 mg/dL   POCT Glucose    Collection Time: 10/16/19  8:54 PM   Result Value Ref Range    Glucose 263 (H) 70 - 139 mg/dL   Basic Metabolic Panel    Collection Time: 10/17/19  5:16 AM   Result Value Ref Range    Sodium 137 136 - 145 mmol/L    Potassium 3.4 (L) 3.5 - 5.0 mmol/L    Chloride 103 98 - 107 mmol/L    CO2 24 22 - 31 mmol/L    Anion Gap, Calculation 10 5 -  18 mmol/L    Glucose 144 (H) 70 - 125 mg/dL    Calcium 8.9 8.5 - 10.5 mg/dL    BUN 38 (H) 8 - 28 mg/dL    Creatinine 2.46 (H) 0.70 - 1.30 mg/dL    GFR MDRD Af Amer 31 (L) >60 mL/min/1.73m2    GFR MDRD Non Af Amer 25 (L) >60 mL/min/1.73m2   HM1 (CBC with Diff)    Collection Time: 10/17/19  5:16 AM   Result Value Ref Range    WBC 7.1 4.0 - 11.0 thou/uL    RBC 2.98 (L) 4.40 - 6.20 mill/uL    Hemoglobin 9.5 (L) 14.0 - 18.0 g/dL    Hematocrit 28.7 (L) 40.0 - 54.0 %    MCV 96 80 - 100 fL    MCH 31.9 27.0 - 34.0 pg    MCHC 33.1 32.0 - 36.0 g/dL    RDW 14.0 11.0 - 14.5 %    Platelets 212 140 - 440 thou/uL    MPV 9.3 8.5 - 12.5 fL    Neutrophils % 76 (H) 50 - 70 %    Lymphocytes % 12 (L) 20 - 40 %    Monocytes % 10 2 - 10 %    Eosinophils % 2 0 - 6 %    Basophils % 1 0 - 2 %    Neutrophils Absolute 5.3 2.0 - 7.7 thou/uL    Lymphocytes Absolute 0.8 0.8 - 4.4 thou/uL    Monocytes Absolute 0.7 0.0 - 0.9 thou/uL    Eosinophils Absolute 0.1 0.0 - 0.4 thou/uL    Basophils Absolute 0.0 0.0 - 0.2 thou/uL   POCT Glucose    Collection Time: 10/17/19  6:31 AM   Result Value Ref Range    Glucose 138 70 - 139 mg/dL   POCT Glucose    Collection Time: 10/17/19 12:17 PM   Result Value Ref Range    Glucose 330 (H) 70 - 139 mg/dL   Potassium    Collection Time: 10/17/19  1:05 PM   Result Value Ref Range    Potassium 3.7 3.5 - 5.0 mmol/L   POCT Glucose    Collection Time: 10/17/19  5:58 PM   Result Value Ref Range    Glucose 250 (H) 70 - 139 mg/dL   POCT Glucose    Collection Time: 10/17/19  9:03 PM   Result Value Ref Range    Glucose 152 (H) 70 - 139 mg/dL   Magnesium    Collection Time: 10/18/19  6:30 AM   Result Value Ref Range    Magnesium 1.9 1.8 - 2.6 mg/dL   Basic Metabolic Panel    Collection Time: 10/18/19  6:30 AM   Result Value Ref Range    Sodium 139 136 - 145 mmol/L    Potassium 3.0 (L) 3.5 - 5.0 mmol/L    Chloride 103 98 - 107 mmol/L    CO2 24 22 - 31 mmol/L    Anion Gap, Calculation 12 5 - 18 mmol/L    Glucose 82 70 - 125 mg/dL     Calcium 9.0 8.5 - 10.5 mg/dL    BUN 25 8 - 28 mg/dL    Creatinine 2.26 (H) 0.70 - 1.30 mg/dL    GFR MDRD Af Amer 34 (L) >60 mL/min/1.73m2    GFR MDRD Non Af Amer 28 (L) >60 mL/min/1.73m2   POCT Glucose    Collection Time: 10/18/19  6:38 AM   Result Value Ref Range    Glucose 83 70 - 139 mg/dL   POCT Glucose    Collection Time: 10/18/19 11:14 AM   Result Value Ref Range    Glucose 280 (H) 70 - 139 mg/dL   POCT Glucose    Collection Time: 10/18/19  4:04 PM   Result Value Ref Range    Glucose 335 (H) 70 - 139 mg/dL   Potassium    Collection Time: 10/18/19  6:26 PM   Result Value Ref Range    Potassium 3.6 3.5 - 5.0 mmol/L   POCT Glucose    Collection Time: 10/18/19  8:24 PM   Result Value Ref Range    Glucose 223 (H) 70 - 139 mg/dL   Magnesium    Collection Time: 10/19/19  6:16 AM   Result Value Ref Range    Magnesium 1.9 1.8 - 2.6 mg/dL   Basic Metabolic Panel    Collection Time: 10/19/19  6:16 AM   Result Value Ref Range    Sodium 137 136 - 145 mmol/L    Potassium 3.4 (L) 3.5 - 5.0 mmol/L    Chloride 104 98 - 107 mmol/L    CO2 21 (L) 22 - 31 mmol/L    Anion Gap, Calculation 12 5 - 18 mmol/L    Glucose 162 (H) 70 - 125 mg/dL    Calcium 9.0 8.5 - 10.5 mg/dL    BUN 41 (H) 8 - 28 mg/dL    Creatinine 2.37 (H) 0.70 - 1.30 mg/dL    GFR MDRD Af Amer 32 (L) >60 mL/min/1.73m2    GFR MDRD Non Af Amer 27 (L) >60 mL/min/1.73m2   Platelet Count - every other day x 3    Collection Time: 10/19/19  6:16 AM   Result Value Ref Range    Platelets 222 140 - 440 thou/uL   POCT Glucose    Collection Time: 10/19/19  6:40 AM   Result Value Ref Range    Glucose 170 (H) 70 - 139 mg/dL   POCT Glucose    Collection Time: 10/19/19 11:09 AM   Result Value Ref Range    Glucose 316 (H) 70 - 139 mg/dL   Potassium    Collection Time: 10/19/19 12:09 PM   Result Value Ref Range    Potassium 3.9 3.5 - 5.0 mmol/L            Imaging Results     Ct Head Without Contrast    Result Date: 10/16/2019  EXAM: CT HEAD WO CONTRAST LOCATION: [facility]  DATE/TIME: 10/16/2019 12:12 AM INDICATION: Neuro deficit, acute, stroke suspected COMPARISON: MRI head 07/20/2015 TECHNIQUE: Routine without IV contrast. Multiplanar reformats. Dose reduction techniques were used. FINDINGS: INTRACRANIAL CONTENTS: No intracranial hemorrhage, extraaxial collection, or mass effect.  No CT evidence of acute infarct. Mild volume loss and presumed chronic small vessel ischemia similar to the prior MRI of 2015. Small chronic lacunar infarcts in each thalamus again noted. A few additional scattered chronic lacunar infarcts in the periventricular white matter are grossly stable. VISUALIZED ORBITS/SINUSES/MASTOIDS: No intraorbital abnormality. No paranasal sinus mucosal disease. No middle ear or mastoid effusion. BONES/SOFT TISSUES: No acute abnormality.     1.  No definite acute intracranial abnormality. 2.  Age-related and chronic ischemic changes not obviously changed compared to 2015.    Xr Chest 1 View    Result Date: 10/15/2019  EXAM: XR CHEST 1 VIEW LOCATION: [facility] DATE/TIME: 10/15/2019 11:30 PM INDICATION: weakness COMPARISON: 08/09/2019     Heart size is normal. Lung volumes are diminished. No acute infiltrate or large effusion. No acute bony abnormalities.    Us Venous Leg Left    Result Date: 10/15/2019  EXAM: US VENOUS LEG LEFT LOCATION: [facility] DATE/TIME: 10/15/2019 11:30 PM INDICATION: Left leg pain and swelling COMPARISON: 03/02/2016 TECHNIQUE: Venous Duplex ultrasound of the left lower extremity with and without compression, augmentation and duplex. Color flow and spectral Doppler with waveform analysis performed. FINDINGS: Exam includes the common femoral, femoral, popliteal, and contralateral common femoral veins as well as segmentally visualized deep calf veins and greater saphenous vein. LEFT: No deep vein thrombosis. No superficial thrombophlebitis. 4.3 x 2.3 x 1.6 cm complex popliteal fossa cyst. Nonspecific subcutaneous edema.     1.  No deep  venous thrombosis in the left lower extremity.            KATIE Florez

## 2021-06-19 NOTE — LETTER
Letter by Gee Rincon NP at      Author: Gee Rincon NP Service: -- Author Type: --    Filed:  Encounter Date: 10/30/2019 Status: Signed         Patient: Suman Nagy   MR Number: [id number]   YOB: 1940   Date of Visit: 10/30/2019     [facility]      Facility:    [facility] [[id number]]  Code Status: FULL CODE      Chief Complaint/Reason for Visit:  011  Chief Complaint   Patient presents with   ? Review Of Multiple Medical Conditions       HPI:   Suman is a 79 y.o. male who is a recent transfer from [facility] admission on 10/52/19 and discharged on 10/19/2019. 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  He was found to have a UTI with Klebsiella pneumonia started on Rocephin,, resumed on Keflex to complete 2-week course.  Per his CKD stage IV, creatinine was 2.37, was on Lasix twice a day for chronic leg edema since August 2019, which is on hold.  We will follow-up with neurology as outpatient.  Norvasc dose is decreased due to chronic leg edema.  Continue lymphedema care.  At discharge he was resumed on amlodipine 5 mg daily, clonidine 0.1 mg 3 times a day, metoprolol 25 mg daily for hypertension.  Per his history of CVA he resumed on statin and Plavix.  Per diabetes he resumed on diabetic diet, glyburide, metformin.  Also has history of bladder, colon, rectal cancer, recent CT showed no malignancy.  He is also seen urology outpatient with a cystoscopy couple weeks ago which was also negative.  He was socially discharged to TCU for rehab.    Past Medical History:  Past Medical History:   Diagnosis Date   ? Anemia    ? Bladder cancer (H)    ? Cancer (H)     Rectosigmoid   ? Diabetes mellitus (H)    ? Hyperkalemia     ? Hypertension    ? Seizure (H)     possible, one time occurence   ? Stroke (H)     multiple, residual left sided weakness, last CVA in July 2015 caused some speech deficit   ? Superficial phlebitis    ? Venous insufficiency of both lower extremities            Surgical History:  Past Surgical History:   Procedure Laterality Date   ? COLON SURGERY      Colectomy Low Anterior Resection   ? EYE SURGERY      Cataract Extraction   ? LAPAROSCOPIC COLON RESECTION N/A 6/1/2017    Procedure: LAPAROSCOPIC ASSISTED COLECTOMY LOW ANTERIOR RESECTION AND DIVERTING LOOP ILEOSTOMY, PROCTOSOPY;  Surgeon: Tyson Parry MD;  Location: [facility] [place];  Service:    ? TX CLOSE ENTEROSTOMY N/A 8/15/2017    Procedure: ILEOSTOMY CLOSURE LOOP ;  Surgeon: Tyson Parry MD;  Location: [facility] [place];  Service: General   ? TX CYSTOURETHROSCOPY,FULGUR <0.5 CM LESN N/A 9/1/2015    Procedure: CYSTOSCOPY TRANSURETHRAL RESECTION BLADDER TUMOR;  Surgeon: Cleveland Nair MD;  Location: [facility] [place];  Service: Urology   ? TX CYSTOURETHROSCOPY,FULGUR <0.5 CM LESN N/A 12/22/2015    Procedure: CYSTOSCOPY TRANSURETHRAL RESECTION BLADDER TUMOR AND BLADDER BIOPSIES;  Surgeon: Cleveland Nair MD;  Location: [facility] [place];  Service: Urology   ? TURBT      X2   ? VASECTOMY         Family History:   Family History   Problem Relation Age of Onset   ? COPD Father        Social History:    Social History     Socioeconomic History   ? Marital status:      Spouse name: Not on file   ? Number of children: Not on file   ? Years of education: Not on file   ? Highest education level: Not on file   Occupational History   ? Not on file   Social Needs   ? Financial resource strain: Not on file   ? Food insecurity:     Worry: Not on file     Inability: Not on file   ? Transportation needs:     Medical: Not on file     Non-medical: Not on file   Tobacco Use   ? Smoking status: Former Smoker     Last attempt to quit: 8/31/1995     Years  since quittin.1   ? Smokeless tobacco: Never Used   Substance and Sexual Activity   ? Alcohol use: No   ? Drug use: No   ? Sexual activity: Not Currently     Partners: Female   Lifestyle   ? Physical activity:     Days per week: Not on file     Minutes per session: Not on file   ? Stress: Not on file   Relationships   ? Social connections:     Talks on phone: Not on file     Gets together: Not on file     Attends [employer] service: Not on file     Active member of club or organization: Not on file     Attends meetings of clubs or organizations: Not on file     Relationship status: Not on file   ? Intimate partner violence:     Fear of current or ex partner: Not on file     Emotionally abused: Not on file     Physically abused: Not on file     Forced sexual activity: Not on file   Other Topics Concern   ? Not on file   Social History Narrative   ? Not on file          Review of Systems   Constitutional: Positive for activity change and fatigue. Negative for appetite change, diaphoresis and fever.        No concerns   HENT: Positive for trouble swallowing. Negative for congestion and hearing loss.    Eyes: Negative.    Respiratory: Negative for shortness of breath and wheezing.    Cardiovascular: Negative for leg swelling.   Gastrointestinal: Negative for abdominal distention, abdominal pain, blood in stool, constipation, diarrhea and nausea.   Endocrine: Negative.    Genitourinary: Negative for difficulty urinating.   Musculoskeletal:        Bilateral knee pain   Skin:        intact   Allergic/Immunologic: Negative.    Neurological: Negative for dizziness, tremors, speech difficulty and light-headedness.   Hematological: Negative.    Psychiatric/Behavioral: Negative for agitation, confusion and hallucinations. The patient is not nervous/anxious.        Vitals:    10/30/19 1243   BP: 120/76   Pulse: 89   Resp: 20   Temp: 98  F (36.7  C)   SpO2: 99%   Weight: 170 lb (77.1 kg)       Physical Exam  Vitals signs  reviewed.   Constitutional:       Appearance: He is obese.      Comments: CKD per oral DM meds, also was given lasix x10d per med error   HENT:      Head: Normocephalic and atraumatic.      Right Ear: External ear normal.      Left Ear: External ear normal.      Nose: No congestion or rhinorrhea.      Mouth/Throat:      Pharynx: Oropharynx is clear. No oropharyngeal exudate or posterior oropharyngeal erythema.   Eyes:      General:         Right eye: No discharge.         Left eye: No discharge.   Cardiovascular:      Rate and Rhythm: Normal rate.      Heart sounds: No murmur. No friction rub. No gallop.    Pulmonary:      Effort: No respiratory distress.      Breath sounds: No wheezing or rales.      Comments: Dim, RA  Abdominal:      General: Bowel sounds are normal.      Comments: Denies constipation or diarrhea   Musculoskeletal:      Right lower leg: Edema present.      Left lower leg: Edema present.      Comments: tubigrips, bilateral knee pain. L sided weakness per CVA in 2015   Skin:     General: Skin is warm and dry.      Comments: intact   Neurological:      Mental Status: He is oriented to person, place, and time.   Psychiatric:         Mood and Affect: Mood normal.         Behavior: Behavior normal.      Comments: No behaviors reported         Medication List:  Current Outpatient Medications   Medication Sig   ? diclofenac sodium (VOLTAREN) 1 % Gel Apply 4 g topically 3 (three) times a day. Apply to knees   ? acetaminophen (TYLENOL) 325 MG tablet Take 2 tablets (650 mg total) by mouth every 4 (four) hours as needed.   ? amLODIPine (NORVASC) 5 MG tablet Take 1 tablet (5 mg total) by mouth daily.   ? atorvastatin (LIPITOR) 20 MG tablet Take 1 tablet (20 mg total) by mouth daily.   ? cloNIDine HCl (CATAPRES) 0.3 MG tablet TAKE 1/2 PILL IN THE MORNING. TAKE 1 FULL PILL AT BEDTIME.   ? clopidogrel (PLAVIX) 75 mg tablet TAKE ONE (1) TABLET BY MOUTH DAILY   ? cyanocobalamin 1,000 mcg/mL injection Inject 1,000  "mcg into the shoulder, thigh, or buttocks every 3 (three) months.          ? cyanocobalamin, vitamin B-12, (VITAMIN B-12) 1,000 mcg Subl Place 1 tablet (1,000 mcg total) under the tongue daily. (Patient taking differently: Place 500 mcg under the tongue daily.       )   ? INJECT EASE LANCETS 30 gauge Misc Use daily as directed (4-6 times daily)   ? insulin aspart U-100 (NOVOLOG) 100 unit/mL injection Inject under the skin 3 (three) times a day before meals. <140                           0U  141-180                      1U  181-220                      2U  221-260                      4U  261-300                      6U  301-350                      8U  351-400                     12U  >400                          Call provider   ? metoprolol succinate (TOPROL-XL) 25 MG Take 1 tablet (25 mg total) by mouth daily.   ? ONETOUCH ULTRA BLUE TEST STRIP strips USE AS DIRECTED 6 TIMES PER DAY   ? psyllium husk (DAILY FIBER ORAL) Take 1 tablet by mouth 2 (two) times a day.          ? sertraline (ZOLOFT) 50 MG tablet Take 50 mg by mouth daily.   ? SURE COMFORT PEN NEEDLE 31 gauge x 3/16\" Ndle Use one needle each night.       Labs:  Results for orders placed or performed in visit on 10/28/19   Basic Metabolic Panel   Result Value Ref Range    Sodium 135 (L) 136 - 145 mmol/L    Potassium 4.2 3.5 - 5.0 mmol/L    Chloride 102 98 - 107 mmol/L    CO2 24 22 - 31 mmol/L    Anion Gap, Calculation 9 5 - 18 mmol/L    Glucose 211 (H) 70 - 125 mg/dL    Calcium 8.8 8.5 - 10.5 mg/dL    BUN 47 (H) 8 - 28 mg/dL    Creatinine 2.32 (H) 0.70 - 1.30 mg/dL    GFR MDRD Af Amer 33 (L) >60 mL/min/1.73m2    GFR MDRD Non Af Amer 27 (L) >60 mL/min/1.73m2     Lab Results   Component Value Date    WBC 9.7 10/29/2019    HGB 10.0 (L) 10/29/2019    HCT 32.6 (L) 10/29/2019     (H) 10/29/2019     (H) 10/29/2019     Lab Results   Component Value Date    HGBA1C 7.6 (H) 10/28/2019     Lab Results   Component Value Date    TSH 2.42 10/28/2019 "       Vitamin D, Total (25-Hydroxy)   Date Value Ref Range Status   10/28/2019 14.0 (L) 30.0 - 80.0 ng/mL Final       Vitamin B-12   Date Value Ref Range Status   10/28/2019 >2,000 (H) 213 - 816 pg/mL Final     Mag 1.9    Assessment/Plan:    UTI for Klebsiella pneumonia: Initially treated with Rocephin, discharged on Keflex 250 mg twice daily, end 10/25    Chronic kidney disease stage IV: Lasix discontinued, f/u with nephrology outpatient on 11/15.  Last creatinine 2.42 with a GFR of 26 on 10/23/2019. Will dc metformin and glimepiride today. He was also given lasix x10d as a medication error    Hypertension: Continue clonidine 0.15 mg in a.m. and 0.3 mg at bedtime, metoprolol succinate 25 mg daily and amlodipine 5 mg (weaned from 10mg in hospital).  SBP <150    History of stroke in 7/2015 with residual left-sided hemiplegia: Continue Plavix 75 mg daily and atorvastatin 20 mg daily    Vitamin B12 deficiency: decrease cyanocobalamin to 500MCG daily and 1000 MCG IM injection every 90 days    Controlled type 2 diabetes: per renal fx will dc metformin and glimepiride. Previously started NovoLog sliding scale, recheck A1c was 7.6    Depression: Continue sertraline 50 mg daily    Dysphasia: Speech to evaluate and treat, no change to CSC diet, regular consistency    Constipation: Continue psyllium husk 1 tab twice daily    Pain control: Continue Tylenol 650 mg every 4 hours as needed, today start voltaren gel 1% 4gm three times a day to knees    Disposition: Plans to return home, CPT 4.6    MEDICAL EQUIPMENT NEEDS:  wheelchair    DISCHARGE PLAN/FACE TO FACE:  I certify that services are/were furnished while this patient was under the care of a physician and that a physician or an allowed non-physician practitioner (NPP), had a face-to-face encounter that meets the physician face-to-face encounter requirements. The encounter was in whole, or in part, related to the primary reason for home health. The patient is confined to  his/her home and needs intermittent skilled nursing, physical therapy, speech-language pathology, or the continued need for occupational therapy. A plan of care has been established by a physician and is periodically reviewed by a physician.  Date of Face-to-Face Encounter: 10/30/19    I certify that, based on my findings, the following services are medically necessary home health services:     Patient is 67.5 inches tall and weighs 179 pounds.  The patient has had acute change of condition that necessitates a mobility device that cannot be resolved by the use of a fitted cane or walker.  Patient has multiple diagnoses that limit functional mobility including M62.81 Muscle weakness, R I 8 9.0 lymphedema, 286.7 3 history of TIA, and arthritis of her knees.  Patient requires a standard wheelchair, seat cushion and bilateral lower extremity foot rest.  The patient has a mobility limitation that prevents him from accomplishing an MR ADLs such as feeding, dressing, grooming and bathing.  This places the patient at a reasonably determined heightened risk of morbidity or mortality secondary to attempts to perform an MR ADL.  Patient's home provides adequate access between rooms, maneuvering space and surfaces for the use of manual wheelchair that is provided.  The use of manual wheelchair will significantly improve the patient's ability to participate in MR ADLs, as he has not expressed an unwillingness to use, and he will use it on a regular basis in the home.  The patient has sufficient upper extremity function and other physical and mental capabilities needed to safely propel the wheelchair that is provided.  He also has caregiver, wife and family who are available and willing to provide assistance with a wheelchair.  Lifetime use.      Electronically signed by: Gee Rincon NP

## 2021-06-19 NOTE — LETTER
Letter by Telly Torre MD at      Author: Telly Torre MD Service: -- Author Type: --    Filed:  Encounter Date: 11/8/2019 Status: Signed         Suman Nagy  [address] [place] [id number]             November 8, 2019         Dear Mr. Nagy,    Below are the results from your recent visit:    Resulted Orders   Comprehensive Metabolic Panel   Result Value Ref Range    Sodium 138 136 - 145 mmol/L    Potassium 4.7 3.5 - 5.0 mmol/L    Chloride 104 98 - 107 mmol/L    CO2 23 22 - 31 mmol/L    Anion Gap, Calculation 11 5 - 18 mmol/L    Glucose 195 (H) 70 - 125 mg/dL    BUN 41 (H) 8 - 28 mg/dL    Creatinine 2.13 (H) 0.70 - 1.30 mg/dL    GFR MDRD Af Amer 36 (L) >60 mL/min/1.73m2    GFR MDRD Non Af Amer 30 (L) >60 mL/min/1.73m2    Bilirubin, Total 0.3 0.0 - 1.0 mg/dL    Calcium 8.8 8.5 - 10.5 mg/dL    Protein, Total 6.8 6.0 - 8.0 g/dL    Albumin 3.2 (L) 3.5 - 5.0 g/dL    Alkaline Phosphatase 141 (H) 45 - 120 U/L    AST 13 0 - 40 U/L    ALT 21 0 - 45 U/L    Narrative    Fasting Glucose reference range is 70-99 mg/dL per  American Diabetes Association (ADA) guidelines.   HM2(CBC w/o Differential)   Result Value Ref Range    WBC 6.3 4.0 - 11.0 thou/uL    RBC 3.39 (L) 4.40 - 6.20 mill/uL    Hemoglobin 10.8 (L) 14.0 - 18.0 g/dL    Hematocrit 32.5 (L) 40.0 - 54.0 %    MCV 96 80 - 100 fL    MCH 31.8 27.0 - 34.0 pg    MCHC 33.2 32.0 - 36.0 g/dL    RDW 13.0 11.0 - 14.5 %    Platelets 361 140 - 440 thou/uL    MPV 6.7 (L) 7.0 - 10.0 fL   CEA (Carcinoembryonic Antigen)   Result Value Ref Range    CEA 6.4 (H) 0.0 - 3.0 ng/mL    Narrative    Method is Abbott Carcinoembryonic Antigen (CEA) using  Chemiluminescent Microparticle Immunoassay.  WHO 1st International  Std (73/601).    Smoking may increase values.  Increased levels may be seen in patients with primary  colorectal cancer or other malignancies including  breast, gastrointestinal tract, liver, ovarian,  pancreatic, and prostatic cancers. CEA may be useful  in  monitoring patients with known malignancies.    Serum markers are not specific for malignancy and values  may vary by method.       Creatinine level is improved and within your baseline range.  Potassium level is normal.    No change in treatment plan.    Liver tests are normal.    Hemoglobin level is improving.    CEA level is increasing.    Please call with questions or contact us using [employer] [employer].    Sincerely,        Electronically signed by Telly Torre MD

## 2021-06-20 ENCOUNTER — HEALTH MAINTENANCE LETTER (OUTPATIENT)
Age: 81
End: 2021-06-20

## 2021-06-20 NOTE — LETTER
Letter by Abbie Kessler MBBS at      Author: Abbie Kessler MBBS Service: -- Author Type: --    Filed:  Encounter Date: 1/16/2020 Status: Signed         Patient: Suman Nagy   MR Number: [id number]   YOB: 1940   Date of Visit: 1/16/2020       [facility] Admission note      Patient: Suman Nagy  MRN: [id number]  Date of Service: 1/16/2020      [facility] [[id number]]  Reason for Visit     Chief Complaint   Patient presents with   ? H & P       Code Status     FULL CODE    Assessment     - ACUTE INFLUENZA  - acute on chronic anemia  - acute on chronic hip and knee pain  - type 2 DM  - advanced CKD with a discharge creatinine ranging between 2-1.9  -Electrolyte abnormalities with low potassium and magnesium  - htn' with elevated systolic blood pressures noted in the TCU  -Chronic lymphedema  -Profound debilitation with increasing amount of care that this patient needs at home.  He has incontinence at baseline and requires assistance with all his IADLs and most of his ADLs at home  -Protein calorie malnutrition  -Obesity    Plan     Patient has been admitted to the TCU and examined in the presence of his wife.  He is being treated for influenza A with Tamiflu and will finish his 5-day regimen in the TCU.  He is denying any cough congestion or fever.  He is reporting profound weakness with myalgias.  He is reporting bilateral hip and knee pain with left greater than right with exacerbation noted.  He has topical Voltaren gel and Tylenol available for pain.  I did review goals of care with him and his wife apparently at baseline he requires significant assistance with all his a ADLs and IADLs.  He has his daughter as well as wife assisting him with his cares.  Wife is aware of his decline and hoping that he can get somewhat stronger and ambulate at least to the bathroom and self transfers to ease the caregiving burden.  He also has chronic incontinence and she is  hoping that if he could go to the bathroom on his own this would help him with managing that.  Monitor labs including hemoglobin discharge hemoglobin was 8.5.  He is on PPIs currently.  He has a history of diabetes which as per family has been well controlled.  He will continue with his blood sugar checks and insulin regimen in the TCU.  He was noted to have significant lymphedema which is chronic as per patient he does not like wearing compression hoses.  BMP is ordered to monitor electrolyte imbalance including potassium and magnesium impairments noted in the hospital.  Blood pressures to be monitored he is on multiple medications for blood pressure management and had accelerated hypertension in the hospital due to which his clonidine 0.15MG dosage has been increased to 3 times daily.  He is also on amlodipine.  He is also on hydralazine as well as metoprolol  Staff will be checking blood pressures closely every shift and monitoring trends  He has chronic lymphedema as per the patient and his wife with left-sided weakness.  He is on diuretics including Lasix.  Weights will need to be monitored.  Redress concerns about compression hose again  Goals of care reviewed with his wife and patient and they are hopeful that if he gets a little bit stronger he should be able to discharge home safely.  As per patient and his wife he is quite weak today and he is unable to stand on his own he requires two-person assistance which would be too much for his wife to manage by herself at home  Total time spent is 45 minutes with more than 35 minutes spent face-to-face reviewing goals of care including discharge planning and baseline functional status with patient and his wife present at bedside these were reviewed in my note above.    Additional 20 minutes spent face-to-face reviewing his CODE STATUS as well as hisPOLST   Patient is requesting a full CODE STATUS.  His wife is the opinion that he should be preferably not full code  she thinks that if he goes for CPR he is at high risk for adverse outcomes however patient is adamant that he wants to be full code this was reviewed with both of them he has advanced directives he would like to be intubated and brought to the emergency room.  He told me subsequently his family can decide what they want to do with him  Patient then stated that he is aware that he can break his ribs while getting CPR but he does not care.  His wife is is still not completely sure if she wants him to be full code but at present she would like him to continue what ever he wants to be  I have asked them to bring a copy of his advance directives to keep in the file also reviewed with them that they should possibly sit down and discuss this with family what long-term planning they would like to do he has advanced directives.  Wife is clear she would not like to keep him in a vegetable state      History     Patient is a very pleasant 79 y.o. male who is admitted to TCU  He was examined in the presence of his wife who supplemented his history.  As per the history he lives at home with his wife and other extended family members.  He presented with increasing weakness with low-grade fever and myalgias.  Work-up revealed that he had a positive influenza A.  He is currently being treated with Tamiflu and will finish his course in the TCU.  He denies any cough congestion.  He however is profoundly weak requiring 2 person assist at least with all his ADLs and is not able to walk.  He was also noted to have acute on chronic anemia with no obvious signs of GI losses.  He was started on Prilosec.  R/C today improved at 8.5 from 7.4 yesterday  Anemia most likely secondary to chronic kidney disease discharge creatinine was 2  He has underlying history of type 2 diabetes which as per wife has been adequately controlled in the past.  He will continue with his insulin regimen in the TCU and oral glimepiride  blood sugars will be  monitored.  He has underlying history of CVA with resultant left-sided hemiplegia.  Unfortunately he continues to be weak on that side as per his wife he requires significant assistance at home now.  He lives with extended family members who have been helping him with all his IADLs he has incontinence also at baseline.  He is hoping that he can get stronger so that at least he can ambulate to the bathroom on his own and manage some of his self-cares before coming home      Past Medical History     Active Ambulatory (Non-Hospital) Problems    Diagnosis   ? Influenza A   ? Generalized muscle weakness   ? Chronic kidney disease, stage IV (severe) (H)   ? Slow transit constipation   ? UTI (urinary tract infection)   ? 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 Anxiety   ? History of stroke   ? Medication monitoring encounter   ? History of bladder cancer   ? History of rectal cancer   ? Carotid artery stenosis, asymptomatic, left   ? Rectal cancer (H)   ? Hypertension   ? B12 deficiency   ? 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 Anemia    ? Bladder cancer (H)    ? Cancer (H)    ? Diabetes mellitus (H)    ? Hyperkalemia    ? Hypertension    ? Seizure (H)    ? Stroke (H)    ? Superficial phlebitis    ? Venous insufficiency of both lower extremities        Past Social History     Reviewed, and he  reports that he quit smoking about 24 years ago. He has never used smokeless tobacco. He reports that he does not drink alcohol or use drugs.    Family History     Reviewed, and family history includes COPD in his father.    Medication List   Post Discharge Medication Reconciliation Status: discharge medications reconciled, continue medications without change   Current Outpatient Medications on File Prior to Visit   Medication Sig Dispense Refill   ? acetaminophen (TYLENOL) 325 MG tablet Take 2 tablets (650 mg total) by mouth every 4 (four) hours as needed.   0   ? amLODIPine (NORVASC) 10 MG tablet Take 1 tablet (10 mg total) by mouth daily.     ? atorvastatin (LIPITOR) 20 MG tablet Take 1 tablet (20 mg total) by mouth daily. 90 tablet 3   ? blood glucose test strips Check blood sugar 3 times per day. Accu-Chek Guide test striips. 100 strip 3   ? cholecalciferol, vitamin D3, (VITAMIN D3) 1,000 unit capsule Take 2 capsules (2,000 Units total) by mouth daily. 180 capsule 0   ? cloNIDine HCl (CATAPRES) 0.3 MG tablet Take 0.5 tablets (0.15 mg total) by mouth 3 (three) times a day.  0   ? clopidogrel (PLAVIX) 75 mg tablet Take 1 tablet (75 mg total) by mouth daily. 90 tablet 0   ? cyanocobalamin 1,000 mcg/mL injection Inject 1,000 mcg into the shoulder, thigh, or buttocks every 3 (three) months.            ? cyanocobalamin, vitamin B-12, (VITAMIN B-12) 1,000 mcg Subl Place 1 tablet (1,000 mcg total) under the tongue daily. 90 tablet 3   ? diclofenac sodium (VOLTAREN) 1 % Gel Apply 4 g topically 3 (three) times a day. Apply to knees (Patient taking differently: Apply 4 g topically 3 (three) times a day as needed. Apply to knees ) 100 g 0   ? furosemide (LASIX) 40 MG tablet Take 40 mg by mouth daily.     ? generic lancets Fastclix lancets. Check blood sugar 3 times per day. 102 each 3   ? glimepiride (AMARYL) 4 MG tablet Take 4 mg by mouth twice a day with lunch and supper.     ? hydrALAZINE (APRESOLINE) 25 MG tablet Take 25 mg by mouth 4 (four) times a day.     ? insulin glargine (LANTUS SOLOSTAR PEN) 100 unit/mL (3 mL) pen Inject 10 Units under the skin at bedtime.     ? metoprolol succinate (TOPROL-XL) 25 MG Take 1 tablet (25 mg total) by mouth daily. 90 tablet 3   ? omeprazole (PRILOSEC) 20 MG capsule Take 1 capsule (20 mg total) by mouth 2 (two) times a day before meals.  0   ? ONETOUCH ULTRA2 METER [facility] AS DIRECTED 1 each 0   ? [START ON 1/17/2020] oseltamivir (TAMIFLU) 30 MG capsule Take 1 capsule (30 mg total) by mouth daily for 1 day. 1 capsule 0   ? pen needle,  "diabetic (BD ULTRA-FINE JONO PEN NEEDLE) 32 gauge x 5/32\" Ndle Use one needle per day to inject insulin. 100 each 4   ? polyethylene glycol (MIRALAX) 17 gram packet Take 1 packet (17 g total) by mouth daily as needed.  0   ? psyllium husk (DAILY FIBER ORAL) Take 1 tablet by mouth 2 (two) times a day.            ? sertraline (ZOLOFT) 50 MG tablet Take 50 mg by mouth daily.     ? [DISCONTINUED] cloNIDine HCl (CATAPRES) 0.3 MG tablet TAKE 1/2 PILL IN THE MORNING. TAKE 1 FULL PILL AT BEDTIME. 135 tablet 2     Current Facility-Administered Medications on File Prior to Visit   Medication Dose Route Frequency Provider Last Rate Last Dose   ? acetaminophen tablet 650 mg (TYLENOL)  650 mg Oral Q4H PRN Norbert Cheung MD   650 mg at 01/16/20 1252   ? amLODIPine tablet 10 mg (NORVASC)  10 mg Oral Daily with lunch Norbert Cheung MD   10 mg at 01/16/20 1120   ? atorvastatin tablet 20 mg (LIPITOR)  20 mg Oral DAILY Norbert Cheung MD   20 mg at 01/16/20 0805   ? bisacodyl suppository 10 mg (DULCOLAX)  10 mg Rectal Daily PRN Norbert Cheung MD       ? cloNIDine (CATAPRES) tablet 0.15 mg  0.15 mg Oral TID Long Flynn MD   0.15 mg at 01/16/20 0804   ? dextrose 50 % (D50W) syringe 20-50 mL  20-50 mL Intravenous Q15 Min PRN Norbert Cheung MD       ? diclofenac sodium 1 % gel 4 g (VOLTAREN)  4 g Topical TID PRN Norbert Cheung MD       ? glimepiride tablet 4 mg (AMARYL)  4 mg Oral BID CC lunch & supper Norbert Cheung MD   4 mg at 01/16/20 1120   ? glucagon (human recombinant) injection 1 mg  1 mg Subcutaneous Q15 Min PRN Norbert Cheung MD       ? hydrALAZINE tablet 25 mg (APRESOLINE)  25 mg Oral QID Norbert Cheung MD   25 mg at 01/16/20 0805   ? insulin aspart U-100 injection (NovoLOG)   Subcutaneous TID with meals Norbert Cheung MD   3 Units at 01/16/20 1120   ? insulin glargine injection 8 Units (LANTUS)  8 Units Subcutaneous QHS Norbert Cheung MD   8 Units at 01/15/20 2048   ? " "magnesium hydroxide suspension 30 mL (MILK OF MAG)  30 mL Oral Daily PRN Norbert Cheung MD       ? magnesium oxide tablet 400 mg (MAG-OX)  400 mg Oral BID Enrique De La Torre MD   400 mg at 01/16/20 0805   ? metoprolol succinate 24 hr tablet 25 mg (TOPROL-XL)  25 mg Oral QHS Norbert Cheung MD   25 mg at 01/15/20 2048   ? omeprazole capsule 20 mg (PriLOSEC)  20 mg Oral BID AC Enrique De La Torre MD   20 mg at 01/16/20 0639   ? ondansetron injection 4 mg (ZOFRAN)  4 mg Intravenous Q4H PRN Norbert Cheung MD        Or   ? ondansetron tablet 8 mg (ZOFRAN)  8 mg Oral Q8H PRN Norbert Cheung MD       ? oseltamivir capsule 30 mg (TAMIFLU)  30 mg Oral DAILY Norbert Cheung MD   30 mg at 01/16/20 0805   ? polyethylene glycol packet 17 g (MIRALAX)  17 g Oral DAILY Norbert Cheung MD   17 g at 01/16/20 0805   ? sertraline tablet 50 mg (ZOLOFT)  50 mg Oral Daily with supper Norbert Cheung MD   50 mg at 01/15/20 1706   ? sodium chloride flush 2.5 mL (NS)  2.5 mL Intravenous Line Care Norbert Cheung MD   10 mL at 01/13/20 1703   ? [DISCONTINUED] cloNIDine (CATAPRES) tablet 0.15 mg  0.15 mg Oral QAM Norbert Cheung MD   0.15 mg at 01/15/20 0921   ? [DISCONTINUED] cloNIDine HCl tablet 0.3 mg (CATAPRES)  0.3 mg Oral QHS Norbert Cheung MD   0.3 mg at 01/14/20 2138   ? [DISCONTINUED] sodium chloride 0.9%  50 mL/hr Intravenous Continuous Long Flynn MD 50 mL/hr at 01/16/20 0433 50 mL/hr at 01/16/20 0433       Allergies     Allergies   Allergen Reactions   ? Cefazolin      \"felt very hot\"   ? Pravastatin      Increased peripheral neuropathy   ? Simvastatin      Increased peripheral neuropathy   ? Thiazides      Other: Gout         Review of Systems   A comprehensive review of 14 systems was done. Pertinent findings noted here and in history of present illness. All the rest negative.  Constitutional: Negative.  Negative for fever, chills, he has activity change, appetite change and fatigue. "   HENT: Negative for congestion and facial swelling.    Eyes: Negative for photophobia, redness and visual disturbance.   Respiratory: Negative for cough and chest tightness.    Cardiovascular: Negative for chest pain, palpitations and has chronic leg swelling.   Gastrointestinal: Negative for nausea, diarrhea, constipation, blood in stool and abdominal distention.   Genitourinary: Negative.  Has significant incontinence  Musculoskeletal: Negative.  REPorting significant weakness and difficulty ambulating  Skin: Negative.    Neurological: Negative for dizziness, tremors, syncope, weakness, light-headedness and headaches.   Hematological: Does not bruise/bleed easily.   Psychiatric/Behavioral: Negative.        Physical Exam     Recent Vitals 1/16/2020   Height -   Weight -   BSA (m2) -   /66   Pulse -   Temp -   Temp src -   SpO2 -   Some recent data might be hidden   Pulse is 79 temp is 98 and sats are 90% on room air    Constitutional: Oriented to person, place, and time and appears well-developed.   HEENT:  Normocephalic and atraumatic.  Eyes: Conjunctivae and EOM are normal. Pupils are equal, round, and reactive to light. No discharge.  No scleral icterus. Nose normal. Mouth/Throat: Oropharynx is clear and moist. No oropharyngeal exudate.    NECK: Normal range of motion. Neck supple. No JVD present. No tracheal deviation present. No thyromegaly present.   CARDIOVASCULAR: Normal rate, regular rhythm and intact distal pulses.  Exam reveals no gallop and no friction rub.  Systolic murmur present.  PULMONARY: Effort normal and breath sounds normal. No respiratory distress.No Wheezing or rales.  ABDOMEN: Soft. Bowel sounds are normal. No distension and no mass.  There is no tenderness. There is no rebound and no guarding. No HSM.  MUSCULOSKELETAL: Normal range of motion.  Has bilateral 2+ leg edema and no tenderness. Mild kyphosis, no tenderness.  Bilateral lower extremity weakness noted on exam  LYMPH NODES:  Has no cervical, supraclavicular, axillary and groin adenopathy.   NEUROLOGICAL: Alert and oriented to person, place, and time. No cranial nerve deficit.  Normal muscle tone. Coordination normal.   Chronic left-sided weakness from previous CVA  GENITOURINARY: Deferred exam.  SKIN: Skin is warm and dry. No rash noted. No erythema. No pallor.   EXTREMITIES: No cyanosis, no clubbing, has bilateral 2+ leg edema. No Deformity.  PSYCHIATRIC: Normal mood, affect and behavior.      Lab Results     Recent Results (from the past 240 hour(s))   POCT Glucose    Collection Time: 01/13/20 11:46 AM   Result Value Ref Range    Glucose 292 (H) 70 - 139 mg/dL   Comprehensive Metabolic Panel    Collection Time: 01/13/20 12:52 PM   Result Value Ref Range    Sodium 133 (L) 136 - 145 mmol/L    Potassium 3.1 (L) 3.5 - 5.0 mmol/L    Chloride 102 98 - 107 mmol/L    CO2 19 (L) 22 - 31 mmol/L    Anion Gap, Calculation 12 5 - 18 mmol/L    Glucose 308 (H) 70 - 125 mg/dL    BUN 40 (H) 8 - 28 mg/dL    Creatinine 2.41 (H) 0.70 - 1.30 mg/dL    GFR MDRD Af Amer 32 (L) >60 mL/min/1.73m2    GFR MDRD Non Af Amer 26 (L) >60 mL/min/1.73m2    Bilirubin, Total 0.5 0.0 - 1.0 mg/dL    Calcium 8.6 8.5 - 10.5 mg/dL    Protein, Total 6.5 6.0 - 8.0 g/dL    Albumin 2.9 (L) 3.5 - 5.0 g/dL    Alkaline Phosphatase 102 45 - 120 U/L    AST 17 0 - 40 U/L    ALT 16 0 - 45 U/L   Lactic Acid    Collection Time: 01/13/20 12:52 PM   Result Value Ref Range    Lactic Acid 1.3 0.5 - 2.2 mmol/L   Influenza A/B Rapid Test    Collection Time: 01/13/20 12:52 PM   Result Value Ref Range    Influenza  A, Rapid Antigen Influenza A antigen detected (!) No Influenza A antigen detected    Influenza B, Rapid Antigen No Influenza B antigen detected No Influenza B antigen detected   Troponin I    Collection Time: 01/13/20 12:52 PM   Result Value Ref Range    Troponin I 0.04 0.00 - 0.29 ng/mL   Magnesium    Collection Time: 01/13/20 12:52 PM   Result Value Ref Range    Magnesium 1.7 (L) 1.8  - 2.6 mg/dL   HM1 (CBC with Diff)    Collection Time: 01/13/20 12:52 PM   Result Value Ref Range    WBC 8.2 4.0 - 11.0 thou/uL    RBC 2.86 (L) 4.40 - 6.20 mill/uL    Hemoglobin 8.7 (L) 14.0 - 18.0 g/dL    Hematocrit 25.8 (L) 40.0 - 54.0 %    MCV 90 80 - 100 fL    MCH 30.4 27.0 - 34.0 pg    MCHC 33.7 32.0 - 36.0 g/dL    RDW 14.8 (H) 11.0 - 14.5 %    Platelets 225 140 - 440 thou/uL    MPV 9.1 8.5 - 12.5 fL    Neutrophils % 82 (H) 50 - 70 %    Lymphocytes % 6 (L) 20 - 40 %    Monocytes % 10 2 - 10 %    Eosinophils % 0 0 - 6 %    Basophils % 0 0 - 2 %    Neutrophils Absolute 6.8 2.0 - 7.7 thou/uL    Lymphocytes Absolute 0.5 (L) 0.8 - 4.4 thou/uL    Monocytes Absolute 0.8 0.0 - 0.9 thou/uL    Eosinophils Absolute 0.0 0.0 - 0.4 thou/uL    Basophils Absolute 0.0 0.0 - 0.2 thou/uL   ECG 12 lead with MUSE    Collection Time: 01/13/20 12:54 PM   Result Value Ref Range    SYSTOLIC BLOOD PRESSURE 177 mmHg    DIASTOLIC BLOOD PRESSURE 72 mmHg    VENTRICULAR RATE 59 BPM    ATRIAL RATE 59 BPM    P-R INTERVAL 152 ms    QRS DURATION 84 ms    Q-T INTERVAL 456 ms    QTC CALCULATION (BEZET) 451 ms    P Axis 87 degrees    R AXIS 2 degrees    T AXIS 12 degrees    MUSE DIAGNOSIS       Sinus bradycardia  Nonspecific T wave abnormality  Abnormal ECG  When compared with ECG of 15-OCT-2019 22:13,  Premature atrial complexes are no longer Present  Vent. rate has decreased BY  47 BPM  Confirmed by SEE ED PROVIDER NOTE FOR, ECG INTERPRETATION ([id number]),  Jaylene Tay ([id number]) on 1/14/2020 1:03:27 AM     Urinalysis-UC if Indicated    Collection Time: 01/13/20  1:47 PM   Result Value Ref Range    Color, UA Straw Colorless, Yellow, Straw, Light Yellow    Clarity, UA Clear Clear    Glucose,  mg/dL (!) Negative    Bilirubin, UA Negative Negative    Ketones, UA Negative Negative    Specific Gravity, UA 1.010 1.001 - 1.030    Blood, UA Negative Negative    pH, UA 5.0 4.5 - 8.0    Protein, UA 30 mg/dL (!) Negative mg/dL    Urobilinogen, UA <2.0  E.U./dL <2.0 E.U./dL, 2.0 E.U./dL    Nitrite, UA Negative Negative    Leukocytes, UA Negative Negative    Bacteria, UA Few (!) None Seen hpf    RBC, UA 0-2 None Seen, 0-2 hpf    WBC, UA 0-5 None Seen, 0-5 hpf    Squam Epithel, UA 0-5 None Seen, 0-5 lpf    Mucus, UA Few (!) None Seen lpf    Hyaline Casts, UA 0-5 0-5, None Seen lpf   POCT Glucose    Collection Time: 01/13/20  4:54 PM   Result Value Ref Range    Glucose 188 (H) 70 - 139 mg/dL   Potassium    Collection Time: 01/13/20  6:51 PM   Result Value Ref Range    Potassium 3.1 (L) 3.5 - 5.0 mmol/L   Magnesium    Collection Time: 01/13/20  6:51 PM   Result Value Ref Range    Magnesium 1.7 (L) 1.8 - 2.6 mg/dL   POCT Glucose    Collection Time: 01/13/20  8:26 PM   Result Value Ref Range    Glucose 246 (H) 70 - 139 mg/dL   Potassium    Collection Time: 01/14/20  4:15 AM   Result Value Ref Range    Potassium 3.3 (L) 3.5 - 5.0 mmol/L   Basic Metabolic Panel    Collection Time: 01/14/20  4:15 AM   Result Value Ref Range    Sodium 136 136 - 145 mmol/L    Potassium 3.3 (L) 3.5 - 5.0 mmol/L    Chloride 107 98 - 107 mmol/L    CO2 21 (L) 22 - 31 mmol/L    Anion Gap, Calculation 8 5 - 18 mmol/L    Glucose 132 (H) 70 - 125 mg/dL    Calcium 8.0 (L) 8.5 - 10.5 mg/dL    BUN 38 (H) 8 - 28 mg/dL    Creatinine 2.25 (H) 0.70 - 1.30 mg/dL    GFR MDRD Af Amer 34 (L) >60 mL/min/1.73m2    GFR MDRD Non Af Amer 28 (L) >60 mL/min/1.73m2   Hemoglobin    Collection Time: 01/14/20  4:19 AM   Result Value Ref Range    Hemoglobin 7.0 (L) 14.0 - 18.0 g/dL   POCT Glucose    Collection Time: 01/14/20  6:25 AM   Result Value Ref Range    Glucose 98 70 - 139 mg/dL   Potassium    Collection Time: 01/14/20 10:50 AM   Result Value Ref Range    Potassium 4.2 3.5 - 5.0 mmol/L   POCT Glucose    Collection Time: 01/14/20 11:36 AM   Result Value Ref Range    Glucose 239 (H) 70 - 139 mg/dL   Protime-INR    Collection Time: 01/14/20  3:32 PM   Result Value Ref Range    INR 1.17 (H) 0.90 - 1.10   Lactate  Dehydrogenase (LDH)    Collection Time: 01/14/20  3:32 PM   Result Value Ref Range    LD (LDH) 173 125 - 220 U/L   Hemoglobin    Collection Time: 01/14/20  3:33 PM   Result Value Ref Range    Hemoglobin 8.4 (L) 14.0 - 18.0 g/dL   Haptoglobin    Collection Time: 01/14/20  3:33 PM   Result Value Ref Range    Haptoglobin 269 (H) 33 - 171 mg/dL   Reticulocytes    Collection Time: 01/14/20  3:33 PM   Result Value Ref Range    Retic Absolute Count 0.045 0.010 - 0.110 mill/uL    Retic Ct Pct 1.63 0.8 - 2.7 %   POCT Glucose    Collection Time: 01/14/20  5:15 PM   Result Value Ref Range    Glucose 193 (H) 70 - 139 mg/dL   POCT Glucose    Collection Time: 01/14/20  9:00 PM   Result Value Ref Range    Glucose 217 (H) 70 - 139 mg/dL   POCT Glucose    Collection Time: 01/15/20  6:27 AM   Result Value Ref Range    Glucose 117 70 - 139 mg/dL   Basic Metabolic Panel    Collection Time: 01/15/20  6:39 AM   Result Value Ref Range    Sodium 136 136 - 145 mmol/L    Potassium 3.7 3.5 - 5.0 mmol/L    Chloride 109 (H) 98 - 107 mmol/L    CO2 20 (L) 22 - 31 mmol/L    Anion Gap, Calculation 7 5 - 18 mmol/L    Glucose 113 70 - 125 mg/dL    Calcium 8.4 (L) 8.5 - 10.5 mg/dL    BUN 42 (H) 8 - 28 mg/dL    Creatinine 2.08 (H) 0.70 - 1.30 mg/dL    GFR MDRD Af Amer 37 (L) >60 mL/min/1.73m2    GFR MDRD Non Af Amer 31 (L) >60 mL/min/1.73m2   Magnesium    Collection Time: 01/15/20  6:39 AM   Result Value Ref Range    Magnesium 1.7 (L) 1.8 - 2.6 mg/dL   Platelet Count - every other day x 3    Collection Time: 01/15/20  6:39 AM   Result Value Ref Range    Platelets 169 140 - 440 thou/uL   Hemoglobin    Collection Time: 01/15/20  6:39 AM   Result Value Ref Range    Hemoglobin 7.4 (L) 14.0 - 18.0 g/dL   POCT Glucose    Collection Time: 01/15/20 12:42 PM   Result Value Ref Range    Glucose 224 (H) 70 - 139 mg/dL   POCT Glucose    Collection Time: 01/15/20  4:59 PM   Result Value Ref Range    Glucose 213 (H) 70 - 139 mg/dL   POCT Glucose    Collection Time:  01/15/20 10:00 PM   Result Value Ref Range    Glucose 247 (H) 70 - 139 mg/dL   POCT Glucose    Collection Time: 01/16/20  6:31 AM   Result Value Ref Range    Glucose 98 70 - 139 mg/dL   Basic Metabolic Panel    Collection Time: 01/16/20  8:39 AM   Result Value Ref Range    Sodium 138 136 - 145 mmol/L    Potassium 4.2 3.5 - 5.0 mmol/L    Chloride 109 (H) 98 - 107 mmol/L    CO2 20 (L) 22 - 31 mmol/L    Anion Gap, Calculation 9 5 - 18 mmol/L    Glucose 108 70 - 125 mg/dL    Calcium 9.0 8.5 - 10.5 mg/dL    BUN 35 (H) 8 - 28 mg/dL    Creatinine 1.94 (H) 0.70 - 1.30 mg/dL    GFR MDRD Af Amer 41 (L) >60 mL/min/1.73m2    GFR MDRD Non Af Amer 33 (L) >60 mL/min/1.73m2   Hemoglobin    Collection Time: 01/16/20  8:39 AM   Result Value Ref Range    Hemoglobin 8.5 (L) 14.0 - 18.0 g/dL   POCT Glucose    Collection Time: 01/16/20 11:14 AM   Result Value Ref Range    Glucose 253 (H) 70 - 139 mg/dL            Imaging Results     Ct Head Without Contrast    Result Date: 1/13/2020  EXAM: CT HEAD WO CONTRAST LOCATION: [facility] DATE/TIME: 1/13/2020 1:13 PM INDICATION: Generalized weakness. COMPARISON: 10/15/2019. TECHNIQUE: Routine without IV contrast. Multiplanar reformats. Dose reduction techniques were used. FINDINGS: INTRACRANIAL CONTENTS: No intracranial hemorrhage, extraaxial collection, or mass effect.  Chronic infarctions within bilateral thalami and right posterior limb internal capsule. Mild presumed chronic small vessel ischemic changes. Mild generalized volume loss. No hydrocephalus. VISUALIZED ORBITS/SINUSES/MASTOIDS: Prior bilateral cataract surgery. Visualized portions of the orbits are otherwise unremarkable. No paranasal sinus mucosal disease. No middle ear or mastoid effusion. BONES/SOFT TISSUES: No acute abnormality.     1.  No acute intracranial process.     Xr Chest 1 View    Result Date: 1/13/2020  EXAM: XR CHEST 1 VIEW LOCATION: [facility] DATE/TIME: 1/13/2020 1:19 PM INDICATION: fever COMPARISON:  10/15/2019     Shallow inspiration. Lungs are clear. Heart and pulmonary vascularity are normal.    Xr Hip Left 2 Or More Vws Portable    Result Date: 1/14/2020  EXAM: XR HIP LEFT 2 OR MORE VWS PORTABLE LOCATION: [facility] DATE/TIME: 1/14/2020 3:11 PM INDICATION: pain COMPARISON: None.     The left hip joint space appears normal. No fracture or dislocation seen. There is peripheral arterial disease. Moderate narrowing of the right hip joint space.             KATIE Florez

## 2021-06-20 NOTE — LETTER
"Letter by Shahrzad Mahan CNP at      Author: Shahrzad Mahan CNP Service: -- Author Type: --    Filed:  Encounter Date: 2020 Status: Signed         Patient: Suman Nagy   MR Number: [id number]   YOB: 1940   Date of Visit: 2020       [facility]      NAME:  Suman Nagy             :  1940    MRN: [id number]    CODE STATUS:  FULL CODE    FACILITY: [facility] [[id number]]      CHIEF COMPLAIN/REASON FOR VISIT:  Chief Complaint   Patient presents with   ? Review Of Multiple Medical Conditions       HISTORY OF PRESENT ILLNESS: Suman Nagy is a 79 y.o. male being seen for review of multiple medical conditions, per nursing request his POLST reviewed and advance care planning discussed. He came to the TCU from [facility]. After a decondtioned state for influenza. Per his EMR \" with a history of CVA with persistent left-sided deficits, seizure disorder, type 2 diabetes, chronic kidney disease stage III-IV presents with fatigue body aches and malaise found to have influenza a in the emergency department.\" Currently in isolation as he is finishing his tamiflu course. His HBG in hospital was lowest at 7, never transfused and was dced to us with a 8.5 level. We did discuss TCU routines, MCS role in his care as well as advanced directives.    Allergies   Allergen Reactions   ? Cefazolin      \"felt very hot\"   ? Pravastatin      Increased peripheral neuropathy   ? Simvastatin      Increased peripheral neuropathy   ? Thiazides      Other: Gout     :     Current Outpatient Medications   Medication Sig   ? acetaminophen (TYLENOL) 325 MG tablet Take 2 tablets (650 mg total) by mouth every 4 (four) hours as needed.   ? amLODIPine (NORVASC) 10 MG tablet Take 1 tablet (10 mg total) by mouth daily.   ? atorvastatin (LIPITOR) 20 MG tablet Take 1 tablet (20 mg total) by mouth daily.   ? blood glucose test strips Check blood sugar 3 times " "per day. Accu-Chek Guide test striips.   ? cholecalciferol, vitamin D3, (VITAMIN D3) 1,000 unit capsule Take 2 capsules (2,000 Units total) by mouth daily.   ? cloNIDine HCl (CATAPRES) 0.3 MG tablet Take 0.5 tablets (0.15 mg total) by mouth 3 (three) times a day.   ? clopidogrel (PLAVIX) 75 mg tablet Take 1 tablet (75 mg total) by mouth daily.   ? cyanocobalamin 1,000 mcg/mL injection Inject 1,000 mcg into the shoulder, thigh, or buttocks every 3 (three) months.          ? cyanocobalamin, vitamin B-12, (VITAMIN B-12) 1,000 mcg Subl Place 1 tablet (1,000 mcg total) under the tongue daily.   ? diclofenac sodium (VOLTAREN) 1 % Gel Apply 4 g topically 3 (three) times a day. Apply to knees (Patient taking differently: Apply 4 g topically 3 (three) times a day as needed. Apply to knees )   ? furosemide (LASIX) 40 MG tablet Take 40 mg by mouth daily.   ? generic lancets Fastclix lancets. Check blood sugar 3 times per day.   ? glimepiride (AMARYL) 4 MG tablet Take 4 mg by mouth twice a day with lunch and supper.   ? hydrALAZINE (APRESOLINE) 25 MG tablet Take 25 mg by mouth 4 (four) times a day.   ? insulin glargine (LANTUS SOLOSTAR PEN) 100 unit/mL (3 mL) pen Inject 10 Units under the skin at bedtime.   ? metoprolol succinate (TOPROL-XL) 25 MG Take 1 tablet (25 mg total) by mouth daily.   ? omeprazole (PRILOSEC) 20 MG capsule Take 1 capsule (20 mg total) by mouth 2 (two) times a day before meals.   ? ONETOUCH ULTRA2 METER Misc AS DIRECTED   ? oseltamivir (TAMIFLU) 30 MG capsule Take 1 capsule (30 mg total) by mouth daily for 1 day.   ? pen needle, diabetic (BD ULTRA-FINE JONO PEN NEEDLE) 32 gauge x 5/32\" Ndle Use one needle per day to inject insulin.   ? polyethylene glycol (MIRALAX) 17 gram packet Take 1 packet (17 g total) by mouth daily as needed.   ? psyllium husk (DAILY FIBER ORAL) Take 1 tablet by mouth 2 (two) times a day.          ? sertraline (ZOLOFT) 50 MG tablet Take 50 mg by mouth daily.         REVIEW OF " SYSTEMS:    Currently, no fever, chills, or rigors. Does not have any visual or hearing problems. Denies any chest pain, headaches, palpitations, lightheadedness, dizziness, shortness of breath, or cough. Appetite is good. Denies any GERD symptoms. Denies any difficulty with swallowing, nausea, or vomiting.  Denies any abdominal pain, diarrhea or constipation. Denies any urinary symptoms. No insomnia. No active bleeding. No rash.       PHYSICAL EXAMINATION:  Vitals:    01/18/20 0904   BP: 161/75   Pulse: 75   Temp: 98.8  F (37.1  C)   Weight: 188 lb (85.3 kg)         GENERAL: Awake, Alert, oriented x3, not in any form of acute distress, answers questions appropriately, follows simple commands, conversant  HEENT: Head is normocephalic with normal hair distribution. No evidence of trauma. Ears: No acute purulent discharge. Eyes: Conjunctivae pink with no scleral jaundice. Nose: Normal mucosa and septum. NECK: Supple with no cervical or supraclavicular lymphadenopathy. Trachea is midline.   CHEST: No tenderness or deformity, no crepitus  LUNG: Clear to auscultation with good chest expansion. There are no crackles or wheezes, normal AP diameter.  BACK: No kyphosis of the thoracic spine. Symmetric, no curvature, ROM normal, no CVA tenderness, no spinal tenderness   CVS: There is good S1  S2,  rhythm is regular.  ABDOMEN: Globular and soft, nontender to palpation, non distended, no masses, no organomegaly, good bowel sounds, no rebound or guarding, no peritoneal signs.   EXTREMITIES: Atraumatic. Full range of motion on both upper and lower extremities, there is no tenderness to palpation, positive pedal edema, no cyanosis or clubbing, no calf tenderness, normal cap refill, no joint swelling. Generalized weakness  SKIN: Warm and dry, no erythema noted, no rashes or lesions.  NEUROLOGICAL: The patient is oriented to person, place and time. Strength and sensation are grossly intact. Face is  symmetric.                    LABS:    Lab Results   Component Value Date    WBC 8.2 01/13/2020    HGB 8.5 (L) 01/16/2020    HCT 25.8 (L) 01/13/2020    MCV 90 01/13/2020     01/15/2020       Results for orders placed or performed during the hospital encounter of 01/13/20   Basic Metabolic Panel   Result Value Ref Range    Sodium 138 136 - 145 mmol/L    Potassium 4.2 3.5 - 5.0 mmol/L    Chloride 109 (H) 98 - 107 mmol/L    CO2 20 (L) 22 - 31 mmol/L    Anion Gap, Calculation 9 5 - 18 mmol/L    Glucose 108 70 - 125 mg/dL    Calcium 9.0 8.5 - 10.5 mg/dL    BUN 35 (H) 8 - 28 mg/dL    Creatinine 1.94 (H) 0.70 - 1.30 mg/dL    GFR MDRD Af Amer 41 (L) >60 mL/min/1.73m2    GFR MDRD Non Af Amer 33 (L) >60 mL/min/1.73m2           Lab Results   Component Value Date    HGBA1C 7.6 (H) 10/28/2019     Vitamin D, Total (25-Hydroxy)   Date Value Ref Range Status   10/28/2019 14.0 (L) 30.0 - 80.0 ng/mL Final     Lab Results   Component Value Date    [id number] >2,000 (H) 10/28/2019       ASSESSMENT/PLAN:  1. Influenza A    2. 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 ACP (advance care planning)      1. Influenza: He is in a deconditioned state s/p for his hospitalization for influenza, Remains in room isolation until tamiflu completed. He is to participate in PT/OT and have SN for chronic medical condition management, meds and VS.    2. Dm: BS at 217, SN check BS four times a day, see med list above, on Lantuis at HS with oral glipizide. Diet counselling offerred, pt a bit gruff reported to me he knows what to eat,.    3. ACP: POLST signed today as Full code by this provider, we spent greater then 16 minutes face to face today reviewing POLST status and advance care planning.        Electronically signed by:  Shahrzad Mahan CNP  This progress note was completed using Dragon software and there may be grammatical errors.

## 2021-06-20 NOTE — LETTER
Letter by Shahrzad Mahan CNP at      Author: Shahrzad Mahan CNP Service: -- Author Type: --    Filed:  Encounter Date: 2/3/2020 Status: (Other)         Patient: Suman Nagy   MR Number: [id number]   YOB: 1940   Date of Visit: 2/3/2020     [facility]      NAME:  Suman Nagy             :  1940  MRN: [id number]  CODE STATUS:  FULL CODE    VISIT TYPE: DISCHARGE SUMMARY  [facility]ACILYTY: [facility] [[id number]]      HOSPITALIZATION: [facility]  to 2020               PRIMARY CARE PROVIDER: Telly Torre MD    DISCHARGE DIAGNOSIS:      1. Influenza A    2. 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 Hypertension         DISCHARGE MEDICATIONS:         Medication List          Accurate as of February 3, 2020 11:59 PM. If you have any questions, ask your nurse or doctor.            CHANGE how you take these medications    diclofenac sodium 1 % Gel  Commonly known as:  VOLTAREN  Apply 4 g topically 3 (three) times a day. Apply to knees  What changed:      when to take this    reasons to take this        CONTINUE taking these medications    acetaminophen 325 MG tablet  Commonly known as:  TYLENOL  Take 2 tablets (650 mg total) by mouth every 4 (four) hours as needed.     amLODIPine 10 MG tablet  Commonly known as:  NORVASC  Take 1 tablet (10 mg total) by mouth daily.     atorvastatin 20 MG tablet  Commonly known as:  LIPITOR  Take 1 tablet (20 mg total) by mouth daily.     blood glucose test strips  Check blood sugar 3 times per day. Accu-Chek Guide test striips.     cholecalciferol (vitamin D3) 25 mcg (1,000 unit) capsule  Commonly known as:  Vitamin D3  Take 2 capsules (2,000 Units total) by mouth daily.     cloNIDine HCl 0.3 MG tablet  Commonly known as:  CATAPRES  Take 0.5 tablets (0.15 mg total) by mouth 3 (three) times a day.     clopidogreL 75 mg tablet  Commonly known as:  PLAVIX  Take 1 tablet (75 mg total) by mouth daily.    "  * cyanocobalamin (vitamin B-12) 1,000 mcg Subl  Commonly known as:  Vitamin B-12  Place 1 tablet (1,000 mcg total) under the tongue daily.     * cyanocobalamin 1,000 mcg/mL injection     furosemide 40 MG tablet  Commonly known as:  LASIX     generic lancets  Fastclix lancets. Check blood sugar 3 times per day.     glimepiride 4 MG tablet  Commonly known as:  AMARYL     hydrALAZINE 25 MG tablet  Commonly known as:  APRESOLINE     insulin glargine 100 unit/mL (3 mL) pen  Commonly known as:  LANTUS SOLOSTAR PEN     metoprolol succinate 25 MG  Commonly known as:  TOPROL-XL  Take 1 tablet (25 mg total) by mouth daily.     omeprazole 20 MG capsule  Commonly known as:  PriLOSEC  Take 1 capsule (20 mg total) by mouth 2 (two) times a day before meals.     OneTouch Ultra2 Meter Misc  Generic drug:  blood-glucose meter  AS DIRECTED     pen needle, diabetic 32 gauge x 5/32\" Ndle  Commonly known as:  BD Ultra-Fine Rebecca Pen Needle  Use one needle per day to inject insulin.     polyethylene glycol 17 gram packet  Commonly known as:  MIRALAX  Take 1 packet (17 g total) by mouth daily as needed.     sertraline 50 MG tablet  Commonly known as:  ZOLOFT         * This list has 2 medication(s) that are the same as other medications prescribed for you. Read the directions carefully, and ask your doctor or other care provider to review them with you.            STOP taking these medications    DAILY FIBER ORAL            HISTORY OF PRESENT ILLNESS: Suman Nagy is a 79 y.o. male being seen for a face to face visit for an anticipated am dc on 2/4/20. IDT has requested [facility]HA for home fu, wife reports declined. . He came to the TCU from [facility]. After a decondtioned state for influenza. Per his EMR \" with a history of CVA with persistent left-sided deficits, seizure disorder, type 2 diabetes, chronic kidney disease stage III-IV presents with fatigue body aches and malaise found to have influenza a in the emergency " "department.\" Currently in isolation as he is finishing his tamiflu course. His HBG in hospital was lowest at 7, never transfused and was dced to us with a 8.5 leave 8.5\"  Last HEMOglobin 1/20 on the TCU was at 8.6.    SKILLED NURSING FACILITY COURSE:  During this TCU stay, patient completed all anticipated goals of therapy.      PHYSICAL EXAMINATION:    Vitals:    02/04/20 0428   BP: 148/67   Pulse: 67   Temp: 98.7  F (37.1  C)   Weight: 177 lb 9.6 oz (80.6 kg)         GENERAL: Awake, Alert, oriented , not in any form of acute distress, answers questions appropriately, follows simple commands, conversant  HEENT: Head is normocephalic with normal hair distribution. No evidence of trauma. Ears: No acute purulent discharge. Eyes: Conjunctivae pink with no scleral jaundice. Nose: Normal mucosa and septum. NECK: Supple with no cervical or supraclavicular lymphadenopathy. Trachea is midline.   CHEST: No tenderness or deformity, no crepitus  LUNG: Clear to auscultation with good chest expansion. There are no crackles or wheezes, normal AP diameter.  BACK: No kyphosis of the thoracic spine. Symmetric, no curvature, ROM normal, no CVA tenderness, no spinal tenderness   CVS: There is good S1  S2, rhythm is regular.  ABDOMEN: Globular and soft, nontender to palpation, non distended, no masses, no organomegaly, good bowel sounds, no rebound or guarding, no peritoneal signs.   EXTREMITIES: Atraumatic. Full range of motion on both upper and lower extremities, there is no tenderness to palpation, positive pedal edema, aged arthritic  joint swelling.  SKIN: Warm and dry, no erythema noted, no rashes or lesions.  NEUROLOGICAL: The patient is oriented to person, place  Strength and sensation are grossly intact. Face is symmetric.      LABS:  All labs reviewed in the nursing home record.        DISCHARGE PLAN: Reviewed dc plan with spouse and pt. They have declined HH services as recommended per IDT. She reports has her supplies for " DM in place at home.    Patient will follow up with PCP within 7 days after discharge for medication mangagment and appropriate lab studies.    Post Discharge Medication Reconciliation Status: discharge medications reconciled and changed, per note/orders (see AVS)      Electronically signed by:  Shahrzad Mahan CNP  This progress note was completed using Dragon software and there may be grammatical errors.      For documentation purposes, chart review, medication management, and discharge coordination of care was greater than 35 minutes

## 2021-06-20 NOTE — LETTER
Letter by Abbie Kessler MBBS at      Author: Abbie Kessler MBBS Service: -- Author Type: --    Filed:  Encounter Date: 1/30/2020 Status: (Other)         Patient: Suman Nagy   MR Number: [id number]   YOB: 1940   Date of Visit: 1/30/2020       [facility] Admission note      Patient: Suman Nagy  MRN: [id number]  Date of Service: 1/30/2020      [facility] [[id number]]  Reason for Visit     Chief Complaint   Patient presents with   ? Review Of Multiple Medical Conditions       Code Status     FULL CODE    Assessment     - ACUTE INFLUENZA; RESOLVED  - acute on chronic anemia  - acute on chronic hip and knee pain  - type 2 DM uncontrolled  - advanced CKD with a discharge creatinine ranging between 2-1.9  R/c 2.3  -Electrolyte abnormalities with low potassium and magnesium  - htn' with elevated systolic blood pressures noted in the TCU  -Chronic lymphedema  -Profound debilitation with increasing amount of care that this patient needs at home.  He has incontinence at baseline and requires assistance with all his IADLs and most of his ADLs at home  -Protein calorie malnutrition  -Obesity    Plan     Patient has been admitted to the TCU   He is now on a higher dose of Lantus 16 units in the morning   however because of elevated blood sugar of 377 ; last night he was given 5 units of Lantus.  WiLL  monitor trends on blood sugar before making any further adjustment.  Physically is doing quite well and is now ambulating with stairs   he is a 1 person assist.  No hypoxia no cough no congestion.  He has chronic lymphedema does not want it addressed.  R/C HG 8.6  Denies any GI bleed  R/c ckd with stable creat 2.3  Discharge plan is to go home      History     Patient is a very pleasant 79 y.o. male who is admitted to TCU  He presented with increasing weakness with low-grade fever and myalgias.  Work-up revealed that he had a positive influenza A.  He is currently done with  Tamiflu .  He denies any cough congestion.  He has been afebrile and denies any concern regarding influenza any longer.   He however is profoundly weak requiring 2 person assist at least with all his ADLs and is not able to walk.  However now reporting significant improvement in strength.  He is a contact-guard assist walking up to 200 feet  Therapy plans to start working with stairs with him if he improves the plan to discharge him home soon  He was also noted to have acute on chronic anemia with no obvious signs of GI losses.  He was started on Prilosec.  R/C in the TCU was 8.6 and he is being monitored denies any bleeding issues  Anemia most likely secondary to chronic kidney disease discharge creatinine was 2  He has underlying history of type 2 diabetes which is not well controlled.  Lantus increased to 16 units last night he had a blood sugar of 377  He has underlying history of CVA with resultant left-sided hemiplegia.    However reporting significant improvement in strength.  Recheck labs show persistent renal impairment with a creatinine of 2.3      Past Medical History     Active Ambulatory (Non-Hospital) Problems    Diagnosis   ? ACP (advance care planning)   ? Influenza A   ? Generalized muscle weakness   ? Chronic kidney disease, stage IV (severe) (H)   ? Slow transit constipation   ? UTI (urinary tract infection)   ? 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 Anxiety   ? History of stroke   ? Medication monitoring encounter   ? History of bladder cancer   ? History of rectal cancer   ? Carotid artery stenosis, asymptomatic, left   ? Rectal cancer (H)   ? Hypertension   ? B12 deficiency   ? 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 Anemia    ? Bladder cancer (H)    ? Cancer (H)    ? Diabetes mellitus (H)    ? Hyperkalemia    ? Hypertension    ? Seizure (H)    ? Stroke (H)    ? Superficial phlebitis    ? Venous insufficiency of both  lower extremities        Past Social History     Reviewed, and he  reports that he quit smoking about 24 years ago. He has never used smokeless tobacco. He reports that he does not drink alcohol or use drugs.    Family History     Reviewed, and family history includes COPD in his father.    Medication List   Post Discharge Medication Reconciliation Status: discharge medications reconciled, continue medications without change   Current Outpatient Medications on File Prior to Visit   Medication Sig Dispense Refill   ? acetaminophen (TYLENOL) 325 MG tablet Take 2 tablets (650 mg total) by mouth every 4 (four) hours as needed.  0   ? amLODIPine (NORVASC) 10 MG tablet Take 1 tablet (10 mg total) by mouth daily.     ? atorvastatin (LIPITOR) 20 MG tablet Take 1 tablet (20 mg total) by mouth daily. 90 tablet 3   ? blood glucose test strips Check blood sugar 3 times per day. Accu-Chek Guide test striips. 100 strip 3   ? cholecalciferol, vitamin D3, (VITAMIN D3) 1,000 unit capsule Take 2 capsules (2,000 Units total) by mouth daily. 180 capsule 0   ? cloNIDine HCl (CATAPRES) 0.3 MG tablet Take 0.5 tablets (0.15 mg total) by mouth 3 (three) times a day.  0   ? clopidogrel (PLAVIX) 75 mg tablet Take 1 tablet (75 mg total) by mouth daily. 90 tablet 0   ? cyanocobalamin 1,000 mcg/mL injection Inject 1,000 mcg into the shoulder, thigh, or buttocks every 3 (three) months.            ? cyanocobalamin, vitamin B-12, (VITAMIN B-12) 1,000 mcg Subl Place 1 tablet (1,000 mcg total) under the tongue daily. 90 tablet 3   ? diclofenac sodium (VOLTAREN) 1 % Gel Apply 4 g topically 3 (three) times a day. Apply to knees (Patient taking differently: Apply 4 g topically 3 (three) times a day as needed. Apply to knees ) 100 g 0   ? furosemide (LASIX) 40 MG tablet Take 40 mg by mouth daily.     ? generic lancets Fastclix lancets. Check blood sugar 3 times per day. 102 each 3   ? glimepiride (AMARYL) 4 MG tablet Take 4 mg by mouth twice a day with  "lunch and supper.     ? hydrALAZINE (APRESOLINE) 25 MG tablet Take 25 mg by mouth 4 (four) times a day.     ? insulin glargine (LANTUS SOLOSTAR PEN) 100 unit/mL (3 mL) pen Inject 10 Units under the skin at bedtime.     ? metoprolol succinate (TOPROL-XL) 25 MG Take 1 tablet (25 mg total) by mouth daily. 90 tablet 3   ? omeprazole (PRILOSEC) 20 MG capsule Take 1 capsule (20 mg total) by mouth 2 (two) times a day before meals.  0   ? ONETOUCH ULTRA2 METER Misc AS DIRECTED 1 each 0   ? pen needle, diabetic (BD ULTRA-FINE JONO PEN NEEDLE) 32 gauge x 5/32\" Ndle Use one needle per day to inject insulin. 100 each 4   ? polyethylene glycol (MIRALAX) 17 gram packet Take 1 packet (17 g total) by mouth daily as needed.  0   ? psyllium husk (DAILY FIBER ORAL) Take 1 tablet by mouth 2 (two) times a day.            ? sertraline (ZOLOFT) 50 MG tablet Take 50 mg by mouth daily.       No current facility-administered medications on file prior to visit.        Allergies     Allergies   Allergen Reactions   ? Cefazolin      \"felt very hot\"   ? Pravastatin      Increased peripheral neuropathy   ? Simvastatin      Increased peripheral neuropathy   ? Thiazides      Other: Gout         Review of Systems   A comprehensive review of 14 systems was done. Pertinent findings noted here and in history of present illness. All the rest negative.  Constitutional: Negative.  Negative for fever, chills, he has activity change, appetite change and fatigue.   HENT: Negative for congestion and facial swelling.    Eyes: Negative for photophobia, redness and visual disturbance.   Respiratory: Negative for cough and chest tightness.    Cardiovascular: Negative for chest pain, palpitations and has chronic leg swelling.   Gastrointestinal: Negative for nausea, diarrhea, constipation, blood in stool and abdominal distention.   Genitourinary: Negative.  Has significant incontinence  Musculoskeletal: Negative.  REPorting significant weakness and difficulty " ambulating  Skin: Negative.    Neurological: Negative for dizziness, tremors, syncope, weakness, light-headedness and headaches.   Hematological: Does not bruise/bleed easily.   Psychiatric/Behavioral: Negative.        Physical Exam     Recent Vitals 1/21/2020   Height -   Weight 188 lbs   BSA (m2) 2.01 m2   /77   Pulse 64   Temp 98.6   Temp src -   SpO2 -   Some recent data might be hidden       Constitutional: Oriented to person, place, and time and appears well-developed.   HEENT:  Normocephalic and atraumatic.  Eyes: Conjunctivae and EOM are normal. Pupils are equal, round, and reactive to light. No discharge.  No scleral icterus. Nose normal. Mouth/Throat: Oropharynx is clear and moist. No oropharyngeal exudate.    NECK: Normal range of motion. Neck supple. No JVD present. No tracheal deviation present. No thyromegaly present.   CARDIOVASCULAR: Normal rate, regular rhythm and intact distal pulses.  Exam reveals no gallop and no friction rub.  Systolic murmur present.  PULMONARY: Effort normal and breath sounds normal. No respiratory distress.No Wheezing or rales.  ABDOMEN: Soft. Bowel sounds are normal. No distension and no mass.  There is no tenderness. There is no rebound and no guarding. No HSM.  MUSCULOSKELETAL: Normal range of motion.  Has bilateral 2+ leg edema and no tenderness. Mild kyphosis, no tenderness.  Bilateral lower extremity weakness noted on exam  LYMPH NODES: Has no cervical, supraclavicular, axillary and groin adenopathy.   NEUROLOGICAL: Alert and oriented to person, place, and time. No cranial nerve deficit.  Normal muscle tone. Coordination normal.   Chronic left-sided weakness from previous CVA  GENITOURINARY: Deferred exam.  SKIN: Skin is warm and dry. No rash noted. No erythema. No pallor.   EXTREMITIES: No cyanosis, no clubbing, has bilateral 2+ leg edema. No Deformity.  PSYCHIATRIC: Normal mood, affect and behavior.      Lab Results     Recent Results (from the past 240 hour(s))    Basic Metabolic Panel    Collection Time: 01/20/20 11:25 AM   Result Value Ref Range    Sodium 135 (L) 136 - 145 mmol/L    Potassium 4.2 3.5 - 5.0 mmol/L    Chloride 105 98 - 107 mmol/L    CO2 21 (L) 22 - 31 mmol/L    Anion Gap, Calculation 9 5 - 18 mmol/L    Glucose 273 (H) 70 - 125 mg/dL    Calcium 8.6 8.5 - 10.5 mg/dL    BUN 45 (H) 8 - 28 mg/dL    Creatinine 2.36 (H) 0.70 - 1.30 mg/dL    GFR MDRD Af Amer 32 (L) >60 mL/min/1.73m2    GFR MDRD Non Af Amer 27 (L) >60 mL/min/1.73m2   HM2(CBC w/o Differential)    Collection Time: 01/20/20 11:25 AM   Result Value Ref Range    WBC 5.9 4.0 - 11.0 thou/uL    RBC 2.90 (L) 4.40 - 6.20 mill/uL    Hemoglobin 8.6 (L) 14.0 - 18.0 g/dL    Hematocrit 27.4 (L) 40.0 - 54.0 %    MCV 95 80 - 100 fL    MCH 29.7 27.0 - 34.0 pg    MCHC 31.4 (L) 32.0 - 36.0 g/dL    RDW 14.4 11.0 - 14.5 %    Platelets 279 140 - 440 thou/uL    MPV 9.5 8.5 - 12.5 fL   Magnesium    Collection Time: 01/20/20 11:25 AM   Result Value Ref Range    Magnesium 1.9 1.8 - 2.6 mg/dL            KATIE Florez

## 2021-06-20 NOTE — LETTER
"Letter by Shahrzad Mahan CNP at      Author: Shahrzad Mahan CNP Service: -- Author Type: --    Filed:  Encounter Date: 2020 Status: (Other)         Patient: Suman Nagy   MR Number: [id number]   YOB: 1940   Date of Visit: 2020     .[facility]      NAME:  Suman Nagy             :  1940    MRN: [id number]    CODE STATUS:  FULL CODE    FACILITY: [facility] [[id number]]      CHIEF COMPLAIN/REASON FOR VISIT:  Chief Complaint   Patient presents with   ? Problem Visit   ? Review Of Multiple Medical Conditions       HISTORY OF PRESENT ILLNESS: Suman Nagy is a 79 y.o. male being seen for review of multiple medical conditions, per nursing request his POLST reviewed and advance care planning discussed. He came to the TCU from [facility]. After a decondtioned state for influenza. Per his EMR \" with a history of CVA with persistent left-sided deficits, seizure disorder, type 2 diabetes, chronic kidney disease stage III-IV presents with fatigue body aches and malaise found to have influenza a in the emergency department.\" His HBG in hospital was lowest at 7, never transfused and was dced to us with a 8.5 level. He is seen working in therapies, has had no concerns. Noted BS up a bit, we will need to check BS four times a day for more trending, will review and adjust meds prn. Tamiflu has completed, out of isolation at this time.    Allergies   Allergen Reactions   ? Cefazolin      \"felt very hot\"   ? Pravastatin      Increased peripheral neuropathy   ? Simvastatin      Increased peripheral neuropathy   ? Thiazides      Other: Gout     :     Current Outpatient Medications   Medication Sig   ? acetaminophen (TYLENOL) 325 MG tablet Take 2 tablets (650 mg total) by mouth every 4 (four) hours as needed.   ? amLODIPine (NORVASC) 10 MG tablet Take 1 tablet (10 mg total) by mouth daily.   ? atorvastatin (LIPITOR) 20 MG " "tablet Take 1 tablet (20 mg total) by mouth daily.   ? blood glucose test strips Check blood sugar 3 times per day. Accu-Chek Guide test striips.   ? cholecalciferol, vitamin D3, (VITAMIN D3) 1,000 unit capsule Take 2 capsules (2,000 Units total) by mouth daily.   ? cloNIDine HCl (CATAPRES) 0.3 MG tablet Take 0.5 tablets (0.15 mg total) by mouth 3 (three) times a day.   ? clopidogrel (PLAVIX) 75 mg tablet Take 1 tablet (75 mg total) by mouth daily.   ? cyanocobalamin 1,000 mcg/mL injection Inject 1,000 mcg into the shoulder, thigh, or buttocks every 3 (three) months.          ? cyanocobalamin, vitamin B-12, (VITAMIN B-12) 1,000 mcg Subl Place 1 tablet (1,000 mcg total) under the tongue daily.   ? diclofenac sodium (VOLTAREN) 1 % Gel Apply 4 g topically 3 (three) times a day. Apply to knees (Patient taking differently: Apply 4 g topically 3 (three) times a day as needed. Apply to knees )   ? furosemide (LASIX) 40 MG tablet Take 40 mg by mouth daily.   ? generic lancets Fastclix lancets. Check blood sugar 3 times per day.   ? glimepiride (AMARYL) 4 MG tablet Take 4 mg by mouth twice a day with lunch and supper.   ? hydrALAZINE (APRESOLINE) 25 MG tablet Take 25 mg by mouth 4 (four) times a day.   ? insulin glargine (LANTUS SOLOSTAR PEN) 100 unit/mL (3 mL) pen Inject 10 Units under the skin at bedtime.   ? metoprolol succinate (TOPROL-XL) 25 MG Take 1 tablet (25 mg total) by mouth daily.   ? omeprazole (PRILOSEC) 20 MG capsule Take 1 capsule (20 mg total) by mouth 2 (two) times a day before meals.   ? ONETOUCH ULTRA2 METER [facility] AS DIRECTED   ? pen needle, diabetic (BD ULTRA-FINE JONO PEN NEEDLE) 32 gauge x 5/32\" Ndle Use one needle per day to inject insulin.   ? polyethylene glycol (MIRALAX) 17 gram packet Take 1 packet (17 g total) by mouth daily as needed.   ? psyllium husk (DAILY FIBER ORAL) Take 1 tablet by mouth 2 (two) times a day.          ? sertraline (ZOLOFT) 50 MG tablet Take 50 mg by mouth daily. "         REVIEW OF SYSTEMS:    Currently, no fever, chills, or rigors. Does not have any visual or hearing problems. Denies any chest pain, headaches, palpitations, lightheadedness, dizziness, shortness of breath, or cough. Appetite is good. Denies any GERD symptoms. Denies any difficulty with swallowing, nausea, or vomiting.  Denies any abdominal pain, diarrhea or constipation. Denies any urinary symptoms. No insomnia. No active bleeding. No rash.       PHYSICAL EXAMINATION:  Vitals:    01/21/20 0639   BP: 132/77   Pulse: 64   Temp: 98.6  F (37  C)   Weight: 188 lb (85.3 kg)         GENERAL: Awake, Alert, oriented x3, not in any form of acute distress, answers questions appropriately, follows simple commands, conversant  HEENT: Head is normocephalic with normal hair distribution. No evidence of trauma. Ears: No acute purulent discharge. Eyes: Conjunctivae pink with no scleral jaundice. Nose: Normal mucosa and septum. NECK: Supple with no cervical or supraclavicular lymphadenopathy. Trachea is midline.   CHEST: No tenderness or deformity, no crepitus  LUNG: Clear to auscultation with good chest expansion. There are no crackles or wheezes, normal AP diameter.  BACK: No kyphosis of the thoracic spine. Symmetric, no curvature, ROM normal, no CVA tenderness, no spinal tenderness   CVS: There is good S1  S2,  rhythm is regular.  ABDOMEN: Globular and soft, nontender to palpation, non distended, no masses, no organomegaly, good bowel sounds, no rebound or guarding, no peritoneal signs.   EXTREMITIES: Atraumatic. Full range of motion on both upper and lower extremities, there is no tenderness to palpation, positive pedal edema, no cyanosis or clubbing, no calf tenderness, normal cap refill, no joint swelling. Generalized weakness  SKIN: Warm and dry, no erythema noted, no rashes or lesions.  NEUROLOGICAL: The patient is oriented to person, place and time. Strength and sensation are grossly intact. Face is  symmetric.          LABS:    Lab Results   Component Value Date    WBC 5.9 01/20/2020    HGB 8.6 (L) 01/20/2020    HCT 27.4 (L) 01/20/2020    MCV 95 01/20/2020     01/20/2020       Results for orders placed or performed in visit on 01/20/20   Basic Metabolic Panel   Result Value Ref Range    Sodium 135 (L) 136 - 145 mmol/L    Potassium 4.2 3.5 - 5.0 mmol/L    Chloride 105 98 - 107 mmol/L    CO2 21 (L) 22 - 31 mmol/L    Anion Gap, Calculation 9 5 - 18 mmol/L    Glucose 273 (H) 70 - 125 mg/dL    Calcium 8.6 8.5 - 10.5 mg/dL    BUN 45 (H) 8 - 28 mg/dL    Creatinine 2.36 (H) 0.70 - 1.30 mg/dL    GFR MDRD Af Amer 32 (L) >60 mL/min/1.73m2    GFR MDRD Non Af Amer 27 (L) >60 mL/min/1.73m2           Lab Results   Component Value Date    HGBA1C 7.6 (H) 10/28/2019     Vitamin D, Total (25-Hydroxy)   Date Value Ref Range Status   10/28/2019 14.0 (L) 30.0 - 80.0 ng/mL Final     Lab Results   Component Value Date    [id number] >2,000 (H) 10/28/2019       ASSESSMENT/PLAN:  1. Influenza A    2. Hypertension    3. 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 Influenza: He is in a deconditioned state s/p for his hospitalization for influenza, Remains in room isolation until tamiflu completed. He is to participate in PT/OT and have SN for chronic medical condition management, meds and VS.    2. HTN: 132/77, See med list, on Lasix as well as apresoline    3. Dm:We ordered BS checks four times a day as we need better trending to adjust any meds, we will review throughout stay.            Electronically signed by:  Shahrzad Mahan CNP  This progress note was completed using Dragon software and there may be grammatical errors.

## 2021-06-20 NOTE — PROGRESS NOTES
[facility] clinic Follow Up Note    Suman Nagy   78 y.o. male    Date of Visit: 9/27/2018    Chief Complaint   Patient presents with     Follow-up     BP check     Injections     b12 and flu     Subjective  Luke is here with his wife, Jeni.  Patient is here for blood pressure follow-up.    Patient had complained of dry mouth with clonidine.  Attempt was made to try to wean down on clonidine but he had high spiking blood pressures.    On further clarification with the wife today, turns out he was taking a half of the clonidine pill in the morning, in addition to 0.3 mg of clonidine at night, prior to the medication adjustment earlier this summer.    He had attempted a higher dose of losartan, increasing to 50 mg a day instead of the 25 mg a day.  But his creatinine was mildly higher at 1.39.  He is back to the 25 mg a day losartan in the morning now.    His blood pressure has been running moderately high in the 130s-150s, but the wife is not checked it recently because it stressed her  out.  Blood pressure at the clinic visit last week was 138/78.    No lightheaded dizzy spells or falls.  No increased edema.    Dry mouth is about the same, does use Biotene artificial saliva.    Past history of the Stone and dementia.  Previous left carotid artery stenosis.  Still on Plavix and Lipitor.  No new neurologic event.    Diabetes remains well controlled with blood sugars 110-170 mostly around 115.  No low blood sugars.    He did not get into the eye doctor this summer, wife is planning to schedule that.    Chronic anxiety.  I did clarify the sertraline dose, the wife did confirm its excellent 75 mg a day for pill in half.    PMHx:    Past Medical History:   Diagnosis Date     Anemia      Bladder cancer (H)      Cancer (H)     Rectosigmoid     Diabetes mellitus (H)      Hyperkalemia      Hypertension      Seizure (H)     possible, one time occurence     Stroke (H)     multiple, residual left  sided weakness, last CVA in July 2015 caused some speech deficit     Superficial phlebitis      Venous insufficiency of both lower extremities      PSHx:    Past Surgical History:   Procedure Laterality Date     COLON SURGERY      Colectomy Low Anterior Resection     EYE SURGERY      Cataract Extraction     LAPAROSCOPIC COLON RESECTION N/A 6/1/2017    Procedure: LAPAROSCOPIC ASSISTED COLECTOMY LOW ANTERIOR RESECTION AND DIVERTING LOOP ILEOSTOMY, PROCTOSOPY;  Surgeon: Tyson Parry MD;  Location: [facility] [place];  Service:      PA CLOSE ENTEROSTOMY N/A 8/15/2017    Procedure: ILEOSTOMY CLOSURE LOOP ;  Surgeon: Tyson Parry MD;  Location: [facility] [place];  Service: General     PA CYSTOURETHROSCOPY,FULGUR <0.5 CM LESN N/A 9/1/2015    Procedure: CYSTOSCOPY TRANSURETHRAL RESECTION BLADDER TUMOR;  Surgeon: Cleveland Nair MD;  Location: [facility] [place];  Service: Urology     PA CYSTOURETHROSCOPY,FULGUR <0.5 CM LESN N/A 12/22/2015    Procedure: CYSTOSCOPY TRANSURETHRAL RESECTION BLADDER TUMOR AND BLADDER BIOPSIES;  Surgeon: Cleveland Nair MD;  Location: [facility] [place];  Service: Urology     TURBT      X2     VASECTOMY       Immunizations:   Immunization History   Administered Date(s) Administered     Influenza [id number], 01/18/2010     Influenza high dose, seasonal 09/15/2015, 09/26/2016, 10/03/2017, 09/27/2018     Influenza, Seasonal, Inj PF IIV3 09/27/2010, 09/14/2012, 09/27/2013     Influenza, inj, historic,unspecified 10/19/2007, 09/16/2011, 10/15/2015     Influenza, seasonal,quad inj 6-35 mos 09/18/2009, 10/17/2014     Pneumo Conj 13-V (2010&after) 01/20/2015     Pneumo Polysac 23-V 10/08/2004     Td,adult,historic,unspecified 07/01/2004     Tdap 05/20/2015       ROS A comprehensive review of systems was performed and was otherwise negative    Medications, allergies, and problem list were reviewed and updated    Exam  /78  Pulse 72  Wt 185 lb (83.9 kg)  BMI 28.98 kg/m2  Baseline  mental status.  Heart is regular without murmur.  Lungs are clear.  Abdomen is nontender.  Trace ankle edema bilaterally.  Able to stand and ambulate to lab with walker.    Assessment/Plan  1. Hypertension with mild renal insufficiency  Still running high.  Further clarification of his clonidine dosing.  Return to previous dosing of the half tablet in the morning in addition to a full tablet in the evening.  I did discuss the downside of possibly increasing the dry mouth side effect and other side effects with that.    I did discuss alternative medications, including trying to go back to the higher dose of losartan, but with risk of kidney injury with his renal insufficiency.  Wife prefers to go back to the previous dose of the clonidine.    Continue the losartan at 25 mg in the morning.    Follow-up for routine checkup and to 3 months.  - cloNIDine HCl (CATAPRES) 0.3 MG tablet; Take 1/2 pill in the morning.  Take 1 full pill at bedtime.  Dispense: 135 tablet; Refill: 2    2. Controlled type 2 diabetes with neuropathy (H) hemoglobin A1c in July was 6.9%  Well controlled.  No low blood sugars.  Continue Amaryl and metformin.  Hemoglobin A1c 3 months.    3. Carotid artery stenosis, asymptomatic, left  Asymptomatic.  He has had previous lacunar strokes.  Lipitor and Plavix.    4. B12 deficiency  B12 shot given today.  Plan B12 shots approximately every 3 months at his clinic follow-up visits.    I will plan to suggest on oral daily B12 1000 mcg supplement as well at future visit.    5. History of stroke  No new event    6. Anxiety  Sertraline 75 mg a day, dose clarified    7. Medication monitoring encounter    - Basic Metabolic Panel    Flu shot given today.    Return in about 2 months (around 12/3/2018) for Recheck.   Patient Instructions   Start taking 1/2 tablet of the clonidine pill in the morning.  Continue the full 0.3 mg clonidine pill in the evening.    Continue losartan 25 mg in the morning.    Lab work today  "to check kidney labs and potassium.    Flu shot and B12 shot today.    Follow-up in 2-3 months for blood pressure.    Make ophthalmology appointment for yearly diabetic eye check.    Telly Torre MD  I spent a total time with patient of over 25 minutes and over 50% coord care.  Time all face to face.      Current Outpatient Prescriptions   Medication Sig Dispense Refill     atorvastatin (LIPITOR) 20 MG tablet Take 1 tablet (20 mg total) by mouth daily. 90 tablet 1     blood glucose test (ONETOUCH ULTRA BLUE TEST STRIP) strips Use 1 each As Directed 6 (six) times a day. (E11.9) 600 strip 1     cholecalciferol, vitamin D3, 1,000 unit tablet Take 1,000 Units by mouth daily.       cloNIDine HCl (CATAPRES) 0.3 MG tablet Take 1/2 pill in the morning.  Take 1 full pill at bedtime. 135 tablet 2     clopidogrel (PLAVIX) 75 mg tablet Take one (1) tablet by mouth daily 30 tablet 5     cyanocobalamin 1,000 mcg/mL injection Inject 1,000 mcg into the shoulder, thigh, or buttocks every 30 (thirty) days.       glimepiride (AMARYL) 4 MG tablet One pill twice per day with lunch and supper 180 tablet 3     INJECT EASE LANCETS 30 gauge Misc Use daily as directed (4-6 times daily) 600 each 0     losartan (COZAAR) 25 MG tablet Take 1 tablet (25 mg total) by mouth daily. 90 tablet 1     metFORMIN (GLUCOPHAGE) 1000 MG tablet Take 1 tablet (1,000 mg total) by mouth 2 (two) times a day with meals. 180 tablet 1     metoprolol succinate (TOPROL-XL) 25 MG Take 0.5 tablets (12.5 mg total) by mouth daily. 30 tablet 0     sertraline (ZOLOFT) 50 MG tablet Take one & one -half (1&1/2) tablets by mouth daily 135 tablet 2     SURE COMFORT PEN NEEDLE 31 gauge x 3/16\" Ndle Use one needle each night. 100 each 2     Current Facility-Administered Medications   Medication Dose Route Frequency Provider Last Rate Last Dose     cyanocobalamin injection 1,000 mcg  1,000 mcg Intramuscular Q30 Days Jerry Kelsey MD   1,000 mcg at 09/27/18 1351     " "cyanocobalamin injection 1,000 mcg  1,000 mcg Intramuscular Q30 Days Telly Torre MD   1,000 mcg at 07/17/18 4419     Allergies   Allergen Reactions     Cefazolin      \"felt very hot\"     Pravastatin      Increased peripheral neuropathy     Simvastatin      Increased peripheral neuropathy     Thiazides      Other: Gout       Social History   Substance Use Topics     Smoking status: Former Smoker     Quit date: 8/31/1995     Smokeless tobacco: Never Used     Alcohol use No           "

## 2021-06-20 NOTE — LETTER
Letter by Abbie Kessler MBBS at      Author: Abbie Kessler MBBS Service: -- Author Type: --    Filed:  Encounter Date: 1/23/2020 Status: (Other)         Patient: Suman Nagy   MR Number: [id number]   YOB: 1940   Date of Visit: 1/23/2020       [facility] Admission note      Patient: Suman Nagy  MRN: [id number]  Date of Service: 1/23/2020      [facility] [[id number]]  Reason for Visit     Chief Complaint   Patient presents with   ? Review Of Multiple Medical Conditions       Code Status     FULL CODE    Assessment     - ACUTE INFLUENZA  - acute on chronic anemia  - acute on chronic hip and knee pain  - type 2 DM  - advanced CKD with a discharge creatinine ranging between 2-1.9  -Electrolyte abnormalities with low potassium and magnesium  - htn' with elevated systolic blood pressures noted in the TCU  -Chronic lymphedema  -Profound debilitation with increasing amount of care that this patient needs at home.  He has incontinence at baseline and requires assistance with all his IADLs and most of his ADLs at home  -Protein calorie malnutrition  -Obesity    Plan     Patient has been admitted to the TCU and examined in the presence of his wife.  He is being treated for influenza A with Tamiflu and will finish his 5-day regimen in the TCU.  He is denying any cough congestion or fever.  Weakness issues were reviewed with therapy and he has been walking with contact-guard assist currently so his strength is returned quite a bit.  He continues with therapy.  Blood sugars were reviewed and his blood sugars are consistently greater than 200.  Plan is to increase his Lantus to 14 units in the morning and monitor trends  Continue with his oral hypoglycemics continue to monitor blood sugars and adjust medication and insulin accordingly.  Continues with lymphedema bilateral lower extremities but is not interested in pursuing any wraps or higher amount of diuretics.  Continue  with his PT OT and rehab  Recheck labs show stable hemoglobin but impaired renal function monitor trends he has baseline renal impairment.  Recheck BMP closely    History     Patient is a very pleasant 79 y.o. male who is admitted to TCU  He was examined in the presence of his wife who supplemented his history.  As per the history he lives at home with his wife and other extended family members.  He presented with increasing weakness with low-grade fever and myalgias.  Work-up revealed that he had a positive influenza A.  He is currently being treated with Tamiflu and will finish his course in the TCU.  He denies any cough congestion.  He has been afebrile and denies any concern regarding influenza any longer  He however is profoundly weak requiring 2 person assist at least with all his ADLs and is not able to walk.  However now reporting significant improvement in strength.  He is a contact-guard assist walking up to 200 feet  He was also noted to have acute on chronic anemia with no obvious signs of GI losses.  He was started on Prilosec.  R/C in the TCU was 8.6 and he is being monitored denies any bleeding issues  Anemia most likely secondary to chronic kidney disease discharge creatinine was 2  He has underlying history of type 2 diabetes which as per wife has been adequately controlled in the past.  He will continue with his insulin regimen in the TCU and oral glimepiride  blood sugars will be monitored.  He has underlying history of CVA with resultant left-sided hemiplegia.    However reporting significant improvement in strength.  Care conference at with family tomorrow to discuss discharge planning      Past Medical History     Active Ambulatory (Non-Hospital) Problems    Diagnosis   ? ACP (advance care planning)   ? Influenza A   ? Generalized muscle weakness   ? Chronic kidney disease, stage IV (severe) (H)   ? Slow transit constipation   ? UTI (urinary tract infection)   ? Controlled type 2 diabetes with  neuropathy (H)   ? Anxiety   ? History of stroke   ? Medication monitoring encounter   ? History of bladder cancer   ? History of rectal cancer   ? Carotid artery stenosis, asymptomatic, left   ? Rectal cancer (H)   ? Hypertension   ? B12 deficiency   ? 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 Anemia    ? Bladder cancer (H)    ? Cancer (H)    ? Diabetes mellitus (H)    ? Hyperkalemia    ? Hypertension    ? Seizure (H)    ? Stroke (H)    ? Superficial phlebitis    ? Venous insufficiency of both lower extremities        Past Social History     Reviewed, and he  reports that he quit smoking about 24 years ago. He has never used smokeless tobacco. He reports that he does not drink alcohol or use drugs.    Family History     Reviewed, and family history includes COPD in his father.    Medication List   Post Discharge Medication Reconciliation Status: discharge medications reconciled, continue medications without change   Current Outpatient Medications on File Prior to Visit   Medication Sig Dispense Refill   ? acetaminophen (TYLENOL) 325 MG tablet Take 2 tablets (650 mg total) by mouth every 4 (four) hours as needed.  0   ? amLODIPine (NORVASC) 10 MG tablet Take 1 tablet (10 mg total) by mouth daily.     ? atorvastatin (LIPITOR) 20 MG tablet Take 1 tablet (20 mg total) by mouth daily. 90 tablet 3   ? blood glucose test strips Check blood sugar 3 times per day. Accu-Chek Guide test striips. 100 strip 3   ? cholecalciferol, vitamin D3, (VITAMIN D3) 1,000 unit capsule Take 2 capsules (2,000 Units total) by mouth daily. 180 capsule 0   ? cloNIDine HCl (CATAPRES) 0.3 MG tablet Take 0.5 tablets (0.15 mg total) by mouth 3 (three) times a day.  0   ? clopidogrel (PLAVIX) 75 mg tablet Take 1 tablet (75 mg total) by mouth daily. 90 tablet 0   ? cyanocobalamin 1,000 mcg/mL injection Inject 1,000 mcg into the shoulder, thigh, or buttocks every 3 (three) months.           "  ? cyanocobalamin, vitamin B-12, (VITAMIN B-12) 1,000 mcg Subl Place 1 tablet (1,000 mcg total) under the tongue daily. 90 tablet 3   ? diclofenac sodium (VOLTAREN) 1 % Gel Apply 4 g topically 3 (three) times a day. Apply to knees (Patient taking differently: Apply 4 g topically 3 (three) times a day as needed. Apply to knees ) 100 g 0   ? furosemide (LASIX) 40 MG tablet Take 40 mg by mouth daily.     ? generic lancets Fastclix lancets. Check blood sugar 3 times per day. 102 each 3   ? glimepiride (AMARYL) 4 MG tablet Take 4 mg by mouth twice a day with lunch and supper.     ? hydrALAZINE (APRESOLINE) 25 MG tablet Take 25 mg by mouth 4 (four) times a day.     ? insulin glargine (LANTUS SOLOSTAR PEN) 100 unit/mL (3 mL) pen Inject 10 Units under the skin at bedtime.     ? metoprolol succinate (TOPROL-XL) 25 MG Take 1 tablet (25 mg total) by mouth daily. 90 tablet 3   ? omeprazole (PRILOSEC) 20 MG capsule Take 1 capsule (20 mg total) by mouth 2 (two) times a day before meals.  0   ? ONETOUCH ULTRA2 METER Mis AS DIRECTED 1 each 0   ? pen needle, diabetic (BD ULTRA-FINE JONO PEN NEEDLE) 32 gauge x 5/32\" Ndle Use one needle per day to inject insulin. 100 each 4   ? polyethylene glycol (MIRALAX) 17 gram packet Take 1 packet (17 g total) by mouth daily as needed.  0   ? psyllium husk (DAILY FIBER ORAL) Take 1 tablet by mouth 2 (two) times a day.            ? sertraline (ZOLOFT) 50 MG tablet Take 50 mg by mouth daily.       No current facility-administered medications on file prior to visit.        Allergies     Allergies   Allergen Reactions   ? Cefazolin      \"felt very hot\"   ? Pravastatin      Increased peripheral neuropathy   ? Simvastatin      Increased peripheral neuropathy   ? Thiazides      Other: Gout         Review of Systems   A comprehensive review of 14 systems was done. Pertinent findings noted here and in history of present illness. All the rest negative.  Constitutional: Negative.  Negative for fever, " chills, he has activity change, appetite change and fatigue.   HENT: Negative for congestion and facial swelling.    Eyes: Negative for photophobia, redness and visual disturbance.   Respiratory: Negative for cough and chest tightness.    Cardiovascular: Negative for chest pain, palpitations and has chronic leg swelling.   Gastrointestinal: Negative for nausea, diarrhea, constipation, blood in stool and abdominal distention.   Genitourinary: Negative.  Has significant incontinence  Musculoskeletal: Negative.  REPorting significant weakness and difficulty ambulating  Skin: Negative.    Neurological: Negative for dizziness, tremors, syncope, weakness, light-headedness and headaches.   Hematological: Does not bruise/bleed easily.   Psychiatric/Behavioral: Negative.        Physical Exam     Recent Vitals 1/21/2020   Height -   Weight 188 lbs   BSA (m2) 2.01 m2   /77   Pulse 64   Temp 98.6   Temp src -   SpO2 -   Some recent data might be hidden   Pulse is 79 temp is 98 and sats are 90% on room air    Constitutional: Oriented to person, place, and time and appears well-developed.   HEENT:  Normocephalic and atraumatic.  Eyes: Conjunctivae and EOM are normal. Pupils are equal, round, and reactive to light. No discharge.  No scleral icterus. Nose normal. Mouth/Throat: Oropharynx is clear and moist. No oropharyngeal exudate.    NECK: Normal range of motion. Neck supple. No JVD present. No tracheal deviation present. No thyromegaly present.   CARDIOVASCULAR: Normal rate, regular rhythm and intact distal pulses.  Exam reveals no gallop and no friction rub.  Systolic murmur present.  PULMONARY: Effort normal and breath sounds normal. No respiratory distress.No Wheezing or rales.  ABDOMEN: Soft. Bowel sounds are normal. No distension and no mass.  There is no tenderness. There is no rebound and no guarding. No HSM.  MUSCULOSKELETAL: Normal range of motion.  Has bilateral 2+ leg edema and no tenderness. Mild kyphosis, no  tenderness.  Bilateral lower extremity weakness noted on exam  LYMPH NODES: Has no cervical, supraclavicular, axillary and groin adenopathy.   NEUROLOGICAL: Alert and oriented to person, place, and time. No cranial nerve deficit.  Normal muscle tone. Coordination normal.   Chronic left-sided weakness from previous CVA  GENITOURINARY: Deferred exam.  SKIN: Skin is warm and dry. No rash noted. No erythema. No pallor.   EXTREMITIES: No cyanosis, no clubbing, has bilateral 2+ leg edema. No Deformity.  PSYCHIATRIC: Normal mood, affect and behavior.      Lab Results     Recent Results (from the past 240 hour(s))   POCT Glucose    Collection Time: 01/13/20 11:46 AM   Result Value Ref Range    Glucose 292 (H) 70 - 139 mg/dL   Comprehensive Metabolic Panel    Collection Time: 01/13/20 12:52 PM   Result Value Ref Range    Sodium 133 (L) 136 - 145 mmol/L    Potassium 3.1 (L) 3.5 - 5.0 mmol/L    Chloride 102 98 - 107 mmol/L    CO2 19 (L) 22 - 31 mmol/L    Anion Gap, Calculation 12 5 - 18 mmol/L    Glucose 308 (H) 70 - 125 mg/dL    BUN 40 (H) 8 - 28 mg/dL    Creatinine 2.41 (H) 0.70 - 1.30 mg/dL    GFR MDRD Af Amer 32 (L) >60 mL/min/1.73m2    GFR MDRD Non Af Amer 26 (L) >60 mL/min/1.73m2    Bilirubin, Total 0.5 0.0 - 1.0 mg/dL    Calcium 8.6 8.5 - 10.5 mg/dL    Protein, Total 6.5 6.0 - 8.0 g/dL    Albumin 2.9 (L) 3.5 - 5.0 g/dL    Alkaline Phosphatase 102 45 - 120 U/L    AST 17 0 - 40 U/L    ALT 16 0 - 45 U/L   Lactic Acid    Collection Time: 01/13/20 12:52 PM   Result Value Ref Range    Lactic Acid 1.3 0.5 - 2.2 mmol/L   Influenza A/B Rapid Test    Collection Time: 01/13/20 12:52 PM   Result Value Ref Range    Influenza  A, Rapid Antigen Influenza A antigen detected (!) No Influenza A antigen detected    Influenza B, Rapid Antigen No Influenza B antigen detected No Influenza B antigen detected   Troponin I    Collection Time: 01/13/20 12:52 PM   Result Value Ref Range    Troponin I 0.04 0.00 - 0.29 ng/mL   Magnesium     Collection Time: 01/13/20 12:52 PM   Result Value Ref Range    Magnesium 1.7 (L) 1.8 - 2.6 mg/dL   HM1 (CBC with Diff)    Collection Time: 01/13/20 12:52 PM   Result Value Ref Range    WBC 8.2 4.0 - 11.0 thou/uL    RBC 2.86 (L) 4.40 - 6.20 mill/uL    Hemoglobin 8.7 (L) 14.0 - 18.0 g/dL    Hematocrit 25.8 (L) 40.0 - 54.0 %    MCV 90 80 - 100 fL    MCH 30.4 27.0 - 34.0 pg    MCHC 33.7 32.0 - 36.0 g/dL    RDW 14.8 (H) 11.0 - 14.5 %    Platelets 225 140 - 440 thou/uL    MPV 9.1 8.5 - 12.5 fL    Neutrophils % 82 (H) 50 - 70 %    Lymphocytes % 6 (L) 20 - 40 %    Monocytes % 10 2 - 10 %    Eosinophils % 0 0 - 6 %    Basophils % 0 0 - 2 %    Neutrophils Absolute 6.8 2.0 - 7.7 thou/uL    Lymphocytes Absolute 0.5 (L) 0.8 - 4.4 thou/uL    Monocytes Absolute 0.8 0.0 - 0.9 thou/uL    Eosinophils Absolute 0.0 0.0 - 0.4 thou/uL    Basophils Absolute 0.0 0.0 - 0.2 thou/uL   ECG 12 lead with MUSE    Collection Time: 01/13/20 12:54 PM   Result Value Ref Range    SYSTOLIC BLOOD PRESSURE 177 mmHg    DIASTOLIC BLOOD PRESSURE 72 mmHg    VENTRICULAR RATE 59 BPM    ATRIAL RATE 59 BPM    P-R INTERVAL 152 ms    QRS DURATION 84 ms    Q-T INTERVAL 456 ms    QTC CALCULATION (BEZET) 451 ms    P Axis 87 degrees    R AXIS 2 degrees    T AXIS 12 degrees    MUSE DIAGNOSIS       Sinus bradycardia  Nonspecific T wave abnormality  Abnormal ECG  When compared with ECG of 15-OCT-2019 22:13,  Premature atrial complexes are no longer Present  Vent. rate has decreased BY  47 BPM  Confirmed by SEE ED PROVIDER NOTE FOR, ECG INTERPRETATION ([id number]),  Jaylene Tay ([id number]) on 1/14/2020 1:03:27 AM     Urinalysis-UC if Indicated    Collection Time: 01/13/20  1:47 PM   Result Value Ref Range    Color, UA Straw Colorless, Yellow, Straw, Light Yellow    Clarity, UA Clear Clear    Glucose,  mg/dL (!) Negative    Bilirubin, UA Negative Negative    Ketones, UA Negative Negative    Specific Gravity, UA 1.010 1.001 - 1.030    Blood, UA Negative Negative    pH,  UA 5.0 4.5 - 8.0    Protein, UA 30 mg/dL (!) Negative mg/dL    Urobilinogen, UA <2.0 E.U./dL <2.0 E.U./dL, 2.0 E.U./dL    Nitrite, UA Negative Negative    Leukocytes, UA Negative Negative    Bacteria, UA Few (!) None Seen hpf    RBC, UA 0-2 None Seen, 0-2 hpf    WBC, UA 0-5 None Seen, 0-5 hpf    Squam Epithel, UA 0-5 None Seen, 0-5 lpf    Mucus, UA Few (!) None Seen lpf    Hyaline Casts, UA 0-5 0-5, None Seen lpf   POCT Glucose    Collection Time: 01/13/20  4:54 PM   Result Value Ref Range    Glucose 188 (H) 70 - 139 mg/dL   Potassium    Collection Time: 01/13/20  6:51 PM   Result Value Ref Range    Potassium 3.1 (L) 3.5 - 5.0 mmol/L   Magnesium    Collection Time: 01/13/20  6:51 PM   Result Value Ref Range    Magnesium 1.7 (L) 1.8 - 2.6 mg/dL   POCT Glucose    Collection Time: 01/13/20  8:26 PM   Result Value Ref Range    Glucose 246 (H) 70 - 139 mg/dL   Potassium    Collection Time: 01/14/20  4:15 AM   Result Value Ref Range    Potassium 3.3 (L) 3.5 - 5.0 mmol/L   Basic Metabolic Panel    Collection Time: 01/14/20  4:15 AM   Result Value Ref Range    Sodium 136 136 - 145 mmol/L    Potassium 3.3 (L) 3.5 - 5.0 mmol/L    Chloride 107 98 - 107 mmol/L    CO2 21 (L) 22 - 31 mmol/L    Anion Gap, Calculation 8 5 - 18 mmol/L    Glucose 132 (H) 70 - 125 mg/dL    Calcium 8.0 (L) 8.5 - 10.5 mg/dL    BUN 38 (H) 8 - 28 mg/dL    Creatinine 2.25 (H) 0.70 - 1.30 mg/dL    GFR MDRD Af Amer 34 (L) >60 mL/min/1.73m2    GFR MDRD Non Af Amer 28 (L) >60 mL/min/1.73m2   Hemoglobin    Collection Time: 01/14/20  4:19 AM   Result Value Ref Range    Hemoglobin 7.0 (L) 14.0 - 18.0 g/dL   POCT Glucose    Collection Time: 01/14/20  6:25 AM   Result Value Ref Range    Glucose 98 70 - 139 mg/dL   Potassium    Collection Time: 01/14/20 10:50 AM   Result Value Ref Range    Potassium 4.2 3.5 - 5.0 mmol/L   POCT Glucose    Collection Time: 01/14/20 11:36 AM   Result Value Ref Range    Glucose 239 (H) 70 - 139 mg/dL   Protime-INR    Collection Time:  01/14/20  3:32 PM   Result Value Ref Range    INR 1.17 (H) 0.90 - 1.10   Lactate Dehydrogenase (LDH)    Collection Time: 01/14/20  3:32 PM   Result Value Ref Range    LD (LDH) 173 125 - 220 U/L   Hemoglobin    Collection Time: 01/14/20  3:33 PM   Result Value Ref Range    Hemoglobin 8.4 (L) 14.0 - 18.0 g/dL   Haptoglobin    Collection Time: 01/14/20  3:33 PM   Result Value Ref Range    Haptoglobin 269 (H) 33 - 171 mg/dL   Reticulocytes    Collection Time: 01/14/20  3:33 PM   Result Value Ref Range    Retic Absolute Count 0.045 0.010 - 0.110 mill/uL    Retic Ct Pct 1.63 0.8 - 2.7 %   POCT Glucose    Collection Time: 01/14/20  5:15 PM   Result Value Ref Range    Glucose 193 (H) 70 - 139 mg/dL   POCT Glucose    Collection Time: 01/14/20  9:00 PM   Result Value Ref Range    Glucose 217 (H) 70 - 139 mg/dL   POCT Glucose    Collection Time: 01/15/20  6:27 AM   Result Value Ref Range    Glucose 117 70 - 139 mg/dL   Basic Metabolic Panel    Collection Time: 01/15/20  6:39 AM   Result Value Ref Range    Sodium 136 136 - 145 mmol/L    Potassium 3.7 3.5 - 5.0 mmol/L    Chloride 109 (H) 98 - 107 mmol/L    CO2 20 (L) 22 - 31 mmol/L    Anion Gap, Calculation 7 5 - 18 mmol/L    Glucose 113 70 - 125 mg/dL    Calcium 8.4 (L) 8.5 - 10.5 mg/dL    BUN 42 (H) 8 - 28 mg/dL    Creatinine 2.08 (H) 0.70 - 1.30 mg/dL    GFR MDRD Af Amer 37 (L) >60 mL/min/1.73m2    GFR MDRD Non Af Amer 31 (L) >60 mL/min/1.73m2   Magnesium    Collection Time: 01/15/20  6:39 AM   Result Value Ref Range    Magnesium 1.7 (L) 1.8 - 2.6 mg/dL   Platelet Count - every other day x 3    Collection Time: 01/15/20  6:39 AM   Result Value Ref Range    Platelets 169 140 - 440 thou/uL   Hemoglobin    Collection Time: 01/15/20  6:39 AM   Result Value Ref Range    Hemoglobin 7.4 (L) 14.0 - 18.0 g/dL   POCT Glucose    Collection Time: 01/15/20 12:42 PM   Result Value Ref Range    Glucose 224 (H) 70 - 139 mg/dL   POCT Glucose    Collection Time: 01/15/20  4:59 PM   Result  Value Ref Range    Glucose 213 (H) 70 - 139 mg/dL   POCT Glucose    Collection Time: 01/15/20 10:00 PM   Result Value Ref Range    Glucose 247 (H) 70 - 139 mg/dL   POCT Glucose    Collection Time: 01/16/20  6:31 AM   Result Value Ref Range    Glucose 98 70 - 139 mg/dL   Basic Metabolic Panel    Collection Time: 01/16/20  8:39 AM   Result Value Ref Range    Sodium 138 136 - 145 mmol/L    Potassium 4.2 3.5 - 5.0 mmol/L    Chloride 109 (H) 98 - 107 mmol/L    CO2 20 (L) 22 - 31 mmol/L    Anion Gap, Calculation 9 5 - 18 mmol/L    Glucose 108 70 - 125 mg/dL    Calcium 9.0 8.5 - 10.5 mg/dL    BUN 35 (H) 8 - 28 mg/dL    Creatinine 1.94 (H) 0.70 - 1.30 mg/dL    GFR MDRD Af Amer 41 (L) >60 mL/min/1.73m2    GFR MDRD Non Af Amer 33 (L) >60 mL/min/1.73m2   Hemoglobin    Collection Time: 01/16/20  8:39 AM   Result Value Ref Range    Hemoglobin 8.5 (L) 14.0 - 18.0 g/dL   POCT Glucose    Collection Time: 01/16/20 11:14 AM   Result Value Ref Range    Glucose 253 (H) 70 - 139 mg/dL   Basic Metabolic Panel    Collection Time: 01/20/20 11:25 AM   Result Value Ref Range    Sodium 135 (L) 136 - 145 mmol/L    Potassium 4.2 3.5 - 5.0 mmol/L    Chloride 105 98 - 107 mmol/L    CO2 21 (L) 22 - 31 mmol/L    Anion Gap, Calculation 9 5 - 18 mmol/L    Glucose 273 (H) 70 - 125 mg/dL    Calcium 8.6 8.5 - 10.5 mg/dL    BUN 45 (H) 8 - 28 mg/dL    Creatinine 2.36 (H) 0.70 - 1.30 mg/dL    GFR MDRD Af Amer 32 (L) >60 mL/min/1.73m2    GFR MDRD Non Af Amer 27 (L) >60 mL/min/1.73m2   HM2(CBC w/o Differential)    Collection Time: 01/20/20 11:25 AM   Result Value Ref Range    WBC 5.9 4.0 - 11.0 thou/uL    RBC 2.90 (L) 4.40 - 6.20 mill/uL    Hemoglobin 8.6 (L) 14.0 - 18.0 g/dL    Hematocrit 27.4 (L) 40.0 - 54.0 %    MCV 95 80 - 100 fL    MCH 29.7 27.0 - 34.0 pg    MCHC 31.4 (L) 32.0 - 36.0 g/dL    RDW 14.4 11.0 - 14.5 %    Platelets 279 140 - 440 thou/uL    MPV 9.5 8.5 - 12.5 fL   Magnesium    Collection Time: 01/20/20 11:25 AM   Result Value Ref Range     Magnesium 1.9 1.8 - 2.6 mg/dL            Imaging Results     Ct Head Without Contrast    Result Date: 1/13/2020  EXAM: CT HEAD WO CONTRAST LOCATION: [facility] DATE/TIME: 1/13/2020 1:13 PM INDICATION: Generalized weakness. COMPARISON: 10/15/2019. TECHNIQUE: Routine without IV contrast. Multiplanar reformats. Dose reduction techniques were used. FINDINGS: INTRACRANIAL CONTENTS: No intracranial hemorrhage, extraaxial collection, or mass effect.  Chronic infarctions within bilateral thalami and right posterior limb internal capsule. Mild presumed chronic small vessel ischemic changes. Mild generalized volume loss. No hydrocephalus. VISUALIZED ORBITS/SINUSES/MASTOIDS: Prior bilateral cataract surgery. Visualized portions of the orbits are otherwise unremarkable. No paranasal sinus mucosal disease. No middle ear or mastoid effusion. BONES/SOFT TISSUES: No acute abnormality.     1.  No acute intracranial process.     Xr Chest 1 View    Result Date: 1/13/2020  EXAM: XR CHEST 1 VIEW LOCATION: [facility] DATE/TIME: 1/13/2020 1:19 PM INDICATION: fever COMPARISON: 10/15/2019     Shallow inspiration. Lungs are clear. Heart and pulmonary vascularity are normal.    Xr Hip Left 2 Or More Vws Portable    Result Date: 1/14/2020  EXAM: XR HIP LEFT 2 OR MORE VWS PORTABLE LOCATION: [facility] DATE/TIME: 1/14/2020 3:11 PM INDICATION: pain COMPARISON: None.     The left hip joint space appears normal. No fracture or dislocation seen. There is peripheral arterial disease. Moderate narrowing of the right hip joint space.             KATIE Florez

## 2021-06-20 NOTE — PROGRESS NOTES
I met with Suman Nagy at the request of Dr Torre to recheck blood pressure.  Blood pressure medications on the MAR were reviewed with patient.    Patient has taken all medications as per usual regimen: Yes  Patient reports tolerating them without any issues or concerns: Yes    There were no vitals filed for this visit.    The previous encounter was reviewed.  The patient was discharged and the note will be sent to the provider for final review.

## 2021-06-20 NOTE — LETTER
Letter by Telly Torre MD at      Author: Telly Torre MD Service: -- Author Type: --    Filed:  Encounter Date: 7/10/2020 Status: (Other)         Suman Nagy  [address] [facility] [id number]             July 10, 2020         Dear Mr. Nagy,    Below are the results from your recent visit:    Resulted Orders   Comprehensive Metabolic Panel   Result Value Ref Range    Sodium 139 136 - 145 mmol/L    Potassium 4.2 3.5 - 5.0 mmol/L    Chloride 105 98 - 107 mmol/L    CO2 21 (L) 22 - 31 mmol/L    Anion Gap, Calculation 13 5 - 18 mmol/L    Glucose 146 (H) 70 - 125 mg/dL    BUN 32 (H) 8 - 28 mg/dL    Creatinine 2.38 (H) 0.70 - 1.30 mg/dL    GFR MDRD Af Amer 32 (L) >60 mL/min/1.73m2    GFR MDRD Non Af Amer 26 (L) >60 mL/min/1.73m2    Bilirubin, Total 0.3 0.0 - 1.0 mg/dL    Calcium 8.3 (L) 8.5 - 10.5 mg/dL    Protein, Total 6.2 6.0 - 8.0 g/dL    Albumin 3.3 (L) 3.5 - 5.0 g/dL    Alkaline Phosphatase 105 45 - 120 U/L    AST 11 0 - 40 U/L    ALT 10 0 - 45 U/L    Narrative    Fasting Glucose reference range is 70-99 mg/dL per  American Diabetes Association (ADA) guidelines.   HM2(CBC w/o Differential)   Result Value Ref Range    WBC 5.2 4.0 - 11.0 thou/uL    RBC 2.98 (L) 4.40 - 6.20 mill/uL    Hemoglobin 8.8 (L) 14.0 - 18.0 g/dL    Hematocrit 26.9 (L) 40.0 - 54.0 %    MCV 90 80 - 100 fL    MCH 29.6 27.0 - 34.0 pg    MCHC 32.9 32.0 - 36.0 g/dL    RDW 13.8 11.0 - 14.5 %    Platelets 195 140 - 440 thou/uL    MPV 6.8 (L) 7.0 - 10.0 fL   Glycosylated Hemoglobin A1c   Result Value Ref Range    Hemoglobin A1c 5.7 3.5 - 6.0 %   CEA (Carcinoembryonic Antigen)   Result Value Ref Range    CEA 4.7 (H) 0.0 - 3.0 ng/mL    Narrative    Method is Abbott Carcinoembryonic Antigen (CEA) using  Chemiluminescent Microparticle Immunoassay.  WHO 1st International  Std (73/601).    Smoking may increase values.  Increased levels may be seen in patients with primary  colorectal cancer or other malignancies including  breast,  gastrointestinal tract, liver, ovarian,  pancreatic, and prostatic cancers. CEA may be useful  in monitoring patients with known malignancies.    Serum markers are not specific for malignancy and values  may vary by method.       Mildly higher creatinine.  Potassium level is normal. Normal liver tests.    Hemoglobin mildly lower.  Blood sugar only borderline high.  Hemoglobin A1c shows excellent control of diabetes.   CEA level is less than previous check.    No change in plan as discussed at recent clinic visit.     Please call with questions or contact us using [employer].    Sincerely,        Electronically signed by Telly Torre MD

## 2021-06-20 NOTE — LETTER
Letter by Mir De La Cruz CNP at      Author: Mir De La Cruz CNP Service: -- Author Type: --    Filed:  Encounter Date: 2/7/2020 Status: (Other)         Suman Nagy  [address] [facility] [address]             February 7, 2020         Dear Mr. Nagy,    Below are the results from your recent visit:    Resulted Orders   HM2(CBC w/o Differential)   Result Value Ref Range    WBC 6.0 4.0 - 11.0 thou/uL    RBC 3.29 (L) 4.40 - 6.20 mill/uL    Hemoglobin 9.6 (L) 14.0 - 18.0 g/dL    Hematocrit 30.9 (L) 40.0 - 54.0 %    MCV 94 80 - 100 fL    MCH 29.2 27.0 - 34.0 pg    MCHC 31.1 (L) 32.0 - 36.0 g/dL    RDW 15.8 (H) 11.0 - 14.5 %    Platelets 246 140 - 440 thou/uL    MPV 10.0 8.5 - 12.5 fL   Basic Metabolic Panel   Result Value Ref Range    Sodium 139 136 - 145 mmol/L    Potassium 4.1 3.5 - 5.0 mmol/L    Chloride 105 98 - 107 mmol/L    CO2 19 (L) 22 - 31 mmol/L    Anion Gap, Calculation 15 5 - 18 mmol/L    Glucose 353 (H) 70 - 125 mg/dL    Calcium 8.7 8.5 - 10.5 mg/dL    BUN 38 (H) 8 - 28 mg/dL    Creatinine 2.43 (H) 0.70 - 1.30 mg/dL    GFR MDRD Af Amer 31 (L) >60 mL/min/1.73m2    GFR MDRD Non Af Amer 26 (L) >60 mL/min/1.73m2    Narrative    Fasting Glucose reference range is 70-99 mg/dL per  American Diabetes Association (ADA) guidelines.        Your hemoglobin labs are looking better.  However, your blood sugar was quite high (353).  You should  check your blood sugars more often during the day, make sure that you are taking your diabetes  medications as prescribed, and follow-up with me if you notices that your blood sugars are consistently  greater than 250.  Otherwise, please schedule an appointment with Dr. Torre for follow-up in 1 month.    Please call with questions or contact us using MyChart.    Sincerely,        Electronically signed by Mir De La Cruz CNP

## 2021-06-21 NOTE — LETTER
Letter by Telly Torre MD at      Author: Telly Torre MD Service: -- Author Type: --    Filed:  Encounter Date: 3/3/2021 Status: (Other)         Suman Nagy  [address] [facility] [address]             March 3, 2021         Dear Mr. Nagy,    Below are the results from your recent visit:    Resulted Orders   Comprehensive Metabolic Panel   Result Value Ref Range    Sodium 136 136 - 145 mmol/L    Potassium 4.1 3.5 - 5.0 mmol/L    Chloride 102 98 - 107 mmol/L    CO2 22 22 - 31 mmol/L    Anion Gap, Calculation 12 5 - 18 mmol/L    Glucose 176 (H) 70 - 125 mg/dL    BUN 52 (H) 8 - 28 mg/dL    Creatinine 2.58 (H) 0.70 - 1.30 mg/dL    GFR MDRD Af Amer 29 (L) >60 mL/min/1.73m2    GFR MDRD Non Af Amer 24 (L) >60 mL/min/1.73m2    Bilirubin, Total 0.4 0.0 - 1.0 mg/dL    Calcium 8.4 (L) 8.5 - 10.5 mg/dL    Protein, Total 6.6 6.0 - 8.0 g/dL    Albumin 3.5 3.5 - 5.0 g/dL    Alkaline Phosphatase 100 45 - 120 U/L    AST 19 0 - 40 U/L    ALT 24 0 - 45 U/L    Narrative    Fasting Glucose reference range is 70-99 mg/dL per  American Diabetes Association (ADA) guidelines.   Glycosylated Hemoglobin A1c   Result Value Ref Range    Hemoglobin A1c 7.3 (H) <=5.6 %       Kidney labs are stable.  Potassium level is normal.    Liver tests are normal.    Calcium level is okay.    Diabetes is well controlled with hemoglobin A1c of 7.3%.    No change in treatment plan.    Please call with questions or contact us using [employer].    Sincerely,        Electronically signed by Telly Torre MD

## 2021-06-23 ENCOUNTER — COMMUNICATION - HEALTHEAST (OUTPATIENT)
Dept: SCHEDULING | Facility: CLINIC | Age: 81
End: 2021-06-23

## 2021-06-23 VITALS
SYSTOLIC BLOOD PRESSURE: 138 MMHG | TEMPERATURE: 96.7 F | HEART RATE: 56 BPM | BODY MASS INDEX: 25.06 KG/M2 | WEIGHT: 160 LBS | DIASTOLIC BLOOD PRESSURE: 48 MMHG

## 2021-06-23 DIAGNOSIS — E87.6 HYPOKALEMIA: ICD-10-CM

## 2021-06-23 DIAGNOSIS — N18.4 CHRONIC KIDNEY DISEASE, STAGE IV (SEVERE) (H): ICD-10-CM

## 2021-06-23 RX ORDER — POTASSIUM CHLORIDE 1500 MG/1
20 TABLET, EXTENDED RELEASE ORAL DAILY
Qty: 30 TABLET | Refills: 10 | Status: SHIPPED | OUTPATIENT
Start: 2021-06-23 | End: 2022-01-01

## 2021-06-23 NOTE — PATIENT INSTRUCTIONS - HE
Continue current medication.    I would recommend adding a oral B12 supplement 1000 mcg a day, in addition to the B12 shots every 3 months.    CEA level was added to your lab work today.  Results will be mailed to you.    Routine follow-up in 3 months with me.    Make your routine eye appointment this winter.

## 2021-06-23 NOTE — PATIENT INSTRUCTIONS - HE
Continue on current medications at this time.    Lab work today to check kidney labs/creatinine.    Follow-up in 3 months if blood pressure controlled and labs are improved today.    Contact me if blood pressure is more consistently above 150/80, you could consider starting amlodipine 2.5 mg once a day in addition to the clonidine and metoprolol.    Stay off losartan.    Doxycycline antibiotic twice a day for 10 days for the right elbow cellulitis.  Avoid scratching at the right elbow.  If the elbow worsens with increased redness or swelling or fever develops, seek medical attention right away to reevaluate infection.

## 2021-06-23 NOTE — TELEPHONE ENCOUNTER
Patient's wife informed and expressed understanding. Appointment made for 01/30 at 11:20. This is 20 minutes at the moment, okay to keep this at 20 or should it be 40 minutes?      Florinda Osuna [facility]  10:08 AM  1/15/2019

## 2021-06-23 NOTE — TELEPHONE ENCOUNTER
Contact patient.  We will need to cancel clinic today because of the cold.  Have him reschedule to this Friday if that is possible.  You can add him on at 12:20 on this Friday (over book him).  Or he can use my new patient spot on that Friday morning.  Otherwise work him in next week to my schedule (ask me where).

## 2021-06-23 NOTE — TELEPHONE ENCOUNTER
Contact the wife, Jeni    Patient's creatinine is worsening, which is evidence of worsening kidney function.    Patient will need to reduce metformin down to 500 mg twice a day, the metformin pills can be cut in half.    He also will need to stop the losartan entirely.    I will need to see him in 2 weeks for follow-up clinic and check his kidney labs.

## 2021-06-23 NOTE — TELEPHONE ENCOUNTER
RN cannot approve Refill Request    RN can NOT refill this medication Protocol failed and NO refill given. Last office visit: 4/27/2018 Jerry Kelsey MD Last Physical: 8/3/2017 Last MTM visit: Visit date not found Last visit same specialty: 9/27/2018 Telly Torre MD.  Next visit within 3 mo: Visit date not found  Next physical within 3 mo: Visit date not found      Jayant Mansfield, [facility] Triage/Med Refill 1/13/2019    Requested Prescriptions   Pending Prescriptions Disp Refills     metFORMIN (GLUCOPHAGE) 1000 MG tablet [Pharmacy Med Name: METFORMIN HCL 1000 MG] 180 tablet 1     Sig: TAKE 1 TABLET (1,000 MG TOTAL) BY MOUTH 2 (TWO) TIMES A DAY WITH MEALS.    Metformin Refill Protocol Failed - 1/12/2019 10:44 AM       Failed - Microalbumin in last year     Microalbumin, Random Urine   Date Value Ref Range Status   12/07/2017 120.44 (H) 0.00 - 1.99 mg/dL Final                 Passed - Blood pressure in last 12 months    BP Readings from Last 1 Encounters:   09/27/18 168/78            Passed - LFT or AST or ALT in last 12 months    Albumin   Date Value Ref Range Status   07/17/2018 3.6 3.5 - 5.0 g/dL Final     Bilirubin, Total   Date Value Ref Range Status   07/17/2018 0.3 0.0 - 1.0 mg/dL Final     Bilirubin, Direct   Date Value Ref Range Status   07/17/2017 <0.1 <=0.5 mg/dL Final     Alkaline Phosphatase   Date Value Ref Range Status   07/17/2018 87 45 - 120 U/L Final     AST   Date Value Ref Range Status   07/17/2018 11 0 - 40 U/L Final     ALT   Date Value Ref Range Status   07/17/2018 10 0 - 45 U/L Final     Protein, Total   Date Value Ref Range Status   07/17/2018 6.0 6.0 - 8.0 g/dL Final               Passed - GFR or Serum Creatinine in last 6 months    GFR MDRD Non Af Amer   Date Value Ref Range Status   09/27/2018 45 (L) >60 mL/min/1.73m2 Final     GFR MDRD Af Amer   Date Value Ref Range Status   09/27/2018 54 (L) >60 mL/min/1.73m2 Final            Passed - Visit with PCP or prescribing  provider visit in last 6 months or next 3 months    Last office visit with prescriber/PCP: Visit date not found OR same dept: 9/27/2018 Telly Torre MD OR same specialty: 9/27/2018 Telly Torre MD Last physical: Visit date not found Last MTM visit: Visit date not found         Next appt within 3 mo: Visit date not found  Next physical within 3 mo: Visit date not found  Prescriber OR PCP: Jerry Kelsey MD  Last diagnosis associated with med order: 1. Type 2 diabetes mellitus without complication, without long-term current use of insulin (H)  - metFORMIN (GLUCOPHAGE) 1000 MG tablet [Pharmacy Med Name: METFORMIN HCL 1000 MG]; Take 1 tablet (1,000 mg total) by mouth 2 (two) times a day with meals.  Dispense: 180 tablet; Refill: 1     If protocol passes may refill for 12 months if within 3 months of last provider visit (or a total of 15 months).          Passed - A1C in last 6 months    Hemoglobin A1c   Date Value Ref Range Status   07/17/2018 6.9 (H) 3.5 - 6.0 % Final

## 2021-06-23 NOTE — PROGRESS NOTES
[facility] Follow Up Note    Suman Nagy   78 y.o. male    Date of Visit: 2/1/2019    Chief Complaint   Patient presents with     2 week follow-up     F/U on Kidney lab     Subjective  Luke is here with his wife Jeni.  Patient has multiple lacunar strokes seen on 2015 brain MRI when he had a acute infarct.  He has some decreased memory and attention since the strokes.  He is managed at home by his wife.    Patient still has some memory of recent events and still manages his medications but with the help of the wife.    Patient checks his own blood sugars and reports blood sugars around 140-150.    He has not checked his blood pressure recently.  He does have a new arm cuff that he could use, however.    Last year I had attempted to use losartan and wean off clonidine, he had high spiking blood pressures and he is back on his usual clonidine of 1.5 mg in the morning and 3 mg in the evening.  He still has his dry mouth complaints.    Still on Toprol-XL 12.5 mg a day.    I saw him in January 14 and he had a significant increase in creatinine up to 2.19 with a potassium of 5.0 and I had him discontinue losartan.  That is been discontinued in the past with an acute tubular necrosis and ATN event after his colon cancer surgery in 2017.    He does have baseline renal insufficiency with creatinine 1.3-1.5 earlier.    Patient has stopped his losartan.  He is not checked his blood pressure since last visit.  No significant lower extremity edema, trace at times.    His blood pressure at last visit was 124/60.    Diet is been stable.  No new GI complaints.    No low blood sugars since last visit.  He had a low blood sugar prior to last visit.  Still on Amaryl 4 mg with lunch and supper.    I had him lower the metformin from 1000 mg twice a day down to 500 mg twice a day since January 14 when his creatinine was elevated.    He has excellent control diabetes with a hemoglobin A1c of 6.6% earlier this  month.  Blood sugars are running 140-150 now.    No new neurologic changes or falls.    Continues on Plavix with history of 50-69% left carotid artery stenosis on May 2017 ultrasound.  No stenosis on the right.  Previous left CEA.    Continues on Lipitor 20 mg and no new diffuse muscle achiness or right upper quadrant pain noted.    Past history of stage IIIa colon cancer with resection in 2017.  Full body CT scan March 2018 was negative.  He had a colonoscopy June 2018 that did show one polyp and flex sig in December 2018 was negative.  He has a planned follow-up with oncology and probably another colonoscopy in June of this year.  He is last saw oncology last month.    His CEA level was just borderline high at 3.8, that was reviewed with patient and wife today.    No palpitations or history of atrial fibrillation.  Normal heart echo in 2015.    No chest pain or chest pressure.    Dysthymia stable on Zoloft 75 mg a day.    He is taking the sublingual vitamin B12 daily, but he just swallows it.  Gets B12 shots every 3 months and had one earlier this month.    Transurethral resection of bladder tumor in 2015.  September 2018 cystoscopy was negative with 3-year follow-up plan.  No new urinary symptoms or hematuria.    He quit smoking in 1995.  No new cough or increasing shortness of breath.    He also had a new complaint of itchy painful right elbow over the past 3 days.  No fever.    He does have a history of a hot flushing rash after IV Ancef was administered in the hospital a long time ago prior to a bladder tumor resection.    PMHx:    Past Medical History:   Diagnosis Date     Anemia      Bladder cancer (H)      Cancer (H)     Rectosigmoid     Diabetes mellitus (H)      Hyperkalemia      Hypertension      Seizure (H)     possible, one time occurence     Stroke (H)     multiple, residual left sided weakness, last CVA in July 2015 caused some speech deficit     Superficial phlebitis      Venous insufficiency of  both lower extremities      PSHx:    Past Surgical History:   Procedure Laterality Date     COLON SURGERY      Colectomy Low Anterior Resection     EYE SURGERY      Cataract Extraction     LAPAROSCOPIC COLON RESECTION N/A 6/1/2017    Procedure: LAPAROSCOPIC ASSISTED COLECTOMY LOW ANTERIOR RESECTION AND DIVERTING LOOP ILEOSTOMY, PROCTOSOPY;  Surgeon: Tyson Parry MD;  Location: [facility];  Service:      ID CLOSE ENTEROSTOMY N/A 8/15/2017    Procedure: ILEOSTOMY CLOSURE LOOP ;  Surgeon: Tyson Parry MD;  Location: [facility];  Service: General     ID CYSTOURETHROSCOPY,FULGUR <0.5 CM LESN N/A 9/1/2015    Procedure: CYSTOSCOPY TRANSURETHRAL RESECTION BLADDER TUMOR;  Surgeon: Cleveland Nair MD;  Location: [facility];  Service: Urology     ID CYSTOURETHROSCOPY,FULGUR <0.5 CM LESN N/A 12/22/2015    Procedure: CYSTOSCOPY TRANSURETHRAL RESECTION BLADDER TUMOR AND BLADDER BIOPSIES;  Surgeon: Cleveland Nair MD;  Location: [facility];  Service: Urology     TURBT      X2     VASECTOMY       Immunizations:   Immunization History   Administered Date(s) Administered     Influenza [id number], 01/18/2010     Influenza high dose, seasonal 09/15/2015, 09/26/2016, 10/03/2017, 09/27/2018     Influenza, Seasonal, Inj PF IIV3 09/27/2010, 09/14/2012, 09/27/2013     Influenza, inj, historic,unspecified 10/19/2007, 09/16/2011, 10/15/2015     Influenza, seasonal,quad inj 6-35 mos 09/18/2009, 10/17/2014     Pneumo Conj 13-V (2010&after) 01/20/2015     Pneumo Polysac 23-V 10/08/2004     Td,adult,historic,unspecified 07/01/2004     Tdap 05/20/2015       ROS A comprehensive review of systems was performed and was otherwise negative    Medications, allergies, and problem list were reviewed and updated    Exam  /70 (Patient Site: Right Arm, Patient Position: Sitting, Cuff Size: Adult Large)   Pulse 60   Wt 181 lb 14.4 oz (82.5 kg)   BMI 28.49 kg/m    Alert and oriented, baseline mental  status.    I rechecked the blood pressure myself after I had him take his arm out of the sleeve, and got blood pressure 120/60.  I suspect he has significant whitecoat hypertension and stiff arteries with labile blood pressures.    Lungs are clear and heart is regular without murmur.  There is no ankle edema today.  Abdomen is nontender.    Right elbow with mild bursal effusion and moderate erythema with dry skin and evidence of excoriation around the elbow.  Consistent with mild early cellulitis.  There is some increased heat and soft tissue edema.  No purulence or skin breakdown.    Assessment/Plan  1. Hypertension  Controlled on my recheck.  Labile hypertension.  Tends to run a low diastolic, likely with stiff arteries.  Her graph he does have a history of multiple lacunar strokes and carotid artery stenosis.    I would like to avoid hypotension, therefore I will not start amlodipine at this time but if he has higher spiking blood pressures above 150/80, could consider amlodipine 2.5 mg a day in addition to his current clonidine and metoprolol.    He will not restart ACE inhibitor or ARB with his significant elevation in creatinine with 25 mg of losartan.    History of stroke with some decreased memory.  The functioning adequately at home with the help of wife.  Continue Zoloft 75 mg a day at this time, could consider reduction to 50 mg a day in the future.    2. Controlled type 2 diabetes with neuropathy (H)  Diabetes still controlled on low-dose metformin.  With his renal insufficiency I will avoid the higher dose metformin.  Continue Amaryl 4 mg with lunch and supper as long as no more low blood sugar events.    Follow-up for hemoglobin A1c check in April    It looks like he still due for his diabetic yearly eye check    Unsteady gait with peripheral neuropathy.  He does have a 4 wheeled walker.  No new falls.  No new foot sores.    3. Elevated serum creatinine  Recheck today.  Now no longer on losartan  -  Basic Metabolic Panel    4. B12 deficiency  Continue oral B12 daily.  I am shots every 3 months, plan at next visit    5. Malignant neoplasm of sigmoid colon (H)  Follow-up with oncology in June.  The wife is given a copy of the CEA level and she can bring that to her oncologist.  Probable follow-up colonoscopy in June.    2 fiber supplement tablets daily    6. History of bladder cancer  Follow-up with urology in March for six-month follow-up    7. Olecranon bursitis of right elbow  New issue, looks like early cellulitis, looks like he been scratching at the elbow.  With increased heat and swelling, will proceed with cellulitis treatment.    He has an allergy to Ancef.    He was warned that if he has progression of redness or swelling or develops a fever, he should be seen right away to evaluate for stronger antibiotic such as clindamycin.    I warned patient on risk of doxycycline including stomach upset.  He accepts these risks and wishes to proceed with antibiotic.  - doxycycline (ADOXA) 100 MG tablet; Take 1 tablet (100 mg total) by mouth 2 (two) times a day for 10 days. Take with food  Dispense: 20 tablet; Refill: 0    History of stroke with CEA.  Continue Lipitor and Plavix    Return in about 3 months (around 5/1/2019) for Recheck.   Patient Instructions   Continue on current medications at this time.    Lab work today to check kidney labs/creatinine.    Follow-up in 3 months if blood pressure controlled and labs are improved today.    Contact me if blood pressure is more consistently above 150/80, you could consider starting amlodipine 2.5 mg once a day in addition to the clonidine and metoprolol.    Stay off losartan.    Doxycycline antibiotic twice a day for 10 days for the right elbow cellulitis.  Avoid scratching at the right elbow.  If the elbow worsens with increased redness or swelling or fever develops, seek medical attention right away to reevaluate infection.    Telly Torre MD  I spent a total  "time with patient of over 25 minutes and over 50% coord care.  Time all face to face.      Current Outpatient Medications   Medication Sig Dispense Refill     atorvastatin (LIPITOR) 20 MG tablet Take 1 tablet (20 mg total) by mouth daily. 90 tablet 1     blood glucose test (ONETOUCH ULTRA BLUE TEST STRIP) strips Use 1 each As Directed 6 (six) times a day. (E11.9) 600 strip 1     cholecalciferol, vitamin D3, 1,000 unit tablet Take 1,000 Units by mouth daily.       cloNIDine HCl (CATAPRES) 0.3 MG tablet Take 1/2 pill in the morning.  Take 1 full pill at bedtime. 135 tablet 2     clopidogrel (PLAVIX) 75 mg tablet TAKE ONE (1) TABLET BY MOUTH DAILY 30 tablet 5     cyanocobalamin 1,000 mcg/mL injection Inject 1,000 mcg into the shoulder, thigh, or buttocks every 30 (thirty) days.       cyanocobalamin, vitamin B-12, (VITAMIN B-12) 1,000 mcg Subl Place 1 tablet (1,000 mcg total) under the tongue daily. 90 tablet 3     glimepiride (AMARYL) 4 MG tablet One pill twice per day with lunch and supper 180 tablet 1     INJECT EASE LANCETS 30 gauge Misc Use daily as directed (4-6 times daily) 600 each 0     INJECT EASE LANCETS 30 gauge Misc Use daily as directed (4-6 times daily) 600 each 3     losartan (COZAAR) 25 MG tablet Take 1 tablet (25 mg total) by mouth daily. 90 tablet 1     metFORMIN (GLUCOPHAGE) 1000 MG tablet Take 1 tablet (1,000 mg total) by mouth 2 (two) times a day with meals. 180 tablet 2     metoprolol succinate (TOPROL-XL) 25 MG Take 0.5 tablets (12.5 mg total) by mouth daily. 45 tablet 3     sertraline (ZOLOFT) 50 MG tablet Take one & one -half (1&1/2) tablets by mouth daily 135 tablet 2     SURE COMFORT PEN NEEDLE 31 gauge x 3/16\" Ndle Use one needle each night. 100 each 2     doxycycline (ADOXA) 100 MG tablet Take 1 tablet (100 mg total) by mouth 2 (two) times a day for 10 days. Take with food 20 tablet 0     Current Facility-Administered Medications   Medication Dose Route Frequency Provider Last Rate Last " "Dose     cyanocobalamin injection 1,000 mcg  1,000 mcg Intramuscular Q30 Days Jerry Kelsey MD   1,000 mcg at 19 1136     cyanocobalamin injection 1,000 mcg  1,000 mcg Intramuscular Q30 Days Telly Torre MD   1,000 mcg at 18 1546     Allergies   Allergen Reactions     Cefazolin      \"felt very hot\"     Pravastatin      Increased peripheral neuropathy     Simvastatin      Increased peripheral neuropathy     Thiazides      Other: Gout       Social History     Tobacco Use     Smoking status: Former Smoker     Last attempt to quit: 1995     Years since quittin.4     Smokeless tobacco: Never Used   Substance Use Topics     Alcohol use: No     Drug use: No           "

## 2021-06-23 NOTE — TELEPHONE ENCOUNTER
It looks like he was already rescheduled for Feb 7th, which is OK, but still offer him the earlier appointments this Friday if that works better for him.

## 2021-06-23 NOTE — TELEPHONE ENCOUNTER
Patient rescheduled for this Friday 02/01 at 12:20 PM.      Florinda Osuna CMA  4:23 PM  1/30/2019

## 2021-06-23 NOTE — PROGRESS NOTES
[facility] Follow Up Note    Suman Nagy   78 y.o. male    Date of Visit: 1/14/2019    Chief Complaint   Patient presents with     3 month follow-up     Subjective  Luke is here with his wife, Jeni.  Patient has had liquids in the past with some memory deficits, history of left CEA.    Multiple chronic lacunar strokes seen on 2015 MRI.  No clear focal deficits.  No recent event.  Patient still living with wife.  She manages medication.    No history of atrial fibrillation.  Normal cardiac echo in 2015.    He had acute renal injury last year after volume depletion as a complication of his colon cancer surgery with malnutrition and weight loss at the time.    Patient is following up for hypertension and diabetes.    He had complained of dry mouth in the past, there was an attempt to adjust down clonidine, but he had high spiking blood pressures and did not tolerate that.  He is back to his usual 1.5 mg of clonidine in the morning and 3 mg of clonidine in the evening.  Still on Toprol-XL 12.5 mg a day.  Also on losartan 25 mg a day.    He had some kidney injury in 2017 during his colon cancer resection.  His creatinine has stabilized in the 1.3-1.5 range on losartan 25 mg a day.    Blood pressure has been well controlled now recently.  Denies lightheaded dizzy spells.    His blood sugars have been ranging mostly in the 130-140 range.  Lowest blood sugar was 108.  Highest blood sugar was 200.  Generally sticking to his diet.  On Amaryl 4 mg with lunch and supper and metformin thousand milligrams twice a day.    He does have some intermittent loose stools, but not severe.  He does take 2 fiber tablets a day.  He does not take Imodium.    He had stage IIIa rectal cancer with surgery summer 2017    Full body CT scan March 2018 was negative for recurrence.  Colonoscopy June 2018 without recurrence, there was one polyp noted.  Flex sig December 2018 with colorectal clinic was reported as  negative.    I reviewed the oncology visit from December 2018, no evidence of recurrence.  Plan was for a CEA measurement that was going to be done today, and 6-month follow-up with oncology and colorectal, with another colonoscopy planned in June 2019.    No blood in stool or lower abdominal pain.    Patient is back on Lipitor 20 mg a day.  Past history of left carotid artery stenosis.    May 2017 ultrasound with 50-69% stenosis on the left, no stenosis on the right.  No intervention.  Still on Plavix.  No falls or bleeding issues.    History of B12 deficiency, not on oral B12 but gets a B12 shot every 3 months.  Is a stable baseline hemoglobin around 12.  Last checked in May 2018.    No chest pain or palpitations.  He does not have a history of cardiac ischemic disease.    Anxiety, was started on Claritin when he was having the issue with his colon cancer over a year ago.  He denies anxiety or depression now but wishes to continue on 75 mg of sertraline.    No foot sores or leg claudication complaints.    Bladder cancer with TURBT in 2015.  High-grade noninvasive tumor treated with valrubicin.  Small recurrence with fulguration December 2016.  September 2018 cystoscopy was negative and 6-month follow-up.    Quit smoking in 1995.  No increasing shortness of breath or new cough.    PMHx:    Past Medical History:   Diagnosis Date     Anemia      Bladder cancer (H)      Cancer (H)     Rectosigmoid     Diabetes mellitus (H)      Hyperkalemia      Hypertension      Seizure (H)     possible, one time occurence     Stroke (H)     multiple, residual left sided weakness, last CVA in July 2015 caused some speech deficit     Superficial phlebitis      Venous insufficiency of both lower extremities      PSHx:    Past Surgical History:   Procedure Laterality Date     COLON SURGERY      Colectomy Low Anterior Resection     EYE SURGERY      Cataract Extraction     LAPAROSCOPIC COLON RESECTION N/A 6/1/2017    Procedure:  LAPAROSCOPIC ASSISTED COLECTOMY LOW ANTERIOR RESECTION AND DIVERTING LOOP ILEOSTOMY, PROCTOSOPY;  Surgeon: Tyson Parry MD;  [place] [facility] [place]  Service:      [place] CLOSE ENTEROSTOMY N/A 8/15/2017    Procedure: ILEOSTOMY CLOSURE LOOP ;  Surgeon: Tyson Parry MD;  Location: [facility] OR;  Service: General     AZ CYSTOURETHROSCOPY,FULGUR <0.5 CM LESN N/A 9/1/2015    Procedure: CYSTOSCOPY TRANSURETHRAL RESECTION BLADDER TUMOR;  Surgeon: Cleveland Nair MD;  Location: [facility] OR;  Service: Urology     AZ CYSTOURETHROSCOPY,FULGUR <0.5 CM LESN N/A 12/22/2015    Procedure: CYSTOSCOPY TRANSURETHRAL RESECTION BLADDER TUMOR AND BLADDER BIOPSIES;  Surgeon: Cleveland Nair MD;  [place] [facility] [place];  Service: Urology     TURBT      X2     VASECTOMY       Immunizations:   Immunization History   Administered Date(s) Administered     Influenza [id number], 01/18/2010     Influenza high dose, seasonal 09/15/2015, 09/26/2016, 10/03/2017, 09/27/2018     Influenza, Seasonal, Inj PF IIV3 09/27/2010, 09/14/2012, 09/27/2013     Influenza, inj, historic,unspecified 10/19/2007, 09/16/2011, 10/15/2015     Influenza, seasonal,quad inj 6-35 mos 09/18/2009, 10/17/2014     Pneumo Conj 13-V (2010&after) 01/20/2015     Pneumo Polysac 23-V 10/08/2004     Td,adult,historic,unspecified 07/01/2004     Tdap 05/20/2015       ROS A comprehensive review of systems was performed and was otherwise negative    Medications, allergies, and problem list were reviewed and updated    Exam  /60 (Patient Site: Right Arm, Patient Position: Sitting, Cuff Size: Adult Large)   Pulse 64   Resp 16   Patient able to stand and ambulate to exam table with standby assistance.  Moderate unsteady gait but does have a walker.  Moderate kyphosis.  Patient does answer questions, oriented to place and person.  Lungs are clear, no wheezing or crackles.  Heart is regular without murmur.  Trace ankle edema bilaterally.  Abdomen is  nontender and soft.    Assessment/Plan  1. 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  Goal hemoglobin A1c less than 8% given age and disability.  Avoiding hypoglycemia.  Patient was told to contact clinic immediately if he does develop further hypoglycemia.    Metformin dose may need to be adjusted if creatinine increases.    Otherwise, continue current dose of oral diabetes medication at this time and follow-up in 3 months.    Wife was reminded to make the yearly ophthalmology checkup appointment this winter.  - Glycosylated Hemoglobin A1c    He does have some peripheral neuropathy, no active toe infection or foot sores currently.    Unsteady gait with peripheral neuropathy and global deconditioning.  Continue with 4 wheeled walker.    2. Hypertension  Controlled.  Continue current medications including current clonidine.  Patient had high spiking blood pressures with clonidine adjustment in the past, not planning additional adjustment.    Could consider a low-dose amlodipine or diuretic in the future if needed for additional blood pressure control.    Not planning increasing losartan, with higher creatinines in the past with increased doses.    Dry mouth, associated with medications.  Could consider reduction and sertraline.    I did discuss Biotene artificial saliva but is not had much benefit with that.    3. Carotid artery stenosis, asymptomatic, left  Asymptomatic.  Continue Plavix and Lipitor 20 mg.    May 2018  and HDL 36    4. B12 deficiency  B12 shot given today.    I will have him start daily sublingual B12 supplement as well as the B12 shots every 3 months.  - cyanocobalamin, vitamin B-12, (VITAMIN B-12) 1,000 mcg Subl; Place 1 tablet (1,000 mcg total) under the tongue daily.  Dispense: 90 tablet; Refill: 3    5. Medication monitoring encounter    - Comprehensive Metabolic Panel    6. Type 2 diabetes mellitus without complication, without  long-term current use of insulin (H)    - metFORMIN (GLUCOPHAGE) 1000 MG tablet; Take 1 tablet (1,000 mg total) by mouth 2 (two) times a day with meals.  Dispense: 180 tablet; Refill: 2    7. Malignant neoplasm of sigmoid colon (H)  No evidence of recurrence.  Six-month follow-up in June with oncology and colorectal surgery clinic.  Plan for possible colonoscopy in June was noted by colorectal clinic.    Intermittent looser stools with history of cancer resection.  I would have him continue on the fiber supplement at this time.  Follow-up if that becomes more severe.  - CEA (Carcinoembryonic Antigen)    History of bladder cancer, six-month cystoscopy and urology follow-up will be due March 2019    Patient denies ongoing anxiety issue, plans to continue on current Zoloft 75 mg a day at this time.  If anxiety remains well controlled, could consider reduction to 50 mg a day.    Mild to moderate dementia with history of lacunar infarcts.  Functioning adequately at home with wife.    Return in about 3 months (around 4/14/2019) for Recheck.   Patient Instructions   Continue current medication.    I would recommend adding a oral B12 supplement 1000 mcg a day, in addition to the B12 shots every 3 months.    CEA level was added to your lab work today.  Results will be mailed to you.    Routine follow-up in 3 months with me.    Make your routine eye appointment this winter.    Telly Torre MD  I spent a total time with patient of over 25 minutes and over 50% coord care.  Time all face to face.      Current Outpatient Medications   Medication Sig Dispense Refill     atorvastatin (LIPITOR) 20 MG tablet Take 1 tablet (20 mg total) by mouth daily. 90 tablet 1     blood glucose test (ONETOUCH ULTRA BLUE TEST STRIP) strips Use 1 each As Directed 6 (six) times a day. (E11.9) 600 strip 1     cholecalciferol, vitamin D3, 1,000 unit tablet Take 1,000 Units by mouth daily.       cloNIDine HCl (CATAPRES) 0.3 MG tablet Take 1/2 pill in  "the morning.  Take 1 full pill at bedtime. 135 tablet 2     clopidogrel (PLAVIX) 75 mg tablet TAKE ONE (1) TABLET BY MOUTH DAILY 30 tablet 5     cyanocobalamin 1,000 mcg/mL injection Inject 1,000 mcg into the shoulder, thigh, or buttocks every 30 (thirty) days.       glimepiride (AMARYL) 4 MG tablet One pill twice per day with lunch and supper 180 tablet 1     INJECT EASE LANCETS 30 gauge Misc Use daily as directed (4-6 times daily) 600 each 0     INJECT EASE LANCETS 30 gauge Misc Use daily as directed (4-6 times daily) 600 each 3     losartan (COZAAR) 25 MG tablet Take 1 tablet (25 mg total) by mouth daily. 90 tablet 1     metFORMIN (GLUCOPHAGE) 1000 MG tablet Take 1 tablet (1,000 mg total) by mouth 2 (two) times a day with meals. 180 tablet 2     metoprolol succinate (TOPROL-XL) 25 MG Take 0.5 tablets (12.5 mg total) by mouth daily. 45 tablet 3     sertraline (ZOLOFT) 50 MG tablet Take one & one -half (1&1/2) tablets by mouth daily 135 tablet 2     SURE COMFORT PEN NEEDLE 31 gauge x 3/16\" Ndle Use one needle each night. 100 each 2     cyanocobalamin, vitamin B-12, (VITAMIN B-12) 1,000 mcg Subl Place 1 tablet (1,000 mcg total) under the tongue daily. 90 tablet 3     Current Facility-Administered Medications   Medication Dose Route Frequency Provider Last Rate Last Dose     cyanocobalamin injection 1,000 mcg  1,000 mcg Intramuscular Q30 Days Jerry Kelsey MD   1,000 mcg at 19 1136     cyanocobalamin injection 1,000 mcg  1,000 mcg Intramuscular Q30 Days Telly Torre MD   1,000 mcg at 18 1546     Allergies   Allergen Reactions     Cefazolin      \"felt very hot\"     Pravastatin      Increased peripheral neuropathy     Simvastatin      Increased peripheral neuropathy     Thiazides      Other: Gout       Social History     Tobacco Use     Smoking status: Former Smoker     Last attempt to quit: 1995     Years since quittin.3     Smokeless tobacco: Never Used   Substance Use Topics     " Alcohol use: No     Drug use: No

## 2021-06-24 NOTE — TELEPHONE ENCOUNTER
FYI - Status Update  Who is Calling: Spouse  Update: Caller stated patient has not received his test results in the mail yet. Caller was informed of the letter mailed on 2/4/19. Caller requested for the letter mailed on 2/4/19, to mailed again. The 2/4/19 letter was put the outgoing mail box.  Okay to leave a detailed message?:  No return call needed

## 2021-06-25 NOTE — TELEPHONE ENCOUNTER
Refill Approved    Rx renewed per Medication Renewal Policy. Medication was last renewed on 11/13/19.    Hayden Porras, [facility] Triage/Med Refill 6/14/2021     Requested Prescriptions   Pending Prescriptions Disp Refills     ONETOUCH ULTRA2 METER Misc [Pharmacy Med Name: ONETOUCH KIT ULTRA 2] 1 each 0     Sig: USE AS DIRECTED       Diabetic Supplies Refill Protocol Passed - 6/11/2021  1:46 PM        Passed - Visit with PCP or prescribing provider visit in last 6 months     Last office visit with prescriber/PCP: 6/2/2021 Telly Torre MD OR same dept: 6/2/2021 Telly Torre MD OR same specialty: 6/2/2021 Telly Torre MD  Last physical: 7/10/2019 Last MTM visit: Visit date not found   Next visit within 3 mo: Visit date not found  Next physical within 3 mo: Visit date not found  Prescriber OR PCP: Telly Torre MD  Last diagnosis associated with med order: 1. 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             Passed - A1C in last 6 months     Hemoglobin A1c   Date Value Ref Range Status   06/02/2021 6.6 (H) <=5.6 % Final

## 2021-06-25 NOTE — PROGRESS NOTES
[facility] clinic Follow Up Note    Suman Nagy   80 y.o. male    Date of Visit: 6/2/2021    Chief Complaint   Patient presents with     Follow-up     Subjective  Luke is here with his wife, Jeni for routine follow-up.    Patient has chronic renal insufficiency and hypertension, followed by the nephrology clinic.  I did review the April 2021 nephrology note, no change in treatment plan at that time and 4-month follow-up given.  Blood pressure was 126/50 at that visit.    He has significant anemia associated with his chronic renal insufficiency.  He also has B12 deficiency and gets B12 shots every 3 to 4 months here in clinic.    No bleeding issues noted.    He gets erythropoietin shots through the nephrology clinic in he has an appointment there tomorrow.    No worsening shortness of breath or weakness.    Blood pressures at home of been in the 130s over 60s and denies orthostasis.  He has had labile hypertension in the past, but has been stable recently.  In March it was 124/46.  He denies orthostasis.    On amlodipine 10 mg a day, clonidine 1.5 mg in the morning and 3 mg in the evening.  Lasix 40 mg a day and hydralazine 75 mg twice daily.    No new cough or increasing shortness of breath.  No chest pain.    Paroxysmal atrial fibrillation on metoprolol.  I did review the cardiology note from April 2021, no change in treatment plan and a 6-month follow-up given.    He has not wanted the watchman device.  On Eliquis.  No bleeding issues.    Right CEA in 2015.  Previous stroke with left hemiparesis.  September 2020 CT angiogram of the neck showed 40% on the left and right 30%.    Still on Lipitor 20 mg.    No new generalized myalgia complaints abdominal pain complaints.    Quit smoking in 1995.    September 2020 heart echo reviewed with mild diastolic dysfunction but no heart valve problems.    Past history of stage III rectal cancer in 2017.  Low anterior resection.  September 2019 abdominal CT  scan without recurrence.  June 2018 colonoscopy with 2 polyps.  He has not contacted the colorectal clinic yet.  He did see oncology in January, with a 6-month follow-up planned for the summer.    Diabetes type 2, on Lantus insulin 16 units and Tradjenta 5 mg.  Lowest blood sugar was 96.  Highest blood sugar was 202.  Most blood sugars between 130 and 160.  Hemoglobin A1c last October was 7.3%.    He has not been able to contact urology, he had a TURBT in 2013 but was due for a cystoscopy.  No new hematuria or urinary symptoms.    Chronic dysthymia on sertraline 50 mg a day.    No new cough or swallowing difficulty.    Mild unsteadiness since the stroke but no recent falls.  He fell a couple months ago in the bathroom without head trauma or loss of consciousness.  He declined need for further physical therapy.  He is ambulating around the house on his own.    PMHx:    Past Medical History:   Diagnosis Date     Anemia      Bladder cancer (H)      Cancer (H)     Rectosigmoid     Diabetes mellitus (H)      Hyperkalemia      Hypertension      Seizure (H)     possible, one time occurence     Stroke (H)     multiple, residual left sided weakness, last CVA in July 2015 caused some speech deficit     Superficial phlebitis      Venous insufficiency of both lower extremities      PSHx:    Past Surgical History:   Procedure Laterality Date     COLON SURGERY      Colectomy Low Anterior Resection     EYE SURGERY      Cataract Extraction     LAPAROSCOPIC COLON RESECTION N/A 6/1/2017    Procedure: LAPAROSCOPIC ASSISTED COLECTOMY LOW ANTERIOR RESECTION AND DIVERTING LOOP ILEOSTOMY, PROCTOSOPY;  Surgeon: Tyson Parry MD;  Location: [facility];  Service:      OR CLOSE ENTEROSTOMY N/A 8/15/2017    Procedure: ILEOSTOMY CLOSURE LOOP ;  Surgeon: Tyson Parry MD;  Location: [facility];  Service: General     OR CYSTOURETHROSCOPY,FULGUR <0.5 CM LESN N/A 9/1/2015    Procedure: CYSTOSCOPY TRANSURETHRAL RESECTION  BLADDER TUMOR;  Surgeon: Cleveland Nair MD;  Location: [facility] [place];  Service: Urology     MN CYSTOURETHROSCOPY,FULGUR <0.5 CM KANDY N/A 12/22/2015    Procedure: CYSTOSCOPY TRANSURETHRAL RESECTION BLADDER TUMOR AND BLADDER BIOPSIES;  Surgeon: Cleveland Nair MD;  Location: [facility] [place];  Service: Urology     TURBT      X2     VASECTOMY       Immunizations:   Immunization History   Administered Date(s) Administered     COVID-19,PF,Pfizer 03/02/2021, 03/23/2021     Influenza F1c6-20, 01/18/2010     Influenza high dose,seasonal,PF, 65+ yrs 09/15/2015, 09/26/2016, 10/03/2017, 09/27/2018, 10/19/2019     Influenza, Seasonal, Inj PF IIV3 09/27/2010, 09/14/2012, 09/27/2013     Influenza, inj, historic,unspecified 10/19/2007, 09/16/2011, 10/15/2015     Influenza, seasonal,quad inj 6-35 mos 09/18/2009, 10/17/2014     Influenza,quad,high Dose,PF, 65yr + 09/21/2020     Pneumo Conj 13-V (2010&after) 01/20/2015     Pneumo Polysac 23-V 10/08/2004     Td,adult,historic,unspecified 07/01/2004     Tdap 05/20/2015       ROS A comprehensive review of systems was performed and was otherwise negative    Medications, allergies, and problem list were reviewed and updated    Exam  /44   Pulse (!) 56   Wt 154 lb (69.9 kg)   BMI 24.12 kg/m    Alert and oriented.  Left-sided hemiparesis unchanged.  Moderate kyphosis.  Lungs are clear.  Heart is regular without murmur.  Abdomen soft nontender, no significant lower extremity edema, perhaps trace puffiness at the ankles.    Assessment/Plan  1. Hypertension  Controlled.  Mildly low diastolic but tolerating.  Continue on current medications.  Could adjust hydralazine if needed for orthostasis.    Follow-up with nephrology clinic in August.    2. Chronic kidney disease, stage IV (severe) (H)  As above.    3. Anemia due to stage 4 chronic kidney disease (H)  If hemoglobin checked regularly at the hematology office for erythropoietin shots.  Has an appointment tomorrow to  get his hemoglobin checked with possible erythropoietin.    History of B12 deficiency, given a B12 shot today.  Repeat B12 shots every 3 to 4 months in clinic.    4. Type 2 diabetes mellitus with stage 4 chronic kidney disease, with long-term current use of insulin (H)  Overall controlled.  Goal hemoglobin A1c less than 8%.    Patient was told to contact me if low blood sugars or more consistent blood sugars above 200.  Adjust Lantus insulin if needed.    No change in treatment plan at this time.  - Glycosylated Hemoglobin A1c    5. Paroxysmal atrial fibrillation (H)  Heart is regular.  Toprol-XL.  Eliquis.  He does not want the watchman.    6. History of stroke  Previous CVA 2015.  Vascular disease, on Lipitor 20 mg.    Paroxysmal atrial fibrillation, on Eliquis    Mild unsteadiness.  He declined referral back to physical therapy.    7. History of rectal cancer  No change in bowels or blood in stool.  He is unsure if he wants to follow-up with colorectal clinic, patient states he does not think he would be able to go through another colonoscopy.  He will plan to follow-up with oncology this summer to determine follow-up plan.    8. Personal history of malignant neoplasm of bladder  Life will continue to contact urology to determine follow-up plan for cystoscopy.    9. Encounter for therapeutic drug monitoring    - Comprehensive Metabolic Panel    Chronic dysthymia stable on sertraline 50 mg.    Return in about 4 months (around 10/2/2021) for Recheck.   Patient Instructions   No change in treatment plan.    Follow-up with oncology this summer for your history of rectal cancer.    Continue to try to contact urology to determine follow-up for your history of bladder cancer.    Contact me if blood sugars less than 80, or if blood sugars above 200 more often.    Otherwise routine follow-up with me in 4 months in clinic.    Telly Torre MD        Current Outpatient Medications   Medication Sig Dispense Refill      acetaminophen (TYLENOL) 325 MG tablet Take 2 tablets (650 mg total) by mouth every 4 (four) hours as needed.  0     amLODIPine (NORVASC) 10 MG tablet TAKE 1 TABLET (10 MG TOTAL) BY MOUTH DAILY. 90 tablet 3     apixaban ANTICOAGULANT (ELIQUIS) 2.5 mg Tab tablet Take 1 tablet (2.5 mg total) by mouth 2 (two) times a day. 60 tablet 11     atorvastatin (LIPITOR) 20 MG tablet TAKE 1 TABLET (20 MG TOTAL) BY MOUTH DAILY. 90 tablet 3     calcium, as carbonate, (TUMS) 200 mg calcium (500 mg) chewable tablet Chew 1 tablet (200 mg total) 3 (three) times a day as needed for heartburn.  0     cholecalciferol, vitamin D3, (VITAMIN D3) 1,000 unit capsule Take 2 capsules (2,000 Units total) by mouth daily. 180 capsule 0     cloNIDine HCL (CATAPRES) 0.3 MG tablet TAKE 1/2 PILL IN THE MORNING AND 1 FULL PILL IN THE EVENING. 135 tablet 0     cyanocobalamin 1,000 mcg/mL injection Inject 1,000 mcg into the shoulder, thigh, or buttocks every 3 (three) months.              cyanocobalamin, vitamin B-12, (VITAMIN B-12) 1,000 mcg Subl Place 1 tablet (1,000 mcg total) under the tongue daily. 90 tablet 3     diclofenac sodium (VOLTAREN) 1 % Gel APPLY 4 GRAMS TOPICALLY 3 (THREE) TIMES A DAY AS NEEDED. APPLY TO KNEES 100 g 0     FIBER CHOICE, INULIN, ORAL Take 2 tablets by mouth daily.       furosemide (LASIX) 40 MG tablet TAKE 1 TABLET (40 MG TOTAL) BY MOUTH DAILY. 90 tablet 3     hydrALAZINE (APRESOLINE) 25 MG tablet Take 75 mg by mouth 4 (four) times a day.        insulin glargine (LANTUS SOLOSTAR U-100 INSULIN) 100 unit/mL (3 mL) pen Inject 16 Units under the skin every morning.       metoprolol succinate (TOPROL-XL) 50 MG 24 hr tablet TAKE 1 TABLET (50 MG TOTAL) BY MOUTH DAILY. 90 tablet 3     ONETOUCH DELICA PLUS LANCET 33 gauge Misc CHECK BLOOD SUGAR 3 TIMES PER DAY. 300 each 3     ONETOUCH ULTRA BLUE TEST STRIP strips CHECK BLOOD SUGAR 3 TIMES PER  strip 1     ONETOUCH ULTRA2 METER [facility] AS DIRECTED 1 each 0     PEN NEEDLE 32 gauge  "x \" Ndle USE ONE NEEDLE PER DAY TO INJECT INSULIN. 100 each 3     polyethylene glycol (MIRALAX) 17 gram packet Take 1 packet (17 g total) by mouth daily as needed.  0     potassium chloride (K-DUR,KLOR-CON) 20 MEQ tablet Take 1 tablet (20 mEq total) by mouth daily. 30 tablet 11     sertraline (ZOLOFT) 50 MG tablet TAKE ONE & ONE -HALF (1&1/2) TABLETS BY MOUTH DAILY (Patient taking differently: Take 50 mg by mouth daily. ) 135 tablet 1     TRADJENTA 5 mg Tab TAKE 1 TABLET (5 MG TOTAL) BY MOUTH DAILY. 90 tablet 3     Current Facility-Administered Medications   Medication Dose Route Frequency Provider Last Rate Last Admin     cyanocobalamin injection 1,000 mcg  1,000 mcg Intramuscular Q3 Months Telly Torre MD   1,000 mcg at 21 1154     Allergies   Allergen Reactions     Cefazolin Other (See Comments)     \"felt very hot\" Oct 2019 Cephalexin  Sep 2020 Ceftriaxone     Pravastatin Myalgia     Increased peripheral neuropathy     Simvastatin Myalgia     Increased peripheral neuropathy     Thiazides Other (See Comments)     Other: Gout       Social History     Tobacco Use     Smoking status: Former Smoker     Quit date: 1995     Years since quittin.7     Smokeless tobacco: Never Used   Substance Use Topics     Alcohol use: No     Frequency: Never     Drug use: No           "

## 2021-06-25 NOTE — TELEPHONE ENCOUNTER
Refill Approved    Rx renewed per Medication Renewal Policy. Medication was last renewed on 3/4/21, last OV 6/2/21.    Kacey Daniels, [facility] Triage/Med Refill 6/5/2021     Requested Prescriptions   Pending Prescriptions Disp Refills     cloNIDine HCL (CATAPRES) 0.3 MG tablet [Pharmacy Med Name: CLONIDINE 0.3MG] 135 tablet 0     Sig: TAKE 1/2 PILL IN THE MORNING AND 1 FULL PILL IN THE EVENING.       Clonidine/Hydralazine Refill Protocol Passed - 6/4/2021  9:27 AM        Passed - PCP or prescribing provider visit in past 12 months       Last office visit with prescriber/PCP: 6/2/2021 Telly Torre MD OR same dept: 6/2/2021 Telly Torre MD OR same specialty: 6/2/2021 Telly Torre MD  Last physical: 7/10/2019 Last MTM visit: Visit date not found   Next visit within 3 mo: Visit date not found  Next physical within 3 mo: Visit date not found  Prescriber OR PCP: Telly Trore MD  Last diagnosis associated with med order: 1. Hypertension  - cloNIDine HCL (CATAPRES) 0.3 MG tablet [Pharmacy Med Name: CLONIDINE 0.3MG]; TAKE 1/2 PILL IN THE MORNING AND 1 FULL PILL IN THE EVENING.  Dispense: 135 tablet; Refill: 0    If protocol passes may refill for 12 months if within 3 months of last provider visit (or a total of 15 months).             Passed - Blood pressure filed in past 12 months     BP Readings from Last 1 Encounters:   06/02/21 136/44

## 2021-06-26 NOTE — PATIENT INSTRUCTIONS - HE
No change in treatment plan.    Follow-up with oncology this summer for your history of rectal cancer.    Continue to try to contact urology to determine follow-up for your history of bladder cancer.    Contact me if blood sugars less than 80, or if blood sugars above 200 more often.    Otherwise routine follow-up with me in 4 months in clinic.

## 2021-06-26 NOTE — TELEPHONE ENCOUNTER
Refill Approved    Rx renewed per Medication Renewal Policy. Medication was last renewed on 06/24/2020.    Ching Ramos, [facility] Triage/Med Refill 6/23/2021     Requested Prescriptions   Pending Prescriptions Disp Refills     potassium chloride (K-DUR,KLOR-CON) 20 MEQ tablet [Pharmacy Med Name: POTASSIUM CL MICRO 20MEQ ER] 30 tablet 10     Sig: TAKE 1 TABLET (20 MEQ TOTAL) BY MOUTH DAILY.       Potassium Supplements Refill Protocol Passed - 6/22/2021  2:19 PM        Passed - PCP or prescribing provider visit in past 12 months       Last office visit with prescriber/PCP: Visit date not found OR same dept: Visit date not found OR same specialty: Visit date not found  Last physical: Visit date not found Last MTM visit: Visit date not found   Next visit within 3 mo: Visit date not found  Next physical within 3 mo: Visit date not found  Prescriber OR PCP: Patrick Parisi MD  Last diagnosis associated with med order: 1. Hypokalemia  - potassium chloride (K-DUR,KLOR-CON) 20 MEQ tablet [Pharmacy Med Name: POTASSIUM CL MICRO 20MEQ ER]; Take 1 tablet (20 mEq total) by mouth daily.  Dispense: 30 tablet; Refill: 10    2. Chronic kidney disease, stage IV (severe) (H)  - potassium chloride (K-DUR,KLOR-CON) 20 MEQ tablet [Pharmacy Med Name: POTASSIUM CL MICRO 20MEQ ER]; Take 1 tablet (20 mEq total) by mouth daily.  Dispense: 30 tablet; Refill: 10    If protocol passes may refill for 12 months if within 3 months of last provider visit (or a total of 15 months).             Passed - Potassium level in last 12 months     Lab Results   Component Value Date    Potassium 4.0 06/02/2021

## 2021-06-27 NOTE — PROGRESS NOTES
Progress Notes by Prashant Langley DO at 8/9/2019  2:50 PM     Author: Prashant Langley DO Service: -- Author Type: Physician    Filed: 8/10/2019 10:31 AM Encounter Date: 8/9/2019 Status: Signed    : Prashant Langley DO (Physician)       Chief Complaint   Patient presents with   ? Hypertension     Was at [facility] for colonoscopy and had high readings, so procedure was not done        History of Present Illness: Rooming staff notes reviewed.  Patient is seen in clinic, coming here from the clinic he was supposed to have a colonoscopy done at but could not because of an elevated blood pressure noted there.  He states he did take his usual medication for treatment of high blood pressure earlier today.  The colonoscopy prep has been very stressful for him causing him to need to use the bathroom frequently.  His main concern at time of exam is his recently elevated blood pressure.  I reviewed the nurse triage note from the same day of exam.  At the clinic he was supposed to have the colonoscopy done at, his blood pressure was 213/124 on one reading, and 214/98 on another.  He denies having any associated chest discomfort or shortness of breath.  I noted some crackles in his left lower lobe.  Review of systems shows he has had am occasional cough, he attributes to allergies.  No reported recent fevers.    Review of systems: See history of present illness, all others negative.     Current Outpatient Medications   Medication Sig Dispense Refill   ? cloNIDine HCl (CATAPRES) 0.3 MG tablet TAKE 1/2 PILL IN THE MORNING. TAKE 1 FULL PILL AT BEDTIME. 135 tablet 2   ? clopidogrel (PLAVIX) 75 mg tablet TAKE ONE (1) TABLET BY MOUTH DAILY 90 tablet 1   ? cyanocobalamin 1,000 mcg/mL injection Inject 1,000 mcg into the shoulder, thigh, or buttocks every 30 (thirty) days.     ? cyanocobalamin, vitamin B-12, (VITAMIN B-12) 1,000 mcg Subl Place 1 tablet (1,000 mcg total) under the tongue daily. 90 tablet 3   ? glimepiride (AMARYL) 4 MG  "tablet ONE PILL TWICE PER DAY WITH LUNCH AND SUPPER 180 tablet 3   ? INJECT EASE LANCETS 30 gauge Misc Use daily as directed (4-6 times daily) 600 each 0   ? INJECT EASE LANCETS 30 gauge Misc Use daily as directed (4-6 times daily) 600 each 3   ? metFORMIN (GLUCOPHAGE) 1000 MG tablet Take 0.5 tablets (500 mg total) by mouth 2 (two) times a day with meals.     ? metoprolol succinate (TOPROL-XL) 25 MG Take 0.5 tablets (12.5 mg total) by mouth daily. 45 tablet 3   ? ONETOUCH ULTRA BLUE TEST STRIP strips USE AS DIRECTED 6 TIMES PER  strip 3   ? psyllium husk (DAILY FIBER ORAL) Take by mouth.     ? sertraline (ZOLOFT) 50 MG tablet Take 1 tablet (50 mg total) by mouth daily.     ? SURE COMFORT PEN NEEDLE 31 gauge x 3/16\" Ndle Use one needle each night. 100 each 2   ? atorvastatin (LIPITOR) 20 MG tablet Take 1 tablet (20 mg total) by mouth daily. 90 tablet 3   ? cholecalciferol, vitamin D3, 1,000 unit tablet Take 1,000 Units by mouth daily.       Current Facility-Administered Medications   Medication Dose Route Frequency Provider Last Rate Last Dose   ? cyanocobalamin injection 1,000 mcg  1,000 mcg Intramuscular Q30 Days Jerry Kelsey MD   1,000 mcg at 04/17/19 1119   ? cyanocobalamin injection 1,000 mcg  1,000 mcg Intramuscular Q30 Days Telly Torre MD   1,000 mcg at 07/10/19 1543     Past Medical History:   Diagnosis Date   ? Anemia    ? Bladder cancer (H)    ? Cancer (H)     Rectosigmoid   ? Diabetes mellitus (H)    ? Hyperkalemia    ? Hypertension    ? Seizure (H)     possible, one time occurence   ? Stroke (H)     multiple, residual left sided weakness, last CVA in July 2015 caused some speech deficit   ? Superficial phlebitis    ? Venous insufficiency of both lower extremities       Past Surgical History:   Procedure Laterality Date   ? COLON SURGERY      Colectomy Low Anterior Resection   ? EYE SURGERY      Cataract Extraction   ? LAPAROSCOPIC COLON RESECTION N/A 6/1/2017    Procedure: LAPAROSCOPIC " ASSISTED COLECTOMY LOW ANTERIOR RESECTION AND DIVERTING LOOP ILEOSTOMY, PROCTOSOPY;  Surgeon: Tyson Parry MD;  Location: [facility];  Service:    ? IN CLOSE ENTEROSTOMY N/A 8/15/2017    Procedure: ILEOSTOMY CLOSURE LOOP ;  Surgeon: Tyson Parry MD;  Location: [facility] OR;  Service: General   ? IN CYSTOURETHROSCOPY,FULGUR <0.5 CM LESN N/A 2015    Procedure: CYSTOSCOPY TRANSURETHRAL RESECTION BLADDER TUMOR;  Surgeon: Cleveland Nair MD;  Location: [facility];  Service: Urology   ? IN CYSTOURETHROSCOPY,FULGUR <0.5 CM LESN N/A 2015    Procedure: CYSTOSCOPY TRANSURETHRAL RESECTION BLADDER TUMOR AND BLADDER BIOPSIES;  Surgeon: Cleveland Nair MD;  Location: [facility];  Service: Urology   ? TURBT      X2   ? VASECTOMY        Social History     Tobacco Use   ? Smoking status: Former Smoker     Last attempt to quit: 1995     Years since quittin.9   ? Smokeless tobacco: Never Used   Substance Use Topics   ? Alcohol use: No   ? Drug use: No        Family History   Problem Relation Age of Onset   ? COPD Father        Vitals:    19 1458 19 1510   BP: (!) 200/105 (!) 189/85   Patient Site: Right Arm Right Arm   Patient Position: Sitting Sitting   Cuff Size: Adult Regular Adult Regular   Pulse: 88 94   Resp: 16    Temp: 99  F (37.2  C)    TempSrc: Oral    SpO2: 99%    Weight: 174 lb 12.8 oz (79.3 kg)      Wt Readings from Last 3 Encounters:   19 174 lb 12.8 oz (79.3 kg)   07/10/19 175 lb 14.4 oz (79.8 kg)   19 177 lb (80.3 kg)     Temp Readings from Last 3 Encounters:   19 99  F (37.2  C) (Oral)   18 98.7  F (37.1  C)   17 96.8  F (36  C) (Oral)     BP Readings from Last 3 Encounters:   19 (!) 189/85   07/10/19 138/86   19 124/68     Pulse Readings from Last 3 Encounters:   19 94   07/10/19 66   19 60     EXAM:   General: Vital signs reviewed.  Patient is in no acute appearing distress.  Breathing  appears nonlabored.  Patient is alert and oriented ×3.  There was not any coughing during exam.  His blood pressure is noted to be a little improved while in the clinic.  Heart:  Heart rate is normal, regular rhythm without murmur.  Lungs:  Lung sounds are clear to auscultation with good airflow except for crackles noted in left lower lobe.  Skin/extremities: Warm and dry, with about 1.5 mm bilateral ankle edema which is normal per patient for him.    I reviewed the chest x-ray study, and I did not see any pneumonia or other acute abnormality.  I reviewed the radiologist report also at exam, which did not note any acute abnormality to include no pneumonia.    Assessment/Plan   1. Lung field abnormal finding on examination  XR Chest 2 Views   2. Cough  XR Chest 2 Views   3. Elevated blood pressure reading         Patient Instructions     Your chest x-ray today was reassuring looking.  I do not think starting any new treatments for your cough, or your blood pressure as needed.  Simply continue your current blood pressure treatment, and recheck it daily for the next couple days.  If it does not continue trending down towards a normal range, with the top systolic number being 140 or less, or the bottom number being 90 or less, be seen again within the next week.  I think the stress of the colonoscopy preparation was the biggest factor in your blood pressure elevation.  You can discuss your cough with your primary care provider if it is not better within the next couple of weeks.  Be seen sooner if your cough worsens, or you develop a fever.  See handout for tips on controlling hypertension, and thoughts about your cough.    Patient Education     Chronic Cough with Uncertain Cause (Adult)    Everyone has had a cough as part of the common cold, flu, or bronchitis. This kind of cough occurs along with an achy feeling, low-grade fever, nasal and sinus congestion, and a scratchy or sore throat. This usually gets better in 2  to 3 weeks. A cough that lasts longer than 3 weeks may be due to other causes.  If your cough does not improve over the next 2 weeks, further testing may be needed. Follow up with your healthcare provider as advised. Cough suppressants may be recommended. Based on your exam today, the exact cause of your cough is not certain. Below are some common causes for persistent cough.  Smoker's cough  Smokers cough doesnt go away. If you continue to smoke, it only gets worse. The cough is from irritation in the air passages. Talk to your healthcare provider about quitting. Medicines or nicotine-replacement products, like gum or the patch, may make quitting easier.  Postnasal drip  A cough that is worse at night may be due to postnasal drip. Excess mucus in the nose drains from the back of your nose to your throat. This triggers the cough reflex. Postnasal drip may be due to a sinus infection or allergy. Common allergens include dust, tobacco smoke (both inhaled and secondhand smoke), environmental pollutants, pollen, mold, pets, cleaning agents, room deodorizers, and chemical fumes. Over-the-counter antihistamines or decongestants may be helpful for allergies. A sinus infection may requires antibiotic treatment. See your healthcare provider if symptoms continue.  Medicines  Certain prescribed medicines can cause a chronic cough in some people:    ACE inhibitors for high blood pressure. These include benazepril, captopril, enalapril, fosinopril, lisinopril, quinapril, ramipril, and others.    Beta-blockers for high blood pressure and other conditions. These include propranolol, atenolol, metoprolol, nadolol, and others.  Let your healthcare provider know if you are taking any of these.  Asthma  Cough may be the only sign of mild asthma. You may have tests to find out if asthma is causing your cough. You may also take asthma medicine on a trial basis.  Acid reflux (heartburn, GERD)  The esophagus is the tube that carries food  from the mouth to the stomach. A valve at its lower end prevents stomach acids from flowing upward. If this valve does not work properly, acid from the stomach enters the esophagus. This may cause a burning pain in the upper abdomen or lower chest, belching, or cough. Symptoms are often worse when lying flat. Avoid eating or drinking before bedtime. Try using extra pillows to raise your upper body, or place 4-inch blocks under the head of your bed. You may try an over-the-counter antacid or an acid-blocking medicine such as famotidine, cimetidine, ranitidine, esomeprazole, lansoprazole, or omeprazole. Stronger medicines for this condition can be prescribed by your healthcare provider.  Follow-up care  Follow up with your healthcare provider, or as advised, if your cough does not improve. Further testing may be needed.  Note: If an X-ray was taken, a specialist will review it. You will be notified of any new findings that may affect your care.  When to seek medical advice  Call your healthcare provider right away if any of these occur:    Mild wheezing or difficulty breathing    Fever of 100.4 F (38 C) or higher, or as directed by your healthcare provider    Unexpected weight loss    Coughing up large amounts of colored sputum    Night sweats (sheets and pajamas get soaking wet)  Call 911  Call 911 if any of these occur:    Coughing up blood    Moderate to severe trouble breathing or wheezing  Date Last Reviewed: 9/13/2015 2000-2017 [employer]. [address] [place] [address]. All rights reserved. This information is not intended as a substitute for professional medical care. Always follow your healthcare professional's instructions.           Patient Education     Controlling High Blood Pressure  High blood pressure (hypertension) is often called the silent killer. This is because many people who have it dont know it. High blood pressure can raise your risk of heart attack, stroke, and  heart failure. Controlling your blood pressure can decrease your risk of these problems. Know your blood pressure and remember to check it regularly. Doing so can save your life.  Blood pressure measurements are given as 2 numbers. Systolic blood pressure is the upper number. This is the pressure when the heart contracts. Diastolic blood pressure is the lower number. This is the pressure when the heart relaxes between beats.  Blood pressure is categorized as normal, elevated, or stage 1 or stage 2 high blood pressure:    Normal blood pressure is systolic of less than 120 and diastolic of less than 80 (120/80)    Elevated blood pressure is systolic of 120 to 129 and diastolic less than 80    Stage 1 high blood pressure is systolic is 130 to 139 or diastolic between 80 to 89    Stage 2 high blood pressure is when systolic is 140 or higher or the diastolic is 90 or higher  Here are some things you can do to help control your blood pressure.    Choose heart-healthy foods    Select low-salt, low-fat foods. Limit sodium intake to 2,400 mg per day or the amount suggested by your healthcare provider.    Limit canned, dried, cured, packaged, and fast foods. These can contain a lot of salt.    Eat 8 to 10 servings of fruits and vegetables every day.    Choose lean meats, fish, or chicken.    Eat whole-grain pasta, brown rice, and beans.    Eat 2 to 3 servings of low-fat or fat-free dairy products.    Ask your doctor about the DASH eating plan. This plan helps reduce blood pressure.    When you go to a restaurant, ask that your meal be prepared with no added salt.  Maintain a healthy weight    Ask your healthcare provider how many calories to eat a day. Then stick to that number.    Ask your healthcare provider what weight range is healthiest for you. If you are overweight, a weight loss of only 3% to 5% of your body weight can help lower blood pressure. Generally, a good weight loss goal is to lose 10% of your body weight in  a year.    Limit snacks and sweets.    Get regular exercise.  Get up and get active    Choose activities you enjoy. Find ones you can do with friends or family. This includes bicycling, dancing, walking, and jogging.    Park farther away from building entrances.    Use stairs instead of the elevator.    When you can, walk or bike instead of driving.    Preston leaves, garden, or do household repairs.    Be active at a moderate to vigorous level of physical activity for at least 40 minutes for a minimum of 3 to 4 days a week.   Manage stress    Make time to relax and enjoy life. Find time to laugh.    Communicate your concerns with your loved ones and your healthcare provider.    Visit with family and friends, and keep up with hobbies.  Limit alcohol and quit smoking    Men should have no more than 2 drinks per day.    Women should have no more than 1 drink per day.    Talk with your healthcare provider about quitting smoking. Smoking significantly increases your risk for heart disease and stroke. Ask your healthcare provider about community smoking cessation programs and other options.  Medicines  If lifestyle changes arent enough, your healthcare provider may prescribe high blood pressure medicine. Take all medicines as prescribed. If you have any questions about your medicines, ask your healthcare provider before stopping or changing them.   Date Last Reviewed: 4/27/2016 2000-2017 [employer]. [address] [facility], [place] [address]. All rights reserved. This information is not intended as a substitute for professional medical care. Always follow your healthcare professional's instructions.              Prashant Langley,

## 2021-06-28 NOTE — PROGRESS NOTES
Progress Notes by Shahrzad Mahan CNP at 2/3/2020  9:53 AM     Author: Shahrzad Mahan CNP Service: -- Author Type: Nurse Practitioner    Filed: 2020  5:09 AM Encounter Date: 2/3/2020 Status: Addendum    : Shahrzad Mahan CNP (Nurse Practitioner)    Related Notes: Original Note by Shahrzad Mahan CNP (Nurse Practitioner) filed at 2020  4:47 AM       [facility]      NAME:  Suman Nagy             :  1940  MRN: [id number]  CODE STATUS:  FULL CODE    VISIT TYPE: DISCHARGE SUMMARY  FACILYTY: [facility] [[id number]]      HOSPITALIZATION: [facility]  to 2020               PRIMARY CARE PROVIDER: Telly Torre MD    DISCHARGE DIAGNOSIS:      1. Influenza A    2. 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 Hypertension         DISCHARGE MEDICATIONS:         Medication List          Accurate as of February 3, 2020 11:59 PM. If you have any questions, ask your nurse or doctor.            CHANGE how you take these medications    diclofenac sodium 1 % Gel  Commonly known as:  VOLTAREN  Apply 4 g topically 3 (three) times a day. Apply to knees  What changed:      when to take this    reasons to take this        CONTINUE taking these medications    acetaminophen 325 MG tablet  Commonly known as:  TYLENOL  Take 2 tablets (650 mg total) by mouth every 4 (four) hours as needed.     amLODIPine 10 MG tablet  Commonly known as:  NORVASC  Take 1 tablet (10 mg total) by mouth daily.     atorvastatin 20 MG tablet  Commonly known as:  LIPITOR  Take 1 tablet (20 mg total) by mouth daily.     blood glucose test strips  Check blood sugar 3 times per day. Accu-Chek Guide test striips.     cholecalciferol (vitamin D3) 25 mcg (1,000 unit) capsule  Commonly known as:  Vitamin D3  Take 2 capsules (2,000 Units total) by mouth daily.     cloNIDine HCl 0.3 MG tablet  Commonly known as:  CATAPRES  Take 0.5 tablets (0.15 mg total) by mouth 3 (three) times a  "day.     clopidogreL 75 mg tablet  Commonly known as:  PLAVIX  Take 1 tablet (75 mg total) by mouth daily.     * cyanocobalamin (vitamin B-12) 1,000 mcg Subl  Commonly known as:  Vitamin B-12  Place 1 tablet (1,000 mcg total) under the tongue daily.     * cyanocobalamin 1,000 mcg/mL injection     furosemide 40 MG tablet  Commonly known as:  LASIX     generic lancets  Fastclix lancets. Check blood sugar 3 times per day.     glimepiride 4 MG tablet  Commonly known as:  AMARYL     hydrALAZINE 25 MG tablet  Commonly known as:  APRESOLINE     insulin glargine 100 unit/mL (3 mL) pen  Commonly known as:  LANTUS SOLOSTAR PEN     metoprolol succinate 25 MG  Commonly known as:  TOPROL-XL  Take 1 tablet (25 mg total) by mouth daily.     omeprazole 20 MG capsule  Commonly known as:  PriLOSEC  Take 1 capsule (20 mg total) by mouth 2 (two) times a day before meals.     OneTouch Ultra2 Meter Misc  Generic drug:  blood-glucose meter  AS DIRECTED     pen needle, diabetic 32 gauge x 5/32\" Ndle  Commonly known as:  BD Ultra-Fine Rebecca Pen Needle  Use one needle per day to inject insulin.     polyethylene glycol 17 gram packet  Commonly known as:  MIRALAX  Take 1 packet (17 g total) by mouth daily as needed.     sertraline 50 MG tablet  Commonly known as:  ZOLOFT         * This list has 2 medication(s) that are the same as other medications prescribed for you. Read the directions carefully, and ask your doctor or other care provider to review them with you.            STOP taking these medications    DAILY FIBER ORAL            HISTORY OF PRESENT ILLNESS: Suman Nagy is a 79 y.o. male being seen for a face to face visit for an anticipated am dc on 2/4/20. IDT has requested HHA for home fu, wife reports declined. . He came to the TCU from [facility]. After a decondtioned state for influenza. Per his EMR \" with a history of CVA with persistent left-sided deficits, seizure disorder, type 2 diabetes, chronic kidney disease " "stage III-IV presents with fatigue body aches and malaise found to have influenza a in the emergency department.\" Currently in isolation as he is finishing his tamiflu course. His HBG in hospital was lowest at 7, never transfused and was dced to us with a 8.5 leave 8.5\"  Last HEMOglobin 1/20 on the TCU was at 8.6.    SKILLED NURSING FACILITY COURSE:  During this TCU stay, patient completed all anticipated goals of therapy.      PHYSICAL EXAMINATION:    Vitals:    02/04/20 0428   BP: 148/67   Pulse: 67   Temp: 98.7  F (37.1  C)   Weight: 177 lb 9.6 oz (80.6 kg)         GENERAL: Awake, Alert, oriented , not in any form of acute distress, answers questions appropriately, follows simple commands, conversant  HEENT: Head is normocephalic with normal hair distribution. No evidence of trauma. Ears: No acute purulent discharge. Eyes: Conjunctivae pink with no scleral jaundice. Nose: Normal mucosa and septum. NECK: Supple with no cervical or supraclavicular lymphadenopathy. Trachea is midline.   CHEST: No tenderness or deformity, no crepitus  LUNG: Clear to auscultation with good chest expansion. There are no crackles or wheezes, normal AP diameter.  BACK: No kyphosis of the thoracic spine. Symmetric, no curvature, ROM normal, no CVA tenderness, no spinal tenderness   CVS: There is good S1  S2, rhythm is regular.  ABDOMEN: Globular and soft, nontender to palpation, non distended, no masses, no organomegaly, good bowel sounds, no rebound or guarding, no peritoneal signs.   EXTREMITIES: Atraumatic. Full range of motion on both upper and lower extremities, there is no tenderness to palpation, positive pedal edema, aged arthritic  joint swelling.  SKIN: Warm and dry, no erythema noted, no rashes or lesions.  NEUROLOGICAL: The patient is oriented to person, place  Strength and sensation are grossly intact. Face is symmetric.      LABS:  All labs reviewed in the nursing home record.        DISCHARGE PLAN: Reviewed dc plan with " spouse and pt. They have declined HH services as recommended per IDT. She reports has her supplies for DM in place at home.    Patient will follow up with PCP within 7 days after discharge for medication mangagment and appropriate lab studies.    Post Discharge Medication Reconciliation Status: discharge medications reconciled and changed, per note/orders (see AVS)      Electronically signed by:  Shahrzad Mahan CNP  This progress note was completed using Dragon software and there may be grammatical errors.      For documentation purposes, chart review, medication management, and discharge coordination of care was greater than 35 minutes

## 2021-06-29 NOTE — PROGRESS NOTES
Progress Notes by Car Canela MD at 10/14/2020  1:50 PM     Author: Car Canela MD Service: -- Author Type: Physician    Filed: 10/14/2020  2:42 PM Encounter Date: 10/14/2020 Status: Signed    : Car Canela MD (Physician)           Click to link to [facility] HEART [facility] NOTE    Thank you, Dr. Torre, for asking me to see Suman Nagy in consultation at [facility] to evaluate atrial fibrillation.      Assessment/Plan:   1.  Paroxysmal atrial fibrillation: Patient was diagnosed with atrial fibrillation during last hospitalization 3 weeks ago.  Since hospital discharge, he did not have a palpitations.  His heart rate is well controlled today.  His physical examination indicated his in sinus rhythm.  We discussed the management of atrial fibrillation.  Since his heart rate is controlled, no palpitations, continue metoprolol succinate 50 mg daily.  His [id number] score is 6, possibly 7 if at diastolic congestive heart failure.  He has high risk of stroke.  Currently he is on Plavix 75 mg daily.  I discussed the benefit and the possible side effects of chronic anticoagulation.  After long discussion, Plavix will be discontinued and start Eliquis 2.5 mg twice a day at renal adjusted dosage.  The patient is reported to my EP colleague for discussion of watchman device placement.  Both patient and his wife's questions are answered.    2.  Essential hypertension: The patient has been on multiple antihypertensive medications including metoprolol XL 50 mg daily, clonidine 0.15 mg a.m., 0.3 mg p.m., hydralazine 75 mg 4 times a day, amlodipine 10 mg daily.    4.  Bilateral leg edema, the patient could have diastolic congestive heart failure due to all the risk factors he has.  Continue Lasix 40 mg daily.  His renal function is a stable.    5.  Stage IV CKD, type 2 diabetes, history of stroke, other medical conditions: Please follow-up with Dr. Torre.    Thank you for  the opportunity to be involved in the care of Suman Nagy. If you have any questions, please feel free to contact me.  I will see the patient again in 6 months and as needed.    Much or all of the text in this note was generated through the use of Dragon Dictate voice-to-text software. Errors in spelling or words which seem out of context are unintentional.   Sound alike errors, in particular, may have escaped editing.       History of Present Illness:   It is my pleasure to see Suman Nagy at the [facility] for evaluation of Consult. Suman Nagy is a 80 y.o. male with a medical history of colon cancer and bladder cancer in remission, multiple strokes with residual weakness in left leg, essential hypertension, type 2 diabetes, dyslipidemia, stage IV CKD, and a recently diagnosed paroxysmal atrial fibrillation.    The patient was admitted to hospital 3 weeks ago due to weakness.  He was found atrial fibrillation with RVR.  The patient was treated with beta-blocker metoprolol.  He is in regular heartbeat today.  He denies any chest pain, shortness of breath, palpitations, dizziness, orthopnea.  He has mild bilateral leg edema chronically, improved with Lasix 40 mg daily.  His blood pressure and heart rate are controlled.    Past Medical History:     Patient Active Problem List   Diagnosis   ? Hypertension   ? B12 deficiency   ? 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 Rectal cancer (H)   ? History of bladder cancer   ? History of rectal cancer   ? Carotid artery stenosis, asymptomatic, left   ? History of stroke   ? Medication monitoring encounter   ? Type 2 diabetes mellitus with stage 4 chronic kidney disease, with long-term current use of insulin (H)   ? Anxiety   ? UTI (urinary tract infection)   ? Slow transit constipation   ? Generalized muscle weakness   ? Chronic kidney disease, stage IV (severe) (H)   ? ACP (advance care planning)   ? Atrial  fibrillation (H)   ? Hypokalemia   ? Coronary artery disease involving native coronary artery of native heart without angina pectoris   ? Acute respiratory failure with hypoxia (H)   ? Bacteremia       Past Surgical History:     Past Surgical History:   Procedure Laterality Date   ? COLON SURGERY      Colectomy Low Anterior Resection   ? EYE SURGERY      Cataract Extraction   ? LAPAROSCOPIC COLON RESECTION N/A 6/1/2017    Procedure: LAPAROSCOPIC ASSISTED COLECTOMY LOW ANTERIOR RESECTION AND DIVERTING LOOP ILEOSTOMY, PROCTOSOPY;  Surgeon: Tyson Parry MD;  Location: [facility];  Service:    ? WA CLOSE ENTEROSTOMY N/A 8/15/2017    Procedure: ILEOSTOMY CLOSURE LOOP ;  Surgeon: Tyson Parry MD;  Location: [facility];  Service: General   ? WA CYSTOURETHROSCOPY,FULGUR <0.5 CM LESN N/A 9/1/2015    Procedure: CYSTOSCOPY TRANSURETHRAL RESECTION BLADDER TUMOR;  Surgeon: Cleveland Nair MD;  Location: [facility];  Service: Urology   ? WA CYSTOURETHROSCOPY,FULGUR <0.5 CM LESN N/A 12/22/2015    Procedure: CYSTOSCOPY TRANSURETHRAL RESECTION BLADDER TUMOR AND BLADDER BIOPSIES;  Surgeon: Cleveland Nair MD;  Location: [facility];  Service: Urology   ? TURBT      X2   ? VASECTOMY         Family History:     Family History   Problem Relation Age of Onset   ? COPD Father    Mother had CAD and stroke.    Social History:    reports that he quit smoking about 25 years ago. He has never used smokeless tobacco. He reports that he does not drink alcohol or use drugs.    Review of Systems:   General: WNL  Eyes: WNL  Ears/Nose/Throat: WNL  Lungs: WNL  Heart: WNL  Stomach: WNL  Bladder: WNL  Muscle/Joints: WNL  Skin: WNL  Nervous System: WNL  Mental Health: WNL     Blood: WNL    Meds:     Current Outpatient Medications:   ?  acetaminophen (TYLENOL) 325 MG tablet, Take 2 tablets (650 mg total) by mouth every 4 (four) hours as needed., Disp: , Rfl: 0  ?  amLODIPine (NORVASC) 10 MG tablet, TAKE 1  TABLET (10 MG TOTAL) BY MOUTH DAILY., Disp: 90 tablet, Rfl: 3  ?  atorvastatin (LIPITOR) 20 MG tablet, TAKE 1 TABLET (20 MG TOTAL) BY MOUTH DAILY., Disp: 90 tablet, Rfl: 3  ?  calcium, as carbonate, (TUMS) 200 mg calcium (500 mg) chewable tablet, Chew 1 tablet (200 mg total) 3 (three) times a day as needed for heartburn., Disp:  , Rfl: 0  ?  cholecalciferol, vitamin D3, (VITAMIN D3) 1,000 unit capsule, Take 2 capsules (2,000 Units total) by mouth daily., Disp: 180 capsule, Rfl: 0  ?  cloNIDine HCL (CATAPRES) 0.3 MG tablet, Take 1/2 pill in the morning and 1 full pill in the evening. (Patient taking differently: Take 0.15-0.3 mg by mouth see administration instructions. Take 1/2 pill in the morning and 1 full pill in the evening.), Disp: 135 tablet, Rfl: 2  ?  clopidogreL (PLAVIX) 75 mg tablet, TAKE 1 TABLET (75 MG TOTAL) BY MOUTH DAILY., Disp: 90 tablet, Rfl: 1  ?  cyanocobalamin 1,000 mcg/mL injection, Inject 1,000 mcg into the shoulder, thigh, or buttocks every 3 (three) months.    , Disp: , Rfl:   ?  cyanocobalamin, vitamin B-12, (VITAMIN B-12) 1,000 mcg Subl, Place 1 tablet (1,000 mcg total) under the tongue daily., Disp: 90 tablet, Rfl: 3  ?  diclofenac sodium (VOLTAREN) 1 % Gel, APPLY 4 GRAMS TOPICALLY 3 (THREE) TIMES A DAY AS NEEDED. APPLY TO KNEES, Disp: 100 g, Rfl: 0  ?  furosemide (LASIX) 40 MG tablet, TAKE 1 TABLET (40 MG TOTAL) BY MOUTH DAILY., Disp: 90 tablet, Rfl: 3  ?  hydrALAZINE (APRESOLINE) 25 MG tablet, Take 75 mg by mouth 4 (four) times a day. , Disp: , Rfl:   ?  insulin glargine (LANTUS SOLOSTAR PEN) 100 unit/mL (3 mL) pen, Inject 10 Units under the skin daily with breakfast., Disp: , Rfl:   ?  metoprolol succinate (TOPROL-XL) 50 MG 24 hr tablet, Take 1 tablet (50 mg total) by mouth daily., Disp: 30 tablet, Rfl: 0  ?  ONETOUCH DELICA PLUS LANCET 33 gauge Misc, CHECK BLOOD SUGAR 3 TIMES PER DAY., Disp: 300 each, Rfl: 3  ?  ONETOUCH ULTRA BLUE TEST STRIP strips, CHECK BLOOD SUGAR 3 TIMES PER DAY.  "ACCU-CHEK GUIDE TEST STRIIPS., Disp: 100 strip, Rfl: 2  ?  ONETOUCH ULTRA2 METER Misc, AS DIRECTED, Disp: 1 each, Rfl: 0  ?  pen needle, diabetic (BD ULTRA-FINE JONO PEN NEEDLE) 32 gauge x 5/32\" Ndle, Use one needle per day to inject insulin., Disp: 100 each, Rfl: 4  ?  potassium chloride (K-DUR,KLOR-CON) 20 MEQ tablet, Take 1 tablet (20 mEq total) by mouth daily., Disp: 30 tablet, Rfl: 11  ?  sertraline (ZOLOFT) 50 MG tablet, Take 50 mg by mouth daily., Disp: , Rfl:   ?  TRADJENTA 5 mg Tab, TAKE 1 TABLET (5 MG TOTAL) BY MOUTH DAILY., Disp: 90 tablet, Rfl: 3  ?  polyethylene glycol (MIRALAX) 17 gram packet, Take 1 packet (17 g total) by mouth daily as needed., Disp: , Rfl: 0     Allergies:   Cefazolin, Pravastatin, Simvastatin, and Thiazides    Objective:      Physical Exam  163 lb (73.9 kg)     Body mass index is 25.53 kg/m .  /50 (Patient Site: Left Arm, Patient Position: Sitting, Cuff Size: Adult Regular)   Pulse (!) 56   Resp 13   Wt 163 lb (73.9 kg)   BMI 25.53 kg/m      General Appearance:   Awake, Alert, No acute distress.   HEENT:  Pupil equal, reactive to light. No scleral icterus; the mucous membranes were moist. No oral ulcers or thrush.    Neck: No cervical bruits. No JVD. No thyromegaly. No lymph node enlargement or tenderness.   Chest: The spine was straight. The chest was symmetric.   Lungs:   Mildly diminished breathting sounds. No crackles. No wheezing.   Cardiovascular:   RRR, normal first and second heart sounds with no murmurs. No rubs or gallops.    Abdomen:  Soft. No tenderness. Non-distended. Bowels sounds are present   Extremities: Equal posterior tibial pulses. Mild bilateral leg edema L>R.   Skin: No rashes or ulcers. Warm, Dry.   Musculoskeletal: No tenderness. No deformity.   Neurologic: Mood and affect are appropriate.           EKG:  Personally reivewed  Atrial fibrillation   ST & T wave abnormality, consider inferior ischemia   Abnormal ECG   When compared with ECG of " 13-JAN-2020 12:54,   Atrial fibrillation has replaced Sinus rhythm   Vent. rate has increased BY  37 BPM     Cardiac Imaging Studies  ECHO on 9-:    Definity contrast utilized.    Normal left ventricular size. Sigmoid septal hypertrophy.    Left ventricle ejection fraction is normal. The calculated left ventricular ejection fraction is 66%.    Normal right ventricular size and systolic function.    Mild left atrial enlargement    No significant valve abnormality.    When compared to the previous study dated 7/31/2015, no significant change from previous.    Lab Review   Lab Results   Component Value Date     09/22/2020    K 3.2 (L) 09/22/2020     (H) 09/22/2020    CO2 22 09/22/2020    BUN 23 09/22/2020    CREATININE 2.12 (H) 09/22/2020    CALCIUM 8.0 (L) 09/22/2020     Lab Results   Component Value Date    WBC 3.9 (L) 09/22/2020    WBC 9.1 08/26/2015    HGB 7.9 (L) 09/22/2020    HCT 24.1 (L) 09/22/2020    MCV 92 09/22/2020     09/22/2020     Lab Results   Component Value Date    CHOL 115 09/05/2014    TRIG 119 09/05/2014    HDL 36 (L) 05/30/2018    LDLDIRECT 116 05/30/2018     Lab Results   Component Value Date    TROPONINI 0.06 09/18/2020     Lab Results   Component Value Date     (H) 10/15/2019     Lab Results   Component Value Date    TSH 2.42 10/28/2019

## 2021-06-30 NOTE — PROGRESS NOTES
Progress Notes by Car Canela MD at 4/14/2021  1:10 PM     Author: Car Canela MD Service: -- Author Type: Physician    Filed: 4/14/2021  2:04 PM Encounter Date: 4/14/2021 Status: Signed    : Car Canela MD (Physician)           Click to link to [facility] HEART [facility] NOTE       Assessment/Plan:   1.  Paroxysmal atrial fibrillation: The patient did not complains of palpitations.  No fatigue.  No shortness of breath.  He has been doing quite well over last 6 months.  Continue metoprolol succinate 50 mg daily.  His [id number] score is 7 including history of multiple stroke.  He has high risk of stroke.  Continue Eliquis 2.5 mg twice a day at renal adjusted dosage.      2.  Essential hypertension: His blood pressure has been well controlled.  The patient is on multiple antihypertensive medications including metoprolol XL 50 mg daily, clonidine 0.15 mg a.m., 0.3 mg p.m., hydralazine 75 mg 4 times a day, amlodipine 10 mg daily.    4.  Chronic diastolic congestive heart failure.  Continue Lasix 40 mg daily.  His renal function is stable.    5.  Stage IV CKD, type 2 diabetes, history of stroke, other medical conditions: Please follow-up with Dr. Torre.    Thank you for the opportunity to be involved in the care of Suman Nagy. If you have any questions, please feel free to contact me.  I will see the patient again in 6 months and as needed.    Much or all of the text in this note was generated through the use of Dragon Dictate voice-to-text software. Errors in spelling or words which seem out of context are unintentional.   Sound alike errors, in particular, may have escaped editing.       History of Present Illness:   It is my pleasure to see Suman Nagy at the [facility] clinic for evaluation of Follow-up. Suman Nagy is a 80 y.o. male with a medical history of colon cancer and bladder cancer in remission, multiple strokes with residual weakness in  left leg, essential hypertension, type 2 diabetes, dyslipidemia, stage IV CKD, and paroxysmal atrial fibrillation.    The patient states that he has been doing quite well since last visit October 14, 2020.  He denies any chest pain, shortness of breath, palpitations, dizziness, orthopnea.  He has mild ankle edema, improved with Lasix 40 mg daily.  His blood pressure and heart rate are controlled.  The patient has no side effects from current medications.    Past Medical History:     Patient Active Problem List   Diagnosis   ? Hypertension   ? B12 deficiency   ? 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 Rectal cancer (H)   ? History of bladder cancer   ? History of rectal cancer   ? Carotid artery stenosis, asymptomatic, left   ? History of stroke   ? Medication monitoring encounter   ? Type 2 diabetes mellitus with stage 4 chronic kidney disease, with long-term current use of insulin (H)   ? Anxiety   ? UTI (urinary tract infection)   ? Slow transit constipation   ? Generalized muscle weakness   ? Chronic kidney disease, stage IV (severe) (H)   ? ACP (advance care planning)   ? Atrial fibrillation (H)   ? Hypokalemia   ? Coronary artery disease involving native coronary artery of native heart without angina pectoris   ? Acute respiratory failure with hypoxia (H)   ? Bacteremia       Past Surgical History:     Past Surgical History:   Procedure Laterality Date   ? COLON SURGERY      Colectomy Low Anterior Resection   ? EYE SURGERY      Cataract Extraction   ? LAPAROSCOPIC COLON RESECTION N/A 6/1/2017    Procedure: LAPAROSCOPIC ASSISTED COLECTOMY LOW ANTERIOR RESECTION AND DIVERTING LOOP ILEOSTOMY, PROCTOSOPY;  Surgeon: Tyson Parry MD;  Location: [facility];  Service:    ? HI CLOSE ENTEROSTOMY N/A 8/15/2017    Procedure: ILEOSTOMY CLOSURE LOOP ;  Surgeon: Tyson Parry MD;  Location: [facility];  Service: General   ? HI CYSTOURETHROSCOPY,FULGUR <0.5 CM LESN N/A 9/1/2015     Procedure: CYSTOSCOPY TRANSURETHRAL RESECTION BLADDER TUMOR;  Surgeon: Cleveland Nair MD;  Location: [facility];  Service: Urology   ? [place] CYSTOURETHROSCOPY,FULGUR <0.5 CM LESN N/A 12/22/2015    Procedure: CYSTOSCOPY TRANSURETHRAL RESECTION BLADDER TUMOR AND BLADDER BIOPSIES;  Surgeon: Cleveland Nair MD;  Location: [facility];  Service: Urology   ? TURBT      X2   ? VASECTOMY         Family History:     Family History   Problem Relation Age of Onset   ? COPD Father    Mother had CAD and stroke.    Social History:    reports that he quit smoking about 25 years ago. He has never used smokeless tobacco. He reports that he does not drink alcohol or use drugs.    Review of Systems:   General: Weight Loss  Eyes: WNL  Ears/Nose/Throat: WNL  Lungs: WNL  Heart: Leg Swelling  Stomach: Constipation  Bladder: Frequent Urination at Night  Muscle/Joints: WNL  Skin: WNL  Nervous System: Daytime Sleepiness, Loss of Balance  Mental Health: WNL     Blood: WNL    Meds:     Current Outpatient Medications:   ?  acetaminophen (TYLENOL) 325 MG tablet, Take 2 tablets (650 mg total) by mouth every 4 (four) hours as needed., Disp: , Rfl: 0  ?  amLODIPine (NORVASC) 10 MG tablet, TAKE 1 TABLET (10 MG TOTAL) BY MOUTH DAILY., Disp: 90 tablet, Rfl: 3  ?  apixaban ANTICOAGULANT (ELIQUIS) 2.5 mg Tab tablet, Take 1 tablet (2.5 mg total) by mouth 2 (two) times a day., Disp: 60 tablet, Rfl: 11  ?  atorvastatin (LIPITOR) 20 MG tablet, TAKE 1 TABLET (20 MG TOTAL) BY MOUTH DAILY., Disp: 90 tablet, Rfl: 3  ?  calcium, as carbonate, (TUMS) 200 mg calcium (500 mg) chewable tablet, Chew 1 tablet (200 mg total) 3 (three) times a day as needed for heartburn., Disp:  , Rfl: 0  ?  cholecalciferol, vitamin D3, (VITAMIN D3) 1,000 unit capsule, Take 2 capsules (2,000 Units total) by mouth daily., Disp: 180 capsule, Rfl: 0  ?  cloNIDine HCL (CATAPRES) 0.3 MG tablet, TAKE 1/2 PILL IN THE MORNING AND 1 FULL PILL IN THE EVENING., Disp: 135  "tablet, Rfl: 0  ?  cyanocobalamin 1,000 mcg/mL injection, Inject 1,000 mcg into the shoulder, thigh, or buttocks every 3 (three) months.    , Disp: , Rfl:   ?  cyanocobalamin, vitamin B-12, (VITAMIN B-12) 1,000 mcg Subl, Place 1 tablet (1,000 mcg total) under the tongue daily., Disp: 90 tablet, Rfl: 3  ?  diclofenac sodium (VOLTAREN) 1 % Gel, APPLY 4 GRAMS TOPICALLY 3 (THREE) TIMES A DAY AS NEEDED. APPLY TO KNEES, Disp: 100 g, Rfl: 0  ?  FIBER CHOICE, INULIN, ORAL, Take 2 tablets by mouth daily., Disp: , Rfl:   ?  furosemide (LASIX) 40 MG tablet, TAKE 1 TABLET (40 MG TOTAL) BY MOUTH DAILY., Disp: 90 tablet, Rfl: 3  ?  hydrALAZINE (APRESOLINE) 25 MG tablet, Take 75 mg by mouth 4 (four) times a day. , Disp: , Rfl:   ?  insulin glargine (LANTUS SOLOSTAR U-100 INSULIN) 100 unit/mL (3 mL) pen, Inject 16 Units under the skin every morning., Disp: , Rfl:   ?  metoprolol succinate (TOPROL-XL) 50 MG 24 hr tablet, TAKE 1 TABLET (50 MG TOTAL) BY MOUTH DAILY., Disp: 90 tablet, Rfl: 1  ?  ONETOUCH DELICA PLUS LANCET 33 gauge Misc, CHECK BLOOD SUGAR 3 TIMES PER DAY., Disp: 300 each, Rfl: 3  ?  ONETOUCH ULTRA BLUE TEST STRIP strips, CHECK BLOOD SUGAR 3 TIMES PER DAY, Disp: 100 strip, Rfl: 1  ?  ONETOUCH ULTRA2 METER Misc, AS DIRECTED, Disp: 1 each, Rfl: 0  ?  PEN NEEDLE 32 gauge x 5/32\" Ndle, USE ONE NEEDLE PER DAY TO INJECT INSULIN., Disp: 100 each, Rfl: 3  ?  polyethylene glycol (MIRALAX) 17 gram packet, Take 1 packet (17 g total) by mouth daily as needed., Disp: , Rfl: 0  ?  potassium chloride (K-DUR,KLOR-CON) 20 MEQ tablet, Take 1 tablet (20 mEq total) by mouth daily., Disp: 30 tablet, Rfl: 11  ?  sertraline (ZOLOFT) 50 MG tablet, TAKE ONE & ONE -HALF (1&1/2) TABLETS BY MOUTH DAILY (Patient taking differently: Take 50 mg by mouth daily. ), Disp: 135 tablet, Rfl: 1  ?  TRADJENTA 5 mg Tab, TAKE 1 TABLET (5 MG TOTAL) BY MOUTH DAILY., Disp: 90 tablet, Rfl: 3    Current Facility-Administered Medications:   ?  cyanocobalamin " "injection 1,000 mcg, 1,000 mcg, Intramuscular, Q3 Months, Telly Torre MD, 1,000 mcg at 03/02/21 1154     Allergies:   Cefazolin, Pravastatin, Simvastatin, and Thiazides    Objective:      Physical Exam  155 lb (70.3 kg)  5' 7\" (1.702 m)  Body mass index is 24.28 kg/m .  /52 (Patient Site: Left Arm, Patient Position: Sitting, Cuff Size: Adult Regular)   Pulse 60   Resp 16   Ht 5' 7\" (1.702 m)   Wt 155 lb (70.3 kg)   BMI 24.28 kg/m      General Appearance:   Awake, Alert, No acute distress.   HEENT:  Pupil equal, reactive to light. No scleral icterus; the mucous membranes were moist. No oral ulcers or thrush.    Neck: No cervical bruits. No JVD. No thyromegaly. No lymph node enlargement or tenderness.   Chest: The spine was straight. The chest was symmetric.   Lungs:   Mildly diminished breathting sounds. No crackles. No wheezing.   Cardiovascular:   RRR, normal first and second heart sounds with no murmurs. No rubs or gallops.    Abdomen:  Soft. No tenderness. Non-distended. Bowels sounds are present   Extremities: Equal posterior tibial pulses. Trace ankle edema L>R.   Skin: No rashes or ulcers. Warm, Dry.   Musculoskeletal: No tenderness. No deformity.   Neurologic: Mood and affect are appropriate.           EKG:  Personally reivewed  Atrial fibrillation   ST & T wave abnormality, consider inferior ischemia   Abnormal ECG   When compared with ECG of 13-JAN-2020 12:54,   Atrial fibrillation has replaced Sinus rhythm   Vent. rate has increased BY  37 BPM     Cardiac Imaging Studies  ECHO on 9-:    Definity contrast utilized.    Normal left ventricular size. Sigmoid septal hypertrophy.    Left ventricle ejection fraction is normal. The calculated left ventricular ejection fraction is 66%.    Normal right ventricular size and systolic function.    Mild left atrial enlargement    No significant valve abnormality.    When compared to the previous study dated 7/31/2015, no significant change from " previous.    Lab Review   Lab Results   Component Value Date     03/02/2021    K 4.1 03/02/2021     03/02/2021    CO2 22 03/02/2021    BUN 52 (H) 03/02/2021    CREATININE 2.58 (H) 03/02/2021    CALCIUM 8.4 (L) 03/02/2021     Lab Results   Component Value Date    WBC 4.6 01/02/2021    WBC 9.1 08/26/2015    HGB 9.6 (L) 01/02/2021    HCT 29.4 (L) 01/02/2021    MCV 91 01/02/2021     01/02/2021     Lab Results   Component Value Date    CHOL 115 09/05/2014    TRIG 119 09/05/2014    HDL 36 (L) 05/30/2018    LDLDIRECT 116 05/30/2018     Lab Results   Component Value Date    TROPONINI 0.01 01/02/2021     Lab Results   Component Value Date     (H) 10/15/2019     Lab Results   Component Value Date    TSH 2.42 10/28/2019

## 2021-07-01 VITALS
DIASTOLIC BLOOD PRESSURE: 46 MMHG | HEART RATE: 56 BPM | BODY MASS INDEX: 23.81 KG/M2 | TEMPERATURE: 97.1 F | SYSTOLIC BLOOD PRESSURE: 124 MMHG | WEIGHT: 152 LBS

## 2021-07-03 NOTE — ADDENDUM NOTE
Addendum Note by Ana Maria Recinos, PharmD at 5/8/2020  9:32 AM     Author: Ana Maria Recinos, Antonio Service: -- Author Type: Pharmacist    Filed: 5/8/2020  9:32 AM Encounter Date: 5/7/2020 Status: Signed    : Ana Maria Recinos, PharmD (Pharmacist)    Addended by: ANA MARIA RECINOS on: 5/8/2020 09:32 AM        Modules accepted: Orders

## 2021-07-03 NOTE — ANESTHESIA PREPROCEDURE EVALUATION
Anesthesia Preprocedure Evaluation by Moises Logan MD at 8/15/2017  6:53 AM     Author: Moises Logan MD Service: -- Author Type: Physician    Filed: 8/15/2017  7:00 AM Date of Service: 8/15/2017  6:53 AM Status: Signed    : Moises Logan MD (Physician)       Anesthesia Evaluation      Patient summary reviewed     Airway   Mallampati: II   Pulmonary - negative ROS and normal exam                          Cardiovascular - normal exam  (+) hypertension, ,     ECG reviewed        Neuro/Psych    (+) CVA ,     Comments: CVA x 3, left sided weakness    Endo/Other    (+) diabetes mellitus type 2 well controlled,      GI/Hepatic/Renal    (+)   chronic renal disease,      Other findings: Hb 10.2, K 5.0      Dental    (+) lower dentures and upper dentures                           Anesthesia Plan  Planned anesthetic: general endotracheal    ASA 4   Induction: intravenous   Anesthetic plan and risks discussed with: patient and spouse    Post-op plan: routine recovery

## 2021-07-03 NOTE — ADDENDUM NOTE
Addendum Note by Shreya Estrada CMA at 10/15/2019  1:12 PM     Author: Shreya Estrada CMA Service: -- Author Type: Certified Medical Assistant    Filed: 10/15/2019  1:12 PM Encounter Date: 10/11/2019 Status: Signed    : Shreya Estrada CMA (Certified Medical Assistant)    Addended by: SHREYA ESTRADA on: 10/15/2019 01:12 PM        Modules accepted: Orders

## 2021-07-03 NOTE — ADDENDUM NOTE
Addendum Note by Aleyda Mcdowell RT (R) at 7/12/2017  7:33 AM     Author: Aleyda Mcdowell RT (R) Service: -- Author Type: Radiologic Technologist    Filed: 7/12/2017  7:33 AM Encounter Date: 7/11/2017 Status: Signed    : Aleyda Mcdowell RT (R) (Radiologic Technologist)    Addended by: ALEYDA MCDOWELL on: 7/12/2017 07:33 AM        Modules accepted: Orders

## 2021-07-04 NOTE — LETTER
Letter by Telly Torre MD at      Author: Telly Torre MD Service: -- Author Type: --    Filed:  Encounter Date: 6/3/2021 Status: (Other)         Suman Nagy  [address] [facility] [address]             Anika 3, 2021         Dear Mr. Nagy,    Below are the results from your recent visit:    Resulted Orders   Comprehensive Metabolic Panel   Result Value Ref Range    Sodium 135 (L) 136 - 145 mmol/L    Potassium 4.0 3.5 - 5.0 mmol/L    Chloride 103 98 - 107 mmol/L    CO2 22 22 - 31 mmol/L    Anion Gap, Calculation 10 5 - 18 mmol/L    Glucose 287 (H) 70 - 125 mg/dL    BUN 50 (H) 8 - 28 mg/dL    Creatinine 2.49 (H) 0.70 - 1.30 mg/dL    GFR MDRD Af Amer 30 (L) >60 mL/min/1.73m2    GFR MDRD Non Af Amer 25 (L) >60 mL/min/1.73m2    Bilirubin, Total 0.3 0.0 - 1.0 mg/dL    Calcium 8.4 (L) 8.5 - 10.5 mg/dL    Protein, Total 6.2 6.0 - 8.0 g/dL    Albumin 3.2 (L) 3.5 - 5.0 g/dL    Alkaline Phosphatase 103 45 - 120 U/L    AST 10 0 - 40 U/L    ALT 10 0 - 45 U/L    Narrative    Fasting Glucose reference range is 70-99 mg/dL per  American Diabetes Association (ADA) guidelines.   Glycosylated Hemoglobin A1c   Result Value Ref Range    Hemoglobin A1c 6.6 (H) <=5.6 %       Creatinine, kidney function test, is stable.    Sodium level is okay.    Liver tests are normal.    Blood sugar was high on day of visit, but hemoglobin A1c of 6.8% shows that your overall control of diabetes is good.    No change in treatment plan.    Please call with questions or contact us using yetut.    Sincerely,        Electronically signed by Telly Torre MD

## 2021-07-06 VITALS
DIASTOLIC BLOOD PRESSURE: 44 MMHG | SYSTOLIC BLOOD PRESSURE: 136 MMHG | HEART RATE: 56 BPM | WEIGHT: 154 LBS | BODY MASS INDEX: 24.12 KG/M2

## 2021-07-08 ENCOUNTER — TRANSCRIBE ORDERS (OUTPATIENT)
Dept: INTERNAL MEDICINE | Facility: CLINIC | Age: 81
End: 2021-07-08

## 2021-07-08 DIAGNOSIS — E53.8 B12 DEFICIENCY: Primary | ICD-10-CM

## 2021-07-08 RX ORDER — CYANOCOBALAMIN 1000 UG/ML
1000 INJECTION, SOLUTION INTRAMUSCULAR; SUBCUTANEOUS
Status: ACTIVE | OUTPATIENT
Start: 2020-10-29

## 2021-07-09 NOTE — PROGRESS NOTES
As part of the required manual data conversion process for integration, this encounter was created to document a CAM (Clinic Administered Medication) order. This information was copied from the [facility] patient's chart to the [facility] patient chart.     Ailyn Santoro, Antonio  July 8, 2021

## 2021-07-14 PROBLEM — C18.9 COLON CANCER (H): Status: RESOLVED | Noted: 2017-05-03 | Resolved: 2017-07-11

## 2021-07-21 DIAGNOSIS — E11.9 TYPE 2 DIABETES MELLITUS (H): ICD-10-CM

## 2021-07-25 RX ORDER — BLOOD SUGAR DIAGNOSTIC
STRIP MISCELLANEOUS
Qty: 100 STRIP | Refills: 1 | Status: SHIPPED | OUTPATIENT
Start: 2021-07-25 | End: 2022-01-01

## 2021-07-26 DIAGNOSIS — I65.22 CAROTID ARTERY STENOSIS, ASYMPTOMATIC, LEFT: ICD-10-CM

## 2021-07-26 NOTE — TELEPHONE ENCOUNTER
"Protocol did not fail, last office visit within required timeframe    Last Written Prescription Date:  12/31/20  Last Fill Quantity: 100,  # refills: 1   Last office visit provider:  6/2/21     Requested Prescriptions   Pending Prescriptions Disp Refills     ONETOUCH ULTRA test strip [Pharmacy Med Name: ONETOUCH ULTRA STRIPS]  0     Sig: CHECK BLOOD SUGAR 3 TIMES PER DAY       Diabetic Supplies Protocol Failed - 7/21/2021 12:03 PM        Failed - Recent (6 mo) or future (30 days) visit within the authorizing provider's specialty     Patient had office visit in the last 6 months or has a visit in the next 30 days with authorizing provider.  See \"Patient Info\" tab in inbasket, or \"Choose Columns\" in Meds & Orders section of the refill encounter.            Passed - Medication is active on med list        Passed - Patient is 18 years of age or older             Freya Hugo RN 07/25/21 7:11 PM  "

## 2021-07-29 ENCOUNTER — TRANSFERRED RECORDS (OUTPATIENT)
Dept: HEALTH INFORMATION MANAGEMENT | Facility: CLINIC | Age: 81
End: 2021-07-29

## 2021-07-29 LAB
CREATININE (EXTERNAL): 2.53 MG/DL (ref 0.7–1.11)
GFR ESTIMATED (EXTERNAL): 23 ML/MIN/1.73M2
GFR ESTIMATED (IF AFRICAN AMERICAN) (EXTERNAL): 26 ML/MIN/1.73M2
GLUCOSE (EXTERNAL): 237 MG/DL (ref 65–99)
POTASSIUM (EXTERNAL): 4.1 MMOL/L (ref 3.5–5.3)

## 2021-07-29 NOTE — TELEPHONE ENCOUNTER
"Routing refill request to provider for review/approval because:  Allergy/contraindication    Last Written Prescription Date:  7/22/20  Last Fill Quantity: 90,  # refills: 3   Last office visit provider:  6/2/21     Requested Prescriptions   Pending Prescriptions Disp Refills     atorvastatin (LIPITOR) 20 MG tablet [Pharmacy Med Name: ATORVASTATIN 20MG] 90 tablet 2     Sig: TAKE 1 TABLET (20 MG TOTAL) BY MOUTH DAILY.       Statins Protocol Failed - 7/28/2021  6:13 PM        Failed - LDL on file in past 12 months     Recent Labs   Lab Test 05/30/18  1125                Passed - No abnormal creatine kinase in past 12 months     No lab results found.             Passed - Recent (12 mo) or future (30 days) visit within the authorizing provider's specialty     Patient has had an office visit with the authorizing provider or a provider within the authorizing providers department within the previous 12 mos or has a future within next 30 days. See \"Patient Info\" tab in inbasket, or \"Choose Columns\" in Meds & Orders section of the refill encounter.              Passed - Medication is active on med list        Passed - Patient is age 18 or older             paola kim RN 07/29/21 5:22 PM  "

## 2021-07-30 RX ORDER — ATORVASTATIN CALCIUM 20 MG/1
TABLET, FILM COATED ORAL
Qty: 90 TABLET | Refills: 2 | Status: SHIPPED | OUTPATIENT
Start: 2021-07-30 | End: 2022-03-25

## 2021-08-01 ENCOUNTER — COMMUNICATION - HEALTHEAST (OUTPATIENT)
Dept: ADMINISTRATIVE | Facility: CLINIC | Age: 81
End: 2021-08-01

## 2021-08-03 PROBLEM — Z93.2 ILEOSTOMY IN PLACE (H): Status: RESOLVED | Noted: 2017-08-15 | Resolved: 2017-08-25

## 2021-09-08 DIAGNOSIS — E11.9 TYPE 2 DIABETES MELLITUS WITHOUT COMPLICATION, WITH LONG-TERM CURRENT USE OF INSULIN (H): ICD-10-CM

## 2021-09-08 DIAGNOSIS — Z79.4 TYPE 2 DIABETES MELLITUS WITHOUT COMPLICATION, WITH LONG-TERM CURRENT USE OF INSULIN (H): ICD-10-CM

## 2021-09-08 DIAGNOSIS — I10 ESSENTIAL HYPERTENSION: ICD-10-CM

## 2021-09-08 DIAGNOSIS — E11.9 TYPE 2 DIABETES MELLITUS WITHOUT COMPLICATION, WITHOUT LONG-TERM CURRENT USE OF INSULIN (H): ICD-10-CM

## 2021-09-08 RX ORDER — LINAGLIPTIN 5 MG/1
5 TABLET, FILM COATED ORAL DAILY
Qty: 90 TABLET | Refills: 3 | Status: SHIPPED | OUTPATIENT
Start: 2021-09-08 | End: 2022-01-01

## 2021-09-08 RX ORDER — AMLODIPINE BESYLATE 10 MG/1
10 TABLET ORAL DAILY
Qty: 90 TABLET | Refills: 3 | Status: SHIPPED | OUTPATIENT
Start: 2021-09-08 | End: 2022-01-01

## 2021-09-08 NOTE — TELEPHONE ENCOUNTER
Refill Request  Medication name: Pending Prescriptions:                       Disp   Refills    linagliptin (TRADJENTA) 5 MG TABS tablet  90 tab*3            Sig: Take 1 tablet (5 mg) by mouth daily    Who prescribed the medication: vilma  Last refill on medication: 08/10/21  Requested Pharmacy: [facility]  Last appointment with PCP: 06/02/21  Next appointment: Appointment scheduled for 10/01/21

## 2021-09-09 ENCOUNTER — TRANSFERRED RECORDS (OUTPATIENT)
Dept: HEALTH INFORMATION MANAGEMENT | Facility: CLINIC | Age: 81
End: 2021-09-09

## 2021-09-24 DIAGNOSIS — I10 ESSENTIAL HYPERTENSION: ICD-10-CM

## 2021-09-24 RX ORDER — FUROSEMIDE 40 MG
TABLET ORAL
Qty: 90 TABLET | Refills: 3 | Status: SHIPPED | OUTPATIENT
Start: 2021-09-24 | End: 2022-01-01

## 2021-10-01 ENCOUNTER — OFFICE VISIT (OUTPATIENT)
Dept: INTERNAL MEDICINE | Facility: CLINIC | Age: 81
End: 2021-10-01
Payer: COMMERCIAL

## 2021-10-01 VITALS
DIASTOLIC BLOOD PRESSURE: 52 MMHG | WEIGHT: 153 LBS | HEART RATE: 60 BPM | SYSTOLIC BLOOD PRESSURE: 134 MMHG | BODY MASS INDEX: 23.96 KG/M2

## 2021-10-01 DIAGNOSIS — Z85.51 HISTORY OF BLADDER CANCER: ICD-10-CM

## 2021-10-01 DIAGNOSIS — I65.22 CAROTID ARTERY STENOSIS, ASYMPTOMATIC, LEFT: ICD-10-CM

## 2021-10-01 DIAGNOSIS — I48.0 PAROXYSMAL ATRIAL FIBRILLATION (H): ICD-10-CM

## 2021-10-01 DIAGNOSIS — Z79.4 TYPE 2 DIABETES MELLITUS WITH STAGE 4 CHRONIC KIDNEY DISEASE, WITH LONG-TERM CURRENT USE OF INSULIN (H): Primary | ICD-10-CM

## 2021-10-01 DIAGNOSIS — N18.4 TYPE 2 DIABETES MELLITUS WITH STAGE 4 CHRONIC KIDNEY DISEASE, WITH LONG-TERM CURRENT USE OF INSULIN (H): Primary | ICD-10-CM

## 2021-10-01 DIAGNOSIS — E11.22 TYPE 2 DIABETES MELLITUS WITH STAGE 4 CHRONIC KIDNEY DISEASE, WITH LONG-TERM CURRENT USE OF INSULIN (H): Primary | ICD-10-CM

## 2021-10-01 DIAGNOSIS — E53.8 B12 DEFICIENCY: ICD-10-CM

## 2021-10-01 DIAGNOSIS — Z86.73 HISTORY OF STROKE: ICD-10-CM

## 2021-10-01 DIAGNOSIS — D63.1 ANEMIA DUE TO STAGE 4 CHRONIC KIDNEY DISEASE (H): ICD-10-CM

## 2021-10-01 DIAGNOSIS — Z85.038 HISTORY OF COLON CANCER: ICD-10-CM

## 2021-10-01 DIAGNOSIS — N18.4 ANEMIA DUE TO STAGE 4 CHRONIC KIDNEY DISEASE (H): ICD-10-CM

## 2021-10-01 DIAGNOSIS — Z23 NEED FOR IMMUNIZATION AGAINST INFLUENZA: ICD-10-CM

## 2021-10-01 PROCEDURE — 90471 IMMUNIZATION ADMIN: CPT | Performed by: INTERNAL MEDICINE

## 2021-10-01 PROCEDURE — 90662 IIV NO PRSV INCREASED AG IM: CPT | Performed by: INTERNAL MEDICINE

## 2021-10-01 PROCEDURE — 99214 OFFICE O/P EST MOD 30 MIN: CPT | Mod: 25 | Performed by: INTERNAL MEDICINE

## 2021-10-01 PROCEDURE — 20610 DRAIN/INJ JOINT/BURSA W/O US: CPT | Performed by: PHARMACIST

## 2021-10-01 RX ADMIN — CYANOCOBALAMIN 1000 MCG: 1000 INJECTION, SOLUTION INTRAMUSCULAR; SUBCUTANEOUS at 11:03

## 2021-10-01 NOTE — PATIENT INSTRUCTIONS
No change in medication treatment plan at this time.    Contact me in clinic if you feel you are having any low blood sugars or blood sugars less than 80.    Schedule your COVID-19 Pfizer vaccine booster    Follow-up with me in 4 months for routine checkup.    If you wish to follow-up for your bladder cancer history, make an appointment with urology for cystoscopy.

## 2021-10-01 NOTE — PROGRESS NOTES
[facility] clinic Follow Up Note    Suman Nagy   81 year old male    Date of Visit: 10/1/2021    Chief Complaint   Patient presents with     Follow Up     flu shot - b12     Subjective  Luke is an 81-year-old male here with his wife, Jeni.    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  Status post right CEA.    He does have coronary disease and carotid artery disease.  September 2020 left carotid artery 40% and right carotid artery 30%.    No new neurologic event.    He has paroxysmal atrial fibrillation on Eliquis.  No bleeding issues.  He did not want to do the watchman device.    No increasing shortness of breath or edema or palpitations symptoms.  Also on metoprolol daily.    September 2020 heart echo with mild diastolic dysfunction but no heart valve issues.    No generalized muscle achiness.  Still on Lipitor 20 mg.  No new abdominal pain.    Stage IV renal failure with GFR 23 in July.  GFR was 25 in June on check here.    He was just seen at the kidney clinic last week.  He stated his hemoglobin was 9.6, still getting iron infusions and erythropoietin through that clinic.    Decision was not to start dialysis at this time.  Patient still does not know if he wants to go through with dialysis if needed.  There is no uremia or nausea symptoms.    Blood pressures been well controlled on current medication.  Please note a typo in my last note, patient is on hydralazine 4 times a day, not twice a day.    Patient was started on calcitriol Monday Wednesday Friday by renal clinic in July.    No bleeding issues.    History of B12 deficiency    Diabetes type 2 is been well controlled blood sugars mostly 130-150.  No low blood sugars less than 100.  On Lantus insulin 16 units in the morning and Tradjenta 5 mg a day.  Hemoglobin A1c was 6.6% in June.    Chronic dysthymia on Zoloft, stable.    No new cough or shortness of breath.  Quit smoking in 1995.    Past history of stage III  rectal cancer 2017 with a low anterior resection.  2019 CT scan of the abdomen without recurrence.  Colonoscopy June 2018 with 2 polyps.  He saw oncology in July, no evidence of recurrence at that time and given a 6-month follow-up.    Past history of bladder cancer with TURBT in 2013, no new urinary symptoms or hematuria.  He had a cystoscopy canceled last year with the COVID-19 outbreak.  He is unsure if he wants to go back for further cystoscopy surveillance at this time.    Patient did have a nontraumatic fall when he tried to sit down in his bed but missed his bed 2 weeks ago but no loss of consciousness or injury.  No other falls.  He walks with walker at home.    PMHx:    Past Medical History:   Diagnosis Date     Anemia      Bladder cancer (H)      Cancer (H)     Rectosigmoid     Diabetes mellitus (H)      Hyperkalemia      Hypertension      Seizure (H)     possible, one time occurence     Stroke (H)     multiple, residual left sided weakness, last CVA in July 2015 caused some speech deficit     Superficial phlebitis      Venous insufficiency of both lower extremities      PSHx:    Past Surgical History:   Procedure Laterality Date     COLON SURGERY      Colectomy Low Anterior Resection     EYE SURGERY      Cataract Extraction     LAPAROSCOPIC ASSISTED COLECTOMY N/A 6/1/2017    Procedure: LAPAROSCOPIC ASSISTED COLECTOMY LOW ANTERIOR RESECTION AND DIVERTING LOOP ILEOSTOMY, PROCTOSOPY;  Surgeon: Tyson Parry MD;  [place] [facility] [place]:      OTHER SURGICAL HISTORY      [id number]     ID CLOSE ENTEROSTOMY N/A 8/15/2017    Procedure: ILEOSTOMY CLOSURE LOOP ;  Surgeon: Tyson Parry MD;  [place] [facility] [place]  Service: General     ID CYSTOURETHROSCOPY,FULGUR <0.5 CM KANDY N/A 9/1/2015    Procedure: CYSTOSCOPY TRANSURETHRAL RESECTION BLADDER TUMOR;  Surgeon: Cleveland Nair MD;  [place] [facility] [place]  Service: Urology     ID CYSTOURETHROSCOPY,FULGUR <0.5 CM KANDY N/A 12/22/2015     Procedure: CYSTOSCOPY TRANSURETHRAL RESECTION BLADDER TUMOR AND BLADDER BIOPSIES;  Surgeon: Cleveland Nair MD;  Location: [facility];  Service: Urology     VASECTOMY       Immunizations:   Immunization History   Administered Date(s) Administered     COVID-19,PF,Pfizer 03/02/2021, 03/23/2021     FLU 6-35 months 09/27/2010, 09/14/2012, 09/27/2013     Flu, Unspecified 10/19/2007, 09/16/2011, 10/15/2015     Influenza (H1N1) 01/18/2010     Influenza (High Dose) 3 valent vaccine 09/15/2015, 09/26/2016, 10/03/2017, 09/27/2018, 10/19/2019     Influenza Vaccine, 6+MO IM (QUADRIVALENT W/PRESERVATIVES) 09/18/2009, 10/17/2014     Influenza, Quad, High Dose, Pf, 65yr+ (Fluzone HD) 09/21/2020     Pneumo Conj 13-V (2010&after) 01/20/2015     Pneumococcal 23 valent 10/08/2004     Td,adult,historic,unspecified 07/01/2004     Tdap (Adacel,Boostrix) 05/20/2015       ROS A comprehensive review of systems was performed and was otherwise negative    Medications, allergies, and problem list were reviewed and updated    Exam  /52   Pulse 60   Wt 69.4 kg (153 lb)   BMI 23.96 kg/m    Patient is alert and oriented x3.  Left hemiparesis unchanged.  No new neurologic changes.  Lungs are clear to auscultation with mild kyphosis.  Heart is regular without murmur.  Trace ankle edema, baseline.  Abdomen nontender.    Assessment/Plan  1. Type 2 diabetes mellitus with stage 4 chronic kidney disease, with long-term current use of insulin (H)  Well-controlled.  I will not check a hemoglobin A1c today to avoid blood draw, as his diabetes has been well controlled and stable.    Continue same insulin and Tradjenta.    2. B12 deficiency  B12 shot today    3. Anemia due to stage 4 chronic kidney disease (H)  Area stage renal failure.  Followed closely with nephrology clinic.  He will follow-up later this fall with nephrology.    Patient is unsure if he wishes to proceed with dialysis if needed in the future.    But no volume  overload, no uremia symptoms, potassium and kidney function monitored by nephrology clinic    4. Need for immunization against influenza    - INFLUENZA, QUAD, HIGH DOSE, PF, 65YR + (FLUZONE HD)    5. Paroxysmal atrial fibrillation (H)  Eliquis.  Metoprolol.  Patient does not want watchman device.    6.  History of colon cancer  No recurrence and evaluation with oncology in July.  Follow-up in 6 months with oncology    7. Carotid artery stenosis, asymptomatic, left  No new stroke.  Continue Lipitor.  Eliquis for anticoagulation.    8. History of stroke  As above.  Ambulates with walker at home.  Cared for by wife.    9. History of bladder cancer  I did discuss this with patient today.  I did tell patient if he does wish to have cystoscopy follow-up for his bladder cancer, he should contact urology clinic to schedule that.    History of chronic dysthymia, stable on Zoloft      Return in about 4 months (around 2/1/2022) for Follow up.   Patient Instructions   No change in medication treatment plan at this time.    Contact me in clinic if you feel you are having any low blood sugars or blood sugars less than 80.    Schedule your COVID-19 Pfizer vaccine booster    Follow-up with me in 4 months for routine checkup.    If you wish to follow-up for your bladder cancer history, make an appointment with urology for cystoscopy.    Telly Torre MD, MD        Current Outpatient Medications   Medication Sig Dispense Refill     acetaminophen (TYLENOL) 325 MG tablet [ACETAMINOPHEN (TYLENOL) 325 MG TABLET] Take 2 tablets (650 mg total) by mouth every 4 (four) hours as needed.  0     amLODIPine (NORVASC) 10 MG tablet Take 1 tablet (10 mg) by mouth daily 90 tablet 3     apixaban ANTICOAGULANT (ELIQUIS) 2.5 mg Tab tablet [APIXABAN ANTICOAGULANT (ELIQUIS) 2.5 MG TAB TABLET] Take 1 tablet (2.5 mg total) by mouth 2 (two) times a day. 60 tablet 11     atorvastatin (LIPITOR) 20 MG tablet TAKE 1 TABLET (20 MG TOTAL) BY MOUTH DAILY. 90  tablet 2     calcium, as carbonate, (TUMS) 200 mg calcium (500 mg) chewable tablet [CALCIUM, AS CARBONATE, (TUMS) 200 MG CALCIUM (500 MG) CHEWABLE TABLET] Chew 1 tablet (200 mg total) 3 (three) times a day as needed for heartburn.  0     cholecalciferol, vitamin D3, (VITAMIN D3) 1,000 unit capsule [CHOLECALCIFEROL, VITAMIN D3, (VITAMIN D3) 1,000 UNIT CAPSULE] Take 2 capsules (2,000 Units total) by mouth daily. 180 capsule 0     cloNIDine HCL (CATAPRES) 0.3 MG tablet [CLONIDINE HCL (CATAPRES) 0.3 MG TABLET] TAKE 1/2 PILL IN THE MORNING AND 1 FULL PILL IN THE EVENING. 135 tablet 3     cyanocobalamin 1,000 mcg/mL injection [CYANOCOBALAMIN 1,000 MCG/ML INJECTION] Inject 1,000 mcg into the shoulder, thigh, or buttocks every 3 (three) months.              cyanocobalamin, vitamin B-12, (VITAMIN B-12) 1,000 mcg Subl [CYANOCOBALAMIN, VITAMIN B-12, (VITAMIN B-12) 1,000 MCG SUBL] Place 1 tablet (1,000 mcg total) under the tongue daily. 90 tablet 3     diclofenac sodium (VOLTAREN) 1 % Gel [DICLOFENAC SODIUM (VOLTAREN) 1 % GEL] APPLY 4 GRAMS TOPICALLY 3 (THREE) TIMES A DAY AS NEEDED. APPLY TO KNEES 100 g 0     FIBER CHOICE, INULIN, ORAL [FIBER CHOICE, INULIN, ORAL] Take 2 tablets by mouth daily.       furosemide (LASIX) 40 MG tablet [FUROSEMIDE (LASIX) 40 MG TABLET] TAKE 1 TABLET (40 MG TOTAL) BY MOUTH DAILY. 90 tablet 3     hydrALAZINE (APRESOLINE) 25 MG tablet [HYDRALAZINE (APRESOLINE) 25 MG TABLET] Take 75 mg by mouth 4 (four) times a day.        insulin glargine (LANTUS SOLOSTAR) 100 UNIT/ML pen Inject 16 Units Subcutaneous every morning 3 mL 3     linagliptin (TRADJENTA) 5 MG TABS tablet Take 1 tablet (5 mg) by mouth daily 90 tablet 3     metoprolol succinate (TOPROL-XL) 50 MG 24 hr tablet [METOPROLOL SUCCINATE (TOPROL-XL) 50 MG 24 HR TABLET] TAKE 1 TABLET (50 MG TOTAL) BY MOUTH DAILY. 90 tablet 3     ONETOUCH DELICA PLUS LANCET 33 gauge Misc [ONETOUCH DELICA PLUS LANCET 33 GAUGE MISC] CHECK BLOOD SUGAR 3 TIMES PER DAY. 300  "each 3     ONETOUCH ULTRA test strip CHECK BLOOD SUGAR 3 TIMES PER  strip 1     ONETOUCH ULTRA2 METER Misc [ONETOUCH ULTRA2 METER MISC] USE AS DIRECTED 1 each 0     PEN NEEDLE 32 gauge x \" Ndle [PEN NEEDLE 32 GAUGE X \" NDLE] USE ONE NEEDLE PER DAY TO INJECT INSULIN. 100 each 3     polyethylene glycol (MIRALAX) 17 gram packet [POLYETHYLENE GLYCOL (MIRALAX) 17 GRAM PACKET] Take 1 packet (17 g total) by mouth daily as needed.  0     potassium chloride (K-DUR,KLOR-CON) 20 MEQ tablet [POTASSIUM CHLORIDE (K-DUR,KLOR-CON) 20 MEQ TABLET] TAKE 1 TABLET (20 MEQ TOTAL) BY MOUTH DAILY. 30 tablet 10     sertraline (ZOLOFT) 50 MG tablet [SERTRALINE (ZOLOFT) 50 MG TABLET] TAKE ONE & ONE -HALF (1&1/2) TABLETS BY MOUTH DAILY 135 tablet 1     Allergies   Allergen Reactions     Cefazolin Other (See Comments)     \"felt very hot\" Oct 2019 Cephalexin, Sep 2020 Ceftriaxone     Pravastatin Muscle Pain (Myalgia)     Increased peripheral neuropathy     Simvastatin Muscle Pain (Myalgia)     Increased peripheral neuropathy     Thiazides [Thiazide-Type Diuretics] Other (See Comments)     Other: Gout       Social History     Tobacco Use     Smoking status: Former Smoker     Quit date: 1995     Years since quittin.1     Smokeless tobacco: Never Used   Substance Use Topics     Alcohol use: No     Drug use: No           "

## 2021-10-04 ENCOUNTER — IMMUNIZATION (OUTPATIENT)
Dept: NURSING | Facility: CLINIC | Age: 81
End: 2021-10-04
Payer: COMMERCIAL

## 2021-10-04 PROCEDURE — 0004A PR COVID VAC PFIZER DIL RECON 30 MCG/0.3 ML IM: CPT

## 2021-10-04 PROCEDURE — 91300 PR COVID VAC PFIZER DIL RECON 30 MCG/0.3 ML IM: CPT

## 2021-10-15 DIAGNOSIS — E11.9 TYPE 2 DIABETES MELLITUS (H): ICD-10-CM

## 2021-10-15 DIAGNOSIS — E11.9 TYPE 2 DIABETES MELLITUS WITHOUT COMPLICATION, WITH LONG-TERM CURRENT USE OF INSULIN (H): Primary | ICD-10-CM

## 2021-10-15 DIAGNOSIS — Z79.4 TYPE 2 DIABETES MELLITUS WITHOUT COMPLICATION, WITH LONG-TERM CURRENT USE OF INSULIN (H): Primary | ICD-10-CM

## 2021-10-15 NOTE — TELEPHONE ENCOUNTER
Refill Request  Medication name:   Who prescribed the medication: Nicho  Last refill on medication: 08/11/21  Requested Pharmacy: [facility]  Last appointment with PCP: 10/01/21  Next appointment: Appointment scheduled for 02/01/22     Onetouch Delica plus 33G    Lancet prescription sent to pharmacy

## 2021-11-04 ENCOUNTER — OFFICE VISIT (OUTPATIENT)
Dept: CARDIOLOGY | Facility: CLINIC | Age: 81
End: 2021-11-04
Payer: COMMERCIAL

## 2021-11-04 VITALS
HEIGHT: 65 IN | HEART RATE: 60 BPM | SYSTOLIC BLOOD PRESSURE: 120 MMHG | DIASTOLIC BLOOD PRESSURE: 48 MMHG | RESPIRATION RATE: 16 BRPM | BODY MASS INDEX: 25.83 KG/M2 | WEIGHT: 155 LBS

## 2021-11-04 DIAGNOSIS — E11.22 TYPE 2 DIABETES MELLITUS WITH STAGE 4 CHRONIC KIDNEY DISEASE, WITH LONG-TERM CURRENT USE OF INSULIN (H): ICD-10-CM

## 2021-11-04 DIAGNOSIS — I48.0 PAROXYSMAL ATRIAL FIBRILLATION (H): ICD-10-CM

## 2021-11-04 DIAGNOSIS — I10 PRIMARY HYPERTENSION: ICD-10-CM

## 2021-11-04 DIAGNOSIS — I50.32 CHRONIC DIASTOLIC CONGESTIVE HEART FAILURE (H): Primary | ICD-10-CM

## 2021-11-04 DIAGNOSIS — N18.4 CHRONIC KIDNEY DISEASE, STAGE IV (SEVERE) (H): ICD-10-CM

## 2021-11-04 DIAGNOSIS — N18.4 TYPE 2 DIABETES MELLITUS WITH STAGE 4 CHRONIC KIDNEY DISEASE, WITH LONG-TERM CURRENT USE OF INSULIN (H): ICD-10-CM

## 2021-11-04 DIAGNOSIS — Z79.4 TYPE 2 DIABETES MELLITUS WITH STAGE 4 CHRONIC KIDNEY DISEASE, WITH LONG-TERM CURRENT USE OF INSULIN (H): ICD-10-CM

## 2021-11-04 PROCEDURE — 99214 OFFICE O/P EST MOD 30 MIN: CPT | Performed by: INTERNAL MEDICINE

## 2021-11-04 RX ORDER — CALCITRIOL 0.25 UG/1
0.25 CAPSULE, LIQUID FILLED ORAL DAILY
COMMUNITY
Start: 2021-08-04 | End: 2023-01-01

## 2021-11-04 ASSESSMENT — MIFFLIN-ST. JEOR: SCORE: 1329.96

## 2021-11-04 NOTE — LETTER
11/4/2021    Telly Torre MD  [address] [place] [address]    RE: Suman Nagy       Dear Colleague,    I had the pleasure of seeing Suman Nagy in the [facility].     Assessment/Plan:   1.  Paroxysmal atrial fibrillation: He has maintained in sinus rhythm.  He has no palpitations.  Continue metoprolol succinate 50 mg daily.  His [id number] score is 7.  He has high risk of stroke.    Continue Eliquis 2.5 mg twice a day at renally adjusted dosage.  He has no side effects of Eliquis.     2.  Primary hypertension: The patient has been on multiple antihypertensive medications including metoprolol XL 50 mg daily, clonidine 0.15 mg a.m., 0.3 mg p.m., hydralazine 75 mg 4 times a day, amlodipine 10 mg daily.     3.  Chronic diastolic congestive heart failure.  He has no obvious fluid retention. Continue Lasix 40 mg daily.  His renal function is stable.     4.  Stage IV CKD, type 2 diabetes, history of stroke, other medical conditions: Please follow-up with Dr. Torre.    Thank you for the opportunity to be involved in the care of Suman Nagy. If you have any questions, please feel free to contact me.  I will see the patient again in 6 months and as needed.    Much or all of the text in this note was generated through the use of Dragon Dictate voice-to-text software. Errors in spelling or words which seem out of context are unintentional. Sound alike errors, in particular, may have escaped editing.       History of Present Illness:   It is my pleasure to see Suman Nagy at the [facility] clinic for routine cardiology follow up.  Suman Nagy is a 81 year old male with a medical history of paroxysmal atrial fibrillation, primary hypertension, dyslipidemia, type 2 diabetes, stage IV CKD, history of a stroke and colon cancer in remission.    The patient states that he has been doing fine.  He denies any  chest pain, shortness of breath, palpitations.  He has occasional lightheadedness.  He has no orthopnea, PND.  He has very mild leg edema which has been stable.  His blood pressure and heart rate are controlled.  He has no side effects from his current medications.  His renal function has been stable.    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     Rectal cancer (H)     History of bladder cancer     History of rectal cancer     Carotid artery stenosis, asymptomatic, left     History of stroke     Medication monitoring encounter     Type 2 diabetes mellitus with stage 4 chronic kidney disease, with long-term current use of insulin (H)     Anxiety     UTI (urinary tract infection)     Slow transit constipation     Generalized muscle weakness     Chronic kidney disease, stage IV (severe) (H)     ACP (advance care planning)     Atrial fibrillation (H)     Hypokalemia     Coronary artery disease involving native coronary artery of native heart without angina pectoris     Acute respiratory failure with hypoxia (H)     Bacteremia     Chronic diastolic CHF (congestive heart failure), NYHA class 2 (H)       Past Surgical History:     Past Surgical History:   Procedure Laterality Date     COLON SURGERY      Colectomy Low Anterior Resection     EYE SURGERY      Cataract Extraction     LAPAROSCOPIC ASSISTED COLECTOMY N/A 6/1/2017    Procedure: LAPAROSCOPIC ASSISTED COLECTOMY LOW ANTERIOR RESECTION AND DIVERTING LOOP ILEOSTOMY, PROCTOSOPY;  Surgeon: Tyson Parry MD;  Location: [facility];  Service:      OTHER SURGICAL HISTORY      [id number]     AK CLOSE ENTEROSTOMY N/A 8/15/2017    Procedure: ILEOSTOMY CLOSURE LOOP ;  Surgeon: Tyson Parry MD;  Location: [facility];  Service: General     AK CYSTOURETHROSCOPY,FULGUR <0.5 CM LESN N/A 9/1/2015    Procedure: CYSTOSCOPY TRANSURETHRAL  RESECTION BLADDER TUMOR;  Surgeon: Cleveland Nair MD;  Location: [facility] OR;  Service: Urology     VT CYSTOURETHROSCOPY,FULGUR <0.5 CM LESJACKY N/A 12/22/2015    Procedure: CYSTOSCOPY TRANSURETHRAL RESECTION BLADDER TUMOR AND BLADDER BIOPSIES;  Surgeon: Cleveland Nair MD;  Location: [facility] OR;  Service: Urology     VASECTOMY         Family History:     Family History   Problem Relation Age of Onset     Chronic Obstructive Pulmonary Disease Father         Social History:    reports that he quit smoking about 26 years ago. He has never used smokeless tobacco. He reports that he does not drink alcohol and does not use drugs.    Review of Systems:   12 systems are reviewed negative except for in HPI.    Meds:     Current Outpatient Medications:      acetaminophen (TYLENOL) 325 MG tablet, [ACETAMINOPHEN (TYLENOL) 325 MG TABLET] Take 2 tablets (650 mg total) by mouth every 4 (four) hours as needed., Disp: , Rfl: 0     amLODIPine (NORVASC) 10 MG tablet, Take 1 tablet (10 mg) by mouth daily, Disp: 90 tablet, Rfl: 3     atorvastatin (LIPITOR) 20 MG tablet, TAKE 1 TABLET (20 MG TOTAL) BY MOUTH DAILY., Disp: 90 tablet, Rfl: 2     blood glucose (NO BRAND SPECIFIED) lancets standard, Use to test blood sugar 3 times daily or as directed., Disp: 300 lancet, Rfl: 3     calcitRIOL (ROCALTROL) 0.25 MCG capsule, Take 0.25 mcg by mouth three times a week Mon, Wed, Fri, Disp: , Rfl:      calcium, as carbonate, (TUMS) 200 mg calcium (500 mg) chewable tablet, [CALCIUM, AS CARBONATE, (TUMS) 200 MG CALCIUM (500 MG) CHEWABLE TABLET] Chew 1 tablet (200 mg total) 3 (three) times a day as needed for heartburn., Disp: , Rfl: 0     cholecalciferol, vitamin D3, (VITAMIN D3) 1,000 unit capsule, [CHOLECALCIFEROL, VITAMIN D3, (VITAMIN D3) 1,000 UNIT CAPSULE] Take 2 capsules (2,000 Units total) by mouth daily., Disp: 180 capsule, Rfl: 0     cloNIDine HCL (CATAPRES) 0.3 MG tablet, [CLONIDINE HCL (CATAPRES) 0.3 MG TABLET] TAKE 1/2  "PILL IN THE MORNING AND 1 FULL PILL IN THE EVENING., Disp: 135 tablet, Rfl: 3     cyanocobalamin 1,000 mcg/mL injection, [CYANOCOBALAMIN 1,000 MCG/ML INJECTION] Inject 1,000 mcg into the shoulder, thigh, or buttocks every 3 (three) months.       , Disp: , Rfl:      cyanocobalamin, vitamin B-12, (VITAMIN B-12) 1,000 mcg Subl, [CYANOCOBALAMIN, VITAMIN B-12, (VITAMIN B-12) 1,000 MCG SUBL] Place 1 tablet (1,000 mcg total) under the tongue daily., Disp: 90 tablet, Rfl: 3     diclofenac sodium (VOLTAREN) 1 % Gel, [DICLOFENAC SODIUM (VOLTAREN) 1 % GEL] APPLY 4 GRAMS TOPICALLY 3 (THREE) TIMES A DAY AS NEEDED. APPLY TO KNEES, Disp: 100 g, Rfl: 0     ELIQUIS ANTICOAGULANT 2.5 MG tablet, TAKE 1 TABLET (2.5 MG TOTAL) BY MOUTH 2 (TWO) TIMES A DAY., Disp: 60 tablet, Rfl: 1     FIBER CHOICE, INULIN, ORAL, [FIBER CHOICE, INULIN, ORAL] Take 2 tablets by mouth daily., Disp: , Rfl:      furosemide (LASIX) 40 MG tablet, [FUROSEMIDE (LASIX) 40 MG TABLET] TAKE 1 TABLET (40 MG TOTAL) BY MOUTH DAILY., Disp: 90 tablet, Rfl: 3     hydrALAZINE (APRESOLINE) 25 MG tablet, [HYDRALAZINE (APRESOLINE) 25 MG TABLET] Take 75 mg by mouth 4 (four) times a day. , Disp: , Rfl:      insulin glargine (LANTUS SOLOSTAR) 100 UNIT/ML pen, Inject 16 Units Subcutaneous every morning, Disp: 3 mL, Rfl: 3     linagliptin (TRADJENTA) 5 MG TABS tablet, Take 1 tablet (5 mg) by mouth daily, Disp: 90 tablet, Rfl: 3     metoprolol succinate (TOPROL-XL) 50 MG 24 hr tablet, [METOPROLOL SUCCINATE (TOPROL-XL) 50 MG 24 HR TABLET] TAKE 1 TABLET (50 MG TOTAL) BY MOUTH DAILY., Disp: 90 tablet, Rfl: 3     ONETOUCH DELICA PLUS LANCET 33 gauge Misc, [ONETOUCH DELICA PLUS LANCET 33 GAUGE MISC] CHECK BLOOD SUGAR 3 TIMES PER DAY., Disp: 300 each, Rfl: 3     ONETOUCH ULTRA test strip, CHECK BLOOD SUGAR 3 TIMES PER DAY, Disp: 100 strip, Rfl: 1     ONETOUCH ULTRA2 METER Misc, [ONETOUCH ULTRA2 METER MISC] USE AS DIRECTED, Disp: 1 each, Rfl: 0     PEN NEEDLE 32 gauge x 5/32\" Ndle, [PEN " "NEEDLE 32 GAUGE X 5/32\" NDLE] USE ONE NEEDLE PER DAY TO INJECT INSULIN., Disp: 100 each, Rfl: 3     potassium chloride (K-DUR,KLOR-CON) 20 MEQ tablet, [POTASSIUM CHLORIDE (K-DUR,KLOR-CON) 20 MEQ TABLET] TAKE 1 TABLET (20 MEQ TOTAL) BY MOUTH DAILY., Disp: 30 tablet, Rfl: 10     sertraline (ZOLOFT) 50 MG tablet, [SERTRALINE (ZOLOFT) 50 MG TABLET] TAKE ONE & ONE -HALF (1&1/2) TABLETS BY MOUTH DAILY (Patient taking differently: Take 50 mg by mouth daily ), Disp: 135 tablet, Rfl: 1     polyethylene glycol (MIRALAX) 17 gram packet, [POLYETHYLENE GLYCOL (MIRALAX) 17 GRAM PACKET] Take 1 packet (17 g total) by mouth daily as needed. (Patient not taking: Reported on 11/4/2021), Disp: , Rfl: 0    Current Facility-Administered Medications:      cyanocobalamin injection 1,000 mcg, 1,000 mcg, Intramuscular, Q90 Days, Ailyn Santoro, PharmD, 1,000 mcg at 10/01/21 1103    Allergies:   Cefazolin, Pravastatin, Simvastatin, and Thiazides [thiazide-type diuretics]      Objective:      Physical Exam  70.3 kg (155 lb)  1.651 m (5' 5\")  Body mass index is 25.79 kg/m .  /48 (BP Location: Left arm, Patient Position: Sitting, Cuff Size: Adult Regular)   Pulse 60   Resp 16   Ht 1.651 m (5' 5\")   Wt 70.3 kg (155 lb)   BMI 25.79 kg/m      General Appearance:   Awake, Alert, No acute distress.   HEENT:  Pupil equal and reactive to light. No scleral icterus; the mucous membranes were moist.   Neck: No cervical bruits. No JVD. No thyromegaly.     Chest: The spine was straight. The chest was symmetric.   Lungs:   Respirations unlabored; Lungs are clear to auscultation. No crackles. No wheezing.   Cardiovascular:   Regular rhythm and rate, normal first and second heart sounds with no murmurs. No rubs or gallops.    Abdomen:  Soft. No tenderness. Non-distended. Bowels sounds are present   Extremities: Equal tibial pulses. No leg edema.   Skin: No rashes or ulcers. Warm, Dry.   Musculoskeletal: No tenderness. No deformity. "   Neurologic: Mood and affect are appropriate. No focal deficits.         EKG:  Personally reivewed  Atrial fibrillation   ST & T wave abnormality, consider inferior ischemia   Abnormal ECG   When compared with ECG of 13-JAN-2020 12:54,   Atrial fibrillation has replaced Sinus rhythm   Vent. rate has increased BY  37 BPM      Cardiac Imaging Studies  ECHO on 9-:    Definity contrast utilized.    Normal left ventricular size. Sigmoid septal hypertrophy.    Left ventricle ejection fraction is normal. The calculated left ventricular ejection fraction is 66%.    Normal right ventricular size and systolic function.    Mild left atrial enlargement    No significant valve abnormality.    When compared to the previous study dated 7/31/2015, no significant change from previous.      Lab Review   Lab Results   Component Value Date     06/02/2021    CO2 22 06/02/2021    BUN 50 06/02/2021     Lab Results   Component Value Date    WBC 4.6 01/02/2021    HGB 9.6 01/02/2021    HCT 29.4 01/02/2021    MCV 91 01/02/2021     01/02/2021     Lab Results   Component Value Date    CHOL 115 09/05/2014    CHOL 115 11/25/2013    CHOL 100 04/12/2013     Lab Results   Component Value Date    HDL 36 (L) 05/30/2018    HDL 36 (L) 09/05/2014    HDL 35 (L) 11/25/2013     No components found for: LDLCALC  Lab Results   Component Value Date    TRIG 119 09/05/2014    TRIG 135 11/25/2013    TRIG 109 04/12/2013     No components found for: CHOLHDL  Lab Results   Component Value Date    TROPONINI 0.01 01/02/2021     Lab Results   Component Value Date     10/15/2019     Lab Results   Component Value Date    TSH 2.42 10/28/2019

## 2021-11-04 NOTE — PROGRESS NOTES
Assessment/Plan:   1.  Paroxysmal atrial fibrillation: He has maintained in sinus rhythm.  He has no palpitations.  Continue metoprolol succinate 50 mg daily.  His [id number] score is 7.  He has high risk of stroke.    Continue Eliquis 2.5 mg twice a day at renally adjusted dosage.  He has no side effects of Eliquis.     2.  Primary hypertension: The patient has been on multiple antihypertensive medications including metoprolol XL 50 mg daily, clonidine 0.15 mg a.m., 0.3 mg p.m., hydralazine 75 mg 4 times a day, amlodipine 10 mg daily.     3.  Chronic diastolic congestive heart failure.  He has no obvious fluid retention. Continue Lasix 40 mg daily.  His renal function is stable.     4.  Stage IV CKD, type 2 diabetes, history of stroke, other medical conditions: Please follow-up with Dr. Torre.    Thank you for the opportunity to be involved in the care of Suman Nagy. If you have any questions, please feel free to contact me.  I will see the patient again in 6 months and as needed.    Much or all of the text in this note was generated through the use of Dragon Dictate voice-to-text software. Errors in spelling or words which seem out of context are unintentional. Sound alike errors, in particular, may have escaped editing.       History of Present Illness:   It is my pleasure to see Suman Nagy at the [facility] clinic for routine cardiology follow up.  Suman Nagy is a 81 year old male with a medical history of paroxysmal atrial fibrillation, primary hypertension, dyslipidemia, type 2 diabetes, stage IV CKD, history of a stroke and colon cancer in remission.    The patient states that he has been doing fine.  He denies any chest pain, shortness of breath, palpitations.  He has occasional lightheadedness.  He has no orthopnea, PND.  He has very mild leg edema which has been stable.  His blood pressure and heart rate are controlled.  He has no side effects from  his current medications.  His renal function has been stable.    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     Rectal cancer (H)     History of bladder cancer     History of rectal cancer     Carotid artery stenosis, asymptomatic, left     History of stroke     Medication monitoring encounter     Type 2 diabetes mellitus with stage 4 chronic kidney disease, with long-term current use of insulin (H)     Anxiety     UTI (urinary tract infection)     Slow transit constipation     Generalized muscle weakness     Chronic kidney disease, stage IV (severe) (H)     ACP (advance care planning)     Atrial fibrillation (H)     Hypokalemia     Coronary artery disease involving native coronary artery of native heart without angina pectoris     Acute respiratory failure with hypoxia (H)     Bacteremia     Chronic diastolic CHF (congestive heart failure), NYHA class 2 (H)       Past Surgical History:     Past Surgical History:   Procedure Laterality Date     COLON SURGERY      Colectomy Low Anterior Resection     EYE SURGERY      Cataract Extraction     LAPAROSCOPIC ASSISTED COLECTOMY N/A 6/1/2017    Procedure: LAPAROSCOPIC ASSISTED COLECTOMY LOW ANTERIOR RESECTION AND DIVERTING LOOP ILEOSTOMY, PROCTOSOPY;  Surgeon: Tyson Parry MD;  Location: [facility];  Service:      OTHER SURGICAL HISTORY      [id number]     MA CLOSE ENTEROSTOMY N/A 8/15/2017    Procedure: ILEOSTOMY CLOSURE LOOP ;  Surgeon: Tyson Parry MD;  Location: [facility];  Service: General     MA CYSTOURETHROSCOPY,FULGUR <0.5 CM KANDY N/A 9/1/2015    Procedure: CYSTOSCOPY TRANSURETHRAL RESECTION BLADDER TUMOR;  Surgeon: Cleveland Nair MD;  Location: [facility];  Service: Urology     MA CYSTOURETHROSCOPY,FULGUR <0.5 CM KANDY N/A 12/22/2015    Procedure: CYSTOSCOPY TRANSURETHRAL RESECTION BLADDER TUMOR AND BLADDER  BIOPSIES;  Surgeon: Cleveland Nair MD;  Location: [facility];  Service: Urology     VASECTOMY         Family History:     Family History   Problem Relation Age of Onset     Chronic Obstructive Pulmonary Disease Father         Social History:    reports that he quit smoking about 26 years ago. He has never used smokeless tobacco. He reports that he does not drink alcohol and does not use drugs.    Review of Systems:   12 systems are reviewed negative except for in HPI.    Meds:     Current Outpatient Medications:      acetaminophen (TYLENOL) 325 MG tablet, [ACETAMINOPHEN (TYLENOL) 325 MG TABLET] Take 2 tablets (650 mg total) by mouth every 4 (four) hours as needed., Disp: , Rfl: 0     amLODIPine (NORVASC) 10 MG tablet, Take 1 tablet (10 mg) by mouth daily, Disp: 90 tablet, Rfl: 3     atorvastatin (LIPITOR) 20 MG tablet, TAKE 1 TABLET (20 MG TOTAL) BY MOUTH DAILY., Disp: 90 tablet, Rfl: 2     blood glucose (NO BRAND SPECIFIED) lancets standard, Use to test blood sugar 3 times daily or as directed., Disp: 300 lancet, Rfl: 3     calcitRIOL (ROCALTROL) 0.25 MCG capsule, Take 0.25 mcg by mouth three times a week Mon, Wed, Fri, Disp: , Rfl:      calcium, as carbonate, (TUMS) 200 mg calcium (500 mg) chewable tablet, [CALCIUM, AS CARBONATE, (TUMS) 200 MG CALCIUM (500 MG) CHEWABLE TABLET] Chew 1 tablet (200 mg total) 3 (three) times a day as needed for heartburn., Disp: , Rfl: 0     cholecalciferol, vitamin D3, (VITAMIN D3) 1,000 unit capsule, [CHOLECALCIFEROL, VITAMIN D3, (VITAMIN D3) 1,000 UNIT CAPSULE] Take 2 capsules (2,000 Units total) by mouth daily., Disp: 180 capsule, Rfl: 0     cloNIDine HCL (CATAPRES) 0.3 MG tablet, [CLONIDINE HCL (CATAPRES) 0.3 MG TABLET] TAKE 1/2 PILL IN THE MORNING AND 1 FULL PILL IN THE EVENING., Disp: 135 tablet, Rfl: 3     cyanocobalamin 1,000 mcg/mL injection, [CYANOCOBALAMIN 1,000 MCG/ML INJECTION] Inject 1,000 mcg into the shoulder, thigh, or buttocks every 3 (three) months.      "  , Disp: , Rfl:      cyanocobalamin, vitamin B-12, (VITAMIN B-12) 1,000 mcg Subl, [CYANOCOBALAMIN, VITAMIN B-12, (VITAMIN B-12) 1,000 MCG SUBL] Place 1 tablet (1,000 mcg total) under the tongue daily., Disp: 90 tablet, Rfl: 3     diclofenac sodium (VOLTAREN) 1 % Gel, [DICLOFENAC SODIUM (VOLTAREN) 1 % GEL] APPLY 4 GRAMS TOPICALLY 3 (THREE) TIMES A DAY AS NEEDED. APPLY TO KNEES, Disp: 100 g, Rfl: 0     ELIQUIS ANTICOAGULANT 2.5 MG tablet, TAKE 1 TABLET (2.5 MG TOTAL) BY MOUTH 2 (TWO) TIMES A DAY., Disp: 60 tablet, Rfl: 1     FIBER CHOICE, INULIN, ORAL, [FIBER CHOICE, INULIN, ORAL] Take 2 tablets by mouth daily., Disp: , Rfl:      furosemide (LASIX) 40 MG tablet, [FUROSEMIDE (LASIX) 40 MG TABLET] TAKE 1 TABLET (40 MG TOTAL) BY MOUTH DAILY., Disp: 90 tablet, Rfl: 3     hydrALAZINE (APRESOLINE) 25 MG tablet, [HYDRALAZINE (APRESOLINE) 25 MG TABLET] Take 75 mg by mouth 4 (four) times a day. , Disp: , Rfl:      insulin glargine (LANTUS SOLOSTAR) 100 UNIT/ML pen, Inject 16 Units Subcutaneous every morning, Disp: 3 mL, Rfl: 3     linagliptin (TRADJENTA) 5 MG TABS tablet, Take 1 tablet (5 mg) by mouth daily, Disp: 90 tablet, Rfl: 3     metoprolol succinate (TOPROL-XL) 50 MG 24 hr tablet, [METOPROLOL SUCCINATE (TOPROL-XL) 50 MG 24 HR TABLET] TAKE 1 TABLET (50 MG TOTAL) BY MOUTH DAILY., Disp: 90 tablet, Rfl: 3     ONETOUCH DELICA PLUS LANCET 33 gauge Misc, [ONETOUCH DELICA PLUS LANCET 33 GAUGE MISC] CHECK BLOOD SUGAR 3 TIMES PER DAY., Disp: 300 each, Rfl: 3     ONETOUCH ULTRA test strip, CHECK BLOOD SUGAR 3 TIMES PER DAY, Disp: 100 strip, Rfl: 1     ONETOUCH ULTRA2 METER Misc, [ONETOUCH ULTRA2 METER MISC] USE AS DIRECTED, Disp: 1 each, Rfl: 0     PEN NEEDLE 32 gauge x 5/32\" Ndle, [PEN NEEDLE 32 GAUGE X 5/32\" NDLE] USE ONE NEEDLE PER DAY TO INJECT INSULIN., Disp: 100 each, Rfl: 3     potassium chloride (K-DUR,KLOR-CON) 20 MEQ tablet, [POTASSIUM CHLORIDE (K-DUR,KLOR-CON) 20 MEQ TABLET] TAKE 1 TABLET (20 MEQ TOTAL) BY MOUTH " "DAILY., Disp: 30 tablet, Rfl: 10     sertraline (ZOLOFT) 50 MG tablet, [SERTRALINE (ZOLOFT) 50 MG TABLET] TAKE ONE & ONE -HALF (1&1/2) TABLETS BY MOUTH DAILY (Patient taking differently: Take 50 mg by mouth daily ), Disp: 135 tablet, Rfl: 1     polyethylene glycol (MIRALAX) 17 gram packet, [POLYETHYLENE GLYCOL (MIRALAX) 17 GRAM PACKET] Take 1 packet (17 g total) by mouth daily as needed. (Patient not taking: Reported on 11/4/2021), Disp: , Rfl: 0    Current Facility-Administered Medications:      cyanocobalamin injection 1,000 mcg, 1,000 mcg, Intramuscular, Q90 Days, Ailyn Santoro, PharmD, 1,000 mcg at 10/01/21 1103    Allergies:   Cefazolin, Pravastatin, Simvastatin, and Thiazides [thiazide-type diuretics]      Objective:      Physical Exam  70.3 kg (155 lb)  1.651 m (5' 5\")  Body mass index is 25.79 kg/m .  /48 (BP Location: Left arm, Patient Position: Sitting, Cuff Size: Adult Regular)   Pulse 60   Resp 16   Ht 1.651 m (5' 5\")   Wt 70.3 kg (155 lb)   BMI 25.79 kg/m      General Appearance:   Awake, Alert, No acute distress.   HEENT:  Pupil equal and reactive to light. No scleral icterus; the mucous membranes were moist.   Neck: No cervical bruits. No JVD. No thyromegaly.     Chest: The spine was straight. The chest was symmetric.   Lungs:   Respirations unlabored; Lungs are clear to auscultation. No crackles. No wheezing.   Cardiovascular:   Regular rhythm and rate, normal first and second heart sounds with no murmurs. No rubs or gallops.    Abdomen:  Soft. No tenderness. Non-distended. Bowels sounds are present   Extremities: Equal tibial pulses. No leg edema.   Skin: No rashes or ulcers. Warm, Dry.   Musculoskeletal: No tenderness. No deformity.   Neurologic: Mood and affect are appropriate. No focal deficits.         EKG:  Personally reivewed  Atrial fibrillation   ST & T wave abnormality, consider inferior ischemia   Abnormal ECG   When compared with ECG of 13-JAN-2020 12:54,   Atrial " fibrillation has replaced Sinus rhythm   Vent. rate has increased BY  37 BPM      Cardiac Imaging Studies  ECHO on 9-:    Definity contrast utilized.    Normal left ventricular size. Sigmoid septal hypertrophy.    Left ventricle ejection fraction is normal. The calculated left ventricular ejection fraction is 66%.    Normal right ventricular size and systolic function.    Mild left atrial enlargement    No significant valve abnormality.    When compared to the previous study dated 7/31/2015, no significant change from previous.      Lab Review   Lab Results   Component Value Date     06/02/2021    CO2 22 06/02/2021    BUN 50 06/02/2021     Lab Results   Component Value Date    WBC 4.6 01/02/2021    HGB 9.6 01/02/2021    HCT 29.4 01/02/2021    MCV 91 01/02/2021     01/02/2021     Lab Results   Component Value Date    CHOL 115 09/05/2014    CHOL 115 11/25/2013    CHOL 100 04/12/2013     Lab Results   Component Value Date    HDL 36 (L) 05/30/2018    HDL 36 (L) 09/05/2014    HDL 35 (L) 11/25/2013     No components found for: LDLCALC  Lab Results   Component Value Date    TRIG 119 09/05/2014    TRIG 135 11/25/2013    TRIG 109 04/12/2013     No components found for: CHOLHDL  Lab Results   Component Value Date    TROPONINI 0.01 01/02/2021     Lab Results   Component Value Date     10/15/2019     Lab Results   Component Value Date    TSH 2.42 10/28/2019

## 2021-11-09 ENCOUNTER — TRANSFERRED RECORDS (OUTPATIENT)
Dept: HEALTH INFORMATION MANAGEMENT | Facility: CLINIC | Age: 81
End: 2021-11-09
Payer: COMMERCIAL

## 2021-11-22 DIAGNOSIS — E11.9 TYPE 2 DIABETES MELLITUS WITHOUT COMPLICATION, WITHOUT LONG-TERM CURRENT USE OF INSULIN (H): ICD-10-CM

## 2021-11-22 RX ORDER — INSULIN GLARGINE 100 [IU]/ML
16 INJECTION, SOLUTION SUBCUTANEOUS EVERY MORNING
Qty: 3 ML | Refills: 11 | Status: SHIPPED | OUTPATIENT
Start: 2021-11-22 | End: 2022-01-01

## 2021-12-05 ENCOUNTER — HEALTH MAINTENANCE LETTER (OUTPATIENT)
Age: 81
End: 2021-12-05

## 2022-01-01 ENCOUNTER — HEALTH MAINTENANCE LETTER (OUTPATIENT)
Age: 82
End: 2022-01-01

## 2022-01-01 ENCOUNTER — OFFICE VISIT (OUTPATIENT)
Dept: INTERNAL MEDICINE | Facility: CLINIC | Age: 82
End: 2022-01-01
Payer: COMMERCIAL

## 2022-01-01 ENCOUNTER — TRANSFERRED RECORDS (OUTPATIENT)
Dept: HEALTH INFORMATION MANAGEMENT | Facility: CLINIC | Age: 82
End: 2022-01-01

## 2022-01-01 ENCOUNTER — OFFICE VISIT (OUTPATIENT)
Dept: CARDIOLOGY | Facility: CLINIC | Age: 82
End: 2022-01-01
Attending: INTERNAL MEDICINE
Payer: COMMERCIAL

## 2022-01-01 VITALS
DIASTOLIC BLOOD PRESSURE: 56 MMHG | WEIGHT: 148 LBS | HEART RATE: 54 BPM | BODY MASS INDEX: 24.63 KG/M2 | RESPIRATION RATE: 16 BRPM | SYSTOLIC BLOOD PRESSURE: 100 MMHG

## 2022-01-01 VITALS
BODY MASS INDEX: 24.32 KG/M2 | DIASTOLIC BLOOD PRESSURE: 50 MMHG | SYSTOLIC BLOOD PRESSURE: 106 MMHG | OXYGEN SATURATION: 99 % | HEIGHT: 65 IN | WEIGHT: 146 LBS | HEART RATE: 57 BPM

## 2022-01-01 DIAGNOSIS — Z79.4 TYPE 2 DIABETES MELLITUS WITHOUT COMPLICATION, WITH LONG-TERM CURRENT USE OF INSULIN (H): ICD-10-CM

## 2022-01-01 DIAGNOSIS — I65.22 CAROTID ARTERY STENOSIS, ASYMPTOMATIC, LEFT: ICD-10-CM

## 2022-01-01 DIAGNOSIS — N18.4 CHRONIC KIDNEY DISEASE, STAGE IV (SEVERE) (H): ICD-10-CM

## 2022-01-01 DIAGNOSIS — I10 ESSENTIAL HYPERTENSION: ICD-10-CM

## 2022-01-01 DIAGNOSIS — Z86.73 HISTORY OF STROKE: ICD-10-CM

## 2022-01-01 DIAGNOSIS — E87.6 HYPOKALEMIA: ICD-10-CM

## 2022-01-01 DIAGNOSIS — E11.9 TYPE 2 DIABETES MELLITUS WITHOUT COMPLICATION, WITH LONG-TERM CURRENT USE OF INSULIN (H): ICD-10-CM

## 2022-01-01 DIAGNOSIS — N18.4 CHRONIC RENAL INSUFFICIENCY, STAGE 4 (SEVERE) (H): ICD-10-CM

## 2022-01-01 DIAGNOSIS — Z79.4 TYPE 2 DIABETES MELLITUS WITH OTHER SPECIFIED COMPLICATION, WITH LONG-TERM CURRENT USE OF INSULIN (H): Primary | ICD-10-CM

## 2022-01-01 DIAGNOSIS — Z23 HIGH PRIORITY FOR 2019-NCOV VACCINE: ICD-10-CM

## 2022-01-01 DIAGNOSIS — I50.32 CHRONIC DIASTOLIC CONGESTIVE HEART FAILURE (H): Primary | ICD-10-CM

## 2022-01-01 DIAGNOSIS — N18.4 TYPE 2 DIABETES MELLITUS WITH STAGE 4 CHRONIC KIDNEY DISEASE, WITH LONG-TERM CURRENT USE OF INSULIN (H): ICD-10-CM

## 2022-01-01 DIAGNOSIS — E11.9 TYPE 2 DIABETES MELLITUS (H): ICD-10-CM

## 2022-01-01 DIAGNOSIS — E11.69 TYPE 2 DIABETES MELLITUS WITH OTHER SPECIFIED COMPLICATION, WITH LONG-TERM CURRENT USE OF INSULIN (H): Primary | ICD-10-CM

## 2022-01-01 DIAGNOSIS — I48.0 PAROXYSMAL ATRIAL FIBRILLATION (H): ICD-10-CM

## 2022-01-01 DIAGNOSIS — I10 PRIMARY HYPERTENSION: ICD-10-CM

## 2022-01-01 DIAGNOSIS — E53.8 VITAMIN B12 DEFICIENCY (NON ANEMIC): ICD-10-CM

## 2022-01-01 DIAGNOSIS — E11.22 TYPE 2 DIABETES MELLITUS WITH STAGE 4 CHRONIC KIDNEY DISEASE, WITH LONG-TERM CURRENT USE OF INSULIN (H): ICD-10-CM

## 2022-01-01 DIAGNOSIS — Z79.4 TYPE 2 DIABETES MELLITUS WITH STAGE 4 CHRONIC KIDNEY DISEASE, WITH LONG-TERM CURRENT USE OF INSULIN (H): ICD-10-CM

## 2022-01-01 DIAGNOSIS — F34.1 DYSTHYMIA: ICD-10-CM

## 2022-01-01 LAB
CREATININE (EXTERNAL): 2.88 MG/DL (ref 0.7–1.11)
GFR ESTIMATED (EXTERNAL): 19 ML/MIN/1.73M2
GLUCOSE (EXTERNAL): 99 MG/DL (ref 65–99)
POTASSIUM (EXTERNAL): 4.3 MMOL/L (ref 3.5–5.3)

## 2022-01-01 PROCEDURE — 99214 OFFICE O/P EST MOD 30 MIN: CPT | Performed by: INTERNAL MEDICINE

## 2022-01-01 PROCEDURE — 96372 THER/PROPH/DIAG INJ SC/IM: CPT | Performed by: PHARMACIST

## 2022-01-01 PROCEDURE — 0054A COVID-19,PF,PFIZER (12+ YRS): CPT | Performed by: INTERNAL MEDICINE

## 2022-01-01 PROCEDURE — 91305 COVID-19,PF,PFIZER (12+ YRS): CPT | Performed by: INTERNAL MEDICINE

## 2022-01-01 PROCEDURE — 99214 OFFICE O/P EST MOD 30 MIN: CPT | Mod: 25 | Performed by: INTERNAL MEDICINE

## 2022-01-01 RX ORDER — LANCETS 33 GAUGE
EACH MISCELLANEOUS
Qty: 300 EACH | Refills: 11 | Status: SHIPPED | OUTPATIENT
Start: 2022-01-01

## 2022-01-01 RX ORDER — ATORVASTATIN CALCIUM 20 MG/1
TABLET, FILM COATED ORAL
Qty: 90 TABLET | Refills: 1 | Status: SHIPPED | OUTPATIENT
Start: 2022-01-01

## 2022-01-01 RX ORDER — BLOOD SUGAR DIAGNOSTIC
STRIP MISCELLANEOUS
Qty: 300 STRIP | Refills: 1 | Status: SHIPPED | OUTPATIENT
Start: 2022-01-01

## 2022-01-01 RX ORDER — APIXABAN 2.5 MG/1
TABLET, FILM COATED ORAL
Qty: 180 TABLET | Refills: 0 | Status: SHIPPED | OUTPATIENT
Start: 2022-01-01 | End: 2022-01-01

## 2022-01-01 RX ORDER — POTASSIUM CHLORIDE 1500 MG/1
TABLET, EXTENDED RELEASE ORAL
Qty: 30 TABLET | Refills: 6 | Status: SHIPPED | OUTPATIENT
Start: 2022-01-01

## 2022-01-01 RX ORDER — POTASSIUM CHLORIDE 1500 MG/1
TABLET, EXTENDED RELEASE ORAL
Qty: 30 TABLET | Refills: 9 | OUTPATIENT
Start: 2022-01-01

## 2022-01-01 RX ORDER — LINAGLIPTIN 5 MG/1
5 TABLET, FILM COATED ORAL DAILY
Qty: 90 TABLET | Refills: 2 | OUTPATIENT
Start: 2022-01-01

## 2022-01-01 RX ORDER — HYDRALAZINE HYDROCHLORIDE 25 MG/1
75 TABLET, FILM COATED ORAL 4 TIMES DAILY
Qty: 810 TABLET | Refills: 3 | Status: SHIPPED | OUTPATIENT
Start: 2022-01-01

## 2022-01-01 RX ORDER — CLONIDINE HYDROCHLORIDE 0.3 MG/1
TABLET ORAL
Qty: 135 TABLET | Refills: 2 | Status: SHIPPED | OUTPATIENT
Start: 2022-01-01 | End: 2023-01-01

## 2022-01-01 RX ORDER — POTASSIUM CHLORIDE 1500 MG/1
TABLET, EXTENDED RELEASE ORAL
Qty: 30 TABLET | Refills: 5 | Status: SHIPPED | OUTPATIENT
Start: 2022-01-01 | End: 2022-01-01

## 2022-01-01 RX ORDER — FUROSEMIDE 40 MG
TABLET ORAL
Qty: 90 TABLET | Refills: 2 | Status: SHIPPED | OUTPATIENT
Start: 2022-01-01 | End: 2023-01-01

## 2022-01-01 RX ORDER — LINAGLIPTIN 5 MG/1
5 TABLET, FILM COATED ORAL DAILY
Qty: 90 TABLET | Refills: 3 | Status: SHIPPED | OUTPATIENT
Start: 2022-01-01

## 2022-01-01 RX ADMIN — CYANOCOBALAMIN 1000 MCG: 1000 INJECTION, SOLUTION INTRAMUSCULAR; SUBCUTANEOUS at 12:02

## 2022-01-01 ASSESSMENT — PATIENT HEALTH QUESTIONNAIRE - PHQ9: SUM OF ALL RESPONSES TO PHQ QUESTIONS 1-9: 0

## 2022-01-11 DIAGNOSIS — E11.9 TYPE 2 DIABETES MELLITUS (H): ICD-10-CM

## 2022-01-11 RX ORDER — PEN NEEDLE, DIABETIC 30 GX3/16"
1 NEEDLE, DISPOSABLE MISCELLANEOUS DAILY
Qty: 100 EACH | Refills: 3 | Status: SHIPPED | OUTPATIENT
Start: 2022-01-11 | End: 2023-01-01

## 2022-02-01 ENCOUNTER — OFFICE VISIT (OUTPATIENT)
Dept: INTERNAL MEDICINE | Facility: CLINIC | Age: 82
End: 2022-02-01
Payer: COMMERCIAL

## 2022-02-01 VITALS
DIASTOLIC BLOOD PRESSURE: 52 MMHG | WEIGHT: 153 LBS | SYSTOLIC BLOOD PRESSURE: 128 MMHG | HEART RATE: 53 BPM | BODY MASS INDEX: 25.46 KG/M2 | OXYGEN SATURATION: 99 %

## 2022-02-01 DIAGNOSIS — I48.0 PAROXYSMAL ATRIAL FIBRILLATION (H): ICD-10-CM

## 2022-02-01 DIAGNOSIS — N18.4 TYPE 2 DIABETES MELLITUS WITH STAGE 4 CHRONIC KIDNEY DISEASE, WITH LONG-TERM CURRENT USE OF INSULIN (H): Primary | ICD-10-CM

## 2022-02-01 DIAGNOSIS — E11.22 TYPE 2 DIABETES MELLITUS WITH STAGE 4 CHRONIC KIDNEY DISEASE, WITH LONG-TERM CURRENT USE OF INSULIN (H): Primary | ICD-10-CM

## 2022-02-01 DIAGNOSIS — I10 ESSENTIAL HYPERTENSION: ICD-10-CM

## 2022-02-01 DIAGNOSIS — E53.8 B12 DEFICIENCY: ICD-10-CM

## 2022-02-01 DIAGNOSIS — Z85.048 HISTORY OF RECTAL CANCER: ICD-10-CM

## 2022-02-01 DIAGNOSIS — Z86.73 HISTORY OF STROKE: ICD-10-CM

## 2022-02-01 DIAGNOSIS — Z85.51 HISTORY OF BLADDER CANCER: ICD-10-CM

## 2022-02-01 DIAGNOSIS — I25.10 CORONARY ARTERY DISEASE INVOLVING NATIVE CORONARY ARTERY OF NATIVE HEART WITHOUT ANGINA PECTORIS: ICD-10-CM

## 2022-02-01 DIAGNOSIS — Z79.4 TYPE 2 DIABETES MELLITUS WITH STAGE 4 CHRONIC KIDNEY DISEASE, WITH LONG-TERM CURRENT USE OF INSULIN (H): Primary | ICD-10-CM

## 2022-02-01 DIAGNOSIS — Z51.81 ENCOUNTER FOR THERAPEUTIC DRUG MONITORING: ICD-10-CM

## 2022-02-01 PROCEDURE — 99214 OFFICE O/P EST MOD 30 MIN: CPT | Mod: 25 | Performed by: INTERNAL MEDICINE

## 2022-02-01 PROCEDURE — 96372 THER/PROPH/DIAG INJ SC/IM: CPT | Performed by: PHARMACIST

## 2022-02-01 RX ADMIN — CYANOCOBALAMIN 1000 MCG: 1000 INJECTION, SOLUTION INTRAMUSCULAR; SUBCUTANEOUS at 11:12

## 2022-02-01 NOTE — PROGRESS NOTES
[facility] clinic Follow Up Note    Suman Nagy   81 year old male    Date of Visit: 2/1/2022    Chief Complaint   Patient presents with     Follow Up     4 month     Subjective  Luke is here with his wife, Jeni for routine blood pressure and general checkup.    He has near end-stage renal failure with GFR of 23 with a creatinine of 2.5 stable in July of last year.  He has not had any lower extremity edema or worsening shortness of breath, potassium level has been okay.    Anemia associate with renal failure with hemoglobin 9.6 most recently.  Gets erythropoietin and IV iron through the nephrology clinic.    He is going to nephrology clinic today for follow-up.  I did review the nephrology note from October of last year, no change in treatment plan.  Patient is still unsure if he wants to go ahead with hemodialysis if and when it comes to that.    No falls.  History of multiple strokes.  I did review the MRI MRA from January 2021.  There is no significant stenosis of the carotid artery.  Status post right CEA after a stroke in 2015 with left hemiparesis.    MRI showed multiple occlusions of the michael, thalamus and basal cannula with moderate ischemic changes.    Coronary disease history without recurrent chest pain.    Denies symptomatic palpitations.  On metoprolol and Eliquis.  He is not wanted a watchman device.  I did review the cardiology note from November of last year, no change in treatment and follow-up as needed.    He is on same blood pressure medications and denies orthostasis.  Blood pressures been controlled in the one twenties-one forties over fifties.    Blood sugars have been 102-135.  No low blood sugar reactions.  Hemoglobin A1c was 6.6% last June.  On Lantus insulin 16 units in the morning and Tradjenta 5 mg a day.    History of B12 deficiency on oral B12, given B12 shot last October.    Chronic dysthymia on Zoloft, stable and controlled.    History of bladder cancer no new  urinary symptoms.  Unclear if patient is going to follow-up with urology.  TURBT in 2013.    Stage IV rectal cancer in 2017 with low anterior resection but negative CT scan of the pelvis in 2019.  2018 colonoscopy showed 2 polyps.  Exam negative for recurrence in July of last year with plan to follow-up with oncology soon.  No new abdominal pain complaints.      PMHx:    Past Medical History:   Diagnosis Date     Anemia      Bladder cancer (H)      Cancer (H)     Rectosigmoid     Diabetes mellitus (H)      Hyperkalemia      Hypertension      Seizure (H)     possible, one time occurence     Stroke (H)     multiple, residual left sided weakness, last CVA in July 2015 caused some speech deficit     Superficial phlebitis      Venous insufficiency of both lower extremities      PSHx:    Past Surgical History:   Procedure Laterality Date     COLON SURGERY      Colectomy Low Anterior Resection     EYE SURGERY      Cataract Extraction     LAPAROSCOPIC ASSISTED COLECTOMY N/A 6/1/2017    Procedure: LAPAROSCOPIC ASSISTED COLECTOMY LOW ANTERIOR RESECTION AND DIVERTING LOOP ILEOSTOMY, PROCTOSOPY;  Surgeon: Tyson Parry MD;  Location: [facility];  Service:      OTHER SURGICAL HISTORY      [id number]     PA CLOSE ENTEROSTOMY N/A 8/15/2017    Procedure: ILEOSTOMY CLOSURE LOOP ;  Surgeon: Tyson Parry MD;  Location: [facility];  Service: General     PA CYSTOURETHROSCOPY,FULGUR <0.5 CM LESN N/A 9/1/2015    Procedure: CYSTOSCOPY TRANSURETHRAL RESECTION BLADDER TUMOR;  Surgeon: Cleveland Nair MD;  Location: [facility];  Service: Urology     PA CYSTOURETHROSCOPY,FULGUR <0.5 CM LESN N/A 12/22/2015    Procedure: CYSTOSCOPY TRANSURETHRAL RESECTION BLADDER TUMOR AND BLADDER BIOPSIES;  Surgeon: Cleveland Nair MD;  Location: [facility];  Service: Urology     VASECTOMY       Immunizations:   Immunization History   Administered Date(s) Administered     COVID-19,PF,Pfizer (12+ Yrs) 03/02/2021,  03/23/2021, 10/04/2021     DT (PEDS <7y) 07/01/2004     FLU 6-35 months 09/27/2010, 09/14/2012, 09/27/2013     Flu, Unspecified 10/19/2007, 09/16/2011, 10/15/2015     Influenza (H1N1) 01/18/2010     Influenza (High Dose) 3 valent vaccine 09/15/2015, 09/26/2016, 10/03/2017, 09/27/2018, 10/19/2019     Influenza (IIV3) PF 10/08/2004, 11/07/2005, 10/16/2006, 10/19/2007, 09/18/2009, 09/16/2011, 10/17/2014, 10/19/2019, 09/21/2020, 10/12/2020     Influenza Vaccine, 6+MO IM (QUADRIVALENT W/PRESERVATIVES) 09/18/2009, 10/17/2014     Influenza, Quad, High Dose, Pf, 65yr+ (Fluzone HD) 09/21/2020, 10/01/2021     Pneumo Conj 13-V (2010&after) 01/20/2015     Pneumococcal 23 valent 10/08/2004     Td (Adult), Adsorbed 07/01/2004     Td,adult,historic,unspecified 07/01/2004     Tdap (Adacel,Boostrix) 05/20/2015       ROS A comprehensive review of systems was performed and was otherwise negative    Medications, allergies, and problem list were reviewed and updated    Exam  /52 (BP Location: Left arm, Patient Position: Sitting, Cuff Size: Adult Regular)   Pulse 53   Wt 69.4 kg (153 lb)   SpO2 99%   BMI 25.46 kg/m    Baseline mental status.  Somewhat withdrawn, severe kyphosis.  But answers questions appropriately.  No jaundice.  Lungs with decreased respiratory excursion with severe kyphosis but otherwise clear.  Heart is regular without murmur.  Abdomen is soft nontender.  There is just trace ankle edema bilaterally    Assessment/Plan  1. Type 2 diabetes mellitus with stage 4 chronic kidney disease, with long-term current use of insulin (H)  Well-controlled.  Denies low blood sugars.  We did opt to not check hemoglobin A1c at this time, as he is going to have blood work at the nephrology clinic.  As long as blood sugars are in reasonable control, does not need regular hemoglobin A1c checks.  Monitor for low blood sugars.    Continue current insulin and Tradjenta    2. Paroxysmal atrial fibrillation (H)  Heart regular.  On  Eliquis.  Does not want watchman.  Continue low-dose metoprolol.    3. Essential hypertension  Has been labile in the past but currently controlled.  Tolerating borderline low diastolic.  Continue current hydralazine, metoprolol, clonidine, amlodipine and Lasix    Near end-stage renal failure but not needing dialysis at this time.  Follow-up with nephrology clinic today, lab work there    Anemia associate with chronic renal insufficiency, hemoglobin, erythropoietin and IV iron management through nephrology clinic    4. Coronary artery disease involving native coronary artery of native heart without angina pectoris  Asymptomatic.  Lipitor 20 mg.    5. History of stroke  As above.  Status post right CEA    6. History of rectal cancer  Follow-up with oncology, no evidence of recurrence at this time    7. History of bladder cancer  Unclear if he wants to follow-up with urology    8. B12 deficiency  B12 shot given today    9. Encounter for therapeutic drug monitoring  No lab work today    Dysthymia controlled on Zoloft    Overdue for eye exam, patient was reminded to make eye exam this winter or spring      Return in about 5 months (around 7/1/2022) for Follow up.   Patient Instructions   No change in medication treatment plan.    Follow-up with your kidney clinic for blood work monitoring.    See ophthalmology this winter or spring for routine eye exam.    Follow-up with me in clinic for routine checkup in 5 months.    Telly Torre MD, MD        Current Outpatient Medications   Medication Sig Dispense Refill     acetaminophen (TYLENOL) 325 MG tablet [ACETAMINOPHEN (TYLENOL) 325 MG TABLET] Take 2 tablets (650 mg total) by mouth every 4 (four) hours as needed.  0     amLODIPine (NORVASC) 10 MG tablet Take 1 tablet (10 mg) by mouth daily 90 tablet 3     atorvastatin (LIPITOR) 20 MG tablet TAKE 1 TABLET (20 MG TOTAL) BY MOUTH DAILY. 90 tablet 2     blood glucose (NO BRAND SPECIFIED) lancets standard Use to test blood  sugar 3 times daily or as directed. 300 lancet 3     calcitRIOL (ROCALTROL) 0.25 MCG capsule Take 0.25 mcg by mouth daily Mon, Wed, Fri       calcium, as carbonate, (TUMS) 200 mg calcium (500 mg) chewable tablet [CALCIUM, AS CARBONATE, (TUMS) 200 MG CALCIUM (500 MG) CHEWABLE TABLET] Chew 1 tablet (200 mg total) 3 (three) times a day as needed for heartburn.  0     cholecalciferol, vitamin D3, (VITAMIN D3) 1,000 unit capsule [CHOLECALCIFEROL, VITAMIN D3, (VITAMIN D3) 1,000 UNIT CAPSULE] Take 2 capsules (2,000 Units total) by mouth daily. 180 capsule 0     cloNIDine HCL (CATAPRES) 0.3 MG tablet [CLONIDINE HCL (CATAPRES) 0.3 MG TABLET] TAKE 1/2 PILL IN THE MORNING AND 1 FULL PILL IN THE EVENING. 135 tablet 3     cyanocobalamin, vitamin B-12, (VITAMIN B-12) 1,000 mcg Subl [CYANOCOBALAMIN, VITAMIN B-12, (VITAMIN B-12) 1,000 MCG SUBL] Place 1 tablet (1,000 mcg total) under the tongue daily. 90 tablet 3     ELIQUIS ANTICOAGULANT 2.5 MG tablet TAKE 1 TABLET (2.5 MG TOTAL) BY MOUTH 2 (TWO) TIMES A DAY. 180 tablet 1     FIBER CHOICE, INULIN, ORAL [FIBER CHOICE, INULIN, ORAL] Take 2 tablets by mouth daily.       furosemide (LASIX) 40 MG tablet [FUROSEMIDE (LASIX) 40 MG TABLET] TAKE 1 TABLET (40 MG TOTAL) BY MOUTH DAILY. 90 tablet 3     hydrALAZINE (APRESOLINE) 25 MG tablet [HYDRALAZINE (APRESOLINE) 25 MG TABLET] Take 75 mg by mouth 4 (four) times a day.        insulin glargine (LANTUS SOLOSTAR) 100 UNIT/ML pen Inject 16 Units Subcutaneous every morning 3 mL 11     Insulin Pen Needle (PEN NEEDLES) 32G X 4 MM MISC 1 each by Other route daily 100 each 3     linagliptin (TRADJENTA) 5 MG TABS tablet Take 1 tablet (5 mg) by mouth daily 90 tablet 3     metoprolol succinate (TOPROL-XL) 50 MG 24 hr tablet [METOPROLOL SUCCINATE (TOPROL-XL) 50 MG 24 HR TABLET] TAKE 1 TABLET (50 MG TOTAL) BY MOUTH DAILY. 90 tablet 3     ONETOUCH DELICA PLUS LANCET 33 gauge Misc [ONETOUCH DELICA PLUS LANCET 33 GAUGE MISC] CHECK BLOOD SUGAR 3 TIMES PER DAY.  "300 each 3     ONETOUCH ULTRA test strip CHECK BLOOD SUGAR 3 TIMES PER  strip 1     ONETOUCH ULTRA2 METER Misc [ONETOUCH ULTRA2 METER MISC] USE AS DIRECTED 1 each 0     potassium chloride (K-DUR,KLOR-CON) 20 MEQ tablet [POTASSIUM CHLORIDE (K-DUR,KLOR-CON) 20 MEQ TABLET] TAKE 1 TABLET (20 MEQ TOTAL) BY MOUTH DAILY. 30 tablet 10     sertraline (ZOLOFT) 50 MG tablet TAKE ONE & ONE -HALF (1&1/2) TABLETS BY MOUTH DAILY 135 tablet 3     cyanocobalamin 1,000 mcg/mL injection [CYANOCOBALAMIN 1,000 MCG/ML INJECTION] Inject 1,000 mcg into the shoulder, thigh, or buttocks every 3 (three) months.              polyethylene glycol (MIRALAX) 17 gram packet [POLYETHYLENE GLYCOL (MIRALAX) 17 GRAM PACKET] Take 1 packet (17 g total) by mouth daily as needed. (Patient not taking: Reported on 2021)  0     Allergies   Allergen Reactions     Cefazolin Other (See Comments)     \"felt very hot\" Oct 2019 Cephalexin, Sep 2020 Ceftriaxone     Pravastatin Muscle Pain (Myalgia)     Increased peripheral neuropathy     Simvastatin Muscle Pain (Myalgia)     Increased peripheral neuropathy     Thiazides [Thiazide-Type Diuretics] Other (See Comments)     Other: Gout       Social History     Tobacco Use     Smoking status: Former Smoker     Quit date: 1995     Years since quittin.4     Smokeless tobacco: Never Used   Substance Use Topics     Alcohol use: No     Drug use: No           "

## 2022-02-01 NOTE — PATIENT INSTRUCTIONS
No change in medication treatment plan.    Follow-up with your kidney clinic for blood work monitoring.    See ophthalmology this winter or spring for routine eye exam.    Follow-up with me in clinic for routine checkup in 5 months.

## 2022-02-15 ENCOUNTER — TRANSFERRED RECORDS (OUTPATIENT)
Dept: HEALTH INFORMATION MANAGEMENT | Facility: CLINIC | Age: 82
End: 2022-02-15
Payer: COMMERCIAL

## 2022-02-15 LAB
ALT SERPL-CCNC: 17 U/L (ref 7–40)
AST SERPL-CCNC: 14 U/L (ref 13–40)
CREATININE (EXTERNAL): 2.93 MG/DL (ref 0.5–1.2)
GFR SERPL CREATININE-BSD FRML MDRD: 20.6 ML/MIN/1.73M^2
GLUCOSE (EXTERNAL): 145 MG/DL (ref 73–126)
POTASSIUM (EXTERNAL): 4 MMOL/L (ref 3.5–5.1)

## 2022-03-03 ENCOUNTER — TRANSFERRED RECORDS (OUTPATIENT)
Dept: HEALTH INFORMATION MANAGEMENT | Facility: CLINIC | Age: 82
End: 2022-03-03
Payer: COMMERCIAL

## 2022-03-25 DIAGNOSIS — I65.22 CAROTID ARTERY STENOSIS, ASYMPTOMATIC, LEFT: ICD-10-CM

## 2022-03-25 RX ORDER — ATORVASTATIN CALCIUM 20 MG/1
TABLET, FILM COATED ORAL
Qty: 90 TABLET | Refills: 2 | Status: SHIPPED | OUTPATIENT
Start: 2022-03-25 | End: 2022-01-01

## 2022-03-25 NOTE — TELEPHONE ENCOUNTER
Refill Request  Medication name: Pending Prescriptions:                       Disp   Refills    atorvastatin (LIPITOR) 20 MG tablet       90 tab*2          Who prescribed the medication: Nicho  Last refill on medication: 12/21/21  Requested Pharmacy: [facility]  Last appointment with PCP: 02/01/22  Next appointment: Appointment scheduled for 07/05/22

## 2022-04-05 DIAGNOSIS — I10 ESSENTIAL HYPERTENSION: ICD-10-CM

## 2022-04-06 RX ORDER — METOPROLOL SUCCINATE 50 MG/1
50 TABLET, EXTENDED RELEASE ORAL DAILY
Qty: 90 TABLET | Refills: 3 | Status: SHIPPED | OUTPATIENT
Start: 2022-04-06

## 2022-06-27 NOTE — LETTER
6/27/2022    Telly Torre MD  [address] [place] [address]    RE: Suman Nagy       Dear Colleague,     I had the pleasure of seeing Suman Nagy in the [facility].            Assessment/Plan:   1.  Paroxysmal atrial fibrillation: He has maintained in sinus rhythm.  He has no palpitations.  Continue metoprolol succinate 50 mg daily.  His [id number] score is 7.  He has high risk of stroke.    Continue Eliquis 2.5 mg twice a day at renally adjusted dosage.  He has no side effects of Eliquis.     2.  Primary hypertension: The patient has been on multiple antihypertensive medications including metoprolol XL 50 mg daily, clonidine 0.15 mg a.m., 0.3 mg p.m., hydralazine 75 mg 4 times a day, amlodipine 10 mg daily.  His blood pressure in clinic today is 100/60 mmHg.  He states that his blood pressure was 135/60 mmHg at home.  Continue to monitor his blood pressure, consider to clonidine or hydralazine if his blood pressure becomes low.     3.  Chronic diastolic congestive heart failure.  He has no obvious fluid retention. Continue Lasix 40 mg daily.  His renal function is stable.  He has nephrology clinic visit tomorrow.  He said he will have labs tomorrow.     4.  Stage IV CKD, type 2 diabetes, history of stroke, other medical conditions: Please follow-up with Dr. Torre.    Thank you for the opportunity to be involved in the care of Suman Nagy. If you have any questions, please feel free to contact me.  I will see the patient again in 6 months and as needed.    Much or all of the text in this note was generated through the use of Dragon Dictate voice-to-text software. Errors in spelling or words which seem out of context are unintentional. Sound alike errors, in particular, may have escaped editing.       History of Present Illness:   It is my pleasure to see Suman Nagy at the [facility] clinic for routine cardiology follow up.  Suman ALEX  Yakov is a 81 year old male with a medical history of chronic diastolic congestive heart failure, paroxysmal atrial fibrillation, primary hypertension, dyslipidemia, type 2 diabetes, stage IV CKD, history of a stroke and colon cancer in remission.    The patient states that he has been doing fine over last 6 months.  He said that he is not able to do much exercise due to previous stroke.  He denies any chest pain, shortness of breath, palpitations.  He has no dizziness, orthopnea, PND.  He has very mild leg edema which has been stable.  He has trace leg edema.  His blood pressure and heart rate are controlled.  He has no side effects from his current medications.       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     Rectal cancer (H)     History of bladder cancer     History of rectal cancer     Carotid artery stenosis, asymptomatic, left     History of stroke     Medication monitoring encounter     Type 2 diabetes mellitus with stage 4 chronic kidney disease, with long-term current use of insulin (H)     Anxiety     UTI (urinary tract infection)     Slow transit constipation     Generalized muscle weakness     Chronic kidney disease, stage IV (severe) (H)     ACP (advance care planning)     Atrial fibrillation (H)     Hypokalemia     Coronary artery disease involving native coronary artery of native heart without angina pectoris     Acute respiratory failure with hypoxia (H)     Bacteremia     Chronic diastolic CHF (congestive heart failure), NYHA class 2 (H)       Past Surgical History:     Past Surgical History:   Procedure Laterality Date     COLON SURGERY      Colectomy Low Anterior Resection     EYE SURGERY      Cataract Extraction     LAPAROSCOPIC ASSISTED COLECTOMY N/A 6/1/2017    Procedure: LAPAROSCOPIC ASSISTED COLECTOMY LOW ANTERIOR RESECTION AND DIVERTING LOOP ILEOSTOMY,  PROCTOSOPY;  Surgeon: Tyson Parry MD;  Location: [facility];  Service:      OTHER SURGICAL HISTORY      [id number]     OH CLOSE ENTEROSTOMY N/A 8/15/2017    Procedure: ILEOSTOMY CLOSURE LOOP ;  Surgeon: Tyson Parry MD;  Location: [facility];  Service: General     OH CYSTOURETHROSCOPY,FULGUR <0.5 CM LESN N/A 9/1/2015    Procedure: CYSTOSCOPY TRANSURETHRAL RESECTION BLADDER TUMOR;  Surgeon: Cleveland Nair MD;  Location: [facility];  Service: Urology     OH CYSTOURETHROSCOPY,FULGUR <0.5 CM LESN N/A 12/22/2015    Procedure: CYSTOSCOPY TRANSURETHRAL RESECTION BLADDER TUMOR AND BLADDER BIOPSIES;  Surgeon: Cleveland Nair MD;  Location: [facility];  Service: Urology     VASECTOMY         Family History:     Family History   Problem Relation Age of Onset     Chronic Obstructive Pulmonary Disease Father         Social History:    reports that he quit smoking about 26 years ago. He has never used smokeless tobacco. He reports that he does not drink alcohol and does not use drugs.    Review of Systems:   12 systems are reviewed negative except for in HPI.    Meds:     Current Outpatient Medications:      acetaminophen (TYLENOL) 325 MG tablet, [ACETAMINOPHEN (TYLENOL) 325 MG TABLET] Take 2 tablets (650 mg total) by mouth every 4 (four) hours as needed., Disp: , Rfl: 0     amLODIPine (NORVASC) 10 MG tablet, Take 1 tablet (10 mg) by mouth daily, Disp: 90 tablet, Rfl: 3     atorvastatin (LIPITOR) 20 MG tablet, TAKE 1 TABLET (20 MG TOTAL) BY MOUTH DAILY., Disp: 90 tablet, Rfl: 2     blood glucose (NO BRAND SPECIFIED) lancets standard, Use to test blood sugar 3 times daily or as directed., Disp: 300 lancet, Rfl: 3     calcitRIOL (ROCALTROL) 0.25 MCG capsule, Take 0.25 mcg by mouth daily Mon, Wed, Fri, Disp: , Rfl:      calcium, as carbonate, (TUMS) 200 mg calcium (500 mg) chewable tablet, [CALCIUM, AS CARBONATE, (TUMS) 200 MG CALCIUM (500 MG) CHEWABLE TABLET] Chew 1 tablet (200 mg total)  3 (three) times a day as needed for heartburn., Disp: , Rfl: 0     cholecalciferol, vitamin D3, (VITAMIN D3) 1,000 unit capsule, [CHOLECALCIFEROL, VITAMIN D3, (VITAMIN D3) 1,000 UNIT CAPSULE] Take 2 capsules (2,000 Units total) by mouth daily., Disp: 180 capsule, Rfl: 0     cloNIDine (CATAPRES) 0.3 MG tablet, TAKE 1/2 PILL IN THE MORNING AND 1 FULL PILL IN THE EVENING., Disp: 135 tablet, Rfl: 2     cyanocobalamin 1,000 mcg/mL injection, [CYANOCOBALAMIN 1,000 MCG/ML INJECTION] Inject 1,000 mcg into the shoulder, thigh, or buttocks every 3 (three) months.       , Disp: , Rfl:      cyanocobalamin, vitamin B-12, (VITAMIN B-12) 1,000 mcg Subl, [CYANOCOBALAMIN, VITAMIN B-12, (VITAMIN B-12) 1,000 MCG SUBL] Place 1 tablet (1,000 mcg total) under the tongue daily., Disp: 90 tablet, Rfl: 3     ELIQUIS ANTICOAGULANT 2.5 MG tablet, TAKE 1 TABLET (2.5 MG TOTAL) BY MOUTH 2 (TWO) TIMES A DAY., Disp: 180 tablet, Rfl: 0     FIBER CHOICE, INULIN, ORAL, [FIBER CHOICE, INULIN, ORAL] Take 2 tablets by mouth daily., Disp: , Rfl:      furosemide (LASIX) 40 MG tablet, [FUROSEMIDE (LASIX) 40 MG TABLET] TAKE 1 TABLET (40 MG TOTAL) BY MOUTH DAILY., Disp: 90 tablet, Rfl: 3     hydrALAZINE (APRESOLINE) 25 MG tablet, Take 3 tablets (75 mg) by mouth 4 times daily, Disp: 360 tablet, Rfl: 3     Insulin Pen Needle (PEN NEEDLES) 32G X 4 MM MISC, 1 each by Other route daily, Disp: 100 each, Rfl: 3     LANTUS SOLOSTAR 100 UNIT/ML soln, INJECT 16 UNITS SUBCUTANEOUS EVERY MORNING, Disp: 3 mL, Rfl: 10     linagliptin (TRADJENTA) 5 MG TABS tablet, Take 1 tablet (5 mg) by mouth daily, Disp: 90 tablet, Rfl: 3     metoprolol succinate ER (TOPROL-XL) 50 MG 24 hr tablet, Take 1 tablet (50 mg) by mouth daily, Disp: 90 tablet, Rfl: 3     ONETOUCH DELICA PLUS LANCET 33 gauge Misc, [ONETOUCH DELICA PLUS LANCET 33 GAUGE MISC] CHECK BLOOD SUGAR 3 TIMES PER DAY., Disp: 300 each, Rfl: 3     ONETOUCH ULTRA test strip, CHECK BLOOD SUGAR 3 TIMES PER DAY, Disp: 100 strip,  Rfl: 1     ONETOUCH ULTRA2 METER Misc, [ONETOUCH ULTRA2 METER MISC] USE AS DIRECTED, Disp: 1 each, Rfl: 0     potassium chloride ER (KLOR-CON M) 20 MEQ CR tablet, TAKE 1 TABLET (20 MEQ TOTAL) BY MOUTH DAILY., Disp: 30 tablet, Rfl: 5     sertraline (ZOLOFT) 50 MG tablet, TAKE ONE & ONE -HALF (1&1/2) TABLETS BY MOUTH DAILY, Disp: 135 tablet, Rfl: 3     polyethylene glycol (MIRALAX) 17 gram packet, [POLYETHYLENE GLYCOL (MIRALAX) 17 GRAM PACKET] Take 1 packet (17 g total) by mouth daily as needed. (Patient not taking: No sig reported), Disp: , Rfl: 0    Current Facility-Administered Medications:      cyanocobalamin injection 1,000 mcg, 1,000 mcg, Intramuscular, Q90 Days, Ailyn Santoro, PharmD, 1,000 mcg at 02/01/22 1112    Allergies:   Cefazolin, Pravastatin, Simvastatin, and Thiazides [thiazide-type diuretics]      Objective:      Physical Exam  67.1 kg (148 lb)     Body mass index is 24.63 kg/m .  /56 (BP Location: Left arm, Patient Position: Sitting, Cuff Size: Adult Regular)   Pulse 54   Resp 16   Wt 67.1 kg (148 lb)   BMI 24.63 kg/m      General Appearance:   Awake, Alert, No acute distress.   HEENT:  Pupil equal and reactive to light. No scleral icterus; the mucous membranes were moist.   Neck: No cervical bruits. No JVD. No thyromegaly.     Chest: The spine was straight. The chest was symmetric.   Lungs:   Mildly diminished breathing sounds; Lungs are clear to auscultation. No crackles. No wheezing.   Cardiovascular:   Regular rhythm and rate, remote first and second heart sounds with no murmurs. No rubs or gallops.    Abdomen:  Soft. No tenderness. Non-distended. Bowels sounds are present   Extremities: Equal tibial pulses. No leg edema.   Skin: No rashes or ulcers. Warm, Dry.   Musculoskeletal: No tenderness. No deformity.   Neurologic: Mood and affect are appropriate. No focal deficits.         EKG:  Personally reivewed  Atrial fibrillation   ST & T wave abnormality, consider inferior  ischemia   Abnormal ECG   When compared with ECG of 13-JAN-2020 12:54,   Atrial fibrillation has replaced Sinus rhythm   Vent. rate has increased BY  37 BPM      Cardiac Imaging Studies  ECHO on 9-:    Definity contrast utilized.    Normal left ventricular size. Sigmoid septal hypertrophy.    Left ventricle ejection fraction is normal. The calculated left ventricular ejection fraction is 66%.    Normal right ventricular size and systolic function.    Mild left atrial enlargement    No significant valve abnormality.    When compared to the previous study dated 7/31/2015, no significant change from previous.      Lab Review   Lab Results   Component Value Date     06/02/2021    CO2 22 06/02/2021    BUN 50 06/02/2021     Lab Results   Component Value Date    WBC 4.6 01/02/2021    HGB 9.6 01/02/2021    HCT 29.4 01/02/2021    MCV 91 01/02/2021     01/02/2021     Lab Results   Component Value Date    CHOL 115 09/05/2014    CHOL 115 11/25/2013    CHOL 100 04/12/2013     Lab Results   Component Value Date    HDL 36 (L) 05/30/2018    HDL 36 (L) 09/05/2014    HDL 35 (L) 11/25/2013     No components found for: LDLCALC  Lab Results   Component Value Date    TRIG 119 09/05/2014    TRIG 135 11/25/2013    TRIG 109 04/12/2013     No results found for: CHOLHDL  Lab Results   Component Value Date    TROPONINI 0.01 01/02/2021     Lab Results   Component Value Date     10/15/2019     Lab Results   Component Value Date    TSH 2.42 10/28/2019                 Thank you for allowing me to participate in the care of your patient.      Sincerely,     Car Canela MD     [facility]  cc:   Car Canela MD  [facility]  [place] [address]

## 2022-06-27 NOTE — PROGRESS NOTES
Assessment/Plan:   1.  Paroxysmal atrial fibrillation: He has maintained in sinus rhythm.  He has no palpitations.  Continue metoprolol succinate 50 mg daily.  His [id number] score is 7.  He has high risk of stroke.    Continue Eliquis 2.5 mg twice a day at renally adjusted dosage.  He has no side effects of Eliquis.     2.  Primary hypertension: The patient has been on multiple antihypertensive medications including metoprolol XL 50 mg daily, clonidine 0.15 mg a.m., 0.3 mg p.m., hydralazine 75 mg 4 times a day, amlodipine 10 mg daily.  His blood pressure in clinic today is 100/60 mmHg.  He states that his blood pressure was 135/60 mmHg at home.  Continue to monitor his blood pressure, consider to clonidine or hydralazine if his blood pressure becomes low.     3.  Chronic diastolic congestive heart failure.  He has no obvious fluid retention. Continue Lasix 40 mg daily.  His renal function is stable.  He has nephrology clinic visit tomorrow.  He said he will have labs tomorrow.     4.  Stage IV CKD, type 2 diabetes, history of stroke, other medical conditions: Please follow-up with Dr. Torre.    Thank you for the opportunity to be involved in the care of Suman Nagy. If you have any questions, please feel free to contact me.  I will see the patient again in 6 months and as needed.    Much or all of the text in this note was generated through the use of Dragon Dictate voice-to-text software. Errors in spelling or words which seem out of context are unintentional. Sound alike errors, in particular, may have escaped editing.       History of Present Illness:   It is my pleasure to see Suman Nagy at the [facility] clinic for routine cardiology follow up.  Suman Nagy is a 81 year old male with a medical history of chronic diastolic congestive heart failure, paroxysmal atrial fibrillation, primary hypertension, dyslipidemia, type 2 diabetes, stage IV CKD, history  of a stroke and colon cancer in remission.    The patient states that he has been doing fine over last 6 months.  He said that he is not able to do much exercise due to previous stroke.  He denies any chest pain, shortness of breath, palpitations.  He has no dizziness, orthopnea, PND.  He has very mild leg edema which has been stable.  He has trace leg edema.  His blood pressure and heart rate are controlled.  He has no side effects from his current medications.       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     Rectal cancer (H)     History of bladder cancer     History of rectal cancer     Carotid artery stenosis, asymptomatic, left     History of stroke     Medication monitoring encounter     Type 2 diabetes mellitus with stage 4 chronic kidney disease, with long-term current use of insulin (H)     Anxiety     UTI (urinary tract infection)     Slow transit constipation     Generalized muscle weakness     Chronic kidney disease, stage IV (severe) (H)     ACP (advance care planning)     Atrial fibrillation (H)     Hypokalemia     Coronary artery disease involving native coronary artery of native heart without angina pectoris     Acute respiratory failure with hypoxia (H)     Bacteremia     Chronic diastolic CHF (congestive heart failure), NYHA class 2 (H)       Past Surgical History:     Past Surgical History:   Procedure Laterality Date     COLON SURGERY      Colectomy Low Anterior Resection     EYE SURGERY      Cataract Extraction     LAPAROSCOPIC ASSISTED COLECTOMY N/A 6/1/2017    Procedure: LAPAROSCOPIC ASSISTED COLECTOMY LOW ANTERIOR RESECTION AND DIVERTING LOOP ILEOSTOMY, PROCTOSOPY;  Surgeon: Tyson Parry MD;  Location: [facility];  Service:      OTHER SURGICAL HISTORY      [id number]     NH CLOSE ENTEROSTOMY N/A 8/15/2017    Procedure: ILEOSTOMY CLOSURE LOOP ;  Surgeon:  Tyson Parry MD;  Location: [facility];  Service: General     CT CYSTOURETHROSCOPY,FULGUR <0.5 CM LESN N/A 9/1/2015    Procedure: CYSTOSCOPY TRANSURETHRAL RESECTION BLADDER TUMOR;  Surgeon: Cleveland Nair MD;  Location: [facility] OR;  Service: Urology     CT CYSTOURETHROSCOPY,FULGUR <0.5 CM LESN N/A 12/22/2015    Procedure: CYSTOSCOPY TRANSURETHRAL RESECTION BLADDER TUMOR AND BLADDER BIOPSIES;  Surgeon: Cleveland Nair MD;  Location: [facility];  Service: Urology     VASECTOMY         Family History:     Family History   Problem Relation Age of Onset     Chronic Obstructive Pulmonary Disease Father         Social History:    reports that he quit smoking about 26 years ago. He has never used smokeless tobacco. He reports that he does not drink alcohol and does not use drugs.    Review of Systems:   12 systems are reviewed negative except for in HPI.    Meds:     Current Outpatient Medications:      acetaminophen (TYLENOL) 325 MG tablet, [ACETAMINOPHEN (TYLENOL) 325 MG TABLET] Take 2 tablets (650 mg total) by mouth every 4 (four) hours as needed., Disp: , Rfl: 0     amLODIPine (NORVASC) 10 MG tablet, Take 1 tablet (10 mg) by mouth daily, Disp: 90 tablet, Rfl: 3     atorvastatin (LIPITOR) 20 MG tablet, TAKE 1 TABLET (20 MG TOTAL) BY MOUTH DAILY., Disp: 90 tablet, Rfl: 2     blood glucose (NO BRAND SPECIFIED) lancets standard, Use to test blood sugar 3 times daily or as directed., Disp: 300 lancet, Rfl: 3     calcitRIOL (ROCALTROL) 0.25 MCG capsule, Take 0.25 mcg by mouth daily Mon, Wed, Fri, Disp: , Rfl:      calcium, as carbonate, (TUMS) 200 mg calcium (500 mg) chewable tablet, [CALCIUM, AS CARBONATE, (TUMS) 200 MG CALCIUM (500 MG) CHEWABLE TABLET] Chew 1 tablet (200 mg total) 3 (three) times a day as needed for heartburn., Disp: , Rfl: 0     cholecalciferol, vitamin D3, (VITAMIN D3) 1,000 unit capsule, [CHOLECALCIFEROL, VITAMIN D3, (VITAMIN D3) 1,000 UNIT CAPSULE] Take 2 capsules  (2,000 Units total) by mouth daily., Disp: 180 capsule, Rfl: 0     cloNIDine (CATAPRES) 0.3 MG tablet, TAKE 1/2 PILL IN THE MORNING AND 1 FULL PILL IN THE EVENING., Disp: 135 tablet, Rfl: 2     cyanocobalamin 1,000 mcg/mL injection, [CYANOCOBALAMIN 1,000 MCG/ML INJECTION] Inject 1,000 mcg into the shoulder, thigh, or buttocks every 3 (three) months.       , Disp: , Rfl:      cyanocobalamin, vitamin B-12, (VITAMIN B-12) 1,000 mcg Subl, [CYANOCOBALAMIN, VITAMIN B-12, (VITAMIN B-12) 1,000 MCG SUBL] Place 1 tablet (1,000 mcg total) under the tongue daily., Disp: 90 tablet, Rfl: 3     ELIQUIS ANTICOAGULANT 2.5 MG tablet, TAKE 1 TABLET (2.5 MG TOTAL) BY MOUTH 2 (TWO) TIMES A DAY., Disp: 180 tablet, Rfl: 0     FIBER CHOICE, INULIN, ORAL, [FIBER CHOICE, INULIN, ORAL] Take 2 tablets by mouth daily., Disp: , Rfl:      furosemide (LASIX) 40 MG tablet, [FUROSEMIDE (LASIX) 40 MG TABLET] TAKE 1 TABLET (40 MG TOTAL) BY MOUTH DAILY., Disp: 90 tablet, Rfl: 3     hydrALAZINE (APRESOLINE) 25 MG tablet, Take 3 tablets (75 mg) by mouth 4 times daily, Disp: 360 tablet, Rfl: 3     Insulin Pen Needle (PEN NEEDLES) 32G X 4 MM MISC, 1 each by Other route daily, Disp: 100 each, Rfl: 3     LANTUS SOLOSTAR 100 UNIT/ML soln, INJECT 16 UNITS SUBCUTANEOUS EVERY MORNING, Disp: 3 mL, Rfl: 10     linagliptin (TRADJENTA) 5 MG TABS tablet, Take 1 tablet (5 mg) by mouth daily, Disp: 90 tablet, Rfl: 3     metoprolol succinate ER (TOPROL-XL) 50 MG 24 hr tablet, Take 1 tablet (50 mg) by mouth daily, Disp: 90 tablet, Rfl: 3     ONETOUCH DELICA PLUS LANCET 33 gauge Misc, [ONETOUCH DELICA PLUS LANCET 33 GAUGE MISC] CHECK BLOOD SUGAR 3 TIMES PER DAY., Disp: 300 each, Rfl: 3     ONETOUCH ULTRA test strip, CHECK BLOOD SUGAR 3 TIMES PER DAY, Disp: 100 strip, Rfl: 1     ONETOUCH ULTRA2 METER Misc, [ONETOUCH ULTRA2 METER MISC] USE AS DIRECTED, Disp: 1 each, Rfl: 0     potassium chloride ER (KLOR-CON M) 20 MEQ CR tablet, TAKE 1 TABLET (20 MEQ TOTAL) BY MOUTH DAILY.,  Disp: 30 tablet, Rfl: 5     sertraline (ZOLOFT) 50 MG tablet, TAKE ONE & ONE -HALF (1&1/2) TABLETS BY MOUTH DAILY, Disp: 135 tablet, Rfl: 3     polyethylene glycol (MIRALAX) 17 gram packet, [POLYETHYLENE GLYCOL (MIRALAX) 17 GRAM PACKET] Take 1 packet (17 g total) by mouth daily as needed. (Patient not taking: No sig reported), Disp: , Rfl: 0    Current Facility-Administered Medications:      cyanocobalamin injection 1,000 mcg, 1,000 mcg, Intramuscular, Q90 Days, Ailyn Santoro, PharmD, 1,000 mcg at 02/01/22 1112    Allergies:   Cefazolin, Pravastatin, Simvastatin, and Thiazides [thiazide-type diuretics]      Objective:      Physical Exam  67.1 kg (148 lb)     Body mass index is 24.63 kg/m .  /56 (BP Location: Left arm, Patient Position: Sitting, Cuff Size: Adult Regular)   Pulse 54   Resp 16   Wt 67.1 kg (148 lb)   BMI 24.63 kg/m      General Appearance:   Awake, Alert, No acute distress.   HEENT:  Pupil equal and reactive to light. No scleral icterus; the mucous membranes were moist.   Neck: No cervical bruits. No JVD. No thyromegaly.     Chest: The spine was straight. The chest was symmetric.   Lungs:   Mildly diminished breathing sounds; Lungs are clear to auscultation. No crackles. No wheezing.   Cardiovascular:   Regular rhythm and rate, remote first and second heart sounds with no murmurs. No rubs or gallops.    Abdomen:  Soft. No tenderness. Non-distended. Bowels sounds are present   Extremities: Equal tibial pulses. No leg edema.   Skin: No rashes or ulcers. Warm, Dry.   Musculoskeletal: No tenderness. No deformity.   Neurologic: Mood and affect are appropriate. No focal deficits.         EKG:  Personally reivewed  Atrial fibrillation   ST & T wave abnormality, consider inferior ischemia   Abnormal ECG   When compared with ECG of 13-JAN-2020 12:54,   Atrial fibrillation has replaced Sinus rhythm   Vent. rate has increased BY  37 BPM      Cardiac Imaging Studies  ECHO on  9-:    Definity contrast utilized.    Normal left ventricular size. Sigmoid septal hypertrophy.    Left ventricle ejection fraction is normal. The calculated left ventricular ejection fraction is 66%.    Normal right ventricular size and systolic function.    Mild left atrial enlargement    No significant valve abnormality.    When compared to the previous study dated 7/31/2015, no significant change from previous.      Lab Review   Lab Results   Component Value Date     06/02/2021    CO2 22 06/02/2021    BUN 50 06/02/2021     Lab Results   Component Value Date    WBC 4.6 01/02/2021    HGB 9.6 01/02/2021    HCT 29.4 01/02/2021    MCV 91 01/02/2021     01/02/2021     Lab Results   Component Value Date    CHOL 115 09/05/2014    CHOL 115 11/25/2013    CHOL 100 04/12/2013     Lab Results   Component Value Date    HDL 36 (L) 05/30/2018    HDL 36 (L) 09/05/2014    HDL 35 (L) 11/25/2013     No components found for: LDLCALC  Lab Results   Component Value Date    TRIG 119 09/05/2014    TRIG 135 11/25/2013    TRIG 109 04/12/2013     No results found for: CHOLHDL  Lab Results   Component Value Date    TROPONINI 0.01 01/02/2021     Lab Results   Component Value Date     10/15/2019     Lab Results   Component Value Date    TSH 2.42 10/28/2019

## 2022-07-05 NOTE — PROGRESS NOTES
[facility] Follow Up Note    Suman Nagy   81 year old male    Date of Visit: 7/5/2022    Chief Complaint   Patient presents with     Hypertension     Subjective  Patient is here with his wife, Jeni.  Patient was scheduled for a diabetes follow-up and general follow-up.    Patient has end-stage renal failure, stage IV-V but not yet started dialysis.  He is still unsure if he wants to start dialysis.  He did meet with nephrology in March.  Was put on sodium bicarbonate and calcium bicarbonate.  GFR down to 17.  Hemoglobin 8.6.    No increasing shortness of breath or edema.    He just had lab work done on June 28.  Creatinine was 2.88 GFR 19 with potassium of 4.3 and sodium normal.  Hemoglobin 9.3.    Still eating regular meals, denies nausea.    We discussed CODE STATUS in detail.  Patient is still unclear whether he wants CPR or to be intubated in the event of a life-threatening emergency.  I did discuss that he would be full CODE STATUS until he clarify that further.    Patient plans to follow-up with nephrology clinic in 1 month.    History of diastolic congestive heart failure, saw cardiology last month without change in treatment plan given a 6-month follow-up.    History of coronary disease and previous stroke.  History of multiple strokes with left hemiparesis.  Previous MRI shows multiple occlusions in the michael, thalamus and basal ganglia.    Paroxysmal atrial fibrillation on Eliquis, does not want watchman device.  No bleeding issues.    Diabetes type 2 with blood sugars recently 117/127 and 150.  Denies any low blood sugar events.  Hemoglobin A1c last year was 6.6%.    On Lantus insulin 16 units in the morning.  Linagliptin 5 mg a day.  Blood sugar was 99 on June 28.    Hypertension controlled and denies orthostasis.  He did not respond well in the past to changes in Catapres dosing.  Still on Lasix 40 mg a day, amlodipine 10 mg a day and hydralazine 75 mg 4 times daily and metoprolol XL  50 mg a day.  He denies orthostasis.    PMHx:    Past Medical History:   Diagnosis Date     Anemia      Bladder cancer (H)      Cancer (H)     Rectosigmoid     Diabetes mellitus (H)      Hyperkalemia      Hypertension      Seizure (H)     possible, one time occurence     Stroke (H)     multiple, residual left sided weakness, last CVA in July 2015 caused some speech deficit     Superficial phlebitis      Venous insufficiency of both lower extremities      PSHx:    Past Surgical History:   Procedure Laterality Date     COLON SURGERY      Colectomy Low Anterior Resection     EYE SURGERY      Cataract Extraction     LAPAROSCOPIC ASSISTED COLECTOMY N/A 6/1/2017    Procedure: LAPAROSCOPIC ASSISTED COLECTOMY LOW ANTERIOR RESECTION AND DIVERTING LOOP ILEOSTOMY, PROCTOSOPY;  Surgeon: Tyson Parry MD;  Location: [facility];  Service:      OTHER SURGICAL HISTORY      [id number]     OK CLOSE ENTEROSTOMY N/A 8/15/2017    Procedure: ILEOSTOMY CLOSURE LOOP ;  Surgeon: Tyson Parry MD;  Location: [facility];  Service: General     OK CYSTOURETHROSCOPY,FULGUR <0.5 CM LESN N/A 9/1/2015    Procedure: CYSTOSCOPY TRANSURETHRAL RESECTION BLADDER TUMOR;  Surgeon: Cleveland Nair MD;  Location: [facility];  Service: Urology     OK CYSTOURETHROSCOPY,FULGUR <0.5 CM LESN N/A 12/22/2015    Procedure: CYSTOSCOPY TRANSURETHRAL RESECTION BLADDER TUMOR AND BLADDER BIOPSIES;  Surgeon: Cleveland Nair MD;  Location: [facility];  Service: Urology     VASECTOMY       Immunizations:   Immunization History   Administered Date(s) Administered     COVID-19,PF,Pfizer (12+ Yrs) 03/02/2021, 03/23/2021, 10/04/2021     DT (PEDS <7y) 07/01/2004     FLU 6-35 months 09/27/2010, 09/14/2012, 09/27/2013     Flu, Unspecified 10/19/2007, 09/16/2011, 10/15/2015     Influenza (H1N1) 01/18/2010     Influenza (High Dose) 3 valent vaccine 09/15/2015, 09/26/2016, 10/03/2017, 09/27/2018, 10/19/2019     Influenza (IIV3) PF 10/08/2004,  "11/07/2005, 10/16/2006, 10/19/2007, 09/18/2009, 09/16/2011, 10/17/2014, 10/19/2019, 09/21/2020, 10/12/2020     Influenza Vaccine, 6+MO IM (QUADRIVALENT W/PRESERVATIVES) 09/18/2009, 10/17/2014     Influenza, Quad, High Dose, Pf, 65yr+ (Fluzone HD) 09/21/2020, 10/01/2021     Pneumo Conj 13-V (2010&after) 01/20/2015     Pneumococcal 23 valent 10/08/2004     Td (Adult), Adsorbed 07/01/2004     Td,adult,historic,unspecified 07/01/2004     Tdap (Adacel,Boostrix) 05/20/2015       ROS A comprehensive review of systems was performed and was otherwise negative    Medications, allergies, and problem list were reviewed and updated    Exam  /50 (BP Location: Left arm, Patient Position: Sitting, Cuff Size: Adult Large)   Pulse 57   Ht 1.651 m (5' 5\")   Wt 66.2 kg (146 lb)   SpO2 99%   BMI 24.30 kg/m    Lungs are clear to auscultation.  Heart is regular without murmur.  Abdomen is nonobese nontender.  There is no significant lower extremity edema bilaterally.    Assessment/Plan  1. Type 2 diabetes mellitus with other specified complication, with long-term current use of insulin (H)  No low blood sugars.  Blood sugars appear to be adequately controlled.  Patient just had blood work done at nephrology in June 28, patient does not need additional blood work here today and I did not try hemoglobin A1c.  Goal hemoglobin A1c less than 8%.    Could consider discontinuation of linagliptin and managing with just insulin in the future, if the cost is too high.    2. Chronic renal insufficiency, stage 4 (severe) (H)  We will follow-up with nephrology clinic again in 1 month.  Patient is nearing need for dialysis.  Patient is still unsure if he wants to proceed with dialysis.    Patient is unsure on his CODE STATUS.  I did discuss this extensively with the wife and patient today.  Continue full CODE STATUS at this time, but patient may wish to change.  - Full Code    Anemia associated with his chronic renal insufficiency, managed " by nephrology clinic    3. History of stroke  Extensive vascular disease.  Also history of paroxysmal atrial fibrillation.  On Eliquis.  On Lipitor 20 mg.    4. Essential hypertension  Fluctuating blood pressures.  Denies orthostasis.  Blood pressure initially low but was 135/60 on my recheck.  Continue on current medications and follow-up with nephrology in 1 month.    5. Vitamin B12 deficiency (non anemic)  Given a B12 shot today.    Given a COVID booster shot with Pfizer today as well, per patient request    History of colon cancer and bladder cancer, patient states he does not want to follow-up with his oncologist at this time.    I recommended ophthalmology appointment for diabetic eye check, but he does not wish to do that at this time.    History of dysthymia, stable on Zoloft 50 mg.      Return in about 4 months (around 11/5/2022) for Routine preventive.   Patient Instructions   No change to current medications.    Follow-up with the nephrology, or kidney clinic, as planned about possible future dialysis.    Your CODE STATUS is currently designated as full code.  If you wish to change that to DNR/DNI, let me know.    Follow-up for physical exam in the fall.    Telly Torre MD, MD        Current Outpatient Medications   Medication Sig Dispense Refill     acetaminophen (TYLENOL) 325 MG tablet [ACETAMINOPHEN (TYLENOL) 325 MG TABLET] Take 2 tablets (650 mg total) by mouth every 4 (four) hours as needed.  0     amLODIPine (NORVASC) 10 MG tablet Take 1 tablet (10 mg) by mouth daily 90 tablet 3     atorvastatin (LIPITOR) 20 MG tablet TAKE 1 TABLET (20 MG TOTAL) BY MOUTH DAILY. 90 tablet 2     blood glucose (NO BRAND SPECIFIED) lancets standard Use to test blood sugar 3 times daily or as directed. 300 lancet 3     calcitRIOL (ROCALTROL) 0.25 MCG capsule Take 0.25 mcg by mouth daily Mon, Wed, Fri       calcium, as carbonate, (TUMS) 200 mg calcium (500 mg) chewable tablet [CALCIUM, AS CARBONATE, (TUMS) 200 MG  CALCIUM (500 MG) CHEWABLE TABLET] Chew 1 tablet (200 mg total) 3 (three) times a day as needed for heartburn.  0     cholecalciferol, vitamin D3, (VITAMIN D3) 1,000 unit capsule [CHOLECALCIFEROL, VITAMIN D3, (VITAMIN D3) 1,000 UNIT CAPSULE] Take 2 capsules (2,000 Units total) by mouth daily. 180 capsule 0     cloNIDine (CATAPRES) 0.3 MG tablet TAKE 1/2 PILL IN THE MORNING AND 1 FULL PILL IN THE EVENING. 135 tablet 2     cyanocobalamin 1,000 mcg/mL injection [CYANOCOBALAMIN 1,000 MCG/ML INJECTION] Inject 1,000 mcg into the shoulder, thigh, or buttocks every 3 (three) months.              cyanocobalamin, vitamin B-12, (VITAMIN B-12) 1,000 mcg Subl [CYANOCOBALAMIN, VITAMIN B-12, (VITAMIN B-12) 1,000 MCG SUBL] Place 1 tablet (1,000 mcg total) under the tongue daily. 90 tablet 3     ELIQUIS ANTICOAGULANT 2.5 MG tablet TAKE 1 TABLET (2.5 MG TOTAL) BY MOUTH 2 (TWO) TIMES A DAY. 180 tablet 0     FIBER CHOICE, INULIN, ORAL [FIBER CHOICE, INULIN, ORAL] Take 2 tablets by mouth daily.       furosemide (LASIX) 40 MG tablet [FUROSEMIDE (LASIX) 40 MG TABLET] TAKE 1 TABLET (40 MG TOTAL) BY MOUTH DAILY. 90 tablet 3     hydrALAZINE (APRESOLINE) 25 MG tablet Take 3 tablets (75 mg) by mouth 4 times daily 360 tablet 3     Insulin Pen Needle (PEN NEEDLES) 32G X 4 MM MISC 1 each by Other route daily 100 each 3     LANTUS SOLOSTAR 100 UNIT/ML soln INJECT 16 UNITS SUBCUTANEOUS EVERY MORNING 3 mL 10     linagliptin (TRADJENTA) 5 MG TABS tablet Take 1 tablet (5 mg) by mouth daily 90 tablet 3     metoprolol succinate ER (TOPROL-XL) 50 MG 24 hr tablet Take 1 tablet (50 mg) by mouth daily 90 tablet 3     ONETOUCH DELICA PLUS LANCET 33 gauge Misc [ONETOUCH DELICA PLUS LANCET 33 GAUGE MISC] CHECK BLOOD SUGAR 3 TIMES PER DAY. 300 each 3     ONETOUCH ULTRA test strip CHECK BLOOD SUGAR 3 TIMES PER  strip 1     ONETOUCH ULTRA2 METER Misc [ONETOUCH ULTRA2 METER MISC] USE AS DIRECTED 1 each 0     polyethylene glycol (MIRALAX) 17 gram packet  "[POLYETHYLENE GLYCOL (MIRALAX) 17 GRAM PACKET] Take 1 packet (17 g total) by mouth daily as needed.  0     potassium chloride ER (KLOR-CON M) 20 MEQ CR tablet TAKE 1 TABLET (20 MEQ TOTAL) BY MOUTH DAILY. 30 tablet 5     sertraline (ZOLOFT) 50 MG tablet TAKE ONE & ONE -HALF (1&1/2) TABLETS BY MOUTH DAILY 135 tablet 3     Allergies   Allergen Reactions     Cefazolin Other (See Comments)     \"felt very hot\" Oct 2019 Cephalexin, Sep 2020 Ceftriaxone     Pravastatin Muscle Pain (Myalgia)     Increased peripheral neuropathy     Simvastatin Muscle Pain (Myalgia)     Increased peripheral neuropathy     Thiazides [Thiazide-Type Diuretics] Other (See Comments)     Other: Gout       Social History     Tobacco Use     Smoking status: Former Smoker     Quit date: 1995     Years since quittin.8     Smokeless tobacco: Never Used   Substance Use Topics     Alcohol use: No     Drug use: No             Subjective   Luke is a 81 year old, presenting for the following health issues:  Hypertension      HPI           Review of Systems         Objective    There were no vitals taken for this visit.  There is no height or weight on file to calculate BMI.  Physical Exam                       .  ..  "

## 2022-07-05 NOTE — PATIENT INSTRUCTIONS
No change to current medications.    Follow-up with the nephrology, or kidney clinic, as planned about possible future dialysis.    Your CODE STATUS is currently designated as full code.  If you wish to change that to DNR/DNI, let me know.    Follow-up for physical exam in the fall.

## 2022-07-08 NOTE — TELEPHONE ENCOUNTER
"Last Written Prescription Date:  7/25/21  Last Fill Quantity: 100,  # refills: 1   Last office visit provider:  7/5/22     Requested Prescriptions   Pending Prescriptions Disp Refills     blood glucose (ONETOUCH ULTRA) test strip 100 strip 1     Sig: CHECK BLOOD SUGAR 3 TIMES PER DAY       Diabetic Supplies Protocol Passed - 7/7/2022  2:11 PM        Passed - Medication is active on med list        Passed - Patient is 18 years of age or older        Passed - Recent (6 mo) or future (30 days) visit within the authorizing provider's specialty     Patient had office visit in the last 6 months or has a visit in the next 30 days with authorizing provider.  See \"Patient Info\" tab in inbasket, or \"Choose Columns\" in Meds & Orders section of the refill encounter.                 Cammy Monroy RN 07/08/22 5:50 PM  "

## 2022-07-12 NOTE — TELEPHONE ENCOUNTER
"Last Written Prescription Date:  3/21/22  Last Fill Quantity: 360,  # refills: 3   Last office visit provider:  7/5/22     Requested Prescriptions   Pending Prescriptions Disp Refills     hydrALAZINE (APRESOLINE) 25 MG tablet [Pharmacy Med Name: HYDRALAZINE 25MG] 360 tablet 2     Sig: TAKE 3 TABLETS (75 MG) BY MOUTH 4 TIMES DAILY       Vasodilators Passed - 7/12/2022  7:31 AM        Passed - Most recent BP less than 140/90 on record     BP Readings from Last 3 Encounters:   07/05/22 106/50   06/27/22 100/56   02/01/22 128/52                 Passed - Most recent encounter is not a hospital encounter. Patient has recent (12 mos) or future (1 mos) visit with authorizing provider's specialty     Patient's most recent encounter is NOT a hospital encounter and has had an office visit in the last 12 months or has a visit in the next 30 days with authorizing provider or within the authorizing provider's specialty.      See \"Patient Info\" tab in inbasket, or \"Choose Columns\" in Meds & Orders section of the refill encounter.      If most recent encounter is a hospital encounter AND the patient does NOT have an appointment scheduled with the authorizing provider or authorizing provider's specialty within the next 30 days, forward refill to authorizing provider for medication review.          Passed - Medication is active on med list        Passed - Patient is of age 18 years or older             Hayden Porras RN 07/12/22 7:32 AM  "

## 2022-07-20 NOTE — LETTER
Letter by Abbie Kessler MBBS at      Author: Abbie Kessler MBBS Service: -- Author Type: --    Filed:  Encounter Date: 1/28/2020 Status: (Other)         Patient: Suman Nagy   MR Number: [id number]   YOB: 1940   Date of Visit: 1/28/2020       [facility] Admission note      Patient: Suman Nagy  MRN: [id number]  Date of Service: 1/28/2020      [facility] [[id number]]  Reason for Visit     Chief Complaint   Patient presents with   ? Review Of Multiple Medical Conditions       Code Status     FULL CODE    Assessment     - ACUTE INFLUENZA  - acute on chronic anemia  - acute on chronic hip and knee pain  - type 2 DM  - advanced CKD with a discharge creatinine ranging between 2-1.9  -Electrolyte abnormalities with low potassium and magnesium  - htn' with elevated systolic blood pressures noted in the TCU  -Chronic lymphedema  -Profound debilitation with increasing amount of care that this patient needs at home.  He has incontinence at baseline and requires assistance with all his IADLs and most of his ADLs at home  -Protein calorie malnutrition  -Obesity    Plan     Patient has been admitted to the TCU and examined in the presence of his wife.  He is being treated for influenza A with Tamiflu and done with his 5-day regimen in the TCU.  He is denying any cough congestion or fever.  He has significant lymphedema noted on exam today.    I did offer to either give him some stockings with elevation versus going up on the diuretics he refuses both options.  Patient has enough stockings at home as per him and does not want to get another one he is not bothered he does not want to increase his diuretics either.  In light of renal impairment with a recheck creatinine of 2.3 will not push for higher doses of diuretics either  Advised control on salt intake and fluid intake.  Diabetic control is extremely poor.  Plan is to discontinue his Lantus at at bedtime as he is  running some early morning low blood sugars of 120s and below.  Switch him to Lantus 16 units but in the morning with breakfast.  His postprandial blood sugars are greater than 200 consistently  Recheck hemoglobin was stable but 8.6 he denies any GI bleeding concerns  Continue with his PT OT and rehab he is down to 1 person assist and needs to work on stairs prior to going home    History     Patient is a very pleasant 79 y.o. male who is admitted to TCU  He was examined in the presence of his wife who supplemented his history.  As per the history he lives at home with his wife and other extended family members.  He presented with increasing weakness with low-grade fever and myalgias.  Work-up revealed that he had a positive influenza A.  He is currently being treated with Tamiflu and will finish his course in the TCU.  He denies any cough congestion.  He has been afebrile and denies any concern regarding influenza any longer.  Currently done with Tamiflu  He however is profoundly weak requiring 2 person assist at least with all his ADLs and is not able to walk.  However now reporting significant improvement in strength.  He is a contact-guard assist walking up to 200 feet  Therapy plans to start working with stairs with him if he improves the plan to discharge him home soon  He was also noted to have acute on chronic anemia with no obvious signs of GI losses.  He was started on Prilosec.  R/C in the TCU was 8.6 and he is being monitored denies any bleeding issues  Anemia most likely secondary to chronic kidney disease discharge creatinine was 2  He has underlying history of type 2 diabetes which as per wife has been adequately controlled in the past.  He will continue with his insulin regimen in the TCU and oral glimepiride  Blood sugars are uncontrolled he recently had an increase in Lantus to 14 units with not much improvement a.m. blood sugars are now dropping to around 120 but postprandials remain high in the  200+ he is not inclined to do any dietary control  He has underlying history of CVA with resultant left-sided hemiplegia.    However reporting significant improvement in strength.  Recheck labs show persistent renal impairment with a creatinine of 2.3      Past Medical History     Active Ambulatory (Non-Hospital) Problems    Diagnosis   ? ACP (advance care planning)   ? Influenza A   ? Generalized muscle weakness   ? Chronic kidney disease, stage IV (severe) (H)   ? Slow transit constipation   ? UTI (urinary tract infection)   ? 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 Anxiety   ? History of stroke   ? Medication monitoring encounter   ? History of bladder cancer   ? History of rectal cancer   ? Carotid artery stenosis, asymptomatic, left   ? Rectal cancer (H)   ? Hypertension   ? B12 deficiency   ? 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 Anemia    ? Bladder cancer (H)    ? Cancer (H)    ? Diabetes mellitus (H)    ? Hyperkalemia    ? Hypertension    ? Seizure (H)    ? Stroke (H)    ? Superficial phlebitis    ? Venous insufficiency of both lower extremities        Past Social History     Reviewed, and he  reports that he quit smoking about 24 years ago. He has never used smokeless tobacco. He reports that he does not drink alcohol or use drugs.    Family History     Reviewed, and family history includes COPD in his father.    Medication List   Post Discharge Medication Reconciliation Status: discharge medications reconciled, continue medications without change   Current Outpatient Medications on File Prior to Visit   Medication Sig Dispense Refill   ? acetaminophen (TYLENOL) 325 MG tablet Take 2 tablets (650 mg total) by mouth every 4 (four) hours as needed.  0   ? amLODIPine (NORVASC) 10 MG tablet Take 1 tablet (10 mg total) by mouth daily.     ? atorvastatin (LIPITOR) 20 MG tablet Take 1 tablet (20 mg total) by mouth daily. 90 tablet 3   ?  "blood glucose test strips Check blood sugar 3 times per day. Accu-Chek Guide test striips. 100 strip 3   ? cholecalciferol, vitamin D3, (VITAMIN D3) 1,000 unit capsule Take 2 capsules (2,000 Units total) by mouth daily. 180 capsule 0   ? cloNIDine HCl (CATAPRES) 0.3 MG tablet Take 0.5 tablets (0.15 mg total) by mouth 3 (three) times a day.  0   ? clopidogrel (PLAVIX) 75 mg tablet Take 1 tablet (75 mg total) by mouth daily. 90 tablet 0   ? cyanocobalamin 1,000 mcg/mL injection Inject 1,000 mcg into the shoulder, thigh, or buttocks every 3 (three) months.            ? cyanocobalamin, vitamin B-12, (VITAMIN B-12) 1,000 mcg Subl Place 1 tablet (1,000 mcg total) under the tongue daily. 90 tablet 3   ? diclofenac sodium (VOLTAREN) 1 % Gel Apply 4 g topically 3 (three) times a day. Apply to knees (Patient taking differently: Apply 4 g topically 3 (three) times a day as needed. Apply to knees ) 100 g 0   ? furosemide (LASIX) 40 MG tablet Take 40 mg by mouth daily.     ? generic lancets Fastclix lancets. Check blood sugar 3 times per day. 102 each 3   ? glimepiride (AMARYL) 4 MG tablet Take 4 mg by mouth twice a day with lunch and supper.     ? hydrALAZINE (APRESOLINE) 25 MG tablet Take 25 mg by mouth 4 (four) times a day.     ? insulin glargine (LANTUS SOLOSTAR PEN) 100 unit/mL (3 mL) pen Inject 10 Units under the skin at bedtime.     ? metoprolol succinate (TOPROL-XL) 25 MG Take 1 tablet (25 mg total) by mouth daily. 90 tablet 3   ? omeprazole (PRILOSEC) 20 MG capsule Take 1 capsule (20 mg total) by mouth 2 (two) times a day before meals.  0   ? ONETOUCH ULTRA2 METER [facility] [place]ED 1 each 0   ? pen needle, diabetic (BD ULTRA-FINE JONO PEN NEEDLE) 32 gauge x 5/32\" Ndle Use one needle per day to inject insulin. 100 each 4   ? polyethylene glycol (MIRALAX) 17 gram packet Take 1 packet (17 g total) by mouth daily as needed.  0   ? psyllium husk (DAILY FIBER ORAL) Take 1 tablet by mouth 2 (two) times a day.            ? " "sertraline (ZOLOFT) 50 MG tablet Take 50 mg by mouth daily.       No current facility-administered medications on file prior to visit.        Allergies     Allergies   Allergen Reactions   ? Cefazolin      \"felt very hot\"   ? Pravastatin      Increased peripheral neuropathy   ? Simvastatin      Increased peripheral neuropathy   ? Thiazides      Other: Gout         Review of Systems   A comprehensive review of 14 systems was done. Pertinent findings noted here and in history of present illness. All the rest negative.  Constitutional: Negative.  Negative for fever, chills, he has activity change, appetite change and fatigue.   HENT: Negative for congestion and facial swelling.    Eyes: Negative for photophobia, redness and visual disturbance.   Respiratory: Negative for cough and chest tightness.    Cardiovascular: Negative for chest pain, palpitations and has chronic leg swelling.   Gastrointestinal: Negative for nausea, diarrhea, constipation, blood in stool and abdominal distention.   Genitourinary: Negative.  Has significant incontinence  Musculoskeletal: Negative.  REPorting significant weakness and difficulty ambulating  Skin: Negative.    Neurological: Negative for dizziness, tremors, syncope, weakness, light-headedness and headaches.   Hematological: Does not bruise/bleed easily.   Psychiatric/Behavioral: Negative.        Physical Exam     Recent Vitals 1/21/2020   Height -   Weight 188 lbs   BSA (m2) 2.01 m2   /77   Pulse 64   Temp 98.6   Temp src -   SpO2 -   Some recent data might be hidden   Pulse is 79 temp is 98 and sats are 90% on room air    Constitutional: Oriented to person, place, and time and appears well-developed.   HEENT:  Normocephalic and atraumatic.  Eyes: Conjunctivae and EOM are normal. Pupils are equal, round, and reactive to light. No discharge.  No scleral icterus. Nose normal. Mouth/Throat: Oropharynx is clear and moist. No oropharyngeal exudate.    NECK: Normal range of motion. " Neck supple. No JVD present. No tracheal deviation present. No thyromegaly present.   CARDIOVASCULAR: Normal rate, regular rhythm and intact distal pulses.  Exam reveals no gallop and no friction rub.  Systolic murmur present.  PULMONARY: Effort normal and breath sounds normal. No respiratory distress.No Wheezing or rales.  ABDOMEN: Soft. Bowel sounds are normal. No distension and no mass.  There is no tenderness. There is no rebound and no guarding. No HSM.  MUSCULOSKELETAL: Normal range of motion.  Has bilateral 2+ leg edema and no tenderness. Mild kyphosis, no tenderness.  Bilateral lower extremity weakness noted on exam  LYMPH NODES: Has no cervical, supraclavicular, axillary and groin adenopathy.   NEUROLOGICAL: Alert and oriented to person, place, and time. No cranial nerve deficit.  Normal muscle tone. Coordination normal.   Chronic left-sided weakness from previous CVA  GENITOURINARY: Deferred exam.  SKIN: Skin is warm and dry. No rash noted. No erythema. No pallor.   EXTREMITIES: No cyanosis, no clubbing, has bilateral 2+ leg edema. No Deformity.  PSYCHIATRIC: Normal mood, affect and behavior.      Lab Results     Recent Results (from the past 240 hour(s))   Basic Metabolic Panel    Collection Time: 01/20/20 11:25 AM   Result Value Ref Range    Sodium 135 (L) 136 - 145 mmol/L    Potassium 4.2 3.5 - 5.0 mmol/L    Chloride 105 98 - 107 mmol/L    CO2 21 (L) 22 - 31 mmol/L    Anion Gap, Calculation 9 5 - 18 mmol/L    Glucose 273 (H) 70 - 125 mg/dL    Calcium 8.6 8.5 - 10.5 mg/dL    BUN 45 (H) 8 - 28 mg/dL    Creatinine 2.36 (H) 0.70 - 1.30 mg/dL    GFR MDRD Af Amer 32 (L) >60 mL/min/1.73m2    GFR MDRD Non Af Amer 27 (L) >60 mL/min/1.73m2   HM2(CBC w/o Differential)    Collection Time: 01/20/20 11:25 AM   Result Value Ref Range    WBC 5.9 4.0 - 11.0 thou/uL    RBC 2.90 (L) 4.40 - 6.20 mill/uL    Hemoglobin 8.6 (L) 14.0 - 18.0 g/dL    Hematocrit 27.4 (L) 40.0 - 54.0 %    MCV 95 80 - 100 fL    MCH 29.7 27.0 - 34.0  pg    MCHC 31.4 (L) 32.0 - 36.0 g/dL    RDW 14.4 11.0 - 14.5 %    Platelets 279 140 - 440 thou/uL    MPV 9.5 8.5 - 12.5 fL   Magnesium    Collection Time: 01/20/20 11:25 AM   Result Value Ref Range    Magnesium 1.9 1.8 - 2.6 mg/dL                KATIE Florez           2

## 2022-09-21 NOTE — TELEPHONE ENCOUNTER
"Routing refill request to provider for review/approval because:  Labs not current:      Last Written Prescription Date:  9/8/21  Last Fill Quantity: 90,  # refills: 3   Last office visit provider:  7/5/22     Requested Prescriptions   Pending Prescriptions Disp Refills     linagliptin (TRADJENTA) 5 MG TABS tablet 90 tablet 3     Sig: Take 1 tablet (5 mg) by mouth daily       DPP4 Inhibitors Protocol Failed - 9/19/2022  4:32 PM        Failed - HgbA1C in past 3 or 6 months     If HgbA1C is 8 or greater, it needs to be on file within the past 3 months.  If less than 8, must be on file within the past 6 months.     Recent Labs   Lab Test 06/02/21  1203   A1C 6.6*             Failed - Normal serum creatinine in past 12 months     Recent Labs   Lab Test 06/02/21  1203   CR 2.49*       Ok to refill medication if creatinine is low          Passed - Medication is active on med list        Passed - Patient is age 18 or older        Passed - Recent (6 mo) or future (30 days) visit within the authorizing provider's specialty     Patient had office visit in the last 6 months or has a visit in the next 30 days with authorizing provider.  See \"Patient Info\" tab in inbasket, or \"Choose Columns\" in Meds & Orders section of the refill encounter.                 Freya Hugo RN 09/21/22 2:16 PM  "

## 2022-09-22 NOTE — TELEPHONE ENCOUNTER
Medication refill request declined: should have refills on file   Disp Refills Start End VASQUEZ   linagliptin (TRADJENTA) 5 MG TABS tablet 90 tablet 3 9/21/2022  No   Sig - Route: Take 1 tablet (5 mg) by mouth daily - Oral   Sent to pharmacy as: Tradjenta 5 MG Oral Tablet (linagliptin)   Class: E-Prescribe   Order: [id number]   E-Prescribing Status: Receipt confirmed by pharmacy (9/21/2022  3:18 PM CDT)     Destinee Garcia RN BSN 9/22/2022 9:37 AM

## 2022-09-25 NOTE — TELEPHONE ENCOUNTER
"Routing refill request to provider for review/approval because:  Labs out of range:  CR, NA,   Labs not current:  CR, NA, K    Last Written Prescription Date:  9/24/2021  Last Fill Quantity: 90,  # refills: 3   Last office visit provider:  7/5/2022     Requested Prescriptions   Pending Prescriptions Disp Refills     furosemide (LASIX) 40 MG tablet [Pharmacy Med Name: FUROSEMIDE 40MG] 90 tablet 2     Sig: TAKE 1 TABLET (40 MG TOTAL) BY MOUTH DAILY.       Diuretics (Including Combos) Protocol Failed - 9/25/2022  2:41 PM        Failed - Normal serum creatinine on file in past 12 months     Recent Labs   Lab Test 06/02/21  1203   CR 2.49*              Failed - Normal serum sodium on file in past 12 months     Recent Labs   Lab Test 06/02/21  1203   *              Passed - Blood pressure under 140/90 in past 12 months     BP Readings from Last 3 Encounters:   07/05/22 106/50   06/27/22 100/56   02/01/22 128/52                 Passed - Recent (12 mo) or future (30 days) visit within the authorizing provider's specialty     Patient has had an office visit with the authorizing provider or a provider within the authorizing providers department within the previous 12 mos or has a future within next 30 days. See \"Patient Info\" tab in inbasket, or \"Choose Columns\" in Meds & Orders section of the refill encounter.              Passed - Medication is active on med list        Passed - Patient is age 18 or older        Passed - Normal serum potassium on file in past 12 months     Recent Labs   Lab Test 06/02/21  1203   POTASSIUM 4.0                         Isha Walker RN 09/25/22 2:42 PM  "

## 2022-10-25 NOTE — TELEPHONE ENCOUNTER
"Routing refill request to provider for review/approval because:  A break in medication  Lancets also ordered as No Brand Specified. Please review.     Last Written Prescription Date:  9/17/2020  Last Fill Quantity: 300,  # refills: 3   Last office visit provider:  7/5/2022     Requested Prescriptions   Pending Prescriptions Disp Refills     Lancets (ONETOUCH DELICA PLUS [id number]) MISC [Pharmacy Med Name: ONETOUCH DELICA PLUS 33G]  2     Sig: USE TO TEST BLOOD SUGAR 3 TIMES DAILY OR AS DIRECTED.       Diabetic Supplies Protocol Failed - 10/25/2022  2:42 PM        Failed - Medication is active on med list        Passed - Patient is 18 years of age or older        Passed - Recent (6 mo) or future (30 days) visit within the authorizing provider's specialty     Patient had office visit in the last 6 months or has a visit in the next 30 days with authorizing provider.  See \"Patient Info\" tab in inbasket, or \"Choose Columns\" in Meds & Orders section of the refill encounter.                 Isha Walker RN 10/25/22 2:45 PM  "

## 2022-12-15 NOTE — TELEPHONE ENCOUNTER
"Last Written Prescription Date: 6/6/2022  Last Fill Quantity:30,  # refills: 5   Last office visit provider: 7/5/2022 with PCP Dr LIDIA Torre     Requested Prescriptions   Pending Prescriptions Disp Refills     potassium chloride ER (KLOR-CON M) 20 MEQ CR tablet [Pharmacy Med Name: POTASSIUM CL MICRO 20MEQ ER] 30 tablet 4     Sig: TAKE 1 TABLET (20 MEQ TOTAL) BY MOUTH DAILY.       Potassium Supplements Protocol Passed - 12/14/2022  4:40 PM        Passed - Recent (12 mo) or future (30 days) visit within the authorizing provider's department     Patient has had an office visit with the authorizing provider or a provider within the authorizing providers department within the previous 12 mos or has a future within next 30 days. See \"Patient Info\" tab in inbasket, or \"Choose Columns\" in Meds & Orders section of the refill encounter.              Passed - Medication is active on med list        Passed - Patient is age 18 or older        Passed - Normal serum potassium in past 12 months     Recent Labs   Lab Test 06/02/21  1203   POTASSIUM 4.0                         Stefany Romero RN 12/15/22 3:40 PM  "

## 2022-12-22 NOTE — TELEPHONE ENCOUNTER
"Last Written Prescription Date:  11/8/21  Last Fill Quantity: 135,  # refills: 3   Last office visit provider:  7/5/22     Requested Prescriptions   Pending Prescriptions Disp Refills     sertraline (ZOLOFT) 50 MG tablet [Pharmacy Med Name: SERTRALINE 50MG] 135 tablet 2     Sig: TAKE ONE & ONE -HALF (1&1/2) TABLETS BY MOUTH DAILY       SSRIs Protocol Passed - 12/21/2022 11:15 AM        Passed - PHQ-9 score less than 5 in past 6 months     Please review last PHQ-9 score.           Passed - Medication is active on med list        Passed - Patient is age 18 or older        Passed - Recent (6 mo) or future (30 days) visit within the authorizing provider's specialty     Patient had office visit in the last 6 months or has a visit in the next 30 days with authorizing provider or within the authorizing provider's specialty.  See \"Patient Info\" tab in inbasket, or \"Choose Columns\" in Meds & Orders section of the refill encounter.                 Ce Logan RN 12/22/22 1:33 PM  "

## 2022-12-22 NOTE — TELEPHONE ENCOUNTER
"Routing refill request to provider for review/approval because:  Labs not current:  LDL    Last Written Prescription Date:  3/25/22  Last Fill Quantity: 90,  # refills: 2   Last office visit provider:  7/5/22     Requested Prescriptions   Pending Prescriptions Disp Refills     atorvastatin (LIPITOR) 20 MG tablet [Pharmacy Med Name: ATORVASTATIN 20MG] 90 tablet 1     Sig: TAKE 1 TABLET (20 MG TOTAL) BY MOUTH DAILY.       Statins Protocol Failed - 12/21/2022 11:15 AM        Failed - LDL on file in past 12 months     Recent Labs   Lab Test 05/30/18  1125                Passed - No abnormal creatine kinase in past 12 months     No lab results found.             Passed - Recent (12 mo) or future (30 days) visit within the authorizing provider's specialty     Patient has had an office visit with the authorizing provider or a provider within the authorizing providers department within the previous 12 mos or has a future within next 30 days. See \"Patient Info\" tab in inbasket, or \"Choose Columns\" in Meds & Orders section of the refill encounter.              Passed - Medication is active on med list        Passed - Patient is age 18 or older             Ce Logan RN 12/22/22 1:35 PM  "

## 2023-01-01 ENCOUNTER — APPOINTMENT (OUTPATIENT)
Dept: PHYSICAL THERAPY | Facility: HOSPITAL | Age: 83
DRG: 291 | End: 2023-01-01
Payer: COMMERCIAL

## 2023-01-01 ENCOUNTER — HOSPITAL ENCOUNTER (INPATIENT)
Facility: HOSPITAL | Age: 83
LOS: 16 days | DRG: 291 | End: 2023-04-08
Attending: EMERGENCY MEDICINE | Admitting: INTERNAL MEDICINE
Payer: COMMERCIAL

## 2023-01-01 ENCOUNTER — DOCUMENTATION ONLY (OUTPATIENT)
Dept: OTHER | Facility: CLINIC | Age: 83
End: 2023-01-01
Payer: COMMERCIAL

## 2023-01-01 ENCOUNTER — APPOINTMENT (OUTPATIENT)
Dept: OCCUPATIONAL THERAPY | Facility: HOSPITAL | Age: 83
DRG: 291 | End: 2023-01-01
Payer: COMMERCIAL

## 2023-01-01 ENCOUNTER — APPOINTMENT (OUTPATIENT)
Dept: NUCLEAR MEDICINE | Facility: HOSPITAL | Age: 83
DRG: 291 | End: 2023-01-01
Attending: INTERNAL MEDICINE
Payer: COMMERCIAL

## 2023-01-01 ENCOUNTER — APPOINTMENT (OUTPATIENT)
Dept: CARDIOLOGY | Facility: HOSPITAL | Age: 83
DRG: 291 | End: 2023-01-01
Attending: INTERNAL MEDICINE
Payer: COMMERCIAL

## 2023-01-01 ENCOUNTER — APPOINTMENT (OUTPATIENT)
Dept: ULTRASOUND IMAGING | Facility: HOSPITAL | Age: 83
DRG: 291 | End: 2023-01-01
Attending: INTERNAL MEDICINE
Payer: COMMERCIAL

## 2023-01-01 ENCOUNTER — HEALTH MAINTENANCE LETTER (OUTPATIENT)
Age: 83
End: 2023-01-01

## 2023-01-01 ENCOUNTER — APPOINTMENT (OUTPATIENT)
Dept: PHYSICAL THERAPY | Facility: HOSPITAL | Age: 83
DRG: 291 | End: 2023-01-01
Attending: INTERNAL MEDICINE
Payer: COMMERCIAL

## 2023-01-01 ENCOUNTER — MYC MEDICAL ADVICE (OUTPATIENT)
Dept: INTERNAL MEDICINE | Facility: CLINIC | Age: 83
End: 2023-01-01
Payer: COMMERCIAL

## 2023-01-01 ENCOUNTER — APPOINTMENT (OUTPATIENT)
Dept: OCCUPATIONAL THERAPY | Facility: HOSPITAL | Age: 83
DRG: 291 | End: 2023-01-01
Attending: INTERNAL MEDICINE
Payer: COMMERCIAL

## 2023-01-01 ENCOUNTER — VIRTUAL VISIT (OUTPATIENT)
Dept: INTERNAL MEDICINE | Facility: CLINIC | Age: 83
End: 2023-01-01
Payer: COMMERCIAL

## 2023-01-01 VITALS
WEIGHT: 155.87 LBS | HEIGHT: 67 IN | SYSTOLIC BLOOD PRESSURE: 138 MMHG | BODY MASS INDEX: 24.46 KG/M2 | TEMPERATURE: 98.2 F | HEART RATE: 78 BPM | DIASTOLIC BLOOD PRESSURE: 66 MMHG | RESPIRATION RATE: 20 BRPM | OXYGEN SATURATION: 87 %

## 2023-01-01 DIAGNOSIS — T14.8XXA OPEN WOUND: ICD-10-CM

## 2023-01-01 DIAGNOSIS — I10 ESSENTIAL HYPERTENSION: ICD-10-CM

## 2023-01-01 DIAGNOSIS — E11.9 TYPE 2 DIABETES MELLITUS (H): ICD-10-CM

## 2023-01-01 DIAGNOSIS — Z51.89 VISIT FOR WOUND CHECK: Primary | ICD-10-CM

## 2023-01-01 DIAGNOSIS — Z79.4 TYPE 2 DIABETES MELLITUS WITH STAGE 4 CHRONIC KIDNEY DISEASE, WITH LONG-TERM CURRENT USE OF INSULIN (H): ICD-10-CM

## 2023-01-01 DIAGNOSIS — E11.22 TYPE 2 DIABETES MELLITUS WITH STAGE 4 CHRONIC KIDNEY DISEASE, WITH LONG-TERM CURRENT USE OF INSULIN (H): ICD-10-CM

## 2023-01-01 DIAGNOSIS — M62.81 GENERALIZED MUSCLE WEAKNESS: ICD-10-CM

## 2023-01-01 DIAGNOSIS — N18.4 CHRONIC KIDNEY DISEASE, STAGE IV (SEVERE) (H): ICD-10-CM

## 2023-01-01 DIAGNOSIS — N18.4 TYPE 2 DIABETES MELLITUS WITH STAGE 4 CHRONIC KIDNEY DISEASE, WITH LONG-TERM CURRENT USE OF INSULIN (H): ICD-10-CM

## 2023-01-01 DIAGNOSIS — D64.9 SYMPTOMATIC ANEMIA: ICD-10-CM

## 2023-01-01 DIAGNOSIS — N18.9 CHRONIC KIDNEY DISEASE, UNSPECIFIED CKD STAGE: ICD-10-CM

## 2023-01-01 DIAGNOSIS — I25.10 CORONARY ARTERY DISEASE INVOLVING NATIVE CORONARY ARTERY OF NATIVE HEART WITHOUT ANGINA PECTORIS: Primary | ICD-10-CM

## 2023-01-01 LAB
ABO/RH(D): NORMAL
ALBUMIN SERPL BCG-MCNC: 2.6 G/DL (ref 3.5–5.2)
ALBUMIN SERPL BCG-MCNC: 2.7 G/DL (ref 3.5–5.2)
ALBUMIN SERPL BCG-MCNC: 3.5 G/DL (ref 3.5–5.2)
ALBUMIN UR-MCNC: 100 MG/DL
ALP SERPL-CCNC: 77 U/L (ref 40–129)
ALT SERPL W P-5'-P-CCNC: 24 U/L (ref 10–50)
ANION GAP SERPL CALCULATED.3IONS-SCNC: 12 MMOL/L (ref 7–15)
ANION GAP SERPL CALCULATED.3IONS-SCNC: 13 MMOL/L (ref 7–15)
ANION GAP SERPL CALCULATED.3IONS-SCNC: 14 MMOL/L (ref 7–15)
ANION GAP SERPL CALCULATED.3IONS-SCNC: 14 MMOL/L (ref 7–15)
ANION GAP SERPL CALCULATED.3IONS-SCNC: 15 MMOL/L (ref 7–15)
ANION GAP SERPL CALCULATED.3IONS-SCNC: 16 MMOL/L (ref 7–15)
ANION GAP SERPL CALCULATED.3IONS-SCNC: 16 MMOL/L (ref 7–15)
ANION GAP SERPL CALCULATED.3IONS-SCNC: 18 MMOL/L (ref 7–15)
ANTIBODY SCREEN: NEGATIVE
APPEARANCE UR: CLEAR
APTT PPP: 39 SECONDS (ref 22–38)
AST SERPL W P-5'-P-CCNC: 22 U/L (ref 10–50)
ATRIAL RATE - MUSE: 61 BPM
BASOPHILS # BLD AUTO: 0 10E3/UL (ref 0–0.2)
BASOPHILS NFR BLD AUTO: 1 %
BILIRUB SERPL-MCNC: 0.3 MG/DL
BILIRUB UR QL STRIP: NEGATIVE
BLD PROD TYP BPU: NORMAL
BLD PROD TYP BPU: NORMAL
BLOOD COMPONENT TYPE: NORMAL
BLOOD COMPONENT TYPE: NORMAL
BUN SERPL-MCNC: 102.2 MG/DL (ref 8–23)
BUN SERPL-MCNC: 106.4 MG/DL (ref 8–23)
BUN SERPL-MCNC: 108.3 MG/DL (ref 8–23)
BUN SERPL-MCNC: 110.9 MG/DL (ref 8–23)
BUN SERPL-MCNC: 80.3 MG/DL (ref 8–23)
BUN SERPL-MCNC: 82.7 MG/DL (ref 8–23)
BUN SERPL-MCNC: 84.7 MG/DL (ref 8–23)
BUN SERPL-MCNC: 85.4 MG/DL (ref 8–23)
BUN SERPL-MCNC: 86 MG/DL (ref 8–23)
BUN SERPL-MCNC: 92.2 MG/DL (ref 8–23)
BUN SERPL-MCNC: 98.2 MG/DL (ref 8–23)
C COLI+JEJUNI+LARI FUSA STL QL NAA+PROBE: NOT DETECTED
CALCIUM SERPL-MCNC: 7.1 MG/DL (ref 8.8–10.2)
CALCIUM SERPL-MCNC: 7.1 MG/DL (ref 8.8–10.2)
CALCIUM SERPL-MCNC: 7.4 MG/DL (ref 8.8–10.2)
CALCIUM SERPL-MCNC: 7.5 MG/DL (ref 8.8–10.2)
CALCIUM SERPL-MCNC: 7.6 MG/DL (ref 8.8–10.2)
CALCIUM SERPL-MCNC: 8 MG/DL (ref 8.8–10.2)
CALCIUM SERPL-MCNC: 8.1 MG/DL (ref 8.8–10.2)
CALCIUM SERPL-MCNC: 8.3 MG/DL (ref 8.8–10.2)
CALCIUM SERPL-MCNC: 8.9 MG/DL (ref 8.8–10.2)
CHLORIDE SERPL-SCNC: 100 MMOL/L (ref 98–107)
CHLORIDE SERPL-SCNC: 102 MMOL/L (ref 98–107)
CHLORIDE SERPL-SCNC: 103 MMOL/L (ref 98–107)
CHLORIDE SERPL-SCNC: 104 MMOL/L (ref 98–107)
CHLORIDE SERPL-SCNC: 104 MMOL/L (ref 98–107)
CHLORIDE SERPL-SCNC: 107 MMOL/L (ref 98–107)
CHLORIDE SERPL-SCNC: 96 MMOL/L (ref 98–107)
CHLORIDE SERPL-SCNC: 97 MMOL/L (ref 98–107)
CHLORIDE SERPL-SCNC: 97 MMOL/L (ref 98–107)
CHLORIDE SERPL-SCNC: 98 MMOL/L (ref 98–107)
CHLORIDE SERPL-SCNC: 99 MMOL/L (ref 98–107)
CODING SYSTEM: NORMAL
CODING SYSTEM: NORMAL
COLOR UR AUTO: COLORLESS
CREAT SERPL-MCNC: 5.82 MG/DL (ref 0.67–1.17)
CREAT SERPL-MCNC: 5.9 MG/DL (ref 0.67–1.17)
CREAT SERPL-MCNC: 6.02 MG/DL (ref 0.67–1.17)
CREAT SERPL-MCNC: 6.03 MG/DL (ref 0.67–1.17)
CREAT SERPL-MCNC: 6.11 MG/DL (ref 0.67–1.17)
CREAT SERPL-MCNC: 6.12 MG/DL (ref 0.67–1.17)
CREAT SERPL-MCNC: 6.13 MG/DL (ref 0.67–1.17)
CREAT SERPL-MCNC: 6.31 MG/DL (ref 0.67–1.17)
CREAT SERPL-MCNC: 6.36 MG/DL (ref 0.67–1.17)
CREAT SERPL-MCNC: 6.46 MG/DL (ref 0.67–1.17)
CREAT SERPL-MCNC: 6.75 MG/DL (ref 0.67–1.17)
CROSSMATCH: NORMAL
CROSSMATCH: NORMAL
CV STRESS CURRENT BP HE: NORMAL
CV STRESS CURRENT HR HE: 57
CV STRESS CURRENT HR HE: 58
CV STRESS CURRENT HR HE: 60
CV STRESS CURRENT HR HE: 64
CV STRESS CURRENT HR HE: 64
CV STRESS CURRENT HR HE: 65
CV STRESS CURRENT HR HE: 66
CV STRESS CURRENT HR HE: 66
CV STRESS CURRENT HR HE: 69
CV STRESS CURRENT HR HE: 69
CV STRESS CURRENT HR HE: 75
CV STRESS CURRENT HR HE: 78
CV STRESS CURRENT HR HE: 81
CV STRESS DEVIATION TIME HE: NORMAL
CV STRESS ECHO PERCENT HR HE: NORMAL
CV STRESS EXERCISE STAGE HE: NORMAL
CV STRESS FINAL RESTING BP HE: NORMAL
CV STRESS FINAL RESTING HR HE: 65
CV STRESS MAX HR HE: 81
CV STRESS MAX TREADMILL GRADE HE: 0
CV STRESS MAX TREADMILL SPEED HE: 0
CV STRESS PEAK DIA BP HE: NORMAL
CV STRESS PEAK SYS BP HE: NORMAL
CV STRESS PHASE HE: NORMAL
CV STRESS PROTOCOL HE: NORMAL
CV STRESS RESTING PT POSITION HE: NORMAL
CV STRESS RESTING PT POSITION HE: NORMAL
CV STRESS ST DEVIATION AMOUNT HE: NORMAL
CV STRESS ST DEVIATION ELEVATION HE: NORMAL
CV STRESS ST EVELATION AMOUNT HE: NORMAL
CV STRESS TEST TYPE HE: NORMAL
CV STRESS TOTAL STAGE TIME MIN 1 HE: NORMAL
DEPRECATED HCO3 PLAS-SCNC: 15 MMOL/L (ref 22–29)
DEPRECATED HCO3 PLAS-SCNC: 16 MMOL/L (ref 22–29)
DEPRECATED HCO3 PLAS-SCNC: 17 MMOL/L (ref 22–29)
DEPRECATED HCO3 PLAS-SCNC: 18 MMOL/L (ref 22–29)
DIASTOLIC BLOOD PRESSURE - MUSE: NORMAL MMHG
EC STX1 GENE STL QL NAA+PROBE: NOT DETECTED
EC STX2 GENE STL QL NAA+PROBE: NOT DETECTED
EOSINOPHIL # BLD AUTO: 0.1 10E3/UL (ref 0–0.7)
EOSINOPHIL NFR BLD AUTO: 2 %
ERYTHROCYTE [DISTWIDTH] IN BLOOD BY AUTOMATED COUNT: 16.5 % (ref 10–15)
ERYTHROCYTE [DISTWIDTH] IN BLOOD BY AUTOMATED COUNT: 17.2 % (ref 10–15)
ERYTHROCYTE [DISTWIDTH] IN BLOOD BY AUTOMATED COUNT: 17.3 % (ref 10–15)
GFR SERPL CREATININE-BSD FRML MDRD: 8 ML/MIN/1.73M2
GFR SERPL CREATININE-BSD FRML MDRD: 9 ML/MIN/1.73M2
GLUCOSE BLDC GLUCOMTR-MCNC: 109 MG/DL (ref 70–99)
GLUCOSE BLDC GLUCOMTR-MCNC: 118 MG/DL (ref 70–99)
GLUCOSE BLDC GLUCOMTR-MCNC: 120 MG/DL (ref 70–99)
GLUCOSE BLDC GLUCOMTR-MCNC: 123 MG/DL (ref 70–99)
GLUCOSE BLDC GLUCOMTR-MCNC: 124 MG/DL (ref 70–99)
GLUCOSE BLDC GLUCOMTR-MCNC: 124 MG/DL (ref 70–99)
GLUCOSE BLDC GLUCOMTR-MCNC: 125 MG/DL (ref 70–99)
GLUCOSE BLDC GLUCOMTR-MCNC: 126 MG/DL (ref 70–99)
GLUCOSE BLDC GLUCOMTR-MCNC: 129 MG/DL (ref 70–99)
GLUCOSE BLDC GLUCOMTR-MCNC: 131 MG/DL (ref 70–99)
GLUCOSE BLDC GLUCOMTR-MCNC: 136 MG/DL (ref 70–99)
GLUCOSE BLDC GLUCOMTR-MCNC: 138 MG/DL (ref 70–99)
GLUCOSE BLDC GLUCOMTR-MCNC: 140 MG/DL (ref 70–99)
GLUCOSE BLDC GLUCOMTR-MCNC: 140 MG/DL (ref 70–99)
GLUCOSE BLDC GLUCOMTR-MCNC: 141 MG/DL (ref 70–99)
GLUCOSE BLDC GLUCOMTR-MCNC: 142 MG/DL (ref 70–99)
GLUCOSE BLDC GLUCOMTR-MCNC: 144 MG/DL (ref 70–99)
GLUCOSE BLDC GLUCOMTR-MCNC: 145 MG/DL (ref 70–99)
GLUCOSE BLDC GLUCOMTR-MCNC: 149 MG/DL (ref 70–99)
GLUCOSE BLDC GLUCOMTR-MCNC: 155 MG/DL (ref 70–99)
GLUCOSE BLDC GLUCOMTR-MCNC: 156 MG/DL (ref 70–99)
GLUCOSE BLDC GLUCOMTR-MCNC: 156 MG/DL (ref 70–99)
GLUCOSE BLDC GLUCOMTR-MCNC: 157 MG/DL (ref 70–99)
GLUCOSE BLDC GLUCOMTR-MCNC: 159 MG/DL (ref 70–99)
GLUCOSE BLDC GLUCOMTR-MCNC: 162 MG/DL (ref 70–99)
GLUCOSE BLDC GLUCOMTR-MCNC: 164 MG/DL (ref 70–99)
GLUCOSE BLDC GLUCOMTR-MCNC: 164 MG/DL (ref 70–99)
GLUCOSE BLDC GLUCOMTR-MCNC: 174 MG/DL (ref 70–99)
GLUCOSE BLDC GLUCOMTR-MCNC: 175 MG/DL (ref 70–99)
GLUCOSE BLDC GLUCOMTR-MCNC: 176 MG/DL (ref 70–99)
GLUCOSE BLDC GLUCOMTR-MCNC: 180 MG/DL (ref 70–99)
GLUCOSE BLDC GLUCOMTR-MCNC: 181 MG/DL (ref 70–99)
GLUCOSE BLDC GLUCOMTR-MCNC: 185 MG/DL (ref 70–99)
GLUCOSE BLDC GLUCOMTR-MCNC: 185 MG/DL (ref 70–99)
GLUCOSE BLDC GLUCOMTR-MCNC: 187 MG/DL (ref 70–99)
GLUCOSE BLDC GLUCOMTR-MCNC: 188 MG/DL (ref 70–99)
GLUCOSE BLDC GLUCOMTR-MCNC: 189 MG/DL (ref 70–99)
GLUCOSE BLDC GLUCOMTR-MCNC: 190 MG/DL (ref 70–99)
GLUCOSE BLDC GLUCOMTR-MCNC: 191 MG/DL (ref 70–99)
GLUCOSE BLDC GLUCOMTR-MCNC: 191 MG/DL (ref 70–99)
GLUCOSE BLDC GLUCOMTR-MCNC: 194 MG/DL (ref 70–99)
GLUCOSE BLDC GLUCOMTR-MCNC: 196 MG/DL (ref 70–99)
GLUCOSE BLDC GLUCOMTR-MCNC: 198 MG/DL (ref 70–99)
GLUCOSE BLDC GLUCOMTR-MCNC: 199 MG/DL (ref 70–99)
GLUCOSE BLDC GLUCOMTR-MCNC: 202 MG/DL (ref 70–99)
GLUCOSE BLDC GLUCOMTR-MCNC: 203 MG/DL (ref 70–99)
GLUCOSE BLDC GLUCOMTR-MCNC: 207 MG/DL (ref 70–99)
GLUCOSE BLDC GLUCOMTR-MCNC: 209 MG/DL (ref 70–99)
GLUCOSE BLDC GLUCOMTR-MCNC: 210 MG/DL (ref 70–99)
GLUCOSE BLDC GLUCOMTR-MCNC: 210 MG/DL (ref 70–99)
GLUCOSE BLDC GLUCOMTR-MCNC: 212 MG/DL (ref 70–99)
GLUCOSE BLDC GLUCOMTR-MCNC: 214 MG/DL (ref 70–99)
GLUCOSE BLDC GLUCOMTR-MCNC: 215 MG/DL (ref 70–99)
GLUCOSE BLDC GLUCOMTR-MCNC: 217 MG/DL (ref 70–99)
GLUCOSE BLDC GLUCOMTR-MCNC: 221 MG/DL (ref 70–99)
GLUCOSE BLDC GLUCOMTR-MCNC: 222 MG/DL (ref 70–99)
GLUCOSE BLDC GLUCOMTR-MCNC: 235 MG/DL (ref 70–99)
GLUCOSE BLDC GLUCOMTR-MCNC: 245 MG/DL (ref 70–99)
GLUCOSE BLDC GLUCOMTR-MCNC: 252 MG/DL (ref 70–99)
GLUCOSE BLDC GLUCOMTR-MCNC: 91 MG/DL (ref 70–99)
GLUCOSE SERPL-MCNC: 111 MG/DL (ref 70–99)
GLUCOSE SERPL-MCNC: 123 MG/DL (ref 70–99)
GLUCOSE SERPL-MCNC: 132 MG/DL (ref 70–99)
GLUCOSE SERPL-MCNC: 135 MG/DL (ref 70–99)
GLUCOSE SERPL-MCNC: 135 MG/DL (ref 70–99)
GLUCOSE SERPL-MCNC: 137 MG/DL (ref 70–99)
GLUCOSE SERPL-MCNC: 138 MG/DL (ref 70–99)
GLUCOSE SERPL-MCNC: 138 MG/DL (ref 70–99)
GLUCOSE SERPL-MCNC: 140 MG/DL (ref 70–99)
GLUCOSE SERPL-MCNC: 141 MG/DL (ref 70–99)
GLUCOSE SERPL-MCNC: 153 MG/DL (ref 70–99)
GLUCOSE UR STRIP-MCNC: 200 MG/DL
HCT VFR BLD AUTO: 19.3 % (ref 40–53)
HCT VFR BLD AUTO: 25.6 % (ref 40–53)
HCT VFR BLD AUTO: 25.7 % (ref 40–53)
HEMOCCULT STL QL: POSITIVE
HGB BLD-MCNC: 5.9 G/DL (ref 13.3–17.7)
HGB BLD-MCNC: 7.3 G/DL (ref 13.3–17.7)
HGB BLD-MCNC: 7.3 G/DL (ref 13.3–17.7)
HGB BLD-MCNC: 7.4 G/DL (ref 13.3–17.7)
HGB BLD-MCNC: 7.4 G/DL (ref 13.3–17.7)
HGB BLD-MCNC: 7.5 G/DL (ref 13.3–17.7)
HGB BLD-MCNC: 7.5 G/DL (ref 13.3–17.7)
HGB BLD-MCNC: 7.7 G/DL (ref 13.3–17.7)
HGB BLD-MCNC: 7.9 G/DL (ref 13.3–17.7)
HGB BLD-MCNC: 8.1 G/DL (ref 13.3–17.7)
HGB BLD-MCNC: 8.3 G/DL (ref 13.3–17.7)
HGB BLD-MCNC: 8.4 G/DL (ref 13.3–17.7)
HGB BLD-MCNC: 8.4 G/DL (ref 13.3–17.7)
HGB UR QL STRIP: ABNORMAL
HOLD SPECIMEN: NORMAL
IMM GRANULOCYTES # BLD: 0.1 10E3/UL
IMM GRANULOCYTES NFR BLD: 2 %
INR PPP: 1.24 (ref 0.85–1.15)
INR PPP: 1.29 (ref 0.85–1.15)
INR PPP: 1.41 (ref 0.85–1.15)
INR PPP: 1.77 (ref 0.85–1.15)
INR PPP: 2.71 (ref 0.85–1.15)
INR PPP: 2.82 (ref 0.85–1.15)
INR PPP: 2.91 (ref 0.85–1.15)
INR PPP: 3 (ref 0.85–1.15)
INR PPP: 3.07 (ref 0.85–1.15)
INR PPP: 3.24 (ref 0.85–1.15)
INR PPP: 3.28 (ref 0.85–1.15)
INR PPP: 3.5 (ref 0.85–1.15)
INR PPP: 6.7 (ref 0.85–1.15)
INR PPP: 6.72 (ref 0.85–1.15)
INTERPRETATION ECG - MUSE: NORMAL
ISSUE DATE AND TIME: NORMAL
ISSUE DATE AND TIME: NORMAL
KETONES UR STRIP-MCNC: NEGATIVE MG/DL
LEUKOCYTE ESTERASE UR QL STRIP: NEGATIVE
LYMPHOCYTES # BLD AUTO: 0.4 10E3/UL (ref 0.8–5.3)
LYMPHOCYTES NFR BLD AUTO: 10 %
MAGNESIUM SERPL-MCNC: 1.8 MG/DL (ref 1.7–2.3)
MCH RBC QN AUTO: 28.9 PG (ref 26.5–33)
MCH RBC QN AUTO: 29.5 PG (ref 26.5–33)
MCH RBC QN AUTO: 29.6 PG (ref 26.5–33)
MCHC RBC AUTO-ENTMCNC: 30.6 G/DL (ref 31.5–36.5)
MCHC RBC AUTO-ENTMCNC: 31.6 G/DL (ref 31.5–36.5)
MCHC RBC AUTO-ENTMCNC: 32.7 G/DL (ref 31.5–36.5)
MCV RBC AUTO: 90 FL (ref 78–100)
MCV RBC AUTO: 93 FL (ref 78–100)
MCV RBC AUTO: 95 FL (ref 78–100)
MONOCYTES # BLD AUTO: 0.5 10E3/UL (ref 0–1.3)
MONOCYTES NFR BLD AUTO: 12 %
NEUTROPHILS # BLD AUTO: 3.2 10E3/UL (ref 1.6–8.3)
NEUTROPHILS NFR BLD AUTO: 73 %
NITRATE UR QL: NEGATIVE
NOROV GI+II ORF1-ORF2 JNC STL QL NAA+PR: NOT DETECTED
NRBC # BLD AUTO: 0 10E3/UL
NRBC BLD AUTO-RTO: 1 /100
NUC STRESS EJECTION FRACTION: 70 %
P AXIS - MUSE: 90 DEGREES
PH UR STRIP: 7 [PH] (ref 5–7)
PHOSPHATE SERPL-MCNC: 4.5 MG/DL (ref 2.5–4.5)
PHOSPHATE SERPL-MCNC: 5.1 MG/DL (ref 2.5–4.5)
PHOSPHATE SERPL-MCNC: 6.3 MG/DL (ref 2.5–4.5)
PHOSPHATE SERPL-MCNC: 7.3 MG/DL (ref 2.5–4.5)
PLATELET # BLD AUTO: 125 10E3/UL (ref 150–450)
PLATELET # BLD AUTO: 128 10E3/UL (ref 150–450)
PLATELET # BLD AUTO: 144 10E3/UL (ref 150–450)
POTASSIUM SERPL-SCNC: 4.5 MMOL/L (ref 3.4–5.3)
POTASSIUM SERPL-SCNC: 4.7 MMOL/L (ref 3.4–5.3)
POTASSIUM SERPL-SCNC: 4.8 MMOL/L (ref 3.4–5.3)
POTASSIUM SERPL-SCNC: 4.9 MMOL/L (ref 3.4–5.3)
POTASSIUM SERPL-SCNC: 4.9 MMOL/L (ref 3.4–5.3)
POTASSIUM SERPL-SCNC: 5 MMOL/L (ref 3.4–5.3)
POTASSIUM SERPL-SCNC: 5 MMOL/L (ref 3.4–5.3)
POTASSIUM SERPL-SCNC: 5.2 MMOL/L (ref 3.4–5.3)
POTASSIUM SERPL-SCNC: 5.3 MMOL/L (ref 3.4–5.3)
POTASSIUM SERPL-SCNC: 6 MMOL/L (ref 3.4–5.3)
PR INTERVAL - MUSE: 168 MS
PROT SERPL-MCNC: 6.2 G/DL (ref 6.4–8.3)
QRS DURATION - MUSE: 90 MS
QT - MUSE: 422 MS
QTC - MUSE: 428 MS
R AXIS - MUSE: 19 DEGREES
RATE PRESSURE PRODUCT: NORMAL
RBC # BLD AUTO: 2.04 10E6/UL (ref 4.4–5.9)
RBC # BLD AUTO: 2.74 10E6/UL (ref 4.4–5.9)
RBC # BLD AUTO: 2.85 10E6/UL (ref 4.4–5.9)
RBC URINE: 59 /HPF
RVA NSP5 STL QL NAA+PROBE: NOT DETECTED
SALMONELLA SP RPOD STL QL NAA+PROBE: NOT DETECTED
SHIGELLA SP+EIEC IPAH STL QL NAA+PROBE: NOT DETECTED
SODIUM SERPL-SCNC: 129 MMOL/L (ref 136–145)
SODIUM SERPL-SCNC: 130 MMOL/L (ref 136–145)
SODIUM SERPL-SCNC: 130 MMOL/L (ref 136–145)
SODIUM SERPL-SCNC: 131 MMOL/L (ref 136–145)
SODIUM SERPL-SCNC: 133 MMOL/L (ref 136–145)
SODIUM SERPL-SCNC: 135 MMOL/L (ref 136–145)
SODIUM SERPL-SCNC: 135 MMOL/L (ref 136–145)
SP GR UR STRIP: 1.01 (ref 1–1.03)
SPECIMEN EXPIRATION DATE: NORMAL
STRESS ECHO BASELINE DIASTOLIC HE: 84
STRESS ECHO BASELINE HR: 59
STRESS ECHO BASELINE SYSTOLIC BP: 197
STRESS ECHO CALCULATED PERCENT HR: 59 %
STRESS ECHO LAST STRESS DIASTOLIC BP: 78
STRESS ECHO LAST STRESS HR: 75
STRESS ECHO LAST STRESS SYSTOLIC BP: 170
STRESS ECHO TARGET HR: 137
SYSTOLIC BLOOD PRESSURE - MUSE: NORMAL MMHG
T AXIS - MUSE: 34 DEGREES
TROPONIN T SERPL HS-MCNC: 109 NG/L
TROPONIN T SERPL HS-MCNC: 121 NG/L
UNIT ABO/RH: NORMAL
UNIT ABO/RH: NORMAL
UNIT NUMBER: NORMAL
UNIT NUMBER: NORMAL
UNIT STATUS: NORMAL
UNIT STATUS: NORMAL
UNIT TYPE ISBT: 6200
UNIT TYPE ISBT: 6200
UROBILINOGEN UR STRIP-MCNC: <2 MG/DL
V CHOL+PARA RFBL+TRKH+TNAA STL QL NAA+PR: NOT DETECTED
VENTRICULAR RATE- MUSE: 62 BPM
WBC # BLD AUTO: 4.1 10E3/UL (ref 4–11)
WBC # BLD AUTO: 4.2 10E3/UL (ref 4–11)
WBC # BLD AUTO: 4.6 10E3/UL (ref 4–11)
WBC URINE: 3 /HPF
Y ENTERO RECN STL QL NAA+PROBE: NOT DETECTED

## 2023-01-01 PROCEDURE — 250N000013 HC RX MED GY IP 250 OP 250 PS 637: Performed by: INTERNAL MEDICINE

## 2023-01-01 PROCEDURE — 120N000004 HC R&B MS OVERFLOW

## 2023-01-01 PROCEDURE — 85018 HEMOGLOBIN: CPT | Performed by: INTERNAL MEDICINE

## 2023-01-01 PROCEDURE — 36415 COLL VENOUS BLD VENIPUNCTURE: CPT | Performed by: STUDENT IN AN ORGANIZED HEALTH CARE EDUCATION/TRAINING PROGRAM

## 2023-01-01 PROCEDURE — 84484 ASSAY OF TROPONIN QUANT: CPT | Performed by: STUDENT IN AN ORGANIZED HEALTH CARE EDUCATION/TRAINING PROGRAM

## 2023-01-01 PROCEDURE — 36415 COLL VENOUS BLD VENIPUNCTURE: CPT | Performed by: INTERNAL MEDICINE

## 2023-01-01 PROCEDURE — 80069 RENAL FUNCTION PANEL: CPT | Performed by: INTERNAL MEDICINE

## 2023-01-01 PROCEDURE — 99233 SBSQ HOSP IP/OBS HIGH 50: CPT | Performed by: FAMILY MEDICINE

## 2023-01-01 PROCEDURE — 99232 SBSQ HOSP IP/OBS MODERATE 35: CPT | Performed by: INTERNAL MEDICINE

## 2023-01-01 PROCEDURE — 250N000011 HC RX IP 250 OP 636: Performed by: INTERNAL MEDICINE

## 2023-01-01 PROCEDURE — G0463 HOSPITAL OUTPT CLINIC VISIT: HCPCS

## 2023-01-01 PROCEDURE — 99418 PROLNG IP/OBS E/M EA 15 MIN: CPT | Performed by: NURSE PRACTITIONER

## 2023-01-01 PROCEDURE — 99232 SBSQ HOSP IP/OBS MODERATE 35: CPT | Performed by: EMERGENCY MEDICINE

## 2023-01-01 PROCEDURE — 85025 COMPLETE CBC W/AUTO DIFF WBC: CPT | Performed by: EMERGENCY MEDICINE

## 2023-01-01 PROCEDURE — 99214 OFFICE O/P EST MOD 30 MIN: CPT | Mod: VID | Performed by: NURSE PRACTITIONER

## 2023-01-01 PROCEDURE — 250N000013 HC RX MED GY IP 250 OP 250 PS 637: Performed by: EMERGENCY MEDICINE

## 2023-01-01 PROCEDURE — 85610 PROTHROMBIN TIME: CPT | Performed by: INTERNAL MEDICINE

## 2023-01-01 PROCEDURE — 82962 GLUCOSE BLOOD TEST: CPT

## 2023-01-01 PROCEDURE — 250N000013 HC RX MED GY IP 250 OP 250 PS 637: Performed by: FAMILY MEDICINE

## 2023-01-01 PROCEDURE — 99239 HOSP IP/OBS DSCHRG MGMT >30: CPT | Performed by: STUDENT IN AN ORGANIZED HEALTH CARE EDUCATION/TRAINING PROGRAM

## 2023-01-01 PROCEDURE — 80053 COMPREHEN METABOLIC PANEL: CPT | Performed by: EMERGENCY MEDICINE

## 2023-01-01 PROCEDURE — 82272 OCCULT BLD FECES 1-3 TESTS: CPT | Performed by: INTERNAL MEDICINE

## 2023-01-01 PROCEDURE — 93971 EXTREMITY STUDY: CPT | Mod: LT

## 2023-01-01 PROCEDURE — 83735 ASSAY OF MAGNESIUM: CPT | Performed by: EMERGENCY MEDICINE

## 2023-01-01 PROCEDURE — 210N000001 HC R&B IMCU HEART CARE

## 2023-01-01 PROCEDURE — 85014 HEMATOCRIT: CPT | Performed by: INTERNAL MEDICINE

## 2023-01-01 PROCEDURE — 99233 SBSQ HOSP IP/OBS HIGH 50: CPT | Performed by: NURSE PRACTITIONER

## 2023-01-01 PROCEDURE — P9016 RBC LEUKOCYTES REDUCED: HCPCS | Performed by: EMERGENCY MEDICINE

## 2023-01-01 PROCEDURE — 99232 SBSQ HOSP IP/OBS MODERATE 35: CPT | Performed by: STUDENT IN AN ORGANIZED HEALTH CARE EDUCATION/TRAINING PROGRAM

## 2023-01-01 PROCEDURE — 99222 1ST HOSP IP/OBS MODERATE 55: CPT | Performed by: INTERNAL MEDICINE

## 2023-01-01 PROCEDURE — 250N000012 HC RX MED GY IP 250 OP 636 PS 637: Performed by: INTERNAL MEDICINE

## 2023-01-01 PROCEDURE — 97530 THERAPEUTIC ACTIVITIES: CPT | Mod: GO

## 2023-01-01 PROCEDURE — 93017 CV STRESS TEST TRACING ONLY: CPT | Performed by: INTERNAL MEDICINE

## 2023-01-01 PROCEDURE — 93018 CV STRESS TEST I&R ONLY: CPT | Performed by: INTERNAL MEDICINE

## 2023-01-01 PROCEDURE — 82310 ASSAY OF CALCIUM: CPT | Performed by: INTERNAL MEDICINE

## 2023-01-01 PROCEDURE — 99233 SBSQ HOSP IP/OBS HIGH 50: CPT | Performed by: INTERNAL MEDICINE

## 2023-01-01 PROCEDURE — 99231 SBSQ HOSP IP/OBS SF/LOW 25: CPT | Performed by: CLINICAL NURSE SPECIALIST

## 2023-01-01 PROCEDURE — 80048 BASIC METABOLIC PNL TOTAL CA: CPT | Performed by: INTERNAL MEDICINE

## 2023-01-01 PROCEDURE — 258N000001 HC RX 258: Performed by: INTERNAL MEDICINE

## 2023-01-01 PROCEDURE — 99232 SBSQ HOSP IP/OBS MODERATE 35: CPT | Performed by: CLINICAL NURSE SPECIALIST

## 2023-01-01 PROCEDURE — 97530 THERAPEUTIC ACTIVITIES: CPT | Mod: GP

## 2023-01-01 PROCEDURE — 36415 COLL VENOUS BLD VENIPUNCTURE: CPT | Performed by: EMERGENCY MEDICINE

## 2023-01-01 PROCEDURE — 87506 IADNA-DNA/RNA PROBE TQ 6-11: CPT | Performed by: INTERNAL MEDICINE

## 2023-01-01 PROCEDURE — 86850 RBC ANTIBODY SCREEN: CPT | Performed by: EMERGENCY MEDICINE

## 2023-01-01 PROCEDURE — 99223 1ST HOSP IP/OBS HIGH 75: CPT | Performed by: INTERNAL MEDICINE

## 2023-01-01 PROCEDURE — 250N000009 HC RX 250: Performed by: INTERNAL MEDICINE

## 2023-01-01 PROCEDURE — 86901 BLOOD TYPING SEROLOGIC RH(D): CPT | Performed by: EMERGENCY MEDICINE

## 2023-01-01 PROCEDURE — 97535 SELF CARE MNGMENT TRAINING: CPT | Mod: GO

## 2023-01-01 PROCEDURE — 97110 THERAPEUTIC EXERCISES: CPT | Mod: GP

## 2023-01-01 PROCEDURE — 343N000001 HC RX 343: Performed by: INTERNAL MEDICINE

## 2023-01-01 PROCEDURE — 78452 HT MUSCLE IMAGE SPECT MULT: CPT

## 2023-01-01 PROCEDURE — 93005 ELECTROCARDIOGRAM TRACING: CPT | Performed by: EMERGENCY MEDICINE

## 2023-01-01 PROCEDURE — 81001 URINALYSIS AUTO W/SCOPE: CPT | Performed by: EMERGENCY MEDICINE

## 2023-01-01 PROCEDURE — 99223 1ST HOSP IP/OBS HIGH 75: CPT | Performed by: CLINICAL NURSE SPECIALIST

## 2023-01-01 PROCEDURE — A9500 TC99M SESTAMIBI: HCPCS | Performed by: INTERNAL MEDICINE

## 2023-01-01 PROCEDURE — 97165 OT EVAL LOW COMPLEX 30 MIN: CPT | Mod: GO

## 2023-01-01 PROCEDURE — 85610 PROTHROMBIN TIME: CPT | Performed by: EMERGENCY MEDICINE

## 2023-01-01 PROCEDURE — 84484 ASSAY OF TROPONIN QUANT: CPT | Performed by: EMERGENCY MEDICINE

## 2023-01-01 PROCEDURE — 97162 PT EVAL MOD COMPLEX 30 MIN: CPT | Mod: GP

## 2023-01-01 PROCEDURE — 85730 THROMBOPLASTIN TIME PARTIAL: CPT | Performed by: EMERGENCY MEDICINE

## 2023-01-01 PROCEDURE — 93016 CV STRESS TEST SUPVJ ONLY: CPT | Performed by: INTERNAL MEDICINE

## 2023-01-01 PROCEDURE — 258N000003 HC RX IP 258 OP 636: Performed by: INTERNAL MEDICINE

## 2023-01-01 PROCEDURE — 84132 ASSAY OF SERUM POTASSIUM: CPT | Performed by: INTERNAL MEDICINE

## 2023-01-01 PROCEDURE — 78452 HT MUSCLE IMAGE SPECT MULT: CPT | Mod: 26 | Performed by: INTERNAL MEDICINE

## 2023-01-01 PROCEDURE — 93017 CV STRESS TEST TRACING ONLY: CPT

## 2023-01-01 PROCEDURE — 85027 COMPLETE CBC AUTOMATED: CPT | Performed by: INTERNAL MEDICINE

## 2023-01-01 PROCEDURE — 86923 COMPATIBILITY TEST ELECTRIC: CPT | Performed by: EMERGENCY MEDICINE

## 2023-01-01 PROCEDURE — 99285 EMERGENCY DEPT VISIT HI MDM: CPT | Mod: 25

## 2023-01-01 RX ORDER — AMOXICILLIN 250 MG
1 CAPSULE ORAL 2 TIMES DAILY PRN
Status: DISCONTINUED | OUTPATIENT
Start: 2023-01-01 | End: 2023-01-01 | Stop reason: HOSPADM

## 2023-01-01 RX ORDER — DEXTROSE MONOHYDRATE 25 G/50ML
25-50 INJECTION, SOLUTION INTRAVENOUS
Status: DISCONTINUED | OUTPATIENT
Start: 2023-01-01 | End: 2023-01-01

## 2023-01-01 RX ORDER — SODIUM BICARBONATE 650 MG/1
1300 TABLET ORAL 2 TIMES DAILY
Status: DISCONTINUED | OUTPATIENT
Start: 2023-01-01 | End: 2023-01-01

## 2023-01-01 RX ORDER — SULFAMETHOXAZOLE/TRIMETHOPRIM 800-160 MG
1 TABLET ORAL 2 TIMES DAILY
Qty: 20 TABLET | Refills: 0 | Status: SHIPPED | OUTPATIENT
Start: 2023-01-01 | End: 2023-01-01

## 2023-01-01 RX ORDER — CALCIUM GLUCONATE 20 MG/ML
1 INJECTION, SOLUTION INTRAVENOUS ONCE
Status: COMPLETED | OUTPATIENT
Start: 2023-01-01 | End: 2023-01-01

## 2023-01-01 RX ORDER — HYDRALAZINE HYDROCHLORIDE 50 MG/1
100 TABLET, FILM COATED ORAL 4 TIMES DAILY
Status: DISCONTINUED | OUTPATIENT
Start: 2023-01-01 | End: 2023-01-01

## 2023-01-01 RX ORDER — TROLAMINE SALICYLATE 10 G/100G
CREAM TOPICAL 2 TIMES DAILY
Status: DISCONTINUED | OUTPATIENT
Start: 2023-01-01 | End: 2023-01-01 | Stop reason: HOSPADM

## 2023-01-01 RX ORDER — ONDANSETRON 4 MG/1
4 TABLET, ORALLY DISINTEGRATING ORAL EVERY 6 HOURS PRN
Status: DISCONTINUED | OUTPATIENT
Start: 2023-01-01 | End: 2023-01-01 | Stop reason: HOSPADM

## 2023-01-01 RX ORDER — NICOTINE POLACRILEX 4 MG
15-30 LOZENGE BUCCAL
Status: DISCONTINUED | OUTPATIENT
Start: 2023-01-01 | End: 2023-01-01

## 2023-01-01 RX ORDER — HYDRALAZINE HYDROCHLORIDE 20 MG/ML
10 INJECTION INTRAMUSCULAR; INTRAVENOUS EVERY 4 HOURS PRN
Status: DISCONTINUED | OUTPATIENT
Start: 2023-01-01 | End: 2023-01-01 | Stop reason: HOSPADM

## 2023-01-01 RX ORDER — WARFARIN SODIUM 2 MG/1
2 TABLET ORAL
Status: COMPLETED | OUTPATIENT
Start: 2023-01-01 | End: 2023-01-01

## 2023-01-01 RX ORDER — DEXTROSE MONOHYDRATE 25 G/50ML
25 INJECTION, SOLUTION INTRAVENOUS ONCE
Status: COMPLETED | OUTPATIENT
Start: 2023-01-01 | End: 2023-01-01

## 2023-01-01 RX ORDER — WARFARIN SODIUM 5 MG/1
5 TABLET ORAL
Status: COMPLETED | OUTPATIENT
Start: 2023-01-01 | End: 2023-01-01

## 2023-01-01 RX ORDER — ALCOHOL ANTISEPTIC PADS
PADS, MEDICATED (EA) TOPICAL
Qty: 100 EACH | Refills: 2 | Status: SHIPPED | OUTPATIENT
Start: 2023-01-01

## 2023-01-01 RX ORDER — CLONIDINE HYDROCHLORIDE 0.1 MG/1
0.3 TABLET ORAL 3 TIMES DAILY
Status: DISCONTINUED | OUTPATIENT
Start: 2023-01-01 | End: 2023-01-01

## 2023-01-01 RX ORDER — OXYCODONE HCL 20 MG/ML
5 CONCENTRATE, ORAL ORAL
Status: DISCONTINUED | OUTPATIENT
Start: 2023-01-01 | End: 2023-01-01 | Stop reason: HOSPADM

## 2023-01-01 RX ORDER — REGADENOSON 0.08 MG/ML
0.4 INJECTION, SOLUTION INTRAVENOUS ONCE
Status: COMPLETED | OUTPATIENT
Start: 2023-01-01 | End: 2023-01-01

## 2023-01-01 RX ORDER — MINERAL OIL/HYDROPHIL PETROLAT
OINTMENT (GRAM) TOPICAL
Status: DISCONTINUED | OUTPATIENT
Start: 2023-01-01 | End: 2023-01-01 | Stop reason: HOSPADM

## 2023-01-01 RX ORDER — CALCIUM CARBONATE 500 MG/1
1000 TABLET, CHEWABLE ORAL 4 TIMES DAILY PRN
Status: DISCONTINUED | OUTPATIENT
Start: 2023-01-01 | End: 2023-01-01

## 2023-01-01 RX ORDER — HALOPERIDOL 2 MG/ML
1 SOLUTION ORAL
Status: DISCONTINUED | OUTPATIENT
Start: 2023-01-01 | End: 2023-01-01 | Stop reason: HOSPADM

## 2023-01-01 RX ORDER — SENNOSIDES 8.6 MG
1 TABLET ORAL 2 TIMES DAILY
Status: DISCONTINUED | OUTPATIENT
Start: 2023-01-01 | End: 2023-01-01 | Stop reason: HOSPADM

## 2023-01-01 RX ORDER — LANOLIN ALCOHOL/MO/W.PET/CERES
3 CREAM (GRAM) TOPICAL
Status: DISCONTINUED | OUTPATIENT
Start: 2023-01-01 | End: 2023-01-01 | Stop reason: HOSPADM

## 2023-01-01 RX ORDER — SULFAMETHOXAZOLE/TRIMETHOPRIM 800-160 MG
0.5 TABLET ORAL 2 TIMES DAILY
Qty: 5 TABLET | Refills: 0
Start: 2023-01-01 | End: 2023-01-01

## 2023-01-01 RX ORDER — CLONIDINE HYDROCHLORIDE 0.1 MG/1
0.2 TABLET ORAL 3 TIMES DAILY
Status: DISCONTINUED | OUTPATIENT
Start: 2023-01-01 | End: 2023-01-01

## 2023-01-01 RX ORDER — SODIUM POLYSTYRENE SULFONATE 15 G/60ML
15 SUSPENSION ORAL; RECTAL ONCE
Status: COMPLETED | OUTPATIENT
Start: 2023-01-01 | End: 2023-01-01

## 2023-01-01 RX ORDER — FLUTICASONE PROPIONATE 50 MCG
1 SPRAY, SUSPENSION (ML) NASAL DAILY
Status: DISCONTINUED | OUTPATIENT
Start: 2023-01-01 | End: 2023-01-01 | Stop reason: HOSPADM

## 2023-01-01 RX ORDER — CLONIDINE HYDROCHLORIDE 0.1 MG/1
0.2 TABLET ORAL EVERY EVENING
Status: DISCONTINUED | OUTPATIENT
Start: 2023-01-01 | End: 2023-01-01

## 2023-01-01 RX ORDER — FUROSEMIDE 10 MG/ML
40 INJECTION INTRAMUSCULAR; INTRAVENOUS ONCE
Status: COMPLETED | OUTPATIENT
Start: 2023-01-01 | End: 2023-01-01

## 2023-01-01 RX ORDER — ACETAMINOPHEN 325 MG/1
650 TABLET ORAL EVERY 4 HOURS PRN
Status: DISCONTINUED | OUTPATIENT
Start: 2023-01-01 | End: 2023-01-01

## 2023-01-01 RX ORDER — DIPHENHYDRAMINE HCL 25 MG
25 CAPSULE ORAL AT BEDTIME
Status: DISCONTINUED | OUTPATIENT
Start: 2023-01-01 | End: 2023-01-01

## 2023-01-01 RX ORDER — OXYCODONE HCL 20 MG/ML
2 CONCENTRATE, ORAL ORAL EVERY 6 HOURS
Status: DISCONTINUED | OUTPATIENT
Start: 2023-01-01 | End: 2023-01-01 | Stop reason: HOSPADM

## 2023-01-01 RX ORDER — CLONIDINE HYDROCHLORIDE 0.1 MG/1
0.2 TABLET ORAL 2 TIMES DAILY
Status: DISCONTINUED | OUTPATIENT
Start: 2023-01-01 | End: 2023-01-01

## 2023-01-01 RX ORDER — AMLODIPINE BESYLATE 5 MG/1
10 TABLET ORAL DAILY
Status: DISCONTINUED | OUTPATIENT
Start: 2023-01-01 | End: 2023-01-01 | Stop reason: HOSPADM

## 2023-01-01 RX ORDER — HYDROMORPHONE HYDROCHLORIDE 2 MG/1
2 TABLET ORAL
Status: DISCONTINUED | OUTPATIENT
Start: 2023-01-01 | End: 2023-01-01

## 2023-01-01 RX ORDER — SODIUM BICARBONATE 650 MG/1
650 TABLET ORAL 2 TIMES DAILY
COMMUNITY

## 2023-01-01 RX ORDER — WARFARIN SODIUM 2 MG/1
4 TABLET ORAL
Status: COMPLETED | OUTPATIENT
Start: 2023-01-01 | End: 2023-01-01

## 2023-01-01 RX ORDER — BISACODYL 10 MG
10 SUPPOSITORY, RECTAL RECTAL DAILY PRN
Status: DISCONTINUED | OUTPATIENT
Start: 2023-01-01 | End: 2023-01-01 | Stop reason: HOSPADM

## 2023-01-01 RX ORDER — PRAZOSIN HYDROCHLORIDE 1 MG/1
1 CAPSULE ORAL 2 TIMES DAILY
Status: DISCONTINUED | OUTPATIENT
Start: 2023-01-01 | End: 2023-01-01 | Stop reason: HOSPADM

## 2023-01-01 RX ORDER — AMOXICILLIN 250 MG
2 CAPSULE ORAL 2 TIMES DAILY PRN
Status: DISCONTINUED | OUTPATIENT
Start: 2023-01-01 | End: 2023-01-01 | Stop reason: HOSPADM

## 2023-01-01 RX ORDER — HYDROMORPHONE HYDROCHLORIDE 1 MG/ML
0.5 INJECTION, SOLUTION INTRAMUSCULAR; INTRAVENOUS; SUBCUTANEOUS
Status: DISCONTINUED | OUTPATIENT
Start: 2023-01-01 | End: 2023-01-01 | Stop reason: HOSPADM

## 2023-01-01 RX ORDER — SODIUM BICARBONATE 650 MG/1
650 TABLET ORAL 2 TIMES DAILY
Status: DISCONTINUED | OUTPATIENT
Start: 2023-01-01 | End: 2023-01-01

## 2023-01-01 RX ORDER — CLONIDINE HYDROCHLORIDE 0.3 MG/1
TABLET ORAL
Qty: 135 TABLET | Refills: 1 | Status: SHIPPED | OUTPATIENT
Start: 2023-01-01

## 2023-01-01 RX ORDER — LORAZEPAM 2 MG/ML
1 CONCENTRATE ORAL
Status: DISCONTINUED | OUTPATIENT
Start: 2023-01-01 | End: 2023-01-01 | Stop reason: HOSPADM

## 2023-01-01 RX ORDER — GUAIFENESIN 200 MG/10ML
10 LIQUID ORAL EVERY 4 HOURS PRN
Status: DISCONTINUED | OUTPATIENT
Start: 2023-01-01 | End: 2023-01-01 | Stop reason: HOSPADM

## 2023-01-01 RX ORDER — ONDANSETRON 2 MG/ML
4 INJECTION INTRAMUSCULAR; INTRAVENOUS EVERY 6 HOURS PRN
Status: DISCONTINUED | OUTPATIENT
Start: 2023-01-01 | End: 2023-01-01 | Stop reason: HOSPADM

## 2023-01-01 RX ORDER — VITAMIN B COMPLEX
50 TABLET ORAL DAILY
Status: DISCONTINUED | OUTPATIENT
Start: 2023-01-01 | End: 2023-01-01 | Stop reason: HOSPADM

## 2023-01-01 RX ORDER — ACETAMINOPHEN 650 MG/1
650 SUPPOSITORY RECTAL EVERY 6 HOURS PRN
Status: DISCONTINUED | OUTPATIENT
Start: 2023-01-01 | End: 2023-01-01 | Stop reason: HOSPADM

## 2023-01-01 RX ORDER — WARFARIN SODIUM 1 MG/1
1 TABLET ORAL
Status: COMPLETED | OUTPATIENT
Start: 2023-01-01 | End: 2023-01-01

## 2023-01-01 RX ORDER — AMINOPHYLLINE 25 MG/ML
50 INJECTION, SOLUTION INTRAVENOUS
Status: ACTIVE | OUTPATIENT
Start: 2023-01-01 | End: 2023-01-01

## 2023-01-01 RX ORDER — METOPROLOL SUCCINATE 50 MG/1
50 TABLET, EXTENDED RELEASE ORAL DAILY
Status: DISCONTINUED | OUTPATIENT
Start: 2023-01-01 | End: 2023-01-01

## 2023-01-01 RX ORDER — LORAZEPAM 2 MG/ML
1 INJECTION INTRAMUSCULAR
Status: DISCONTINUED | OUTPATIENT
Start: 2023-01-01 | End: 2023-01-01

## 2023-01-01 RX ORDER — ACETAMINOPHEN 325 MG/1
650 TABLET ORAL EVERY 6 HOURS PRN
Status: DISCONTINUED | OUTPATIENT
Start: 2023-01-01 | End: 2023-01-01 | Stop reason: HOSPADM

## 2023-01-01 RX ADMIN — SODIUM BICARBONATE 1300 MG: 650 TABLET ORAL at 08:33

## 2023-01-01 RX ADMIN — CLONIDINE HYDROCHLORIDE 0.2 MG: 0.1 TABLET ORAL at 09:25

## 2023-01-01 RX ADMIN — HYDRALAZINE HYDROCHLORIDE 100 MG: 50 TABLET, FILM COATED ORAL at 12:11

## 2023-01-01 RX ADMIN — HYDRALAZINE HYDROCHLORIDE 100 MG: 50 TABLET, FILM COATED ORAL at 12:34

## 2023-01-01 RX ADMIN — ACETAMINOPHEN 650 MG: 325 TABLET ORAL at 21:15

## 2023-01-01 RX ADMIN — PRAZOSIN HYDROCHLORIDE 1 MG: 1 CAPSULE ORAL at 21:05

## 2023-01-01 RX ADMIN — ACETAMINOPHEN 650 MG: 325 TABLET ORAL at 16:45

## 2023-01-01 RX ADMIN — Medication 50 MCG: at 08:26

## 2023-01-01 RX ADMIN — CLONIDINE HYDROCHLORIDE 0.3 MG: 0.1 TABLET ORAL at 13:15

## 2023-01-01 RX ADMIN — CLONIDINE HYDROCHLORIDE 0.3 MG: 0.1 TABLET ORAL at 08:11

## 2023-01-01 RX ADMIN — CLONIDINE HYDROCHLORIDE 0.3 MG: 0.1 TABLET ORAL at 13:53

## 2023-01-01 RX ADMIN — CLONIDINE HYDROCHLORIDE 0.3 MG: 0.1 TABLET ORAL at 08:32

## 2023-01-01 RX ADMIN — AMLODIPINE BESYLATE 10 MG: 5 TABLET ORAL at 08:23

## 2023-01-01 RX ADMIN — HYDRALAZINE HYDROCHLORIDE 10 MG: 20 INJECTION INTRAMUSCULAR; INTRAVENOUS at 11:05

## 2023-01-01 RX ADMIN — PRAZOSIN HYDROCHLORIDE 1 MG: 1 CAPSULE ORAL at 08:49

## 2023-01-01 RX ADMIN — SODIUM BICARBONATE 650 MG: 650 TABLET ORAL at 20:26

## 2023-01-01 RX ADMIN — PRAZOSIN HYDROCHLORIDE 1 MG: 1 CAPSULE ORAL at 08:32

## 2023-01-01 RX ADMIN — HYDRALAZINE HYDROCHLORIDE 100 MG: 50 TABLET, FILM COATED ORAL at 08:37

## 2023-01-01 RX ADMIN — SITAGLIPTIN 25 MG: 25 TABLET, FILM COATED ORAL at 08:44

## 2023-01-01 RX ADMIN — CLONIDINE HYDROCHLORIDE 0.3 MG: 0.1 TABLET ORAL at 14:44

## 2023-01-01 RX ADMIN — PRAZOSIN HYDROCHLORIDE 1 MG: 1 CAPSULE ORAL at 20:56

## 2023-01-01 RX ADMIN — SODIUM BICARBONATE 1300 MG: 650 TABLET ORAL at 20:29

## 2023-01-01 RX ADMIN — SODIUM BICARBONATE 1300 MG: 650 TABLET ORAL at 20:52

## 2023-01-01 RX ADMIN — AMLODIPINE BESYLATE 10 MG: 5 TABLET ORAL at 14:37

## 2023-01-01 RX ADMIN — CLONIDINE HYDROCHLORIDE 0.3 MG: 0.1 TABLET ORAL at 09:09

## 2023-01-01 RX ADMIN — Medication 50 MCG: at 08:44

## 2023-01-01 RX ADMIN — Medication 50 MCG: at 09:06

## 2023-01-01 RX ADMIN — Medication 50 MCG: at 09:26

## 2023-01-01 RX ADMIN — Medication 50 MCG: at 14:37

## 2023-01-01 RX ADMIN — CLONIDINE HYDROCHLORIDE 0.2 MG: 0.1 TABLET ORAL at 10:34

## 2023-01-01 RX ADMIN — SITAGLIPTIN 25 MG: 25 TABLET, FILM COATED ORAL at 09:03

## 2023-01-01 RX ADMIN — HYDRALAZINE HYDROCHLORIDE 100 MG: 50 TABLET, FILM COATED ORAL at 12:35

## 2023-01-01 RX ADMIN — HYDRALAZINE HYDROCHLORIDE 100 MG: 50 TABLET, FILM COATED ORAL at 13:15

## 2023-01-01 RX ADMIN — CLONIDINE HYDROCHLORIDE 0.3 MG: 0.1 TABLET ORAL at 08:26

## 2023-01-01 RX ADMIN — HYDRALAZINE HYDROCHLORIDE 10 MG: 20 INJECTION INTRAMUSCULAR; INTRAVENOUS at 05:56

## 2023-01-01 RX ADMIN — HYDRALAZINE HYDROCHLORIDE 75 MG: 50 TABLET, FILM COATED ORAL at 20:47

## 2023-01-01 RX ADMIN — SODIUM BICARBONATE 1300 MG: 650 TABLET ORAL at 09:08

## 2023-01-01 RX ADMIN — PRAZOSIN HYDROCHLORIDE 1 MG: 1 CAPSULE ORAL at 20:30

## 2023-01-01 RX ADMIN — HYDRALAZINE HYDROCHLORIDE 100 MG: 50 TABLET, FILM COATED ORAL at 12:27

## 2023-01-01 RX ADMIN — PRAZOSIN HYDROCHLORIDE 1 MG: 1 CAPSULE ORAL at 19:54

## 2023-01-01 RX ADMIN — HYDRALAZINE HYDROCHLORIDE 75 MG: 50 TABLET, FILM COATED ORAL at 13:42

## 2023-01-01 RX ADMIN — APIXABAN 2.5 MG: 2.5 TABLET, FILM COATED ORAL at 08:57

## 2023-01-01 RX ADMIN — SODIUM CHLORIDE 7.03 UNITS: 9 INJECTION, SOLUTION INTRAVENOUS at 05:57

## 2023-01-01 RX ADMIN — CLONIDINE HYDROCHLORIDE 0.3 MG: 0.1 TABLET ORAL at 08:21

## 2023-01-01 RX ADMIN — SODIUM BICARBONATE 1300 MG: 650 TABLET ORAL at 08:11

## 2023-01-01 RX ADMIN — HYDRALAZINE HYDROCHLORIDE 100 MG: 50 TABLET, FILM COATED ORAL at 08:32

## 2023-01-01 RX ADMIN — AMLODIPINE BESYLATE 10 MG: 5 TABLET ORAL at 08:55

## 2023-01-01 RX ADMIN — SENNOSIDES 1 TABLET: 8.6 TABLET, FILM COATED ORAL at 20:56

## 2023-01-01 RX ADMIN — HYDRALAZINE HYDROCHLORIDE 100 MG: 50 TABLET, FILM COATED ORAL at 12:45

## 2023-01-01 RX ADMIN — HYDRALAZINE HYDROCHLORIDE 10 MG: 20 INJECTION INTRAMUSCULAR; INTRAVENOUS at 19:10

## 2023-01-01 RX ADMIN — HYDRALAZINE HYDROCHLORIDE 100 MG: 50 TABLET, FILM COATED ORAL at 16:01

## 2023-01-01 RX ADMIN — SENNOSIDES 1 TABLET: 8.6 TABLET, FILM COATED ORAL at 09:06

## 2023-01-01 RX ADMIN — AMINOPHYLLINE 50 MG: 25 INJECTION, SOLUTION INTRAVENOUS at 13:12

## 2023-01-01 RX ADMIN — ACETAMINOPHEN 650 MG: 325 TABLET ORAL at 17:54

## 2023-01-01 RX ADMIN — SODIUM BICARBONATE 1300 MG: 650 TABLET ORAL at 07:51

## 2023-01-01 RX ADMIN — METOPROLOL SUCCINATE 50 MG: 50 TABLET, EXTENDED RELEASE ORAL at 14:37

## 2023-01-01 RX ADMIN — CLONIDINE HYDROCHLORIDE 0.3 MG: 0.1 TABLET ORAL at 13:44

## 2023-01-01 RX ADMIN — AMLODIPINE BESYLATE 10 MG: 5 TABLET ORAL at 09:37

## 2023-01-01 RX ADMIN — SODIUM BICARBONATE 650 MG: 650 TABLET ORAL at 14:44

## 2023-01-01 RX ADMIN — ACETAMINOPHEN 650 MG: 325 TABLET ORAL at 20:47

## 2023-01-01 RX ADMIN — SERTRALINE HYDROCHLORIDE 50 MG: 50 TABLET ORAL at 08:33

## 2023-01-01 RX ADMIN — REGADENOSON 0.4 MG: 0.08 INJECTION, SOLUTION INTRAVENOUS at 13:09

## 2023-01-01 RX ADMIN — PRAZOSIN HYDROCHLORIDE 1 MG: 1 CAPSULE ORAL at 20:12

## 2023-01-01 RX ADMIN — LORAZEPAM 1 MG: 2 LIQUID ORAL at 20:44

## 2023-01-01 RX ADMIN — HYDRALAZINE HYDROCHLORIDE 100 MG: 50 TABLET, FILM COATED ORAL at 08:10

## 2023-01-01 RX ADMIN — HYDRALAZINE HYDROCHLORIDE 100 MG: 50 TABLET, FILM COATED ORAL at 20:49

## 2023-01-01 RX ADMIN — ACETAMINOPHEN 650 MG: 325 TABLET ORAL at 09:29

## 2023-01-01 RX ADMIN — DEXTROSE MONOHYDRATE 25 G: 25 INJECTION, SOLUTION INTRAVENOUS at 05:52

## 2023-01-01 RX ADMIN — CLONIDINE HYDROCHLORIDE 0.3 MG: 0.1 TABLET ORAL at 20:07

## 2023-01-01 RX ADMIN — HYDRALAZINE HYDROCHLORIDE 100 MG: 50 TABLET, FILM COATED ORAL at 20:12

## 2023-01-01 RX ADMIN — CLONIDINE HYDROCHLORIDE 0.3 MG: 0.1 TABLET ORAL at 20:58

## 2023-01-01 RX ADMIN — INSULIN ASPART 1 UNITS: 100 INJECTION, SOLUTION INTRAVENOUS; SUBCUTANEOUS at 17:56

## 2023-01-01 RX ADMIN — CLONIDINE HYDROCHLORIDE 0.3 MG: 0.1 TABLET ORAL at 13:41

## 2023-01-01 RX ADMIN — ACETAMINOPHEN 650 MG: 325 TABLET ORAL at 21:26

## 2023-01-01 RX ADMIN — HYDRALAZINE HYDROCHLORIDE 100 MG: 50 TABLET, FILM COATED ORAL at 12:20

## 2023-01-01 RX ADMIN — AMLODIPINE BESYLATE 10 MG: 5 TABLET ORAL at 08:33

## 2023-01-01 RX ADMIN — SITAGLIPTIN 25 MG: 25 TABLET, FILM COATED ORAL at 09:37

## 2023-01-01 RX ADMIN — ACETAMINOPHEN 650 MG: 325 TABLET ORAL at 08:48

## 2023-01-01 RX ADMIN — CLONIDINE HYDROCHLORIDE 0.3 MG: 0.1 TABLET ORAL at 20:56

## 2023-01-01 RX ADMIN — METOPROLOL SUCCINATE 75 MG: 25 TABLET, EXTENDED RELEASE ORAL at 08:18

## 2023-01-01 RX ADMIN — ACETAMINOPHEN 650 MG: 325 TABLET ORAL at 16:24

## 2023-01-01 RX ADMIN — HYDRALAZINE HYDROCHLORIDE 100 MG: 50 TABLET, FILM COATED ORAL at 17:39

## 2023-01-01 RX ADMIN — METOPROLOL SUCCINATE 75 MG: 25 TABLET, EXTENDED RELEASE ORAL at 08:27

## 2023-01-01 RX ADMIN — SITAGLIPTIN 25 MG: 25 TABLET, FILM COATED ORAL at 08:42

## 2023-01-01 RX ADMIN — ACETAMINOPHEN 650 MG: 325 TABLET ORAL at 20:39

## 2023-01-01 RX ADMIN — CLONIDINE HYDROCHLORIDE 0.3 MG: 0.1 TABLET ORAL at 08:40

## 2023-01-01 RX ADMIN — HYDRALAZINE HYDROCHLORIDE 100 MG: 50 TABLET, FILM COATED ORAL at 12:08

## 2023-01-01 RX ADMIN — HYDRALAZINE HYDROCHLORIDE 100 MG: 50 TABLET, FILM COATED ORAL at 09:01

## 2023-01-01 RX ADMIN — GUAIFENESIN 10 ML: 200 SOLUTION ORAL at 20:55

## 2023-01-01 RX ADMIN — SERTRALINE HYDROCHLORIDE 50 MG: 50 TABLET ORAL at 08:11

## 2023-01-01 RX ADMIN — DEXTROSE MONOHYDRATE 300 ML: 100 INJECTION, SOLUTION INTRAVENOUS at 06:18

## 2023-01-01 RX ADMIN — HYDRALAZINE HYDROCHLORIDE 100 MG: 50 TABLET, FILM COATED ORAL at 16:40

## 2023-01-01 RX ADMIN — SERTRALINE HYDROCHLORIDE 50 MG: 50 TABLET ORAL at 08:56

## 2023-01-01 RX ADMIN — WARFARIN SODIUM 0.5 MG: 1 TABLET ORAL at 17:00

## 2023-01-01 RX ADMIN — SODIUM BICARBONATE 1300 MG: 650 TABLET ORAL at 08:28

## 2023-01-01 RX ADMIN — HYDRALAZINE HYDROCHLORIDE 100 MG: 50 TABLET, FILM COATED ORAL at 09:11

## 2023-01-01 RX ADMIN — TROLAMINE SALICYLATE: 10 CREAM TOPICAL at 20:55

## 2023-01-01 RX ADMIN — ACETAMINOPHEN 650 MG: 325 TABLET ORAL at 04:08

## 2023-01-01 RX ADMIN — FLUTICASONE PROPIONATE 1 SPRAY: 50 SPRAY, METERED NASAL at 19:49

## 2023-01-01 RX ADMIN — Medication 50 MCG: at 09:37

## 2023-01-01 RX ADMIN — METOPROLOL SUCCINATE 75 MG: 25 TABLET, EXTENDED RELEASE ORAL at 08:11

## 2023-01-01 RX ADMIN — PRAZOSIN HYDROCHLORIDE 1 MG: 1 CAPSULE ORAL at 08:41

## 2023-01-01 RX ADMIN — CLONIDINE HYDROCHLORIDE 0.3 MG: 0.1 TABLET ORAL at 19:52

## 2023-01-01 RX ADMIN — Medication 50 MCG: at 09:27

## 2023-01-01 RX ADMIN — PRAZOSIN HYDROCHLORIDE 1 MG: 1 CAPSULE ORAL at 08:25

## 2023-01-01 RX ADMIN — HYDRALAZINE HYDROCHLORIDE 100 MG: 50 TABLET, FILM COATED ORAL at 08:44

## 2023-01-01 RX ADMIN — PRAZOSIN HYDROCHLORIDE 1 MG: 1 CAPSULE ORAL at 13:03

## 2023-01-01 RX ADMIN — HYDRALAZINE HYDROCHLORIDE 10 MG: 20 INJECTION INTRAMUSCULAR; INTRAVENOUS at 01:11

## 2023-01-01 RX ADMIN — HYDRALAZINE HYDROCHLORIDE 75 MG: 50 TABLET, FILM COATED ORAL at 16:53

## 2023-01-01 RX ADMIN — LORAZEPAM 1 MG: 2 LIQUID ORAL at 07:06

## 2023-01-01 RX ADMIN — SITAGLIPTIN 25 MG: 25 TABLET, FILM COATED ORAL at 08:39

## 2023-01-01 RX ADMIN — HYDRALAZINE HYDROCHLORIDE 75 MG: 50 TABLET, FILM COATED ORAL at 09:26

## 2023-01-01 RX ADMIN — METOPROLOL SUCCINATE 75 MG: 25 TABLET, EXTENDED RELEASE ORAL at 09:09

## 2023-01-01 RX ADMIN — HYDRALAZINE HYDROCHLORIDE 100 MG: 50 TABLET, FILM COATED ORAL at 20:52

## 2023-01-01 RX ADMIN — SODIUM BICARBONATE 650 MG: 650 TABLET ORAL at 20:47

## 2023-01-01 RX ADMIN — SENNOSIDES 1 TABLET: 8.6 TABLET, FILM COATED ORAL at 20:58

## 2023-01-01 RX ADMIN — CLONIDINE HYDROCHLORIDE 0.3 MG: 0.1 TABLET ORAL at 21:49

## 2023-01-01 RX ADMIN — HYDRALAZINE HYDROCHLORIDE 10 MG: 20 INJECTION INTRAMUSCULAR; INTRAVENOUS at 12:42

## 2023-01-01 RX ADMIN — LORAZEPAM 1 MG: 2 LIQUID ORAL at 21:11

## 2023-01-01 RX ADMIN — CLONIDINE HYDROCHLORIDE 0.3 MG: 0.1 TABLET ORAL at 20:50

## 2023-01-01 RX ADMIN — SENNOSIDES 1 TABLET: 8.6 TABLET, FILM COATED ORAL at 21:04

## 2023-01-01 RX ADMIN — Medication 50 MCG: at 09:08

## 2023-01-01 RX ADMIN — Medication 50 MCG: at 08:38

## 2023-01-01 RX ADMIN — WARFARIN SODIUM 2 MG: 2 TABLET ORAL at 17:17

## 2023-01-01 RX ADMIN — HYDRALAZINE HYDROCHLORIDE 100 MG: 50 TABLET, FILM COATED ORAL at 16:45

## 2023-01-01 RX ADMIN — AMLODIPINE BESYLATE 10 MG: 5 TABLET ORAL at 09:27

## 2023-01-01 RX ADMIN — Medication 32.1 MILLICURIE: at 15:17

## 2023-01-01 RX ADMIN — HYDRALAZINE HYDROCHLORIDE 100 MG: 50 TABLET, FILM COATED ORAL at 09:38

## 2023-01-01 RX ADMIN — HYDRALAZINE HYDROCHLORIDE 75 MG: 50 TABLET, FILM COATED ORAL at 09:28

## 2023-01-01 RX ADMIN — DIPHENHYDRAMINE HYDROCHLORIDE 25 MG: 25 CAPSULE ORAL at 21:11

## 2023-01-01 RX ADMIN — ACETAMINOPHEN 650 MG: 325 TABLET ORAL at 21:21

## 2023-01-01 RX ADMIN — WARFARIN SODIUM 4 MG: 2 TABLET ORAL at 17:00

## 2023-01-01 RX ADMIN — PRAZOSIN HYDROCHLORIDE 1 MG: 1 CAPSULE ORAL at 21:48

## 2023-01-01 RX ADMIN — HYDRALAZINE HYDROCHLORIDE 75 MG: 50 TABLET, FILM COATED ORAL at 20:31

## 2023-01-01 RX ADMIN — HYDRALAZINE HYDROCHLORIDE 100 MG: 50 TABLET, FILM COATED ORAL at 19:50

## 2023-01-01 RX ADMIN — SODIUM BICARBONATE 1300 MG: 650 TABLET ORAL at 20:13

## 2023-01-01 RX ADMIN — FLUTICASONE PROPIONATE 1 SPRAY: 50 SPRAY, METERED NASAL at 08:29

## 2023-01-01 RX ADMIN — Medication 50 MCG: at 08:23

## 2023-01-01 RX ADMIN — TROLAMINE SALICYLATE: 10 CREAM TOPICAL at 08:29

## 2023-01-01 RX ADMIN — HYDRALAZINE HYDROCHLORIDE 75 MG: 50 TABLET, FILM COATED ORAL at 11:27

## 2023-01-01 RX ADMIN — HYDRALAZINE HYDROCHLORIDE 75 MG: 50 TABLET, FILM COATED ORAL at 16:26

## 2023-01-01 RX ADMIN — PRAZOSIN HYDROCHLORIDE 1 MG: 1 CAPSULE ORAL at 09:39

## 2023-01-01 RX ADMIN — FLUTICASONE PROPIONATE 1 SPRAY: 50 SPRAY, METERED NASAL at 08:38

## 2023-01-01 RX ADMIN — HYDRALAZINE HYDROCHLORIDE 100 MG: 50 TABLET, FILM COATED ORAL at 16:24

## 2023-01-01 RX ADMIN — FLUTICASONE PROPIONATE 1 SPRAY: 50 SPRAY, METERED NASAL at 08:43

## 2023-01-01 RX ADMIN — HYDRALAZINE HYDROCHLORIDE 100 MG: 50 TABLET, FILM COATED ORAL at 12:33

## 2023-01-01 RX ADMIN — SODIUM BICARBONATE 50 MEQ: 84 INJECTION, SOLUTION INTRAVENOUS at 06:00

## 2023-01-01 RX ADMIN — CLONIDINE HYDROCHLORIDE 0.3 MG: 0.1 TABLET ORAL at 21:48

## 2023-01-01 RX ADMIN — CLONIDINE HYDROCHLORIDE 0.3 MG: 0.1 TABLET ORAL at 21:59

## 2023-01-01 RX ADMIN — WHITE PETROLATUM: 1.75 OINTMENT TOPICAL at 21:12

## 2023-01-01 RX ADMIN — HYDRALAZINE HYDROCHLORIDE 75 MG: 50 TABLET, FILM COATED ORAL at 08:56

## 2023-01-01 RX ADMIN — AMLODIPINE BESYLATE 10 MG: 5 TABLET ORAL at 09:06

## 2023-01-01 RX ADMIN — SERTRALINE HYDROCHLORIDE 50 MG: 50 TABLET ORAL at 08:28

## 2023-01-01 RX ADMIN — Medication 3 MG: at 21:59

## 2023-01-01 RX ADMIN — SERTRALINE HYDROCHLORIDE 50 MG: 50 TABLET ORAL at 08:20

## 2023-01-01 RX ADMIN — SENNOSIDES 1 TABLET: 8.6 TABLET, FILM COATED ORAL at 08:42

## 2023-01-01 RX ADMIN — CLONIDINE HYDROCHLORIDE 0.2 MG: 0.1 TABLET ORAL at 20:47

## 2023-01-01 RX ADMIN — HYDRALAZINE HYDROCHLORIDE 100 MG: 50 TABLET, FILM COATED ORAL at 08:39

## 2023-01-01 RX ADMIN — HYDRALAZINE HYDROCHLORIDE 100 MG: 50 TABLET, FILM COATED ORAL at 20:06

## 2023-01-01 RX ADMIN — CLONIDINE HYDROCHLORIDE 0.3 MG: 0.1 TABLET ORAL at 20:29

## 2023-01-01 RX ADMIN — SODIUM BICARBONATE 1300 MG: 650 TABLET ORAL at 08:41

## 2023-01-01 RX ADMIN — HYDRALAZINE HYDROCHLORIDE 100 MG: 50 TABLET, FILM COATED ORAL at 21:03

## 2023-01-01 RX ADMIN — HYDRALAZINE HYDROCHLORIDE 100 MG: 50 TABLET, FILM COATED ORAL at 20:30

## 2023-01-01 RX ADMIN — Medication 50 MCG: at 08:20

## 2023-01-01 RX ADMIN — CLONIDINE HYDROCHLORIDE 0.2 MG: 0.1 TABLET ORAL at 20:26

## 2023-01-01 RX ADMIN — HYDRALAZINE HYDROCHLORIDE 10 MG: 20 INJECTION INTRAMUSCULAR; INTRAVENOUS at 04:15

## 2023-01-01 RX ADMIN — HYDRALAZINE HYDROCHLORIDE 100 MG: 50 TABLET, FILM COATED ORAL at 16:49

## 2023-01-01 RX ADMIN — HYDRALAZINE HYDROCHLORIDE 100 MG: 50 TABLET, FILM COATED ORAL at 16:53

## 2023-01-01 RX ADMIN — CLONIDINE HYDROCHLORIDE 0.3 MG: 0.1 TABLET ORAL at 14:30

## 2023-01-01 RX ADMIN — AMLODIPINE BESYLATE 10 MG: 5 TABLET ORAL at 08:37

## 2023-01-01 RX ADMIN — SODIUM BICARBONATE 650 MG: 650 TABLET ORAL at 09:25

## 2023-01-01 RX ADMIN — SODIUM BICARBONATE 650 MG: 650 TABLET ORAL at 08:55

## 2023-01-01 RX ADMIN — AMLODIPINE BESYLATE 10 MG: 5 TABLET ORAL at 08:38

## 2023-01-01 RX ADMIN — Medication 50 MCG: at 08:33

## 2023-01-01 RX ADMIN — PRAZOSIN HYDROCHLORIDE 1 MG: 1 CAPSULE ORAL at 09:08

## 2023-01-01 RX ADMIN — AMLODIPINE BESYLATE 10 MG: 5 TABLET ORAL at 08:11

## 2023-01-01 RX ADMIN — METOPROLOL SUCCINATE 50 MG: 50 TABLET, EXTENDED RELEASE ORAL at 09:26

## 2023-01-01 RX ADMIN — MAGNESIUM HYDROXIDE 30 ML: 400 SUSPENSION ORAL at 20:48

## 2023-01-01 RX ADMIN — SERTRALINE HYDROCHLORIDE 50 MG: 50 TABLET ORAL at 08:39

## 2023-01-01 RX ADMIN — DIPHENHYDRAMINE HYDROCHLORIDE 25 MG: 25 CAPSULE ORAL at 22:13

## 2023-01-01 RX ADMIN — HYDRALAZINE HYDROCHLORIDE 100 MG: 50 TABLET, FILM COATED ORAL at 07:52

## 2023-01-01 RX ADMIN — PRAZOSIN HYDROCHLORIDE 1 MG: 1 CAPSULE ORAL at 09:04

## 2023-01-01 RX ADMIN — CLONIDINE HYDROCHLORIDE 0.3 MG: 0.1 TABLET ORAL at 13:56

## 2023-01-01 RX ADMIN — ACETAMINOPHEN 650 MG: 325 TABLET ORAL at 08:53

## 2023-01-01 RX ADMIN — CLONIDINE HYDROCHLORIDE 0.3 MG: 0.1 TABLET ORAL at 13:12

## 2023-01-01 RX ADMIN — HYDRALAZINE HYDROCHLORIDE 100 MG: 50 TABLET, FILM COATED ORAL at 13:12

## 2023-01-01 RX ADMIN — SERTRALINE HYDROCHLORIDE 50 MG: 50 TABLET ORAL at 17:01

## 2023-01-01 RX ADMIN — CLONIDINE HYDROCHLORIDE 0.3 MG: 0.1 TABLET ORAL at 21:03

## 2023-01-01 RX ADMIN — SODIUM BICARBONATE 1300 MG: 650 TABLET ORAL at 09:28

## 2023-01-01 RX ADMIN — SERTRALINE HYDROCHLORIDE 50 MG: 50 TABLET ORAL at 08:44

## 2023-01-01 RX ADMIN — CLONIDINE HYDROCHLORIDE 0.3 MG: 0.1 TABLET ORAL at 13:16

## 2023-01-01 RX ADMIN — SERTRALINE HYDROCHLORIDE 50 MG: 50 TABLET ORAL at 08:42

## 2023-01-01 RX ADMIN — AMLODIPINE BESYLATE 10 MG: 5 TABLET ORAL at 08:18

## 2023-01-01 RX ADMIN — HYDRALAZINE HYDROCHLORIDE 100 MG: 50 TABLET, FILM COATED ORAL at 20:56

## 2023-01-01 RX ADMIN — HYDRALAZINE HYDROCHLORIDE 100 MG: 50 TABLET, FILM COATED ORAL at 08:27

## 2023-01-01 RX ADMIN — ACETAMINOPHEN 650 MG: 325 TABLET ORAL at 21:48

## 2023-01-01 RX ADMIN — HYDRALAZINE HYDROCHLORIDE 100 MG: 50 TABLET, FILM COATED ORAL at 21:59

## 2023-01-01 RX ADMIN — CLONIDINE HYDROCHLORIDE 0.3 MG: 0.1 TABLET ORAL at 08:37

## 2023-01-01 RX ADMIN — HYDRALAZINE HYDROCHLORIDE 75 MG: 50 TABLET, FILM COATED ORAL at 16:09

## 2023-01-01 RX ADMIN — SERTRALINE HYDROCHLORIDE 50 MG: 50 TABLET ORAL at 09:04

## 2023-01-01 RX ADMIN — HYDRALAZINE HYDROCHLORIDE 100 MG: 50 TABLET, FILM COATED ORAL at 20:15

## 2023-01-01 RX ADMIN — PRAZOSIN HYDROCHLORIDE 1 MG: 1 CAPSULE ORAL at 21:11

## 2023-01-01 RX ADMIN — OXYCODONE HYDROCHLORIDE 5 MG: 100 SOLUTION ORAL at 20:42

## 2023-01-01 RX ADMIN — HYDRALAZINE HYDROCHLORIDE 100 MG: 50 TABLET, FILM COATED ORAL at 09:36

## 2023-01-01 RX ADMIN — SENNOSIDES AND DOCUSATE SODIUM 1 TABLET: 50; 8.6 TABLET ORAL at 14:43

## 2023-01-01 RX ADMIN — HYDRALAZINE HYDROCHLORIDE 100 MG: 50 TABLET, FILM COATED ORAL at 17:08

## 2023-01-01 RX ADMIN — PRAZOSIN HYDROCHLORIDE 1 MG: 1 CAPSULE ORAL at 08:40

## 2023-01-01 RX ADMIN — PRAZOSIN HYDROCHLORIDE 1 MG: 1 CAPSULE ORAL at 21:49

## 2023-01-01 RX ADMIN — HYDRALAZINE HYDROCHLORIDE 10 MG: 20 INJECTION INTRAMUSCULAR; INTRAVENOUS at 10:34

## 2023-01-01 RX ADMIN — GUAIFENESIN 10 ML: 200 SOLUTION ORAL at 21:34

## 2023-01-01 RX ADMIN — PRAZOSIN HYDROCHLORIDE 1 MG: 1 CAPSULE ORAL at 20:07

## 2023-01-01 RX ADMIN — Medication 3 MG: at 21:32

## 2023-01-01 RX ADMIN — SODIUM BICARBONATE 1300 MG: 650 TABLET ORAL at 19:10

## 2023-01-01 RX ADMIN — HYDRALAZINE HYDROCHLORIDE 100 MG: 50 TABLET, FILM COATED ORAL at 12:30

## 2023-01-01 RX ADMIN — HYDRALAZINE HYDROCHLORIDE 100 MG: 50 TABLET, FILM COATED ORAL at 17:17

## 2023-01-01 RX ADMIN — METOPROLOL SUCCINATE 75 MG: 25 TABLET, EXTENDED RELEASE ORAL at 09:37

## 2023-01-01 RX ADMIN — ACETAMINOPHEN 650 MG: 325 TABLET ORAL at 06:18

## 2023-01-01 RX ADMIN — PRAZOSIN HYDROCHLORIDE 1 MG: 1 CAPSULE ORAL at 21:59

## 2023-01-01 RX ADMIN — FUROSEMIDE 40 MG: 10 INJECTION, SOLUTION INTRAVENOUS at 05:49

## 2023-01-01 RX ADMIN — Medication 3 MG: at 21:48

## 2023-01-01 RX ADMIN — Medication 3 MG: at 21:27

## 2023-01-01 RX ADMIN — AMLODIPINE BESYLATE 10 MG: 5 TABLET ORAL at 07:50

## 2023-01-01 RX ADMIN — OXYCODONE HYDROCHLORIDE 2 MG: 100 SOLUTION ORAL at 05:48

## 2023-01-01 RX ADMIN — CLONIDINE HYDROCHLORIDE 0.3 MG: 0.1 TABLET ORAL at 14:02

## 2023-01-01 RX ADMIN — AMLODIPINE BESYLATE 10 MG: 5 TABLET ORAL at 08:40

## 2023-01-01 RX ADMIN — AMLODIPINE BESYLATE 10 MG: 5 TABLET ORAL at 08:44

## 2023-01-01 RX ADMIN — Medication 50 MCG: at 08:37

## 2023-01-01 RX ADMIN — OXYCODONE HYDROCHLORIDE 2 MG: 100 SOLUTION ORAL at 17:08

## 2023-01-01 RX ADMIN — HYDRALAZINE HYDROCHLORIDE 10 MG: 20 INJECTION INTRAMUSCULAR; INTRAVENOUS at 07:13

## 2023-01-01 RX ADMIN — PRAZOSIN HYDROCHLORIDE 1 MG: 1 CAPSULE ORAL at 08:27

## 2023-01-01 RX ADMIN — CLONIDINE HYDROCHLORIDE 0.3 MG: 0.1 TABLET ORAL at 08:39

## 2023-01-01 RX ADMIN — SITAGLIPTIN 25 MG: 25 TABLET, FILM COATED ORAL at 08:19

## 2023-01-01 RX ADMIN — PRAZOSIN HYDROCHLORIDE 1 MG: 1 CAPSULE ORAL at 20:52

## 2023-01-01 RX ADMIN — SODIUM BICARBONATE 1300 MG: 650 TABLET ORAL at 20:06

## 2023-01-01 RX ADMIN — SODIUM BICARBONATE 1300 MG: 650 TABLET ORAL at 21:48

## 2023-01-01 RX ADMIN — METOPROLOL SUCCINATE 75 MG: 25 TABLET, EXTENDED RELEASE ORAL at 09:27

## 2023-01-01 RX ADMIN — PRAZOSIN HYDROCHLORIDE 1 MG: 1 CAPSULE ORAL at 08:44

## 2023-01-01 RX ADMIN — CAMPHOR AND MENTHOL: 5; 5 LOTION TOPICAL at 21:12

## 2023-01-01 RX ADMIN — CALCIUM GLUCONATE 1 G: 20 INJECTION, SOLUTION INTRAVENOUS at 05:29

## 2023-01-01 RX ADMIN — HYDRALAZINE HYDROCHLORIDE 100 MG: 50 TABLET, FILM COATED ORAL at 12:15

## 2023-01-01 RX ADMIN — SERTRALINE HYDROCHLORIDE 50 MG: 50 TABLET ORAL at 09:27

## 2023-01-01 RX ADMIN — WARFARIN SODIUM 1 MG: 1 TABLET ORAL at 17:21

## 2023-01-01 RX ADMIN — CLONIDINE HYDROCHLORIDE 0.3 MG: 0.1 TABLET ORAL at 08:57

## 2023-01-01 RX ADMIN — CLONIDINE HYDROCHLORIDE 0.2 MG: 0.1 TABLET ORAL at 09:28

## 2023-01-01 RX ADMIN — SENNOSIDES 1 TABLET: 8.6 TABLET, FILM COATED ORAL at 08:23

## 2023-01-01 RX ADMIN — WARFARIN SODIUM 2 MG: 2 TABLET ORAL at 17:13

## 2023-01-01 RX ADMIN — WARFARIN SODIUM 5 MG: 5 TABLET ORAL at 17:37

## 2023-01-01 RX ADMIN — DIPHENHYDRAMINE HYDROCHLORIDE 25 MG: 25 CAPSULE ORAL at 20:51

## 2023-01-01 RX ADMIN — SERTRALINE HYDROCHLORIDE 50 MG: 50 TABLET ORAL at 09:11

## 2023-01-01 RX ADMIN — SODIUM BICARBONATE 1300 MG: 650 TABLET ORAL at 08:20

## 2023-01-01 RX ADMIN — SODIUM POLYSTYRENE SULFONATE 15 G: 15 SUSPENSION ORAL; RECTAL at 06:13

## 2023-01-01 RX ADMIN — CALCIUM CARBONATE (ANTACID) CHEW TAB 500 MG 1000 MG: 500 CHEW TAB at 16:22

## 2023-01-01 RX ADMIN — PRAZOSIN HYDROCHLORIDE 1 MG: 1 CAPSULE ORAL at 08:12

## 2023-01-01 RX ADMIN — Medication 8.03 MILLICURIE: at 11:51

## 2023-01-01 RX ADMIN — METOPROLOL SUCCINATE 75 MG: 25 TABLET, EXTENDED RELEASE ORAL at 08:32

## 2023-01-01 RX ADMIN — METOPROLOL SUCCINATE 75 MG: 25 TABLET, EXTENDED RELEASE ORAL at 08:23

## 2023-01-01 RX ADMIN — AMLODIPINE BESYLATE 10 MG: 5 TABLET ORAL at 09:09

## 2023-01-01 RX ADMIN — METOPROLOL SUCCINATE 75 MG: 25 TABLET, EXTENDED RELEASE ORAL at 09:02

## 2023-01-01 RX ADMIN — METOPROLOL SUCCINATE 50 MG: 50 TABLET, EXTENDED RELEASE ORAL at 09:14

## 2023-01-01 RX ADMIN — ONDANSETRON 4 MG: 2 INJECTION INTRAMUSCULAR; INTRAVENOUS at 13:38

## 2023-01-01 RX ADMIN — CLONIDINE HYDROCHLORIDE 0.3 MG: 0.1 TABLET ORAL at 14:41

## 2023-01-01 RX ADMIN — METOPROLOL SUCCINATE 75 MG: 25 TABLET, EXTENDED RELEASE ORAL at 08:40

## 2023-01-01 RX ADMIN — METOPROLOL SUCCINATE 75 MG: 25 TABLET, EXTENDED RELEASE ORAL at 08:58

## 2023-01-01 RX ADMIN — HYDRALAZINE HYDROCHLORIDE 100 MG: 50 TABLET, FILM COATED ORAL at 20:58

## 2023-01-01 RX ADMIN — Medication 50 MCG: at 08:42

## 2023-01-01 RX ADMIN — APIXABAN 2.5 MG: 2.5 TABLET, FILM COATED ORAL at 20:26

## 2023-01-01 RX ADMIN — Medication 50 MCG: at 07:52

## 2023-01-01 RX ADMIN — WARFARIN SODIUM 5 MG: 5 TABLET ORAL at 17:56

## 2023-01-01 RX ADMIN — METOPROLOL SUCCINATE 75 MG: 25 TABLET, EXTENDED RELEASE ORAL at 08:39

## 2023-01-01 RX ADMIN — PRAZOSIN HYDROCHLORIDE 1 MG: 1 CAPSULE ORAL at 08:57

## 2023-01-01 RX ADMIN — SITAGLIPTIN 25 MG: 25 TABLET, FILM COATED ORAL at 08:28

## 2023-01-01 RX ADMIN — AMLODIPINE BESYLATE 10 MG: 5 TABLET ORAL at 08:28

## 2023-01-01 RX ADMIN — HYDRALAZINE HYDROCHLORIDE 10 MG: 20 INJECTION INTRAMUSCULAR; INTRAVENOUS at 21:57

## 2023-01-01 RX ADMIN — CLONIDINE HYDROCHLORIDE 0.3 MG: 0.1 TABLET ORAL at 20:12

## 2023-01-01 RX ADMIN — Medication 50 MCG: at 08:19

## 2023-01-01 RX ADMIN — CLONIDINE HYDROCHLORIDE 0.3 MG: 0.1 TABLET ORAL at 09:37

## 2023-01-01 RX ADMIN — CLONIDINE HYDROCHLORIDE 0.3 MG: 0.1 TABLET ORAL at 08:44

## 2023-01-01 RX ADMIN — CLONIDINE HYDROCHLORIDE 0.3 MG: 0.1 TABLET ORAL at 09:00

## 2023-01-01 RX ADMIN — HYDRALAZINE HYDROCHLORIDE 10 MG: 20 INJECTION INTRAMUSCULAR; INTRAVENOUS at 23:56

## 2023-01-01 RX ADMIN — HYDRALAZINE HYDROCHLORIDE 100 MG: 50 TABLET, FILM COATED ORAL at 16:27

## 2023-01-01 RX ADMIN — SITAGLIPTIN 25 MG: 25 TABLET, FILM COATED ORAL at 08:37

## 2023-01-01 RX ADMIN — AMLODIPINE BESYLATE 10 MG: 5 TABLET ORAL at 09:25

## 2023-01-01 RX ADMIN — WARFARIN SODIUM 2 MG: 2 TABLET ORAL at 17:54

## 2023-01-01 RX ADMIN — HYDRALAZINE HYDROCHLORIDE 100 MG: 50 TABLET, FILM COATED ORAL at 17:00

## 2023-01-01 RX ADMIN — HYDRALAZINE HYDROCHLORIDE 100 MG: 50 TABLET, FILM COATED ORAL at 16:22

## 2023-01-01 RX ADMIN — SODIUM BICARBONATE 1300 MG: 650 TABLET ORAL at 21:02

## 2023-01-01 RX ADMIN — PRAZOSIN HYDROCHLORIDE 1 MG: 1 CAPSULE ORAL at 20:15

## 2023-01-01 RX ADMIN — CLONIDINE HYDROCHLORIDE 0.3 MG: 0.1 TABLET ORAL at 20:14

## 2023-01-01 RX ADMIN — OXYCODONE HYDROCHLORIDE 2 MG: 100 SOLUTION ORAL at 00:51

## 2023-01-01 RX ADMIN — HYDRALAZINE HYDROCHLORIDE 75 MG: 50 TABLET, FILM COATED ORAL at 19:10

## 2023-01-01 RX ADMIN — CLONIDINE HYDROCHLORIDE 0.2 MG: 0.1 TABLET ORAL at 19:10

## 2023-01-01 RX ADMIN — SERTRALINE HYDROCHLORIDE 50 MG: 50 TABLET ORAL at 07:50

## 2023-01-01 RX ADMIN — FLUTICASONE PROPIONATE 1 SPRAY: 50 SPRAY, METERED NASAL at 08:56

## 2023-01-01 RX ADMIN — HYDRALAZINE HYDROCHLORIDE 100 MG: 50 TABLET, FILM COATED ORAL at 08:19

## 2023-01-01 RX ADMIN — SERTRALINE HYDROCHLORIDE 50 MG: 50 TABLET ORAL at 08:24

## 2023-01-01 RX ADMIN — CLONIDINE HYDROCHLORIDE 0.3 MG: 0.1 TABLET ORAL at 13:55

## 2023-01-01 RX ADMIN — SERTRALINE HYDROCHLORIDE 50 MG: 50 TABLET ORAL at 09:37

## 2023-01-01 RX ADMIN — HYDRALAZINE HYDROCHLORIDE 100 MG: 50 TABLET, FILM COATED ORAL at 21:48

## 2023-01-01 RX ADMIN — SERTRALINE HYDROCHLORIDE 50 MG: 50 TABLET ORAL at 08:41

## 2023-01-01 RX ADMIN — SERTRALINE HYDROCHLORIDE 50 MG: 50 TABLET ORAL at 09:28

## 2023-01-01 RX ADMIN — Medication 50 MCG: at 08:56

## 2023-01-01 ASSESSMENT — ACTIVITIES OF DAILY LIVING (ADL)
ADLS_ACUITY_SCORE: 63
ADLS_ACUITY_SCORE: 61
ADLS_ACUITY_SCORE: 65
ADLS_ACUITY_SCORE: 53
ADLS_ACUITY_SCORE: 63
ADLS_ACUITY_SCORE: 63
ADLS_ACUITY_SCORE: 64
ADLS_ACUITY_SCORE: 63
ADLS_ACUITY_SCORE: 61
ADLS_ACUITY_SCORE: 61
ADLS_ACUITY_SCORE: 62
VISION_MANAGEMENT: READING
ADLS_ACUITY_SCORE: 63
DRESSING/BATHING: BATHING DIFFICULTY, ASSISTANCE 1 PERSON
ADLS_ACUITY_SCORE: 61
ADLS_ACUITY_SCORE: 65
ADLS_ACUITY_SCORE: 63
ADLS_ACUITY_SCORE: 67
ADLS_ACUITY_SCORE: 47
CONCENTRATING,_REMEMBERING_OR_MAKING_DECISIONS_DIFFICULTY: NO
ADLS_ACUITY_SCORE: 63
ADLS_ACUITY_SCORE: 63
ADLS_ACUITY_SCORE: 65
ADLS_ACUITY_SCORE: 61
ADLS_ACUITY_SCORE: 67
ADLS_ACUITY_SCORE: 63
ADLS_ACUITY_SCORE: 65
ADLS_ACUITY_SCORE: 61
ADLS_ACUITY_SCORE: 60
ADLS_ACUITY_SCORE: 56
ADLS_ACUITY_SCORE: 59
ADLS_ACUITY_SCORE: 63
ADLS_ACUITY_SCORE: 61
ADLS_ACUITY_SCORE: 53
ADLS_ACUITY_SCORE: 59
ADLS_ACUITY_SCORE: 63
ADLS_ACUITY_SCORE: 61
WALKING_OR_CLIMBING_STAIRS_DIFFICULTY: YES
ADLS_ACUITY_SCORE: 61
ADLS_ACUITY_SCORE: 65
ADLS_ACUITY_SCORE: 60
ADLS_ACUITY_SCORE: 61
ADLS_ACUITY_SCORE: 61
ADLS_ACUITY_SCORE: 59
ADLS_ACUITY_SCORE: 65
WEAR_GLASSES_OR_BLIND: YES
ADLS_ACUITY_SCORE: 61
ADLS_ACUITY_SCORE: 62
TOILETING: 2-->COMPLETELY DEPENDENT
ADLS_ACUITY_SCORE: 65
ADLS_ACUITY_SCORE: 63
ADLS_ACUITY_SCORE: 60
ADLS_ACUITY_SCORE: 63
ADLS_ACUITY_SCORE: 59
ADLS_ACUITY_SCORE: 61
ADLS_ACUITY_SCORE: 61
ADLS_ACUITY_SCORE: 59
ADLS_ACUITY_SCORE: 63
ADLS_ACUITY_SCORE: 63
ADLS_ACUITY_SCORE: 65
ADLS_ACUITY_SCORE: 65
ADLS_ACUITY_SCORE: 61
ADLS_ACUITY_SCORE: 63
ADLS_ACUITY_SCORE: 53
ADLS_ACUITY_SCORE: 59
ADLS_ACUITY_SCORE: 62
ADLS_ACUITY_SCORE: 63
ADLS_ACUITY_SCORE: 63
ADLS_ACUITY_SCORE: 65
ADLS_ACUITY_SCORE: 59
ADLS_ACUITY_SCORE: 64
ADLS_ACUITY_SCORE: 60
ADLS_ACUITY_SCORE: 61
ADLS_ACUITY_SCORE: 60
ADLS_ACUITY_SCORE: 53
ADLS_ACUITY_SCORE: 63
ADLS_ACUITY_SCORE: 63
DRESSING/BATHING_MANAGEMENT: ASSIST OF 1
ADLS_ACUITY_SCORE: 62
ADLS_ACUITY_SCORE: 63
ADLS_ACUITY_SCORE: 61
ADLS_ACUITY_SCORE: 53
TRANSFERRING: 1-->ASSISTANCE (EQUIPMENT/PERSON) NEEDED (NOT DEVELOPMENTALLY APPROPRIATE)
ADLS_ACUITY_SCORE: 37
CHANGE_IN_FUNCTIONAL_STATUS_SINCE_ONSET_OF_CURRENT_ILLNESS/INJURY: YES
ADLS_ACUITY_SCORE: 63
ADLS_ACUITY_SCORE: 60
ADLS_ACUITY_SCORE: 62
ADLS_ACUITY_SCORE: 59
DIFFICULTY_EATING/SWALLOWING: NO
ADLS_ACUITY_SCORE: 65
ADLS_ACUITY_SCORE: 61
ADLS_ACUITY_SCORE: 63
ADLS_ACUITY_SCORE: 61
ADLS_ACUITY_SCORE: 63
ADLS_ACUITY_SCORE: 63
ADLS_ACUITY_SCORE: 62
ADLS_ACUITY_SCORE: 61
ADLS_ACUITY_SCORE: 62
ADLS_ACUITY_SCORE: 62
ADLS_ACUITY_SCORE: 63
ADLS_ACUITY_SCORE: 60
ADLS_ACUITY_SCORE: 53
ADLS_ACUITY_SCORE: 62
TOILETING_ISSUES: YES
ADLS_ACUITY_SCORE: 63
ADLS_ACUITY_SCORE: 61
ADLS_ACUITY_SCORE: 61
ADLS_ACUITY_SCORE: 64
ADLS_ACUITY_SCORE: 59
FALL_HISTORY_WITHIN_LAST_SIX_MONTHS: YES
ADLS_ACUITY_SCORE: 62
ADLS_ACUITY_SCORE: 61
TRANSFERRING: 2-->COMPLETELY DEPENDENT
ADLS_ACUITY_SCORE: 53
ADLS_ACUITY_SCORE: 59
ADLS_ACUITY_SCORE: 47
ADLS_ACUITY_SCORE: 47
ADLS_ACUITY_SCORE: 63
ADLS_ACUITY_SCORE: 60
ADLS_ACUITY_SCORE: 61
ADLS_ACUITY_SCORE: 61
ADLS_ACUITY_SCORE: 53
ADLS_ACUITY_SCORE: 67
ADLS_ACUITY_SCORE: 60
TOILETING: 2-->COMPLETELY DEPENDENT (NOT DEVELOPMENTALLY APPROPRIATE)
ADLS_ACUITY_SCORE: 63
ADLS_ACUITY_SCORE: 37
EQUIPMENT_CURRENTLY_USED_AT_HOME: WHEELCHAIR, MANUAL
ADLS_ACUITY_SCORE: 63
ADLS_ACUITY_SCORE: 65
ADLS_ACUITY_SCORE: 61
ADLS_ACUITY_SCORE: 61
ADLS_ACUITY_SCORE: 65
ADLS_ACUITY_SCORE: 60
ADLS_ACUITY_SCORE: 63
ADLS_ACUITY_SCORE: 61
ADLS_ACUITY_SCORE: 49
ADLS_ACUITY_SCORE: 61
ADLS_ACUITY_SCORE: 63
ADLS_ACUITY_SCORE: 53
ADLS_ACUITY_SCORE: 63
ADLS_ACUITY_SCORE: 63
WALKING_OR_CLIMBING_STAIRS: AMBULATION DIFFICULTY, REQUIRES EQUIPMENT
ADLS_ACUITY_SCORE: 64
ADLS_ACUITY_SCORE: 63
NUMBER_OF_TIMES_PATIENT_HAS_FALLEN_WITHIN_LAST_SIX_MONTHS: 2
ADLS_ACUITY_SCORE: 61
ADLS_ACUITY_SCORE: 61
ADLS_ACUITY_SCORE: 60
ADLS_ACUITY_SCORE: 61
ADLS_ACUITY_SCORE: 63
ADLS_ACUITY_SCORE: 63
ADLS_ACUITY_SCORE: 65
ADLS_ACUITY_SCORE: 61
ADLS_ACUITY_SCORE: 60
ADLS_ACUITY_SCORE: 62
ADLS_ACUITY_SCORE: 59
ADLS_ACUITY_SCORE: 35
ADLS_ACUITY_SCORE: 63
ADLS_ACUITY_SCORE: 65
ADLS_ACUITY_SCORE: 61
ADLS_ACUITY_SCORE: 63
BATHING: 2-->COMPLETELY DEPENDENT (NOT DEVELOPMENTALLY APPROPRIATE)
ADLS_ACUITY_SCORE: 65
ADLS_ACUITY_SCORE: 60
ADLS_ACUITY_SCORE: 63
ADLS_ACUITY_SCORE: 37
ADLS_ACUITY_SCORE: 61
TOILETING_ASSISTANCE: TOILETING DIFFICULTY, DEPENDENT
ADLS_ACUITY_SCORE: 59
ADLS_ACUITY_SCORE: 63
ADLS_ACUITY_SCORE: 62
ADLS_ACUITY_SCORE: 53
ADLS_ACUITY_SCORE: 60
ADLS_ACUITY_SCORE: 63
ADLS_ACUITY_SCORE: 59
ADLS_ACUITY_SCORE: 53
ADLS_ACUITY_SCORE: 67
DRESS: 2-->COMPLETELY DEPENDENT
DRESSING/BATHING_DIFFICULTY: YES
ADLS_ACUITY_SCORE: 63
ADLS_ACUITY_SCORE: 47
ADLS_ACUITY_SCORE: 65
ADLS_ACUITY_SCORE: 61
ADLS_ACUITY_SCORE: 60
ADLS_ACUITY_SCORE: 65
ADLS_ACUITY_SCORE: 59
ADLS_ACUITY_SCORE: 61
ADLS_ACUITY_SCORE: 64
DRESS: 2-->COMPLETELY DEPENDENT (NOT DEVELOPMENTALLY APPROPRIATE)
ADLS_ACUITY_SCORE: 61
ADLS_ACUITY_SCORE: 37
ADLS_ACUITY_SCORE: 61
ADLS_ACUITY_SCORE: 65
ADLS_ACUITY_SCORE: 60
ADLS_ACUITY_SCORE: 61
ADLS_ACUITY_SCORE: 53
ADLS_ACUITY_SCORE: 61
ADLS_ACUITY_SCORE: 60
ADLS_ACUITY_SCORE: 63
ADLS_ACUITY_SCORE: 56
ADLS_ACUITY_SCORE: 61
ADLS_ACUITY_SCORE: 63
ADLS_ACUITY_SCORE: 67
ADLS_ACUITY_SCORE: 61
ADLS_ACUITY_SCORE: 63
ADLS_ACUITY_SCORE: 63
ADLS_ACUITY_SCORE: 61
DOING_ERRANDS_INDEPENDENTLY_DIFFICULTY: YES
ADLS_ACUITY_SCORE: 63
ADLS_ACUITY_SCORE: 61
ADLS_ACUITY_SCORE: 65

## 2023-01-01 ASSESSMENT — ENCOUNTER SYMPTOMS
WEAKNESS: 1
ABDOMINAL PAIN: 0
COLOR CHANGE: 1
BLOOD IN STOOL: 0

## 2023-03-01 NOTE — TELEPHONE ENCOUNTER
Called pt to make appointment. Daughter Sommer states that due to mobility issues he is having trouble leaving the house. He normally uses a wheelchair in his home, and the stairs are hard to use. His only way out right now is either using the stairs or an outside hill that is covered in snow.     Educated he should be seen, daughter states she tried to get him out for an urgent care appointment yesterday, but he refused to leave the house.      Virtual appointment made.    Caitlyn SAGE RN,  [facility]

## 2023-03-01 NOTE — PROGRESS NOTES
Luke is a 82 year old who is being evaluated via a billable video visit.      How would you like to obtain your AVS? MyChart  If the video visit is dropped, the invitation should be resent by: Text to cell phone: [phone]  Will anyone else be joining your video visit? No          Assessment & Plan     Visit for wound check  The wound on his right shin does not look to be severely infected at the moment but given his history of renal disease and diabetes, I am inclined to start him on oral antibiotics.    Half tablet of DS Bactrim twice daily for 10 days.  Continue using topical bacitracin with daily gauze dressing changes.  We talked about warning signs and symptoms of worsening infection and when to follow-up in clinic    - sulfamethoxazole-trimethoprim (BACTRIM DS) 800-160 MG tablet; Take 0.5 tablets by mouth 2 times daily for 10 days    Type 2 diabetes  Continues on insulin and Tradjenta.  Has been checking his blood sugars with most between 100-150. Controlled.       Mir De La Cruz, [facility]    Rosi Butler is a 82 year old, presenting for the following health issues:    Laceration (Right leg, had a fall on 2/16)      HPI     He fell while going up the stairs in his basement a couple of weeks ago.  The wound seems to be slow to heal and wife and daughter have noticed excessive drainage recently.  Wife seems to think that the margins of the wound have felt a bit warm.  Patient denies fevers.    Wife has been changing the dressing on the wound every 2 days or so.    {    Review of Systems   Constitutional, HEENT, cardiovascular, pulmonary, gi and gu systems are negative, except as otherwise noted.      Objective           Vitals:  No vitals were obtained today due to virtual visit.    Physical Exam   GENERAL: Healthy, alert and no distress  EYES: Eyes grossly normal to inspection.  No discharge or erythema, or obvious scleral/conjunctival abnormalities.  RESP:  No audible wheeze, cough, or visible cyanosis.  No visible retractions or increased work of breathing.    SKIN: Visible skin clear. No significant rash, abnormal pigmentation or lesions.  NEURO: Cranial nerves grossly intact.  Mentation and speech appropriate for age.  PSYCH: Mentation appears normal, affect normal/bright, judgement and insight intact, normal speech and appearance well-groomed.            Video-Visit Details    Type of service:  Video Visit   Video Start Time: 1600   Video End Time:1612    Originating Location (pt. Location): Home    Distant Location (provider location):  On-site  Platform used for Video Visit: [employer]

## 2023-03-05 NOTE — TELEPHONE ENCOUNTER
"Routing refill request to provider for review/approval because:  Labs not current:  Serum creatinine    Last Written Prescription Date:  6/2/2022  Last Fill Quantity: 135,  # refills: 2   Last office visit provider:  3/1/2023     Requested Prescriptions   Pending Prescriptions Disp Refills     cloNIDine (CATAPRES) 0.3 MG tablet [Pharmacy Med Name: CLONIDINE 0.3MG] 135 tablet 1     Sig: TAKE 1/2 PILL IN THE MORNING AND 1 FULL PILL IN THE EVENING.       Central Acting Antiadrenergic Agents Failed - 3/3/2023  9:44 AM        Failed - Normal serum creatinine on file within past 12 months     Recent Labs   Lab Test 06/02/21  1203   CR 2.49*       Ok to refill medication if creatinine is low          Passed - Blood pressure under 140/90 in past 12 months     BP Readings from Last 3 Encounters:   07/05/22 106/50   06/27/22 100/56   02/01/22 128/52                 Passed - Patient is 6 years of age or older        Passed - Recent (12 mo) or future (30 days) visit within the authorizing provider's specialty     Patient has had an office visit with the authorizing provider or a provider within the authorizing providers department within the previous 12 mos or has a future within next 30 days. See \"Patient Info\" tab in inbasket, or \"Choose Columns\" in Meds & Orders section of the refill encounter.              Passed - Medication is active on med list       Antiadrenergic Antihypertensives Failed - 3/3/2023  9:44 AM        Failed - Blood pressure less than 140/90 in past 6 months     BP Readings from Last 3 Encounters:   07/05/22 106/50   06/27/22 100/56   02/01/22 128/52                 Failed - Normal serum creatinine on file in past 12 months     Recent Labs   Lab Test 06/02/21  1203   CR 2.49*       Ok to refill medication if creatinine is low          Passed - Medication is active on med list        Passed - Patient is age 18 or older        Passed - Recent (6 mo) or future (30 days) visit within the authorizing provider's " "specialty     Patient had office visit in the last 6 months or has a visit in the next 30 days with authorizing provider or within the authorizing provider's specialty.  See \"Patient Info\" tab in inbasket, or \"Choose Columns\" in Meds & Orders section of the refill encounter.                 Freya Padgett RN 03/04/23 10:27 PM  "

## 2023-03-09 NOTE — TELEPHONE ENCOUNTER
"Last Written Prescription Date:  1/11/2022  Last Fill Quantity: 100,  # refills: 3   Last office visit provider:  3/1/2023     Requested Prescriptions   Pending Prescriptions Disp Refills     EASY COMFORT PEN NEEDLES 32G X 4 MM miscellaneous [Pharmacy Med Name: PEN NEEDLES 4MM X 32G]  2     Sig: USE 1 DAILY       Diabetic Supplies Protocol Passed - 3/8/2023 11:45 AM        Passed - Medication is active on med list        Passed - Patient is 18 years of age or older        Passed - Recent (6 mo) or future (30 days) visit within the authorizing provider's specialty     Patient had office visit in the last 6 months or has a visit in the next 30 days with authorizing provider.  See \"Patient Info\" tab in inbasket, or \"Choose Columns\" in Meds & Orders section of the refill encounter.                 Freya Padgett RN 03/09/23 4:57 PM  "

## 2023-03-23 PROBLEM — I50.32 CHRONIC DIASTOLIC CHF (CONGESTIVE HEART FAILURE), NYHA CLASS 2 (H): Status: ACTIVE | Noted: 2021-04-14

## 2023-03-23 PROBLEM — Z86.73 HISTORY OF STROKE: Status: ACTIVE | Noted: 2018-06-12

## 2023-03-23 PROBLEM — N18.9 CHRONIC KIDNEY DISEASE, UNSPECIFIED CKD STAGE: Status: ACTIVE | Noted: 2023-01-01

## 2023-03-23 PROBLEM — Z85.51 HISTORY OF BLADDER CANCER: Status: ACTIVE | Noted: 2018-05-30

## 2023-03-23 PROBLEM — E11.22 TYPE 2 DIABETES MELLITUS WITH STAGE 4 CHRONIC KIDNEY DISEASE, WITH LONG-TERM CURRENT USE OF INSULIN (H): Status: ACTIVE | Noted: 2018-07-17

## 2023-03-23 PROBLEM — N18.4 TYPE 2 DIABETES MELLITUS WITH STAGE 4 CHRONIC KIDNEY DISEASE, WITH LONG-TERM CURRENT USE OF INSULIN (H): Status: ACTIVE | Noted: 2018-07-17

## 2023-03-23 PROBLEM — I48.91 ATRIAL FIBRILLATION (H): Status: ACTIVE | Noted: 2020-09-18

## 2023-03-23 PROBLEM — Z79.4 TYPE 2 DIABETES MELLITUS WITH STAGE 4 CHRONIC KIDNEY DISEASE, WITH LONG-TERM CURRENT USE OF INSULIN (H): Status: ACTIVE | Noted: 2018-07-17

## 2023-03-23 PROBLEM — D64.9 SYMPTOMATIC ANEMIA: Status: ACTIVE | Noted: 2023-01-01

## 2023-03-23 NOTE — CONSULTS
[facility]  [facility]   Nephrology Consultation/Initial Inpatient Care    Suman Nagy   MRN: [id number]  : 1940   DOA: 3/23/2023     REASON FOR CONSULTATION: We are asked to see pt by Dr. Rizvi    HISTORY OF PRESENT ILLNESS:82 year old male with advanced CKD; has been recently following in our clinic but had a gap in care prior to that; renal function is worse and had more anemia; started on epo; hgb 6.4 on Monday and pt did not want to be admitted; instructed to go to ER if developed more sxs  Presented to ER early this am with generalized weakness and unable to get out of bed; hgb 5.9 so transfused; no loss identified  Pt says has been weak and in a WC and mostly lying in bed for 2 years; says slightly worse over last few weeks; says appetite and sleep ok; no NV  Denies urine issues  Currently no CP or SOB  Pt does not really known much about dialysis--due to gap in care, he has not been able to attend any kidney education as his renal function has declined  Scraped shins on stairs; has been having wife dress them; was on abx in the last couple weeks      REVIEW OF SYSTEMS:  ROS was completely reviewed and otherwise negative and non-contributory    Past Medical History:   Diagnosis Date     Anemia      Bladder cancer (H)      Cancer (H)     Rectosigmoid     Diabetes mellitus (H)      Hyperkalemia      Hypertension      Seizure (H)     possible, one time occurence     Stroke (H)     multiple, residual left sided weakness, last CVA in 2015 caused some speech deficit     Superficial phlebitis      Venous insufficiency of both lower extremities        Social History     Socioeconomic History     Marital status:      Spouse name: Not on file     Number of children: Not on file     Years of education: Not on file     Highest education level: Not on file   Occupational History     Not on file   Tobacco Use     Smoking status:  "Former     Types: Cigarettes     Quit date: 1995     Years since quittin.5     Smokeless tobacco: Never   Substance and Sexual Activity     Alcohol use: No     Drug use: No     Sexual activity: Not Currently     Partners: Female   Other Topics Concern     Not on file   Social History Narrative     Not on file     Social Determinants of Health     Financial Resource Strain: Not on file   Food Insecurity: Not on file   Transportation Needs: Not on file   Physical Activity: Not on file   Stress: Not on file   Social Connections: Not on file   Intimate Partner Violence: Not on file   Housing Stability: Not on file       Family History   Problem Relation Age of Onset     Chronic Obstructive Pulmonary Disease Father        Allergies   Allergen Reactions     Cefazolin Other (See Comments)     \"felt very hot\" Oct 2019 Cephalexin, Sep 2020 Ceftriaxone     Pravastatin Muscle Pain (Myalgia)     Increased peripheral neuropathy     Simvastatin Muscle Pain (Myalgia)     Increased peripheral neuropathy     Thiazides [Thiazide-Type Diuretics] Other (See Comments)     Other: Gout         MEDICATIONS:    cyanocobalamin  1,000 mcg Intramuscular Q90 Days     dextrose 10%  300 mL Intravenous Once     insulin aspart  1-7 Units Subcutaneous TID AC     insulin aspart  1-5 Units Subcutaneous At Bedtime         PHYSICAL EXAM    BP (!) 196/87 (BP Location: Left arm)   Pulse 71   Temp 98.4  F (36.9  C) (Oral)   Resp 19   Ht 1.702 m (5' 7\")   Wt 70.3 kg (155 lb)   SpO2 98%   BMI 24.28 kg/m        Intake/Output Summary (Last 24 hours) at 3/23/2023 1004  Last data filed at 3/23/2023 0742  Gross per 24 hour   Intake 650 ml   Output --   Net 650 ml       Alert/oriented x 3; awake and NAD; pale; disheveled  HEENT NC/AT; perrla; OP clear without lesions; mmm  Neck supple without LAD, TM  CV; RRR without rub or murmur  Lung: clear and equal; no extra sounds  Ab: soft and NT; not distended; normal bs  Ext: + edema and well perfused; " has areas on his shins that are covered with gauze; weeping some serous material but no infection  Skin; no rash  Neuro; grossly intact    LABORATORIES    Last Renal Panel:  Sodium   Date Value Ref Range Status   03/23/2023 135 (L) 136 - 145 mmol/L Final     Potassium   Date Value Ref Range Status   03/23/2023 5.3 3.4 - 5.3 mmol/L Final   06/02/2021 4.0 3.5 - 5.0 mmol/L Final     Chloride   Date Value Ref Range Status   03/23/2023 107 98 - 107 mmol/L Final   06/02/2021 103 98 - 107 mmol/L Final     Carbon Dioxide (CO2)   Date Value Ref Range Status   03/23/2023 16 (L) 22 - 29 mmol/L Final   06/02/2021 22 22 - 31 mmol/L Final     Anion Gap   Date Value Ref Range Status   03/23/2023 12 7 - 15 mmol/L Final   06/02/2021 10 5 - 18 mmol/L Final     Glucose   Date Value Ref Range Status   06/02/2021 287 (H) 70 - 125 mg/dL Final     GLUCOSE BY METER POCT   Date Value Ref Range Status   03/23/2023 123 (H) 70 - 99 mg/dL Final     Urea Nitrogen   Date Value Ref Range Status   03/23/2023 86.0 (H) 8.0 - 23.0 mg/dL Final   06/02/2021 50 (H) 8 - 28 mg/dL Final     Creatinine   Date Value Ref Range Status   03/23/2023 6.12 (H) 0.67 - 1.17 mg/dL Final     GFR Estimate   Date Value Ref Range Status   03/23/2023 8 (L) >60 mL/min/1.73m2 Final     Comment:     eGFR calculated using 2021 CKD-EPI equation.   02/15/2022 20.6 (L) >60.0 ml/min/1.73m^2 Final     Calcium   Date Value Ref Range Status   03/23/2023 8.9 8.8 - 10.2 mg/dL Final     Albumin   Date Value Ref Range Status   03/23/2023 3.5 3.5 - 5.2 g/dL Final   06/02/2021 3.2 (L) 3.5 - 5.0 g/dL Final     No components found for: URINE   Lab Results   Component Value Date    WBC 4.2 03/23/2023     Lab Results   Component Value Date    RBC 2.04 03/23/2023     Lab Results   Component Value Date    HGB 5.9 03/23/2023     Lab Results   Component Value Date    HCT 19.3 03/23/2023     No components found for: MCT  Lab Results   Component Value Date    MCV 95 03/23/2023     Lab Results    Component Value Date    MCH 28.9 03/23/2023     Lab Results   Component Value Date    MCHC 30.6 03/23/2023     Lab Results   Component Value Date    RDW 16.5 03/23/2023     Lab Results   Component Value Date     03/23/2023         I reviewed all labs    ASSESSMENT/PLAN:  82 year old male    1. Advancing CKD; due to gap in care, pt has not been optimally educated re; options of RRT vs declining to do dialysis; this is my first meeting of pt--my impression is that he has poor baseline functional status and that his QOL is not likely to be improved by dialysis and his length of life may not improve much; will try to have family meeting with pt and wife and daughter to discuss  2. Anemia; underproduction from CKD; started on epo and now transfused; received elemental iron with prbcs so should be relatively iron replete  3. Hyperkalemia; medically treated and improved; hold KCL supplement  4. HTN; on chronic meds (norvasc, clonidine, hydralzine, metoprolol); resume as needed  5. Chronic A/C; on eliquis for prior CVA ad A fib  6. DM; on insulin and tradjenta  7. Acidosis; on bicarb  8. Hyperlipid; on statin  9. Leg abrasions: local cares; ask wound care to see      Liya Laboy MD  Nephrology

## 2023-03-23 NOTE — ED NOTES
Patient resting comfortably with good disposition regards to medication admin and education. Doing BG checks Q30x4, then Q60 x 4 post calcium gluconate, D50. Patient denies pain, SOB, nausea. Endorsed foot pain to existing neuropathy.

## 2023-03-23 NOTE — PHARMACY-ADMISSION MEDICATION HISTORY
Pharmacy Note - Admission Medication History    Pertinent Provider Information: takes his meds spread  through out the day, to make it easier swallowing.     ______________________________________________________________________    Prior To Admission (PTA) med list completed and updated in EMR.       Current Facility-Administered Medications for the 3/23/23 encounter (Hospital Encounter)   Medication     cyanocobalamin injection 1,000 mcg     PTA Med List   Medication Sig Note Last Dose     acetaminophen (TYLENOL) 325 MG tablet [ACETAMINOPHEN (TYLENOL) 325 MG TABLET] Take 2 tablets (650 mg total) by mouth every 4 (four) hours as needed.  Past Week     amLODIPine (NORVASC) 10 MG tablet Take 1 tablet (10 mg) by mouth daily  3/22/2023 at noon     atorvastatin (LIPITOR) 20 MG tablet TAKE 1 TABLET (20 MG TOTAL) BY MOUTH DAILY.  3/22/2023 at am     calcium, as carbonate, (TUMS) 200 mg calcium (500 mg) chewable tablet [CALCIUM, AS CARBONATE, (TUMS) 200 MG CALCIUM (500 MG) CHEWABLE TABLET] Chew 1 tablet (200 mg total) 3 (three) times a day as needed for heartburn.  not recently     cholecalciferol, vitamin D3, (VITAMIN D3) 1,000 unit capsule [CHOLECALCIFEROL, VITAMIN D3, (VITAMIN D3) 1,000 UNIT CAPSULE] Take 2 capsules (2,000 Units total) by mouth daily.  3/22/2023 at noon     cloNIDine (CATAPRES) 0.3 MG tablet TAKE 1/2 PILL IN THE MORNING AND 1 FULL PILL IN THE EVENING.  3/22/2023 at noon     cyanocobalamin, vitamin B-12, (VITAMIN B-12) 1,000 mcg Subl [CYANOCOBALAMIN, VITAMIN B-12, (VITAMIN B-12) 1,000 MCG SUBL] Place 1 tablet (1,000 mcg total) under the tongue daily.  3/22/2023 at 5pm     ELIQUIS ANTICOAGULANT 2.5 MG tablet TAKE 1 TABLET (2.5 MG TOTAL) BY MOUTH 2 (TWO) TIMES A DAY.  3/22/2023 at pm     FIBER CHOICE, INULIN, ORAL Take 1 tablet by mouth 2 times daily  3/22/2023 at pm     furosemide (LASIX) 40 MG tablet [FUROSEMIDE (LASIX) 40 MG TABLET] TAKE 1 TABLET (40 MG TOTAL) BY MOUTH DAILY. (Patient taking differently:  40 mg 2 times daily [FUROSEMIDE (LASIX) 40 MG TABLET] TAKE 1 TABLET (40 MG TOTAL) BY MOUTH DAILY.  Recently increased for 40mg qday to 40mg bid (qam and noon)) 3/23/2023: Increased from 40mg qday to 40mg bid (3/20/23) 3/22/2023 at noon     hydrALAZINE (APRESOLINE) 25 MG tablet TAKE 3 TABLETS (75 MG) BY MOUTH 4 TIMES DAILY  3/22/2023 at pm     LANTUS SOLOSTAR 100 UNIT/ML soln INJECT 16 UNITS SUBCUTANEOUS EVERY MORNING  3/22/2023 at am     linagliptin (TRADJENTA) 5 MG TABS tablet Take 1 tablet (5 mg) by mouth daily  3/22/2023 at am     metoprolol succinate ER (TOPROL-XL) 50 MG 24 hr tablet Take 1 tablet (50 mg) by mouth daily  3/22/2023 at pm     polyethylene glycol (MIRALAX) 17 gram packet [POLYETHYLENE GLYCOL (MIRALAX) 17 GRAM PACKET] Take 1 packet (17 g total) by mouth daily as needed.  not recently     potassium chloride ER (KLOR-CON M) 20 MEQ CR tablet TAKE 1 TABLET (20 MEQ TOTAL) BY MOUTH DAILY.  3/22/2023 at afternoon     sertraline (ZOLOFT) 50 MG tablet Take 50 mg by mouth daily  3/22/2023 at afternoon     sodium bicarbonate 650 MG tablet Take 650 mg by mouth 2 times daily  3/22/2023 at pm       Information source(s): Patient, Family member, Clinic records and [facility]  Method of interview communication: in-person and phone    Summary of Changes to PTA Med List  New: sodium bicarbonate  Discontinued: calcitriol,   Changed: fiber choice 2 day to 1 bid, furosemide 40mg qday to bid, sertraline 75mg to 50mg qday(this was changed awhile ago but RX dosing from clinic has not been changed per daughter)    Patient was asked about OTC/herbal products specifically.  PTA med list reflects this.    In the past week, patient estimated taking medication this percent of the time:  greater than 90%.    Medication Affordability:       Allergies were reviewed, assessed, and updated with the patient.      Patient does not anticipate needing any multi-use medications during admission.    The information provided  in this note is only as accurate as the sources available at the time of the update(s).    Thank you for the opportunity to participate in the care of this patient.    Bradley Yo [facility]  3/23/2023 8:47 AM

## 2023-03-23 NOTE — ED PROVIDER NOTES
EMERGENCY DEPARTMENT ENCOUNTER     NAME: Suman Nagy   AGE: 82 year old male   YOB: 1940   MRN: [id number]   EVALUATION DATE & TIME: 3/23/2023  3:25 AM   PCP: Telly Torre     Chief Complaint   Patient presents with     Generalized Weakness   :    FINAL IMPRESSION       1. Symptomatic anemia    2. Chronic kidney disease, unspecified CKD stage    3. Generalized muscle weakness           ED COURSE & MEDICAL DECISION MAKING      Pertinent Labs & Imaging studies reviewed. (See chart for details)   82 year old male  presents to the Emergency Department for evaluation of generalized weakness.  Family states that he is followed by associated nephrology, he was seen in clinic on Monday where he had a hemoglobin of 6.4.  They debated doing a transfusion at that time but with shared decision making decided against it as he was feeling well.  He now has been generally weak and his daughter noticed that he cannot get up out of bed which is different than his baseline. Initial Vitals Reviewed. Initial exam notable for patient who is pale and weak appearing but does not have any focal neurologic deficits.  His vital signs are generally normal.  He does have venous stasis of his lower extremities which are weeping fluid.  His daughter notes that they are in a discussion with associated nephrology about whether or not to start dialysis as he is in pretty severe kidney failure.  His labs today were repeated and the hemoglobin is now down to 5.9.  He denies black or bloody stool, though he is anticoagulated on Eliquis.  I am more suspicious of erythropoietin deficiency than I am of an acute GI bleed or anemia from acute blood loss.  Either way, patient and family were consented and I am transfusing 2 units of packed red blood cells for symptomatic anemia.  I have discussed the case with hospitalist for admission.        3:36 AM I met with the patient to gather history and perform my exam. ED course and  treatment discussed.     4:33 AM I obtained the patient's consent for blood transfusion and updated with the plan for admission     4:45 AM I discussed the patient with Dr. Rizvi from the hospitalist service who agrees to admit the patient.      At the conclusion of the encounter I discussed the results of all of the tests and the disposition. The questions were answered. The patient or family acknowledged understanding and was agreeable with the care plan.     35 minutes critical care time, see procedure note below for details if relevant    Medical Decision Making    History:    Supplemental history from: Documented in chart, if applicable and Family Member/Significant Other    External Record(s) reviewed: Outpatient Record: history    Work Up:    Chart documentation includes differential considered and any EKGs or imaging independently interpreted by provider, where specified.    In additional to work up documented, I considered the following work up: Documented in chart, if applicable.    External consultation:    Discussion of management with another provider: Hospitalist    Complicating factors:    Care impacted by chronic illness: Chronic Kidney Disease    Care affected by social determinants of health: N/A    Disposition considerations: Admit.        MEDICATIONS GIVEN IN THE EMERGENCY:   Medications - No data to display   NEW PRESCRIPTIONS STARTED AT TODAY'S ER VISIT   New Prescriptions    No medications on file     ================================================================   HISTORY OF PRESENT ILLNESS       Patient information was obtained from: Patient's daughter    Use of Intrepreter: N/A   Suman Nagy is a 82 year old male with history of anemia, CKD 4, Atrial fibrillation (on Eliquis), CAD, chronic diastolic CHF, history of bladder and rectal cancer, history of stroke, DM type II, and HTN who presents to this ED via EMS with a chief complaint of generalized weakness    The patient's  "daughter reports the patient had an appointment with his nephrologist three days ago where labs found his hemoglobin to be 6.4. She notes the patient has been progressively getting weaker, to the point that the patient \"couldn't get out of bed\" this morning. She notes the patient is normally able to get out of bed on their own. She notes the patient is in kidney failure as well. The patient is pale appearing and has scattered bruising to upper extremities.   The patient denies abdominal pain or black or bloody stools.    ================================================================    REVIEW OF SYSTEMS       Review of Systems   Gastrointestinal: Negative for abdominal pain and blood in stool.   Skin: Positive for color change (bruising to upper arms) and pallor.   Neurological: Positive for weakness (generalized).         PAST HISTORY     PAST MEDICAL HISTORY:   Past Medical History:   Diagnosis Date     Anemia      Bladder cancer (H)      Cancer (H)     Rectosigmoid     Diabetes mellitus (H)      Hyperkalemia      Hypertension      Seizure (H)     possible, one time occurence     Stroke (H)     multiple, residual left sided weakness, last CVA in July 2015 caused some speech deficit     Superficial phlebitis      Venous insufficiency of both lower extremities       PAST SURGICAL HISTORY:   Past Surgical History:   Procedure Laterality Date     COLON SURGERY      Colectomy Low Anterior Resection     EYE SURGERY      Cataract Extraction     LAPAROSCOPIC ASSISTED COLECTOMY N/A 6/1/2017    Procedure: LAPAROSCOPIC ASSISTED COLECTOMY LOW ANTERIOR RESECTION AND DIVERTING LOOP ILEOSTOMY, PROCTOSOPY;  Surgeon: Tyson Parry MD;  [place] [facility] [place]  Service:      OTHER SURGICAL HISTORY      [id number]     DE CLOSE ENTEROSTOMY N/A 8/15/2017    Procedure: ILEOSTOMY CLOSURE LOOP ;  Surgeon: Tyson Parry MD;  [place] [facility] [place]  Service: General     DE CYSTOURETHROSCOPY,FULGUR <0.5 CM LESN N/A " "9/1/2015    Procedure: CYSTOSCOPY TRANSURETHRAL RESECTION BLADDER TUMOR;  Surgeon: Cleveland Nair MD;  Location: [facility] - [place];  Service: Urology     DC CYSTOURETHROSCOPY,FULGUR <0.5 CM LESN N/A 12/22/2015    Procedure: CYSTOSCOPY TRANSURETHRAL RESECTION BLADDER TUMOR AND BLADDER BIOPSIES;  Surgeon: Cleveland Nair MD;  Location: [facility] [place];  Service: Urology     VASECTOMY        CURRENT MEDICATIONS:   acetaminophen (TYLENOL) 325 MG tablet  amLODIPine (NORVASC) 10 MG tablet  atorvastatin (LIPITOR) 20 MG tablet  blood glucose (NO BRAND SPECIFIED) lancets standard  blood glucose (ONETOUCH ULTRA) test strip  calcitRIOL (ROCALTROL) 0.25 MCG capsule  calcium, as carbonate, (TUMS) 200 mg calcium (500 mg) chewable tablet  cholecalciferol, vitamin D3, (VITAMIN D3) 1,000 unit capsule  cloNIDine (CATAPRES) 0.3 MG tablet  cyanocobalamin 1,000 mcg/mL injection  cyanocobalamin, vitamin B-12, (VITAMIN B-12) 1,000 mcg Subl  EASY COMFORT PEN NEEDLES 32G X 4 MM miscellaneous  ELIQUIS ANTICOAGULANT 2.5 MG tablet  FIBER CHOICE, INULIN, ORAL  furosemide (LASIX) 40 MG tablet  furosemide (LASIX) 40 MG tablet  hydrALAZINE (APRESOLINE) 25 MG tablet  Lancets (ONETOUCH DELICA PLUS [id number]) MISC  LANTUS SOLOSTAR 100 UNIT/ML soln  linagliptin (TRADJENTA) 5 MG TABS tablet  metoprolol succinate ER (TOPROL-XL) 50 MG 24 hr tablet  ONETOUCH DELICA PLUS LANCET 33 gauge Misc  ONETOUCH ULTRA2 METER Misc  polyethylene glycol (MIRALAX) 17 gram packet  potassium chloride ER (KLOR-CON M) 20 MEQ CR tablet  sertraline (ZOLOFT) 50 MG tablet      ALLERGIES:   Allergies   Allergen Reactions     Cefazolin Other (See Comments)     \"felt very hot\" Oct 2019 Cephalexin, Sep 2020 Ceftriaxone     Pravastatin Muscle Pain (Myalgia)     Increased peripheral neuropathy     Simvastatin Muscle Pain (Myalgia)     Increased peripheral neuropathy     Thiazides [Thiazide-Type Diuretics] Other (See Comments)     Other: Gout        FAMILY HISTORY:   Family " "History   Problem Relation Age of Onset     Chronic Obstructive Pulmonary Disease Father       SOCIAL HISTORY:   Social History     Socioeconomic History     Marital status:    Tobacco Use     Smoking status: Former     Types: Cigarettes     Quit date: 1995     Years since quittin.5     Smokeless tobacco: Never   Substance and Sexual Activity     Alcohol use: No     Drug use: No     Sexual activity: Not Currently     Partners: Female        VITALS  Patient Vitals for the past 24 hrs:   BP Pulse Resp SpO2 Height Weight   23 0332 (!) 179/72 63 20 97 % 1.702 m (5' 7\") 70.3 kg (155 lb)        ================================================================    PHYSICAL EXAM     VITAL SIGNS: BP (!) 179/72   Pulse 63   Resp 20   Ht 1.702 m (5' 7\")   Wt 70.3 kg (155 lb)   SpO2 97%   BMI 24.28 kg/m     Constitutional:  Awake, no acute distress. Generally weak appearing, pale  HENT:  Atraumatic, oropharynx without exudate or erythema, membranes moist  Lymph:  No adenopathy  Eyes: EOM intact, PERRL, no injection  Neck: Supple  Respiratory:  Clear to auscultation bilaterally, no wheezes or crackles   Cardiovascular:  Regular rate and rhythm, single S1 and S2   GI:  Soft, nontender, nondistended, no rebound or guarding   Musculoskeletal:  Moves all extremities, no lower extremity edema, no deformities. venostasis to anterior shins    Skin:  Warm, dry. Scattered bruising to upper extremities  Neurologic:  Alert and oriented x3, no focal deficits noted       ================================================================  LAB       All pertinent labs reviewed and interpreted.   Labs Ordered and Resulted from Time of ED Arrival to Time of ED Departure   COMPREHENSIVE METABOLIC PANEL - Abnormal       Result Value    Sodium 135 (*)     Potassium 6.0 (*)     Chloride 107      Carbon Dioxide (CO2) 16 (*)     Anion Gap 12      Urea Nitrogen 86.0 (*)     Creatinine 6.12 (*)     Calcium 8.9      Glucose 111 " (*)     Alkaline Phosphatase 77      AST 22      ALT 24      Protein Total 6.2 (*)     Albumin 3.5      Bilirubin Total 0.3      GFR Estimate 8 (*)    TROPONIN T, HIGH SENSITIVITY - Abnormal    Troponin T, High Sensitivity 121 (*)    INR - Abnormal    INR 1.41 (*)    PARTIAL THROMBOPLASTIN TIME - Abnormal    aPTT 39 (*)    CBC WITH PLATELETS AND DIFFERENTIAL - Abnormal    WBC Count 4.2      RBC Count 2.04 (*)     Hemoglobin 5.9 (*)     Hematocrit 19.3 (*)     MCV 95      MCH 28.9      MCHC 30.6 (*)     RDW 16.5 (*)     Platelet Count 125 (*)     % Neutrophils 73      % Lymphocytes 10      % Monocytes 12      % Eosinophils 2      % Basophils 1      % Immature Granulocytes 2      NRBCs per 100 WBC 1 (*)     Absolute Neutrophils 3.2      Absolute Lymphocytes 0.4 (*)     Absolute Monocytes 0.5      Absolute Eosinophils 0.1      Absolute Basophils 0.0      Absolute Immature Granulocytes 0.1      Absolute NRBCs 0.0     MAGNESIUM - Normal    Magnesium 1.8     ROUTINE UA WITH MICROSCOPIC REFLEX TO CULTURE   GLUCOSE MONITOR NURSING POCT   HEMOGLOBIN A1C   GLUCOSE MONITOR NURSING POCT   GLUCOSE MONITOR NURSING POCT   GLUCOSE MONITOR NURSING POCT   GLUCOSE MONITOR NURSING POCT   GLUCOSE MONITOR NURSING POCT   GLUCOSE MONITOR NURSING POCT   POTASSIUM   POTASSIUM   GLUCOSE MONITOR NURSING POCT   TYPE AND SCREEN, ADULT    ABO/RH(D) A POS      Antibody Screen Negative      SPECIMEN EXPIRATION DATE [id number]     PREPARE RED BLOOD CELLS (UNIT)    Blood Component Type Red Blood Cells      Product Code X4086W58      Unit Status Ready for issue      Unit Number [id number]      CROSSMATCH Compatible      CODING SYSTEM [id number]     PREPARE RED BLOOD CELLS (UNIT)    Blood Component Type Red Blood Cells      Product Code F6525G75      Unit Status Issued      Unit Number [id number]      CROSSMATCH Compatible      CODING SYSTEM [id number]      ISSUE DATE AND TIME [id number]      UNIT ABO/RH A+      UNIT TYPE ISBT 6200     PREPARE  RED BLOOD CELLS (UNIT)   TRANSFUSE RED BLOOD CELLS (UNIT)   ABO/RH TYPE AND SCREEN        ===============================================================  RADIOLOGY       Reviewed all pertinent imaging. Please see official radiology report.   No orders to display         ================================================================  EKG     EKG reviewed interpreted by me shows sinus rhythm with rate of 62, normal axis, QTc 428 with no acute ST or T wave changes since previous    Dr. Aguiar reviewed and interpreted the patient's EKG, with comments made as listed above.    Please see scanned EKG for full report.    ================================================================  PROCEDURES     Critical Care  Performed by: Em Aguiar MD  Authorized by: Em Aguiar MD  Total critical care time: 35 minutes  Critical care time was exclusive of separately billable procedures and treating other patients.  Critical care was necessary to treat or prevent imminent or life-threatening deterioration of the following conditions: Symptomatic anemia requiring transfusion  Critical care was time spent personally by me on the following activities: development of treatment plan with patient or surrogate, discussions with consultants, examination of patient, evaluation of patient's response to treatment, obtaining history from patient or surrogate, ordering and performing treatments and interventions, ordering and review of laboratory studies, ordering and review of radiographic studies and re-evaluation of patient's condition, this excludes any separately billable procedures.          I, Cassi Sher, am serving as a scribe to document services personally performed by Dr. Aguiar based on my observation and the provider's statements to me. IEm MD attest that Cassi Sher is acting in a scribe capacity, has observed my performance of the services and has documented them in accordance with my direction.      Em Aguiar M.D.   Emergency Medicine   [facility] EMERGENCY DEPARTMENT  [facility] [facility] [id number] [phone]  Dept: [phone]      Em Aguiar MD  03/23/23 0519       Em Aguiar MD  03/23/23 0547

## 2023-03-23 NOTE — ED NOTES
Heart care called and stated that patient to have a stress test tomorrow @ 0900. Patient is not to have any caffeine after 2100 tonight 03/23/2023( includes decaff).   NPO @ 0500, but can have water, nothing else.

## 2023-03-23 NOTE — ED TRIAGE NOTES
Patient comes from home with generalized weaknesses by 3-4 days, stated he was at clinic on Monday and found a hgb of 6.1. Endorses diarrhea by 3 days with 3-4 occurrences per day, denies red stool or blood. Denies pain, N/V. AOx3. Hx of bladder cancer per patient. Hypertensive SBP 150s for EMS

## 2023-03-23 NOTE — H&P
[facility]    History and Physical - Hospitalist Service       Date of Admission:  3/23/2023    Assessment & Plan   Principal Problem:    Symptomatic anemia  Active Problems:    History of bladder cancer    History of stroke    Type 2 diabetes mellitus with stage 4 chronic kidney disease, with long-term current use of insulin (H)    Generalized muscle weakness    Atrial fibrillation (H)    Chronic diastolic CHF (congestive heart failure), NYHA class 2 (H)    SARIKA (acute kidney injury) (H)    Chronic kidney disease, unspecified CKD stage       Suman Nagy is a 82 year old male with history of A-fib and prior stroke, DM2, CKD4, chronic CHF and history of bladder and rectal cancer who is admitted on 3/23/2023 with symptomatic anemia and progressive renal disease.    Symptomatic anemia: Patient presents with progressive diffuse weakness and hemoglobin found to have drifted down to 5.9 from previous reported value of 6.4 as outpatient.  There is no reports of clinical blood loss although the patient is on Eliquis  There is no reports of hematuria, hematemesis or bloody stools.  --Transfused 2 units PRBC  --Recheck hemoglobin this afternoon      SARIKA on CKD 4 with associated hyperkalemia:  Creatinine 6.12 from previous value of 2.88 per our records.  K is 6 on admission  -- Nephrology consult  -- Trend renal labs  -- We will order potassium shifting therapy with Hc03, lasix, kayexelate and insulin with dextrose.       Elevated troponin: Difficult to interpret in the setting of progressive renal disease.  EKG on admission is negative for ACS.  Patient denies chest pain  --Stop trending troponin      Loose stools:   -- hold home fiber supplement  -- check stool culture      History of A-fib and prior stroke:  --Okay to continue home DOAC once dosing verified by pharmacy  -- Resume home rate control once dosing verified by pharmacy      DM2:   -- verify home meds  -- sliding scale insulin for  now      History of stroke:  -- verify home medications      HTN:  -- verify home medications  -- As needed hydralazine available for now         Diet: Moderate Consistent Carb (60 g CHO per Meal) Diet    DVT Prophylaxis: DOAC  Benz Catheter: Not present  Lines: None     Cardiac Monitoring: None  Code Status: Full Code. Patient is rather undecided but wishes to be full code for now.     Clinically Significant Risk Factors Present on Admission        # Hyperkalemia: Highest K = 6 mmol/L in last 2 days, will monitor as appropriate        # Drug Induced Coagulation Defect: home medication list includes an anticoagulant medication  # Thrombocytopenia: Lowest platelets = 125 in last 2 days, will monitor for bleeding   # Hypertension: home medication list includes antihypertensive(s)              Disposition Plan      Expected Discharge Date: 03/25/2023                  Sebas Rizvi DO  Hospitalist Service  [facility]  Securely message with Signature (more info)  Text page via [facility] Paging/Directory     ______________________________________________________________________    Chief Complaint   Weakness         History of Present Illness   82 year old male with history of A-fib and prior stroke, DM2, CKD4, chronic CHF and history of bladder and rectal cancer who is admitted on 3/23/2023 with symptomatic anemia and progressive renal disease.  Patient endorses diffuse weakness and 3-4 loose stools per day.   Denies any bleeding or pain.   Remainder of ROS negative.          Past Medical History    Past Medical History:   Diagnosis Date     Anemia      Bladder cancer (H)      Cancer (H)     Rectosigmoid     Diabetes mellitus (H)      Hyperkalemia      Hypertension      Seizure (H)     possible, one time occurence     Stroke (H)     multiple, residual left sided weakness, last CVA in July 2015 caused some speech deficit     Superficial phlebitis      Venous insufficiency of both lower extremities         Past Surgical History   Past Surgical History:   Procedure Laterality Date     COLON SURGERY      Colectomy Low Anterior Resection     EYE SURGERY      Cataract Extraction     LAPAROSCOPIC ASSISTED COLECTOMY N/A 6/1/2017    Procedure: LAPAROSCOPIC ASSISTED COLECTOMY LOW ANTERIOR RESECTION AND DIVERTING LOOP ILEOSTOMY, PROCTOSOPY;  Surgeon: Tyson Parry MD;  [facility] [place];  Service:      OTHER SURGICAL HISTORY      [id number]     NE CLOSE ENTEROSTOMY N/A 8/15/2017    Procedure: ILEOSTOMY CLOSURE LOOP ;  Surgeon: Tyson Parry MD;  Location: [facility] [place];  Service: General     NE CYSTOURETHROSCOPY,FULGUR <0.5 CM LESN N/A 9/1/2015    Procedure: CYSTOSCOPY TRANSURETHRAL RESECTION BLADDER TUMOR;  Surgeon: Cleveland Nair MD;  Location: [facility] [place];  Service: Urology     NE CYSTOURETHROSCOPY,FULGUR <0.5 CM LESN N/A 12/22/2015    Procedure: CYSTOSCOPY TRANSURETHRAL RESECTION BLADDER TUMOR AND BLADDER BIOPSIES;  Surgeon: Cleveland Nair MD;  Location: [facility] [place];  Service: Urology     VASECTOMY         Prior to Admission Medications   Prior to Admission Medications   Prescriptions Last Dose Informant Patient Reported? Taking?   EASY COMFORT PEN NEEDLES 32G X 4 MM miscellaneous   No No   Sig: USE 1 DAILY   ELIQUIS ANTICOAGULANT 2.5 MG tablet   No No   Sig: TAKE 1 TABLET (2.5 MG TOTAL) BY MOUTH 2 (TWO) TIMES A DAY.   FIBER CHOICE, INULIN, ORAL   Yes No   Sig: [FIBER CHOICE, INULIN, ORAL] Take 2 tablets by mouth daily.   LANTUS SOLOSTAR 100 UNIT/ML soln   No No   Sig: INJECT 16 UNITS SUBCUTANEOUS EVERY MORNING   Lancets (ONETOUCH DELICA PLUS [id number]) MISC   No No   Sig: USE TO TEST BLOOD SUGAR 3 TIMES DAILY OR AS DIRECTED.   ONETOUCH DELICA PLUS LANCET 33 gauge Misc   No No   Sig: [ONETOUCH DELICA PLUS LANCET 33 GAUGE MISC] CHECK BLOOD SUGAR 3 TIMES PER DAY.   ONETOUCH ULTRA2 METER Misc   No No   Sig: [ONETOUCH ULTRA2 METER MISC] USE AS DIRECTED   acetaminophen (TYLENOL)  325 MG tablet   No No   Sig: [ACETAMINOPHEN (TYLENOL) 325 MG TABLET] Take 2 tablets (650 mg total) by mouth every 4 (four) hours as needed.   amLODIPine (NORVASC) 10 MG tablet   No No   Sig: Take 1 tablet (10 mg) by mouth daily   atorvastatin (LIPITOR) 20 MG tablet   No No   Sig: TAKE 1 TABLET (20 MG TOTAL) BY MOUTH DAILY.   blood glucose (NO BRAND SPECIFIED) lancets standard   No No   Sig: Use to test blood sugar 3 times daily or as directed.   blood glucose (ONETOUCH ULTRA) test strip   No No   Sig: CHECK BLOOD SUGAR 3 TIMES PER DAY   calcitRIOL (ROCALTROL) 0.25 MCG capsule   Yes No   Sig: Take 0.25 mcg by mouth daily Mon, Wed, Fri   calcium, as carbonate, (TUMS) 200 mg calcium (500 mg) chewable tablet   No No   Sig: [CALCIUM, AS CARBONATE, (TUMS) 200 MG CALCIUM (500 MG) CHEWABLE TABLET] Chew 1 tablet (200 mg total) 3 (three) times a day as needed for heartburn.   cholecalciferol, vitamin D3, (VITAMIN D3) 1,000 unit capsule   No No   Sig: [CHOLECALCIFEROL, VITAMIN D3, (VITAMIN D3) 1,000 UNIT CAPSULE] Take 2 capsules (2,000 Units total) by mouth daily.   cloNIDine (CATAPRES) 0.3 MG tablet   No No   Sig: TAKE 1/2 PILL IN THE MORNING AND 1 FULL PILL IN THE EVENING.   cyanocobalamin 1,000 mcg/mL injection   Yes No   Sig: [CYANOCOBALAMIN 1,000 MCG/ML INJECTION] Inject 1,000 mcg into the shoulder, thigh, or buttocks every 3 (three) months.          cyanocobalamin, vitamin B-12, (VITAMIN B-12) 1,000 mcg Subl   No No   Sig: [CYANOCOBALAMIN, VITAMIN B-12, (VITAMIN B-12) 1,000 MCG SUBL] Place 1 tablet (1,000 mcg total) under the tongue daily.   furosemide (LASIX) 40 MG tablet   No No   Sig: [FUROSEMIDE (LASIX) 40 MG TABLET] TAKE 1 TABLET (40 MG TOTAL) BY MOUTH DAILY.   furosemide (LASIX) 40 MG tablet   No No   Sig: TAKE 1 TABLET (40 MG TOTAL) BY MOUTH DAILY.   hydrALAZINE (APRESOLINE) 25 MG tablet   No No   Sig: TAKE 3 TABLETS (75 MG) BY MOUTH 4 TIMES DAILY   linagliptin (TRADJENTA) 5 MG TABS tablet   No No   Sig: Take 1  tablet (5 mg) by mouth daily   metoprolol succinate ER (TOPROL-XL) 50 MG 24 hr tablet   No No   Sig: Take 1 tablet (50 mg) by mouth daily   polyethylene glycol (MIRALAX) 17 gram packet   No No   Sig: [POLYETHYLENE GLYCOL (MIRALAX) 17 GRAM PACKET] Take 1 packet (17 g total) by mouth daily as needed.   potassium chloride ER (KLOR-CON M) 20 MEQ CR tablet   No No   Sig: TAKE 1 TABLET (20 MEQ TOTAL) BY MOUTH DAILY.   sertraline (ZOLOFT) 50 MG tablet   No No   Sig: TAKE ONE & ONE -HALF (1&1/2) TABLETS BY MOUTH DAILY      Facility-Administered Medications Last Administration Doses Remaining   cyanocobalamin injection 1,000 mcg 7/5/2022 12:02 PM             Physical Exam   Vital Signs: Temp: 97.7  F (36.5  C) Temp src: Oral BP: (!) 158/67 Pulse: 59   Resp: 16 SpO2: 98 % O2 Device: None (Room air)    Weight: 155 lbs 0 oz    General Appearance: In no acute distress  RESPIRATORY: respirations nonlabored  CARDIOVASCULAR: 1+ le edema bilat.  ABDOMEN: soft and non-tender  NEUROLOGIC: No focal arm or leg  weakness, speech is clear       Medical Decision Making       75 MINUTES SPENT BY ME on the date of service doing chart review, history, exam, documentation & further activities per the note.      Data

## 2023-03-23 NOTE — PROGRESS NOTES
[facility] - Palliative Care Note      Palliative care consult noted, consulted for goals of care.  Our team will not be able to see today and will plan to see tomorrow.  Notified Dr. Rosado via text page.  Please call our service if any questions.      Thank you,   SURYA Limon, GCNS-BC  Clinical Nurse Specialist  [facility] Palliative Care  [phone]

## 2023-03-23 NOTE — CONSULTS
Care Management Initial Consult    General Information  Assessment completed with: Patient, Children, patient, Daughter Trish  Type of CM/SW Visit: Initial Assessment    Primary Care Provider verified and updated as needed: Yes   Readmission within the last 30 days:           Advance Care Planning:            Communication Assessment  Patient's communication style: spoken language (English or Bilingual)             Cognitive  Cognitive/Neuro/Behavioral: WDL  Level of Consciousness: alert     Orientation: oriented x 4     Best Language: 0 - No aphasia  Speech: clear    Living Environment:   People in home: child(burt), adult, spouse     Current living Arrangements: house      Able to return to prior arrangements: yes  Living Arrangement Comments: house with steps    Family/Social Support:  Care provided by: self, child(burt)  Provides care for: no one, no one, unable/limited ability to care for self  Marital Status:   Children, Wife          Description of Support System: Supportive    Support Assessment: Adequate family and caregiver support    Current Resources:   Patient receiving home care services: No     Community Resources:    Equipment currently used at home: walker, standard, wheelchair, manual  Supplies currently used at home: None    Employment/Financial:  Employment Status: retired        Financial Concerns:             Lifestyle & Psychosocial Needs:  Social Determinants of Health     Tobacco Use: Medium Risk     Smoking Tobacco Use: Former     Smokeless Tobacco Use: Never     Passive Exposure: Not on file   Alcohol Use: Not on file   Financial Resource Strain: Not on file   Food Insecurity: Not on file   Transportation Needs: Not on file   Physical Activity: Not on file   Stress: Not on file   Social Connections: Not on file   Intimate Partner Violence: Not on file   Depression: Not at risk     PHQ-2 Score: 0   Housing Stability: Not on file       Functional Status:  Prior to admission patient  needed assistance:   Dependent ADLs:: Ambulation-walker, Bathing, Dressing  Dependent IADLs:: Incontinence, Transportation, Medication Management, Meal Preparation, Cleaning, Cooking, Laundry, Shopping       Mental Health Status:  Mental Health Status: No Current Concerns       Chemical Dependency Status:  Chemical Dependency Status: No Current Concerns             Values/Beliefs:  Spiritual, Cultural Beliefs, Worship Practices, Values that affect care:                 Additional Information:  CM Chart reviewed,  met with patient in his room to introduce self, CM role and complete initial assessment. Patient reports he lives in a house with his daughter and wife. Has a walker and wheelchair which he uses both at home. Daughter Trish sets update medications.   Patient allows  to discuss additional discharge planning needs or background information with patient's daughter.  SW spoke to patient's daughter over the phone. Trish confirms the above information. She reports when patient is feeling well, patient does not need help with self cares, however as of recent, has needed assist. Home has steps and patient does need help going up the stairs to bedroom and bathroom. She takes patient to all medical appointments, shopping. Daughter also reports patient's wife is currently also having health issues.  addressed discharge planning and goals to daughter surrounding primary caregiving for both parenst, and discharge disposition if patient's care needs increase. Also discussed rehab at TCU if this was recommended.   Daughter and wife plan to re-visit hospital soon today.     Palliative care consult may be beneficial to review goals of care in depth with family. /care management can help facilitate a family meeting if needed.      Deena Navarrete, SW

## 2023-03-24 NOTE — PLAN OF CARE
Problem: Plan of Care - These are the overarching goals to be used throughout the patient stay.    Goal: Plan of Care Review  Description: The Plan of Care Review/Shift note should be completed every shift.  The Outcome Evaluation is a brief statement about your assessment that the patient is improving, declining, or no change.  This information will be displayed automatically on your shift note.  Outcome: Progressing     Problem: Plan of Care - These are the overarching goals to be used throughout the patient stay.    Goal: Readiness for Transition of Care  Outcome: Progressing  Intervention: Mutually Develop Transition Plan  Recent Flowsheet Documentation  Taken 3/23/2023 2300 by Marito Mello, RN  Equipment Currently Used at Home: wheelchair, manual     Problem: Risk for Delirium  Goal: Optimal Coping  Outcome: Progressing  Goal: Improved Behavioral Control  Intervention: Minimize Safety Risk  Recent Flowsheet Documentation  Taken 3/23/2023 2115 by Marito Mello, RN  Enhanced Safety Measures: bed alarm set   Goal Outcome Evaluation:               Patient was admit from ED tonight.    Alert. Disoriented to time.    He has hx of stroke with left sided weakness. Per patient ever since he had stroke he uses wheelchair and his weakness is worst this time. He used to get out of bed by himself to wheelchair but not anymore.     Denied pain. Denied shortness of breath.    Assist of 2 with turning and repositioning.    Has small wounds on his bilateral shin. New dressing applied.    BP recheck was 164/60 , taken manually.

## 2023-03-24 NOTE — PROGRESS NOTES
"Pt denies chest pain, denies shortness of breath.  Elevated troponin's.  Pt states does not know why he is here.  Told here to check heart out.  C/O bowel disorder.  Pt was anemic upon arrival per labs.  States \" I don't care.  All they want to do is check me out and don't tell me anything. \"  Concha Sharpe RN  "

## 2023-03-24 NOTE — PROVIDER NOTIFICATION
Pt's BP at 0106 was 183/83 and so he received his PRN hydralazine. An hour after receiving his PRN hydralazine his BP was 188/84. Dr. Maciel was updated and said to continue to monitor. No new orders at this time.

## 2023-03-24 NOTE — PHARMACY-ANTICOAGULATION SERVICE
Clinical Pharmacy - Warfarin Dosing Consult     Pharmacy has been consulted to manage this patient s warfarin therapy.  Indication: Atrial Fibrillation  Therapy Goal: INR 2-3  Warfarin Prior to Admission: No  Warfarin PTA Regimen: new start , was on eliquis  Significant drug interactions: sertraline  Dose Comments: 3/23/23 INR = 1.41, 3/24/23 INR pending, will dose with 5mg x1 today (no bridging with heparin at this time)    INR   Date Value Ref Range Status   03/23/2023 1.41 (H) 0.85 - 1.15 Final   01/02/2021 1.29 (H) 0.90 - 1.10 Final       Recommend warfarin 5 mg today.  Pharmacy will monitor Suman Nagy daily and order warfarin doses to achieve specified goal.      Please contact pharmacy as soon as possible if the warfarin needs to be held for a procedure or if the warfarin goals change.

## 2023-03-24 NOTE — CONSULTS
[facility]  WOC Nurse Inpatient Assessment     Consulted for: B shins    Patient History (according to provider note(s):        Areas Assessed:    Wound location: B shins    Wound due to: Trauma  Wound history/plan of care: Per patient wounds occurred after fall prior to admission  R shin with scattered wounds the largest measuring approximately 1 cm x 2 cm.  L shin with almost continuous line of wounds in an approximate 10 cm x 3 cm area.  Wound beds are a mix of pink viable tissue and tan sloughing non-viable tissue.  Small amount of serosang exudate noted.  Periwound skin: Intact      Color: normal and consistent with surrounding tissue      Temperature: normal   Odor: none  Pain: denies   Pain interventions prior to dressing change: patient tolerated well and slow and gentle cares   Treatment goal: Heal  and Remove necrotic tissue  STATUS: initial assessment  Supplies ordered: ordered Silvercel      Treatment Plan:     B shins - every three days   Cleanse shin wounds with water, gently dry.   Cut Silvercel to fit wound beds, cover with Mepilex Sacral dressing.    Orders: Written    RECOMMEND PRIMARY TEAM ORDER: None, at this time  Education provided: plan of care  Discussed plan of care with: Patient and Nurse  WOC nurse follow-up plan: weekly  Notify WOC if wound(s) deteriorate.  Nursing to notify the Provider(s) and re-consult the WOC Nurse if new skin concern.    DATA:     Current support surface: Standard  Standard gel/foam mattress (IsoFlex, Atmos air, etc)  Containment of urine/stool: Incontinence Protocol  BMI: Body mass index is 24.38 kg/m .   Active diet order: Orders Placed This Encounter      Combination Diet Renal Diet (dialysis); Moderate Consistent Carb (60 g CHO per Meal) Diet; Low Saturated Fat Na <2400mg Diet     Output: I/O last 3 completed shifts:  In: 1545 [P.O.:840]  Out: 300 [Urine:300]     Labs: Recent Labs   Lab 03/24/23  0526 03/23/23  1156 03/23/23  0354    ALBUMIN  --   --  3.5   HGB 8.4*   < > 5.9*   INR  --   --  1.41*   WBC 4.6  --  4.2    < > = values in this interval not displayed.     Pressure injury risk assessment:   Sensory Perception: 3-->slightly limited  Moisture: 3-->occasionally moist  Activity: 2-->chairfast  Mobility: 2-->very limited  Nutrition: 3-->adequate  Friction and Shear: 2-->potential problem  Doe Score: 15    Oneyda Merida MSN RN CWOCN  Pager no longer is use, please contact through [employer]: [facility] [employer]

## 2023-03-24 NOTE — PROGRESS NOTES
Lexiscan nuclear stress test completed as ordered.  Normal vasodilation sx.  Images and Interpretation pending.  Concha Sharpe RN

## 2023-03-24 NOTE — PLAN OF CARE
Problem: Chronic Kidney Disease  Goal: Optimal Coping with Chronic Illness  Outcome: Progressing     Problem: Chronic Kidney Disease  Goal: Absence of Anemia Signs and Symptoms  Outcome: Progressing     Problem: Chronic Kidney Disease  Goal: Acceptable Pain Control  Outcome: Progressing    Pt's BP has been elevated throughout the night. He was given his PRN hydralazine x 2 and the house officer was updated but no new orders. He denied pain throughout the night. He is in a sinus dysrhythmia on telemetry. There is an order for a occult stool and stool sample but he has not had a BM overnight. He has a primofit on that is draining appropriately. He is having a stress test today at 0900. He is NPO at 0500 and he is aware. He is able to make needs known. Call light is within reach.

## 2023-03-24 NOTE — CONSULTS
[facility]  Palliative Care Consultation Note    Patient: Suman Nagy  Date of Admission:  3/23/2023    Requesting Clinician / Team: Dr. Rosado  Reason for consult: Goals of care    Code status: Full Code    Impression & Recommendations:  SYMPTOM ASSESSMENT  Generalized weakness, multifactorial, and due to chronic problems and Left sided 2/2 previous CVA  - Reposition for comfort  - up with assistance    ADVANCED CARE PLANNING  - Code status: FULL CODE  - Surrogate Decision Maker(s): wife, Rossi Nagy, and Sommer, DTR  - No POLST or HCD on file.  Patient had previously completed a HCD and family are unable to locate it at this time.  - Blank copy Honoring choices left in patient room for Sommer JENSEN, to review and potentially complete with patient.  - Discussed Outpatient palliative care clinic for ongoing goals of care discussion and support. Sommer JENSEN, agrees that discussions ongoing.    GOALS OF CARE DISCUSSION  - Goals are life prolonging at this.  - See discussion below.  - Family continue to gather information and options. Realistic about prognosis in lieu of multiple medical problems.  - Family would be open to palliative care meeting if patient remains hospitalized; otherwise this can occur as outpatient.  - ALSO briefly discussed hospice program should patient chose not to pursue dialysis.    PLAN:  - Continue current cares.  -Outpatient referral ordered. Schedulers will call to schedule outpatient palliative care clinic after patient discharges.      Thank you for the opportunity to participate in the care of this patient and family. Our team: will continue to follow.     During regular M-F work hours -- if you are not sure who specifically to contact -- please contact us by calling us directly at the Palliative Care Main Line [phone]    After regular work hours and on weekends/holidays, you can leave a message at [phone]      History of Present Illness:  History  "gathered today from: patient, family/loved ones, medical chart  Suman Nagy is a 82 year old male admitted 3/23/2023 with increased weakness and inability to get OOB. Patient transfused for a Hgb of 5.9.   PMH CVA with residual left sided weakness and WC and bed bound at baseline last 2 years. Patient also with CKD and declining renal function. Nephrology following as an outpatient and potential for dialysis and were seeing him weekly in preparation for potential dialysis then he was admitted per daughter, Sommer report.    Today, the patient was seen for:  Goals of care discussion    Prognosis, Goals, & Planning:      Functional Status just prior to hospitalization: 3 (Capable of only limited self-care; needs help with ADLs; in bed/chair >50% of waking hours)      Prognosis, Goals, and/or Advance Care Planning were addressed today: Yes        Summary/Comments: Met with patient who is able to provide simple information. Lacks insight into the multiple medical problems; however, does report \"thinkgs do not look good for me with the stroke and kidney failure if I have more or a heart problem.\"  Spoke with DTR, Sommer, who lives with parents and care for them and takes them to all their appointments. She is just getting caught up on all of their medical issues.  She reports that her father would want to be resuscitated if recovery would be meaningful. Sommer understands that with the increasing medical issues that code status needs to be discussed further, readdressed and that it could be harmful.  She is open to ongoing discussion on goals of care as an outpatient or on Tuesday, 3/28/2023 as patient's wife having a procedure on Monday 3/27.      Patient's decision making preferences: shared with support from loved ones          Patient has decision-making capacity today for complex decisions: Partial (needs assistance with complex decisions)            I have concerns about the patient/family's health literacy " today: No           Patient has a completed Health Care Directive: No.       Code status: Full Code      ROS:  Comprehensive ROS is reviewed and is negative except as here & per HPI.  Pain: denies  Dyspnea: denies  Anxiety: denies  Nausea: denies  Weakness/Fatigue: moderate-severe  Constipation: denies     Past Medical History:  Past Medical History:   Diagnosis Date     Anemia      Bladder cancer (H)      Cancer (H)     Rectosigmoid     Diabetes mellitus (H)      Hyperkalemia      Hypertension      Seizure (H)     possible, one time occurence     Stroke (H)     multiple, residual left sided weakness, last CVA in July 2015 caused some speech deficit     Superficial phlebitis      Venous insufficiency of both lower extremities         Past Surgical History:  Past Surgical History:   Procedure Laterality Date     COLON SURGERY      Colectomy Low Anterior Resection     EYE SURGERY      Cataract Extraction     LAPAROSCOPIC ASSISTED COLECTOMY N/A 6/1/2017    Procedure: LAPAROSCOPIC ASSISTED COLECTOMY LOW ANTERIOR RESECTION AND DIVERTING LOOP ILEOSTOMY, PROCTOSOPY;  Surgeon: Tyson Parry MD;  Location: [facility];  Service:      OTHER SURGICAL HISTORY      [id number]     HI CLOSE ENTEROSTOMY N/A 8/15/2017    Procedure: ILEOSTOMY CLOSURE LOOP ;  Surgeon: Tyson Parry MD;  Location: [facility] [place];  Service: General     HI CYSTOURETHROSCOPY,FULGUR <0.5 CM LESN N/A 9/1/2015    Procedure: CYSTOSCOPY TRANSURETHRAL RESECTION BLADDER TUMOR;  Surgeon: Cleveland Nair MD;  Location: [facility] [place];  Service: Urology     HI CYSTOURETHROSCOPY,FULGUR <0.5 CM LESN N/A 12/22/2015    Procedure: CYSTOSCOPY TRANSURETHRAL RESECTION BLADDER TUMOR AND BLADDER BIOPSIES;  Surgeon: Cleveland Nair MD;  Location: [facility];  Service: Urology     VASECTOMY           Family History:  Family History   Problem Relation Age of Onset     Chronic Obstructive Pulmonary Disease Father         Social:     Living  "situation: Sommer JENSEN adn Wife, Rossi live with patient    Rushing family / caregivers: Sommer JENSEN. wife with increasing medical problems and cannot care for him on her own anymore     Allergies:  Allergies   Allergen Reactions     Cefazolin Other (See Comments)     \"felt very hot\" Oct 2019 Cephalexin, Sep 2020 Ceftriaxone     Pravastatin Muscle Pain (Myalgia)     Increased peripheral neuropathy     Simvastatin Muscle Pain (Myalgia)     Increased peripheral neuropathy     Thiazides [Thiazide-Type Diuretics] Other (See Comments)     Other: Gout          Medications:  I have reviewed this patient's medication profile and medications from this hospitalization.     Lab Results: personally reviewed.   Lab Results   Component Value Date     03/24/2023    CO2 18 03/24/2023    CO2 22 06/02/2021    BUN 80.3 03/24/2023    BUN 50 06/02/2021     Lab Results   Component Value Date    WBC 4.6 03/24/2023    HGB 8.4 03/24/2023    HCT 25.7 03/24/2023    MCV 90 03/24/2023     03/24/2023     AST   Date Value Ref Range Status   03/23/2023 22 10 - 50 U/L Final     ALT   Date Value Ref Range Status   03/23/2023 24 10 - 50 U/L Final     Alkaline Phosphatase   Date Value Ref Range Status   03/23/2023 77 40 - 129 U/L Final     Albumin   Date Value Ref Range Status   03/23/2023 3.5 3.5 - 5.2 g/dL Final   06/02/2021 3.2 (L) 3.5 - 5.0 g/dL Final       RADIOLOGY:  No results found.      Physical Exam:  Temp:  [98  F (36.7  C)-98.5  F (36.9  C)] 98.4  F (36.9  C)  Pulse:  [64-80] 65  Resp:  [14-24] 18  BP: (164-218)/() 189/82  SpO2:  [92 %-98 %] 93 %  Wt Readings from Last 3 Encounters:   03/24/23 70.6 kg (155 lb 10.3 oz)   07/05/22 66.2 kg (146 lb)   06/27/22 67.1 kg (148 lb)      General appearance: alert, fatigued and no distress  Head: Normocephalic, without obvious abnormality, atraumatic  Eyes: lids and lashes normal, sclera anicteric  Nose: no discharge  Throat: lips without lesions, oral mucosa pink and moist  Lungs: " non-labored  Heart: regular rate  Abdomen: soft, non-tender  Extremities: LLE weakness  Pulses: 2+ and symmetric  Neurologic: alert. Oriented to person and place    TTS: I have personally spent a total of 80 minutes on unit in review of medical record and assessment of patient today, with more than 50% of this time spent in discussing goals of care with patient and DTRSenait APRN, [facility] Palliative Medicine Service: [facility]  Department voice mail (checked M-F 8a-4pm approx): [phone]  [facility] Palliative Medicine pager: [phone]  (Please use [employer] for text paging if possible, use link under Palliative service)

## 2023-03-24 NOTE — ED NOTES
"[facility] ED Handoff Report    ED Chief Complaint: Generalized weakness     ED Diagnosis:  (D64.9) Symptomatic anemia  Comment:   Plan:     (N18.9) Chronic kidney disease, unspecified CKD stage  Comment:   Plan:     (M62.81) Generalized muscle weakness  Comment:   Plan:        PMH:    Past Medical History:   Diagnosis Date    Anemia     Bladder cancer (H)     Cancer (H)     Rectosigmoid    Diabetes mellitus (H)     Hyperkalemia     Hypertension     Seizure (H)     possible, one time occurence    Stroke (H)     multiple, residual left sided weakness, last CVA in July 2015 caused some speech deficit    Superficial phlebitis     Venous insufficiency of both lower extremities         Code Status:  Full Code     Falls Risk: Yes Band: Applied    Current Living Situation/Residence: lives with a significant other and lives with their son or daughter     Elimination Status: Continent: premofit in place      Activity Level: 2 assist    Patients Preferred Language:  English     Needed: No    Vital Signs:  BP (!) 201/93   Pulse 77   Temp 98  F (36.7  C) (Oral)   Resp 19   Ht 1.702 m (5' 7\")   Wt 70.3 kg (155 lb)   SpO2 93%   BMI 24.28 kg/m       Cardiac Rhythm: NSR     Pain Score: 0/10    Is the Patient Confused:  Yes, confused on time     Last Food or Drink: 03/23/23 at dinner     Focused Assessment:  patient has a history of CVA with left sided weakness.     Tests Performed: Done: Labs    Treatments Provided:  1 unit of blood, prn's for hypertension.     Family Dynamics/Concerns: No    Family Updated On Visitor Policy: Yes      Who Was Updated about Plan of Care: patient     Medications sent with patient:       Additional Information: Patient has been hypertensive all afternoon, prn antihypertensives given.     RN: Taylor Mcelroy RN 3/23/2023 7:37 PM       "

## 2023-03-24 NOTE — PROGRESS NOTES
Care Management Follow Up    Length of Stay (days): 1    Expected Discharge Date: 03/25/2023     Concerns to be Addressed:  Palliative care consult-GOC  Patient plan of care discussed at interdisciplinary rounds: Yes    Anticipated Discharge Disposition:  TBD     Anticipated Discharge Services:   TBD  Anticipated Discharge DME:  unknown    Patient/family educated on Medicare website which has current facility and service quality ratings:  NA  Education Provided on the Discharge Plan:  NA  Patient/Family in Agreement with the Plan:  NA    Referrals Placed by CM/SW:  ITD  Private pay costs discussed: NA    Additional Information:  Pt is an 82 year old  male with a past medical history of bladder and rectal cancer, carotid artery stenosis, anxiety, constipation )9slow transit), CKD stage IV, CAD, Acute resp failure, DMII, CVA, Acute kidney injury, and chronic diastolic CHF. He was admitted on 03/23/23 with generalized weakness and found to have symptomatic anemia with a Hgb 5.9,  likely due to his chronic kidney disease..     Pt does not have a health care directive , or a designated health care agent.  Pt and wife are going to meet with palliative care to discuss goals of care. Wife Dona is his primary contact.     Pt was transfused with 2 units of PRBCs and hgb came up 8.4. Plan is for the patient to have a nuclear stress test and an ECHO, and to adjust meds to control his BP. Family will meet with Palliative care to discuss GOC and next steps. CM will continue to follow    Senia Barahona RN

## 2023-03-24 NOTE — PROGRESS NOTES
"    [facility]  [facility]   Follow up    Suman Nagy   MRN: [id number]  : 1940   DOA: 3/23/2023   CC: advanced CKD      Assessment and Plan:  82 year old male     1. Advancing CKD; due to gap in care, pt has not been optimally educated re; options of RRT vs declining to do dialysis; no change in renal function or sxs today that push us to consider dialysis now; overall he has poor baseline functional status and would not recommend dialysis as an intervention that would improve his QOL or his life span much; today pt away at testing all day and plan palliative care consult; willl need to keep discussions going with pt and involve family re; appropriateness for dialysis; I don't have the sense that the pt is fully decisional or grasping the overall picture; ie, the idea that he would be involved in chopping or sawing down dead trees in the yard seems unrealistic and I am concerned about his overall functional capacity to even attend regular OP dialysis  2. Anemia; underproduction from CKD; started on epo and now transfused; received elemental iron with prbcs so should be relatively iron replete; follow hgb  3. Hyperkalemia; medically treated and improved; hold KCL supplement  4. HTN; on chronic meds (norvasc, clonidine, hydralzine, metoprolol); resumed   5. Chronic A/C; on eliquis for prior CVA ad A fib  6. DM; on insulin and tradjenta  7. Acidosis; on bicarb--bump dosiing  8. Hyperlipid; on statin  9. Leg abrasions: local cares; appreciate wound consult\  10. Troponin elevation; cards consulting and workup with ECHO and GXT ensues; not clear that pt would benefit or be a candidate for any intervention      Subjective  Pt eating and says he does not feel different than usual; he did not notice any effect from blood transfusion  Says \"we'll see how it goes \" about thinking about dialysis and says he would like to get to chop up two trees in his " yard--a chokecherry and an elm  Objective    Vital signs in last 24 hours  Temp:  [97.6  F (36.4  C)-98.5  F (36.9  C)] 97.7  F (36.5  C)  Pulse:  [58-80] 59  Resp:  [18-24] 21  BP: (164-209)/(60-94) 194/81  SpO2:  [92 %-95 %] 95 %      Intake/Output last 3 shifts  I/O last 3 completed shifts:  In: 220 [P.O.:220]  Out: 750 [Urine:750]  Intake/Output this shift:  I/O this shift:  In: 120 [P.O.:120]  Out: -     Physical Exam  Alert/oriented x 3, awake, NAD  CV: RRR without murmur or rub  Lung: clear and equal; no extra sounds  Ab: soft and NT; not distended; normal bs  Ext: no edema and well perfused  Skin; no rash    Pertinent Labs     Last Renal Panel:  Sodium   Date Value Ref Range Status   03/24/2023 135 (L) 136 - 145 mmol/L Final     Potassium   Date Value Ref Range Status   03/24/2023 4.8 3.4 - 5.3 mmol/L Final   06/02/2021 4.0 3.5 - 5.0 mmol/L Final     Chloride   Date Value Ref Range Status   03/24/2023 104 98 - 107 mmol/L Final   06/02/2021 103 98 - 107 mmol/L Final     Carbon Dioxide (CO2)   Date Value Ref Range Status   03/24/2023 18 (L) 22 - 29 mmol/L Final   06/02/2021 22 22 - 31 mmol/L Final     Anion Gap   Date Value Ref Range Status   03/24/2023 13 7 - 15 mmol/L Final   06/02/2021 10 5 - 18 mmol/L Final     Glucose   Date Value Ref Range Status   06/02/2021 287 (H) 70 - 125 mg/dL Final     GLUCOSE BY METER POCT   Date Value Ref Range Status   03/24/2023 125 (H) 70 - 99 mg/dL Final     Urea Nitrogen   Date Value Ref Range Status   03/24/2023 80.3 (H) 8.0 - 23.0 mg/dL Final   06/02/2021 50 (H) 8 - 28 mg/dL Final     Creatinine   Date Value Ref Range Status   03/24/2023 6.03 (H) 0.67 - 1.17 mg/dL Final     GFR Estimate   Date Value Ref Range Status   03/24/2023 9 (L) >60 mL/min/1.73m2 Final     Comment:     eGFR calculated using 2021 CKD-EPI equation.   02/15/2022 20.6 (L) >60.0 ml/min/1.73m^2 Final     Calcium   Date Value Ref Range Status   03/24/2023 8.3 (L) 8.8 - 10.2 mg/dL Final     Albumin   Date  Value Ref Range Status   03/23/2023 3.5 3.5 - 5.2 g/dL Final   06/02/2021 3.2 (L) 3.5 - 5.0 g/dL Final     Recent Labs   Lab 03/24/23  0526 03/23/23  1156 03/23/23  0359   HGB 8.4* 8.4* 5.9*          I reviewed all lab results  Liya Laboy MD

## 2023-03-24 NOTE — CONSULTS
CARDIOLOGY CONSULT NOTE         Assessment:   1.  Troponin elevation- asymptomatic with level diminishing from 121 down to 109.  No recent echo, ECG entirely normal.  Hospitalist has ordered stress test, agree would not pursue angiography, but given lack of symptoms unless stress test significantly abnormal would not pursue angiography.  However given his risk factors of chronic kidney disease, diabetes, systemic hypertension will arrange for pharmacological stress nuclear.  2.  Benign essential hypertension- very poor control.  Resuming home Norvasc, clonidine, hydralazine, metoprolol.  Will monitor.  3.  Symptomatic anemia-hemoglobin 5.9.  Possibly secondary to chronic kidney disease, no active GI bleeding.  Defer to primary care team as anemia does not appear to be microcytic.  4.  Atrial fibrillation (H)-currently in sinus rhythm, normally on low-dose Eliquis, given his profound diminished GFR I might consider switching over to Coumadin.  5.  Chronic diastolic CHF (congestive heart failure), NYHA class 2 (H)-no significant signs or symptoms on exam currently, will check echo.  6. Chronic kidney disease, unspecified CKD stage-possibly an element of acute kidney injury with creatinine currently 6.03 and GFR of 9 however GFR was 24 in March or 21.  Defer to nephrology.     Plan:   1.  Agree with controlling blood pressure as nephrology is already doing.  2.  Not sure quite agree with stress nuclear given his profound comorbidities but as ordered will follow through, not sure would be very aggressive with angiography.  3.  Check echocardiogram looking for ejection fraction.  4.  Defer to primary care team and nephrology, but suggest possibly switching Eliquis over to Coumadin.  5.  Can follow with primary cardiologist Dr. Chad Canela.     Current History:   [facility] has been requested by Dr. Rosado to evaluate Suman Nagy  who is a 82 year old year old white male for positive troponin and need for  stress test.  Patient lives at home independently, with numerous comorbidities.  He basically stays in his wheelchair all day, due to having a prior CVA involving his left side.  He has been having frequent bowel movements, his wife was concerned and sent him to the emergency department.  While in the emergency department he was noted to have an increased troponin and cardiology consultation was requested.  Patient denies any chest pains, palpitations, significant shortness of breath, PND, orthopnea, syncope, dizziness or peripheral edema.    Past Medical History:     Past Medical History:   Diagnosis Date     Anemia      Bladder cancer (H)  Chronic kidney disease      Diabetes mellitus (H)      Hyperkalemia      Hypertension  Paroxysmal atrial fibrillation      Seizure (H)     possible, one time occurence     Stroke (H)     multiple, residual left sided weakness, last CVA in July 2015 caused some speech deficit     Superficial phlebitis      Venous insufficiency of both lower extremities       Past history is negative for tuberculosis, myocardial infarction,  rheumatic fever, peptic ulcer disease, chronic obstructive pulmonary disease, or thyroid disorder  and no lipid disorder.    Past Surgical History:     Past Surgical History:   Procedure Laterality Date     COLON SURGERY      Colectomy Low Anterior Resection     EYE SURGERY      Cataract Extraction     LAPAROSCOPIC ASSISTED COLECTOMY N/A 6/1/2017    Procedure: LAPAROSCOPIC ASSISTED COLECTOMY LOW ANTERIOR RESECTION AND DIVERTING LOOP ILEOSTOMY, PROCTOSOPY;  Surgeon: Tyson Parry MD;  Location: [facility];  Service:      OTHER SURGICAL HISTORY      [id number]     WI CLOSE ENTEROSTOMY N/A 8/15/2017    Procedure: ILEOSTOMY CLOSURE LOOP ;  Surgeon: Tyson Parry MD;  Location: [facility];  Service: General     WI CYSTOURETHROSCOPY,FULGUR <0.5 CM LESN N/A 9/1/2015    Procedure: CYSTOSCOPY TRANSURETHRAL RESECTION BLADDER TUMOR;  Surgeon: Cleveland MURILLO  MD Joanie;  [place] [facility]: [place]rology     SD CYSTOURETHROSCOPY,FULGUR <0.5 CM LESJACKY N/A 12/22/2015    Procedure: CYSTOSCOPY TRANSURETHRAL RESECTION BLADDER TUMOR AND BLADDER BIOPSIES;  Surgeon: Cleveland Nair MD;  [place] [facility]  Service: Urology     VASECTOMY       Family History:   Reviewed, and negative for coronary artery disease.    Social History:   Reviewed, and he  reports that he quit smoking about 27 years ago. He has never used smokeless tobacco.  He smoked up to a pack a day for about 30 years he reports that he does not drink alcohol and does not use drugs.  He is , lives at home independently with his wife and daughter, primary physician is Telly Sanz.    Meds:       amLODIPine  10 mg Oral Daily     apixaban ANTICOAGULANT  2.5 mg Oral BID     cloNIDine  0.2 mg Oral QPM     hydrALAZINE  75 mg Oral 4x Daily     insulin aspart  1-7 Units Subcutaneous Q4H     [Held by provider] insulin aspart  1-5 Units Subcutaneous At Bedtime     metoprolol succinate ER  50 mg Oral Daily     sertraline  50 mg Oral Daily     sodium bicarbonate  650 mg Oral BID     Vitamin D3  50 mcg Oral Daily     Allergies:   Cefazolin, Pravastatin, Simvastatin, and Thiazides [thiazide-type diuretics]    Review of Systems:   A 12 point comprehensive review of systems was  as follows:   General: Positive for fatigue, negative weight loss or weight gain  Constitutional: negative for anorexia, chills, fevers, malaise, night sweats   Eyes: negative for cataracts, color blindness, contacts/glasses, glaucoma, icterus, irritation, redness and visual disturbance  Ears, nose, mouth, throat, and face: negative for ear drainage, earaches, epistaxis, facial trauma, hearing loss, hoarseness, nasal congestion, snoring, sore mouth, sore throat, tinnitus and voice change  Respiratory: negative for cough, dyspnea on exertion,  hemoptysis, sputum production, pleurisy, stridor, wheezing, PND,  "orthopnea  Cardiovascular: negative for chest pain, chest pressure/discomfort, claudication, dyspnea, exertional chest pressure/discomfort, fatigue, irregular heart beat, lower extremity edema, near-syncope,  palpitations,  syncope   Gastrointestinal: Positive diarrhea, negative for abdominal pain, change in bowel haibits, constipation, dyspepsia, dysphagia, jaundice, melena, nausea, odynophagia, reflux symptoms and vomiting  Genitourinary: negative for decreased stream  Hematologic/lymphatic: negative for bleeding  Musculoskeletal: negative for arthralgias, back pain, bone pain, muscle weakness, myalgias, neck pain and stiff joints  Neurological: negative for syncope, presyncope, coordination problems, dizziness, gait problems, headaches, memory problems, paresthesia, seizures, speech problems, tremors, vertigo and weakness    Objective:     Physical Exam:  BP (!) 198/89   Pulse 64   Temp 98.4  F (36.9  C) (Oral)   Resp 18   Ht 1.702 m (5' 7\")   Wt 70.6 kg (155 lb 10.3 oz)   SpO2 93%   BMI 24.38 kg/m       Head:    Normocephalic, without obvious abnormality, atraumatic   Eyes:    PERRL, conjunctiva/corneas clear, EOM's intact,           Nose:   Nares normal, septum midline, mucosa normal, no drainage  or sinus tenderness   Throat:   Lips, mucosa, and tongue normal; teeth and gums normal   Neck:   Supple, symmetrical, trachea midline, no adenopathy;        thyroid:  No enlargement/tenderness/nodules; no carotid    bruit or JVD   Back:     Symmetric, no curvature, ROM normal, no CVA tenderness   Lungs:     Clear to auscultation bilaterally, respirations unlabored   Chest wall:    No tenderness or deformity   Heart:    Regular rate and rhythm, S1 and S2 normal, no murmur, rub   or gallop   Abdomen:     Soft, non-tender, bowel sounds active all four quadrants,     no masses, no organomegaly   Extremities:   Extremities normal, atraumatic, no cyanosis or edema   Pulses:   2+ and symmetric all extremities   Skin:  "  Skin color, texture, turgor normal, no rashes or lesions   Neurologic:   CNII-XII intact. Normal strength, sensation and reflexes       throughout     Cardiographics and Imaging  Radiology Results: Chest x-ray shows Heart size normal. Mild streaky density left lung base and right midlung could represent bilateral pneumonia. No pleural effusion. No pulmonary vascular congestion.    EKG Results: personally reviewed sinus rhythm, within normal limits.      Lab Review   Pertinent Labs  Lab Results: personally reviewed       Lab Results   Component Value Date    TROPONINI 0.01 01/02/2021       Lab Results   Component Value Date    BUN 80.3 (H) 03/24/2023     (L) 03/24/2023    CO2 18 (L) 03/24/2023       Lab Results   Component Value Date    WBC 4.6 03/24/2023    HGB 8.4 (L) 03/24/2023    HCT 25.7 (L) 03/24/2023    MCV 90 03/24/2023     (L) 03/24/2023       Lab Results   Component Value Date     (H) 10/15/2019     Clinically Significant Risk Factors Present on Admission              Nephrology: Stage 5 (GFR < 15)

## 2023-03-24 NOTE — PLAN OF CARE
Problem: Chronic Kidney Disease  Goal: Absence of Anemia Signs and Symptoms  Outcome: Progressing     Problem: Hypertension Comorbidity  Goal: Blood Pressure in Desired Range  3/24/2023 1422 by Venus Messer RN  Outcome: Not Progressing   Goal Outcome Evaluation:    Patient is disorientated to time. Can be forgetful and require re-orientation. Patient appears to not understand diagnosis or treatment plan, despite education given. Denied chest pain or SOB. SBP elevated to 190s, with no improvement with scheduled meds and PRN hydralazine. Hospitalist notified, no changes to meds at this time. Clonidine changed from once daily to BID per cardiology. First half of stress test completed and returned for lunch around 1pm. Plan for the second half after eating. Awaiting palliative consult.

## 2023-03-24 NOTE — PROGRESS NOTES
[facility]    Medicine Progress Note - Hospitalist Service    Date of Admission:  3/23/2023    Assessment & Plan   Principal Problem:    Symptomatic anemia  Active Problems:    History of bladder cancer    History of stroke    Type 2 diabetes mellitus with stage 4 chronic kidney disease, with long-term current use of insulin (H)    Generalized muscle weakness    Atrial fibrillation (H)    Chronic diastolic CHF (congestive heart failure), NYHA class 2 (H)    SARIKA (acute kidney injury) (H)    Chronic kidney disease, unspecified CKD stage       Suman Nagy is a 82 year old male with history of A-fib and prior stroke, DM2, CKD4, chronic CHF and history of bladder and rectal cancer who is admitted on 3/23/2023 with symptomatic anemia and progressive renal disease.    Symptomatic anemia: Patient presents with progressive diffuse weakness and hemoglobin found to have drifted down to 5.9 from previous reported value of 6.4 as outpatient.  There is no reports of clinical blood loss although the patient is on Eliquis  There is no reports of hematuria, hematemesis or bloody stools.  --Transfused 2 units PRBC  --Recheck hemoglobin: now stable at 8.4    SARIKA on CKD 4 with associated hyperkalemia:  Creatinine 6.12 from previous value of 2.88 per our records.  K is 6 on admission  -- Nephrology consult  -- Trend renal labs  -- We will order potassium shifting therapy with Hc03, lasix, kayexelate and insulin with dextrose.   -now k wnl; discontinue tele    Elevated troponin: Difficult to interpret in the setting of progressive renal disease.  EKG on admission is negative for ACS.  Patient denies chest pain  --Stop trending troponin  -cardiologist consultation, due to scheduled stress test on 3/24 by cardiologist as outpatient? (some confusion about who ordered the stress test, because cardiologist note stating it is primary care)  -cardiologist is following the nuclear stress test; check echo.      Loose stools:   -- hold home fiber supplement  -- check stool culture      History of A-fib and prior stroke:  -- home DOAC changed to Warfarin as per cardiologist recommendation; will defer to pharmacy to dose  -- Resume home rate control once dosing verified by pharmacy      DM2:   -- verify home meds  -- sliding scale insulin for now      History of stroke:  -- verify home medications    HTN: difficult to control with worsening renal failture  -- verify home medications  -- As needed hydralazine available for now  -on Norvasc, Clonidine, Hydralazine, Metorpolol; iv hydralazine prn       Diet: NPO for Medical/Clinical Reasons Except for: Meds, Ice Chips    DVT Prophylaxis: DOAC  Benz Catheter: Not present  Lines: None     Cardiac Monitoring: ACTIVE order. Indication: Electrolyte Imbalance (24 hours)- Magnesium <1.3 mg/ml; Potassium < =2.8 or > 5.5 mg/ml  Code Status: Full Code      Clinically Significant Risk Factors        # Hyperkalemia: Highest K = 6 mmol/L in last 2 days, will monitor as appropriate   # Hypocalcemia: Lowest Ca = 8.3 mg/dL in last 2 days, will monitor and replace as appropriate       # Thrombocytopenia: Lowest platelets = 125 in last 2 days, will monitor for bleeding                 Disposition Plan      Expected Discharge Date: 03/25/2023                  Abimbola Rosado MD  Hospitalist Service  [facility]  Securely message with "[employer] (more info)  Text page via [facility] Paging/Directory   ______________________________________________________________________    Interval History   Feeling weak but no cp/sob, no f/c, no n/v; c/o chronic feet pain    Physical Exam   Vital Signs: Temp: 98.4  F (36.9  C) Temp src: Oral BP: (!) 189/82 Pulse: 65   Resp: 18 SpO2: 93 % O2 Device: None (Room air)    Weight: 155 lbs 10.32 oz    General.  Awake alert oriented not in acute distress.Obese  HEENT.  Pupils equal round react to light, anicteric, EOM intact.  Neck supple no JVD.  CVS  regular rhythm no murmur gallops.  Lungs.  Clear to auscultation bilateral no wheezing or rales.  Abdomen.  Soft nontender bowel sounds present.  Extremities.  No edema no calf tenderness.  Neurological.  Awake and alert. No focal deficit. General weakness  Skin no rash. + pallor.  Psych. Normal mood.     Medical Decision Making       56 MINUTES SPENT BY ME on the date of service doing chart review, history, exam, documentation & further activities per the note.      Data     I have personally reviewed the following data over the past 24 hrs:    4.6  \   8.4 (L)   / 128 (L)     135 (L) 104 80.3 (H) /  124 (H)   4.8 18 (L) 6.03 (H) \       Imaging results reviewed over the past 24 hrs:   No results found for this or any previous visit (from the past 24 hour(s)).

## 2023-03-25 PROBLEM — N18.6 END STAGE RENAL DISEASE (H): Status: ACTIVE | Noted: 2023-01-01

## 2023-03-25 NOTE — PLAN OF CARE
Problem: Hypertension Comorbidity  Goal: Blood Pressure in Desired Range  3/25/2023 1453 by Venus eMsser RN  Outcome: Progressing     Problem: Chronic Kidney Disease  Goal: Absence of Anemia Signs and Symptoms  3/25/2023 1453 by Venus Messer RN  Outcome: Progressing   Goal Outcome Evaluation:     Patient is alert but disoriented to time. Denied SOB, dizziness, and pain. SBP this shift 160s-190s, with slight improvements with scheduled meds. PRN IV hydralazine given around 1245 for a BP of 178/76. BP recheck showed improvement around 1330 of 152/69. LUE appeared increasingly more swollen and hard around the elbow and forearm, Dr. Ingram was at the bedside and ordered an ultrasound. Primofit in place. A2 with repositioning.     Wounds on bilateral shins cleansed with soap and water, silvercel applied on open areas with mepilex placed per [facility] orders. Remaining wounds appear closed but red and rashy with scabs, these were left open to air. Redness noted on sacrum, mepilex applied.

## 2023-03-25 NOTE — PLAN OF CARE
Problem: Chronic Kidney Disease  Goal: Electrolyte Balance  Outcome: Progressing     Problem: Chronic Kidney Disease  Goal: Absence of Anemia Signs and Symptoms  Outcome: Progressing     Problem: Hypertension Comorbidity  Goal: Blood Pressure in Desired Range  Outcome: Not Progressing  Intervention: Maintain Blood Pressure Management  Recent Flowsheet Documentation  Taken 3/25/2023 0048 by Shanelle Ocasio RN  Medication Review/Management: medications reviewed    Pt is alert and oriented to self and place and he can be forgetful. His BP continues to be elevated. AT 0400 his BP was 190/84 so he was given his PRN hydralazine. At 0600 his BP was 193/83. Pt denied any chest pain, shortness of breath, headache or dizziness. He stated that he has some pain in his feet but it was tolerable overnight. He has a primofit in place that is draining appropriately. The primofit was changed at 0408. He is an A2 with turns and repositioning. He is able to make needs known. Bed alarm in on for safety precautions. Call light is within reach.

## 2023-03-25 NOTE — PLAN OF CARE
Problem: Plan of Care - These are the overarching goals to be used throughout the patient stay.    Goal: Absence of Hospital-Acquired Illness or Injury  Intervention: Prevent Skin Injury  Recent Flowsheet Documentation  Taken 3/24/2023 2020 by Ric Wells, RN  Body Position:   supine   supine, legs elevated   supine, head elevated     Problem: Plan of Care - These are the overarching goals to be used throughout the patient stay.    Goal: Optimal Comfort and Wellbeing  Outcome: Progressing  Intervention: Monitor Pain and Promote Comfort  Recent Flowsheet Documentation  Taken 3/24/2023 2047 by Ric Wells, RN  Pain Management Interventions: medication (see MAR)     Problem: Diabetes Comorbidity  Goal: Blood Glucose Level Within Targeted Range  Outcome: Progressing   Goal Outcome Evaluation:    Pt off tele now as potassium WDL. A  & O to self and location only. SBP in the 180-200s. Pt denies any dizziness, lightheadedness, SOB. Scheduled and PRN hydralazine administered but mildly effective. Reported pain in his heels and rated it 30/10; tylenol administered and effective per pt. Had a small smear BM today, unable to collect stool sample. Uses call light appropriately.

## 2023-03-25 NOTE — PROGRESS NOTES
CARDIOLOGY PROGRESS NOTE      Assessment/Plan:  1.  Troponin elevation- asymptomatic with level diminishing from 121 down to 109.  ECG entirely normal.  Nuclear stress shows no ischemia or scar, suspect troponin elevation secondary to systemic hypertension.  Nonischemic.  No further inpatient evaluation.  2.  Benign essential hypertension- very poor control.    Refractory, on Norvasc 10 mg a day, clonidine 0.2 twice a day, hydralazine 75 4 times a day, metoprolol 50 a day, will increase up to 75 mg a day 3.  Symptomatic anemia-hemoglobin 5.9.  Possibly secondary to chronic kidney disease, no active GI bleeding.  Defer to primary care team as anemia does not appear to be microcytic.  4.  Atrial fibrillation (H)-currently in sinus rhythm, normally on low-dose Eliquis, given his profound diminished GFR switched over to Coumadin.  5.  Chronic diastolic CHF (congestive heart failure), NYHA class 2 (H)-no significant signs or symptoms on exam currently, will check echo.  6. Chronic kidney disease, unspecified CKD stage-possibly an element of acute kidney injury with creatinine currently 6.03 and GFR of 9 however GFR was 24 in March or .  Defer to nephrology.  7. Diarrhea-resolved..    Plan:  1.  Increase metoprolol to 75 mg a day.  2.  Cardiology has nothing further inpatient to add, we will sign off, available as needed.    Discharge Plannin.  No significant cardiac barrier to discharge.  2.  Would not arrange for cardiac follow-up at this point time     LOS: 2 days     Subjective:  Interval History:    79-year-old white gentleman being seen on third day of hospitalization.  States his bowels are slow down and now he is having solid bowel movements.  No chest pains, palpitations, shortness of breath, PND, orthopnea, syncope, dizziness or peripheral edema.    Medications    amLODIPine  10 mg Oral Daily     cloNIDine  0.2 mg Oral BID     hydrALAZINE  75 mg Oral 4x Daily     insulin aspart  1-7 Units Subcutaneous  "TID w/meals     insulin aspart  1-5 Units Subcutaneous At Bedtime     metoprolol succinate ER  50 mg Oral Daily     sertraline  50 mg Oral Daily     sodium bicarbonate  650 mg Oral BID     Vitamin D3  50 mcg Oral Daily     warfarin ANTICOAGULANT  5 mg Oral ONCE at 18:00     Warfarin Therapy Reminder  1 each Oral See Admin Instructions     Objective:   Vital signs in last 24 hours:  Temp:  [97.6  F (36.4  C)-97.8  F (36.6  C)] 97.6  F (36.4  C)  Pulse:  [58-71] 61  Resp:  [20-22] 20  BP: (162-203)/(64-89) 195/83  SpO2:  [95 %-100 %] 97 %    Physical Exam:  BP (!) 195/83   Pulse 61   Temp 97.6  F (36.4  C) (Oral)   Resp 20   Ht 1.702 m (5' 7\")   Wt 71.1 kg (156 lb 12 oz)   SpO2 97%   BMI 24.55 kg/m      General Appearance:    Alert, cooperative, no distress, appears stated age   Head:    Normocephalic, without obvious abnormality, atraumatic   Throat:   Lips, mucosa, and tongue normal; teeth and gums normal   Neck:   Supple, symmetrical, trachea midline, no adenopathy;        thyroid:  No enlargement/tenderness/nodules; no carotid    bruit or JVD   Back:     Symmetric, no curvature, ROM normal, no CVA tenderness   Lungs:     Clear to auscultation bilaterally, respirations unlabored   Chest wall:    No tenderness or deformity   Heart:    Regular rate and rhythm, S1 and S2 normal, no murmur, rub   or gallop   Abdomen:     Soft, non-tender, bowel sounds active all four quadrants,     no masses, no organomegaly   Extremities:   Normal, atraumatic, no cyanosis or edema   Pulses:   2+ and symmetric all extremities   Skin:   Skin color, texture, turgor normal, no rashes or lesions     Cardiographics:      ECG: Personally reviewed by myself shows sinus rhythm, within normal limits.    Echocardiogram: Not performed      Lab Results:   Recent Labs   Lab 03/25/23 0434   WBC 4.1   HGB 8.1*   HCT 25.6*   *     Recent Labs   Lab 03/25/23 0434   *   CO2 15*   BUN 82.7*   .       Lab Results   Component Value " Date    TROPONINI 0.01 01/02/2021

## 2023-03-25 NOTE — PROGRESS NOTES
Care Management Follow Up    Length of Stay (days): 2    Expected Discharge Date: 03/26/2023     Concerns to be Addressed:  bp  Patient plan of care discussed at interdisciplinary rounds: Yes    Anticipated Discharge Disposition:  home     Anticipated Discharge Services:   None  Anticipated Discharge DME:  [email or web address]    Patient/family educated on Medicare website which has current facility and service quality ratings:  NA  Education Provided on the Discharge Plan:  yes  Patient/Family in Agreement with the Plan:  ghazala    Referrals Placed by CM/SW:  NA  Private pay costs discussed: NA    Additional Information:  Pt is an 82 year old  male with a past medical history of bladder and rectal cancer, carotid artery stenosis, anxiety, constipation )9slow transit), CKD stage IV, CAD, Acute resp failure, DMII, CVA, Acute kidney injury, and chronic diastolic CHF. He was admitted on 03/23/23 with generalized weakness and found to have symptomatic anemia with a Hgb 5.9,  likely due to his chronic kidney disease..     Pt continues to have high BP requiring dose s of PRN Hydralazine. Met with palliative care for GOC and plan is for the patient to remain a full code at this time and for Palliative care to continue to follow up on an outpatient basis. Dialysis was being discussed on an out patient basis prior to pt admission and patient has not yet decided if he is going to move forward with this. Palliative car had stated to the family they would help with hospice if patient decided not to pursue dialysis.     Family plans that the patient will return home on discharge.  Pt wife is having a procedure on 03/27/23, so family is putting GOC on hold until 03/28/23. CM will continue to follow.     Senia Barahona RN

## 2023-03-25 NOTE — PLAN OF CARE
Problem: Hypertension Comorbidity  Goal: Blood Pressure in Desired Range  Outcome: Not Progressing     Problem: Chronic Kidney Disease  Goal: Absence of Anemia Signs and Symptoms  Outcome: Progressing   Goal Outcome Evaluation:    Patient is alert and oriented but disoriented to time. Denied SOB, dizziness, and pain. SBP this shift 160s-190s, with slight improvements with scheduled meds. PRN IV hydralazine given around 1245 for a BP of 178/76. BP recheck showed improvement around 1330 of 152/69. LUE appeared increasingly more swollen and hard around the elbow and forearm, Dr. Ingram was at the bedside and ordered an ultrasound. Primofit in place. A2 with repositioning.     Wounds on bilateral shins cleansed with soap and water, silvercel applied on open areas with mepilex placed per WOC orders. Remaining wounds appear closed but red and rashy with scabs, these were left open to air. Redness noted on sacrum, mepilex applied.

## 2023-03-25 NOTE — PROGRESS NOTES
[facility]  [facility]   Follow up    Suman Nagy   MRN: [id number]  : 1940   DOA: 3/23/2023   CC: advanced CKD      Assessment and Plan:  82 year old male     1. Advancing CKD; had gap in chair so has not been following in clinic to get typical eduation on dialysis options; now advanced CKD--discussions ongoing with family and pr re; ?diaysis vs comfort; at this point, he does not need dialysis acutely so he could be managed as an OP once deemed medically stable by other services; will continue to have ongoing discussions with pt and family; planning family meeting on Tuesday (family not available to meet until then)  2. Anemia; underproduction from CKD; started on epo and now transfused; received elemental iron with prbcs so should be relatively iron replete; follow hgb  3. Hyperkalemia; medically treated and improved; holding KCL supplement  4. HTN; on chronic meds (norvasc, clonidine, hydralzine, metoprolol); resumed; some highish readings and follow need to adjust but not looking for super tight control in this setting (80 yo and hospitalzed)  5. Chronic A/C; on eliquis for prior CVA ad A fib  6. DM; on insulin and tradjenta  7. Acidosis; on bicarb--bump dosiing  8. Hyperlipid; on statin  9. Leg abrasions: local cares; appreciate wound consult  10. Troponin elevation; cards consulting and workup with ECHO and GXT ensues; not clear that pt would benefit or be a candidate for any intervention      Subjective  Ordering food now; has a good appetite; not dizzy; no changes  Objective    Vital signs in last 24 hours  Temp:  [97.6  F (36.4  C)-97.8  F (36.6  C)] 97.6  F (36.4  C)  Pulse:  [58-71] 68  Resp:  [18-22] 20  BP: (162-203)/(64-89) 167/77  SpO2:  [94 %-100 %] 97 %      Intake/Output last 3 shifts  I/O last 3 completed shifts:  In: 640 [P.O.:640]  Out: 1200 [Urine:1200]  Intake/Output this shift:  No intake/output data  recorded.    Physical Exam  Alert/oriented x 3, awake, NAD  CV: RRR without murmur or rub  Lung: clear and equal; no extra sounds  Ab: soft and NT; not distended; normal bs  Ext: no edema and well perfused  Skin; no rash    Pertinent Labs     Last Renal Panel:  Sodium   Date Value Ref Range Status   03/25/2023 133 (L) 136 - 145 mmol/L Final     Potassium   Date Value Ref Range Status   03/25/2023 4.7 3.4 - 5.3 mmol/L Final   06/02/2021 4.0 3.5 - 5.0 mmol/L Final     Chloride   Date Value Ref Range Status   03/25/2023 104 98 - 107 mmol/L Final   06/02/2021 103 98 - 107 mmol/L Final     Carbon Dioxide (CO2)   Date Value Ref Range Status   03/25/2023 15 (L) 22 - 29 mmol/L Final   06/02/2021 22 22 - 31 mmol/L Final     Anion Gap   Date Value Ref Range Status   03/25/2023 14 7 - 15 mmol/L Final   06/02/2021 10 5 - 18 mmol/L Final     Glucose   Date Value Ref Range Status   03/25/2023 140 (H) 70 - 99 mg/dL Final   06/02/2021 287 (H) 70 - 125 mg/dL Final     GLUCOSE BY METER POCT   Date Value Ref Range Status   03/25/2023 136 (H) 70 - 99 mg/dL Final     Urea Nitrogen   Date Value Ref Range Status   03/25/2023 82.7 (H) 8.0 - 23.0 mg/dL Final   06/02/2021 50 (H) 8 - 28 mg/dL Final     Creatinine   Date Value Ref Range Status   03/25/2023 5.90 (H) 0.67 - 1.17 mg/dL Final     GFR Estimate   Date Value Ref Range Status   03/25/2023 9 (L) >60 mL/min/1.73m2 Final     Comment:     eGFR calculated using 2021 CKD-EPI equation.   02/15/2022 20.6 (L) >60.0 ml/min/1.73m^2 Final     Calcium   Date Value Ref Range Status   03/25/2023 8.1 (L) 8.8 - 10.2 mg/dL Final     Albumin   Date Value Ref Range Status   03/23/2023 3.5 3.5 - 5.2 g/dL Final   06/02/2021 3.2 (L) 3.5 - 5.0 g/dL Final     Recent Labs   Lab 03/25/23  0434 03/24/23  0526 03/23/23  1156 03/23/23  0359   HGB 8.1* 8.4* 8.4* 5.9*          I reviewed all lab results  Liya Laboy MD

## 2023-03-25 NOTE — PROGRESS NOTES
[facility]    Medicine Progress Note - Hospitalist Service    Date of Admission:  3/23/2023    Assessment & Plan      Suman Nagy is a 82 year old male with history of A-fib and prior stroke, DM2, CKD4, chronic CHF and history of bladder and rectal cancer who is admitted on 3/23/2023 with symptomatic anemia and progressive renal disease.     Symptomatic anemia:   Patient presents with progressive diffuse weakness and hemoglobin found to have drifted down to 5.9 from previous reported value of 6.4 as outpatient.  There is no reports of clinical blood loss although the patient is on Eliquis  There is no reports of hematuria, hematemesis or bloody stools.  Anemia likely related ESRD.  Transfused 2 units PRBC  - Monitor hemoglobin: stable at 8.4     ESRD with associated hyperkalemia: History of CKD 4, now advanced to ESRD.    On admission, creatinine 6.12 from previous value of 2.88. Creatinine has been stable at the level of 6.   K is 6 on admission. It has now resolved after lasix, kayexelate and insulin with dextrose.   - Nephrology consulted and input appreciated: no indication for hemodialysis yet  - Palliative care consulted. Family conference to clarify goal of care on 3/28. His wife is having a provedure on 3/27.   - Continue to trend renal labs     Elevated troponin: Slightly elevated in the setting of ESRD.  EKG on admission is negative for ACS.  Patient denies chest pain. Troponin remains stable since admission. Nuclear stress 3/24 showed no ischemia. Normal LVEF.   - Cardiology consulted and input appreciated.     Paroxysmal A-fib: Now in NSR.   - PTA eliquis changed to Warfarin due to ESRD per cardiologist recommendation. Pharmacy to dose  - Continue metoprolol    Essential hypertension: BP remains uncontrolled. Continue PTA norvasc 10 mg daily, Hydralazine 75 mg qid, Metoprolol 75 mg daily. Increase Clonidine to 0.2 mg tid. Additional hydralazine prn.     Diabetes, type II:  PTA Lantus 16 units qam, trajenda 5mg daily.   - Blood sugar reviewed today and it is controlled. Continue Novolog sliding scale.     Left upper extremity swelling: US to rule out DVT.     History of stroke: On anticoagulation as above.      Loose stools: hold home fiber supplement. Now resolved.           Diet: Combination Diet Renal Diet (dialysis); Moderate Consistent Carb (60 g CHO per Meal) Diet; Low Saturated Fat Na <2400mg Diet    DVT Prophylaxis: Warfarin  Benz Catheter: Not present  Lines: None     Cardiac Monitoring: None  Code Status: Full Code      Clinically Significant Risk Factors                # Thrombocytopenia: Lowest platelets = 128 in last 2 days, will monitor for bleeding                 Disposition Plan      Expected Discharge Date: 03/26/2023        Discharge Comments: Palliative-goals of care.  Home vs TCU.          Juaquin Ingram MD  Hospitalist Service  [facility]  Securely message with [employer] (more info)  Text page via [email or web address] Paging/Directory   ______________________________________________________________________    Interval History   Patient is new to me. Previous progress notes reviewed.   Patient reports feeling OK. No chest pain and no SOB. His left arm has become swollen since yesterday. He reports having left shoulder pain.     Physical Exam   Vital Signs: Temp: 97.8  F (36.6  C) Temp src: Oral BP: (!) 180/79 Pulse: 63   Resp: 19 SpO2: 96 % O2 Device: None (Room air)    Weight: 156 lbs 11.95 oz    General appearance: not in acute distress  HEENT: PERRL, EOMI  Lungs: Clear breath sounds in bilateral lung fields  Cardiovascular: Regular rate and rhythm, normal S1-S2  Abdomen: Soft, non tender, no distension  Musculoskeletal: No joint swelling  Skin: Left arm swelling around the elbow area, no erythema. Superficial skin wound on the right shin, no discharge.   Neurology: AAO ×3.  Cranial nerves II - XII normal.  Normal muscle strength in all four  extremities.    Medical Decision Making       45 MINUTES SPENT BY ME on the date of service doing chart review, history, exam, documentation & further activities per the note.      Data     I have personally reviewed the following data over the past 24 hrs:    4.1  \   8.1 (L)   / 144 (L)     133 (L) 104 82.7 (H) /  144 (H)   4.7 15 (L) 5.90 (H) \       INR:  1.24 (H) PTT:  N/A   D-dimer:  N/A Fibrinogen:  N/A       Imaging results reviewed over the past 24 hrs:   No results found for this or any previous visit (from the past 24 hour(s)).

## 2023-03-26 NOTE — PROGRESS NOTES
[facility]    Medicine Progress Note - Hospitalist Service    Date of Admission:  3/23/2023    Assessment & Plan      Suman Nagy is a 82 year old male with history of A-fib and prior stroke, DM2, CKD4, chronic CHF and history of bladder and rectal cancer who is admitted on 3/23/2023 with symptomatic anemia and progressive renal disease.     Symptomatic anemia:   Patient presents with progressive diffuse weakness and hemoglobin found to have drifted down to 5.9 from previous reported value of 6.4 as outpatient.  There is no reports of clinical blood loss although the patient is on Eliquis  There is no reports of hematuria, hematemesis or bloody stools.  Anemia likely related ESRD.  Transfused 2 units PRBC  - Monitor hemoglobin: stable at 8.4     ESRD with associated hyperkalemia: History of CKD 4, now advanced to ESRD.    On admission, creatinine 6.12 from previous value of 2.88. Creatinine has been stable at the level of 6.   K is 6 on admission. It has now resolved after lasix, kayexelate and insulin with dextrose.   - Nephrology consulted and input appreciated: no indication for hemodialysis yet  - Palliative care consulted. Family conference to clarify goal of care on 3/28. His wife is having a provedure on 3/27.   - Continue to trend renal labs     Elevated troponin: Slightly elevated in the setting of ESRD.  EKG on admission is negative for ACS.  Patient denies chest pain. Troponin remains stable since admission. Nuclear stress 3/24 showed no ischemia. Normal LVEF.   - Cardiology consulted and input appreciated.     Paroxysmal A-fib: Now in NSR.   - PTA eliquis changed to Warfarin due to ESRD per cardiologist recommendation. Pharmacy to dose  - Continue metoprolol    Essential hypertension: BP remains uncontrolled. Continue PTA norvasc 10 mg daily, Hydralazine 75 mg qid, Metoprolol 75 mg daily. Increase Clonidine to 0.2 mg tid. Additional hydralazine prn.     Diabetes, type II:  PTA Lantus 16 units qam, trajenda 5mg daily.   - Blood sugar reviewed today and it is controlled. Continue Novolog sliding scale.     Left upper extremity swelling: US negative for DVT on 3/25, showed acute occlusive superficial venous thrombosis involving left cephalic vein with associated extensive subcutaneous edema.  -Symptomatic treatment    Left shoulder edema.  Ultrasound 3/25 showed moderate to large complex fluid collection, likely complex joint effusion.  No hyperemia or warmth, to suspect septic joint.  Patient afebrile.  WBC is 4.1.  -Symptomatic treatment.    History of stroke: On anticoagulation as above.      Loose stools: hold home fiber supplement. Now resolved.        Diet: Combination Diet Renal Diet (dialysis); Moderate Consistent Carb (60 g CHO per Meal) Diet; Low Saturated Fat Na <2400mg Diet    DVT Prophylaxis: Warfarin  Benz Catheter: Not present  Lines: None     Cardiac Monitoring: None  Code Status: Full Code      Clinically Significant Risk Factors                                Disposition Plan      Expected Discharge Date: 03/27/2023        Discharge Comments: Palliative-goals of care.  Home vs TCU.          Windy Humphreys MD  Hospitalist Service  [facility]  Securely message with [employer] (more info)  Text page via [employer] Paging/Directory   ______________________________________________________________________    Interval History   Patient is new to me.  Reviewed.  Patient seen and examined.  D/W nursing staff.  Patient is not present any complaints to me.  Comfortably in bed.  Denies chest pain, dyspnea, cough. He reports having left shoulder pain.     Physical Exam   Vital Signs: Temp: 98.5  F (36.9  C) Temp src: Oral BP: (!) 161/68 Pulse: 58   Resp: 19 SpO2: 96 % O2 Device: None (Room air)    Weight: 157 lbs 13.59 oz    General appearance: not in acute distress  HEENT: PERRL, EOMI  Lungs: Clear breath sounds in bilateral lung fields  Cardiovascular: Regular  rate and rhythm, normal S1-S2  Abdomen: Soft, non tender, no distension  Musculoskeletal: No joint swelling  Skin: Left arm swelling around the elbow area, no erythema. Superficial skin wound on the right shin, no discharge.   Neurology: AAO ×3.  Cranial nerves II - XII normal.  Normal muscle strength in all four extremities.    Medical Decision Making       45 MINUTES SPENT BY ME on the date of service doing chart review, history, exam, documentation & further activities per the note.      Data     I have personally reviewed the following data over the past 24 hrs:    N/A  \   N/A   / N/A     133 (L) 103 84.7 (H) /  202 (H)   4.5 17 (L) 5.82 (H) \       INR:  1.77 (H) PTT:  N/A   D-dimer:  N/A Fibrinogen:  N/A       Imaging results reviewed over the past 24 hrs:   No results found for this or any previous visit (from the past 24 hour(s)).

## 2023-03-26 NOTE — PLAN OF CARE
"  Problem: Hypertension Comorbidity  Goal: Blood Pressure in Desired Range  Outcome: Progressing     Problem: Chronic Kidney Disease  Goal: Optimal Functional Ability  Outcome: Progressing   Goal Outcome Evaluation:    Patient is alert but occasionally disoriented to time. Denied pain, SOB, and dizziness. Up in chair x1 with a 2 assist pivot. SBPs today have been 150s-190s, with slight improvements with scheduled meds. Hospitalist increased scheduled 1200 hydralazine and added a new dose for clonidine at 1400. BP recheck after scheduled noon meds was 119/58. BP recheck at 1500 after increase in clonidine was 134/65. Pt reports that he \"feels constipated.\" Offered and educated on PRN senna, pt refused and verbalized understanding. Primofit in place. Pt resting comfortably.                 "

## 2023-03-26 NOTE — PLAN OF CARE
Problem: Plan of Care - These are the overarching goals to be used throughout the patient stay.    Goal: Absence of Hospital-Acquired Illness or Injury  Intervention: Identify and Manage Fall Risk  Recent Flowsheet Documentation  Taken 3/25/2023 1545 by Ric Wells RN  Safety Promotion/Fall Prevention:   activity supervised   assistive device/personal items within reach   bed alarm on   clutter free environment maintained   fall prevention program maintained   mobility aid in reach   nonskid shoes/slippers when out of bed   patient and family education   room door open   room organization consistent   safety round/check completed   supervised activity   toileting scheduled     Problem: Plan of Care - These are the overarching goals to be used throughout the patient stay.    Goal: Absence of Hospital-Acquired Illness or Injury  Intervention: Prevent Skin Injury  Recent Flowsheet Documentation  Taken 3/25/2023 1545 by Ric Wells RN  Body Position:   supine, legs elevated   supine, head elevated   log-rolled     Problem: Plan of Care - These are the overarching goals to be used throughout the patient stay.    Goal: Optimal Comfort and Wellbeing  Outcome: Progressing     Problem: Risk for Delirium  Goal: Optimal Coping  Outcome: Progressing   Goal Outcome Evaluation:    -190s this evening. Scheduled hydralazine and clonidine administered, along with one dose of hydralazine PRN. Pt asymptomatic, denies any dizziness, lightheadedness, or SOB. Denies pain. On room air. Pleasant and cooperative. Family was at bedside.

## 2023-03-26 NOTE — PROGRESS NOTES
[facility]  [facility]   Follow up    Suman Ngay   MRN: [id number]  : 1940   DOA: 3/23/2023   CC: advanced CKD      Assessment and Plan:  82 year old male     1. Advancing CKD; had gap in chair so has not been following in clinic to get typical eduation on dialysis options; now advanced CKD--discussions ongoing with family and pr re; ?diaysis vs comfort; at this point, he does not need dialysis acutely so he could be managed as an OP once deemed medically stable by other services; will continue to have ongoing discussions with pt and family; planning family meeting on Tuesday (family not available to meet until then)  2. Anemia; underproduction from CKD; started on epo and now transfused; received elemental iron with prbcs so should be relatively iron replete; hgb stable in 8s  3. Hyperkalemia; medically treated and improved; holding KCL supplement  4. HTN; on chronic meds (norvasc, clonidine, hydralzine, metoprolol); resumed; some highish readings and follow need to adjust but not looking for super tight control in this setting (80 yo and hospitalzed)--will add low dose alpha blocker and follow  5. Chronic A/C; on eliquis for prior CVA ad A fib  6. DM; on insulin and tradjenta  7. Acidosis; on bicarb--bump dosiing  8. Hyperlipid; on statin  9. Leg abrasions: local cares; appreciate wound consult  10. Troponin elevation; cards consulting and workup with ECHO and GXT ensues; not clear that pt would benefit or be a candidate for any intervention      Subjective  Up in chair and eating; no change in appetite; tells me he did have an episode of emesis with some sinus drainage; d/w pt again today that I would not recommend dialysis and asked what he thought about the prospect of dialysis; he told me that he really does not have an opinion on the matter but his wife thinks it is a bad idea  Objective    Vital signs in last 24 hours  Temp:   [97.8  F (36.6  C)-98.6  F (37  C)] 98.6  F (37  C)  Pulse:  [57-66] 64  Resp:  [16-20] 18  BP: (152-199)/(60-86) 157/72  SpO2:  [95 %-99 %] 98 %      Intake/Output last 3 shifts  I/O last 3 completed shifts:  In: 800 [P.O.:800]  Out: 750 [Urine:750]  Intake/Output this shift:  I/O this shift:  In: -   Out: 600 [Urine:600]    Physical Exam  Alert/oriented x 3, awake, NAD  CV: RRR without murmur or rub  Lung: clear and equal; no extra sounds  Ab: soft and NT; not distended; normal bs  Ext: no edema and well perfused  Skin; no rash    Pertinent Labs     Last Renal Panel:  Sodium   Date Value Ref Range Status   03/25/2023 133 (L) 136 - 145 mmol/L Final     Potassium   Date Value Ref Range Status   03/25/2023 4.7 3.4 - 5.3 mmol/L Final   06/02/2021 4.0 3.5 - 5.0 mmol/L Final     Chloride   Date Value Ref Range Status   03/25/2023 104 98 - 107 mmol/L Final   06/02/2021 103 98 - 107 mmol/L Final     Carbon Dioxide (CO2)   Date Value Ref Range Status   03/25/2023 15 (L) 22 - 29 mmol/L Final   06/02/2021 22 22 - 31 mmol/L Final     Anion Gap   Date Value Ref Range Status   03/25/2023 14 7 - 15 mmol/L Final   06/02/2021 10 5 - 18 mmol/L Final     Glucose   Date Value Ref Range Status   06/02/2021 287 (H) 70 - 125 mg/dL Final     GLUCOSE BY METER POCT   Date Value Ref Range Status   03/26/2023 118 (H) 70 - 99 mg/dL Final     Urea Nitrogen   Date Value Ref Range Status   03/25/2023 82.7 (H) 8.0 - 23.0 mg/dL Final   06/02/2021 50 (H) 8 - 28 mg/dL Final     Creatinine   Date Value Ref Range Status   03/25/2023 5.90 (H) 0.67 - 1.17 mg/dL Final     GFR Estimate   Date Value Ref Range Status   03/25/2023 9 (L) >60 mL/min/1.73m2 Final     Comment:     eGFR calculated using 2021 CKD-EPI equation.   02/15/2022 20.6 (L) >60.0 ml/min/1.73m^2 Final     Calcium   Date Value Ref Range Status   03/25/2023 8.1 (L) 8.8 - 10.2 mg/dL Final     Albumin   Date Value Ref Range Status   03/23/2023 3.5 3.5 - 5.2 g/dL Final   06/02/2021 3.2 (L) 3.5 -  5.0 g/dL Final     Recent Labs   Lab 03/25/23  0434 03/24/23  0526 03/23/23  1156 03/23/23  0359   HGB 8.1* 8.4* 8.4* 5.9*          I reviewed all lab results  Liya Laboy MD

## 2023-03-26 NOTE — PLAN OF CARE
Problem: Chronic Kidney Disease  Goal: Acceptable Pain Control  Outcome: Progressing     Problem: Hypertension Comorbidity  Goal: Blood Pressure in Desired Range  Outcome: Progressing  Intervention: Maintain Blood Pressure Management  Recent Flowsheet Documentation  Taken 3/26/2023 0002 by Shanelle Ocasio RN  Medication Review/Management: medications reviewed    Pt is disoriented to time and situation. He denied pain throughout the night. He continues to have high BP and was given a PRN hydralazine. He is an A2 with turn and repositions. No BM overnight to collect a stool sample. He is able to make needs known. Call light is within reach.

## 2023-03-27 NOTE — PROGRESS NOTES
"CLINICAL NUTRITION SERVICES - ASSESSMENT NOTE     Nutrition Prescription    RECOMMENDATIONS FOR MDs/PROVIDERS TO ORDER:  None at this time    Malnutrition Status:    Does not meet 2 criteria for malnutrition    Recommendations already ordered by Registered Dietitian (RD):  Continue current diet    Future/Additional Recommendations:  Follow po intake, weight, labs, poc     REASON FOR ASSESSMENT  Suman Nagy is a/an 82 year old male assessed by the dietitian for Admission Nutrition Risk Screen for positive weight loss but noted to be eating fine PTA    NUTRITION HISTORY  Pt states he does not follow any special diet at home.  He has had diabetic diet education in the past (took classes) He eats \"What tastes good\" He declined any diet education today-let him know RD is available if he does want diet education.  Pt states he eats everything in moderation    CURRENT NUTRITION ORDERS  Diet: Dialysis, moderate consistent CHO, low saturated fat< 2400 mg Na  Intake/Tolerance: %    LABS  Labs reviewed: Na 133, K 4.9, urea nitrogen 85.4, Cr 6.02, Calcium 7.4, alb 2.7, Glu 132    MEDICATIONS  Medications reviewed: novolog, sodium bicarbonate tablet 1,300 mg, vit D 3, warfarin    ANTHROPOMETRICS  Height: 170.2 cm (5' 7\")  Most Recent Weight: 71 kg (156 lb 8.4 oz)    IBW: 67.3 kg (148 lb)  BMI: Normal BMI  Weight History:   Wt Readings from Last 10 Encounters:   03/27/23 71 kg (156 lb 8.4 oz)   07/05/22 66.2 kg (146 lb)   06/27/22 67.1 kg (148 lb)   02/01/22 69.4 kg (153 lb)   11/04/21 70.3 kg (155 lb)   10/01/21 69.4 kg (153 lb)   06/02/21 69.9 kg (154 lb)   04/14/21 70.3 kg (155 lb)   03/02/21 68.9 kg (152 lb)   10/29/20 72.6 kg (160 lb)   Weight up 10 lb in the past 8 months    Dosing Weight: 71 kg/ current weight    ASSESSED NUTRITION NEEDS  Estimated Energy Needs: 1029-7914 kcals/day (25 - 30 kcals/kg)  Justification: Maintenance  Estimated Protein Needs:  grams protein/day (1.2 - 1.5 grams of " pro/kg)  Justification: Dialysis (if pt goes on dialysis:  Otherwise needs ~ 57 g protein/day-0.8 g/Kg)  Estimated Fluid Needs: 4119-5410 mL/day (25 - 30 mL/kg)   Justification: Per provider pending fluid status    PHYSICAL FINDINGS  See malnutrition section below.  Skin: Redness/blanchable buttocks/IT/ abrasion shin/calf  Scab fung/calf  Doe score=16, nutrition noted as adequate    MALNUTRITION:  % Weight Loss:  None noted  % Intake:  Decreased intake does not meet criteria for malnutrition   Subcutaneous Fat Loss:  None observed  Muscle Loss:  None observed  Fluid Retention:  Trace to mild 1+-2+    Malnutrition Diagnosis: Patient does not meet two of the above criteria necessary for diagnosing malnutrition    NUTRITION DIAGNOSIS  Altered nutrition-related laboratory values related to CKD stage 5 as evidenced by elevated renal labs      INTERVENTIONS  Implementation  Continue current diet  Follow for education needs    Goals  Patient to consume % of nutritionally adequate meals three times per day, or the equivalent with supplements/snacks.  Maintain weight     Monitoring/Evaluation  Progress toward goals will be monitored and evaluated per protocol.

## 2023-03-27 NOTE — PROGRESS NOTES
Care Management Follow Up    Length of Stay (days): 4    Expected Discharge Date: 03/27/2023     Concerns to be Addressed:  POC with palliative care  Patient plan of care discussed at interdisciplinary rounds: Yes    Anticipated Discharge Disposition:  Home      Anticipated Discharge Services:   TBD likely home  Anticipated Discharge DME:  unknown    Patient/family educated on Medicare website which has current facility and service quality ratings:  yes  Education Provided on the Discharge Plan:  NA  Patient/Family in Agreement with the Plan:  TBD    Referrals Placed by CM/SW:  TBD  Private pay costs discussed: TBD    Additional Information:  Pt is an 82 year old  male with a past medical history of bladder and rectal cancer, carotid artery stenosis, anxiety, constipation )9slow transit), CKD stage IV, CAD, Acute resp failure, DMII, CVA, Acute kidney injury, and chronic diastolic CHF. He was admitted on 03/23/23 with generalized weakness and found to have symptomatic anemia with a Hgb 5.9,  likely due to his chronic kidney disease..      Pt continues to have high BP requiring dose s of PRN Hydralazine. Met with palliative care for [facility] and plan is for the patient to remain a full code at this time and for Palliative care to continue to follow up on an outpatient basis. Dialysis was being discussed on an out patient basis prior to pt admission and patient has not yet decided if he is going to move forward with this. Palliative car had stated to the family they would help with hospice if patient decided not to pursue dialysis.     Plan to continue [facility] discussion with palliative care on 03/28/23.  . Family still would like to have pt return home on discharge, but unclear if they would be able to care for him. CM to continue to follow.     Senia Barahona RN

## 2023-03-27 NOTE — PROGRESS NOTES
[facility]  Palliative Care Daily Progress Note       Recommendations & Counseling     Generalized weakness, multifactorial, and due to chronic problems and Left sided 2/2 previous CVA  - Reposition for comfort  - up with assistance     ADVANCED CARE PLANNING  - Code status: FULL CODE  - Surrogate Decision Maker(s): wife, Rossi Nagy, and Sommer, DTR  - No POLST or HCD on file.  Patient had previously completed a HCD and family are unable to locate it at this time.  - Blank copy Honoring choices left in patient room for DTR, Sommer, to review and potentially complete with patient.     GOALS OF CARE DISCUSSION  - Goals are life prolonging at this.  -Plan to meet with patient, spouse and daughter Sommer tomorrow at 2:30  - Family continue to gather information and options. Realistic about prognosis in lieu of multiple medical problems.  - 3/24: ALSO briefly discussed hospice program should patient chose not to pursue dialysis.     PLAN:  - Continue current cares.  -Outpatient referral ordered. Schedulers will call to schedule outpatient palliative care clinic after patient discharges.       Thank you for the opportunity to participate in the care of this patient and family. Our team: will continue to follow.     During regular M-F work hours -- if you are not sure who specifically to contact -- please contact us by calling us directly at the Palliative Care Main Line [phone]    After regular work hours and on weekends/holidays, you can leave a message at [phone]        Assessments          History gathered today from: patient, family/loved ones, medical chart  Suman Nagy is a 82 year old male admitted 3/23/2023 with increased weakness and inability to get OOB. Patient transfused for a Hgb of 5.9.   PMH CVA with residual left sided weakness and WC and bed bound at baseline last 2 years. Patient also with CKD and declining renal function. Nephrology following as an outpatient  "and potential for dialysis and were seeing him weekly in preparation for potential dialysis then he was admitted per daughter, Sommer report.    Today, the patient was seen for:  Goals of care    Prognosis, Goals, or Advance Care Planning was addressed today with: Yes.  Mood, coping, and/or meaning in the context of serious illness were addressed today: Yes.              Interval History:     Chart review/discussion with unit or clinical team members:   No significant overnight events.   No imminent need for dialysis at this time.     Per patient or family/caregivers today:  Patient denies pain or shortness of breath.  Fatigued.  States he doesn't think he will do dialysis because \"they say I'll probably kick off before then.\"  He states plan is to discuss with wife and daughter tomorrow and agreed to have me call his daughter to schedule.    Phone call to daughter Sommer: Agreed to meet at 2:30 tomorrow afternoon to discuss goals of care.     Key Palliative Symptoms:  # Pain severity the last 12 hours: none  # Dyspnea severity the last 12 hours: none  # Nausea severity the last 12 hours: none  # Anxiety severity the last 12 hours: none             Review of Systems:     Besides above, an additional 4 point system ROS was reviewed and is unremarkable          Medications:     I have reviewed this patient's medication profile and medications during this hospitalization.             Physical Exam:   Vital Signs: Blood pressure 139/61, pulse (P) 59, temperature 97.9  F (36.6  C), temperature source Oral, resp. rate 20, height 1.702 m (5' 7\"), weight 71 kg (156 lb 8.4 oz), SpO2 95 %.   General appearance: alert, fatigued and no distress  Head: Normocephalic  Lungs: non-labored  Extremities: LLE weakness  Pulses: 2+ and symmetric  Neurologic: alert. Oriented to person and place and time.            Data Reviewed:       Results for orders placed or performed during the hospital encounter of 03/23/23    Upper Extremity " Venous Duplex Left    Impression    IMPRESSION:   1.  No deep venous thrombosis in the left upper extremity.  2.  Acute occlusive superficial venous thrombosis involving the left cephalic vein in the mid forearm.  3.  Extensive left arm subcutaneous edema.  4.  Moderate to large complex fluid collection along the left anterior shoulder, perhaps a complex joint effusion.   NM Lexiscan stress test (nuc card)   Result Value Ref Range    Pharmacologic Protocol Lexiscan     Test Type Pharmacological     Baseline HR 59     Baseline Systolic      Baseline Diastolic BP 84     Last Stress HR 75     Last Stress Systolic      Last Stress Diastolic BP 78     Target      PERCENT HR 85%     ST Deviation Elevation III 0.1mm     Deviation Time V4 -0.2mm     ST Elevation Amount V3 0.4mm     ST Deviation Amount he III -0.2mm     Final Resting /79     Final Resting HR 65     Max Treadmill Speed 0.0     Max Treadmill Grade 0.0     Peak Systolic /84     Peak Diastolic /84     Max HR  81     Stress Phase Resting     Stress Resting Pt Position SUPINE     Current HR 60     Current /84     Stress Phase Resting     Stress Resting Pt Position MANUAL EVENT     Current HR 75     Current /78     Stress Phase Stress     Stage Minute EXE 00:00     Exercise Stage STAGE 2     Current HR 58     Current /84     Stress Phase Stress     Stage Minute EXE 01:00     Exercise Stage STAGE 3     Current HR 57     Current /84     Stress Phase Stress     Stage Minute EXE 02:00     Exercise Stage STAGE 4     Current HR 78     Current /84     Stress Phase Stress     Stage Minute EXE 02:11     Exercise Stage STAGE 4     Current HR 81     Current /84     Stress Phase Stress     Stage Minute EXE 03:00     Exercise Stage STAGE 5     Current HR 78     Current /84     Stress Phase Stress     Stage Minute EXE 03:21     Exercise Stage STAGE 5     Current HR 78     Current /78     Stress  Phase Stress     Stage Minute EXE 04:00     Exercise Stage STAGE 6     Current HR 75     Current /78     Stress Phase Stress     Stage Minute EXE 04:00     Exercise Stage STAGE 6     Current HR 75     Current /78     Stress Phase Recovery     Stage Minute REC 00:13     Exercise Stage Recovery     Current HR 75     Current /78     Stress Phase Recovery     Stage Minute REC 00:59     Exercise Stage Recovery     Current HR 69     Current /78     Stress Phase Recovery     Stage Minute REC 01:09     Exercise Stage Recovery     Current HR 69     Current /70     Stress Phase Recovery     Stage Minute REC 01:59     Exercise Stage Recovery     Current HR 66     Current /70     Stress Phase Recovery     Stage Minute REC 02:55     Exercise Stage Recovery     Current HR 66     Current /79     Stress Phase Recovery     Stage Minute REC 02:59     Exercise Stage Recovery     Current HR 64     Current /79     Stress Phase Recovery     Stage Minute REC 03:59     Exercise Stage Recovery     Current HR 64     Current /79     Stress Phase Recovery     Stage Minute REC 04:01     Exercise Stage Recovery     Current HR 65     Current /79     Max Predicted HR  59 %    Rate Pressure Product 13,770.0     Left Ventricular EF 70 %       Recent Labs   Lab 03/27/23  1136 03/27/23  0756 03/27/23  0636 03/27/23  0425 03/26/23  0758 03/26/23  0445 03/25/23  0743 03/25/23  0434 03/24/23  0825 03/24/23  0526 03/23/23  1750 03/23/23  1156 03/23/23  0531 03/23/23  0359   WBC  --   --   --   --   --   --   --  4.1  --  4.6  --   --   --  4.2   HGB  --   --   --   --   --   --   --  8.1*  --  8.4*  --  8.4*  --  5.9*   MCV  --   --   --   --   --   --   --  93  --  90  --   --   --  95   PLT  --   --   --   --   --   --   --  144*  --  128*  --   --   --  125*   INR  --   --   --  2.71*  --  1.77*  --  1.24*   < >  --   --   --   --  1.41*   NA  --   --  133*  --   --  133*  --  133*  --  135*   --   --   --  135*   POTASSIUM  --   --  4.9  --   --  4.5  --  4.7  --  4.8  --   --    < > 6.0*   CHLORIDE  --   --  102  --   --  103  --  104  --  104  --   --   --  107   CO2  --   --  17*  --   --  17*  --  15*  --  18*  --   --   --  16*   BUN  --   --  85.4*  --   --  84.7*  --  82.7*  --  80.3*  --   --   --  86.0*   CR  --   --  6.02*  --   --  5.82*  --  5.90*  --  6.03*  --   --   --  6.12*   ANIONGAP  --   --  14  --   --  13  --  14  --  13  --   --   --  12   KARTHIKEYAN  --   --  7.4*  --   --  8.0*  --  8.1*  --  8.3*  --   --   --  8.9   * 142* 132*  --    < > 123*   < > 140*   < > 135*   < >  --    < > 111*   ALBUMIN  --   --  2.7*  --   --   --   --   --   --   --   --   --   --  3.5   PROTTOTAL  --   --   --   --   --   --   --   --   --   --   --   --   --  6.2*   BILITOTAL  --   --   --   --   --   --   --   --   --   --   --   --   --  0.3   ALKPHOS  --   --   --   --   --   --   --   --   --   --   --   --   --  77   ALT  --   --   --   --   --   --   --   --   --   --   --   --   --  24   AST  --   --   --   --   --   --   --   --   --   --   --   --   --  22    < > = values in this interval not displayed.      Lab Results   Component Value Date    WBC 4.1 03/25/2023    HGB 8.1 (L) 03/25/2023    HCT 25.6 (L) 03/25/2023     (L) 03/25/2023     (L) 03/27/2023    POTASSIUM 4.9 03/27/2023    CHLORIDE 102 03/27/2023    CO2 17 (L) 03/27/2023    BUN 85.4 (H) 03/27/2023    CR 6.02 (H) 03/27/2023     (H) 03/27/2023    AST 22 03/23/2023    ALT 24 03/23/2023    ALKPHOS 77 03/23/2023    BILITOTAL 0.3 03/23/2023    INR 2.71 (H) 03/27/2023      Lab Results   Component Value Date    ALBUMIN 2.7 03/27/2023    ALBUMIN 3.2 06/02/2021                ====================================================  TT: I have personally spent a total of 25 minutes on the day of the encounter on the unit in review of medical record, consultation with the medical providers and assessment of patient today,  with time spent in counseling, coordination of care, and discussion with patient and family re: diagnostic results, prognosis, symptom management, risks and benefits of management options, and development of plan of care as noted above.      ====================================================    SURYA Limon, ELOINA-BC  Clinical Nurse Specialist  [facility]  [phone]

## 2023-03-27 NOTE — PROGRESS NOTES
[facility]  [facility]   Follow up    Suman Nagy   MRN: [id number]  : 1940   DOA: 3/23/2023   CC: advanced CKD      Assessment and Plan:  82 year old male     CKD stage 5 - had gap in care so has not been following in clinic to get typical eduation on dialysis options; now advanced CKD--discussions ongoing with family and pr re; ?diaysis vs comfort; at this point.  He does not need dialysis acutely so he could be managed as an OP once deemed medically stable by other services; will continue to have ongoing discussions with pt and family; planning family meeting on Tuesday  Recs:  - no change in cares currently  - will attend family meeting tomorrow    Anemia; underproduction from CKD; started on epo and now transfused; received elemental iron with prbcs so should be relatively iron replete; hgb stable in 8s    Hyperkalemia; medically treated and improved; holding KCL supplement    HTN; on chronic meds (norvasc, clonidine, hydralzine, metoprolol); resumed; 140-160s and follow need to adjust but not looking for super tight control in this setting (80 yo and hospitalzed).  Added further alpha blocker as well    Chronic A/C; on eliquis for prior CVA ad A fib    DM; on insulin and tradjenta    Acidosis; on bicarb 1300mg BID    Hyperlipid; on statin    Leg abrasions: local cares; appreciate wound consult    Troponin elevation; cards consulting and workup with ECHO and GXT ensues; not clear that pt would benefit or be a candidate for any intervention      Subjective  Up in chair and eating; no change in appetite.  Complains of some intermittent loose stools but also tells me he feels a little constipated now.      Agreeable to family conference tomorrow about dialysis plans.  He is ambivalent about what he wants to do.....  Objective    Vital signs in last 24 hours  Temp:  [97.6  F (36.4  C)-98.5  F (36.9  C)] 97.9  F (36.6  C)  Pulse:  [52-62]  59  Resp:  [19-20] 20  BP: (119-169)/(58-73) 169/70  SpO2:  [95 %-96 %] 95 %      Intake/Output last 3 shifts  I/O last 3 completed shifts:  In: 342 [P.O.:342]  Out: 900 [Urine:900]  Intake/Output this shift:  I/O this shift:  In: 240 [P.O.:240]  Out: -     Physical Exam  Alert/oriented x 3, awake, NAD  CV: RRR without murmur or rub  Lung: clear and equal; no extra sounds  Ab: soft and NT; not distended; normal bs  Ext: left arm edematous and lower extremities without much edema  Skin; no rash    Pertinent Labs     Last Renal Panel:  Sodium   Date Value Ref Range Status   03/27/2023 133 (L) 136 - 145 mmol/L Final     Potassium   Date Value Ref Range Status   03/27/2023 4.9 3.4 - 5.3 mmol/L Final   06/02/2021 4.0 3.5 - 5.0 mmol/L Final     Chloride   Date Value Ref Range Status   03/27/2023 102 98 - 107 mmol/L Final   06/02/2021 103 98 - 107 mmol/L Final     Carbon Dioxide (CO2)   Date Value Ref Range Status   03/27/2023 17 (L) 22 - 29 mmol/L Final   06/02/2021 22 22 - 31 mmol/L Final     Anion Gap   Date Value Ref Range Status   03/27/2023 14 7 - 15 mmol/L Final   06/02/2021 10 5 - 18 mmol/L Final     Glucose   Date Value Ref Range Status   03/27/2023 132 (H) 70 - 99 mg/dL Final   06/02/2021 287 (H) 70 - 125 mg/dL Final     GLUCOSE BY METER POCT   Date Value Ref Range Status   03/27/2023 198 (H) 70 - 99 mg/dL Final     Comment:     /RN Notified     Urea Nitrogen   Date Value Ref Range Status   03/27/2023 85.4 (H) 8.0 - 23.0 mg/dL Final   06/02/2021 50 (H) 8 - 28 mg/dL Final     Creatinine   Date Value Ref Range Status   03/27/2023 6.02 (H) 0.67 - 1.17 mg/dL Final     GFR Estimate   Date Value Ref Range Status   03/27/2023 9 (L) >60 mL/min/1.73m2 Final     Comment:     eGFR calculated using 2021 CKD-EPI equation.   02/15/2022 20.6 (L) >60.0 ml/min/1.73m^2 Final     Calcium   Date Value Ref Range Status   03/27/2023 7.4 (L) 8.8 - 10.2 mg/dL Final     Phosphorus   Date Value Ref Range Status   03/27/2023 4.5 2.5 -  4.5 mg/dL Final     Albumin   Date Value Ref Range Status   03/27/2023 2.7 (L) 3.5 - 5.2 g/dL Final   06/02/2021 3.2 (L) 3.5 - 5.0 g/dL Final     Recent Labs   Lab 03/25/23  0434 03/24/23  0526 03/23/23  1156 03/23/23  0359   HGB 8.1* 8.4* 8.4* 5.9*          I reviewed all lab results    Tellymoustapha Silva  [facility]  [phone]

## 2023-03-27 NOTE — PLAN OF CARE
Problem: Plan of Care - These are the overarching goals to be used throughout the patient stay.    Goal: Absence of Hospital-Acquired Illness or Injury  Intervention: Prevent Skin Injury  Recent Flowsheet Documentation  Taken 3/26/2023 1615 by Ric Wells RN  Body Position:   supine, legs elevated   supine, head elevated     Problem: Plan of Care - These are the overarching goals to be used throughout the patient stay.    Goal: Optimal Comfort and Wellbeing  Outcome: Progressing     Problem: Chronic Kidney Disease  Goal: Optimal Oral Intake  Outcome: Progressing     Problem: Hypertension Comorbidity  Goal: Blood Pressure in Desired Range  Outcome: Progressing  Intervention: Maintain Blood Pressure Management  Recent Flowsheet Documentation  Taken 3/26/2023 1615 by Ric Wells, RN  Medication Review/Management: medications reviewed     Problem: Diabetes Comorbidity  Goal: Blood Glucose Level Within Targeted Range  Outcome: Progressing   Goal Outcome Evaluation:    SBP between 130-160s this evening. Denies any dizziness, lightheadedness, SOB. Denies pain. Blood sugars stable. Calm and cooperative. Family was at bedside this evening.

## 2023-03-27 NOTE — PLAN OF CARE
Problem: Chronic Kidney Disease  Goal: Electrolyte Balance  Outcome: Progressing   Cr. 6.02  GFR 9    Problem: Diabetes Comorbidity  Goal: Blood Glucose Level Within Targeted Range  Outcome: Progressing    & 198    Problem: Hypertension Comorbidity  Goal: Blood Pressure in Desired Range  Outcome: Progressing   /70 & 139/61      Goal Outcome Evaluation:    Patient doing well today. Eating 100% of meals. No PRN requested. Priomfit in place.

## 2023-03-27 NOTE — PROGRESS NOTES
[facility]    Medicine Progress Note - Hospitalist Service    Date of Admission:  3/23/2023    Assessment & Plan      Suman Nagy is a 82 year old male with history of A-fib and prior stroke, DM2, CKD4, chronic CHF and history of bladder and rectal cancer who is admitted on 3/23/2023 with symptomatic anemia and progressive renal disease.     Symptomatic anemia:   Patient presents with progressive diffuse weakness and hemoglobin found to have drifted down to 5.9 from previous reported value of 6.4 as outpatient.  There is no reports of clinical blood loss although the patient is on Eliquis  There is no reports of hematuria, hematemesis or bloody stools.  Anemia likely related ESRD.  Transfused 2 units PRBC  - Monitor hemoglobin: stable in 8's     ESRD with associated hyperkalemia: History of CKD 4, now advanced to ESRD.    On admission, creatinine 6.12 from previous value of 2.88. Creatinine has been stable at the level of 6.   K is 6 on admission. It has now resolved after lasix, kayexelate and insulin with dextrose.   - Nephrology consulted and input appreciated: no indication for hemodialysis yet  - Palliative care consulted. Family conference to clarify goal of care on 3/28 at 2:30 PM with wife and daughter Sommer.  - Continue to trend renal labs     Elevated troponin: Slightly elevated in the setting of ESRD.  EKG on admission is negative for ACS.  Patient denies chest pain. Troponin remains stable since admission. Nuclear stress 3/24 showed no ischemia. Normal LVEF.   - Cardiology consulted and input appreciated.     Paroxysmal A-fib: Now in NSR.   - PTA eliquis changed to Warfarin due to ESRD per cardiologist recommendation. Pharmacy to dose  - Continue metoprolol    Essential hypertension: BP remains uncontrolled. Continue PTA norvasc 10 mg daily, Hydralazine 75 mg qid, Metoprolol 75 mg daily. Increase Clonidine to 0.2 mg tid. Additional hydralazine prn.     Diabetes, type II:  PTA Lantus 16 units qam, trajenda 5mg daily.   - Blood sugar reviewed today and it is controlled. Continue Novolog sliding scale.     Left upper extremity swelling: US negative for DVT on 3/25, showed acute occlusive superficial venous thrombosis involving left cephalic vein with associated extensive subcutaneous edema.  -Symptomatic treatment    Left shoulder edema.  Ultrasound 3/25 showed moderate to large complex fluid collection, likely complex joint effusion.  No hyperemia or warmth, to suspect septic joint.  Patient afebrile.  WBC is 4.1.  She admits to left shoulder pain and swelling for years.  Never had septic arthritis.  -Symptomatic treatment.    History of stroke: On anticoagulation as above.      Loose stools: hold home fiber supplement. Now resolved.        Diet: Combination Diet Renal Diet (dialysis); Moderate Consistent Carb (60 g CHO per Meal) Diet; Low Saturated Fat Na <2400mg Diet    DVT Prophylaxis: Warfarin  Benz Catheter: Not present  Lines: None     Cardiac Monitoring: None  Code Status: Full Code      Clinically Significant Risk Factors        Disposition Plan     Expected Discharge Date: 03/27/2023        Discharge Comments: Palliative-goals of care.  Home vs TCU.       Windy Humphreys MD  Hospitalist Service  [facility]  Securely message with [employer] (more info)  Text page via [employer] Paging/Directory   ______________________________________________________________________    Interval History   Patient seen and examined.  D/W nursing staff.  Complaining of left shoulder pain.  States is not new and was bothering patient for years.  Denies chest pain, dyspnea, cough.  Physical Exam   Vital Signs: Temp: 97.9  F (36.6  C) Temp src: Oral BP: (!) 169/70 Pulse: 59   Resp: 20 SpO2: 95 % O2 Device: None (Room air)    Weight: 156 lbs 8.43 oz    General appearance: not in acute distress  HEENT: PERRL, EOMI  Lungs: Clear breath sounds in bilateral lung fields  Cardiovascular:  Regular rate and rhythm, normal S1-S2  Abdomen: Soft, non tender, no distension  Musculoskeletal: Left shoulder mildly edematous, tender with range of motion, no hyperemia or erythema  Skin: Left arm swelling around the elbow area, no erythema. Superficial skin wound on the right shin, no discharge.   Neurology: AAO ×3.  Cranial nerves II - XII normal.  Normal muscle strength in all four extremities.    Medical Decision Making       35 MINUTES SPENT BY ME on the date of service doing chart review, history, exam, documentation & further activities per the note.      Data     I have personally reviewed the following data over the past 24 hrs:    N/A  \   N/A   / N/A     N/A N/A N/A /  142 (H)   N/A N/A N/A \       INR:  2.71 (H) PTT:  N/A   D-dimer:  N/A Fibrinogen:  N/A       Imaging results reviewed over the past 24 hrs:   No results found for this or any previous visit (from the past 24 hour(s)).

## 2023-03-27 NOTE — PLAN OF CARE
Problem: Chronic Kidney Disease  Goal: Optimal Coping with Chronic Illness  3/27/2023 0659 by Shanelle Ocasio, RN  Outcome: Progressing  3/27/2023 0659 by Shanelle Ocasio, RN  Outcome: Progressing     Problem: Hypertension Comorbidity  Goal: Blood Pressure in Desired Range  Outcome: Progressing  Intervention: Maintain Blood Pressure Management  Recent Flowsheet Documentation  Taken 3/27/2023 0000 by Shanelle Ocasio, RN  Medication Review/Management: medications reviewed    Pt's vital signs were stable. He denied pain throughout the night. He is an A2 with cares and transfers. He has a primofit in place that is draining appropriately. He is able to make needs known. Call light is within reach.

## 2023-03-28 NOTE — PROGRESS NOTES
Previous shift stated they had  Stool sample ordered from days ago and pt. Had not had BM.  Start of night shift pt. Had BM so stool sample was sent to lab.   Pt. is assist of 2 staff but able to tell us what he needs.

## 2023-03-28 NOTE — PLAN OF CARE
Problem: Plan of Care - These are the overarching goals to be used throughout the patient stay.    Goal: Absence of Hospital-Acquired Illness or Injury  Intervention: Prevent Skin Injury  Recent Flowsheet Documentation  Taken 3/27/2023 2000 by Ric Wells RN  Body Position:    supine, legs elevated    supine, head elevated  Taken 3/27/2023 1620 by Ric Wells RN  Body Position:    supine, legs elevated    supine, head elevated     Problem: Plan of Care - These are the overarching goals to be used throughout the patient stay.    Goal: Optimal Comfort and Wellbeing  Outcome: Progressing     Problem: Risk for Delirium  Goal: Improved Sleep  Outcome: Progressing   Goal Outcome Evaluation:    Reported shoulder pain 3/10, said it was chronic but didn't want to be repositioned. Tylenol administered and effective per pt. Sleeping in between cares. Family was at bedside. Blood pressures up to SBP 150s only this evening. Pleasant and cooperative.

## 2023-03-28 NOTE — PLAN OF CARE
Problem: Hypertension Comorbidity  Goal: Blood Pressure in Desired Range  Outcome: Progressing  Intervention: Maintain Blood Pressure Management  Recent Flowsheet Documentation  Taken 3/28/2023 1652 by Dmaian Chew, RN  Medication Review/Management: medications reviewed  Taken 3/28/2023 0815 by Damian Chew, RN  Medication Review/Management: medications reviewed     Problem: Plan of Care - These are the overarching goals to be used throughout the patient stay.    Goal: Optimal Comfort and Wellbeing  Outcome: Progressing     Nephrology met with pt and family (spouse, daughter, and son) to discuss dialysis vs comfort. Pt still taking time to consider options.     Pt is calm, cooperative, and appears withdrawn at times. Turning every 2 hours. RN encouraged pt to sit up in recliner and pt refused. Bilateral dressings covering shins changed today. No BM. VSS. Trending Hgb - next check at 2200.

## 2023-03-28 NOTE — PROGRESS NOTES
[facility]    Medicine Progress Note - Hospitalist Service    Date of Admission:  3/23/2023    Assessment & Plan      Suman Nagy is a 82 year old male with history of A-fib and prior stroke, DM2, CKD4, chronic CHF and history of bladder and rectal cancer who is admitted on 3/23/2023 with symptomatic anemia and progressive renal disease.  Also diffuse weakness without reports of hematuria hematemesis or bloody stools likely related to stage V kidney disease received 2 units of packed red cells     Symptomatic anemia  Presumed chronic disease renal failure            -Transfused            -Trending hemoglobin     Previous CKD stage IV  Progressed to stage V CKD            -Previous creatinine 2.88 now 6.12            -Had shifting done for hyperkalemia            -Nephrology on board no dialysis yet            -Palliative care involved for goals of care            -Uncertain if he would actually want hemodialysis    Elevated troponin            -Question artifact with CKD            -No symptoms EKG negative            -Nuclear stress 3/24 was nonischemic            -Cardiology consult input appreciated     Paroxysmal A-fib:             - Now in NSR.             -Previous Eliquis changed to warfarin            -Continue rate control with metoprolol    Essential hypertension            -Previously on Norvasc hydralazine metoprolol            -Clonidine 0.2 3 times daily was added            -Has as needed hydralazine            -Still elevated increase metoprolol to 100 daily    Diabetes, type II:             - PTA Lantus 16 units qam, trajenda 5mg daily.             - Blood sugar reviewed today and it is controlled.             - Continue Novolog sliding scale.             -No changes    Left upper extremity swelling:             - US negative for DVT on 3/25,             -Did show superficial venous thrombosis             -Extensive subcu edema             -Symptomatic treatment  does not need anticoagulation     Left shoulder edema.            -Ultrasound 3/25 moderate to large complex fluid collection            -Presumed joint effusion            -Orthopedic consult here or as outpatient  History of stroke: On anticoagulation as above.      Loose stools: hold home fiber supplement. Now resolved.        Diet: Combination Diet Renal Diet (dialysis); Moderate Consistent Carb (60 g CHO per Meal) Diet; Low Saturated Fat Na <2400mg Diet    DVT Prophylaxis: Warfarin  Benz Catheter: Not present  Lines: None     Cardiac Monitoring: None  Code Status: Full Code      Clinically Significant Risk Factors        Disposition Plan     Expected Discharge Date: 03/27/2023        Discharge Comments: Palliative-goals of care.  Home vs TCU.   Securely message with [employer] (more info)  Text page via [employer] Paging/Directory   ______________________________________________________________________    Interval History   Patient seen and examined.  Comfortable awaiting his transitional care conference  Physical Exam   Vital Signs: Temp: 98  F (36.7  C) Temp src: Oral BP: (!) 154/71 Pulse: 55   Resp: 19 SpO2: 94 % O2 Device: None (Room air)    Weight: 158 lbs 4.64 oz    General appearance: not in acute distress  HEENT: PERRL, EOMI  Lungs: Clear breath sounds in bilateral lung fields  Cardiovascular: Regular rate and rhythm, normal S1-S2  Abdomen: Soft, non tender, no distension  Musculoskeletal: Left shoulder mildly edematous, tender with range of motion, no hyperemia or erythema  Skin: Left arm swelling around the elbow area, no erythema. Superficial skin wound on the right shin, no discharge.   Neurology: AAO ×3.  Cranial nerves II - XII normal.  Normal muscle strength in all four extremities.    Medical Decision Making       35 MINUTES SPENT BY ME on the date of service doing chart review, history, exam, documentation & further activities per the note.      Data     I have personally reviewed the following data  over the past 24 hrs:    N/A  \   N/A   / N/A     131 (L) 100 92.2 (H) /  141 (H)   4.7 18 (L) 6.13 (H) \       ALT: N/A AST: N/A AP: N/A TBILI: N/A   ALB: 2.7 (L) TOT PROTEIN: N/A LIPASE: N/A       INR:  3.00 (H) PTT:  N/A   D-dimer:  N/A Fibrinogen:  N/A       Imaging results reviewed over the past 24 hrs:   No results found for this or any previous visit (from the past 24 hour(s)).

## 2023-03-28 NOTE — PROGRESS NOTES
"SPIRITUAL HEALTH SERVICES Progress Note  [facility]     Saw pt Suman Nagy per admission request.      Patient/Family Understanding of Illness and Goals of Care - Pt was alone, resting in bed. He is aware that EOL is nearing, (\"I am 82.\") I am unclear as to what he knows or understands re: his health status.      Distress and Loss - He shared that there was a time that death scared him but recently it doesn't. He looks forward to being with relatives that have passed. There is some distress re: saying goodbye and trusting God in everything.      Strengths, Coping, and Resources - Family is his primary support. His [employer] ann is also important.      Meaning, Beliefs, and Spirituality  - [employer] understanding of eternal life after mortal death. He recalls things his mother said about ann and God that are instructive. Mystery, not having all the answers, is endorsed by pt.      Plan of Care - Spiritual Care is available as needed or requested.    Anay Lucero    Palliative Care Team    Office: [phone] (for non-urgent requests)  Please Vocera or page through [facility] for time-sensitive requests  "

## 2023-03-28 NOTE — PROGRESS NOTES
[facility]  Palliative Care Daily Progress Note       Recommendations & Counseling     Generalized weakness, multifactorial, and due to chronic problems and Left sided 2/2 previous CVA  - Recommend PT/OT, to determine his functional ability and whether he may be eligible for TCU  - up with assistance     ADVANCED CARE PLANNING  - Code status: FULL CODE  - Surrogate Decision Maker(s): wife, Rossi Nagy, and Sommer, DTR  - Patient had previously completed a HCD and family are unable to locate it at this time. Provided POLST and HCD  - Blank copy Honoring choices left in patient room for DTR, Sommer, to review and potentially complete with patient.     GOALS OF CARE DISCUSSION  - Goals are life prolonging at this time.  Considering options, but likely would not pursue dialysis.  Will follow up tomorrow.  Family seems supportive of forgoing dialysis and changing code status to DNR/DNI.    -Requesting hospice informational consult     PLAN:  - Continue current cares.  -Outpatient referral ordered. Schedulers will call to schedule outpatient palliative care clinic after patient discharges.       Thank you for the opportunity to participate in the care of this patient and family. Our team: will continue to follow.     During regular M-F work hours -- if you are not sure who specifically to contact -- please contact us by calling us directly at the Palliative Care Main Line [phone]    After regular work hours and on weekends/holidays, you can leave a message at [phone]    Text page sent to Dr. Guerrero, discussed with Dr. Silva      Assessments                 History gathered today from: patient, family/loved ones, medical chart  Suman Nagy is a 82 year old male admitted 3/23/2023 with increased weakness and inability to get OOB. Patient transfused for a Hgb of 5.9.   PMH CVA with residual left sided weakness and WC and bed bound at baseline last 2 years. Patient also with CKD  "and declining renal function. Nephrology following as an outpatient and potential for dialysis and were seeing him weekly in preparation for potential dialysis then he was admitted per daughter, Sommer cordero.        Today, the patient was seen for:  Goals of care    Prognosis, Goals, or Advance Care Planning was addressed today with: Yes.  Mood, coping, and/or meaning in the context of serious illness were addressed today: Yes.              Interval History:     Chart review/discussion with unit or clinical team members:   No immediate need for dialysis.      Per patient or family/caregivers today:  Met with patient, wife, children Sommer and Boogie, and Dr. Silva.   Reviewed options in detail including dialysis, continuing with outpatient management with renal, and comfort focused approach to care.  Discussed benefits vs burden of options. Also recommended DNR/DNI.      Key Palliative Symptoms:  # Pain severity the last 12 hours: none  # Dyspnea severity the last 12 hours: none  # Nausea severity the last 12 hours: none  # Anxiety severity the last 12 hours: none    Fatigue, feels weak.            Review of Systems:     Besides above, an additional 4 point system ROS was reviewed and is unremarkable          Medications:     I have reviewed this patient's medication profile and medications during this hospitalization.           Physical Exam:   Vital Signs: Blood pressure (!) 142/66, pulse 54, temperature 97.5  F (36.4  C), temperature source Oral, resp. rate 18, height 1.702 m (5' 7\"), weight 71.8 kg (158 lb 4.6 oz), SpO2 94 %.   General appearance: alert, fatigued and no distress  Head: Normocephalic  Lungs: non-labored  Extremities: LLE weakness  Pulses: 2+ and symmetric  Neurologic: alert. Oriented to person and place and time.              Data Reviewed:       Recent Labs   Lab 03/28/23  1151 03/28/23  0744 03/28/23  0425 03/27/23  0756 03/27/23  0636 03/27/23  0425 03/26/23  0758 03/26/23  0445 " 03/25/23  0743 03/25/23  0434 03/24/23  0825 03/24/23  0526 03/23/23  1750 03/23/23  1156 03/23/23  0531 03/23/23  0359   WBC  --   --   --   --   --   --   --   --   --  4.1  --  4.6  --   --   --  4.2   HGB  --   --   --   --   --   --   --   --   --  8.1*  --  8.4*  --  8.4*  --  5.9*   MCV  --   --   --   --   --   --   --   --   --  93  --  90  --   --   --  95   PLT  --   --   --   --   --   --   --   --   --  144*  --  128*  --   --   --  125*   INR  --   --  3.00*  --   --  2.71*  --  1.77*  --  1.24*   < >  --   --   --   --  1.41*   NA  --   --  131*  --  133*  --   --  133*  --  133*  --  135*  --   --   --  135*   POTASSIUM  --   --  4.7  --  4.9  --   --  4.5  --  4.7  --  4.8  --   --    < > 6.0*   CHLORIDE  --   --  100  --  102  --   --  103  --  104  --  104  --   --   --  107   CO2  --   --  18*  --  17*  --   --  17*  --  15*  --  18*  --   --   --  16*   BUN  --   --  92.2*  --  85.4*  --   --  84.7*  --  82.7*  --  80.3*  --   --   --  86.0*   CR  --   --  6.13*  --  6.02*  --   --  5.82*  --  5.90*  --  6.03*  --   --   --  6.12*   ANIONGAP  --   --  13  --  14  --   --  13  --  14  --  13  --   --   --  12   KARTHIKEYAN  --   --  7.5*  --  7.4*  --   --  8.0*  --  8.1*  --  8.3*  --   --   --  8.9   * 149* 141*   < > 132*  --    < > 123*   < > 140*   < > 135*   < >  --    < > 111*   ALBUMIN  --   --  2.7*  --  2.7*  --   --   --   --   --   --   --   --   --   --  3.5   PROTTOTAL  --   --   --   --   --   --   --   --   --   --   --   --   --   --   --  6.2*   BILITOTAL  --   --   --   --   --   --   --   --   --   --   --   --   --   --   --  0.3   ALKPHOS  --   --   --   --   --   --   --   --   --   --   --   --   --   --   --  77   ALT  --   --   --   --   --   --   --   --   --   --   --   --   --   --   --  24   AST  --   --   --   --   --   --   --   --   --   --   --   --   --   --   --  22    < > = values in this interval not displayed.      Lab Results   Component Value Date    WBC  4.1 03/25/2023    HGB 8.1 (L) 03/25/2023    HCT 25.6 (L) 03/25/2023     (L) 03/25/2023     (L) 03/28/2023    POTASSIUM 4.7 03/28/2023    CHLORIDE 100 03/28/2023    CO2 18 (L) 03/28/2023    BUN 92.2 (H) 03/28/2023    CR 6.13 (H) 03/28/2023     (H) 03/28/2023    AST 22 03/23/2023    ALT 24 03/23/2023    ALKPHOS 77 03/23/2023    BILITOTAL 0.3 03/23/2023    INR 3.00 (H) 03/28/2023      Lab Results   Component Value Date    ALBUMIN 2.7 03/28/2023    ALBUMIN 3.2 06/02/2021               ====================================================  TT: I have personally spent a total of 90 minutes on the day of the encounter on the unit in review of medical record, consultation with the medical providers and assessment of patient today, with time spent in counseling, coordination of care, and discussion with patient and family re: diagnostic results, prognosis, symptom management, risks and benefits of management options, and development of plan of care as noted above.      ====================================================    SURYA Limon, GCNS-BC  Clinical Nurse Specialist  [facility]  [phone]

## 2023-03-28 NOTE — PROGRESS NOTES
[facility]  [facility]   Follow up    Suman Nagy   MRN: [id number]  : 1940   DOA: 3/23/2023   CC: advanced CKD      Assessment and Plan:  82 year old male     CKD stage 5 - had gap in care so has not been following in clinic to get typical eduation on dialysis options; now advanced CKD--discussions ongoing with family and pr re; ?diaysis vs comfort; at this point.  He does not need dialysis acutely so he could be managed as an OP once deemed medically stable by other services; will continue to have ongoing discussions with pt and family; planning family meeting on Tuesday  Recs:  - no change in cares currently  - strongly encouraged limiting any plan for future dialysis which I generally think is going to be Luke's plan eventually but he wants to consider again tonight  - encouraged DNR and he's again considering  - hospice would be quite reasonable at this point in time, he's not imminently going to pass but I suspect it will not be that long    Anemia; underproduction from CKD; started on epo and now transfused; received elemental iron with prbcs so should be relatively iron replete; hgb stable in 8s    Hyperkalemia; medically treated and improved; holding KCL supplement    HTN; on chronic meds (norvasc, clonidine, hydralzine, metoprolol); resumed; Follow need to adjust but not looking for super tight control in this setting (78 yo and hospitalzed).  With alpha blocker -150s    Chronic A/C; on eliquis for prior CVA ad A fib    DM; on insulin and tradjenta    Acidosis; on bicarb 1300mg BID    Hyperlipid; on statin    Leg abrasions: local cares; appreciate wound consult    Troponin elevation; cards consulting and workup with ECHO and GXT ensues; not clear that pt would benefit or be a candidate for any intervention      Subjective  Family conference today.  Spouse, daughter and son in attendance.  Discussed his baseline CKD and  worsening with worsening uremic symptoms.  He's not very mobile at baseline and chronically ill and we discussed that dialysis would be unlikely to add much quality to his life.  I strongly recommend we not pursue dialysis and I got the feeling his family was on board with this although Luke deferred and wants to consider.  I also strongly encouraged him to be DNR and again the family seems to be on board with this but Luke wanted more time to consider.  A bit question is how much care he wants after the hospital, ie coming to clinic for further RYAN vs more of a hospice focused care.  Again, his family seems like they would support the latter if Luke vocalized this.  Appreciate the help from Palliative during this conversation as well.      Objective    Vital signs in last 24 hours  Temp:  [97.4  F (36.3  C)-98  F (36.7  C)] 97.4  F (36.3  C)  Pulse:  [54-60] 56  Resp:  [18-19] 18  BP: (129-154)/(60-71) 144/63  SpO2:  [94 %-96 %] 96 %      Intake/Output last 3 shifts  I/O last 3 completed shifts:  In: 720 [P.O.:720]  Out: 1200 [Urine:1200]  Intake/Output this shift:  No intake/output data recorded.    Physical Exam  Alert/oriented x 3, awake, NAD  CV: RRR without murmur or rub  Lung: clear and equal; no extra sounds  Ab: soft and NT; not distended; normal bs  Ext: left arm edematous and lower extremities without much edema  Skin; no rash    Pertinent Labs     Last Renal Panel:  Sodium   Date Value Ref Range Status   03/28/2023 131 (L) 136 - 145 mmol/L Final     Potassium   Date Value Ref Range Status   03/28/2023 4.7 3.4 - 5.3 mmol/L Final   06/02/2021 4.0 3.5 - 5.0 mmol/L Final     Chloride   Date Value Ref Range Status   03/28/2023 100 98 - 107 mmol/L Final   06/02/2021 103 98 - 107 mmol/L Final     Carbon Dioxide (CO2)   Date Value Ref Range Status   03/28/2023 18 (L) 22 - 29 mmol/L Final   06/02/2021 22 22 - 31 mmol/L Final     Anion Gap   Date Value Ref Range Status   03/28/2023 13 7 - 15 mmol/L Final    06/02/2021 10 5 - 18 mmol/L Final     Glucose   Date Value Ref Range Status   06/02/2021 287 (H) 70 - 125 mg/dL Final     GLUCOSE BY METER POCT   Date Value Ref Range Status   03/28/2023 203 (H) 70 - 99 mg/dL Final     Urea Nitrogen   Date Value Ref Range Status   03/28/2023 92.2 (H) 8.0 - 23.0 mg/dL Final   06/02/2021 50 (H) 8 - 28 mg/dL Final     Creatinine   Date Value Ref Range Status   03/28/2023 6.13 (H) 0.67 - 1.17 mg/dL Final     GFR Estimate   Date Value Ref Range Status   03/28/2023 8 (L) >60 mL/min/1.73m2 Final     Comment:     eGFR calculated using 2021 CKD-EPI equation.   02/15/2022 20.6 (L) >60.0 ml/min/1.73m^2 Final     Calcium   Date Value Ref Range Status   03/28/2023 7.5 (L) 8.8 - 10.2 mg/dL Final     Phosphorus   Date Value Ref Range Status   03/28/2023 5.1 (H) 2.5 - 4.5 mg/dL Final     Albumin   Date Value Ref Range Status   03/28/2023 2.7 (L) 3.5 - 5.2 g/dL Final   06/02/2021 3.2 (L) 3.5 - 5.0 g/dL Final     Recent Labs   Lab 03/25/23  0434 03/24/23  0526 03/23/23  1156 03/23/23  0359   HGB 8.1* 8.4* 8.4* 5.9*          I reviewed all lab results    Telly Silva  [facility]  [phone]

## 2023-03-29 NOTE — PROGRESS NOTES
SW placed call to patients daughter, Trish (left VM) and spouse, Rossi (unable to leave VM). SW will visit patient at bedside tomorrow for further follow up on informational hospice meeting.    Deena Estrada [facility]  [phone]

## 2023-03-29 NOTE — PROGRESS NOTES
[facility]    Medicine Progress Note - Hospitalist Service    Date of Admission:  3/23/2023    Assessment & Plan      Suman Nagy is a 82 year old male with history of A-fib and prior stroke, DM2, CKD4, chronic CHF and history of bladder and rectal cancer who is admitted on 3/23/2023 with symptomatic anemia and progressive renal disease.  Also diffuse weakness without reports of hematuria hematemesis or bloody stools likely related to stage V kidney disease received 2 units of packed red cells  He is not sure how much she wants to do but he may well not want to proceed with aggressive cares, appreciate ongoing evaluations from the palliative care provider please see those notes     Symptomatic anemia  Presumed chronic disease renal failure            -Transfused            -Trending hemoglobin     Previous CKD stage IV  Progressed to stage V CKD            -Previous creatinine 2.88 now 6.12            -Had shifting done for hyperkalemia            -Nephrology on board no dialysis yet            -Palliative care involved for goals of care            -Uncertain if he would actually want hemodialysis    Elevated troponin            -Question artifact with CKD            -No symptoms EKG negative            -Nuclear stress 3/24 was nonischemic            -Cardiology consult input appreciated     Paroxysmal A-fib:             - Now in NSR.             -Previous Eliquis changed to warfarin            -Continue rate control with metoprolol    Essential hypertension            -Previously on Norvasc hydralazine metoprolol            -Clonidine 0.2 3 times daily was added            -Has as needed hydralazine            -Still elevated increase metoprolol to 100 daily    Diabetes, type II:             - PTA Lantus 16 units qam, trajenda 5mg daily.             - Blood sugar reviewed today and it is controlled.             - Continue Novolog sliding scale.             -No changes    Left upper  extremity swelling:             - US negative for DVT on 3/25,             -Did show superficial venous thrombosis             -Extensive subcu edema             -Symptomatic treatment does not need anticoagulation     Left shoulder edema.            -Ultrasound 3/25 moderate to large complex fluid collection            -Presumed joint effusion            -Orthopedic consult here or as outpatient  History of stroke: On anticoagulation as above.      Loose stools: hold home fiber supplement. Now resolved.        Diet: Combination Diet Renal Diet (dialysis); Moderate Consistent Carb (60 g CHO per Meal) Diet; Low Saturated Fat Na <2400mg Diet    DVT Prophylaxis: Warfarin  Benz Catheter: Not present  Lines: None     Cardiac Monitoring: None  Code Status: Full Code      Clinically Significant Risk Factors        Disposition Plan     Expected Discharge Date: 03/27/2023        Discharge Comments: Palliative-goals of care.  Home vs TCU.   Securely message with [employer] (more info)  Text page via [employer] Paging/Directory   ______________________________________________________________________    Interval History   Patient seen and examined.  Comfortable awaiting his transitional care conference  Physical Exam   Vital Signs: Temp: 98.1  F (36.7  C) Temp src: Oral BP: 125/56 Pulse: 52   Resp: 17 SpO2: 97 % O2 Device: None (Room air)    Weight: 157 lbs 6.54 oz    General appearance: not in acute distress  HEENT: PERRL, EOMI  Lungs: Clear breath sounds in bilateral lung fields  Cardiovascular: Regular rate and rhythm, normal S1-S2  Abdomen: Soft, non tender, no distension  Musculoskeletal: Left shoulder mildly edematous, tender with range of motion, no hyperemia or erythema  Skin: Left arm swelling around the elbow area, no erythema. Superficial skin wound on the right shin, no discharge.   Neurology: AAO ×3.  Cranial nerves II - XII normal.  Normal muscle strength in all four extremities.    Medical Decision Making       35  MINUTES SPENT BY ME on the date of service doing chart review, history, exam, documentation & further activities per the note.      Data     I have personally reviewed the following data over the past 24 hrs:    N/A  \   7.5 (L)   / N/A     129 (L) 99 98.2 (H) /  191 (H)   4.7 17 (L) 6.11 (H) \       INR:  2.91 (H) PTT:  N/A   D-dimer:  N/A Fibrinogen:  N/A       Imaging results reviewed over the past 24 hrs:   No results found for this or any previous visit (from the past 24 hour(s)).

## 2023-03-29 NOTE — PROGRESS NOTES
[facility]  [facility]   Follow up    Suman Nagy   MRN: [id number]  : 1940   DOA: 3/23/2023   CC: advanced CKD      Assessment and Plan:  82 year old male     CKD stage 5 - had gap in care so has not been following in clinic to get typical eduation on dialysis options; now advanced CKD--discussions ongoing with family and pr re; ?diaysis vs comfort; at this point.  He does not need dialysis acutely but would do poorly with it.  Met with family on 3/28 and the general impression is pursuing less aggressive cares like dialysis but uncertain if he's planning hospice at this point.  He continues to speak with his family.  Palliative following  Recs:  - no change in cares currently  - strongly encouraged limiting any plan for future dialysis which I generally think is going to be Luke's plan eventually but still considering hospice  - encouraged DNR and he's again considering  - hospice would be quite reasonable at this point in time, he's not imminently going to pass but I suspect it will not be that long    Anemia; underproduction from CKD; started on epo and now transfused; received elemental iron with prbcs so should be relatively iron replete; hgb stable in 8s    Hyperkalemia; medically treated and improved    Hyponatremia; sodium drifting a bit, not symptomatic, would continue to follow for now    HTN; on chronic meds (norvasc, clonidine, hydralzine, metoprolol); resumed; Follow need to adjust but not looking for super tight control in this setting (78 yo and hospitalzed).  With alpha blocker -150s    Chronic A/C; on eliquis for prior CVA ad A fib    DM; on insulin and tradjenta    Acidosis; on bicarb 1300mg BID    Hyperlipid; on statin    Leg abrasions: local cares; appreciate wound consult    Troponin elevation; cards saw and workup with ECHO and GXT stable.  Poor candidate for intervention        Subjective  Seen in room, no  family, palliative here.  Generally leaning towards less aggressive cares but no decision on hospice at this point in time.  He worries about the cares he would need at home and his family's ability to provide for this.  Discussed fairly stable renal function for now.    Objective    Vital signs in last 24 hours  Temp:  [97.4  F (36.3  C)-98.1  F (36.7  C)] 98.1  F (36.7  C)  Pulse:  [50-58] 58  Resp:  [17-18] 17  BP: (129-173)/(56-77) 133/56  SpO2:  [94 %-97 %] 97 %      Intake/Output last 3 shifts  I/O last 3 completed shifts:  In: 480 [P.O.:480]  Out: 700 [Urine:700]  Intake/Output this shift:  I/O this shift:  In: 240 [P.O.:240]  Out: -     Physical Exam  Alert/oriented x 3, awake, NAD  CV: RRR without murmur or rub  Lung: clear and equal; no extra sounds  Ab: soft and NT; not distended; normal bs  Ext: left arm edematous and lower extremities without much edema  Skin; no rash    Pertinent Labs     Last Renal Panel:  Sodium   Date Value Ref Range Status   03/29/2023 129 (L) 136 - 145 mmol/L Final     Potassium   Date Value Ref Range Status   03/29/2023 4.7 3.4 - 5.3 mmol/L Final   06/02/2021 4.0 3.5 - 5.0 mmol/L Final     Chloride   Date Value Ref Range Status   03/29/2023 99 98 - 107 mmol/L Final   06/02/2021 103 98 - 107 mmol/L Final     Carbon Dioxide (CO2)   Date Value Ref Range Status   03/29/2023 17 (L) 22 - 29 mmol/L Final   06/02/2021 22 22 - 31 mmol/L Final     Anion Gap   Date Value Ref Range Status   03/29/2023 13 7 - 15 mmol/L Final   06/02/2021 10 5 - 18 mmol/L Final     Glucose   Date Value Ref Range Status   03/29/2023 137 (H) 70 - 99 mg/dL Final   06/02/2021 287 (H) 70 - 125 mg/dL Final     GLUCOSE BY METER POCT   Date Value Ref Range Status   03/29/2023 145 (H) 70 - 99 mg/dL Final     Urea Nitrogen   Date Value Ref Range Status   03/29/2023 98.2 (H) 8.0 - 23.0 mg/dL Final   06/02/2021 50 (H) 8 - 28 mg/dL Final     Creatinine   Date Value Ref Range Status   03/29/2023 6.11 (H) 0.67 - 1.17 mg/dL  Final     GFR Estimate   Date Value Ref Range Status   03/29/2023 9 (L) >60 mL/min/1.73m2 Final     Comment:     eGFR calculated using 2021 CKD-EPI equation.   02/15/2022 20.6 (L) >60.0 ml/min/1.73m^2 Final     Calcium   Date Value Ref Range Status   03/29/2023 7.5 (L) 8.8 - 10.2 mg/dL Final     Phosphorus   Date Value Ref Range Status   03/28/2023 5.1 (H) 2.5 - 4.5 mg/dL Final     Albumin   Date Value Ref Range Status   03/28/2023 2.7 (L) 3.5 - 5.2 g/dL Final   06/02/2021 3.2 (L) 3.5 - 5.0 g/dL Final     Recent Labs   Lab 03/29/23  0545 03/28/23  2239 03/25/23  0434 03/24/23  0526 03/23/23  1156   HGB 7.5* 7.4* 8.1* 8.4* 8.4*          I reviewed all lab results    Telly Silva  [facility]  [phone]

## 2023-03-29 NOTE — CONSULTS
SW spoke with care team; referral made to [facility] hospice to follow up with pt and family to discuss community hospice options. Hospice social work (Deena) to follow up. SW following for discharge planning.  3:22 PM    APOLLO Briggs  3/29/2023

## 2023-03-29 NOTE — PLAN OF CARE
Problem: Hypertension Comorbidity  Goal: Blood Pressure in Desired Range  Outcome: Progressing  Intervention: Maintain Blood Pressure Management  Recent Flowsheet Documentation  Taken 3/29/2023 0058 by Chantale Mejia RN  Medication Review/Management: medications reviewed     Problem: Plan of Care - These are the overarching goals to be used throughout the patient stay.    Goal: Absence of Hospital-Acquired Illness or Injury  Intervention: Prevent Skin Injury  Recent Flowsheet Documentation  Taken 3/29/2023 0058 by Chantale Mejia RN  Body Position:    turned    right    heels elevated       Goal Outcome Evaluation: Patient is aox3. Patient is disoriented to time. Denied pain or any discomfort. VSS. /67 earlier during shift. Recheck BP an hour later was 134/62. BP stable overnight without prn meds. Continue to monitor. Patient sating adequately on room air. Patient's buttocks reddened. Skin intact. Mepilex applied. Patient repositioned with assist x2.

## 2023-03-29 NOTE — PLAN OF CARE
Problem: Hypertension Comorbidity  Goal: Blood Pressure in Desired Range  Outcome: Progressing  Intervention: Maintain Blood Pressure Management  Recent Flowsheet Documentation  Taken 3/28/2023 2010 by Ras Watt RN  Medication Review/Management: medications reviewed   Goal Outcome Evaluation:       Pt alert, oriented x3. C/o of generalized pain/ pain on right hip. PRN Tylenol given per MAR. On room air, vital signs stable, except /72. Schedule BP meds given per MAR. Bedtime blood sugar 214. Hemoglobin drawn at 2240, awaiting result. All due meds given. Pt is able to make needs known. Call light is within reach.

## 2023-03-29 NOTE — PROGRESS NOTES
"Palliative Care Initial Social Work Note    Patient Info:  Suman Nagy is a 82 year old male with history of A-fib and prior stroke, DM2, CKD4, chronic CHF and history of bladder and rectal cancer who is admitted on 3/23/2023 with symptomatic anemia and progressive renal disease.  Also diffuse weakness without reports of hematuria hematemesis or bloody stools likely related to stage V kidney disease received 2 units of packed red cells    Brief summary of visit: PCSW visit with Pt today for check in and support. Introduced self and role of Palliative SW. Talked with Pt about how he is feeling today and about the meeting he had yesterday with medical providers. Pt stated \"They wanted me to make a decision on things, but I am not ready.\"   Talked with him about what he is deciding about. He said dialysis or hospice. In conversation, he is aware that he is to well and that no matter what he decides his time is short. He said it is difficult to make a decision about dying. He said he is not afraid of dying itself, but is unhappy with not being able to get things done while he is still alive.   Talked with Pt about how he typically makes decisions and he said he is never quick to do so, and does worry about making the wrong choices. He shared some experiences of this.   Talked with Pt about what is most important to him, knowing that time is short. He was not exactly sure, but time with his wife and family is important. He talked about some life regrets and wishes to be able to do things for himself. Named that anticipatory grief for him and provided emotional support.   Offered listening, re-framing and life review.       Date of Admission: 3/23/2023    Reason for consult: Decisional support  Patient and family support    Sources of information: Patient    Recommendations & Plan:  PCSW continues to follow       Symptoms & Concerns Addressed Today:  Emotional coping    Strengths Identified:    Pt open to " discussion.   Good family support.       Clinical Social Work Interventions:   Introduction of Palliative clinical social work interventions  Facilitation of processing of thoughts/feelings  Life review facilitation  Re-framing      KAI Gutierres, [facility]  Palliative Care Team  Team Pager: [phone]

## 2023-03-30 NOTE — PROGRESS NOTES
[facility]  [facility]   Follow up    Suman Nagy   MRN: [id number]  : 1940   DOA: 3/23/2023   CC: advanced CKD      Assessment and Plan:  82 year old male     CKD stage 5 - had gap in care so has not been following in clinic to get typical eduation on dialysis options; now advanced CKD--discussions ongoing with family and pr re; ?diaysis vs comfort; at this point.  He does not need dialysis acutely but would do poorly with it.  Met with family on 3/28 and the general impression is pursuing less aggressive cares like dialysis but uncertain if he's planning hospice at this point.  He continues to speak with his family.  Palliative following  Recs:  - no change in cares currently  - strongly encouraged limiting any plan for future dialysis which I generally think is going to be Luke's plan eventually but still considering hospice  - encouraged DNR and he's again considering  - I think family is ultimately going to have to help him make this decision  - I think hospice would be the best discharge plan for him    Anemia; underproduction from CKD; started on epo and now transfused; received elemental iron with prbcs so should be relatively iron replete; hgb stable in 8s    Hyperkalemia; medically treated and improved    Hyponatremia; sodium drifting a bit, not symptomatic, would continue to follow for now    HTN; on chronic meds (norvasc, clonidine, hydralzine, metoprolol); resumed; Follow need to adjust but not looking for super tight control in this setting (78 yo and hospitalzed).  With alpha blocker -150s    Chronic A/C; on eliquis for prior CVA ad A fib    DM; on insulin and tradjenta    Acidosis; on bicarb 1300mg BID    Hyperlipid; on statin    Leg abrasions: local cares; appreciate wound consult    Troponin elevation; cards saw and workup with ECHO and GXT stable.  Poor candidate for intervention        Subjective  Seen in room,  no family, palliative here.  Luke fundamentally indecisive about what to do next.  Weak.  Appetite stable.  Breathing stable.  Strongly encouraged DNR and no dialysis and fundamentally I wouldn't offer him dialysis even if he was interested but family wouldn't want him to pursue it and I don't think Luke is seriously considering it.  Palliative is going to reach out to his family about trying to make further decisions regarding goals of care.    Discussed with Dr. Alvarenga and Bernie from Palliative today    Objective    Vital signs in last 24 hours  Temp:  [97.5  F (36.4  C)-97.9  F (36.6  C)] 97.9  F (36.6  C)  Pulse:  [53-62] 56  Resp:  [18-20] 18  BP: (129-163)/(58-71) 131/63  SpO2:  [92 %-95 %] 94 %      Intake/Output last 3 shifts  I/O last 3 completed shifts:  In: 720 [P.O.:720]  Out: 550 [Urine:550]  Intake/Output this shift:  I/O this shift:  In: 480 [P.O.:480]  Out: -     Physical Exam  Alert/oriented x 3, awake, NAD  CV: RRR without murmur or rub  Lung: clear and equal; no extra sounds  Ab: soft and NT; not distended; normal bs  Ext: left arm edematous and lower extremities without much edema  Skin; no rash    Pertinent Labs     Last Renal Panel:  Sodium   Date Value Ref Range Status   03/30/2023 130 (L) 136 - 145 mmol/L Final     Potassium   Date Value Ref Range Status   03/30/2023 5.0 3.4 - 5.3 mmol/L Final   06/02/2021 4.0 3.5 - 5.0 mmol/L Final     Chloride   Date Value Ref Range Status   03/30/2023 97 (L) 98 - 107 mmol/L Final   06/02/2021 103 98 - 107 mmol/L Final     Carbon Dioxide (CO2)   Date Value Ref Range Status   03/30/2023 18 (L) 22 - 29 mmol/L Final   06/02/2021 22 22 - 31 mmol/L Final     Anion Gap   Date Value Ref Range Status   03/30/2023 15 7 - 15 mmol/L Final   06/02/2021 10 5 - 18 mmol/L Final     Glucose   Date Value Ref Range Status   06/02/2021 287 (H) 70 - 125 mg/dL Final     GLUCOSE BY METER POCT   Date Value Ref Range Status   03/30/2023 252 (H) 70 - 99 mg/dL Final      Urea Nitrogen   Date Value Ref Range Status   03/30/2023 102.2 (H) 8.0 - 23.0 mg/dL Final   06/02/2021 50 (H) 8 - 28 mg/dL Final     Creatinine   Date Value Ref Range Status   03/30/2023 6.31 (H) 0.67 - 1.17 mg/dL Final     GFR Estimate   Date Value Ref Range Status   03/30/2023 8 (L) >60 mL/min/1.73m2 Final     Comment:     eGFR calculated using 2021 CKD-EPI equation.   02/15/2022 20.6 (L) >60.0 ml/min/1.73m^2 Final     Calcium   Date Value Ref Range Status   03/30/2023 7.1 (L) 8.8 - 10.2 mg/dL Final     Phosphorus   Date Value Ref Range Status   03/30/2023 6.3 (H) 2.5 - 4.5 mg/dL Final     Albumin   Date Value Ref Range Status   03/30/2023 2.7 (L) 3.5 - 5.2 g/dL Final   06/02/2021 3.2 (L) 3.5 - 5.0 g/dL Final     Recent Labs   Lab 03/30/23  0634 03/29/23  2137 03/29/23  1432 03/29/23  0545 03/28/23  2239   HGB 7.5* 7.9* 7.7* 7.5* 7.4*          I reviewed all lab results    Telly Silva  [facility]  [phone]

## 2023-03-30 NOTE — PLAN OF CARE
Problem: Plan of Care - These are the overarching goals to be used throughout the patient stay.    Goal: Plan of Care Review  Description: The Plan of Care Review/Shift note should be completed every shift.  The Outcome Evaluation is a brief statement about your assessment that the patient is improving, declining, or no change.  This information will be displayed automatically on your shift note.  Outcome: Progressing     Problem: Plan of Care - These are the overarching goals to be used throughout the patient stay.    Goal: Absence of Hospital-Acquired Illness or Injury  Intervention: Prevent Skin Injury  Recent Flowsheet Documentation  Taken 3/30/2023 0430 by Colton Dalton RN  Body Position: turned  Taken 3/29/2023 2100 by Colton Dalton RN  Body Position: turned     Problem: Plan of Care - These are the overarching goals to be used throughout the patient stay.    Goal: Optimal Comfort and Wellbeing  Outcome: Progressing  Intervention: Monitor Pain and Promote Comfort  Recent Flowsheet Documentation  Taken 3/29/2023 2126 by Colton Dalton, RN  Pain Management Interventions: medication (see MAR)     Goal Outcome Evaluation:    Pt alert but can be forgetful. Reports right foot pain, tylenol given with relief. Pt sleeping in between cares.  Bed alarm is on and call light within reach.

## 2023-03-30 NOTE — PROGRESS NOTES
"SANAM spoke in depth to patients daughter, Trish via phone. Trish was unable to come to hospital today due to mothers condition. Writer reviewed 3 options/settings for hospice - home with hospice (not 24/7 service), hospice facility, and SNF with hospice. Trish stated patient will need to discharge to either a hospice facility or SNF as she is unable to care for him and her mother at home. Writer educated Trish on medicare benefits of hospice care, but that a hospice facility and SNF are private pay for \"room and board.\" SANAM briefly explained MA, and asked if she would like SANAM to place financial counselor referral. Due to prognosis, [facility] may be the best option for family. (SANAM will continue to assess). Trish asked if her and/or her mom could make this decision for patient. Writer explained that patient is his own decision maker and at this time, we have to have informed consent to move forward. Trish expressed frustration with not knowing \"the plan.\" Writer offered to arrange a care conference, Trish plans to speak with father again to try to get a decision. SANAM asked if patient has anything holding him back from hospice; per Trish patient wants to cut down a tree, buy a new used truck, and be a store . Writer will place additional hospice resources in patient room for family to read.    Deena Estrada, [facility]  [phone]  "

## 2023-03-30 NOTE — PROGRESS NOTES
SW and CNL went to patients bedside, patient eating, no visitors present. SW placed call to patients daughter, Trish and asked what a good time would be to connect to discuss hospice. Trish stated she works till 2:15 and will call SW back after once she's off work. SANAM will continue to follow.    Deena Estrada, [facility]  [phone]

## 2023-03-30 NOTE — PLAN OF CARE
Patient is A/O, has intermittent pain at his feet from neuropathy.  Patient rated his pain 3/10 which was managed by Tylenol PRN in the 0900 hour.  Patient is chair bound.  Patient has end stage real disease, and is not wishing to proceed with dialysis.  At this time patient is considering palliative and hospice care.  Patient is undetermined on the changes of his medical plan, and it is anticipated his family to be involved in the decision.     Problem: Plan of Care - These are the overarching goals to be used throughout the patient stay.    Goal: Readiness for Transition of Care  Outcome: Not Progressing  Flowsheets (Taken 3/30/2023 1432)  Anticipated Changes Related to Illness: (patient needs time, and help of family to decide on hospice) other (see comments)  Intervention: Mutually Develop Transition Plan  Recent Flowsheet Documentation  Taken 3/30/2023 1432 by Lazaro Puente RN  Anticipated Changes Related to Illness: (patient needs time, and help of family to decide on hospice) other (see comments)   Eldon Puente RN

## 2023-03-30 NOTE — PROGRESS NOTES
[facility]  Palliative Care Daily Progress Note       Recommendations & Counseling     Generalized weakness, multifactorial, and due to chronic problems and Left sided 2/2 previous CVA  - Recommend PT/OT, to determine his functional ability and whether he may be eligible for TCU  - up with assistance     ADVANCED CARE PLANNING  - Code status: FULL CODE  - Surrogate Decision Maker(s): wife, Rossi Nagy, and Sommer, DTR  - Patient had previously completed a HCD and family are unable to locate it at this time. Provided POLST and HCD  - Blank copy Honoring choices left in patient room for DTR, Sommer, to review and potentially complete with patient.     GOALS OF CARE DISCUSSION  - Goals are life prolonging at this time.  Considering options, but likely would not pursue dialysis.    Family seems supportive of forgoing dialysis and changing code status to DNR/DNI.  Patient having much difficulty making these hard decisions, and will likely need strong support from family.  Left message for daughter to call back.    -Hospice meeting this afternoon.      PLAN:  - Continue current cares.  -Outpatient referral ordered. Schedulers will call to schedule outpatient palliative care clinic after patient discharges.       Thank you for the opportunity to participate in the care of this patient and family. Our team: will continue to follow.     During regular M-F work hours -- if you are not sure who specifically to contact -- please contact us by calling us directly at the Palliative Care Main Line [phone]    After regular work hours and on weekends/holidays, you can leave a message at [phone]    Discussed with: Dr. Silva and Dr. Alvarenga      Assessments          History gathered today from: patient, family/loved ones, medical chart  Suman Nagy is a 82 year old male admitted 3/23/2023 with increased weakness and inability to get OOB. Patient transfused for a Hgb of 5.9.   PMH CVA  with residual left sided weakness and WC and bed bound at baseline last 2 years. Patient also with CKD and declining renal function. Nephrology following as an outpatient and potential for dialysis and were seeing him weekly in preparation for potential dialysis then he was admitted per daughter, Sommer cordero.          Today, the patient was seen for:  Goals of care    Prognosis, Goals, or Advance Care Planning was addressed today with: Yes.  Mood, coping, and/or meaning in the context of serious illness were addressed today: Yes.              Interval History:     Chart review/discussion with unit or clinical team members:   No significant overnight.    PT/OT has not been ordered.   Hospice consult is pending.    Per patient or family/caregivers today:  Met in room with patient, Dr. Silva and bedside RN.  Dr. Silva advised against dialysis.  Patient still undecided on his goals re: whether to focus on comfort measures only versus continuing with outpatient management with renal after discharge.  Also undecided on code status.  States he has difficulty making decisions.  Encouraged him to consider how he would like to spend his remaining time.  He would like to discuss with his family.  Meeting with hospice later today.      Left message for daughter Sommer to call back.      Key Palliative Symptoms:  # Pain severity the last 12 hours: low  # Dyspnea severity the last 12 hours: none  # Nausea severity the last 12 hours: none  # Anxiety severity the last 12 hours: none     Intermittent neuropathic pain in bilateral feet.  Weak.  Had BM today.            Review of Systems:     Besides above, an additional 4 point system ROS was reviewed and is unremarkable          Medications:     I have reviewed this patient's medication profile and medications during this hospitalization.             Physical Exam:   Vital Signs: Blood pressure 131/63, pulse 56, temperature 97.9  F (36.6  C), temperature source Oral, resp.  "rate 18, height 1.702 m (5' 7\"), weight 71.2 kg (156 lb 15.5 oz), SpO2 94 %.   General appearance: alert, fatigued and no distress  Head: Normocephalic  Lungs: non-labored  Extremities: LLE weakness  Pulses: 2+ and symmetric  Neurologic: alert. Oriented to person and place and time.              Data Reviewed:       Results for orders placed or performed during the hospital encounter of 03/23/23   US Upper Extremity Venous Duplex Left    Impression    IMPRESSION:   1.  No deep venous thrombosis in the left upper extremity.  2.  Acute occlusive superficial venous thrombosis involving the left cephalic vein in the mid forearm.  3.  Extensive left arm subcutaneous edema.  4.  Moderate to large complex fluid collection along the left anterior shoulder, perhaps a complex joint effusion.   NM Lexiscan stress test (nuc card)   Result Value Ref Range    Pharmacologic Protocol Lexiscan     Test Type Pharmacological     Baseline HR 59     Baseline Systolic      Baseline Diastolic BP 84     Last Stress HR 75     Last Stress Systolic      Last Stress Diastolic BP 78     Target      PERCENT HR 85%     ST Deviation Elevation III 0.1mm     Deviation Time V4 -0.2mm     ST Elevation Amount V3 0.4mm     ST Deviation Amount he III -0.2mm     Final Resting /79     Final Resting HR 65     Max Treadmill Speed 0.0     Max Treadmill Grade 0.0     Peak Systolic /84     Peak Diastolic /84     Max HR  81     Stress Phase Resting     Stress Resting Pt Position SUPINE     Current HR 60     Current /84     Stress Phase Resting     Stress Resting Pt Position MANUAL EVENT     Current HR 75     Current /78     Stress Phase Stress     Stage Minute EXE 00:00     Exercise Stage STAGE 2     Current HR 58     Current /84     Stress Phase Stress     Stage Minute EXE 01:00     Exercise Stage STAGE 3     Current HR 57     Current /84     Stress Phase Stress     Stage Minute EXE 02:00     Exercise " Stage STAGE 4     Current HR 78     Current /84     Stress Phase Stress     Stage Minute EXE 02:11     Exercise Stage STAGE 4     Current HR 81     Current /84     Stress Phase Stress     Stage Minute EXE 03:00     Exercise Stage STAGE 5     Current HR 78     Current /84     Stress Phase Stress     Stage Minute EXE 03:21     Exercise Stage STAGE 5     Current HR 78     Current /78     Stress Phase Stress     Stage Minute EXE 04:00     Exercise Stage STAGE 6     Current HR 75     Current /78     Stress Phase Stress     Stage Minute EXE 04:00     Exercise Stage STAGE 6     Current HR 75     Current /78     Stress Phase Recovery     Stage Minute REC 00:13     Exercise Stage Recovery     Current HR 75     Current /78     Stress Phase Recovery     Stage Minute REC 00:59     Exercise Stage Recovery     Current HR 69     Current /78     Stress Phase Recovery     Stage Minute REC 01:09     Exercise Stage Recovery     Current HR 69     Current /70     Stress Phase Recovery     Stage Minute REC 01:59     Exercise Stage Recovery     Current HR 66     Current /70     Stress Phase Recovery     Stage Minute REC 02:55     Exercise Stage Recovery     Current HR 66     Current /79     Stress Phase Recovery     Stage Minute REC 02:59     Exercise Stage Recovery     Current HR 64     Current /79     Stress Phase Recovery     Stage Minute REC 03:59     Exercise Stage Recovery     Current HR 64     Current /79     Stress Phase Recovery     Stage Minute REC 04:01     Exercise Stage Recovery     Current HR 65     Current /79     Max Predicted HR  59 %    Rate Pressure Product 13,770.0     Left Ventricular EF 70 %       Recent Labs   Lab 03/30/23  1701 03/30/23  1446 03/30/23  1205 03/30/23  0811 03/30/23  0634 03/30/23  0633 03/29/23  2137 03/29/23  0737 03/29/23  0545 03/28/23  0744 03/28/23  0425 03/25/23  0743 03/25/23  0434 03/24/23  0825  03/24/23  0526   WBC  --   --   --   --   --   --   --   --   --   --   --   --  4.1  --  4.6   HGB  --  7.3*  --   --  7.5*  --  7.9*   < > 7.5*   < >  --   --  8.1*  --  8.4*   MCV  --   --   --   --   --   --   --   --   --   --   --   --  93  --  90   PLT  --   --   --   --   --   --   --   --   --   --   --   --  144*  --  128*   INR  --   --   --   --  3.24*  --   --   --  2.91*  --  3.00*   < > 1.24*   < >  --    NA  --   --   --   --   --  130*  --   --  129*  --  131*   < > 133*  --  135*   POTASSIUM  --   --   --   --   --  5.0  --   --  4.7  --  4.7   < > 4.7  --  4.8   CHLORIDE  --   --   --   --   --  97*  --   --  99  --  100   < > 104  --  104   CO2  --   --   --   --   --  18*  --   --  17*  --  18*   < > 15*  --  18*   BUN  --   --   --   --   --  102.2*  --   --  98.2*  --  92.2*   < > 82.7*  --  80.3*   CR  --   --   --   --   --  6.31*  --   --  6.11*  --  6.13*   < > 5.90*  --  6.03*   ANIONGAP  --   --   --   --   --  15  --   --  13  --  13   < > 14  --  13   KARTHIKEYAN  --   --   --   --   --  7.1*  --   --  7.5*  --  7.5*   < > 8.1*  --  8.3*   *  --  252* 140*  --  135*  --    < > 137*   < > 141*   < > 140*   < > 135*   ALBUMIN  --   --   --   --   --  2.7*  --   --   --   --  2.7*   < >  --   --   --     < > = values in this interval not displayed.      Lab Results   Component Value Date    WBC 4.1 03/25/2023    HGB 7.3 (L) 03/30/2023    HCT 25.6 (L) 03/25/2023     (L) 03/25/2023     (L) 03/30/2023    POTASSIUM 5.0 03/30/2023    CHLORIDE 97 (L) 03/30/2023    CO2 18 (L) 03/30/2023    .2 (H) 03/30/2023    CR 6.31 (H) 03/30/2023     (H) 03/30/2023    AST 22 03/23/2023    ALT 24 03/23/2023    ALKPHOS 77 03/23/2023    BILITOTAL 0.3 03/23/2023    INR 3.24 (H) 03/30/2023      Lab Results   Component Value Date    ALBUMIN 2.7 03/30/2023    ALBUMIN 3.2 06/02/2021                  ====================================================  TT: I have personally spent a total of  35 minutes on the day of the encounter on the unit in review of medical record, consultation with the medical providers and assessment of patient today, with time spent in counseling, coordination of care, and discussion with patient re: diagnostic results, prognosis, symptom management, risks and benefits of management options, and development of plan of care as noted above.      ====================================================    SURYA Limon, [facility]  Clinical Nurse Specialist  [facility]  [phone]

## 2023-03-30 NOTE — PROGRESS NOTES
[facility]    Medicine Progress Note - Hospitalist Service    Date of Admission:  3/23/2023    Assessment & Plan   Suman Nagy is a 82 year old male with history of A-fib and prior stroke, DM2, CKD4, chronic CHF and history of bladder and rectal cancer who is admitted on 3/23/2023 with symptomatic anemia and progressive renal disease.  S/p 2 units of packed red cells.  No indication for dialysis at this time and he would not be a good dialysis candidate.  Goals of care discussions ongoing     Symptomatic anemia  Presumed chronic disease renal failure            -Transfused            -Trending hemoglobin     Previous CKD stage IV  Progressed to stage V CKD            -Previous creatinine 2.88 now 6.12            -Nephrology on board, no indication for dialysis at this time and he would be a poor dialysis candidate            -Palliative care involved for goals of care; strongly recommend DNR/DNI - patient is still considering     Elevated troponin            -Question artifact with CKD            -No symptoms, EKG negative            -Nuclear stress 3/24 was nonischemic            -Cardiology consult input appreciated     Paroxysmal A-fib:             - Now in NSR.             -Previous Eliquis changed to warfarin            -Continue rate control with metoprolol     Essential hypertension            -Previously on Norvasc hydralazine metoprolol            -Clonidine 0.2 3 times daily was added            -Has as needed hydralazine            -Still elevated increase metoprolol to 100 daily     Diabetes, type II:             - PTA Lantus 16 units qam, trajenda 5mg daily.             - Continue Novolog sliding scale.             - Add Januvia 25 mg daily     Left upper extremity swelling:             - US negative for DVT on 3/25,             -Did show superficial venous thrombosis             -Extensive subcu edema             -Already anticoagulated      Left shoulder edema.             -Ultrasound 3/25 moderate to large complex fluid collection            -Presumed joint effusion            -Orthopedic consult as outpatient     History of stroke: On anticoagulation as above.      Loose stools: hold home fiber supplement. Now resolved.           Diet: Combination Diet Renal Diet (dialysis); Moderate Consistent Carb (60 g CHO per Meal) Diet; Low Saturated Fat Na <2400mg Diet    DVT Prophylaxis: Warfarin  Benz Catheter: Not present  Lines: None     Cardiac Monitoring: None  Code Status: Full Code      Clinically Significant Risk Factors         # Hyponatremia: Lowest Na = 129 mmol/L in last 2 days, will monitor as appropriate      # Hypoalbuminemia: Lowest albumin = 2.7 g/dL at 3/30/2023  6:33 AM, will monitor as appropriate                   Disposition Plan      Expected Discharge Date: 03/31/2023        Discharge Comments: Palliative-goals of care.  Home vs TCU.          Serene Alvarenga MD  Hospitalist Service  [facility]  Securely message with [employer] (more info)  Text page via [employer] Paging/Directory   ______________________________________________________________________    Interval History   Has intermittent pain in both legs from his neuropathy  No dyspnea or chest pain  Mostly Wheelchair bound at baseline      Physical Exam   Vital Signs: Temp: 97.6  F (36.4  C) Temp src: Oral BP: 131/63 Pulse: 55   Resp: 20 SpO2: 94 % O2 Device: None (Room air)    Weight: 156 lbs 15.48 oz    Gen: NAD, weak and fatigued, laying in bed  CV: RRR  Lungs: Clear anteriorly  Extremities: Left arm is swollen, no LE edema   Skin: Extensive bruising left hand   Neuro : AAOx3    Medical Decision Making       39 MINUTES SPENT BY ME on the date of service doing chart review, history, exam, documentation & further activities per the note.  MANAGEMENT DISCUSSED with the following over the past 24 hours: patient, Dr. Silva and Lisa from Palliative care        Data     I have personally  reviewed the following data over the past 24 hrs:    N/A  \   7.3 (L)   / N/A     130 (L) 97 (L) 102.2 (H) /  252 (H)   5.0 18 (L) 6.31 (H) \       ALT: N/A AST: N/A AP: N/A TBILI: N/A   ALB: 2.7 (L) TOT PROTEIN: N/A LIPASE: N/A       INR:  3.24 (H) PTT:  N/A   D-dimer:  N/A Fibrinogen:  N/A       Imaging results reviewed over the past 24 hrs:   No results found for this or any previous visit (from the past 24 hour(s)).

## 2023-03-31 NOTE — PROGRESS NOTES
[facility]    Medicine Progress Note - Hospitalist Service    Date of Admission:  3/23/2023    Assessment & Plan   Suman Nagy is a 82 year old male with history of A-fib and prior stroke, DM2, CKD4, chronic CHF and history of bladder and rectal cancer who is admitted on 3/23/2023 with symptomatic anemia and progressive renal disease.  S/p 2 units of packed red cells.  No indication for dialysis at this time and he would not be a good dialysis candidate.  Goals of care discussions are ongoing      Symptomatic anemia - Hb stable   Presumed chronic disease renal failure            -S/p 2 units PRBC on 3/23             -Trending hemoglobin     Previous CKD stage IV  Progressed to stage V CKD            -Previous creatinine 2.88 now 6.12            -Nephrology on board, no indication for dialysis at this time and he would be a poor dialysis candidate which was reiterated to the patient today             -Palliative care involved for goals of care; strongly recommend DNR/DNI - patient is still not able to make any decisions      Elevated troponin            -Question artifact with CKD            -No symptoms, EKG negative            -Nuclear stress 3/24 was nonischemic            -Cardiology consult input appreciated     Paroxysmal A-fib:             - Now in NSR.             -Previous Eliquis changed to warfarin            -Continue rate control with metoprolol     Essential hypertension            -Previously on Norvasc hydralazine metoprolol            -Clonidine 0.2 3 times daily was added            -Has as needed hydralazine            -Still elevated increase metoprolol to 100 daily     Diabetes, type II:             - PTA Lantus 16 units qam, trajenda 5mg daily.             - Continue Novolog sliding scale.             - Add Januvia 25 mg daily     Left upper extremity swelling:             - US negative for DVT on 3/25,             -Did show superficial venous thrombosis              -Extensive subcu edema             -Already anticoagulated      Left shoulder edema.            -Ultrasound 3/25 moderate to large complex fluid collection            -Presumed joint effusion            -Orthopedic consult as outpatient     History of stroke: On anticoagulation as above.      Loose stools: hold home fiber supplement. Now resolved.             Diet: Combination Diet Renal Diet (dialysis); Moderate Consistent Carb (60 g CHO per Meal) Diet; Low Saturated Fat Na <2400mg Diet    DVT Prophylaxis: warfarin  Benz Catheter: Not present  Lines: None     Cardiac Monitoring: None  Code Status: Full Code      Clinically Significant Risk Factors              # Hypoalbuminemia: Lowest albumin = 2.7 g/dL at 3/30/2023  6:33 AM, will monitor as appropriate                   Disposition Plan      Expected Discharge Date: 04/01/2023        Discharge Comments: Palliative-goals of care.  Home vs TCU.          Serene Alvarenga MD  Hospitalist Service  [facility]  Securely message with [employer] (more info)  Text page via [employer] Paging/Directory   ______________________________________________________________________    Interval History   Sitting in a recliner - ? for the first time since admission   Per PT assessment, needs moderate assist of 2 and very unsteady.  Sounds like he would be on his own at home for a good part of the day while daughter is at home.  Wife has health issues herself      Physical Exam   Vital Signs: Temp: 97.7  F (36.5  C) Temp src: Oral BP: (!) 140/63 Pulse: 58   Resp: 20 SpO2: 96 % O2 Device: None (Room air)    Weight: 157 lbs 6.54 oz    Gen: NAD, weak and fatigued  Pulmonary: Respirations unlabored   Neuro: Awake and alert       Medical Decision Making       45 MINUTES SPENT BY ME on the date of service doing chart review, history, exam, documentation & further activities per the note.  MANAGEMENT DISCUSSED with the following over the past 24 hours: patient,   Ricardo, Palliative care        Data     I have personally reviewed the following data over the past 24 hrs:    N/A  \   8.3 (L)   / N/A     130 (L) 97 (L) 106.4 (H) /  185 (H)   5.0 17 (L) 6.36 (H) \       INR:  3.50 (H) PTT:  N/A   D-dimer:  N/A Fibrinogen:  N/A       Imaging results reviewed over the past 24 hrs:   No results found for this or any previous visit (from the past 24 hour(s)).

## 2023-03-31 NOTE — PROGRESS NOTES
"[facility]  Palliative Care Daily Progress Note       Recommendations & Counseling     Encounter for palliative care  Psychosocial support    Psychosocial support was provided to patient and/or family.    Prognosis: Nephrology not advising dialysis. Would be eligible for hospice, if desired.     Palliative performance scale (PPS): 20-30    CODE status: FULL (several discussions with pt and family this week; has not wanted to make a change. Personally, did review with him the outcomes/impact on independence (which he greatly values), and professionally advised DNR/DNI. He remains undecided. Also, discussed with dtr.     Goals of Care: Pt is a very independent person, and having increasing serious health issues (2 cancers, a CVA and now kidney failure, not a dialysis candidate per Nephrology). While he says he understands, (\"aware that at times you win and times you lose, and perhaps I am losing more now\"). Reviewed our goal to engage him in important discussions about his wishes. He shares, he almost wishes he had cognitive issues as he would then not be thinking about these important decisions as he is. He values remaining independent most of all. He has not been very active here recently. Family have shared with medical staff and it sounds like with pt too, that he is too weak, to come home.     Dtr feels pt may understand but not want to hear this. Family all are united, wanting him to be comfortable, accept no dialysis, ok for DNR/DNI and hospice (discussed [facility] as a good option).     Per dtr they have all tried to convey this.     o Surrogate: Pt named his dtr Sommer as his primary health care agent (per pt, spouse is [place] and has memory changes, although Sommer states her memory is good). He has reportedly done a previous HCD, but family unable to locate. He has been given a new one to work on.      Disposition: TBD. ? TCU (family not readily in favor of TCU). [facility] a good option financially " although spouse is not a fan of the geographic location (dtr would drive her though)     Symptoms    Weakness, likely progressive, per Nephrology. PT evaluated today and recommended TCU.     Family are not readily in favor of TCU.     PLAN  -Recommend an OT SLUMs evaluation to understand decision making capacity a little better (per colleague, he has repeated stories, dtr does endorse he has a loop of conversations. Although per dtr, pt has a long history of struggling with decisions as well as is very independent   -Advised dtr to try to assemble family, meet with pt this week-end, and talk with pt and CM further re: care options (? [facility])   -Palliative medicine will follow up next week.     Total time spent was 55 minutes regarding palliative care goals of care on the date of the encounter. An additional 45 minutes was spent in a prolonged meeting with dtr/Palliative Care LICSW, re: assistance with decision making/care conversations These recommendations were given to the primary team via direct communication/AMCOM with RN, MDs.    SURYA Kwon, FNP-BC, PMHNP-BC  Palliative Care Nurse Practitioner  [facility]  [phone]  Team Pager [phone] (8am-430pm M-F)    During regular M-F work hours -- if you are not sure who specifically to contact -- please contact us by calling [phone].             Assessments          History gathered today from: patient, family/loved ones, medical chart  Suman Nagy is a 82 year old male admitted 3/23/2023 with increased weakness and inability to get OOB. Patient transfused for a Hgb of 5.9.   PMH CVA with residual left sided weakness and WC and bed bound at baseline last 2 years. Patient also with CKD and declining renal function. Nephrology following as an outpatient and potential for dialysis and were seeing him weekly in preparation for potential dialysis then he was admitted per daughter, Sommer cordero.         Today, the patient was  seen for:  Goals of care     Prognosis, Goals, or Advance Care Planning was addressed today with: Yes.  Mood, coping, and/or meaning in the context of serious illness were addressed today: Yes.                 Interval History:      Chart review/discussion with unit or clinical team members:   No significant overnight.    PT/OT now ordered.   Hospice consult started yesterday. Spoke with dtr. Not much discussion with pt.      Per patient or family/caregivers today:  Met in room with patient, w/Hospitalist and Dr. Silva. Dr. Silva advised against dialysis.  Patient still undecided on his goals re: whether to focus on comfort measures only versus continuing with outpatient management with renal after discharge.  Also undecided on code status.  Later joined by Palliative Care LICSW.     Separately spoke with bibi Novoa and Palliative Care LICSW by telephone from [phone].     Key Palliative Symptoms:  # Pain severity the last 12 hours: None  # Dyspnea severity the last 12 hours: None  # Nausea severity the last 12 hours: none  # Anxiety severity the last 12 hours: none                Review of Systems:     Besides above, an additional ROS was not done today.           Medications:     I have reviewed this patient's medication profile and medications during this hospitalization.           Physical Exam:   Vitals were reviewed  Temp: 98.1  F (36.7  C) Temp src: Oral BP: (!) 158/73 Pulse: 54   Resp: 20 SpO2: 94 % O2 Device: None (Room air)    General: Not in acute distress.           Data Reviewed:       Reviewed recent labs, comments:   CMP  Recent Labs   Lab 03/31/23  0823 03/31/23  0615 03/30/23  2105 03/30/23  1701 03/30/23  0811 03/30/23  0633 03/29/23  0737 03/29/23  0545 03/28/23  0744 03/28/23  0425 03/27/23  0756 03/27/23  0636   NA  --  130*  --   --   --  130*  --  129*  --  131*  --  133*   POTASSIUM  --  5.0  --   --   --  5.0  --  4.7  --  4.7  --  4.9   CHLORIDE  --  97*  --   --   --  97*  --  99  --   100  --  102   CO2  --  17*  --   --   --  18*  --  17*  --  18*  --  17*   ANIONGAP  --  16*  --   --   --  15  --  13  --  13  --  14   * 138* 210* 185*   < > 135*   < > 137*   < > 141*   < > 132*   BUN  --  106.4*  --   --   --  102.2*  --  98.2*  --  92.2*  --  85.4*   CR  --  6.36*  --   --   --  6.31*  --  6.11*  --  6.13*  --  6.02*   GFRESTIMATED  --  8*  --   --   --  8*  --  9*  --  8*  --  9*   KARTHIKEYAN  --  7.5*  --   --   --  7.1*  --  7.5*  --  7.5*  --  7.4*   PHOS  --   --   --   --   --  6.3*  --   --   --  5.1*  --  4.5   ALBUMIN  --   --   --   --   --  2.7*  --   --   --  2.7*  --  2.7*    < > = values in this interval not displayed.     CBC  Recent Labs   Lab 03/31/23 0615 03/30/23 2212 03/30/23  1446 03/30/23 0634 03/28/23  2239 03/25/23  0434   WBC  --   --   --   --   --  4.1   RBC  --   --   --   --   --  2.74*   HGB 7.4* 7.3* 7.3* 7.5*   < > 8.1*   HCT  --   --   --   --   --  25.6*   MCV  --   --   --   --   --  93   MCH  --   --   --   --   --  29.6   MCHC  --   --   --   --   --  31.6   RDW  --   --   --   --   --  17.3*   PLT  --   --   --   --   --  144*    < > = values in this interval not displayed.     INR  Recent Labs   Lab 03/31/23 0615 03/30/23 0634 03/29/23  0545 03/28/23  0425   INR 3.50* 3.24* 2.91* 3.00*     Arterial Blood GasNo lab results found in last 7 days.

## 2023-03-31 NOTE — PROGRESS NOTES
[facility]  WOC Nurse Inpatient Assessment     Consulted for: B shins    Patient History (according to provider note(s):        Areas Assessed:    Wound location: B shins    Wound due to: Trauma  Wound history/plan of care: Per patient wounds occurred after fall prior to admission    Wounds not 100% dry intact scabs to BLE. No open areas noted.     Periwound skin: Intact      Color: normal and consistent with surrounding tissue      Temperature: normal   Odor: none  Pain: denies   Pain interventions prior to dressing change: patient tolerated well and slow and gentle cares   Treatment goal: Heal   STATUS: healing  Supplies ordered: none- ok to leave PATY      Treatment Plan:     Updated orders to leave scabs dry and open to air    Orders: Updated    RECOMMEND PRIMARY TEAM ORDER: None, at this time  Education provided: plan of care  Discussed plan of care with: Patient  WOC nurse follow-up plan: signing off  Notify WOC if wound(s) deteriorate.  Nursing to notify the Provider(s) and re-consult the WOC Nurse if new skin concern.    DATA:     Current support surface: Standard  Standard gel/foam mattress (IsoFlex, Atmos air, etc)  Containment of urine/stool: Incontinence Protocol  BMI: Body mass index is 24.65 kg/m .   Active diet order: Orders Placed This Encounter      Combination Diet Renal Diet (dialysis); Moderate Consistent Carb (60 g CHO per Meal) Diet; Low Saturated Fat Na <2400mg Diet     Output: I/O last 3 completed shifts:  In: 780 [P.O.:780]  Out: 1150 [Urine:1150]     Labs:   Recent Labs   Lab 03/31/23  0615 03/30/23  0634 03/30/23  0633 03/26/23  0445 03/25/23  0434   ALBUMIN  --   --  2.7*   < >  --    HGB 7.4*   < >  --    < > 8.1*   INR 3.50*   < >  --    < > 1.24*   WBC  --   --   --   --  4.1    < > = values in this interval not displayed.     Pressure injury risk assessment:   Sensory Perception: 3-->slightly limited  Moisture: 3-->occasionally moist  Activity:  2-->chairfast  Mobility: 2-->very limited  Nutrition: 3-->adequate  Friction and Shear: 2-->potential problem  Doe Score: 15    Carin Barajas RN, CWOCN, CFCN  Pager no longer is use, please contact through [employer] group: [facility] [employer] [facility]

## 2023-03-31 NOTE — PROGRESS NOTES
"PALLIATIVE CARE SOCIAL WORK Progress Note     Joint visit with Palliative NP Birdie Estrada. Met with Pt at bedside. He was alert, oriented, and engaged in conversation.   Pt has had several visits and conversations with medical team over the past few days regarding dialysis and hospice. He was told by nephrology today that the recommendation is NO dialysis and a shift to comfort care.   Pt continues to feel that he cannot make a decision about this. He said \"I just dont have all the information\" however, it is unclear what further information he needs.   Pt wishes he could return home and do things around this house, drive and get a job. He seems to believe that he could do these things. When asked specific questions about how he would manage getting a physical job, or how he could get himself to work, he does not seem to grasp the questions. At the same time he also recognizes that he is very weak and acknowledges that he has not been able to do this type of activity for a few years. Pt states that he would be fine to discharge home and that his daughter would help with care needs. When asked what is most important to him he said \"getting my independence back.\"   NP discussed code status again today, but Pt is not able to engage in this conversation of make a decision about it.     Pt seems to be oriented x3, however he does repeat himself and stories quite a bit. He does not recall much of my previous visit as well. Pt may be having difficulty making decisions due to emotions around losing independence, or fear of dying. It would also be good to further assess whether there may be some difficulty processing the complexity of information around these decisions.     Joint phone call with Palliative NP to Pts daughter Sommer. Updated her on our visit with Pt today. She said she is feeling very frustrated that he continues to resist making a decision. She is clear that Pt cannot return home, as she is not able to care " for him. She also cares for her mother, and this is too much. She has not told Pt this outright. Per Sommer, Pts wife has told him that he can't come home, but he has not heard it from her or her siblings.   SW encouraged her to be really honest with Pt about coming home, as this is a key part of his decision making and goals. Acknowledged that this is difficult, but important.  Per Sommer, Pt has trouble making decisions about even very simple things at baseline. For example, when given a choice between two things for dinner, he cannot and will not decide. Talked with her about how hard this is and that this is even more indication that Pt will need strong and honest input from family to make further decisions about his care.   Provided listening and emotional support.   Made plan for PCSW to check in with Sommer again on Monday and let her know that if no decisions are made over the weekend, that a family conference will be necessary on Monday.     Plan: PCSW continues to follow.     KAI Gutierres, [facility]  Palliative Care Team  Team Pager: [phone]

## 2023-03-31 NOTE — PROGRESS NOTES
Pt undecided at this time on what he would like to do at discharge; TCU vs hospice. SW send referral to [facility] hospice home to review and left message inquiring about bed status. SW spoke with palliative SW who is following pt today and will follow up after discussion. SW following to assist with discharge planning.  11:24 AM    APOLLO Briggs  3/31/2023

## 2023-03-31 NOTE — PLAN OF CARE
Problem: Plan of Care - These are the overarching goals to be used throughout the patient stay.    Goal: Plan of Care Review  Description: The Plan of Care Review/Shift note should be completed every shift.  The Outcome Evaluation is a brief statement about your assessment that the patient is improving, declining, or no change.  This information will be displayed automatically on your shift note.  Outcome: Progressing   Goal Outcome Evaluation:       Patient alert and oriented x 3 yet forgetful and repeats himself. Pleasant affect. Palliative in to talk with the patient and they plan to visit with the daughter when able. Hgb 8.3 and q 8 hour checks for anemia. Did request air mattress for MD to order. HR 50's and metoprolol held this morning. INR 3.50 and coumadin held today. Call light in reach.

## 2023-03-31 NOTE — PROGRESS NOTES
Chart reviewed. Patient reportedly overwhelmed with hospice discussion day prior so will hold off for now on approaching conversation again. Will continue to follow. Noted code status currently remains Full Code. Will attempt to reach out to daughterTrish.     Oneyda Grimm RN on 3/31/2023 at 12:44 PM  [phone]

## 2023-03-31 NOTE — PROGRESS NOTES
Check in visit. Luke continues to struggle with decision making re: his quality of life. On one hand, he acknowledges that he is old and does a lot of sitting, unable to ambulate safely. On the other hand he enjoys his family, is concerned about his wife's health and is encouraged by his son's offer to build a ramp.    He mentions that spirituality is a big part of his thoughts. He brings this up as I am saying goodbye. He has anxiety about God's forgiveness. He alluded to not always being the best person. Staff arrived for shift change tasks. Luke also wanted to move back into bed. The listening environment changed.  I offered prayerful assurance that he was forgiven. He appreciates the [employer] [place] prayer.    Spiritual Care will follow up Monday to initiate more conversation.    NINA Park.  Palliative Care Team

## 2023-03-31 NOTE — PROGRESS NOTES
"CLINICAL NUTRITION SERVICES - REASSESSMENT NOTE     Nutrition Prescription    RECOMMENDATIONS FOR MDs/PROVIDERS TO ORDER:  None at this time    Malnutrition Status:    Does not meet 2 criteria    Recommendations already ordered by Registered Dietitian (RD):  Continue current diet    Future/Additional Recommendations:  Follow for plan of care     EVALUATION OF THE PROGRESS TOWARD GOALS   Diet: renal (dialysis), moderate consistent Cho (60 g Cho per meal), low saturated fat, < 2400 mg Na  Intake: 100% of those meal recorded       NEW FINDINGS   Possible hospice    ANTHROPOMETRICS  Height: 170.2 cm (5' 7\")  Most Recent Weight: 71.4 kg (157 lb 6.5 oz)    Weight History:   Wt Readings from Last 10 Encounters:   03/31/23 71.4 kg (157 lb 6.5 oz)   07/05/22 66.2 kg (146 lb)   06/27/22 67.1 kg (148 lb)   02/01/22 69.4 kg (153 lb)   11/04/21 70.3 kg (155 lb)   10/01/21 69.4 kg (153 lb)   06/02/21 69.9 kg (154 lb)   04/14/21 70.3 kg (155 lb)   03/02/21 68.9 kg (152 lb)   10/29/20 72.6 kg (160 lb)     GI CONCERNS  Diarrhea (per pt statement)  Last BM 3/28/2023 per nurse    LABS  Reviewed: Na 130, K 5, urea nitrogen 106.4, Cr 6.36, Ca 7.5, glu 138    MEDICATIONS  Reviewed: novolog, januvia, sodium bicarbonate table-1300 mg 2 times daily, vit D 3, warfarin    Malnutrition Diagnosis: Patient does not meet two of the above criteria necessary for diagnosing malnutrition    CURRENT NUTRITION DIAGNOSIS  Altered nutrition-related laboratory values related to CKD stage 5 as evidenced by elevated renal labs      INTERVENTIONS  Implementation  Continue current diet    Goals  Patient to consume % of nutritionally adequate meals three times per day, or the equivalent with supplements/snacks.-progressing    Monitoring/Evaluation  Progress toward goals will be monitored and evaluated per protocol.  "

## 2023-03-31 NOTE — PROGRESS NOTES
[facility] [place]/[facility]  [facility]   Follow up    Suman Nagy   MRN: [id number]  : 1940   DOA: 3/23/2023   CC: advanced CKD      Assessment and Plan:  82 year old male     CKD stage 5 - had gap in care so has not been following in clinic to get typical eduation on dialysis options; now advanced CKD--discussions ongoing with family and pr re; ?diaysis vs comfort; at this point.  He does not need dialysis acutely but would do poorly with it.  Met with family on 3/28 and the general impression is pursuing less aggressive cares like dialysis but uncertain if he's planning hospice at this point.  He continues to speak with his family.  Palliative following.  Luke struggling to make any decision about this.  Recs:  - no change in cares currently  - I came down more firmly and told Luke I think dialysis is a bad idea and I would not plan on starting him on it  - encouraged DNR but he's having a challenging time making these decisions  - I think family is ultimately going to have to help him make this decision  - I think hospice would be the best discharge plan for him    Anemia; underproduction from CKD; started on epo and now transfused; received elemental iron with prbcs so should be relatively iron replete; hgb stable in 8s    Hyperkalemia; medically treated and improved, on the higher end of normal    Hyponatremia; sodium drifting a bit, not symptomatic, would continue to follow for now    HTN; on chronic meds (norvasc, clonidine, hydralzine, metoprolol); resumed; Follow need to adjust but not looking for super tight control in this setting (80 yo and hospitalzed).  With alpha blocker -150s    Chronic A/C; on eliquis for prior CVA ad A fib    DM; on insulin and tradjenta    Acidosis; on bicarb 1300mg BID    Hyperlipid; on statin    Leg abrasions: local cares; appreciate wound consult    Troponin elevation; cards saw and workup with ECHO and GXT  stable.  Poor candidate for intervention        Subjective  Seen in room with Dr. Alvarenga, myself and palliative.  Luke up in the chair for the first time in days.  Noncomittal about any change in plans.  At times states he knows that he will die in the relative near future but then makes statements about wanting to mow the lawn even though he hasn't been able to even consider doing this for months.  I'm not sure Luke will able to verbalize moving to a hospice focus.    I again stated clearly that dialysis is not something we are going to be pursuing with him and my advice is hospice.    Objective    Vital signs in last 24 hours  Temp:  [97.6  F (36.4  C)-98.1  F (36.7  C)] 97.7  F (36.5  C)  Pulse:  [51-58] 58  Resp:  [20] 20  BP: (131-159)/(63-73) 153/66  SpO2:  [92 %-96 %] 96 %      Intake/Output last 3 shifts  I/O last 3 completed shifts:  In: 780 [P.O.:780]  Out: 1150 [Urine:1150]  Intake/Output this shift:  I/O this shift:  In: 650 [P.O.:650]  Out: -     Physical Exam  Alert/oriented x 3, awake, NAD  CV: RRR without murmur or rub  Lung: clear and equal; no extra sounds  Ab: soft and NT; not distended; normal bs  Ext: left arm edematous and lower extremities without much edema  Skin; no rash    Pertinent Labs     Last Renal Panel:  Sodium   Date Value Ref Range Status   03/31/2023 130 (L) 136 - 145 mmol/L Final     Potassium   Date Value Ref Range Status   03/31/2023 5.0 3.4 - 5.3 mmol/L Final   06/02/2021 4.0 3.5 - 5.0 mmol/L Final     Chloride   Date Value Ref Range Status   03/31/2023 97 (L) 98 - 107 mmol/L Final   06/02/2021 103 98 - 107 mmol/L Final     Carbon Dioxide (CO2)   Date Value Ref Range Status   03/31/2023 17 (L) 22 - 29 mmol/L Final   06/02/2021 22 22 - 31 mmol/L Final     Anion Gap   Date Value Ref Range Status   03/31/2023 16 (H) 7 - 15 mmol/L Final   06/02/2021 10 5 - 18 mmol/L Final     Glucose   Date Value Ref Range Status   06/02/2021 287 (H) 70 - 125 mg/dL Final     GLUCOSE BY  METER POCT   Date Value Ref Range Status   03/31/2023 185 (H) 70 - 99 mg/dL Final     Urea Nitrogen   Date Value Ref Range Status   03/31/2023 106.4 (H) 8.0 - 23.0 mg/dL Final   06/02/2021 50 (H) 8 - 28 mg/dL Final     Creatinine   Date Value Ref Range Status   03/31/2023 6.36 (H) 0.67 - 1.17 mg/dL Final     GFR Estimate   Date Value Ref Range Status   03/31/2023 8 (L) >60 mL/min/1.73m2 Final     Comment:     eGFR calculated using 2021 CKD-EPI equation.   02/15/2022 20.6 (L) >60.0 ml/min/1.73m^2 Final     Calcium   Date Value Ref Range Status   03/31/2023 7.5 (L) 8.8 - 10.2 mg/dL Final     Phosphorus   Date Value Ref Range Status   03/30/2023 6.3 (H) 2.5 - 4.5 mg/dL Final     Albumin   Date Value Ref Range Status   03/30/2023 2.7 (L) 3.5 - 5.2 g/dL Final   06/02/2021 3.2 (L) 3.5 - 5.0 g/dL Final     Recent Labs   Lab 03/31/23  0615 03/30/23  2212 03/30/23  1446 03/30/23  0634 03/29/23  2137   HGB 7.4* 7.3* 7.3* 7.5* 7.9*          I reviewed all lab results    Telly Silva  [facility]  [phone]

## 2023-03-31 NOTE — PLAN OF CARE
Problem: Hypertension Comorbidity  Goal: Blood Pressure in Desired Range  Outcome: Progressing  Intervention: Maintain Blood Pressure Management  Recent Flowsheet Documentation  Taken 3/31/2023 0000 by Chantale Mejia RN  Medication Review/Management: medications reviewed     Problem: Chronic Kidney Disease  Goal: Absence of Anemia Signs and Symptoms  Outcome: Progressing       Goal Outcome Evaluation: Patient is aox3. Patient is forgetful of time. C/o right toe pain and prn tylenol helpful. Patient sating adequately on room air. Hr 50s. Patient using primofit. Buttocks reddened. Refused repositioning despite education. Call-light within reach. Bed alarm on for safety.

## 2023-03-31 NOTE — PROGRESS NOTES
03/31/23 1000   Appointment Info   Signing Clinician's Name / Credentials (PT) Gee Hyatt, PT   Living Environment   People in Home spouse;child(burt), adult   Current Living Arrangements house   Home Accessibility stairs within home   Number of Stairs, Within Home, Primary ten   Stair Railings, Within Home, Primary   (Pt uncertain.)   Transportation Anticipated family or friend will provide   Living Environment Comments Pt lives in a house with wife, daughter and son-in-law have moved in with them. Daughter can provide some assistance. Needs to do 10 stairs to get from garage to main level.   Self-Care   Usual Activity Tolerance fair   Current Activity Tolerance poor   Regular Exercise No   Equipment Currently Used at Home walker, rolling;wheelchair, manual   Fall history within last six months yes   Number of times patient has fallen within last six months 2   Activity/Exercise/Self-Care Comment Pt used to work as .   General Information   Onset of Illness/Injury or Date of Surgery 03/23/23   Referring Physician Dr. Serene Alvarenga.   Patient/Family Therapy Goals Statement (PT) To go home.   Pertinent History of Current Problem (include personal factors and/or comorbidities that impact the POC) From chart: Suman Nagy is a 82 year old male with history of A-fib and prior stroke, DM2, CKD4, chronic CHF and history of bladder and rectal cancer who is admitted on 3/23/2023 with symptomatic anemia and progressive renal disease.  S/p 2 units of packed red cells.   Existing Precautions/Restrictions cardiac;fall   Weight-Bearing Status - LLE full weight-bearing   Weight-Bearing Status - RLE full weight-bearing   Heart Disease Risk Factors Lack of physical activity;Medical history;Gender;Age   General Observations Male supine in bed.   Cognition   Affect/Mental Status (Cognition) WFL;flat/blunted affect   Orientation Status (Cognition) oriented x 3  (Unsure of day.)   Follows Commands  (Cognition) follows two-step commands   Safety Deficit (Cognition) moderate deficit   Memory Deficit (Cognition) minimal deficit   Cognitive Status Comments Generally pleasant, cooperative, unsure of why he needs to do PT despite education and encouragement. Seems to lack insight into deficits.   Pain Assessment   Patient Currently in Pain No   Posture    Posture Forward head position;Protracted shoulders   Range of Motion (ROM)   ROM Comment Mild gross impairment, likely d/t weakness.   Strength (Manual Muscle Testing)   Strength Comments Moderate gross impairment.   Bed Mobility   Comment, (Bed Mobility) Supine-sit with min-modA, bed rail.   Transfers   Comment, (Transfers) Sit-stand with walker, modA2.   Gait/Stairs (Locomotion)   Comment, (Gait/Stairs) Able to take a few small, very unsteady steps to chair with walker, modA2.   Balance   Balance Comments Impaired.   Sensory Examination   Sensory Perception patient reports no sensory changes   Coordination   Coordination no deficits were identified   Muscle Tone   Muscle Tone no deficits were identified   Clinical Impression   Criteria for Skilled Therapeutic Intervention Yes, treatment indicated   PT Diagnosis (PT) Significantly impaired functional mobility and activity tolerance.   Influenced by the following impairments Medical, weakness.   Functional limitations due to impairments All functional mobility, endurance.   Clinical Presentation (PT Evaluation Complexity) Evolving/Changing   Clinical Presentation Rationale Clinician judgment,   Clinical Decision Making (Complexity) moderate complexity   Planned Therapy Interventions (PT) balance training;bed mobility training;gait training;home exercise program;patient/family education;stair training;strengthening;transfer training   Anticipated Equipment Needs at Discharge (PT)   (TBD.)   Risk & Benefits of therapy have been explained patient   PT Total Evaluation Time   PT Eval, Moderate Complexity Minutes  (02833) 8   Physical Therapy Goals   PT Frequency 5x/week   PT Predicted Duration/Target Date for Goal Attainment 04/07/23   PT Goals Bed Mobility;Transfers;Gait;Stairs   PT: Bed Mobility Minimal assist;Supine to/from sit   PT: Transfers Minimal assist;Sit to/from stand;Assistive device   PT: Gait Minimal assist;Assistive device;25 feet   PT: Stairs Minimal assist;10 stairs  (Uncertain of where railing is. May be getting stair lift per pt.)   Interventions   Interventions Quick Adds Therapeutic Activity;Therapeutic Procedure   Therapeutic Procedure/Exercise   Ther. Procedure: strength, endurance, ROM, flexibillity Minutes (11853) 9   Symptoms Noted During/After Treatment fatigue   Treatment Detail/Skilled Intervention PT: For BLE strength, all x 12 in sitting: Marching, LAQ, AP, hip abduction/adduction against PT resistance.   Therapeutic Activity   Therapeutic Activities: dynamic activities to improve functional performance Minutes (06558) 25   Symptoms Noted During/After Treatment Fatigue   Treatment Detail/Skilled Intervention PT: Eval completed, treatment initiated. Some extra time needed to redirect pt at times and for encouragement. Pt demos supine-sit with Temo, bed rail, cues for sequencing. Able to slide towards EOB, effortful. BP 160s/70s, no symptoms. Sit-stand with walker, modA2. Very unsteady on feet. Pivoted to chair with walker, modA2, cues for sequencing and safe sitting. Demos posterior lean throughout, unable to fully extend knees. Quite fatigued from the transfer. One more sit-stand with walker, mod-maxA. Again, unable to extend knees, and only able to stand x ~20 seconds. Discussion with pt regarding TCU recommendation- though pt did not explicitly refuse, he was somewhat fixated upon going home despite my education.   PT Discharge Planning   PT Plan PT: Stand, walk, if greatly improved, attempt stairs. Check status as may be going on hospice.   PT Discharge Recommendation (DC Rec) Transitional  Care Facility   PT Rationale for DC Rec Pt significantly below baseline with mobility and endurance. Required Ax2 for mobility today. Does have some assist at home available but it does not sound like it would be enough for him to be safe to return. Has a flight of stairs to get to main level of house, which is not realistic for him to do right now. May lack insight into deficits.   PT Brief overview of current status PT eval completed. Pt is Temo for bed mobility, modA2 for sit-stand with walker and to pivot to chair. Very unsteady.   Total Session Time   Timed Code Treatment Minutes 34   Total Session Time (sum of timed and untimed services) 42

## 2023-03-31 NOTE — PLAN OF CARE
Problem: Plan of Care - These are the overarching goals to be used throughout the patient stay.    Goal: Optimal Comfort and Wellbeing  3/30/2023 2344 by Ric Wells RN  Outcome: Progressing  3/30/2023 2335 by Ric Wells RN  Outcome: Progressing  Intervention: Monitor Pain and Promote Comfort  Recent Flowsheet Documentation  Taken 3/30/2023 2039 by Ric Wells RN  Pain Management Interventions: medication (see MAR)     Problem: Risk for Delirium  Goal: Optimal Coping  3/30/2023 2344 by Ric Wells RN  Outcome: Progressing  3/30/2023 2335 by Ric Wells RN  Outcome: Progressing     Problem: Risk for Delirium  Goal: Improved Behavioral Control  3/30/2023 2344 by Ric Wells RN  Outcome: Progressing  3/30/2023 2335 by Ric Wells RN  Outcome: Progressing  Intervention: Minimize Safety Risk  Recent Flowsheet Documentation  Taken 3/30/2023 1900 by Ric Wells RN  Enhanced Safety Measures: bed alarm set  Taken 3/30/2023 1700 by Ric Wells RN  Enhanced Safety Measures: bed alarm set   Goal Outcome Evaluation:    VSS, reports pain in lower back; tylenol administered and effective per pt. Refused to be repositioned although he complains of back and bottom pain. Hospice was here today and provided information to pt; he says he was overwhelmed with the amount of information and will speak more to his family about it tomorrow. Alert and pleasant, able to make needs known.

## 2023-04-01 NOTE — PROGRESS NOTES
[facility]  [facility]   Follow up    Suman Nagy   MRN: [id number]  : 1940   DOA: 3/23/2023   CC: advanced CKD      Assessment and Plan:  82 year old male     CKD stage 5 - had gap in care so has not been following in clinic to get typical eduation on dialysis options; now advanced CKD--discussions ongoing with family and pr re; ?diaysis vs comfort; at this point.  He does not need dialysis acutely but would do poorly with it.  Met with family on 3/28 and the general impression is pursuing less aggressive cares like dialysis but uncertain if he's planning hospice at this point.  He continues to speak with his family.  Palliative following.  Luke struggling to make any decision about this.  Recs:  - no change in cares currently  - I have told Luke I think dialysis is a bad idea and I would not plan on starting him on it  - encouraged DNR but he's having a challenging time making these decisions  - I think family is ultimately going to have to help him make this decision  - I think hospice would be the best discharge plan for him    Volume; a bit more edematous.  Luke doesn't want to start a diuretic for fear of gout    Anemia; underproduction from CKD; started on epo and now transfused; received elemental iron with prbcs so should be relatively iron replete; hgb stable in 8s    Hyperkalemia; medically treated and improved, on the higher end of normal    Hyponatremia; sodium drifting a bit, not symptomatic, would continue to follow for now    HTN; on chronic meds (norvasc, clonidine, hydralzine, metoprolol); resumed; Follow need to adjust but not looking for super tight control in this setting (80 yo and hospitalzed).  With alpha blocker -150s    Chronic A/C; on eliquis for prior CVA ad A fib    DM; on insulin and tradjenta    Acidosis; on bicarb 1300mg BID    Hyperlipid; on statin    Leg abrasions: local cares; appreciate wound  "consult    Troponin elevation; cards saw and workup with ECHO and GXT stable.  Poor candidate for intervention        Subjective  Sitting up eating breakfast.  Rambles about various topics we've talked about before.  He has a bit more LOWER EXTREMITY edema.  He's opposed to starting diuretics as they've \"given him gout before.\"  I don't get the impression that any decisions have been made.  I discussed that his kidney function is slightly worse compared to before.      Objective    Vital signs in last 24 hours  Temp:  [97.5  F (36.4  C)-98.3  F (36.8  C)] 98.3  F (36.8  C)  Pulse:  [58-63] 63  Resp:  [16-20] 16  BP: (135-174)/(62-74) 135/63  SpO2:  [95 %-96 %] 95 %      Intake/Output last 3 shifts  I/O last 3 completed shifts:  In: 1010 [P.O.:1010]  Out: 950 [Urine:950]  Intake/Output this shift:  I/O this shift:  In: 340 [P.O.:340]  Out: -     Physical Exam  Alert/oriented x 3, awake, NAD  CV: RRR without murmur or rub  Lung: clear and equal; no extra sounds  Ab: soft and NT; not distended; normal bs  Ext: left arm edematous and lower extremities with 1+ edema, increasing   Skin; no rash    Pertinent Labs     Last Renal Panel:  Sodium   Date Value Ref Range Status   04/01/2023 131 (L) 136 - 145 mmol/L Final     Potassium   Date Value Ref Range Status   04/01/2023 5.2 3.4 - 5.3 mmol/L Final   06/02/2021 4.0 3.5 - 5.0 mmol/L Final     Chloride   Date Value Ref Range Status   04/01/2023 98 98 - 107 mmol/L Final   06/02/2021 103 98 - 107 mmol/L Final     Carbon Dioxide (CO2)   Date Value Ref Range Status   04/01/2023 17 (L) 22 - 29 mmol/L Final   06/02/2021 22 22 - 31 mmol/L Final     Anion Gap   Date Value Ref Range Status   04/01/2023 16 (H) 7 - 15 mmol/L Final   06/02/2021 10 5 - 18 mmol/L Final     Glucose   Date Value Ref Range Status   06/02/2021 287 (H) 70 - 125 mg/dL Final     GLUCOSE BY METER POCT   Date Value Ref Range Status   04/01/2023 196 (H) 70 - 99 mg/dL Final     Urea Nitrogen   Date Value Ref Range " Status   04/01/2023 108.3 (H) 8.0 - 23.0 mg/dL Final   06/02/2021 50 (H) 8 - 28 mg/dL Final     Creatinine   Date Value Ref Range Status   04/01/2023 6.46 (H) 0.67 - 1.17 mg/dL Final     GFR Estimate   Date Value Ref Range Status   04/01/2023 8 (L) >60 mL/min/1.73m2 Final     Comment:     eGFR calculated using 2021 CKD-EPI equation.   02/15/2022 20.6 (L) >60.0 ml/min/1.73m^2 Final     Calcium   Date Value Ref Range Status   04/01/2023 7.1 (L) 8.8 - 10.2 mg/dL Final     Phosphorus   Date Value Ref Range Status   03/30/2023 6.3 (H) 2.5 - 4.5 mg/dL Final     Albumin   Date Value Ref Range Status   03/30/2023 2.7 (L) 3.5 - 5.2 g/dL Final   06/02/2021 3.2 (L) 3.5 - 5.0 g/dL Final     Recent Labs   Lab 03/31/23  1402 03/31/23  0615 03/30/23  2212 03/30/23  1446 03/30/23  0634   HGB 8.3* 7.4* 7.3* 7.3* 7.5*          I reviewed all lab results    Telly Silva  [facility]  [phone]

## 2023-04-01 NOTE — PLAN OF CARE
Problem: Plan of Care - These are the overarching goals to be used throughout the patient stay.    Goal: Plan of Care Review  Description: The Plan of Care Review/Shift note should be completed every shift.  The Outcome Evaluation is a brief statement about your assessment that the patient is improving, declining, or no change.  This information will be displayed automatically on your shift note.  Outcome: Progressing     Problem: Chronic Kidney Disease  Goal: Acceptable Pain Control  Intervention: Prevent or Manage Pain  Recent Flowsheet Documentation  Taken 3/31/2023 1630 by Marito Mello RN  Pain Management Interventions: medication (see MAR)     Problem: Hypertension Comorbidity  Goal: Blood Pressure in Desired Range  Outcome: Progressing  Intervention: Maintain Blood Pressure Management  Recent Flowsheet Documentation  Taken 3/31/2023 1630 by Marito Mello RN  Medication Review/Management: medications reviewed     Problem: Chronic Kidney Disease  Goal: Minimize Renal Failure Effects  Intervention: Monitor and Support Renal Function  Recent Flowsheet Documentation  Taken 3/31/2023 1630 by Marito Mello RN  Medication Review/Management: medications reviewed   Goal Outcome Evaluation:         Alert. Oriented, forgetful..    Prn Acetaminophen 350 mg po given for right hip and low back pain from sitting. Patient was assisted back in bed. Pain improved.     BP improving after BP scheduled meds given.    Up on chair this afternoon. Very weak and unsteady. Has bad L shoulder. Heavy assist of 2, pivot back to bed. Falls precaution and interventions maintained. Appropriately calls for assistance.

## 2023-04-01 NOTE — PLAN OF CARE
Problem: Chronic Kidney Disease  Goal: Optimal Coping with Chronic Illness  4/1/2023 1840 by Marilee Kulkarni RN  Outcome: Not Progressing  4/1/2023 1356 by Marilee Kulkarni RN  Outcome: Progressing     Problem: Hypertension Comorbidity  Goal: Blood Pressure in Desired Range  4/1/2023 1840 by Marilee Kulkarni RN  Outcome: Progressing  4/1/2023 1356 by Marilee Kulkarni RN  Outcome: Progressing  Intervention: Maintain Blood Pressure Management  Recent Flowsheet Documentation  Taken 4/1/2023 1600 by Marilee Kulkarni RN  Medication Review/Management: medications reviewed  Taken 4/1/2023 1200 by Marilee Kulkarni RN  Medication Review/Management: medications reviewed  Taken 4/1/2023 0800 by Marilee Kulkarni RN  Medication Review/Management: medications reviewed   Goal Outcome Evaluation:  Patient visiting with his wife and daughter ,Trish. They were discussing what life choices they would like with the patient. I did inform the family the patient mentioned to the Occupational Therapist of wanting hospice. The daughter was planning to talk to her dad, the patient,about hospice this evening. The daughter said she will contact the hospice person and leave a message of their decision. Patient needed 2 assist to get up to the chair with walker and gait belt. HE is very unsteady and needs much direction. BP medications scheduled and given frequently as ordered. Cr. 6.46 and patient poor candidate for dialysis. Call light in reach and bed alarm on.

## 2023-04-01 NOTE — PROGRESS NOTES
[facility]    Medicine Progress Note - Hospitalist Service    Date of Admission:  3/23/2023    Assessment & Plan   Suman Nagy is a 82 year old male with history of A-fib and prior stroke, DM2, CKD4, chronic CHF and history of bladder and rectal cancer who is admitted on 3/23/2023 with symptomatic anemia and progressive renal disease.  S/p 2 units of packed red cells.  No indication for dialysis at this time and he would not be a good dialysis candidate.  Goals of care discussions are ongoing     CKD stage 5            -Creatinine continues to slowly rise, mild hyperkalemia noted today along with worsening edema             -Nephrology on board, no indication for dialysis at this time and he would be a poor dialysis candidate which was reiterated to the patient many times             -Palliative care involved for goals of care; strongly recommend DNR/DNI - patient is still not able to make any decisions.  OT consulted for [facility] to evaluate decision making capacity     Anemia of chronic disease             -S/p 2 units PRBC on 3/23             -Trending hemoglobin              Elevated troponin            -Question artifact with CKD            -No symptoms, EKG negative            -Nuclear stress 3/24 was nonischemic            -Cardiology consult input appreciated     Paroxysmal A-fib:             -Now in NSR.             -Previously on Eliquis, changed to warfarin            -Continue rate control with metoprolol     Essential hypertension            -Previously on Norvasc hydralazine metoprolol            -Clonidine 0.2 3 times daily was added            -Has as needed hydralazine     Diabetes, type II:             - PTA Lantus 16 units qam, trajenda 5mg daily.             - Continue Novolog sliding scale.             - Added Januvia 25 mg daily     Left upper extremity swelling:             - US negative for DVT on 3/25,             -Did show superficial venous thrombosis           "   -Extensive subcu edema             -Already anticoagulated      Left shoulder edema.            -Ultrasound 3/25 moderate to large complex fluid collection            -Presumed joint effusion            -Orthopedic consult as outpatient     History of stroke: On anticoagulation as above.         Diet: Combination Diet Renal Diet (dialysis); Moderate Consistent Carb (60 g CHO per Meal) Diet; Low Saturated Fat Na <2400mg Diet    DVT Prophylaxis: Warfarin  Benz Catheter: Not present  Lines: None     Cardiac Monitoring: None  Code Status: Full Code      Clinically Significant Risk Factors              # Hypoalbuminemia: Lowest albumin = 2.7 g/dL at 3/30/2023  6:33 AM, will monitor as appropriate           # Overweight: Estimated body mass index is 25.38 kg/m  as calculated from the following:    Height as of this encounter: 1.702 m (5' 7\").    Weight as of this encounter: 73.5 kg (162 lb 0.6 oz).          Disposition Plan     Expected Discharge Date: 04/01/2023        Discharge Comments: Palliative-goals of care.  Home vs TCU.          Serene Alvarenga MD  Hospitalist Service  [facility]  Securely message with [employer] (more info)  Text page via [employer] Paging/Directory   ______________________________________________________________________    Interval History   Luke is telling me that his wife \"wants me on Hospice\".  He continues to talk about various topics but is not giving any clear answers     Physical Exam   Vital Signs: Temp: 98.3  F (36.8  C) Temp src: Oral BP: 135/63 Pulse: 63   Resp: 16 SpO2: 95 % O2 Device: None (Room air)    Weight: 162 lbs .61 oz    Gen: NAD, weak and fatigued  CV: RRR. Normal S1S2  Pulmonary: Respirations unlabored   Extremities: 1 + LE edema, left arm is edematous as well  Skin: Multiple scabs to bilateral LE  Neuro: Awake and alert     Medical Decision Making       38 MINUTES SPENT BY ME on the date of service doing chart review, history, exam, documentation " & further activities per the note.  MANAGEMENT DISCUSSED with the following over the past 24 hours: patient, RN       Data     I have personally reviewed the following data over the past 24 hrs:    N/A  \   8.3 (L)   / N/A     131 (L) 98 108.3 (H) /  196 (H)   5.2 17 (L) 6.46 (H) \       INR:  3.28 (H) PTT:  N/A   D-dimer:  N/A Fibrinogen:  N/A       Imaging results reviewed over the past 24 hrs:   No results found for this or any previous visit (from the past 24 hour(s)).

## 2023-04-01 NOTE — PROGRESS NOTES
"   04/01/23 1400   Appointment Info   Signing Clinician's Name / Credentials (OT) Caitlyn Brad, OTR/L   Living Environment   People in Home spouse;child(burt), adult   Current Living Arrangements house   Home Accessibility stairs within home   Number of Stairs, Within Home, Primary ten   Living Environment Comments Pt lives in a house with wife, daughter and son-in-law have moved in with them. Daughter can provide some assistance. Needs to do 10 stairs to get from garage to main level.   Self-Care   Equipment Currently Used at Home walker, rolling;wheelchair, manual   Fall history within last six months yes   Number of times patient has fallen within last six months 2   Activity/Exercise/Self-Care Comment Pt states he is independent with ADLs and transfers, per chart review gets assistance with bathing and dressing.   Instrumental Activities of Daily Living (IADL)   IADL Comments Per chart review, total assist with IADLs from family.   General Information   Onset of Illness/Injury or Date of Surgery 03/23/23   Referring Physician Birdie Estrada NP   Patient/Family Therapy Goal Statement (OT) patient and family determining goals of care, TCU vs hospice   Additional Occupational Profile Info/Pertinent History of Current Problem Per chart review, pt is is a 82 year old male with history of A-fib and prior stroke, DM2, CKD4, chronic CHF and history of bladder and rectal cancer who is admitted on 3/23/2023 with symptomatic anemia and progressive renal disease.  S/p 2 units of packed red cells.   Existing Precautions/Restrictions fall   Limitations/Impairments safety/cognitive   Cognitive Status Examination   Orientation Status orientation to person, place and time  (A&Ox4)   Cognitive Status Comments Pt expressing thoughts of feeling like a burden, leaning towards hospice. Referenced wanting to end his life \"I almost think about taking a gun and shooting myself\", notified RN of these thoughts pt is having.   Pain " Assessment   Patient Currently in Pain No   Range of Motion Comprehensive   Comment, General Range of Motion No functional deficits RUE, limited shoulder AROM LUE   Strength Comprehensive (MMT)   Comment, General Manual Muscle Testing (MMT) Assessment Functional BUE weakness noted, L worse than R   Coordination   Coordination Comments WNL RUE, tremor/poor coordination in LUE (pt states this is baseline)   Transfers   Transfers sit-stand transfer   Transfer Comments Pt declined attempting transfer during session d/t fatigue. Per RN, pt had just transferred EOB>recliner with heavy Ax2 pivot transfer with heavy posterior leaning.   Sit-Stand Transfer   Sit/Stand Transfer Comments Pt declined attempting stand despite encouragement and offer to get second person to assist, pt agreeable to attempt arm push ups in recliner, completed 2x with Min A able to lift hips out of chair and maintain for up to 5 sec. Declined further attempts d/t fatigue.   Balance   Balance Comments Not assessed   Activities of Daily Living   BADL Assessment/Intervention grooming;lower body dressing   Lower Body Dressing Assessment/Training   Position (Lower Body Dressing) supported sitting   Comment, (Lower Body Dressing) Unable to reach feet using figure 4 tech d/t weakness.   [place] Level (Lower Body Dressing) dependent (less than 25% patient effort)   Grooming Assessment/Training   Position (Grooming) unsupported sitting  (scooted forward to edge of recliner)   [place] Level (Grooming) supervision   Comment, (Grooming) Difficulty reaching out to  brush with LUE d/t incoordination and weakness, required Min A and set up A. SBA to brush hair with RUE seated.   Clinical Impression   Criteria for Skilled Therapeutic Interventions Met (OT) Yes, treatment indicated   OT Diagnosis Impaired ability to perform ADLs and transfers.   Influenced by the following impairments symptomatic anemia   OT Problem List-Impairments impacting ADL  problems related to;activity tolerance impaired;balance;cognition;mobility;strength;range of motion (ROM)   Assessment of Occupational Performance 1-3 Performance Deficits   Identified Performance Deficits transfers, lower body dressing, grooming/hygiene   Planned Therapy Interventions (OT) ADL retraining;balance training;bed mobility training;cognition;ROM;strengthening;transfer training;home program guidelines;progressive activity/exercise;risk factor education   Clinical Decision Making Complexity (OT) low complexity   Risk & Benefits of therapy have been explained evaluation/treatment results reviewed;care plan/treatment goals reviewed;risks/benefits reviewed;current/potential barriers reviewed;participants voiced agreement with care plan;participants included;patient   Clinical Impression Comments Pt would benefit from skilled OT services to promote ability to perform ADLs and transfers safely.   OT Total Evaluation Time   OT Eval, Low Complexity Minutes (08591) 15   OT Goals   Therapy Frequency (OT) 3 times/wk   OT Predicted Duration/Target Date for Goal Attainment 04/08/23   OT Goals Hygiene/Grooming;Lower Body Dressing;Transfers;Cognition   OT: Hygiene/Grooming modified independent;using adaptive equipment;from wheelchair   OT: Lower Body Dressing Minimal assist;using adaptive equipment;from wheelchair   OT: Transfer Moderate assist;with assistive device  (Ax1)   OT: Cognitive Patient/caregiver will verbalize understanding of cognitive assessment results/recommendations as needed for safe discharge planning   OT Discharge Planning   OT Plan check status - family deciding goals of care, close transfers, toileting on commode, lower body dressing, G/H   OT Discharge Recommendation (DC Rec) Transitional Care Facility   OT Rationale for DC Rec Pt unable to stand with Ax1 at this time. Family unable to provide this level of assistance.   OT Brief overview of current status declined attempting stand, dependent  lower body dressing   Total Session Time   Total Session Time (sum of timed and untimed services) 15

## 2023-04-01 NOTE — PLAN OF CARE
Problem: Chronic Kidney Disease  Goal: Optimal Coping with Chronic Illness  Outcome: Progressing     Problem: Hypertension Comorbidity  Goal: Blood Pressure in Desired Range  Outcome: Progressing  Intervention: Maintain Blood Pressure Management  Recent Flowsheet Documentation  Taken 4/1/2023 1200 by Marilee Kulkarni RN  Medication Review/Management: medications reviewed  Taken 4/1/2023 0800 by Marilee Kulkarni RN  Medication Review/Management: medications reviewed     Problem: Anemia  Goal: Anemia Symptom Improvement  Outcome: Progressing  Intervention: Monitor and Manage Anemia  Recent Flowsheet Documentation  Taken 4/1/2023 1200 by Marilee Kulkarni RN  Safety Promotion/Fall Prevention:   activity supervised   clutter free environment maintained   bed alarm on  Taken 4/1/2023 0800 by Marilee Kulkarni RN  Safety Promotion/Fall Prevention:   activity supervised   clutter free environment maintained   bed alarm on   Goal Outcome Evaluation:       Patient alert and oriented x 3 yet forgetful and not aware of the time. Took two assist to get him up to the chair since he is unsteady and needs a lot of direction which took much time for him to move to safely move him to the recliner chair. . Buttock on right cheek has a small scab and will monitor for any change. INR 3.28. Hgb 8.3. Cr 6.46. MD aware of the labs. No c/o pain. Appetite fair. Call light in reach and chair alarm on. No PT today. OT in the room to work with patient at this time.

## 2023-04-01 NOTE — PLAN OF CARE
Problem: Anemia  Goal: Anemia Symptom Improvement  Outcome: Progressing  Intervention: Monitor and Manage Anemia  Recent Flowsheet Documentation  Taken 4/1/2023 0000 by Polo Sheth RN  Safety Promotion/Fall Prevention:   activity supervised   bed alarm on   clutter free environment maintained   fall prevention program maintained   assistive device/personal items within reach   increased rounding and observation   lighting adjusted   mobility aid in reach   nonskid shoes/slippers when out of bed   patient and family education   room organization consistent   safety round/check completed   supervised activity     Latest Hgb 8.2 drawn yesterday 3/31.  VSS.    Problem: Chronic Kidney Disease  Goal: Electrolyte Balance  Outcome: Progressing     Creatine remains elevated at 6.46 this AM.    Problem: Chronic Kidney Disease  Goal: Acceptable Pain Control  Outcome: Progressing    Pt denied any pain overnight.    Problem: Plan of Care - These are the overarching goals to be used throughout the patient stay.    Goal: Readiness for Transition of Care  Outcome: Progressing      Awaiting pt and family to make decisions about goals of care.

## 2023-04-02 NOTE — PROGRESS NOTES
[facility]    Medicine Progress Note - Hospitalist Service    Date of Admission:  3/23/2023    Assessment & Plan   Suman Nagy is a 82 year old male with history of A-fib and prior stroke, DM2, CKD4, chronic CHF and history of bladder and rectal cancer who is admitted on 3/23/2023 with symptomatic anemia and progressive renal disease.  S/p 2 units of packed red cells.  No indication for dialysis at this time and he would not be a good dialysis candidate.  Luke ultimately decided he wishes to be on Hospice and be DNR/DNI     CKD stage 5            -Creatinine continues to slowly rise, noted to have worsening edema             -Nephrology on board, no indication for dialysis at this time and he would be a poor dialysis candidate which was reiterated to the patient many times             -Patient wishes to proceed with Hospice, daughter Sommer will call them.  Discussed with SW that patient will need placement (OLOP vs LTC)     Anemia of chronic disease             -S/p 2 units PRBC on 3/23               Elevated troponin            -Question artifact with CKD            -No symptoms, EKG negative            -Nuclear stress 3/24 was nonischemic            -Cardiology consult input appreciated     Paroxysmal A-fib:             -Now in NSR.             -Previously on Eliquis, changed to warfarin - will need to address with Hospice             -Continue rate control with metoprolol     Essential hypertension            -Previously on Norvasc hydralazine metoprolol            -Clonidine 0.2 3 times daily was added            -Has as needed hydralazine     Diabetes, type II:             - PTA Lantus 16 units qam, trajenda 5mg daily.             - Continue Novolog sliding scale.             - Added Januvia 25 mg daily     Left upper extremity swelling:             - US negative for DVT on 3/25,             -Did show superficial venous thrombosis             -Extensive subcu edema              "-Already anticoagulated      Left shoulder edema.            -Ultrasound 3/25 moderate to large complex fluid collection            -Presumed joint effusion    History of stroke: On anticoagulation as above.           Diet: Regular Diet Adult    DVT Prophylaxis: Warfarin  Benz Catheter: Not present  Lines: None     Cardiac Monitoring: None  Code Status: No CPR- Do NOT Intubate      Clinically Significant Risk Factors              # Hypoalbuminemia: Lowest albumin = 2.6 g/dL at 4/2/2023  5:13 AM, will monitor as appropriate           # Overweight: Estimated body mass index is 26.73 kg/m  as calculated from the following:    Height as of this encounter: 1.702 m (5' 7\").    Weight as of this encounter: 77.4 kg (170 lb 10.2 oz).          Disposition Plan      Expected Discharge Date: 04/03/2023        Discharge Comments: Palliative-goals of care.  Home vs TCU.          Serene Alvarenga MD  Hospitalist Service  [facility]  Securely message with [employer] (more info)  Text page via [employer] Paging/Directory   ______________________________________________________________________    Interval History   Luke is telling me that he \"signed up with hospice\" last night.  I confirmed with his daughter Sommer that he did agree to Hospice and DNR/DNI last night and signed ?POLST form.  I gave Sommer Hospice number and she was going to call them.    Luke is very happy he is able now to have his favorite [facility]       Physical Exam   Vital Signs: Temp: 98  F (36.7  C) Temp src: Oral BP: (!) 141/67 Pulse: 62   Resp: 16 SpO2: 94 % O2 Device: None (Room air)    Weight: 170 lbs 10.18 oz    Gen: NAD, laying in bed  Respirations unlabored  Neuro: Awake and alert       Medical Decision Making       38 MINUTES SPENT BY ME on the date of service doing chart review, history, exam, documentation & further activities per the note.  MANAGEMENT DISCUSSED with the following over the past 24 hours: patient, RN, " daughter Sommer over the phone, SW       Data     I have personally reviewed the following data over the past 24 hrs:    N/A  \   N/A   / N/A     131 (L) 96 (L) 110.9 (H) /  217 (H)   4.9 17 (L) 6.75 (H) \       ALT: N/A AST: N/A AP: N/A TBILI: N/A   ALB: 2.6 (L) TOT PROTEIN: N/A LIPASE: N/A       INR:  2.82 (H) PTT:  N/A   D-dimer:  N/A Fibrinogen:  N/A       Imaging results reviewed over the past 24 hrs:   No results found for this or any previous visit (from the past 24 hour(s)).

## 2023-04-02 NOTE — PLAN OF CARE
Problem: Plan of Care - These are the overarching goals to be used throughout the patient stay.    Goal: Optimal Comfort and Wellbeing  Intervention: Monitor Pain and Promote Comfort  Recent Flowsheet Documentation  Taken 4/1/2023 2148 by Swati Oropeza, RN  Pain Management Interventions: medication (see MAR)     Problem: Risk for Delirium  Goal: Improved Behavioral Control  Intervention: Minimize Safety Risk  Recent Flowsheet Documentation  Taken 4/1/2023 1940 by Swati Oropeza, RN  Enhanced Safety Measures: bed alarm set   Patient is alert and orientated. Managing BP with scheduled medications, averaging 150s systolic. Repositioned frequently. BG stable.

## 2023-04-02 NOTE — PLAN OF CARE
"Goal Outcome Evaluation:      Plan of Care Reviewed With: patient    Overall Patient Progress: no changeOverall Patient Progress: no change    Outcome Evaluation: Luke is alert and oriented to place and time but does not understan the situation surrounding his prognosis.  States that he \"signed up with hospice last night\" and is anxious to go home for a wew weeks and then go to the hospice facility.  Denies pain or dyspnea.  Assist of 2 for transfers.      "

## 2023-04-02 NOTE — PROGRESS NOTES
[facility]  [facility]   Follow up    Suman Nagy   MRN: [id number]  : 1940   DOA: 3/23/2023   CC: advanced CKD      Assessment and Plan:  82 year old male     CKD stage 5 - had gap in care so has not been following in clinic to get typical eduation on dialysis options; now advanced CKD--discussions ongoing with family and pr re; ?diaysis vs comfort; at this point.  He does not need dialysis acutely but would do poorly with it.  Met with family on 3/28 and the general impression is pursuing less aggressive cares like dialysis but uncertain if he's planning hospice at this point.  He continues to speak with his family.  Palliative following.  Luke has struggled to make a decision but seems to be definitively pursuing hospice now.  Recs:  - sounds like he's moving more definitely towards hospice  - he'd like to keep his BP meds going and given how much he's on he could become symptomatic from stopping abruptly  - stop checking labs, can stop bicarb    Volume; a bit more edematous.  Luke doesn't want to start a diuretic for fear of gout    Anemia; underproduction from CKD; started on epo and now transfused; received elemental iron with prbcs so should be relatively iron replete; hgb stable in 8s    Hyperkalemia; medically treated and improved, on the higher end of normal    Hyponatremia; sodium drifting a bit, not symptomatic, would continue to follow for now    HTN; on chronic meds, very hard to control.  (norvasc, clonidine, hydralzine, metoprolol); resumed; Follow need to adjust but not looking for super tight control in this setting (80 yo and hospitalzed).  With alpha blocker -150s    Chronic A/C; on warfarin for prior CVA ad A fib    DM; on insulin and tradjenta    Acidosis; on bicarb 1300mg BID    Hyperlipid; on statin    Leg abrasions: local cares; appreciate wound consult    Troponin elevation; cards saw and workup with ECHO  and GXT stable.  Poor candidate for intervention        Subjective  Tells me he talked with is family and is planning on signing onto hospice.  No documentation to verify this but Luke seems certain.  Wants his diet liberalized.  RN reaching out to [facility] for DNR order.  He's not sure if he'll be going home or to a hospice home or TCU.  Discussed with RN.    Objective    Vital signs in last 24 hours  Temp:  [97.6  F (36.4  C)-98  F (36.7  C)] 98  F (36.7  C)  Pulse:  [58-61] 60  Resp:  [16-18] 18  BP: (141-175)/(58-84) 141/67  SpO2:  [92 %-94 %] 94 %      Intake/Output last 3 shifts  I/O last 3 completed shifts:  In: 990 [P.O.:990]  Out: 875 [Urine:875]  Intake/Output this shift:  I/O this shift:  In: 300 [P.O.:300]  Out: -     Physical Exam  Alert/oriented x 3, awake, NAD  CV: RRR without murmur or rub  Lung: clear and equal; no extra sounds  Ab: soft and NT; not distended; normal bs  Ext: left arm edematous and lower extremities with 1+ edema, stable  Skin; no rash    Pertinent Labs     Last Renal Panel:  Sodium   Date Value Ref Range Status   04/02/2023 131 (L) 136 - 145 mmol/L Final     Potassium   Date Value Ref Range Status   04/02/2023 4.9 3.4 - 5.3 mmol/L Final   06/02/2021 4.0 3.5 - 5.0 mmol/L Final     Chloride   Date Value Ref Range Status   04/02/2023 96 (L) 98 - 107 mmol/L Final   06/02/2021 103 98 - 107 mmol/L Final     Carbon Dioxide (CO2)   Date Value Ref Range Status   04/02/2023 17 (L) 22 - 29 mmol/L Final   06/02/2021 22 22 - 31 mmol/L Final     Anion Gap   Date Value Ref Range Status   04/02/2023 18 (H) 7 - 15 mmol/L Final   06/02/2021 10 5 - 18 mmol/L Final     Glucose   Date Value Ref Range Status   06/02/2021 287 (H) 70 - 125 mg/dL Final     GLUCOSE BY METER POCT   Date Value Ref Range Status   04/02/2023 217 (H) 70 - 99 mg/dL Final     Urea Nitrogen   Date Value Ref Range Status   04/02/2023 110.9 (H) 8.0 - 23.0 mg/dL Final   06/02/2021 50 (H) 8 - 28 mg/dL Final     Creatinine   Date Value Ref  Range Status   04/02/2023 6.75 (H) 0.67 - 1.17 mg/dL Final     GFR Estimate   Date Value Ref Range Status   04/02/2023 8 (L) >60 mL/min/1.73m2 Final     Comment:     eGFR calculated using 2021 CKD-EPI equation.   02/15/2022 20.6 (L) >60.0 ml/min/1.73m^2 Final     Calcium   Date Value Ref Range Status   04/02/2023 7.6 (L) 8.8 - 10.2 mg/dL Final     Phosphorus   Date Value Ref Range Status   04/02/2023 7.3 (H) 2.5 - 4.5 mg/dL Final     Albumin   Date Value Ref Range Status   04/02/2023 2.6 (L) 3.5 - 5.2 g/dL Final   06/02/2021 3.2 (L) 3.5 - 5.0 g/dL Final     Recent Labs   Lab 03/31/23  1402 03/31/23  0615 03/30/23  2212 03/30/23  1446 03/30/23  0634   HGB 8.3* 7.4* 7.3* 7.3* 7.5*          I reviewed all lab results    Telly Silva  [facility]  [phone]

## 2023-04-03 NOTE — PLAN OF CARE
Problem: Nausea and Vomiting  Goal: Nausea and Vomiting Relief  Outcome: Progressing  Intervention: Prevent and Manage Nausea and Vomiting  Recent Flowsheet Documentation  Taken 4/3/2023 1400 by Zollinger, Nancy K, RN  Nausea/Vomiting Interventions: antiemetic   Goal Outcome Evaluation:  Denies pain.  Complaining of intermittent nausea and cough.  Zofran given x1.  Didn't eat breakfast or lunch.  Says he doesn't have an appetite today.  Up in chair with assist x2.

## 2023-04-03 NOTE — PROGRESS NOTES
Care Management Follow Up    Length of Stay (days): 11    Expected Discharge Date: 04/03/2023    Concerns to be Addressed:   Discharge planning;   Patient plan of care discussed at interdisciplinary rounds: Yes    Anticipated Discharge Disposition:  Home with hospice vs SNF on hospice.      Anticipated Discharge Services:  Patient is expecting to be on hospice.  Anticipated Discharge DME:  Per therapy (if indicated).    Patient/family educated on Medicare website which has current facility and service quality ratings:   NA  Education Provided on the Discharge Plan:   Per team  Patient/Family in Agreement with the Plan:   Yes    Referrals Placed by CM/SW:   [facility]; [facility];   Private pay costs discussed: private room/amenity fees for SNF on hospice.     Additional Information:  Met with patient and family at the bedside. Patient states he is planning to go home on hospice. However, writer spoke with spouse who stated that she cannot care for patient at home. He is needing assist of two to transfer. Discussed out of pocket expenses for SNF (room and board) on hospice. She deferred writer to speak with their daughter Sommer who stated she was under the impression from Palliative Care that we 'could get patient on MA' to cover the cost of care in long term care. Writer educated her that patient and family is responsible to apply for MA (Trish says the do no have MA at this point). She would also like a hospice consult to discuss what support and coverage hospice could provided.  Text page sent to MD.  Will print out the [facility] MA application and leave at the bedside for patient's daughter.   11:40 AM:  Writer met again with patient and family at the bedside; copy of [facility] MA application left for Sommer. Patient insistent that he 'can get in to the chair by myself'.  Reviewed with therapy who stated patient has been needing assist of two. Patient states he 'has no money' to pay for care  "in a SNF and \"the only asset we have is the house\". Patient was under the impression that he was 'already on hospice'. Discussed that we have requested a hospice consult.     Carin Lima RN      "

## 2023-04-03 NOTE — PLAN OF CARE
Problem: Chronic Kidney Disease  Goal: Optimal Coping with Chronic Illness  Outcome: Progressing  Goal: Electrolyte Balance  Outcome: Progressing  Goal: Fluid Balance  Outcome: Progressing  Goal: Optimal Functional Ability  Outcome: Progressing  Goal: Absence of Anemia Signs and Symptoms  Outcome: Progressing  Goal: Optimal Oral Intake  Outcome: Progressing  Goal: Acceptable Pain Control  Outcome: Progressing  Intervention: Prevent or Manage Pain  Recent Flowsheet Documentation  Taken 4/2/2023 1758 by Evert Lacy RN  Pain Management Interventions: medication (see MAR)  Goal: Minimize Renal Failure Effects  Outcome: Progressing  Intervention: Monitor and Support Renal Function  Recent Flowsheet Documentation  Taken 4/2/2023 1653 by Evert Lacy RN  Medication Review/Management: medications reviewed   Goal Outcome Evaluation:         Pt family gave writer paperwork regarding code status. Writer paged MD who came to sign paperwork.  Paperwork was copied and given to pt family.   Pt is now DNR/DNI    Pt reports some mild pain that was resolved with position change and apap.  Pt was calm and cooperative.     Pt ate a couple bites of ham sandwich.  Pt states his stomach feels a little uneasy from coughing.

## 2023-04-03 NOTE — PLAN OF CARE
Problem: Plan of Care - These are the overarching goals to be used throughout the patient stay.    Goal: Readiness for Transition of Care  Outcome: Progressing      Pt to transition to Hospice.   No acute issues overnight.  Pt denied any pain.  VSS.

## 2023-04-03 NOTE — PROGRESS NOTES
[facility]  Palliative Care Daily Progress Note      Code status: No CPR- Do NOT Intubate     Impressions, Recommendations & Counseling     SYMPTOM ASSESSMENT:  Generalized weakness, multifactorial, and due to chronic problems and Left sided 2/2 previous CVA  - Reposition for comfort  - up with assistance  - PPS 30-40    ADVANCED CARE PLANNING  - Code status: DNR/I  - Surrogate Decision maker(s): wife, Rossi Nagy and Sommer Kapoorco, DTR  - NO HCD or POLST.  - POLST completed over the weekend per jose e Novoa.    - Patient cannot discharge home as he now requires skilled nursing and family unable to care for him at this time.    GOALS OF CARE DISCUSSION  - Goals are slowly transitioning to comfort.  - Plan is to discharge to long-term care facility with hospice services or to [facility] depending on bed availability.  -Care management involved in working to get patient and family connected with medical assistance.        HPI   Notes and chart reviewed.         Suman Nagy is a 82 year old male admitted 3/23/2023 with increased weakness and inability to get OOB. Patient transfused for a Hgb of 5.9.   PMH CVA with residual left sided weakness and WC and bed bound at baseline last 2 years. Patient also with CKD and declining renal function. Nephrology following as an outpatient and potential for dialysis and were seeing him weekly in preparation for potential dialysis then he was admitted per daughter Sommer report.     Today, the patient was seen for:  Goals of care discussion              Interval History:     Chart review/discussion with unit or clinical team members:   VSS. Afebrile. NAD.     Per patient or family/caregivers today:  Met with patient briefly this afternoon who indicates wishes for DNR/DNI, hospice involvement and that he needs to discharge to a facility for ongoing care.  He hopes to get stronger and be able to discharge home; however, while offering  "him reassurance that could be a possibility, he understands that that likely will not happen.  I did speak with patient's daughter, Sommer via telephone who confirms this.  She states that they completed POLST document over the weekend indicating wishes for DNR/I and comfort focused cares.  She is very clear that she cannot care for her weekend and ailing father in addition to patient's wife her mother at home.  He needs 24-hour skilled care at a long-term care facility or hosp[facility] facility like [facility].  She is appreciative of the care of care management team in assisting in getting placement in a facility that we will take medical assistance.  Hopes and plan are for discharging to a skilled facility with hospice.           Review of Systems:     ROS was reviewed and is unremarkable.  Pain: \"my butt\" hurts  Dyspnea: denies  Weakness/fatigue: severe  Nausea: mild  Constipation: 3/31/2023 last documented BM          Medications:     I have reviewed this patient's medication profile and medications during this hospitalization.           Physical Exam:   Temp:  [97.7  F (36.5  C)-98  F (36.7  C)] 97.8  F (36.6  C)  Pulse:  [58-72] 72  Resp:  [16-20] 19  BP: (129-160)/(62-72) 160/72  SpO2:  [90 %-94 %] 90 %    General appearance: alert, cooperative, fatigued and no distress  Head: Normocephalic, without obvious abnormality, atraumatic  Eyes: lids and lashes normal; sclera anicteric  Nose: no discharge  Neck: no JVD, supple  Lungs: non-labored at rest  Extremities: warm, no cyanosis  Neurologic: alert, oriented to person and place.             Data Reviewed:     Pertinent Labs  Lab Results: personally reviewed.   Lab Results   Component Value Date     04/02/2023    CO2 17 04/02/2023    CO2 22 06/02/2021    .9 04/02/2023    BUN 50 06/02/2021     Lab Results   Component Value Date    WBC 4.1 03/25/2023    HGB 8.3 03/31/2023    HCT 25.6 03/25/2023    MCV 93 03/25/2023     03/25/2023     AST "   Date Value Ref Range Status   03/23/2023 22 10 - 50 U/L Final     ALT   Date Value Ref Range Status   03/23/2023 24 10 - 50 U/L Final     Alkaline Phosphatase   Date Value Ref Range Status   03/23/2023 77 40 - 129 U/L Final     Albumin   Date Value Ref Range Status   04/02/2023 2.6 (L) 3.5 - 5.2 g/dL Final   06/02/2021 3.2 (L) 3.5 - 5.0 g/dL Final         Radiology Results  US Upper Extremity Venous Duplex Left    Result Date: 3/25/2023  EXAM: US UPPER EXTREMITY VENOUS DUPLEX LEFT LOCATION: [facility] DATE/TIME: 3/25/2023 5:12 PM INDICATION: left arm swelling and left shoulder pain. rule out DVT COMPARISON: None. TECHNIQUE: Venous Duplex ultrasound of the left upper extremity with (when possible) and without compression, augmentation, and duplex. Color flow and spectral Doppler with waveform analysis performed. FINDINGS: Ultrasound includes evaluation of the internal jugular vein, innominate vein, subclavian vein, axillary vein, and brachial vein. The superficial cephalic and basilic veins were also evaluated where seen. LEFT: No deep venous thrombosis. Moderate to severe left upper extremity subcutaneous edema. Occlusive superficial venous thrombosis in the left cephalic vein at the mid forearm segment. Moderate to large complex fluid collection along the left anterior shoulder joint.     IMPRESSION: 1.  No deep venous thrombosis in the left upper extremity. 2.  Acute occlusive superficial venous thrombosis involving the left cephalic vein in the mid forearm. 3.  Extensive left arm subcutaneous edema. 4.  Moderate to large complex fluid collection along the left anterior shoulder, perhaps a complex joint effusion.    NM Lexiscan stress test (nuc card)    Result Date: 3/24/2023     The nuclear stress test is negative for inducible myocardial ischemia or infarction.    The left ventricular ejection fraction at stress is 70%.    The patient is at a low risk of future cardiac ischemic events.     There is no prior study for comparison.    Results communicated to Dr. Landry    There is no prior study for comparison.     TTS: I have personally spent a total of 40 minutes today on unit in review of medical record, consultation with the medical providers and assessment of patient today and discussing care goals with DTRSommer, via telephone and development of plan of care as noted above.    SURYA Lopez, [facility] Palliative Medicine Service: [facility]  Department voice mail (checked M-F 8a-4pm approx): [phone]  [facility] Palliative Medicine pager: [phone]  (Please use AMION for text paging if possible, use link under Palliative service)

## 2023-04-03 NOTE — CONSULTS
Referral Received - [facility]        [facility] would like to thank you for the Hospice referral.     Referral received and initial insurance information sent to  Hospice intake for review.     We are determining your patient's eligibility with a medical director at this time.     Our plan is to visit your patient as soon as possible. We will connect with the primary care team shortly to collect more information on the patient's progression. Thank you for your patience.    Addendum 1623: Called daughter at number listed in chart. No answer, left VM for call back to discuss the community hospice consult that was placed.       Kacie Capps RN

## 2023-04-03 NOTE — PROGRESS NOTES
Nephrology signing off  Patient planning on discharging on comfort cares to home  Further plans per primary medicine team  Please call for any questions

## 2023-04-04 NOTE — PROGRESS NOTES
Care Management Follow Up    Length of Stay (days): 12    Expected Discharge Date: 04/05/2023     Concerns to be Addressed:       Patient plan of care discussed at interdisciplinary rounds: Yes    Anticipated Discharge Disposition: LTC with hospice      Anticipated Discharge Services:    Anticipated Discharge DME:      Patient/family educated on Medicare website which has current facility and service quality ratings:    Education Provided on the Discharge Plan:    Patient/Family in Agreement with the Plan:      Referrals Placed by CM/SW:    Private pay costs discussed: Not applicable    Additional Information:  Spoke to Lynda from hospice who plans to connect with daughter today     Spoke to Ronen at [facility] who says they don't have beds and also patient seems more appropriate for LTC with hospice           Carin Nguyen RN

## 2023-04-04 NOTE — PLAN OF CARE
Patient stable this morning, no new concerns.     Problem: Chronic Kidney Disease  Goal: Fluid Balance  Outcome: Progressing  Wt Readings from Last 3 Encounters:   04/04/23 72 kg (158 lb 11.7 oz)   07/05/22 66.2 kg (146 lb)   06/27/22 67.1 kg (148 lb)   Cr 6.75 on 4/2/23.  Still producing urine, cola colored. 200cc UOP this morning via [employer].       Problem: Hypertension Comorbidity  Goal: Blood Pressure in Desired Range  Outcome: Progressing  /59.  HR 50-60's. Asymptomatic when jones.     Problem: Anemia  Goal: Anemia Symptom Improvement  Outcome: Progressing  Intervention: Monitor and Manage Anemia  Recent Flowsheet Documentation  Taken 4/4/2023 0900 by Calista Parisi RN  Safety Promotion/Fall Prevention:   activity supervised   assistive device/personal items within reach   clutter free environment maintained   mobility aid in reach   nonskid shoes/slippers when out of bed   patient and family education   safety round/check completed   toileting scheduled  Hgb 8.3.  INR 6.70 - MD was notified. Hold Warfarin today.  No s/s of active bleeding.     Problem: Nausea and Vomiting  Goal: Nausea and Vomiting Relief  Outcome: Progressing  Denied nausea this shift. PRN Zofran available.       Awaiting placement to LTC with hospice. CM following.

## 2023-04-04 NOTE — PROVIDER NOTIFICATION
[address] GIANCARLO Malone: Critical lab INR of 6.70. Thank you. Miley LONDON RN [phone]    Maddie Chang

## 2023-04-04 NOTE — PROGRESS NOTES
[facility]  Palliative Care Daily Progress Note      Code status: No CPR- Do NOT Intubate     Impressions, Recommendations & Counseling     SYMPTOM ASSESSMENT:  Generalized weakness, multifactorial, and due to chronic problems and Left sided 2/2 previous CVA  - Reposition for comfort  - up with assistance  - PPS 30-40     ADVANCED CARE PLANNING  - Code status: DNR/I  - Surrogate Decision maker(s): wife, Rossi Nagy and Sommer Zamudio, DTR  - NO HCD.  - POLST completed over the weekend and in paper chart. Sent copy of POLST to honoring choices today fot placement in EMR.     - Patient cannot discharge home as he now requires skilled nursing and family unable to care for him at this time.     GOALS OF CARE DISCUSSION  - Goals are slowly transitioning to comfort. DTR, plans to discuss further with patient's wife today about transitioning to comfort focused care.  - Plan is to discharge to long-term care facility with hospice services or to [facility] depending on bed availability.  -Care management involved in working to get patient and family connected with medical assistance.    Discussed with Dr. Cano.  Palliative care will continue to follow along.      HPI          Suman Nagy is a 82 year old male admitted 3/23/2023 with increased weakness and inability to get OOB. Patient transfused for a Hgb of 5.9.   PMH CVA with residual left sided weakness and WC and bed bound at baseline last 2 years. Patient also with CKD and declining renal function. Nephrology following as an outpatient and potential for dialysis and were seeing him weekly in preparation for potential dialysis then he was admitted per daughter, Sommer report.     Today, the patient was seen for:  Goals of care discussion              Interval History:     Chart review/discussion with unit or clinical team members:   Decreased UO, dark chris, 200 ml out, Hgb 8.3, INR 6.70    Per patient or family/caregivers  today:  Spoke with DTRSommer, today about decreased UO and elevated INR. Discussed it would be reasonable to transition to comfort focused cares and socus on symptom management. She will discuss further with her mother/patient's wife today.  Patient not able to engage in discussion today.    Prognosis, Goals, or Advance Care Planning was addressed today with: Yes.             Review of Systems:     Pain: low in buttocks  Dyspnea: none  Weakness/fatigue: severe            Medications:     I have reviewed this patient's medication profile and medications during this hospitalization.           Physical Exam:   Temp:  [97.4  F (36.3  C)-98  F (36.7  C)] 97.8  F (36.6  C)  Pulse:  [51-66] 66  Resp:  [18-20] 20  BP: (125-149)/(59-65) 135/59  SpO2:  [89 %-94 %] 89 %  General appearance: fatigued and no distress  Head: Normocephalic, without obvious abnormality, atraumatic  Nose: no discharge  Lungs: non-labored at rest  Extremities: warm, no cyanosis  Neurologic: alert, oriented to person and place.           Data Reviewed:     Pertinent Labs  Lab Results: personally reviewed.   Lab Results   Component Value Date     04/02/2023    CO2 17 04/02/2023    CO2 22 06/02/2021    .9 04/02/2023    BUN 50 06/02/2021     Lab Results   Component Value Date    WBC 4.1 03/25/2023    HGB 8.3 03/31/2023    HCT 25.6 03/25/2023    MCV 93 03/25/2023     03/25/2023     AST   Date Value Ref Range Status   03/23/2023 22 10 - 50 U/L Final     ALT   Date Value Ref Range Status   03/23/2023 24 10 - 50 U/L Final     Alkaline Phosphatase   Date Value Ref Range Status   03/23/2023 77 40 - 129 U/L Final     Albumin   Date Value Ref Range Status   04/02/2023 2.6 (L) 3.5 - 5.2 g/dL Final   06/02/2021 3.2 (L) 3.5 - 5.0 g/dL Final         Radiology Results  US Upper Extremity Venous Duplex Left    Result Date: 3/25/2023  EXAM: US UPPER EXTREMITY VENOUS DUPLEX LEFT LOCATION: [facility] DATE/TIME: 3/25/2023  5:12 PM INDICATION: left arm swelling and left shoulder pain. rule out DVT COMPARISON: None. TECHNIQUE: Venous Duplex ultrasound of the left upper extremity with (when possible) and without compression, augmentation, and duplex. Color flow and spectral Doppler with waveform analysis performed. FINDINGS: Ultrasound includes evaluation of the internal jugular vein, innominate vein, subclavian vein, axillary vein, and brachial vein. The superficial cephalic and basilic veins were also evaluated where seen. LEFT: No deep venous thrombosis. Moderate to severe left upper extremity subcutaneous edema. Occlusive superficial venous thrombosis in the left cephalic vein at the mid forearm segment. Moderate to large complex fluid collection along the left anterior shoulder joint.     IMPRESSION: 1.  No deep venous thrombosis in the left upper extremity. 2.  Acute occlusive superficial venous thrombosis involving the left cephalic vein in the mid forearm. 3.  Extensive left arm subcutaneous edema. 4.  Moderate to large complex fluid collection along the left anterior shoulder, perhaps a complex joint effusion.    NM Lexiscan stress test (nuc card)    Result Date: 3/24/2023     The nuclear stress test is negative for inducible myocardial ischemia or infarction.    The left ventricular ejection fraction at stress is 70%.    The patient is at a low risk of future cardiac ischemic events.    There is no prior study for comparison.    Results communicated to Dr. Landry    There is no prior study for comparison.     TTS: I have personally spent a total of 40 minutes today on unit in review of medical record, consultation with the medical providers and discussing care goals with patient and family and development of plan of care as noted above.    SURYA Lopez, [facility] Palliative Medicine Service: [facility]  Department voice mail (checked M-F 8a-4pm approx): [phone]  [facility] Palliative Medicine  pager: [phone]  (Please use AMION for text paging if possible, use link under Palliative service)

## 2023-04-04 NOTE — PROGRESS NOTES
Daily Progress Note    Assessment/Plan:  Suman Nagy is a 82 year old male with history of A-fib and prior stroke, DM2, CKD4, chronic CHF and history of bladder and rectal cancer who is admitted on 3/23/2023 with symptomatic anemia and progressive renal disease.  S/p 2 units of packed red cells.  No indication for dialysis at this time and he would not be a good dialysis candidate.  Luke ultimately decided he wishes to be on Hospice and be DNR/DNI      CKD stage 5            -Creatinine continues to slowly rise, noted to have worsening edema             -Nephrology has been following, no indication for dialysis at this time and he would be a poor dialysis candidate which was reiterated to the patient many times             -Patient wishes to proceed with Hospice - consult placed.            -Discussed with SW on Sunday that patient will need placement (OLOP vs LTC)              -No further labs, discussed with family today     Anemia of chronic disease -no further labs            -S/p 2 units PRBC on 3/23               Elevated troponin-no further labs            -Question artifact with CKD            -No symptoms, EKG negative            -Nuclear stress 3/24 was nonischemic            -Cardiology consult input appreciated     Paroxysmal A-fib:             -Now in NSR.             -Previously on Eliquis, changed to warfarin -INR was high, family agrees with no further labs thus Coumadin discontinued            -Continue rate control with metoprolol     Essential hypertension            -Previously on Norvasc hydralazine metoprolol            -Clonidine 0.2 3 times daily was added            -Has as needed hydralazine  -          -Blood pressure currently adequately controlled, will not make any changes today     Diabetes, type II:             - PTA Lantus 16 units qam, trajenda 5mg daily.             - Continue Novolog sliding scale.             - Added Januvia 25 mg daily     Left upper extremity swelling:              - US negative for DVT on 3/25,             -Did show superficial venous thrombosis             -Extensive subcu edema             -Stopping Coumadin     Left shoulder edema.            -Ultrasound 3/25 moderate to large complex fluid collection            -Presumed joint effusion     History of stroke: Stopping Coumadin    Insomnia: We will try Benadryl at bedtime as he also has a lot of drainage down the back of his throat.  We will also have available as needed for drainage if it does not make him too sedated.    Postnasal drainage: He is requesting a nasal inhaler, will start Flonase    Code status:No CPR- Do NOT Intubate        Barriers to Discharge: Hospice placement    Disposition: Whenever appropriate placement found    Subjective:  Patient is new to me today, chart reviewed discussed with staff.  Family present including wife and daughter Sommer.  Luke is comfortable today, he would like something stronger for sleep.  He also has a lot of postnasal drainage.  No chest pain or shortness of breath.        Current Medications Reviewed via EHR List    Objective:  Vital signs in last 24 hours:  [unfilled]  .prog  Weight:   @THISENCWEIGHTS(1)@  Weight change: -4.3 kg (-9 lb 7.7 oz)  Body mass index is 24.86 kg/m .    Intake/Output last 3 shifts:  I/O last 3 completed shifts:  In: 654 [P.O.:654]  Out: 450 [Urine:450]  Intake/Output this shift:  No intake/output data recorded.    Physical Exam:  General: Sitting up in bed, no apparent distress,  CV: Regular rate and rhythm  Lungs: Clear to auscultation  Abdomen: Soft, nontender        Imaging:  Personally Reviewed.  US Upper Extremity Venous Duplex Left    Result Date: 3/25/2023  EXAM: US UPPER EXTREMITY VENOUS DUPLEX LEFT LOCATION: [facility] DATE/TIME: 3/25/2023 5:12 PM INDICATION: left arm swelling and left shoulder pain. rule out DVT COMPARISON: None. TECHNIQUE: Venous Duplex ultrasound of the left upper extremity with  (when possible) and without compression, augmentation, and duplex. Color flow and spectral Doppler with waveform analysis performed. FINDINGS: Ultrasound includes evaluation of the internal jugular vein, innominate vein, subclavian vein, axillary vein, and brachial vein. The superficial cephalic and basilic veins were also evaluated where seen. LEFT: No deep venous thrombosis. Moderate to severe left upper extremity subcutaneous edema. Occlusive superficial venous thrombosis in the left cephalic vein at the mid forearm segment. Moderate to large complex fluid collection along the left anterior shoulder joint.     IMPRESSION: 1.  No deep venous thrombosis in the left upper extremity. 2.  Acute occlusive superficial venous thrombosis involving the left cephalic vein in the mid forearm. 3.  Extensive left arm subcutaneous edema. 4.  Moderate to large complex fluid collection along the left anterior shoulder, perhaps a complex joint effusion.    NM Lexiscan stress test (nuc card)    Result Date: 3/24/2023     The nuclear stress test is negative for inducible myocardial ischemia or infarction.    The left ventricular ejection fraction at stress is 70%.    The patient is at a low risk of future cardiac ischemic events.    There is no prior study for comparison.    Results communicated to Dr. Landry    There is no prior study for comparison.      Lab Results:  Personally Reviewed.   Fingerstick Blood Glucose: @[id number](POCGLUFGR:10)@    Last Hbg A1C: No results found for: HGBA1C   Lab Results   Component Value Date    INR 6.70 (HH) 04/04/2023    INR 3.07 (H) 04/03/2023    INR 2.82 (H) 04/02/2023     Recent Results (from the past 24 hour(s))   Glucose by meter    Collection Time: 04/03/23 10:04 PM   Result Value Ref Range    GLUCOSE BY METER POCT 190 (H) 70 - 99 mg/dL   INR    Collection Time: 04/04/23  4:19 AM   Result Value Ref Range    INR 6.70 (HH) 0.85 - 1.15   Extra Red Top Tube    Collection Time: 04/04/23  4:19  AM   Result Value Ref Range    Hold Specimen JIC    Extra Purple Top Tube    Collection Time: 04/04/23  4:19 AM   Result Value Ref Range    Hold Specimen JIC    Glucose by meter    Collection Time: 04/04/23  7:46 AM   Result Value Ref Range    GLUCOSE BY METER POCT 126 (H) 70 - 99 mg/dL   Glucose by meter    Collection Time: 04/04/23 11:59 AM   Result Value Ref Range    GLUCOSE BY METER POCT 202 (H) 70 - 99 mg/dL           Advanced Care Planning    Medical Decision Making       35 MINUTES SPENT BY ME on the date of service doing chart review, history, exam, documentation & further activities per the note.      Joseph Cano MD  Date: 4/4/2023  Time: 5:21 PM  [facility]  Family Medicine

## 2023-04-04 NOTE — CONSULTS
Called daughter Sommer to discuss hospice consult for outpatient services that was placed 4/3/23. No answer. [facility] for return call. CM note states family is looking for LTC placement with hospice support. [facility] will follow up with patient and family on 4/5/23.    Lynda Capps RN  Clinical Nurse Liaison  [facility]  Direct: [phone]  Contact information available via [facility] Paging/Directory

## 2023-04-05 NOTE — PLAN OF CARE
Physical Therapy Discharge Summary    Reason for therapy discharge:    Pt moving to [facility]

## 2023-04-05 NOTE — PROGRESS NOTES
Daily Progress Note    Assessment/Plan:  Suman Nagy is a 82 year old male with history of A-fib and prior stroke, DM2, CKD4, chronic CHF and history of bladder and rectal cancer who is admitted on 3/23/2023 with symptomatic anemia and progressive renal disease.  S/p 2 units of packed red cells.  No indication for dialysis at this time and he would not be a good dialysis candidate.  Luke ultimately decided he wishes to be on Hospice and be DNR/DNI.      CKD stage 5            -Creatinine has continued to rise, no longer checking labs noted to have worsening edema             -Nephrology has been following, no indication for dialysis at this time and he would be a poor dialysis candidate which was reiterated to the patient many times             -Patient wishes to proceed with Hospice - consult placed.            -Discussed with SW on Sunday that patient will need placement (OLOP vs LTC)              -No further labs, discussed with family     Anemia of chronic disease -no further labs            -S/p 2 units PRBC on 3/23               Elevated troponin-no further labs            -Question artifact with CKD            -No symptoms, EKG negative            -Nuclear stress 3/24 was nonischemic            -Cardiology consult input appreciated     Paroxysmal A-fib:             -Now in NSR.             -Previously on Eliquis, changed to warfarin -INR was high, family agrees with no further labs thus Coumadin discontinued            -Continue rate control with metoprolol     Essential hypertension            -Previously on Norvasc hydralazine metoprolol            -Clonidine 0.2 3 times daily was added            -Has as needed hydralazine  -          -Blood pressure currently adequately controlled, will not make any changes today     Diabetes, type II:             - PTA Lantus 16 units qam, trajenda 5mg daily.             - Continue Novolog sliding scale.             - Added Januvia 25 mg daily     Left upper  extremity swelling:             - US negative for DVT on 3/25,             -Did show superficial venous thrombosis             -Extensive subcu edema             -Stopping Coumadin     Left shoulder edema.            -Ultrasound 3/25 moderate to large complex fluid collection            -Presumed joint effusion     History of stroke: Stopping Coumadin     Insomnia: Started on Benadryl at bedtime which she feels have been helpful.  As he also has a lot of drainage down the back of his throat.   He is requesting a nasal inhaler, will start Flonase     Code status:No CPR- Do NOT Intubate        Barriers to Discharge: Hospice placement    Disposition: Whenever appropriate placement found.    Subjective:  Luke reports he slept better last night.  He reports his appetite is poor.  He denies any type of pain, he denies chest pain, shortness of breath and abdominal pain.  No family present currently.        Current Medications Reviewed via EHR List    Objective:  Vital signs in last 24 hours:  [unfilled]  .prog  Weight:   @THISENCWEIGHTS(1)@  Weight change: 1.2 kg (2 lb 10.3 oz)  Body mass index is 25.28 kg/m .    Intake/Output last 3 shifts:  I/O last 3 completed shifts:  In: 534 [P.O.:534]  Out: 200 [Urine:200]  Intake/Output this shift:  I/O this shift:  In: 240 [P.O.:240]  Out: 300 [Urine:300]    Physical Exam:  General:  CV:  Lungs:  Abdomen:        Imaging:  Personally Reviewed.  US Upper Extremity Venous Duplex Left    Result Date: 3/25/2023  EXAM: US UPPER EXTREMITY VENOUS DUPLEX LEFT LOCATION: [facility] DATE/TIME: 3/25/2023 5:12 PM INDICATION: left arm swelling and left shoulder pain. rule out DVT COMPARISON: None. TECHNIQUE: Venous Duplex ultrasound of the left upper extremity with (when possible) and without compression, augmentation, and duplex. Color flow and spectral Doppler with waveform analysis performed. FINDINGS: Ultrasound includes evaluation of the internal jugular vein,  innominate vein, subclavian vein, axillary vein, and brachial vein. The superficial cephalic and basilic veins were also evaluated where seen. LEFT: No deep venous thrombosis. Moderate to severe left upper extremity subcutaneous edema. Occlusive superficial venous thrombosis in the left cephalic vein at the mid forearm segment. Moderate to large complex fluid collection along the left anterior shoulder joint.     IMPRESSION: 1.  No deep venous thrombosis in the left upper extremity. 2.  Acute occlusive superficial venous thrombosis involving the left cephalic vein in the mid forearm. 3.  Extensive left arm subcutaneous edema. 4.  Moderate to large complex fluid collection along the left anterior shoulder, perhaps a complex joint effusion.    NM Lexiscan stress test (nuc card)    Result Date: 3/24/2023     The nuclear stress test is negative for inducible myocardial ischemia or infarction.    The left ventricular ejection fraction at stress is 70%.    The patient is at a low risk of future cardiac ischemic events.    There is no prior study for comparison.    Results communicated to Dr. Landry    There is no prior study for comparison.      Lab Results:  Personally Reviewed.   Fingerstick Blood Glucose: @[id number](POCGLUFGR:10)@    Last Hbg A1C: No results found for: HGBA1C   Lab Results   Component Value Date    INR 6.72 (HH) 04/05/2023    INR 6.70 (HH) 04/04/2023    INR 3.07 (H) 04/03/2023     Recent Results (from the past 24 hour(s))   Glucose by meter    Collection Time: 04/04/23  5:57 PM   Result Value Ref Range    GLUCOSE BY METER POCT 164 (H) 70 - 99 mg/dL   Glucose by meter    Collection Time: 04/04/23  8:11 PM   Result Value Ref Range    GLUCOSE BY METER POCT 199 (H) 70 - 99 mg/dL   INR    Collection Time: 04/05/23  5:32 AM   Result Value Ref Range    INR 6.72 (HH) 0.85 - 1.15   Extra Red Top Tube    Collection Time: 04/05/23  5:32 AM   Result Value Ref Range    Hold Specimen JIC    Glucose by meter     Collection Time: 04/05/23  7:52 AM   Result Value Ref Range    GLUCOSE BY METER POCT 131 (H) 70 - 99 mg/dL   Glucose by meter    Collection Time: 04/05/23 12:01 PM   Result Value Ref Range    GLUCOSE BY METER POCT 221 (H) 70 - 99 mg/dL           Advanced Care Planning    Medical Decision Making       25 MINUTES SPENT BY ME on the date of service doing chart review, history, exam, documentation & further activities per the note.      Joseph Cano MD  Date: 4/5/2023  Time: 1:08 PM  [facility]  Family Medicine

## 2023-04-05 NOTE — PLAN OF CARE
Goal Outcome Evaluation:    Disoriented to time, forgetful. 3L NC for sleep. LS clear. Denies pain. Dressing changed on buttocks. Mepilex placed on R calf over open wound. Turns q2h. Primofit in place. Bed alarm on, call light in reach.       Problem: Chronic Kidney Disease  Goal: Optimal Coping with Chronic Illness  Outcome: Progressing  Goal: Electrolyte Balance  Outcome: Progressing  Goal: Fluid Balance  Outcome: Progressing  Goal: Optimal Functional Ability  Outcome: Progressing  Intervention: Optimize Functional Ability  Recent Flowsheet Documentation  Taken 4/4/2023 2100 by Amanda Vernon RN  Activity Management: ambulated outside room  Taken 4/4/2023 1600 by Amanda Vernon RN  Activity Management: activity adjusted per tolerance  Goal: Absence of Anemia Signs and Symptoms  Outcome: Progressing  Goal: Optimal Oral Intake  Outcome: Progressing  Goal: Acceptable Pain Control  Outcome: Progressing  Goal: Minimize Renal Failure Effects  Outcome: Progressing  Intervention: Monitor and Support Renal Function  Recent Flowsheet Documentation  Taken 4/4/2023 1600 by Amanda Vernon, RN  Medication Review/Management: medications reviewed     Problem: Diabetes Comorbidity  Goal: Blood Glucose Level Within Targeted Range  Outcome: Progressing     Problem: Hypertension Comorbidity  Goal: Blood Pressure in Desired Range  Outcome: Progressing  Intervention: Maintain Blood Pressure Management  Recent Flowsheet Documentation  Taken 4/4/2023 1600 by Amanda Vernon RN  Medication Review/Management: medications reviewed

## 2023-04-05 NOTE — PROGRESS NOTES
Care Management Follow Up    Length of Stay (days): 13    Expected Discharge Date: 04/05/2023     Concerns to be Addressed:       Patient plan of care discussed at interdisciplinary rounds: Yes    Anticipated Discharge Disposition: Hospice, Long Term Care     Anticipated Discharge Services:    Anticipated Discharge DME:      Patient/family educated on Medicare website which has current facility and service quality ratings:    Education Provided on the Discharge Plan:    Patient/Family in Agreement with the Plan:      Referrals Placed by CM/SW:    Private pay costs discussed: Not applicable    Additional Information:  See below       Carin Nguyen RN          Spoke to daughter Sommer via telephone . Verified patient needs LTC. Told her [facility] declined. She agrees to additional referral to the [facility]. Says she has not started the MA application yet. Told her that is very important to complete asap.     2:11 PM  Freya from [facility] says they do have a bed and could clinically accept patient, but needs to see a copy of MA megan

## 2023-04-05 NOTE — PLAN OF CARE
Goal Outcome Evaluation:       Plan is to discharge to LTC with hospice. Pt refuses to get OOB today. Allows some repositioning.

## 2023-04-05 NOTE — PLAN OF CARE
"Goal Outcome Evaluation:         Problem: Plan of Care - These are the overarching goals to be used throughout the patient stay.    Goal: Patient-Specific Goal (Individualized)  Description: You can add care plan individualizations to a care plan. Examples of Individualization might be:  \"Parent requests to be called daily at 9am for status\", \"I have a hard time hearing out of my right ear\", or \"Do not touch me to wake me up as it startles me\".  Outcome: Adequate for Care Transition  Goal: Absence of Hospital-Acquired Illness or Injury  Outcome: Adequate for Care Transition  Intervention: Identify and Manage Fall Risk  Recent Flowsheet Documentation  Taken 4/5/2023 0100 by Emma Gongora RN  Safety Promotion/Fall Prevention: activity supervised  Intervention: Prevent Skin Injury  Recent Flowsheet Documentation  Taken 4/5/2023 0326 by Emma Gongora, RN  Body Position:   turned   supine  Taken 4/5/2023 0100 by Emma Gongora RN  Body Position:   turned   right  Goal: Optimal Comfort and Wellbeing  Outcome: Adequate for Care Transition  Goal: Readiness for Transition of Care  Outcome: Adequate for Care Transition           Patient was alert to self.  Disoriented conversation noted at times.  Patient denied pain.  Allowed staff to reposition a few times, once he refused and wanted to stay in his place.  No signs of bleeding noted.  External catheter in place.  Staff grouped cares.  Will continue to monitor.         "

## 2023-04-05 NOTE — PLAN OF CARE
Occupational Therapy Discharge Summary    Reason for therapy discharge:    No further expectations of functional progress.  Patient's goals of care has changed, discharging to LTC with hospice    Progress towards therapy goal(s). See goals on Care Plan in Epic electronic health record for goal details.  Goals not met.  Barriers to achieving goals:   transitioning to hospice.    Therapy recommendation(s):    No further therapy is recommended.

## 2023-04-05 NOTE — PROGRESS NOTES
Pt coughs and is gurgly after drinking. Dr. Gilbert at bedside and is aware. Will discuss with daughter

## 2023-04-05 NOTE — PROGRESS NOTES
[facility]  Palliative Care Daily Progress Note       Recommendations & Counseling     Reviewed with Sommer and Rossi that Luke voices goals of care consistent with comfort-focus, yet continues on many life-prolonging therapies.  Sommer and Rossi assent to stopping PT/OT.  Counseled Sommer and Rossi regarding dysphagia with pills (cough/rhonchi after taking meds with sips), she and Rossi (wife) assent to transition to comfort focused goals of care in hospital and comfort care.  If clinical decline in hospital, no escalation of care to ICU or NIPPV.  Counselled on insulin sliding scale, glucose checks; for now continue sitagliptin but stop insulin and have prn glucose check if signs of hypoglycemia.  Low threshold to stop sitagliptin.  -> orders placed from comfort care order set.  -> hydromorphone prn for pain or dyspnea.  -> if encephalopathy with agitation, may use haloperidol.  ->lorazepam prn for anxiety (if no delirium)  -> bowel regimen scheduled as no BM documented since 3/30.    Symptom assessment:  1. Pain: low back/buttock  - repositioning  - if severe pain, opioid PRN  - continue specialized mattress    2. Dysphagia: chronic (prior stroke, had dysphagia afterward).  - diet for comfort; family aware of risk aspiration and do not wish to limit diet, and instead allow Luke to eat for comfort.  - continue oral meds until unable to swallow.  - offered SLP for follow up visit; pt and family declined as modified diet not welcomed    3. Uremia/nausea from ESRD.  - not a candidate for HD  - sarna ordered for pruritis  - antiemetic meds  - diet ad franky.  - may develop more dyspnea as urine output declines    4. Generalized weakness:  - PPS now 20-30%  - reposition for comfort.    5. Atrial fib and prior stroke  - warfarin held as INR elevated yesterday.  - family assent to stopping warfarin/anticoagulation completely given decreased life expectancy.  - continue metoprolol until unable  "to swallow.    Goals of care: now comfort focused and on comfort care.    Advanced care planning: no HCD  POLST (dnr/comfort) completed over weekend  DNR/DNI  Surrogate decision makers:  wife, Rossi Nagy and Sommerchelsea Zamudio, DTR    Support: Cindy juarez  - palliative will continue to follow  - [facility] hospice team working to schedule time to meet with family          Assessments          Suman Nagy is a 82 year old male admitted 3/23/2023 with increased weakness and inability to get OOB. Patient transfused for a Hgb of 5.9.   PMH CVA with residual left sided weakness and WC and bed bound at baseline last 2 years. Patient also with CKD and declining renal function. Nephrology following as an outpatient and potential for dialysis and were seeing him weekly in preparation for potential dialysis then he was admitted.  Since admission, he has had declining functional status, today is observed to be coughing with rhonchi after taking medications and liquids by mouth.       Today, the patient was seen for:  Follow up on goals of care, symptom management, polypharmacy in context of comfort focused goals of care.    Prognosis, Goals, or Advance Care Planning was addressed today with: Yes.  Mood, coping, and/or meaning in the context of serious illness were addressed today: Yes.              Interval History:     Chart review/discussion with unit or clinical team members:   Chart reviewed, notes from [facility] (Dr Cano), [facility] hospice team, care manager, nephrology read and appreciated.  Overnight, intermittently confused.  Today, had some food, but RN noting difficulties with swallowing and rhonchi after meds with sips of fluids.     Per patient or family/caregivers today:  Luke notes feeling \"not great\", stomach feels unsettled. Feels constipated, no BM for at least 3 days per patient (since 3/30 per charting).  No vomiting, denies nausea, denies reduced appetite.  Doesn't want to sit in chair (painful to sit up) " and wants to just rest.  Denies shortness of breath at rest.  Wants to be able to eat, drink as he wishes.  Offers no other concerns.    Key Palliative Symptoms:  # Pain severity the last 12 hours: low  # Dyspnea severity the last 12 hours: low  # Nausea severity the last 12 hours: low  # Anxiety severity the last 12 hours: low           Medications:     I have reviewed this patient's medication profile and medications during this hospitalization.    Noted meds:    Current Facility-Administered Medications   Medication     acetaminophen (TYLENOL) tablet 650 mg    Or     acetaminophen (TYLENOL) Suppository 650 mg     acetaminophen (TYLENOL) tablet 650 mg     amLODIPine (NORVASC) tablet 10 mg     calcium carbonate (TUMS) chewable tablet 1,000 mg     cloNIDine (CATAPRES) tablet 0.3 mg     glucose gel 15-30 g    Or     dextrose 50 % injection 25-50 mL    Or     glucagon injection 1 mg     diphenhydrAMINE (BENADRYL) capsule 25 mg     fluticasone (FLONASE) 50 MCG/ACT spray 1 spray     guaiFENesin (ROBITUSSIN) 20 mg/mL solution 10 mL     HOLD: Caffeine containing medications 12 hours prior to the procedure     HOLD: dipyridamole (PERSANTINE) or aspirin/dipyridamole (AGGRENOX) 48 hours prior to the procedure     HOLD: theophylline or aminophylline 12 hours prior to the procedure     hydrALAZINE (APRESOLINE) injection 10 mg     hydrALAZINE (APRESOLINE) tablet 100 mg     IF patient diabetic - HOLD: ALL ORAL HYPOGLYCEMICS and include: glipizide, glyburide, glimepiride, gliclazide, metformin, any metformin containing medication, on day of the procedure     insulin aspart (NovoLOG) injection (RAPID ACTING)     insulin aspart (NovoLOG) injection (RAPID ACTING)     melatonin tablet 3 mg     metoprolol succinate ER (TOPROL XL) 24 hr tablet 75 mg     ondansetron (ZOFRAN ODT) ODT tab 4 mg    Or     ondansetron (ZOFRAN) injection 4 mg     Patient is already receiving anticoagulation with heparin, enoxaparin (LOVENOX), warfarin  "(COUMADIN)  or other anticoagulant medication     prazosin (MINIPRESS) capsule 1 mg     senna-docusate (SENOKOT-S/PERICOLACE) 8.6-50 MG per tablet 1 tablet    Or     senna-docusate (SENOKOT-S/PERICOLACE) 8.6-50 MG per tablet 2 tablet     sertraline (ZOLOFT) tablet 50 mg     sitagliptin (JANUVIA) tablet 25 mg     sodium chloride (PF) 0.9% PF flush 1-10 mL     Vitamin D3 (CHOLECALCIFEROL) tablet 50 mcg     Warfarin Dose Required Daily - Pharmacist Managed     warfarin-No DOSE today   24-hr MME: 0mg             Physical Exam:   Vital Signs: Blood pressure (!) 148/67, pulse 62, temperature 98.9  F (37.2  C), temperature source Oral, resp. rate 20, height 1.702 m (5' 7\"), weight 73.2 kg (161 lb 6 oz), SpO2 90 %.   Vitals:    04/01/23 0345 04/02/23 0412 04/03/23 0618 04/04/23 0416   Weight: 73.5 kg (162 lb 0.6 oz) 77.4 kg (170 lb 10.2 oz) 76.3 kg (168 lb 3.4 oz) 72 kg (158 lb 11.7 oz)    04/05/23 0324   Weight: 73.2 kg (161 lb 6 oz)       Intake/Output Summary (Last 24 hours) at 4/5/2023 1622  Last data filed at 4/5/2023 0900  Gross per 24 hour   Intake 240 ml   Output 300 ml   Net -60 ml       GENERAL: Alert 81 yo male, appears chronically ill.  Supine in bed.  Conversant, pleasant.  SKIN: Warm and dry, pale, multiple ecchymoses over arms, legs.  Plaques over extensor aspect of both lower legs noted.    HEENT: Normocephalic, anicteric sclera, moist mucous membranes and temporal muscle wasting noted.  LUNGS: Clear to auscultation anterolaterally; non-labored, with rhonchi noted and coughing after taking sips of fluids.  CARDIAC: RRR, normal s1/s2, w/o m/r/g   ABDOMINAL: BS (+), soft, non distended, non tender  : external urine catheter in place, chris urine.  MUSKL: weak L arm and L leg.  EXTREMITIES: 2+ edema lower extremities, and 2+ edema L arm to shoulder.  NEUROLOGIC: L sided weakness.  Oriented to self and partly to situation.  PSYCH: affect full, normal psychomotor activity.             Data Reviewed: "     Reviewed recent pertinent imaging:   No new imaging    Reviewed recent labs:   Last Comprehensive Metabolic Panel:  Lab Results   Component Value Date     (L) 04/02/2023    POTASSIUM 4.9 04/02/2023    CHLORIDE 96 (L) 04/02/2023    CO2 17 (L) 04/02/2023    ANIONGAP 18 (H) 04/02/2023     (H) 04/05/2023    .9 (H) 04/02/2023    CR 6.75 (H) 04/02/2023    GFRESTIMATED 8 (L) 04/02/2023    KARTHIKEYAN 7.6 (L) 04/02/2023       CBC RESULTS: Recent Labs   Lab Test 03/31/23  1402 03/28/23  2239 03/25/23  0434   WBC  --   --  4.1   RBC  --   --  2.74*   HGB 8.3*   < > 8.1*   HCT  --   --  25.6*   MCV  --   --  93   MCH  --   --  29.6   MCHC  --   --  31.6   RDW  --   --  17.3*   PLT  --   --  144*    < > = values in this interval not displayed.           Taryn Gilbert MD  [facility] Palliative Medicine Service: [facility]  Department voice mail (checked M-F 8a-4pm approx): [phone]  [facility] Palliative Medicine pager: [phone]  (Please use AMION for text paging if possible, use link under Palliative service)  Or may securely message me via Vocera Web Console

## 2023-04-06 NOTE — PLAN OF CARE
Problem: Palliative Care  Goal: Enhanced Quality of Life  Outcome: Progressing     No issues overnight.  Pt denied any pain.  Pt comfortable and repositioned PRN.

## 2023-04-06 NOTE — PROGRESS NOTES
Care Management Follow Up    Length of Stay (days): 14    Expected Discharge Date: 04/07/2023     Concerns to be Addressed:       Patient plan of care discussed at interdisciplinary rounds: Yes    Anticipated Discharge Disposition: Hospice, Long Term Care     Anticipated Discharge Services:    Anticipated Discharge DME:      Patient/family educated on Medicare website which has current facility and service quality ratings:    Education Provided on the Discharge Plan:    Patient/Family in Agreement with the Plan:      Referrals Placed by CM/SW:    Private pay costs discussed: Not applicable    Additional Information:  CM received call from [facility] stating they could accept patient today if they receive a copy of MA application. CM called patient's daughter Sommer who states she hasn't filled it out yet but plans to come to the hospital later today and will fill it out. CM offered to fax to the [facility] for her if she brings it in by 4pm today. [facility] states that if CM can send copy of MA megan this afternoon, they could accept patient tomorrow.       GIANNA GouldW

## 2023-04-06 NOTE — PROGRESS NOTES
[facility]    Medicine Progress Note - Hospitalist Service    Date of Admission:  3/23/2023    Assessment & Plan   Suman Nagy is a 82 year old male with history of A-fib and prior stroke, DM2, CKD4, chronic CHF and history of bladder and rectal cancer who is admitted on 3/23/2023 with symptomatic anemia and progressive renal disease.  S/p 2 units of packed red cells.  No indication for dialysis at this time and he would not be a good dialysis candidate.  Luke ultimately decided he wishes to be on Hospice and be DNR/DNI.      CKD stage 5   -Creatinine has continued to rise, no longer checking labs noted to have worsening edema   -Nephrology has been following, no indication for dialysis at this time and he would be a poor dialysis candidate which was reiterated to the patient many times   -Patient wishes to proceed with Hospice      Anemia of chronic disease -no further labs  -S/p 2 units PRBC on 3/23      Insomnia:   Started on Benadryl at bedtime which has been helpful.  As he also has a lot of drainage down the back of his throat.                  Paroxysmal A-fib:   -Previously on Eliquis, changed to warfarin -INR was high, family agrees with no further labs thus Coumadin discontinued   -Continue rate control with metoprolol     Essential hypertension   -Previously on Norvasc hydralazine metoprolol   -Clonidine 0.2 3 times daily was added   -Has as needed hydralazine     Diabetes, type II:   - PTA Lantus 16 units qam, trajenda 5mg daily.   - Continue Novolog sliding scale.   - Added Januvia 25 mg daily     Left upper extremity swelling:             - US negative for DVT on 3/25,             -Did show superficial venous thrombosis             -Extensive subcu edema             -Stopping Coumadin     Left shoulder edema.            -Ultrasound 3/25 moderate to large complex fluid collection            -Presumed joint effusion     History of stroke: Stopping Coumadin       Diet:  "Regular Diet Adult    DVT Prophylaxis: Comfort based care  Benz Catheter: Not present  Lines: None     Cardiac Monitoring: None  Code Status: No CPR- Do NOT Intubate      Clinically Significant Risk Factors              # Hypoalbuminemia: Lowest albumin = 2.6 g/dL at 4/2/2023  5:13 AM, will monitor as appropriate           # Overweight: Estimated body mass index is 25.28 kg/m  as calculated from the following:    Height as of this encounter: 1.702 m (5' 7\").    Weight as of this encounter: 73.2 kg (161 lb 6 oz).          Disposition Plan      Expected Discharge Date: 04/07/2023    Discharge Delays: Placement - LTC  *Medically Ready for Discharge  Destination: nursing home  Discharge Comments: Hospice consult pending; may need SNF.  Need to figure out funding?          Terrence Emerson MD  Hospitalist Service  [facility]  Securely message with "[employer] (more info)  Text page via [facility] Paging/Directory   ______________________________________________________________________    Interval History   Patient new to me today.  Chart, labs and imaging reviewed.  Patient has been receiving comfort based care.  He stated that he has occasional cough otherwise denies having significant pain.  Management plan discussed with RN and case management    Physical Exam   Vital Signs: Temp: 98.4  F (36.9  C) Temp src: Oral BP: (!) 154/91 Pulse: 60   Resp: 18 SpO2: 90 % O2 Device: Nasal cannula Oxygen Delivery: 3 LPM  Weight: 161 lbs 6.03 oz    General Appearance: Appears to be mildly confused.  No distress noted  Respiratory: Good air entry bilaterally  Cardiovascular: [place] and S2 well heard, no murmur or gallop  GI: Soft abdomen, no tenderness, normoactive bowel sounds  Skin: Intact and warm      Medical Decision Making       45 MINUTES SPENT BY ME on the date of service doing chart review, history, exam, documentation & further activities per the note.      Data     "

## 2023-04-06 NOTE — PROGRESS NOTES
CLINICAL NUTRITION SERVICES NOTE    Pt is Comfort Care.  Clinical Nutrition Services will sign off.  RD available, if consulted.

## 2023-04-06 NOTE — PROGRESS NOTES
[facility]  Palliative Care Daily Progress Note      Code status: No CPR- Do NOT Intubate     Impressions, Recommendations & Counseling     SYMPTOM ASSESSMENT:  1. Pain: low back/buttock  - reposition for comfort  - if severe pain, opioid PRN  - continue specialized mattress     2. Dysphagia: chronic (prior stroke, had dysphagia afterward).  - Comfort diet - family aware of risk aspiration and do not wish to limit diet  - continue oral meds until unable to swallow.     3. Uremia/nausea from ESRD.  - not a candidate for HD  - sarna ordered for pruritis  - antiemetic meds  - diet ad franky.  - may develop more dyspnea as urine output declines     4. Generalized weakness:  - PPS now 20-30%  - reposition for comfort.     5. Atrial fib and prior stroke  - warfarin held as INR elevated yesterday.  - family assent to stopping warfarin/anticoagulation completely given decreased life expectancy.  - continue metoprolol until unable to swallow.     GOALS OF CARE:  - Continue comfort cares.     Advanced care planning: no HCD  POLST (dnr/comfort) completed over weekend  DNR/DNI  Surrogate decision makers:  wife, Rossi Nagy and Sommer Zamudio, DTR     Support: Cindy juarez  - palliative will continue to follow  - [facility] hospice team working to schedule time to meet with family        HPI     Notes and chart reviewed.       Suman Nagy is a 82 year old male admitted 3/23/2023 with increased weakness and inability to get OOB. Patient transfused for a Hgb of 5.9.   PMH CVA with residual left sided weakness and WC and bed bound at baseline last 2 years. Patient also with CKD and declining renal function. Nephrology following as an outpatient and potential for dialysis and were seeing him weekly in preparation for potential dialysis then he was admitted per daughter, Sommer report.    Patient not a dialysis candidate.  Showing signs of aspiration. Family declined addition evaluation from SLP and wish to  allow for comfort diet.     Today, the patient was seen for:  Goals of care discussion, symptom manage and assessment            Interval History:     Chart review/discussion with unit or clinical team members:   Afebrile. VSS. 90% on 3L NC. NAD. Watching TV in bed and desired to watch his show.             Review of Systems:     ROS was reviewed and is unremarkable.  Pain: none  Dyspnea: none  Weakness/fatigue: severe  Anxiety: none  Nausea: none  Constipation: No documented BM since 3/30          Medications:     I have reviewed this patient's medication profile and medications during this hospitalization.           Physical Exam:   Temp:  [97.9  F (36.6  C)-98.4  F (36.9  C)] 98.4  F (36.9  C)  Pulse:  [57-61] 57  Resp:  [18] 18  BP: (114-139)/(42-62) 114/42  SpO2:  [90 %-95 %] 90 %    General appearance: alert, cooperative, fatigued and no distress  Head: Normocephalic, without obvious abnormality, atraumatic, temporal wasting noted  Eyes: lids and lashes noted  Throat: moist oral mucosa  Lungs: non-labored, wet sounding cough noted  Heart: regular rate, no murmur  Abdomen: soft, non-tender  Extremities: 2+ lower extremity edema noted  Neurologic: alert. Oriented x3. Left sided weakness noted        TTS: I have personally spent a total of 25 minutes today on unit in review of medical record and assessment of patient today and benefits of management options, and development of plan of care as noted above.    SURYA Lopez, [facility] Palliative Medicine Service: [facility]  Department voice mail (checked M-F 8a-4pm approx): [phone]  [facility] Palliative Medicine pager: [phone]  (Please use AMION for text paging if possible, use link under Palliative service)

## 2023-04-06 NOTE — PLAN OF CARE
Goal Outcome Evaluation:    Pt transitioning to comfort cares. Disoriented to time/ situation. LS diminished. 2L NC. Denies pain. Prn milk of mag given for constipation, -BM. Mepilex on coccyx and R leg changed. Prn aquaphor and dermasarra given for lips and legs. Dentures cleaned. Mouth care done. Takes pills one at a time with small sips of soda. Minimal intake. Turns q2h. Bed alarm on, call light in reach.       Problem: Risk for Delirium  Goal: Improved Behavioral Control  Intervention: Minimize Safety Risk  Recent Flowsheet Documentation  Taken 4/5/2023 1600 by Amanda Vernon RN  Enhanced Safety Measures: room near unit station  Goal: Improved Attention and Thought Clarity  Intervention: Maximize Cognitive Function  Recent Flowsheet Documentation  Taken 4/5/2023 1600 by Amanda Vernon RN  Sensory Stimulation Regulation: care clustered  Reorientation Measures: clock in view     Problem: Risk for Delirium  Goal: Optimal Coping  Outcome: Progressing  Goal: Improved Behavioral Control  Outcome: Progressing  Intervention: Minimize Safety Risk  Recent Flowsheet Documentation  Taken 4/5/2023 1600 by Amanda Vernon RN  Enhanced Safety Measures: room near unit station  Goal: Improved Attention and Thought Clarity  Outcome: Progressing  Intervention: Maximize Cognitive Function  Recent Flowsheet Documentation  Taken 4/5/2023 1600 by Amanda Vernon RN  Sensory Stimulation Regulation: care clustered  Reorientation Measures: clock in view  Goal: Improved Sleep  Outcome: Progressing     Problem: Chronic Kidney Disease  Goal: Optimal Coping with Chronic Illness  Outcome: Progressing  Goal: Electrolyte Balance  Outcome: Progressing  Goal: Fluid Balance  Outcome: Progressing  Goal: Optimal Functional Ability  Outcome: Progressing  Goal: Minimize Renal Failure Effects  Intervention: Monitor and Support Renal Function  Recent Flowsheet Documentation  Taken 4/5/2023 1600 by Amanda Vernon RN  Medication  Review/Management: medications reviewed     Problem: Wound Healing Progression  Goal: Optimal Wound Healing  Outcome: Progressing

## 2023-04-07 NOTE — PROGRESS NOTES
[facility]  Palliative Care Daily Progress Note       Recommendations & Counseling      Significant change in past 24 hours including inability to eat, aspiration symptoms yesterday w attempting to eat.    ->anticipatory guidance provided to family for dying process--Looks like luke is actively dying.  Still has palpable radial pulses, urine output >100ml in 12 hours, no cyanosis, no cheyne hernandez respirations and no mandibular movement with breathing.  Favor short days but could be less.  ->change oral hydromorphone to oxycodone intensol (see below).  ->if.when unable to swallow pills, would discontinue cardiac medictions as well.  ->if Luke removes oxymask, would allow him to keep it off and use opioids for air hunger instead.  Consider weaning to see if tolerated as day progresses.  ->updated Dr Emerson    Symptom assessment:     1. Pain: low back/buttock  - repositioning as able  - noting some pain, low dose oxycodone intensol (2mg) ordered q6H scheduled w hold parameters, and additional available if breakthrough pain or air hunger.  - if severe pain, opioid PRN  - continue specialized mattress     Dysphagia: chronic (prior stroke, had dysphagia afterward).  - diet for comfort; family aware of risk aspiration and do not wish to limit diet, and instead allow Luke to eat for comfort.  - continue oral meds until unable to swallow.  Suspect will not be able to swallow meds in next 24 hours.    Dyspnea: multifactorial including volume overload/pulm edema from ESRD and also aspiration event.  - Oxygen for comfort only--no sat goals.  Encourage weaning from oxymask  - use opioid for air hunger (oxycodone intensol 2mg SL q6H scheduled plus additional PRN.  It is anticipated he WILL NEED PRN DOSING, RN should provide med if appears tachypneic or uncomfortable.    -->over weekend, if more than 2 PRN doses needed, would increase scheduled oxycodone intensol to 3mg SL q6H.    Uremia/nausea from  ESRD.  - not a candidate for HD  - sarna ordered for pruritis  - antiemetic meds  - diet ad franky- not tolerating.  Continue attentive oral cares.  - may develop more dyspnea as urine output declines     Generalized weakness:  - PPS now 10-20%  - reposition for comfort.     Atrial fib and prior stroke  - warfarin held as INR elevated yesterday.  - family assented to stopping warfarin/anticoagulation completely given decreased life expectancy.  - continue metoprolol until unable to swallow (likely to discontinue tomorrow if remains minimally responsive).    Goals of care: comfort focused goals of treatment and on comfort care: life expectancy appears hours-days.    Advanced care planning: no HCD  POLST (dnr/comfort) completed   DNR/DNI  Surrogate decision makers:  wife, Rossi Nagy and Sommer Zamudio, DTR     Support: Cindy juarez  - palliative will continue to follow: next visit would be 4/10/23.             Assessments          Suman Nagy is a 82 year old male admitted 3/23/2023 with increased weakness and inability to get OOB. Patient transfused for a Hgb of 5.9.   PMH CVA with residual left sided weakness and WC and bed bound at baseline last 2 years. Patient also with CKD and declining renal function. Nephrology following as an outpatient and potential for dialysis and were seeing him weekly in preparation for potential dialysis then he was admitted.  Since admission, he has had declining functional status, earlier this week, was observed to be coughing with rhonchi after taking medications and liquids by mouth.  Last night had hypoxia and dyspnea after attempting to eat supper and today has been obtunded, minimally responsive.    Today, the patient was seen for:  End of life symptom management, anticipatory guidance to family.    Prognosis, Goals, or Advance Care Planning was addressed today with: Yes.  Mood, coping, and/or meaning in the context of serious illness were addressed today: Yes.    "           Interval History:     Chart review/discussion with unit or clinical team members:   Chart reviewed, noted events overnight.  ?? If truly had 3000ml urine output earlier today (suspect typo and was really 300)  Minimal intake of food.  Continued coughing with intake yesterday.    Did take medications    Per patient or family/caregivers today:  When asked \"are you hurting?\" Luke notes \"yes, some.\"  Offers no other history.    Several family members present including daughter Sommer; she notes Luke has not been responsive today, and has appeared comfortable except occasional grimace/wincing.  Not eating.  One son will be arriving Sunday from [place].           Medications:     I have reviewed this patient's medication profile and medications during this hospitalization.    Noted meds:    Current Facility-Administered Medications   Medication     acetaminophen (TYLENOL) tablet 650 mg    Or     acetaminophen (TYLENOL) Suppository 650 mg     amLODIPine (NORVASC) tablet 10 mg     bisacodyl (DULCOLAX) suppository 10 mg     camphor-menthol (DERMASARRA) lotion     cloNIDine (CATAPRES) tablet 0.3 mg     fluticasone (FLONASE) 50 MCG/ACT spray 1 spray     guaiFENesin (ROBITUSSIN) 20 mg/mL solution 10 mL     haloperidol (HALDOL) 2 MG/ML (HIGH CONC) solution 1 mg     hydrALAZINE (APRESOLINE) injection 10 mg     hydrALAZINE (APRESOLINE) tablet 100 mg     HYDROmorphone (PF) (DILAUDID) injection 0.5 mg     LORazepam (ATIVAN) 2 MG/ML (HIGH CONC) oral solution 1 mg     melatonin tablet 3 mg     metoprolol succinate ER (TOPROL XL) 24 hr tablet 75 mg     mineral oil-hydrophilic petrolatum (AQUAPHOR)     ondansetron (ZOFRAN ODT) ODT tab 4 mg    Or     ondansetron (ZOFRAN) injection 4 mg     oxyCODONE (ROXICODONE INTENSOL) 20 mg/mL (HIGH CONC) solution 2 mg     oxyCODONE (ROXICODONE INTENSOL) 20 mg/mL (HIGH CONC) solution 5 mg     prazosin (MINIPRESS) capsule 1 mg     senna-docusate (SENOKOT-S/PERICOLACE) 8.6-50 MG per tablet " "1 tablet    Or     senna-docusate (SENOKOT-S/PERICOLACE) 8.6-50 MG per tablet 2 tablet     sennosides (SENOKOT) tablet 1 tablet     sertraline (ZOLOFT) tablet 50 mg     sodium chloride (PF) 0.9% PF flush 1-10 mL     trolamine salicylate (ASPERCREME) 10 % cream     Vitamin D3 (CHOLECALCIFEROL) tablet 50 mcg     24-hr MME: 0mg  24-hr benzodiazepine: 1mg lorazepam x1           Physical Exam:   Vital Signs: Blood pressure 125/57, pulse 69, temperature 98.2  F (36.8  C), temperature source Oral, resp. rate 18, height 1.702 m (5' 7\"), weight 73.2 kg (161 lb 6 oz), SpO2 90 %.   GENERAL: minimally responsive, supine w HOB elevated, oxymask in place.   SKIN: Warm and dry with scattered ecchymoses over arms, plaques over lower legs.  HEENT: Normocephalic, anicteric sclera, moist mucous membranes and temporal muscle wasting noted.  Face appears relaxed.  Edentulous.  LUNGS: Rhonchi noted bilaterally, mild accessory muscle use.    CARDIAC: irreg irreg.  ABDOMINAL: BS (+), soft, non distended, non tender  : ext coats noted, minimal urine (chris) in collecting device.  EXTREMITIES: No edema or cyanosis, pulses 2+ and symmetrical  NEUROLOGIC: no myoclonus.  Responds briefly to verbal stim and falls asleep.  PSYCH: calm             Data Reviewed:       Reviewed recent labs:   Last Comprehensive Metabolic Panel:  Lab Results   Component Value Date     (L) 04/02/2023    POTASSIUM 4.9 04/02/2023    CHLORIDE 96 (L) 04/02/2023    CO2 17 (L) 04/02/2023    ANIONGAP 18 (H) 04/02/2023     (H) 04/06/2023    .9 (H) 04/02/2023    CR 6.75 (H) 04/02/2023    GFRESTIMATED 8 (L) 04/02/2023    KARTHIKEYAN 7.6 (L) 04/02/2023             Taryn Gilbert MD  [facility] Palliative Medicine Service: [facility]  Department voice mail (checked M-F 8a-4pm approx): [phone]  [facility] Palliative Medicine pager: [phone]  (Please use AMION for text paging if possible, use link under Palliative service)  Or may securely " message me via Vocera Web Console

## 2023-04-07 NOTE — PLAN OF CARE
Problem: Risk for Delirium  Goal: Optimal Coping  Outcome: Progressing  Goal: Improved Attention and Thought Clarity  Outcome: Progressing  Goal: Improved Sleep  Outcome: Progressing     Problem: Palliative Care  Goal: Enhanced Quality of Life  Outcome: Progressing   Goal Outcome Evaluation:  93% on 12 L oxymask. Denies pain. PRN Ativan given at HS.

## 2023-04-07 NOTE — PLAN OF CARE
Goal Outcome Evaluation:       Problem: Chronic Kidney Disease  Goal: Optimal Functional Ability  Outcome: Progressing  Intervention: Optimize Functional Ability  Recent Flowsheet Documentation  Taken 4/6/2023 1620 by Lizeth Camara, RN  Activity Management:   activity adjusted per tolerance   activity encouraged   Pt. Declines repositioning. He states that he feels like he is trapped when pillows are placed around him. Sacral mepilex in place.     Pt. States he feels like he has a lump of lead in his stomach, no nausea, but not much of an appetite. As needed tums given. Pt. Stated that it helped a little bit.     Lizeth Camara, RN

## 2023-04-07 NOTE — PLAN OF CARE
Problem: Palliative Care  Goal: Enhanced Quality of Life  Outcome: Progressing     Pt comfortable this shift, refusing to lie on his side.   Comfort promoted as per pt request and allowed.  Pt denied any pain.  Pt comfortable on 4L oxymask.

## 2023-04-07 NOTE — PROGRESS NOTES
SPIRITUAL HEALTH SERVICES Progress Note  [facility], P3    Visited with Luke for follow up support. Luke was mostly non-responsive but attempted to respond to questions. Offered prayer of blessing for Luke.    Plan of Care - A  will continue to visit as able or per request by patient/family/staff.      Arvind Valencia MDiv, [facility]  /Manager Spiritual Health Services  [phone]

## 2023-04-07 NOTE — PLAN OF CARE
Problem: Plan of Care - These are the overarching goals to be used throughout the patient stay.    Goal: Absence of Hospital-Acquired Illness or Injury  Intervention: Identify and Manage Fall Risk  Recent Flowsheet Documentation  Taken 4/7/2023 1545 by Lu Gallegos RN  Safety Promotion/Fall Prevention:   activity supervised   assistive device/personal items within reach   clutter free environment maintained   increased rounding and observation   increase visualization of patient   lighting adjusted   mobility aid in reach   nonskid shoes/slippers when out of bed   patient and family education   room door open   room organization consistent   safety round/check completed   supervised activity  Taken 4/7/2023 0855 by Lu Gallegos RN  Safety Promotion/Fall Prevention:   activity supervised   assistive device/personal items within reach   clutter free environment maintained   increased rounding and observation   increase visualization of patient   lighting adjusted   mobility aid in reach   nonskid shoes/slippers when out of bed   patient and family education   room door open   room organization consistent   safety round/check completed   supervised activity  Intervention: Prevent Skin Injury  Recent Flowsheet Documentation  Taken 4/7/2023 1545 by Lu Gallegos RN  Body Position: position maintained  Taken 4/7/2023 0855 by Lu Gallegos RN  Body Position: position maintained   Goal Outcome Evaluation:  Pt is followed by palliative care, on comfort cares.  Pt aspirates frequently, appetite is poor.  Pt has had many family members visiting throughout the day.  Pt on 6L of O2 via oxymask.  Bed linens were changed, pt continues to have dry cough, especially after oral intake.

## 2023-04-07 NOTE — PROGRESS NOTES
[facility]    Medicine Progress Note - Hospitalist Service    Date of Admission:  3/23/2023    Assessment & Plan   Suman Nagy is a 82 year old male with history of A-fib and prior stroke, DM2, CKD4, chronic CHF and history of bladder and rectal cancer who is admitted on 3/23/2023 with symptomatic anemia and progressive renal disease.  S/p 2 units of packed red cells. No indication for dialysis at this time and he would not be a good dialysis candidate.  Luke ultimately decided he wishes to be on Hospice and be DNR/DNI.      CKD stage 5  -Creatinine has continued to rise, no longer checking labs noted to have worsening edema   -Nephrology has been following, no indication for dialysis at this time and he would be a poor dialysis candidate which was reiterated to the patient many times   -Patient wishes to proceed with Hospice .  Plan is to discharge to long-term care on hospice     Anemia of chronic disease -no further labs  -S/p 2 units PRBC on 3/23      Insomnia:   -Started on Benadryl at bedtime which has been helpful.                Paroxysmal A-fib:   -Previously on Eliquis, changed to warfarin -INR was high, family agrees with no further labs thus Coumadin discontinued  -Continue rate control with metoprolol     Essential hypertension   -Previously on Norvasc hydralazine metoprolol   -Clonidine 0.2 3 times daily    -Has as needed hydralazine     Diabetes, type II:   - PTA Lantus 16 units qam, trajenda 5mg daily.   - Continue Novolog sliding scale.   - Added Januvia 25 mg daily     Left upper extremity swelling:     - US negative for DVT on 3/25,     -Did show superficial venous thrombosis     -Extensive subcu edema     -Stopping Coumadin     Left shoulder edema.    -Ultrasound 3/25 moderate to large complex fluid collection    -Presumed joint effusion     History of stroke: Stopping Coumadin       Diet: Regular Diet Adult    DVT Prophylaxis: Comfort based care  Benz Catheter:  "Not present  Lines: None     Cardiac Monitoring: None  Code Status: No CPR- Do NOT Intubate      Clinically Significant Risk Factors              # Hypoalbuminemia: Lowest albumin = 2.6 g/dL at 4/2/2023  5:13 AM, will monitor as appropriate           # Overweight: Estimated body mass index is 25.28 kg/m  as calculated from the following:    Height as of this encounter: 1.702 m (5' 7\").    Weight as of this encounter: 73.2 kg (161 lb 6 oz).          Disposition Plan      Expected Discharge Date: 04/07/2023    Discharge Delays: Placement - LTC  *Medically Ready for Discharge  Destination: nursing home  Discharge Comments: LTC on hospice  Daughter filling out MA application          Terrence Emerson MD  Hospitalist Service  [facility]  Securely message with Realm (more info)  Text page via [employer] Paging/Directory   ______________________________________________________________________    Interval History   Patient has been receiving comfort based care.  Appears to be comfortable.  Management plan discussed with RN and case management    Physical Exam   Vital Signs: Temp: 98.2  F (36.8  C) Temp src: Oral BP: 125/57 Pulse: 69   Resp: 18 SpO2: 90 % O2 Device: Oxymask Oxygen Delivery: 4 LPM  Weight: 161 lbs 6.03 oz    General Appearance: Appears to be mildly confused.  No distress noted  Respiratory: Good air entry bilaterally  Cardiovascular: [place] and S2 well heard, no murmur or gallop  GI: Soft abdomen, no tenderness, normoactive bowel sounds  Skin: Intact and warm      Medical Decision Making       45 MINUTES SPENT BY ME on the date of service doing chart review, history, exam, documentation & further activities per the note.      Data     "

## 2023-04-07 NOTE — PROGRESS NOTES
I went to check on pt. And he stated that he felt like he couldn't breath. Oxygen saturations 79-80% on 3L nasal cannula. Oxygen turned up to 5L , O2 sats still low. Pt. Placed on 10L oxymask and then turned up to 12L on the oxymask with O2sats at 90%. Pt. Stated that his breathing felt better. Pt. Stated that he was feeling anxious, I offered him prn ativan, but he declined at the time.   Pt. Has very congested cough that is non productive and coughs when taking any liquid or food.     Lizeth Camara, RN

## 2023-04-07 NOTE — PROGRESS NOTES
Care Management Follow Up    Length of Stay (days): 15    Expected Discharge Date: 04/07/2023     Concerns to be Addressed:       Patient plan of care discussed at interdisciplinary rounds: Yes    Anticipated Discharge Disposition: Hospice, Long Term Care     Anticipated Discharge Services:    Anticipated Discharge DME:      Patient/family educated on Medicare website which has current facility and service quality ratings:    Education Provided on the Discharge Plan:    Patient/Family in Agreement with the Plan:      Referrals Placed by CM/SW:    Private pay costs discussed: Not applicable    Additional Information:  Patient has been clinically accepted at [facility] however they need a copy of patient's MA application. CM spoke to patient's daughter and she apologized for not making it out to the hospital until after 4 yesterday. She states that she is still working on the application as her mom needs to put together some documents for the application. CM let daughter know that if she has questions or needs help with application that FSW could likely assist, she declines need for help at this time. Daughter states she is off of work today so she is hopeful to finish the megan today and will let CM know if she has any questions.     CM asked if family has chosen a hospice agency, daughter states that she has called a few agencies but hasn't heard back and asked if CM could leave list of agencies in patient's room.     Social History  Patient lives in a house with his daughter and wife He uses a walker or wheelchair for ambulation. Patient to discharge to LTC on hospice.       Kelly Doran, GIANNAW

## 2023-04-08 NOTE — PROGRESS NOTES
This writer was notified by nursing staff that patient had . Family (daughter Sommer) was notified by hospitalist and is en route. This writer left message for [facility] with this update as they were anticipating admitting patient for hospice care.    DORA Vann

## 2023-04-08 NOTE — PROGRESS NOTES
"Date of Admission:  3/23/2023  Hospital Day: 16    Pain/ discomfort: comfort cares, PRN Rx given for cough/breathing/anxiety with s/s improved. Pt comfort cares order active.    Problem List:  Generalized muscle weakness [M62.81]  Symptomatic anemia [D64.9]  Chronic kidney disease, unspecified CKD stage [N18.9]   Assessment:  Problem: Pain Acute  Goal: Optimal Pain Control and Function  Outcome: Progressing  Intervention: Prevent or Manage Pain  Recent Flowsheet Documentation  Taken 4/7/2023 2000 by Tyson Eaton RN  Medication Review/Management: medications reviewed   Goal Outcome Evaluation:  -COMFORT cares order, vitals and oxygen per pt request. O2 was up to 12 L O2, now titrated back down to 10 LPM oxygen and pt reporting improvement. PRN Rx given.  Notified Dr. Rodolfo Drew at the following times below about pt being on comfort cares and if anti-HTN Rx orders still wanted, see below:    0749     \"pt on COMFORT* cares -- note states \"Previously on Norvasc hydralazine metoprolol\", again comfort cares, but still has scheduled anti-HTN Rx. please advise. thank you.\"    0751     \"I did not see a goal, if anti-HTN Rx is to be continued until he gets to LTC hospice(?).\"  0753      \"looks like SBP in last 24-hr has ranged up to 148 and down to 125 today\"    Received call-back from provider above with medication orders modified per provider. PRN hydralazine available.  Progress:  No acute events. Pt resting comfortably.    BP (!) 165/72   Pulse 95   Temp 98.2  F (36.8  C) (Oral)   Resp 24   Ht 1.702 m (5' 7\")   Wt 73.2 kg (161 lb 6 oz)   SpO2 90%   BMI 25.28 kg/m      Respiratory monitoring:   Resp: 24      Cardiac Monitoring: None       I/O last 3 completed shifts:  In: -   Out: 3200 [Urine:3200]    Wt Readings from Last 2 Encounters:   04/05/23 73.2 kg (161 lb 6 oz)   07/05/22 66.2 kg (146 lb)        24 Hour Vital Signs Summary:  Temp: 98.2  F (36.8  C) Temp  Min: 98.2  F (36.8  C)  Max: 98.2  F (36.8 "  C)  Resp: 24 Resp  Min: 18  Max: 24  SpO2: 90 % SpO2  Min: 70 %  Max: 93 %  Pulse: 95 Pulse  Min: 57  Max: 95    No data recorded  BP: (!) 165/72 Systolic (24hrs), Av , Min:125 , Max:165   Diastolic (24hrs), Av, Min:57, Max:72      Tyson Eaton RN  .................................................... 2023   10:30 PM  [facility]

## 2023-04-08 NOTE — PLAN OF CARE
Problem: Pain Acute  Goal: Optimal Pain Control and Function  Outcome: Progressing  Intervention: Prevent or Manage Pain  Recent Flowsheet Documentation  Taken 4/8/2023 0052 by Chantale Mejia RN  Medication Review/Management: medications reviewed       Goal Outcome Evaluation: Patient arouses with voice and oriented to self. Patient denied pain. Patient repositioned with assist x2 for comfort. Patient's RR between 22-24. SpO2 87%. Using 15L oxygen via oxymask. Scheduled oral oxycodone given for air hunger. Prn ativan given for anxiety and effective. Patient coughs and clears throat frequently. Aspiration precation in place. Call-light within reach.

## 2023-04-08 NOTE — PROGRESS NOTES
Care Management Follow Up    Length of Stay (days): 16    Expected Discharge Date: 4/9/2023     Concerns to be Addressed:     Patient/family working to secure funding for LTC with hospice placement  Patient plan of care discussed at interdisciplinary rounds: Yes    Anticipated Discharge Disposition: Hospice, Long Term Care     Anticipated Discharge Services:  LTC with hospice  Anticipated Discharge DME:  None identified    Patient/family educated on Medicare website which has current facility and service quality ratings:  N/A  Education Provided on the Discharge Plan:    Patient/Family in Agreement with the Plan:      Referrals Placed by CM/SW:    Private pay costs discussed: Not applicable    Additional Information:  Patient has been clinically accepted at [facility] however they need a copy of patient's MA application. CM is waiting for daughter to complete patient's MA application as she is still putting together some needed documents. Daughter is hoping to bring completed application into the hospital this weekend.    List of hospice agencies left in room yesterday for patient's daughter. We are awaiting word of her agency preference.      Social History  Prior to admission, patient lived in a house with his daughter and wife.  He used a walker or wheelchair for ambulation. Patient to discharge to LTC on hospice.     VIRGIE StreeterSW

## 2023-04-08 NOTE — PROGRESS NOTES
Expiration note    I was called by RN to pronounce death.  When I arrived patient was unresponsive to verbal or noxious stimuli, no breathing movements noticed, no precordial activity upon auscultation and absent corneal reflex.  Patient is pronounced dead at 12:35 PM.  Patient was on comfort cares. Called his daughter (Sommer) to notify.    Terrence Emerson MD  [facility]

## 2023-04-08 NOTE — DISCHARGE SUMMARY
[facility]  Hospitalist Discharge Summary      Date of Admission:  3/23/2023  Date of Discharge:  2023  4:50 PM  Discharging Provider: Terrence Emerson MD  Discharge Service: Hospitalist Service    Discharge Diagnoses   CKD stage V  Anemia of chronic disease  Insomnia  Paroxysmal A-fib  Essential hypertension  Type 2 diabetes mellitus  History of CVA  Follow-ups Needed After Discharge       Unresulted Labs Ordered in the Past 30 Days of this Admission     No orders found from 2023 to 3/24/2023.      These results will be followed up by     Discharge Disposition   Discharged to home    Condition at discharge:       Hospital Course   Suman Nagy is a 82 year old male with history of A-fib and prior stroke, DM2, CKD4, chronic CHF and history of bladder and rectal cancer who is admitted on 3/23/2023 with symptomatic anemia and progressive renal disease.  S/p 2 units of packed red cells. No indication for dialysis at this time and he would not be a good dialysis candidate.  Luke ultimately decided he wishes to be on Hospice and be DNR/DNI.  Patient's medical condition continuously declined due to progressive kidney failure  On 2023 at 12:35 PM patient  due to respiratory arrest.           Consultations This Hospital Stay   NEPHROLOGY IP CONSULT  CARE MANAGEMENT / SOCIAL WORK IP CONSULT  SOCIAL WORK IP CONSULT  WOUND OSTOMY CONTINENCE NURSE  IP CONSULT  PALLIATIVE CARE ADULT IP CONSULT  CARDIOLOGY IP CONSULT  PHARMACY TO DOSE WARFARIN  CARE MANAGEMENT / SOCIAL WORK IP CONSULT  PHYSICAL THERAPY ADULT IP CONSULT  OCCUPATIONAL THERAPY ADULT IP CONSULT  INTEGRATIVE THERAPIES CONSULT  OCCUPATIONAL THERAPY ADULT IP CONSULT  INPATIENT HOSPICE ADULT CONSULT    Code Status   No CPR- Do NOT Intubate    Time Spent on this Encounter   ITerrence MD, personally saw the patient today and spent greater than 30 minutes discharging this patient.        Terrence Emerson MD  [facility]  [address] [place] [id number]  Phone: [phone]  Fax: [phone]  ______________________________________________________________________    Physical Exam   Vital Signs:   Temp src: Oral BP: 138/66 Pulse: 78   Resp: 20 SpO2: (!) 87 % O2 Device: Oxymask Oxygen Delivery: 15 LPM  Weight: 155 lbs 13.84 oz         Primary Care Physician   Telly Torre    Discharge Orders      Follow Up (TCM)    Follow up in renal clinic ([facility])  Call [phone] to make appt       Significant Results and Procedures       Discharge Medications   Current Discharge Medication List      CONTINUE these medications which have NOT CHANGED    Details   acetaminophen (TYLENOL) 325 MG tablet [ACETAMINOPHEN (TYLENOL) 325 MG TABLET] Take 2 tablets (650 mg total) by mouth every 4 (four) hours as needed.  Refills: 0    Associated Diagnoses: Urinary tract infection without hematuria, site unspecified      amLODIPine (NORVASC) 10 MG tablet Take 1 tablet (10 mg) by mouth daily  Qty: 90 tablet, Refills: 3    Associated Diagnoses: Essential hypertension      atorvastatin (LIPITOR) 20 MG tablet TAKE 1 TABLET (20 MG TOTAL) BY MOUTH DAILY.  Qty: 90 tablet, Refills: 1    Associated Diagnoses: Carotid artery stenosis, asymptomatic, left      calcium, as carbonate, (TUMS) 200 mg calcium (500 mg) chewable tablet [CALCIUM, AS CARBONATE, (TUMS) 200 MG CALCIUM (500 MG) CHEWABLE TABLET] Chew 1 tablet (200 mg total) 3 (three) times a day as needed for heartburn.  Refills: 0    Associated Diagnoses: Heartburn      cholecalciferol, vitamin D3, (VITAMIN D3) 1,000 unit capsule [CHOLECALCIFEROL, VITAMIN D3, (VITAMIN D3) 1,000 UNIT CAPSULE] Take 2 capsules (2,000 Units total) by mouth daily.  Qty: 180 capsule, Refills: 0    Associated Diagnoses: Vitamin D deficiency      cloNIDine (CATAPRES) 0.3 MG tablet TAKE 1/2 PILL IN THE MORNING AND 1 FULL PILL  IN THE EVENING.  Qty: 135 tablet, Refills: 1    Comments: * * N O T I C E * * PRESCRIPTION PREVIOUSLY AUTHORIZED BY DOCTOR:JUDY DE LA ROSA [phone]* * *  Associated Diagnoses: Essential hypertension      cyanocobalamin, vitamin B-12, (VITAMIN B-12) 1,000 mcg Subl [CYANOCOBALAMIN, VITAMIN B-12, (VITAMIN B-12) 1,000 MCG SUBL] Place 1 tablet (1,000 mcg total) under the tongue daily.  Qty: 90 tablet, Refills: 3    Associated Diagnoses: B12 deficiency      ELIQUIS ANTICOAGULANT 2.5 MG tablet TAKE 1 TABLET (2.5 MG TOTAL) BY MOUTH 2 (TWO) TIMES A DAY.  Qty: 180 tablet, Refills: 0    Comments: * * N O T I C E * * Last quantity doesn't match original quantity. Due for follow-up with cardiology  Associated Diagnoses: Paroxysmal atrial fibrillation (H)      FIBER CHOICE, INULIN, ORAL Take 1 tablet by mouth 2 times daily      furosemide (LASIX) 40 MG tablet [FUROSEMIDE (LASIX) 40 MG TABLET] TAKE 1 TABLET (40 MG TOTAL) BY MOUTH DAILY.  Qty: 90 tablet, Refills: 3    Associated Diagnoses: Essential hypertension      hydrALAZINE (APRESOLINE) 25 MG tablet TAKE 3 TABLETS (75 MG) BY MOUTH 4 TIMES DAILY  Qty: 810 tablet, Refills: 3    Associated Diagnoses: Primary hypertension      LANTUS SOLOSTAR 100 UNIT/ML soln INJECT 16 UNITS SUBCUTANEOUS EVERY MORNING  Qty: 3 mL, Refills: 10    Comments: Please specify alternative quantity desired, if patient requests alternative quantity  Associated Diagnoses: Type 2 diabetes mellitus without complication, without long-term current use of insulin (H)      linagliptin (TRADJENTA) 5 MG TABS tablet Take 1 tablet (5 mg) by mouth daily  Qty: 90 tablet, Refills: 3    Associated Diagnoses: Type 2 diabetes mellitus without complication, with long-term current use of insulin (H)      metoprolol succinate ER (TOPROL-XL) 50 MG 24 hr tablet Take 1 tablet (50 mg) by mouth daily  Qty: 90 tablet, Refills: 3    Associated Diagnoses: Essential hypertension      polyethylene glycol (MIRALAX) 17 gram packet  [POLYETHYLENE GLYCOL (MIRALAX) 17 GRAM PACKET] Take 1 packet (17 g total) by mouth daily as needed.  Refills: 0    Associated Diagnoses: Influenza A      potassium chloride ER (KLOR-CON M) 20 MEQ CR tablet TAKE 1 TABLET (20 MEQ TOTAL) BY MOUTH DAILY.  Qty: 30 tablet, Refills: 6    Associated Diagnoses: Hypokalemia; Chronic kidney disease, stage IV (severe) (H)      sertraline (ZOLOFT) 50 MG tablet Take 50 mg by mouth daily      sodium bicarbonate 650 MG tablet Take 650 mg by mouth 2 times daily      blood glucose (NO BRAND SPECIFIED) lancets standard Use to test blood sugar 3 times daily or as directed.  Qty: 300 lancet, Refills: 3    Associated Diagnoses: Type 2 diabetes mellitus without complication, with long-term current use of insulin (H)      blood glucose (ONETOUCH ULTRA) test strip CHECK BLOOD SUGAR 3 TIMES PER DAY  Qty: 300 strip, Refills: 1    Associated Diagnoses: Type 2 diabetes mellitus (H)      cyanocobalamin 1,000 mcg/mL injection [CYANOCOBALAMIN 1,000 MCG/ML INJECTION] Inject 1,000 mcg into the shoulder, thigh, or buttocks every 3 (three) months.             EASY COMFORT PEN NEEDLES 32G X 4 MM miscellaneous USE 1 DAILY  Qty: 100 each, Refills: 2    Comments: * * N O T I C E * * PRESCRIPTION PREVIOUSLY AUTHORIZED BY DOCTOR:JUDY DE LA ROSA [phone]* * *  Associated Diagnoses: Type 2 diabetes mellitus (H)      Lancets (ONETOUCH DELICA PLUS [id number]) MISC USE TO TEST BLOOD SUGAR 3 TIMES DAILY OR AS DIRECTED.  Qty: 300 each, Refills: 11    Associated Diagnoses: Type 2 diabetes mellitus with other specified complication, with long-term current use of insulin (H)      !! ONETOUCH DELICA PLUS LANCET 33 gauge Misc [ONETOUCH DELICA PLUS LANCET 33 GAUGE MISC] CHECK BLOOD SUGAR 3 TIMES PER DAY.  Qty: 300 each, Refills: 3    Associated Diagnoses: Type 2 diabetes mellitus (H)      !! ONETOUCH ULTRA2 METER Misc [ONETOUCH ULTRA2 METER MISC] USE AS DIRECTED  Qty: 1 each, Refills: 0    Associated Diagnoses:  "Controlled type 2 diabetes with neuropathy (H)       !! - Potential duplicate medications found. Please discuss with provider.        Allergies   Allergies   Allergen Reactions     Cefazolin Other (See Comments)     \"felt very hot\" Oct 2019 Cephalexin, Sep 2020 Ceftriaxone     Pravastatin Muscle Pain (Myalgia)     Increased peripheral neuropathy     Simvastatin Muscle Pain (Myalgia)     Increased peripheral neuropathy     Thiazides [Thiazide-Type Diuretics] Other (See Comments)     Other: Gout       "
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
